# Patient Record
Sex: MALE | Race: WHITE | Employment: OTHER | URBAN - METROPOLITAN AREA
[De-identification: names, ages, dates, MRNs, and addresses within clinical notes are randomized per-mention and may not be internally consistent; named-entity substitution may affect disease eponyms.]

---

## 2017-01-31 ENCOUNTER — OFFICE VISIT (OUTPATIENT)
Dept: SURGERY | Age: 57
End: 2017-01-31

## 2017-01-31 VITALS
HEART RATE: 67 BPM | HEIGHT: 71 IN | DIASTOLIC BLOOD PRESSURE: 68 MMHG | OXYGEN SATURATION: 98 % | WEIGHT: 162 LBS | SYSTOLIC BLOOD PRESSURE: 120 MMHG | TEMPERATURE: 98.8 F | BODY MASS INDEX: 22.68 KG/M2

## 2017-01-31 DIAGNOSIS — K43.2 INCISIONAL HERNIA, WITHOUT OBSTRUCTION OR GANGRENE: Primary | ICD-10-CM

## 2017-01-31 RX ORDER — OXYCODONE AND ACETAMINOPHEN 10; 325 MG/1; MG/1
TABLET ORAL
Refills: 0 | COMMUNITY
Start: 2017-01-17 | End: 2017-11-02

## 2017-01-31 RX ORDER — MORPHINE SULFATE 20 MG/1
CAPSULE, EXTENDED RELEASE ORAL
Refills: 0 | COMMUNITY
Start: 2017-01-28 | End: 2017-11-02

## 2017-01-31 NOTE — PROGRESS NOTES
[employer] [facility] History and Physical    History of Present Illness:      Arnaldo Martin is a 55 y.o. male who has a PSH of multiple laparotomies for Crohn's disease, had small bowel resections, R colectomy, enterocutaneous fistulas. He was most recently operated on in 2/16 for fistula takedown by Dr Eleanor Pritchett of colorectal.  He now presents with an incisional hernia. The hernia is causing him pain in the mid abdomen. The skin is thin over his abdominal scar and has pain there all the time. The pain is increased with straining but hurts to press on it. He also says he thinks he may have one in the RUQ too. Past Medical History   Diagnosis Date    Abdominal wall hernia 6/15/2012    Anal fissure     Anemia     Anemia     Anxiety and depression     Arthritis      Related to the Crohn's disease.  Cancer ([address]      MELANOMA HEAD AND FACE    Chronic pain      GENERALIZED    COPD (chronic obstructive pulmonary disease) (HCC)     Crohn disease (HCC)      Diagnosed at age 17.  Echocardiogram normal (EF: 55-60%) 07/2015    Esophageal ulcer     GERD (gastroesophageal reflux disease)     H/O blood transfusion 2013     [address] [facility]    Hypertension      denies    Migraine     Short bowel syndrome     Ventral hernia without obstruction or gangrene        Past Surgical History   Procedure Laterality Date    Hc ndl ceja       ceja needle, takes 1 inch needle    Pr abdomen surgery proc unlisted       33 abdominal operations for treatment of Crohn's disease and its complications.     Hx cholecystectomy      Hx heent       neck fusion    Hx appendectomy      Hx other surgical       surgical repair from spider bite    Hx other surgical  12/1/14     Incision and drainage of posterior right subcutaneous shoulder abscess    Hx other surgical  2014     LEFT INDEX FINGER SPIDER BITE    Hx other surgical  2014     RECTAL FISTULA    Hx other surgical  3/17/2015     Ileostomy takedown with extensive lysis of adhesions (greater than three hours), mobilization of the splenic flexure, left colon resection, ileocolic anastomosis, and excision of scar; Dr. Harley Aguero.   other surgical  3/22/2015     Exploratory laparotomy with washout of abdomen, suture repair of small bowel/anastomosis, and diverting protective loop ileostomy; Steffany Cabello MD.    Hx other surgical  4/9/2015     Laparotomy, extensive lysis of adhesions, abdominal lavage, and resection of ileocolic anastomosis (including the efferent limb of the loop ileostomy) with creation of Demetrius's pouch; Dr. Harley Aguero.   gi       colectomy x2, one ileostomy    Hx gi  7/28/14     exp lap,partial colectomy with end ileostomy by Dr Ronak Burgess Hx orthopaedic  1980s     wrist right,     Hx orthopaedic  2006, 11/2013     neck, L4 L5 L6    Hx orthopaedic  1990s     right knee scope    Hx other surgical  7/14/2015     Cystoscopy and placement of bilateral ureteral catheters; Ermias Weeks MD.    Hx other surgical  7/14/2015     Ileostomy takedown with extensive lysis of adhesions, small bowel resection, and enterocolostomy; Dr. Harley Aguero.   other surgical  9/29/2015     CT-guided percutaneous drainage of intraabdominal abscess; Dr. Karen Broderick.   other surgical  11/16/2015     CT-guided percutaneous aspiration of abdominal wall cavity; Dr. José Hutchinson.   other surgical  12/1/2015     Incision and drainage of abdominal wall abscess; Dr. Harley Aguero.  Hx other surgical  2/11/2016     Incision and drainage of abdominal wall abscess; Dr. Harley Aguero.   other surgical  2/23/2016     Cystoscopy and placement of bilateral temporary ureteral catheters; Dr. Diana Sparks.  Hx other surgical  2/23/2016     Exploratory laparotomy, extensive lysis of adhesions, removal of mesh, and repair of enterocutaneous fistula; Dr. Harley Aguero.          Current Outpatient Prescriptions:     HYDROmorphone (DILAUDID) 8 mg tablet, Take 1 Tab by mouth every four (4) hours as needed. Max Daily Amount: 48 mg., Disp: 30 Tab, Rfl: 0    cephALEXin (KEFLEX) 500 mg capsule, Take 1 Cap by mouth four (4) times daily. , Disp: 24 Cap, Rfl: 0    colestipol (COLESTID) 1 gram tablet, Take 6 g by mouth four (4) times daily. Min 24 g/day and Max 30 g/day., Disp: , Rfl:     LORazepam (ATIVAN) 1 mg tablet, Take 1 mg by mouth nightly as needed for Anxiety. , Disp: , Rfl:     pregabalin (LYRICA) 75 mg capsule, Take 75 mg by mouth daily. , Disp: , Rfl:     aspirin-acetaminophen-caffeine (EXCEDRIN ES) 250-250-65 mg per tablet, Take 2 Tabs by mouth every six (6) hours as needed for Headache., Disp: , Rfl:     HYDROcodone-acetaminophen (NORCO)  mg tablet, Take 1 Tab by mouth every four (4) hours as needed for Pain., Disp: , Rfl:     diphenoxylate-atropine (LOMOTIL) 2.5-0.025 mg per tablet, Take 2 Tabs by mouth four (4) times daily as needed for Diarrhea., Disp: , Rfl:     loperamide (IMODIUM) 2 mg capsule, Take 2 mg by mouth as needed for Diarrhea. Patient takes 14-16 capsules a day., Disp: , Rfl:     diphenhydrAMINE (BENADRYL) 50 mg capsule, Take 100 mg by mouth nightly as needed. , Disp: , Rfl:     ondansetron hcl (ZOFRAN, AS HYDROCHLORIDE,) 8 mg tablet, Take 8 mg by mouth every eight (8) hours as needed for Nausea., Disp: , Rfl:     multivit-min-FA-lycopen-lutein (CENTRUM SILVER ULTRA MEN'S) 300-600-300 mcg tab, Take 1 Tab by mouth daily. , Disp: , Rfl:     folic acid 058 mcg tablet, Take 400 mcg by mouth three (3) times daily (with meals). , Disp: , Rfl:     Allergies   Allergen Reactions    Honey Anaphylaxis    Remicade [Infliximab] Anaphylaxis    Crestor [Rosuvastatin] Myalgia    Other Plant, Animal, Environmental Other (comments)     Developed \"boils\" on right arm that required I &D after receiving FENTANYL patch.   Is able to take FENTANYL orally; states is allergic to fentanyl patch GLUE    Imuran [Azathioprine] Diarrhea and Nausea Only    Mercaptopurine Diarrhea    Sulfa (Sulfonamide Antibiotics) Other (comments)     Causes diarrhea       Social History     Social History    Marital status:      Spouse name: N/A    Number of children: N/A    Years of education: N/A     Occupational History    Not on file.      Social History Main Topics    Smoking status: Current Every Day Smoker     Packs/day: 1.00     Years: 44.00    Smokeless tobacco: Current User      Comment: CURRENT E CIGS    Alcohol use No      Comment: RARELY    Drug use: No    Sexual activity: Not on file     Other Topics Concern    Not on file     Social History Narrative       Family History   Problem Relation Age of Onset    Stroke Mother     Heart Disease Mother     Kidney Disease Mother     Anesth Problems Mother      TAKES A LONG TIME TO WAKE UP    Heart Disease Father     Thyroid Disease Sister        ROS   Constitutional: negative  Ears, Nose, Mouth, Throat, and Face: negative  Respiratory: negative  Cardiovascular: negative  Gastrointestinal: positive for abdominal pain  Genitourinary:negative  Integument/Breast: negative  Hematologic/Lymphatic: negative  Behavioral/Psychiatric: negative  Allergic/Immunologic: negative      Physical Exam:     Visit Vitals    /68    Pulse 67    Temp 98.8 °F (37.1 °C)    Ht 5' 11\" (1.803 m)    Wt 162 lb (73.5 kg)    SpO2 98%    BMI 22.59 kg/m2       General - alert and oriented, no apparent distress  HEENT - no jaundice, no hearing imparement  Pulm - CTAB, no C/W/R  CV - RRR, no M/R/G  Abd - soft, ND, BS present, large wide midline scar from multiple surgeries, large incisional hernia palpable feels at least 10cm wide, small scab at the base of the hernia, TTP, no incarceration, very wide base  Ext - pulses intact in UE and LE bilaterally, no edema  Skin - supple, no rashes  Psychiatric - normal affect, good mood    Labs  Lab Results   Component Value Date/Time    Sodium 141 08/12/2016 05:32 AM    Potassium 4.5 08/12/2016 05:32 AM    Chloride 107 08/12/2016 05:32 AM    CO2 26 08/12/2016 05:32 AM    Anion gap 8 08/12/2016 05:32 AM    Glucose 102 08/12/2016 05:32 AM    BUN 14 08/12/2016 05:32 AM    Creatinine 1.02 08/12/2016 05:32 AM    BUN/Creatinine ratio 14 08/12/2016 05:32 AM    GFR est AA >60 08/12/2016 05:32 AM    GFR est non-AA >60 08/12/2016 05:32 AM    Calcium 8.6 08/12/2016 05:32 AM    Bilirubin, total 0.4 02/25/2016 02:01 AM    ALT 12 02/25/2016 02:01 AM    AST 12 02/25/2016 02:01 AM    Alk. phosphatase 86 02/25/2016 02:01 AM    Protein, total 6.0 02/25/2016 02:01 AM    Albumin 2.5 02/25/2016 02:01 AM    Globulin 3.5 02/25/2016 02:01 AM    A-G Ratio 0.7 02/25/2016 02:01 AM         Imaging  CT 3/31/16-  Decreased size of the previously seen wound in the subcutaneous tissues of   the right abdomen.      No evidence of a focal drainable fluid collection.     No obstruction. No hernia      I have reviewed and agree with all of the pertinent images    Assessment:     Radha Hernandez is a 55 y.o. male s/p multiple abdominal surgeries with large incisional hernia    Recommendations:     1. His hernia is very large and wide. He has also had a few attempts at hernia repair in the past but other surgeons, mostly they were done in [place]. I will need to get the records from these surgeries and also get a CT to get a baseline for the size of hernia now. He had cut down his smoking and is at a 1/2 ppd but still needs to be smoke free for a month prior to any possible surgery. He will try cutting down now. He will see me in the office soon after the CT to discuss our options for repair. The hernia is large and wide base so very little to no chance of incarceration. Shant Patel MD    Mr. Eleni Valentin has a reminder for a \"due or due soon\" health maintenance. I have asked that he contact his primary care provider for follow-up on this health maintenance.

## 2017-01-31 NOTE — PROGRESS NOTES
1. Have you been to the ER, urgent care clinic since your last visit? Hospitalized since your last visit?  no    2. Have you seen or consulted any other health care providers outside of the [address] [place] [address] since your last visit? Include any pap smears or colon screening.    Dr Omaira Hernandez

## 2017-02-03 ENCOUNTER — HOSPITAL ENCOUNTER (OUTPATIENT)
Dept: CT IMAGING | Age: 57
Discharge: HOME | End: 2017-02-03
Attending: SURGERY
Payer: MEDICARE

## 2017-02-03 DIAGNOSIS — K43.2 INCISIONAL HERNIA, WITHOUT OBSTRUCTION OR GANGRENE: ICD-10-CM

## 2017-02-03 PROCEDURE — 74011636320 HC RX REV CODE- 636/320: Performed by: SURGERY

## 2017-02-03 PROCEDURE — 74177 CT ABD & PELVIS W/CONTRAST: CPT

## 2017-02-03 PROCEDURE — 74011000258 HC RX REV CODE- 258: Performed by: SURGERY

## 2017-02-03 RX ORDER — SODIUM CHLORIDE 0.9 % (FLUSH) 0.9 %
10 SYRINGE (ML) INJECTION
Status: COMPLETED | OUTPATIENT
Start: 2017-02-03 | End: 2017-02-03

## 2017-02-03 RX ORDER — SODIUM CHLORIDE 0.9 % (FLUSH) 0.9 %
10 SYRINGE (ML) INJECTION
Status: DISCONTINUED | OUTPATIENT
Start: 2017-02-03 | End: 2017-02-03 | Stop reason: SDUPTHER

## 2017-02-03 RX ADMIN — Medication 10 ML: at 15:13

## 2017-02-03 RX ADMIN — IOPAMIDOL 100 ML: 755 INJECTION, SOLUTION INTRAVENOUS at 15:13

## 2017-02-03 RX ADMIN — SODIUM CHLORIDE 100 ML: 900 INJECTION, SOLUTION INTRAVENOUS at 15:13

## 2017-02-07 ENCOUNTER — TELEPHONE (OUTPATIENT)
Dept: SURGERY | Age: 57
End: 2017-02-07

## 2017-02-07 NOTE — TELEPHONE ENCOUNTER
I discussed the CT findings with the patient. The CT shows his hernia to be about 10cm wide. He has been smoke free for one week so far and is doing well. He will follow up with me in 2 wks. If he can be smoke free for 4wks he will undergo incisional hernia repair.       Sera Avery

## 2017-02-27 ENCOUNTER — OFFICE VISIT (OUTPATIENT)
Dept: SURGERY | Age: 57
End: 2017-02-27

## 2017-02-27 VITALS
TEMPERATURE: 98 F | RESPIRATION RATE: 18 BRPM | DIASTOLIC BLOOD PRESSURE: 70 MMHG | WEIGHT: 165 LBS | HEART RATE: 85 BPM | OXYGEN SATURATION: 97 % | HEIGHT: 71 IN | BODY MASS INDEX: 23.1 KG/M2 | SYSTOLIC BLOOD PRESSURE: 116 MMHG

## 2017-02-27 DIAGNOSIS — K43.2 INCISIONAL HERNIA, WITHOUT OBSTRUCTION OR GANGRENE: Primary | ICD-10-CM

## 2017-02-27 NOTE — PROGRESS NOTES
1. Have you been to the ER, urgent care clinic since your last visit? Hospitalized since your last visit?  no    2. Have you seen or consulted any other health care providers outside of the [address] [place] [address] since your last visit? Include any pap smears or colon screening.    no

## 2017-02-28 NOTE — PROGRESS NOTES
[facility] History and Physical    History of Present Illness:      Harpal Griggs is a 55 y.o. male who has a PSH of multiple laparotomies for Crohn's disease, had small bowel resections, R colectomy, enterocutaneous fistulas. He was most recently operated on in 2/16 for fistula takedown by Dr Akosua Sykes of colorectal. He now presents with an incisional hernia. He has been mostly smoke free for the past month. He otherwise is doing well and is having some mild pain from the hernia but is manageable. Past Medical History:   Diagnosis Date    Abdominal wall hernia 6/15/2012    Anal fissure     Anemia     Anemia     Anxiety and depression     Arthritis     Related to the Crohn's disease.  Cancer ([address]     MELANOMA HEAD AND FACE    Chronic pain     GENERALIZED    COPD (chronic obstructive pulmonary disease) (HCC)     Crohn disease (HCC)     Diagnosed at age 17.  Echocardiogram normal (EF: 55-60%) 07/2015    Esophageal ulcer     GERD (gastroesophageal reflux disease)     H/O blood transfusion 2013    [address] [facility]    Hypertension     denies    Migraine     Short bowel syndrome     Ventral hernia without obstruction or gangrene        Past Surgical History:   Procedure Laterality Date    ABDOMEN SURGERY PROC UNLISTED      33 abdominal operations for treatment of Crohn's disease and its complications.     HC NDL CEJA      ceja needle, takes 1 inch needle    HX APPENDECTOMY      HX CHOLECYSTECTOMY      HX GI      colectomy x2, one ileostomy    HX GI  7/28/14    exp lap,partial colectomy with end ileostomy by Dr Blank Organ      neck fusion    HX ORTHOPAEDIC  1980s    wrist right,     HX ORTHOPAEDIC  2006, 11/2013    neck, L4 L5 L6    HX ORTHOPAEDIC  1990s    right knee scope    HX OTHER SURGICAL      surgical repair from spider bite    HX OTHER SURGICAL  12/1/14    Incision and drainage of posterior right subcutaneous shoulder abscess    HX OTHER SURGICAL  2014    LEFT INDEX FINGER SPIDER BITE    HX OTHER SURGICAL  2014    RECTAL FISTULA    HX OTHER SURGICAL  3/17/2015    Ileostomy takedown with extensive lysis of adhesions (greater than three hours), mobilization of the splenic flexure, left colon resection, ileocolic anastomosis, and excision of scar; Dr. Codie Foote.  HX OTHER SURGICAL  3/22/2015    Exploratory laparotomy with washout of abdomen, suture repair of small bowel/anastomosis, and diverting protective loop ileostomy; Brice Del Rosario MD.    HX OTHER SURGICAL  4/9/2015    Laparotomy, extensive lysis of adhesions, abdominal lavage, and resection of ileocolic anastomosis (including the efferent limb of the loop ileostomy) with creation of Demetrius's pouch; Dr. Codie Foote.  HX OTHER SURGICAL  7/14/2015    Cystoscopy and placement of bilateral ureteral catheters; Toni Braxton MD.    HX OTHER SURGICAL  7/14/2015    Ileostomy takedown with extensive lysis of adhesions, small bowel resection, and enterocolostomy; Dr. Codie Foote.  HX OTHER SURGICAL  9/29/2015    CT-guided percutaneous drainage of intraabdominal abscess; Dr. Alicia Bernal.   OTHER SURGICAL  11/16/2015    CT-guided percutaneous aspiration of abdominal wall cavity; Dr. Dior Schwab.   OTHER SURGICAL  12/1/2015    Incision and drainage of abdominal wall abscess; Dr. Codie Foote.   OTHER SURGICAL  2/11/2016    Incision and drainage of abdominal wall abscess; Dr. Codie Foote.   OTHER SURGICAL  2/23/2016    Cystoscopy and placement of bilateral temporary ureteral catheters; Dr. Katherine Robertson.   OTHER SURGICAL  2/23/2016    Exploratory laparotomy, extensive lysis of adhesions, removal of mesh, and repair of enterocutaneous fistula; Dr. Codie Foote.          Current Outpatient Prescriptions:     oxyCODONE-acetaminophen (PERCOCET 10)  mg per tablet, TAKE 1 TABLET BY MOUTH EVERY 4 HOURS AS NEEDED FOR PAIN, Disp: , Rfl: 0    Morphine 20 mg CERP, take 1 capsule by mouth once daily, Disp: , Rfl: 0    colestipol (COLESTID) 1 gram tablet, Take 6 g by mouth four (4) times daily. Min 24 g/day and Max 30 g/day., Disp: , Rfl:     LORazepam (ATIVAN) 1 mg tablet, Take 1 mg by mouth nightly as needed for Anxiety. , Disp: , Rfl:     pregabalin (LYRICA) 75 mg capsule, Take 75 mg by mouth daily. , Disp: , Rfl:     loperamide (IMODIUM) 2 mg capsule, Take 2 mg by mouth as needed for Diarrhea. Patient takes 14-16 capsules a day., Disp: , Rfl:     ondansetron hcl (ZOFRAN, AS HYDROCHLORIDE,) 8 mg tablet, Take 8 mg by mouth every eight (8) hours as needed for Nausea., Disp: , Rfl:     multivit-min-FA-lycopen-lutein (CENTRUM SILVER ULTRA MEN'S) 300-600-300 mcg tab, Take 1 Tab by mouth daily. , Disp: , Rfl:     folic acid 810 mcg tablet, Take 400 mcg by mouth three (3) times daily (with meals). , Disp: , Rfl:     HYDROmorphone (DILAUDID) 8 mg tablet, Take 1 Tab by mouth every four (4) hours as needed. Max Daily Amount: 48 mg., Disp: 30 Tab, Rfl: 0    cephALEXin (KEFLEX) 500 mg capsule, Take 1 Cap by mouth four (4) times daily. , Disp: 24 Cap, Rfl: 0    aspirin-acetaminophen-caffeine (EXCEDRIN ES) 250-250-65 mg per tablet, Take 2 Tabs by mouth every six (6) hours as needed for Headache., Disp: , Rfl:     diphenoxylate-atropine (LOMOTIL) 2.5-0.025 mg per tablet, Take 2 Tabs by mouth four (4) times daily as needed for Diarrhea., Disp: , Rfl:     diphenhydrAMINE (BENADRYL) 50 mg capsule, Take 100 mg by mouth nightly as needed. , Disp: , Rfl:     Allergies   Allergen Reactions    Honey Anaphylaxis    Remicade [Infliximab] Anaphylaxis    Crestor [Rosuvastatin] Myalgia    Other Plant, Animal, Environmental Other (comments)     Developed \"boils\" on right arm that required I &D after receiving FENTANYL patch.   Is able to take FENTANYL orally; states is allergic to fentanyl patch GLUE    Imuran [Azathioprine] Diarrhea and Nausea Only    Mercaptopurine Diarrhea    Sulfa (Sulfonamide Antibiotics) Other (comments)     Causes diarrhea       Social History     Social History    Marital status: LEGALLY      Spouse name: N/A    Number of children: N/A    Years of education: N/A     Occupational History    Not on file. Social History Main Topics    Smoking status: Current Every Day Smoker     Packs/day: 1.00     Years: 44.00    Smokeless tobacco: Current User      Comment: CURRENT E CIGS    Alcohol use No      Comment: RARELY    Drug use: No    Sexual activity: Not on file     Other Topics Concern    Not on file     Social History Narrative       Family History   Problem Relation Age of Onset    Stroke Mother     Heart Disease Mother     Kidney Disease Mother     Anesth Problems Mother      TAKES A LONG TIME TO WAKE UP    Heart Disease Father     Thyroid Disease Sister        ROS   Constitutional: negative  Ears, Nose, Mouth, Throat, and Face: negative  Respiratory: negative  Cardiovascular: negative  Gastrointestinal: positive for abdominal pain  Genitourinary:negative  Integument/Breast: negative  Hematologic/Lymphatic: negative  Behavioral/Psychiatric: negative  Allergic/Immunologic: negative      Physical Exam:     Visit Vitals    /70    Pulse 85    Temp 98 °F (36.7 °C)    Resp 18    Ht 5' 11\" (1.803 m)    Wt 165 lb (74.8 kg)    SpO2 97%    BMI 23.01 kg/m2       General - alert and oriented, no apparent distress  HEENT - no jaundice, no hearing imparement  Pulm - CTAB, no C/W/R  CV - RRR, no M/R/G  Abd - soft, ND, BS present, large incisional hernia  Ext - pulses intact in UE and LE bilaterally, no edema  Skin - supple, no rashes  Psychiatric - normal affect, good mood    Labs  none    Imaging  CT - large incisional hernia about 13cm at its widest  I have reviewed and agree with all of the pertinent images    Assessment:     Cheryl Rowley is a 55 y.o. male with large incisional hernia    Recommendations:     1.    He will get sent for urine cotenine testing to test for him smoking. He needs to be smoke free for 4 wks prior to surgery. I will call him after the test results return. If it is positive I will likely have him retest in 1 wk. If normal then we may schedule him for surgery for incisional hernia repair with mesh and component separation. Ruby Kaplan MD    Mr. Thomas Weir has a reminder for a \"due or due soon\" health maintenance. I have asked that he contact his primary care provider for follow-up on this health maintenance.

## 2017-06-22 ENCOUNTER — APPOINTMENT (OUTPATIENT)
Dept: GENERAL RADIOLOGY | Age: 57
End: 2017-06-22
Attending: STUDENT IN AN ORGANIZED HEALTH CARE EDUCATION/TRAINING PROGRAM
Payer: MEDICARE

## 2017-06-22 ENCOUNTER — HOSPITAL ENCOUNTER (EMERGENCY)
Age: 57
Discharge: HOME | End: 2017-06-22
Attending: STUDENT IN AN ORGANIZED HEALTH CARE EDUCATION/TRAINING PROGRAM
Payer: MEDICARE

## 2017-06-22 VITALS
DIASTOLIC BLOOD PRESSURE: 76 MMHG | OXYGEN SATURATION: 100 % | SYSTOLIC BLOOD PRESSURE: 128 MMHG | WEIGHT: 179 LBS | TEMPERATURE: 98.5 F | BODY MASS INDEX: 26.51 KG/M2 | HEIGHT: 69 IN | RESPIRATION RATE: 14 BRPM | HEART RATE: 64 BPM

## 2017-06-22 DIAGNOSIS — M25.461 KNEE EFFUSION, RIGHT: Primary | ICD-10-CM

## 2017-06-22 PROCEDURE — 75810000054 HC ARTHOCENTISIS JOINT

## 2017-06-22 PROCEDURE — 73562 X-RAY EXAM OF KNEE 3: CPT

## 2017-06-22 PROCEDURE — 99282 EMERGENCY DEPT VISIT SF MDM: CPT

## 2017-06-22 PROCEDURE — 74011000250 HC RX REV CODE- 250: Performed by: STUDENT IN AN ORGANIZED HEALTH CARE EDUCATION/TRAINING PROGRAM

## 2017-06-22 RX ORDER — KETOROLAC TROMETHAMINE 10 MG/1
10 TABLET, FILM COATED ORAL
Qty: 8 TAB | Refills: 0 | Status: SHIPPED | OUTPATIENT
Start: 2017-06-22 | End: 2017-06-24

## 2017-06-22 RX ORDER — LIDOCAINE HYDROCHLORIDE 10 MG/ML
5 INJECTION INFILTRATION; PERINEURAL ONCE
Status: COMPLETED | OUTPATIENT
Start: 2017-06-22 | End: 2017-06-22

## 2017-06-22 RX ADMIN — LIDOCAINE HYDROCHLORIDE 5 ML: 10 INJECTION, SOLUTION INFILTRATION; PERINEURAL at 19:01

## 2017-06-22 NOTE — DISCHARGE INSTRUCTIONS
We hope that we have addressed all of your medical concerns. The examination and treatment you received in the Emergency Department were for an emergent problem and were not intended as complete care. It is important that you follow up with your healthcare provider(s) for ongoing care. If your symptoms worsen or do not improve as expected, and you are unable to reach your usual health care provider(s), you should return to the Emergency Department. Today's healthcare is undergoing tremendous change, and patient satisfaction surveys are one of the many tools to assess the quality of medical care. You may receive a survey from the [employer] organization regarding your experience in the Emergency Department. I hope that your experience has been completely positive, particularly the medical care that I provided. As such, please participate in the survey; anything less than excellent does not meet my expectations or intentions. Formerly [facility] and [address] [place] [address] participate in nationally recognized quality of care measures. If your blood pressure is greater than 120/80, as reported below, we urge that you seek medical care to address the potential of high blood pressure, commonly known as hypertension. Hypertension can be hereditary or can be caused by certain medical conditions, pain, stress, or \"white coat syndrome. \"       Please make an appointment with your health care provider(s) for follow up of your Emergency Department visit. VITALS:   Patient Vitals for the past 8 hrs:   Temp Pulse Resp BP SpO2   06/22/17 1751 98.4 °F (36.9 °C) 93 16 135/78 98 %          Thank you for allowing us to provide you with medical care today. We realize that you have many choices for your emergency care needs. Please choose us in the future for any continued health care needs. La Kapoor, [address] [place] [address] [address]   Office: [phone]            No results found for this or any previous visit (from the past 24 hour(s)). Xr Knee Rt 3 V    Result Date: 6/22/2017  EXAM:  XR KNEE RT 3 V INDICATION:  Right knee pain, swelling. Today. No injury. COMPARISON: None. FINDINGS: Three views of the right knee demonstrate a joint effusion without fat fluid level. There is mild osteoarthritis. There is no apparent fracture. IMPRESSION: Joint effusion. No apparent fracture. Knee Pain or Injury: Care Instructions  Your Care Instructions    Injuries are a common cause of knee problems. Sudden (acute) injuries may be caused by a direct blow to the knee. They can also be caused by abnormal twisting, bending, or falling on the knee. Pain, bruising, or swelling may be severe, and may start within minutes of the injury. Overuse is another cause of knee pain. Other causes are climbing stairs, kneeling, and other activities that use the knee. Everyday wear and tear, especially as you get older, also can cause knee pain. Rest, along with home treatment, often relieves pain and allows your knee to heal. If you have a serious knee injury, you may need tests and treatment. Follow-up care is a key part of your treatment and safety. Be sure to make and go to all appointments, and call your doctor if you are having problems. It's also a good idea to know your test results and keep a list of the medicines you take. How can you care for yourself at home? · Be safe with medicines. Read and follow all instructions on the label. ¨ If the doctor gave you a prescription medicine for pain, take it as prescribed. ¨ If you are not taking a prescription pain medicine, ask your doctor if you can take an over-the-counter medicine. · Rest and protect your knee. Take a break from any activity that may cause pain. · Put ice or a cold pack on your knee for 10 to 20 minutes at a time. Put a thin cloth between the ice and your skin.   · Prop up a sore knee on a pillow when you ice it or anytime you sit or lie down for the next 3 days. Try to keep it above the level of your heart. This will help reduce swelling. · If your knee is not swollen, you can put moist heat, a heating pad, or a warm cloth on your knee. · If your doctor recommends an elastic bandage, sleeve, or other type of support for your knee, wear it as directed. · Follow your doctor's instructions about how much weight you can put on your leg. Use a cane, crutches, or a walker as instructed. · Follow your doctor's instructions about activity during your healing process. If you can do mild exercise, slowly increase your activity. · Reach and stay at a healthy weight. Extra weight can strain the joints, especially the knees and hips, and make the pain worse. Losing even a few pounds may help. When should you call for help? Call 911 anytime you think you may need emergency care. For example, call if:  · You have symptoms of a blood clot in your lung (called a pulmonary embolism). These may include:  ¨ Sudden chest pain. ¨ Trouble breathing. ¨ Coughing up blood. Call your doctor now or seek immediate medical care if:  · You have severe or increasing pain. · Your leg or foot turns cold or changes color. · You cannot stand or put weight on your knee. · Your knee looks twisted or bent out of shape. · You cannot move your knee. · You have signs of infection, such as:  ¨ Increased pain, swelling, warmth, or redness. ¨ Red streaks leading from the knee. ¨ Pus draining from a place on your knee. ¨ A fever. · You have signs of a blood clot in your leg (called a deep vein thrombosis), such as:  ¨ Pain in your calf, back of the knee, thigh, or groin. ¨ Redness and swelling in your leg or groin. Watch closely for changes in your health, and be sure to contact your doctor if:  · You have tingling, weakness, or numbness in your knee. · You have any new symptoms, such as swelling.   · You have bruises from a knee injury that last longer than 2 weeks. · You do not get better as expected. Where can you learn more? Go to http://johnathon-marielos.info/. Enter K195 in the search box to learn more about \"Knee Pain or Injury: Care Instructions. \"  Current as of: March 20, 2017  Content Version: 11.3  © 8820-9736 [employer]. Care instructions adapted under license by [employer] (which disclaims liability or warranty for this information). If you have questions about a medical condition or this instruction, always ask your healthcare professional. [place] [address] any warranty or liability for your use of this information.

## 2017-06-22 NOTE — ED NOTES
Pt given discharge instructions, patient education, prescriptions, and follow up information by Provider. Pt states understanding. All questions answered. Pt discharged to home in private vehicle, ambulatory. Pt A/Ox4, RA, pain controlled.

## 2017-08-18 ENCOUNTER — HOSPITAL ENCOUNTER (EMERGENCY)
Age: 57
Discharge: HOME | End: 2017-08-18
Attending: EMERGENCY MEDICINE
Payer: MEDICARE

## 2017-08-18 ENCOUNTER — APPOINTMENT (OUTPATIENT)
Dept: GENERAL RADIOLOGY | Age: 57
End: 2017-08-18
Attending: PHYSICIAN ASSISTANT
Payer: MEDICARE

## 2017-08-18 VITALS
SYSTOLIC BLOOD PRESSURE: 134 MMHG | HEART RATE: 68 BPM | TEMPERATURE: 98.3 F | RESPIRATION RATE: 16 BRPM | HEIGHT: 71 IN | OXYGEN SATURATION: 98 % | WEIGHT: 180 LBS | DIASTOLIC BLOOD PRESSURE: 81 MMHG | BODY MASS INDEX: 25.2 KG/M2

## 2017-08-18 DIAGNOSIS — M25.461 KNEE EFFUSION, RIGHT: Primary | ICD-10-CM

## 2017-08-18 PROCEDURE — 74011250637 HC RX REV CODE- 250/637: Performed by: PHYSICIAN ASSISTANT

## 2017-08-18 PROCEDURE — 73562 X-RAY EXAM OF KNEE 3: CPT

## 2017-08-18 PROCEDURE — 96372 THER/PROPH/DIAG INJ SC/IM: CPT

## 2017-08-18 PROCEDURE — 74011250636 HC RX REV CODE- 250/636: Performed by: PHYSICIAN ASSISTANT

## 2017-08-18 PROCEDURE — 99284 EMERGENCY DEPT VISIT MOD MDM: CPT

## 2017-08-18 PROCEDURE — L1830 KO IMMOB CANVAS LONG PRE OTS: HCPCS

## 2017-08-18 RX ORDER — OXYCODONE AND ACETAMINOPHEN 5; 325 MG/1; MG/1
1 TABLET ORAL
Status: COMPLETED | OUTPATIENT
Start: 2017-08-18 | End: 2017-08-18

## 2017-08-18 RX ORDER — KETOROLAC TROMETHAMINE 30 MG/ML
60 INJECTION, SOLUTION INTRAMUSCULAR; INTRAVENOUS
Status: COMPLETED | OUTPATIENT
Start: 2017-08-18 | End: 2017-08-18

## 2017-08-18 RX ADMIN — OXYCODONE HYDROCHLORIDE AND ACETAMINOPHEN 1 TABLET: 5; 325 TABLET ORAL at 17:15

## 2017-08-18 RX ADMIN — KETOROLAC TROMETHAMINE 60 MG: 30 INJECTION, SOLUTION INTRAMUSCULAR at 17:15

## 2017-08-18 NOTE — ED NOTES
Discharge instructions given to pt. All questions answered and pt verbalized understanding. V/S stable @ time of discharge. Pt ambulatory out of unit on crutches.

## 2017-08-18 NOTE — ED TRIAGE NOTES
TRIAGE NOTE: Patient reports right knee \"popped real bad a few days ago while walking up an incline. Now I can't stand it. \" Reports pain was originally just his right knee, now it radiates down to foot and up to hip.

## 2017-08-18 NOTE — DISCHARGE INSTRUCTIONS
We hope that we have addressed all of your medical concerns. The examination and treatment you received in the Emergency Department were for an emergent problem and were not intended as complete care. It is important that you follow up with your healthcare provider(s) for ongoing care. If your symptoms worsen or do not improve as expected, and you are unable to reach your usual health care provider(s), you should return to the Emergency Department. Today's healthcare is undergoing tremendous change, and patient satisfaction surveys are one of the many tools to assess the quality of medical care. You may receive a survey from the [employer] regarding your experience in the Emergency Department. I hope that your experience has been completely positive, particularly the medical care that I provided. As such, please participate in the survey; anything less than excellent does not meet my expectations or intentions. [facility] and [address] [place] [address] participate in nationally recognized quality of care measures. If your blood pressure is greater than 120/80, as reported below, we urge that you seek medical care to address the potential of high blood pressure, commonly known as hypertension. Hypertension can be hereditary or can be caused by certain medical conditions, pain, stress, or \"white coat syndrome. \"       Please make an appointment with your health care provider(s) for follow up of your Emergency Department visit. VITALS:   Patient Vitals for the past 8 hrs:   Temp Pulse Resp BP SpO2   08/18/17 1649 98.2 °F (36.8 °C) 97 16 132/82 97 %          Thank you for allowing us to provide you with medical care today. We realize that you have many choices for your emergency care needs. Please choose us in the future for any continued health care needs. Kerry Mccoy, [address] [facility]   Office: [phone]            No results found for this or any previous visit (from the past 24 hour(s)). Xr Knee Rt 3 V    Result Date: 8/18/2017  EXAM:  XR KNEE RT 3 V INDICATION:   Right knee pain after walking. COMPARISON: 6/22/2017. FINDINGS: Three views of the right knee demonstrate no fracture or other acute osseous or articular abnormality. There is a large joint effusion. IMPRESSION:  Large effusion. No acute fracture. Learning About RICE (Rest, Ice, Compression, and Elevation)  What is RICE? RICE is a way to care for an injury. RICE helps relieve pain and swelling. It may also help with healing and flexibility. RICE stands for:  · Rest and protect the injured or sore area. · Ice or a cold pack used as soon as possible. · Compression, or wrapping the injured or sore area with an elastic bandage. · Elevation (propping up) the injured or sore area. How do you do RICE? You can use RICE for home treatment when you have general aches and pains or after an injury or surgery. Rest  · Do not put weight on the injury for at least 24 to 48 hours. · Use crutches for a badly sprained knee or ankle. · Support a sprained wrist, elbow, or shoulder with a sling. Ice  · Put ice or a cold pack on the injury right away to reduce pain and swelling. Frozen vegetables will also work as an ice pack. Put a thin cloth between the ice or cold pack and your skin. The cloth protects the injured area from getting too cold. · Use ice for 10 to 15 minutes at a time for the first 48 to 72 hours. Compression  · Use compression for sprains, strains, and surgeries of the arms and legs. · Wrap the injured area with an elastic bandage or compression sleeve to reduce swelling. · Don't wrap it too tightly. If the area below it feels numb, tingles, or feels cool, loosen the wrap. Elevation  · Use elevation for areas of the body that can be propped up, such as arms and legs.   · Prop up the injured area on pillows whenever you use ice. Keep it propped up anytime you sit or lie down. · Try to keep the injured area at or above the level of your heart. This will help reduce swelling and bruising. Where can you learn more? Go to http://johnathon-marielos.info/. Enter X043 in the search box to learn more about \"Learning About RICE (Rest, Ice, Compression, and Elevation). \"  Current as of: March 21, 2017  Content Version: 11.3  © 3234-1017 [employer]. Care instructions adapted under license by [employer] (which disclaims liability or warranty for this information). If you have questions about a medical condition or this instruction, always ask your healthcare professional. [place] [address] any warranty or liability for your use of this information. Knee Pain or Injury: Care Instructions  Your Care Instructions    Injuries are a common cause of knee problems. Sudden (acute) injuries may be caused by a direct blow to the knee. They can also be caused by abnormal twisting, bending, or falling on the knee. Pain, bruising, or swelling may be severe, and may start within minutes of the injury. Overuse is another cause of knee pain. Other causes are climbing stairs, kneeling, and other activities that use the knee. Everyday wear and tear, especially as you get older, also can cause knee pain. Rest, along with home treatment, often relieves pain and allows your knee to heal. If you have a serious knee injury, you may need tests and treatment. Follow-up care is a key part of your treatment and safety. Be sure to make and go to all appointments, and call your doctor if you are having problems. It's also a good idea to know your test results and keep a list of the medicines you take. How can you care for yourself at home? · Be safe with medicines. Read and follow all instructions on the label.   ¨ If the doctor gave you a prescription medicine for pain, take it as prescribed. ¨ If you are not taking a prescription pain medicine, ask your doctor if you can take an over-the-counter medicine. · Rest and protect your knee. Take a break from any activity that may cause pain. · Put ice or a cold pack on your knee for 10 to 20 minutes at a time. Put a thin cloth between the ice and your skin. · Prop up a sore knee on a pillow when you ice it or anytime you sit or lie down for the next 3 days. Try to keep it above the level of your heart. This will help reduce swelling. · If your knee is not swollen, you can put moist heat, a heating pad, or a warm cloth on your knee. · If your doctor recommends an elastic bandage, sleeve, or other type of support for your knee, wear it as directed. · Follow your doctor's instructions about how much weight you can put on your leg. Use a cane, crutches, or a walker as instructed. · Follow your doctor's instructions about activity during your healing process. If you can do mild exercise, slowly increase your activity. · Reach and stay at a healthy weight. Extra weight can strain the joints, especially the knees and hips, and make the pain worse. Losing even a few pounds may help. When should you call for help? Call 911 anytime you think you may need emergency care. For example, call if:  · You have symptoms of a blood clot in your lung (called a pulmonary embolism). These may include:  ¨ Sudden chest pain. ¨ Trouble breathing. ¨ Coughing up blood. Call your doctor now or seek immediate medical care if:  · You have severe or increasing pain. · Your leg or foot turns cold or changes color. · You cannot stand or put weight on your knee. · Your knee looks twisted or bent out of shape. · You cannot move your knee. · You have signs of infection, such as:  ¨ Increased pain, swelling, warmth, or redness. ¨ Red streaks leading from the knee. ¨ Pus draining from a place on your knee. ¨ A fever.   · You have signs of a blood clot in your leg (called a deep vein thrombosis), such as:  ¨ Pain in your calf, back of the knee, thigh, or groin. ¨ Redness and swelling in your leg or groin. Watch closely for changes in your health, and be sure to contact your doctor if:  · You have tingling, weakness, or numbness in your knee. · You have any new symptoms, such as swelling. · You have bruises from a knee injury that last longer than 2 weeks. · You do not get better as expected. Where can you learn more? Go to http://johnathon-marielos.info/. Enter K195 in the search box to learn more about \"Knee Pain or Injury: Care Instructions. \"  Current as of: March 20, 2017  Content Version: 11.3  © 4939-0978 [employer]. Care instructions adapted under license by [employer] (which disclaims liability or warranty for this information). If you have questions about a medical condition or this instruction, always ask your healthcare professional. [place] [address] any warranty or liability for your use of this information.

## 2017-08-18 NOTE — ED PROVIDER NOTES
HPI Comments: 65year old male presenting to the ED for RIGHT knee pain. Pt notes that a few days ago he was walking up an incline when it popped and he fell to the ground, notes that he landed directly onto the knee. Pt states that about 30 minutes later he got up and was able to walk, had localized pain, thought it would improve. Pt notes that over the last couple of days the pain has worsened, now radiates into the ankle and up into the hip. No fever. No tx attempted PTA. Unable to bear weight. Pt notes that he has had issues with the same knee in the past.  Does not have orthopedist.  No CP, SOB, or other concerns. PMHx: extensive GI hx, please see list.  Also HTN, anemia, arthritis, skin CA     reviewed, filled #180 10 mg percocet and #30 15mg morphine ER on 8/7. Patient is a 65 y.o. male presenting with knee pain. The history is provided by the patient. Knee Pain           Past Medical History:   Diagnosis Date    Abdominal wall hernia 6/15/2012    Anal fissure     Anemia     Anemia     Anxiety and depression     Arthritis     Related to the Crohn's disease.  Cancer ([facility] [address]     MELANOMA HEAD AND FACE    Chronic pain     GENERALIZED    COPD (chronic obstructive pulmonary disease) (HCC)     Crohn disease (HCC)     Diagnosed at age 17.  Echocardiogram normal (EF: 55-60%) 07/2015    Esophageal ulcer     GERD (gastroesophageal reflux disease)     H/O blood transfusion 2013    [address] [facility]    Hypertension     denies    Migraine     Short bowel syndrome     Ventral hernia without obstruction or gangrene        Past Surgical History:   Procedure Laterality Date    ABDOMEN SURGERY PROC UNLISTED      33 abdominal operations for treatment of Crohn's disease and its complications.     HC NDL CEJA      ceja needle, takes 1 inch needle    HX APPENDECTOMY      HX CHOLECYSTECTOMY      HX GI      colectomy x2, one ileostomy    HX GI  7/28/14    exp lap,partial colectomy with end ileostomy by Dr Judy Quintana      neck fusion    HX ORTHOPAEDIC  1980s    wrist right,     HX ORTHOPAEDIC  2006, 11/2013    neck, L4 L5 L6    HX ORTHOPAEDIC  1990s    right knee scope    HX OTHER SURGICAL      surgical repair from spider bite    HX OTHER SURGICAL  12/1/14    Incision and drainage of posterior right subcutaneous shoulder abscess    HX OTHER SURGICAL  2014    LEFT INDEX FINGER SPIDER BITE    HX OTHER SURGICAL  2014    RECTAL FISTULA    HX OTHER SURGICAL  3/17/2015    Ileostomy takedown with extensive lysis of adhesions (greater than three hours), mobilization of the splenic flexure, left colon resection, ileocolic anastomosis, and excision of scar; Dr. Laura Montague.  HX OTHER SURGICAL  3/22/2015    Exploratory laparotomy with washout of abdomen, suture repair of small bowel/anastomosis, and diverting protective loop ileostomy; Heath Mandel MD.    HX OTHER SURGICAL  4/9/2015    Laparotomy, extensive lysis of adhesions, abdominal lavage, and resection of ileocolic anastomosis (including the efferent limb of the loop ileostomy) with creation of Demetrius's pouch; Dr. Laura Montague.   OTHER SURGICAL  7/14/2015    Cystoscopy and placement of bilateral ureteral catheters; Kitty Multani MD.    HX OTHER SURGICAL  7/14/2015    Ileostomy takedown with extensive lysis of adhesions, small bowel resection, and enterocolostomy; Dr. Laura Montague.   OTHER SURGICAL  9/29/2015    CT-guided percutaneous drainage of intraabdominal abscess; Dr. Ralph Cobian.   OTHER SURGICAL  11/16/2015    CT-guided percutaneous aspiration of abdominal wall cavity; Dr. Carlos Eduardo Shultz.   OTHER SURGICAL  12/1/2015    Incision and drainage of abdominal wall abscess; Dr. Laura Montague.   OTHER SURGICAL  2/11/2016    Incision and drainage of abdominal wall abscess; Dr. Laura Montague.      OTHER SURGICAL  2/23/2016    Cystoscopy and placement of bilateral temporary ureteral catheters; Dr. Katherine Rodriguez.  HX OTHER SURGICAL  2/23/2016    Exploratory laparotomy, extensive lysis of adhesions, removal of mesh, and repair of enterocutaneous fistula; Dr. Irving Oates. Family History:   Problem Relation Age of Onset   Jojo Lopez Stroke Mother     Heart Disease Mother     Kidney Disease Mother     Anesth Problems Mother      TAKES A LONG TIME TO WAKE UP    Heart Disease Father     Thyroid Disease Sister        Social History     Social History    Marital status: LEGALLY      Spouse name: N/A    Number of children: N/A    Years of education: N/A     Occupational History    Not on file. Social History Main Topics    Smoking status: Current Every Day Smoker     Packs/day: 1.00     Years: 44.00    Smokeless tobacco: Current User      Comment: CURRENT E [employer]    Alcohol use No      Comment: RARELY    Drug use: No    Sexual activity: Not on file     Other Topics Concern    Not on file     Social History Narrative         ALLERGIES: Honey; Remicade [infliximab]; Crestor [rosuvastatin]; Other plant, animal, environmental; Imuran [azathioprine]; Mercaptopurine; and Sulfa (sulfonamide antibiotics)    Review of Systems   Constitutional: Negative for fever. HENT: Negative for trouble swallowing. Eyes: Negative for visual disturbance. Respiratory: Negative for shortness of breath. Cardiovascular: Positive for leg swelling. Gastrointestinal: Negative for abdominal pain, diarrhea and vomiting. Musculoskeletal: Positive for arthralgias and joint swelling. Skin: Negative for wound. Neurological: Negative for weakness. All other systems reviewed and are negative. Vitals:    08/18/17 1649   BP: 132/82   Pulse: 97   Resp: 16   Temp: 98.2 °F (36.8 °C)   SpO2: 97%   Weight: 81.6 kg (180 lb)   Height: 5' 11\" (1.803 m)            Physical Exam   Constitutional: He is oriented to person, place, and time. He appears well-developed and well-nourished. No distress.    Pleasant WM HENT:   Head: Normocephalic and atraumatic. Right Ear: External ear normal.   Left Ear: External ear normal.   Eyes: Conjunctivae are normal. No scleral icterus. Neck: Neck supple. No tracheal deviation present. Cardiovascular: Normal rate. Pulmonary/Chest: Effort normal. No stridor. No respiratory distress. Abdominal: Soft. He exhibits no distension. Musculoskeletal: He exhibits edema and tenderness. RIGHT KNEE: + swelling, large medial effusion. + tender. Pt able to extend the knee. No appreciable laxity. 2+ DP and PT pulses, sensation intact distally, foot warm and well-perfused, brisk cap refill. No TTP of the hip or ankle   Neurological: He is alert and oriented to person, place, and time. Skin: Skin is warm and dry. Psychiatric: He has a normal mood and affect. His behavior is normal.   Nursing note and vitals reviewed. MDM  Number of Diagnoses or Management Options  Knee effusion, right:   Diagnosis management comments: 65year old male presenting to the ED for RIGHT knee pain after a fall a few days ago. Pt with hx problems with the knee. Large effusion noted on exam and XR. No fx. Discussed with pt possible ligamentous injury. Encouraged use of immobilizer, crutches, RICE, ortho f/u.        Amount and/or Complexity of Data Reviewed  Tests in the radiology section of CPT®: ordered and reviewed  Discuss the patient with other providers: yes (Dr. Cristiane Bhatia, ED attending)      ED Course       Procedures

## 2017-11-02 ENCOUNTER — HOSPITAL ENCOUNTER (INPATIENT)
Age: 57
LOS: 7 days | Discharge: HOME | DRG: 330 | End: 2017-11-10
Attending: STUDENT IN AN ORGANIZED HEALTH CARE EDUCATION/TRAINING PROGRAM | Admitting: COLON & RECTAL SURGERY
Payer: MEDICARE

## 2017-11-02 ENCOUNTER — ANESTHESIA (OUTPATIENT)
Dept: SURGERY | Age: 57
DRG: 330 | End: 2017-11-02
Payer: MEDICARE

## 2017-11-02 ENCOUNTER — APPOINTMENT (OUTPATIENT)
Dept: CT IMAGING | Age: 57
DRG: 330 | End: 2017-11-02
Attending: STUDENT IN AN ORGANIZED HEALTH CARE EDUCATION/TRAINING PROGRAM
Payer: MEDICARE

## 2017-11-02 ENCOUNTER — APPOINTMENT (OUTPATIENT)
Dept: GENERAL RADIOLOGY | Age: 57
DRG: 330 | End: 2017-11-02
Attending: COLON & RECTAL SURGERY
Payer: MEDICARE

## 2017-11-02 ENCOUNTER — APPOINTMENT (OUTPATIENT)
Dept: GENERAL RADIOLOGY | Age: 57
DRG: 330 | End: 2017-11-02
Attending: EMERGENCY MEDICINE
Payer: MEDICARE

## 2017-11-02 DIAGNOSIS — K56.609 LARGE BOWEL OBSTRUCTION (HCC): Primary | ICD-10-CM

## 2017-11-02 DIAGNOSIS — R11.0 NAUSEA: ICD-10-CM

## 2017-11-02 DIAGNOSIS — K90.9 DIARRHEA DUE TO MALABSORPTION: ICD-10-CM

## 2017-11-02 DIAGNOSIS — G89.18 ACUTE POST-OPERATIVE PAIN: ICD-10-CM

## 2017-11-02 DIAGNOSIS — G89.4 CHRONIC PAIN SYNDROME: ICD-10-CM

## 2017-11-02 DIAGNOSIS — R10.9 ACUTE ABDOMINAL PAIN: ICD-10-CM

## 2017-11-02 DIAGNOSIS — Z71.89 ADVANCED CARE PLANNING/COUNSELING DISCUSSION: ICD-10-CM

## 2017-11-02 DIAGNOSIS — R10.84 GENERALIZED ABDOMINAL PAIN: ICD-10-CM

## 2017-11-02 DIAGNOSIS — R19.7 DIARRHEA DUE TO MALABSORPTION: ICD-10-CM

## 2017-11-02 DIAGNOSIS — M54.50 CHRONIC BILATERAL LOW BACK PAIN WITHOUT SCIATICA: ICD-10-CM

## 2017-11-02 DIAGNOSIS — G89.29 CHRONIC BILATERAL LOW BACK PAIN WITHOUT SCIATICA: ICD-10-CM

## 2017-11-02 LAB
ALBUMIN SERPL-MCNC: 3.8 G/DL (ref 3.5–5)
ALBUMIN/GLOB SERPL: 1 {RATIO} (ref 1.1–2.2)
ALP SERPL-CCNC: 99 U/L (ref 45–117)
ALT SERPL-CCNC: 22 U/L (ref 12–78)
ANION GAP SERPL CALC-SCNC: 10 MMOL/L (ref 5–15)
AST SERPL-CCNC: 17 U/L (ref 15–37)
BASOPHILS # BLD: 0 K/UL (ref 0–0.1)
BASOPHILS NFR BLD: 0 % (ref 0–1)
BILIRUB SERPL-MCNC: 0.3 MG/DL (ref 0.2–1)
BUN SERPL-MCNC: 9 MG/DL (ref 6–20)
BUN/CREAT SERPL: 9 (ref 12–20)
CALCIUM SERPL-MCNC: 8.7 MG/DL (ref 8.5–10.1)
CHLORIDE SERPL-SCNC: 102 MMOL/L (ref 97–108)
CO2 SERPL-SCNC: 26 MMOL/L (ref 21–32)
CREAT SERPL-MCNC: 1.03 MG/DL (ref 0.7–1.3)
EOSINOPHIL # BLD: 0.2 K/UL (ref 0–0.4)
EOSINOPHIL NFR BLD: 2 % (ref 0–7)
ERYTHROCYTE [DISTWIDTH] IN BLOOD BY AUTOMATED COUNT: 15.7 % (ref 11.5–14.5)
GLOBULIN SER CALC-MCNC: 3.8 G/DL (ref 2–4)
GLUCOSE SERPL-MCNC: 79 MG/DL (ref 65–100)
HCT VFR BLD AUTO: 43.3 % (ref 36.6–50.3)
HGB BLD-MCNC: 14 G/DL (ref 12.1–17)
LYMPHOCYTES # BLD: 2.2 K/UL (ref 0.8–3.5)
LYMPHOCYTES NFR BLD: 23 % (ref 12–49)
MCH RBC QN AUTO: 28.5 PG (ref 26–34)
MCHC RBC AUTO-ENTMCNC: 32.3 G/DL (ref 30–36.5)
MCV RBC AUTO: 88 FL (ref 80–99)
MONOCYTES # BLD: 0.7 K/UL (ref 0–1)
MONOCYTES NFR BLD: 7 % (ref 5–13)
NEUTS SEG # BLD: 6.6 K/UL (ref 1.8–8)
NEUTS SEG NFR BLD: 68 % (ref 32–75)
PLATELET # BLD AUTO: 392 K/UL (ref 150–400)
POTASSIUM SERPL-SCNC: 3.6 MMOL/L (ref 3.5–5.1)
PROT SERPL-MCNC: 7.6 G/DL (ref 6.4–8.2)
RBC # BLD AUTO: 4.92 M/UL (ref 4.1–5.7)
SODIUM SERPL-SCNC: 138 MMOL/L (ref 136–145)
WBC # BLD AUTO: 9.7 K/UL (ref 4.1–11.1)

## 2017-11-02 PROCEDURE — 99284 EMERGENCY DEPT VISIT MOD MDM: CPT

## 2017-11-02 PROCEDURE — 74000 XR ABD PORT  1 V: CPT

## 2017-11-02 PROCEDURE — 77030002966 HC SUT PDS J&J -A: Performed by: COLON & RECTAL SURGERY

## 2017-11-02 PROCEDURE — 74011000250 HC RX REV CODE- 250

## 2017-11-02 PROCEDURE — 77030011640 HC PAD GRND REM COVD -A: Performed by: COLON & RECTAL SURGERY

## 2017-11-02 PROCEDURE — 74011250636 HC RX REV CODE- 250/636

## 2017-11-02 PROCEDURE — 96374 THER/PROPH/DIAG INJ IV PUSH: CPT

## 2017-11-02 PROCEDURE — 80053 COMPREHEN METABOLIC PANEL: CPT | Performed by: EMERGENCY MEDICINE

## 2017-11-02 PROCEDURE — 77030002996 HC SUT SLK J&J -A: Performed by: COLON & RECTAL SURGERY

## 2017-11-02 PROCEDURE — 77030031139 HC SUT VCRL2 J&J -A: Performed by: COLON & RECTAL SURGERY

## 2017-11-02 PROCEDURE — 77030032490 HC SLV COMPR SCD KNE COVD -B: Performed by: COLON & RECTAL SURGERY

## 2017-11-02 PROCEDURE — 77030034850: Performed by: COLON & RECTAL SURGERY

## 2017-11-02 PROCEDURE — 76210000017 HC OR PH I REC 1.5 TO 2 HR: Performed by: COLON & RECTAL SURGERY

## 2017-11-02 PROCEDURE — 77030019702 HC WRP THER MENM -C: Performed by: COLON & RECTAL SURGERY

## 2017-11-02 PROCEDURE — 0DS80ZZ REPOSITION SMALL INTESTINE, OPEN APPROACH: ICD-10-PCS | Performed by: COLON & RECTAL SURGERY

## 2017-11-02 PROCEDURE — 74020 XR ABD FLAT/ ERECT: CPT

## 2017-11-02 PROCEDURE — 77030018836 HC SOL IRR NACL ICUM -A: Performed by: COLON & RECTAL SURGERY

## 2017-11-02 PROCEDURE — C1765 ADHESION BARRIER: HCPCS | Performed by: COLON & RECTAL SURGERY

## 2017-11-02 PROCEDURE — 76060000038 HC ANESTHESIA 3.5 TO 4 HR: Performed by: COLON & RECTAL SURGERY

## 2017-11-02 PROCEDURE — 76010000134 HC OR TIME 3.5 TO 4 HR: Performed by: COLON & RECTAL SURGERY

## 2017-11-02 PROCEDURE — 36415 COLL VENOUS BLD VENIPUNCTURE: CPT | Performed by: STUDENT IN AN ORGANIZED HEALTH CARE EDUCATION/TRAINING PROGRAM

## 2017-11-02 PROCEDURE — 96375 TX/PRO/DX INJ NEW DRUG ADDON: CPT

## 2017-11-02 PROCEDURE — 85025 COMPLETE CBC W/AUTO DIFF WBC: CPT | Performed by: EMERGENCY MEDICINE

## 2017-11-02 PROCEDURE — 3E0M05Z INTRODUCTION OF ADHESION BARRIER INTO PERITONEAL CAVITY, OPEN APPROACH: ICD-10-PCS | Performed by: COLON & RECTAL SURGERY

## 2017-11-02 PROCEDURE — 96376 TX/PRO/DX INJ SAME DRUG ADON: CPT

## 2017-11-02 PROCEDURE — 74011000258 HC RX REV CODE- 258: Performed by: STUDENT IN AN ORGANIZED HEALTH CARE EDUCATION/TRAINING PROGRAM

## 2017-11-02 PROCEDURE — 74011636320 HC RX REV CODE- 636/320: Performed by: STUDENT IN AN ORGANIZED HEALTH CARE EDUCATION/TRAINING PROGRAM

## 2017-11-02 PROCEDURE — 0DN80ZZ RELEASE SMALL INTESTINE, OPEN APPROACH: ICD-10-PCS | Performed by: COLON & RECTAL SURGERY

## 2017-11-02 PROCEDURE — 74011250636 HC RX REV CODE- 250/636: Performed by: STUDENT IN AN ORGANIZED HEALTH CARE EDUCATION/TRAINING PROGRAM

## 2017-11-02 PROCEDURE — 74177 CT ABD & PELVIS W/CONTRAST: CPT

## 2017-11-02 PROCEDURE — 77030011264 HC ELECTRD BLD EXT COVD -A: Performed by: COLON & RECTAL SURGERY

## 2017-11-02 PROCEDURE — 77030008467 HC STPLR SKN COVD -B: Performed by: COLON & RECTAL SURGERY

## 2017-11-02 RX ORDER — CLONIDINE HYDROCHLORIDE 0.1 MG/1
0.1 TABLET ORAL 2 TIMES DAILY
Status: ON HOLD | COMMUNITY
End: 2019-06-30

## 2017-11-02 RX ORDER — LORAZEPAM 2 MG/ML
1 INJECTION INTRAMUSCULAR
Status: COMPLETED | OUTPATIENT
Start: 2017-11-02 | End: 2017-11-02

## 2017-11-02 RX ORDER — HYDROMORPHONE HYDROCHLORIDE 1 MG/ML
1 INJECTION, SOLUTION INTRAMUSCULAR; INTRAVENOUS; SUBCUTANEOUS
Status: COMPLETED | OUTPATIENT
Start: 2017-11-02 | End: 2017-11-02

## 2017-11-02 RX ORDER — MORPHINE SULFATE 15 MG/1
15 TABLET, FILM COATED, EXTENDED RELEASE ORAL DAILY
Status: ON HOLD | COMMUNITY
End: 2017-11-10

## 2017-11-02 RX ORDER — CHLORZOXAZONE 500 MG/1
500-1000 TABLET ORAL
COMMUNITY

## 2017-11-02 RX ORDER — HYDROMORPHONE HYDROCHLORIDE 1 MG/ML
1 INJECTION, SOLUTION INTRAMUSCULAR; INTRAVENOUS; SUBCUTANEOUS ONCE
Status: COMPLETED | OUTPATIENT
Start: 2017-11-02 | End: 2017-11-02

## 2017-11-02 RX ORDER — SODIUM CHLORIDE 0.9 % (FLUSH) 0.9 %
10 SYRINGE (ML) INJECTION
Status: COMPLETED | OUTPATIENT
Start: 2017-11-02 | End: 2017-11-02

## 2017-11-02 RX ORDER — HYDROCODONE BITARTRATE AND ACETAMINOPHEN 10; 325 MG/1; MG/1
1-2 TABLET ORAL
COMMUNITY
End: 2017-11-10

## 2017-11-02 RX ADMIN — HYDROMORPHONE HYDROCHLORIDE 1 MG: 1 INJECTION, SOLUTION INTRAMUSCULAR; INTRAVENOUS; SUBCUTANEOUS at 18:24

## 2017-11-02 RX ADMIN — Medication 10 ML: at 20:57

## 2017-11-02 RX ADMIN — LORAZEPAM 1 MG: 2 INJECTION, SOLUTION INTRAMUSCULAR; INTRAVENOUS at 22:34

## 2017-11-02 RX ADMIN — HYDROMORPHONE HYDROCHLORIDE 1 MG: 1 INJECTION, SOLUTION INTRAMUSCULAR; INTRAVENOUS; SUBCUTANEOUS at 22:35

## 2017-11-02 RX ADMIN — SODIUM CHLORIDE 100 ML: 900 INJECTION, SOLUTION INTRAVENOUS at 20:57

## 2017-11-02 RX ADMIN — IOHEXOL 50 ML: 240 INJECTION, SOLUTION INTRATHECAL; INTRAVASCULAR; INTRAVENOUS; ORAL at 20:58

## 2017-11-02 RX ADMIN — HYDROMORPHONE HYDROCHLORIDE 1 MG: 1 INJECTION, SOLUTION INTRAMUSCULAR; INTRAVENOUS; SUBCUTANEOUS at 17:44

## 2017-11-02 RX ADMIN — IOPAMIDOL 100 ML: 755 INJECTION, SOLUTION INTRAVENOUS at 20:57

## 2017-11-02 NOTE — ED TRIAGE NOTES
Pt. presents doubled over and complains of abd. Pain and \"intestinal blockage\". Pain started Tuesday. States he hasn't eaten in about 4 days. Last \"real\" BM a few days ago per pt.

## 2017-11-02 NOTE — ED PROVIDER NOTES
HPI Comments: 65 y.o. male with extensive past medical history, please see list, significant for Crohn's disease, arthritis, migraine, chronic generalized pain, anemia, hypertension, anxiety, depression, COPD, GERD, short bowel syndrome, and ventral hernia without obstruction or gangrene who presents from home with chief complaint of abdominal pain. Patient states onset of moderate generalized abdominal pain that is aggravated upon palpation. Patient suspects he is having an \"intestinal blockage\" for the past 3 days described as \"10/10\" abdominal pain and he is unable to find a position of comfort. Patient admits he has not been eating or sleeping the past 4 days though he has been drinking a lot of fluids. Patient notes his last episode of obstruction was over 2 years ago. Patient denies fever and chills. There are no other acute medical concerns at this time. Old Chart Review: Per note, patient was admitted from 02/14/16 through 03/1/16 for enterocutaneous fistula, Crohn's disease, short bowel syndrome, and malnutrition. Patient has hx of multiple abdominal surgeries inclunding removal of abdominal wall abscess. Social hx: Tobacco Use: Yes (1 pack per day), Alcohol Use: No (rarely), Drug Use: No    PCP: Stacy Gregg MD    Note written by Elvis Cotton, as dictated by Henrry Hope MD 5:23 PM      The history is provided by the patient and medical records. Past Medical History:   Diagnosis Date    Abdominal wall hernia 6/15/2012    Anal fissure     Anemia     Anemia     Anxiety and depression     Arthritis     Related to the Crohn's disease.  Cancer ([address]     MELANOMA HEAD AND FACE    Chronic pain     GENERALIZED    COPD (chronic obstructive pulmonary disease) (HCC)     Crohn disease (HCC)     Diagnosed at age 17.     Echocardiogram normal (EF: 55-60%) 07/2015    Esophageal ulcer     GERD (gastroesophageal reflux disease)     H/O blood transfusion 2013    [facility] [place]    Hypertension     denies    Migraine     Short bowel syndrome     Ventral hernia without obstruction or gangrene        Past Surgical History:   Procedure Laterality Date    ABDOMEN SURGERY PROC UNLISTED      33 abdominal operations for treatment of Crohn's disease and its complications.  HC NDL CEJA      ceja needle, takes 1 inch needle    HX APPENDECTOMY      HX CHOLECYSTECTOMY      HX GI      colectomy x2, one ileostomy    HX GI  7/28/14    exp lap,partial colectomy with end ileostomy by Dr Pantera Talavera      neck fusion    HX ORTHOPAEDIC  1980s    wrist right,     HX ORTHOPAEDIC  2006, 11/2013    neck, L4 L5 L6    HX ORTHOPAEDIC  1990s    right knee scope    HX OTHER SURGICAL      surgical repair from spider bite    HX OTHER SURGICAL  12/1/14    Incision and drainage of posterior right subcutaneous shoulder abscess    HX OTHER SURGICAL  2014    LEFT INDEX FINGER SPIDER BITE    HX OTHER SURGICAL  2014    RECTAL FISTULA    HX OTHER SURGICAL  3/17/2015    Ileostomy takedown with extensive lysis of adhesions (greater than three hours), mobilization of the splenic flexure, left colon resection, ileocolic anastomosis, and excision of scar; Dr. Kassie Guthrie.  HX OTHER SURGICAL  3/22/2015    Exploratory laparotomy with washout of abdomen, suture repair of small bowel/anastomosis, and diverting protective loop ileostomy; Zofia De Leon MD.    HX OTHER SURGICAL  4/9/2015    Laparotomy, extensive lysis of adhesions, abdominal lavage, and resection of ileocolic anastomosis (including the efferent limb of the loop ileostomy) with creation of Demetrius's pouch; Dr. Kassie Guthrie.  HX OTHER SURGICAL  7/14/2015    Cystoscopy and placement of bilateral ureteral catheters; Thiago Raya MD.    HX OTHER SURGICAL  7/14/2015    Ileostomy takedown with extensive lysis of adhesions, small bowel resection, and enterocolostomy; Dr. Kassie Guthrie.      OTHER SURGICAL 9/29/2015    CT-guided percutaneous drainage of intraabdominal abscess; Dr. Martha Teran.   OTHER SURGICAL  11/16/2015    CT-guided percutaneous aspiration of abdominal wall cavity; Dr. Foreign Schneider.   OTHER SURGICAL  12/1/2015    Incision and drainage of abdominal wall abscess; Dr. Ronda Allen.   OTHER SURGICAL  2/11/2016    Incision and drainage of abdominal wall abscess; Dr. Ronda Allen.   OTHER SURGICAL  2/23/2016    Cystoscopy and placement of bilateral temporary ureteral catheters; Dr. Catie Miranda.   OTHER SURGICAL  2/23/2016    Exploratory laparotomy, extensive lysis of adhesions, removal of mesh, and repair of enterocutaneous fistula; Dr. Ronda Allen. Family History:   Problem Relation Age of Onset   [employer] Stroke Mother     Heart Disease Mother     Kidney Disease Mother     Anesth Problems Mother      TAKES A LONG TIME TO WAKE UP    Heart Disease Father     Thyroid Disease Sister        Social History     Social History    Marital status: LEGALLY      Spouse name: N/A    Number of children: N/A    Years of education: N/A     Occupational History    Not on file. Social History Main Topics    Smoking status: Current Every Day Smoker     Packs/day: 1.00     Years: 44.00    Smokeless tobacco: Current User      Comment: CURRENT E CIGS    Alcohol use No      Comment: RARELY    Drug use: No    Sexual activity: Not on file     Other Topics Concern    Not on file     Social History Narrative         ALLERGIES: Honey; Remicade [infliximab]; Crestor [rosuvastatin]; Other plant, animal, environmental; Imuran [azathioprine]; Mercaptopurine; and Sulfa (sulfonamide antibiotics)    Review of Systems   Constitutional: Negative for chills, diaphoresis, fatigue and fever. HENT: Negative for congestion, rhinorrhea, sinus pressure, sore throat, trouble swallowing and voice change. Eyes: Negative for photophobia and visual disturbance.    Respiratory: Negative for cough, chest tightness and shortness of breath. Cardiovascular: Negative for chest pain, palpitations and leg swelling. Gastrointestinal: Positive for abdominal pain and nausea. Negative for blood in stool, constipation, diarrhea and vomiting. Musculoskeletal: Negative for arthralgias, myalgias and neck pain. Neurological: Negative for dizziness, weakness, light-headedness, numbness and headaches. All other systems reviewed and are negative. Vitals:    11/02/17 1511   BP: 127/81   Pulse: 75   Resp: 20   Temp: 97.8 °F (36.6 °C)   SpO2: 99%   Weight: 69.6 kg (153 lb 6.4 oz)   Height: 5' 11\" (1.803 m)            Physical Exam   Constitutional: He is oriented to person, place, and time. He appears well-developed and well-nourished. He appears distressed. Moderate to severe distress   Patient appears uncomfortable    HENT:   Head: Normocephalic and atraumatic. Nose: Nose normal.   Mouth/Throat: Oropharynx is clear and moist. No oropharyngeal exudate. Eyes: Conjunctivae and EOM are normal. Right eye exhibits no discharge. Left eye exhibits no discharge. No scleral icterus. Neck: Normal range of motion. Neck supple. No JVD present. No tracheal deviation present. No thyromegaly present. Cardiovascular: Normal rate, regular rhythm, normal heart sounds and intact distal pulses. Exam reveals no gallop and no friction rub. No murmur heard. Pulmonary/Chest: Effort normal and breath sounds normal. No stridor. No respiratory distress. He has no wheezes. He has no rales. He exhibits no tenderness. Abdominal: Bowel sounds are normal. He exhibits no distension and no mass. There is tenderness. There is no rebound. Patient has a large midline incision scar with scar tissue   Patient's abdomen is extremely tender to touch    Musculoskeletal: Normal range of motion. He exhibits no edema or tenderness. Lymphadenopathy:     He has no cervical adenopathy.    Neurological: He is alert and oriented to person, place, and time. No cranial nerve deficit. Coordination normal.   Skin: Skin is warm and dry. No rash noted. He is not diaphoretic. No erythema. No pallor. Psychiatric: He has a normal mood and affect. His behavior is normal. Judgment and thought content normal.      Note written by Elvis Chaidez, as dictated by Shayla Montesinos MD 5:23 PM    MDM  Number of Diagnoses or Management Options  Large bowel obstruction:      Amount and/or Complexity of Data Reviewed  Clinical lab tests: ordered and reviewed  Tests in the radiology section of CPT®: ordered and reviewed  Discuss the patient with other providers: yes  Independent visualization of images, tracings, or specimens: yes    Risk of Complications, Morbidity, and/or Mortality  Presenting problems: moderate  Diagnostic procedures: moderate  Management options: moderate    Critical Care  Total time providing critical care: 30-74 minutes (Total critical care time spent exclusive of procedures:  40 min.)    Patient Progress  Patient progress: stable    ED Course       Procedures    CONSULT NOTE:  9:58 PM Shayla Montesinos MD spoke with Dr. Zully Browning, Consult for colorectal surgery. Discussed available diagnostic tests and clinical findings. He is in agreement with care plans as outlined. Provider will evaluate the patient for surgery. 11:22 PM  Patient is being admitted to the hospital.  The results of their tests and reasons for their admission have been discussed with them and/or available family. They convey agreement and understanding for the need to be admitted and for their admission diagnosis. Consultation has been made with the inpatient physician specialist for hospitalization.     LABORATORY TESTS:  Recent Results (from the past 12 hour(s))   GLUCOSE, POC    Collection Time: 11/04/17 12:21 PM   Result Value Ref Range    Glucose (POC) 128 (H) 65 - 100 mg/dL    Performed by Meron Max POC    Collection Time: 11/04/17  4:29 PM Result Value Ref Range    Glucose (POC) 127 (H) 65 - 100 mg/dL    Performed by Talha HOGAN(CON)    GLUCOSE, POC    Collection Time: 11/04/17 10:48 PM   Result Value Ref Range    Glucose (POC) 167 (H) 65 - 100 mg/dL    Performed by Talha HOGAN(CON)        IMAGING RESULTS:  XR ABD PORT  1 V   Final Result      CT ABD PELV W CONT   Final Result      XR ABD FLAT/ ERECT   Final Result        No results found.     MEDICATIONS GIVEN:  Medications   sodium chloride (NS) flush 5-10 mL (10 mL IntraVENous Given 11/4/17 2304)   sodium chloride (NS) flush 5-10 mL (not administered)   naloxone (NARCAN) injection 0.4 mg (not administered)   ondansetron (ZOFRAN) injection 4 mg (not administered)   LORazepam (ATIVAN) injection 1 mg (not administered)   midazolam (VERSED) 1 mg/mL injection (not administered)   ADDaptor (not administered)   0.9% sodium chloride (MBP/ADV) 0.9 % infusion (not administered)   TPN ADULT - CENTRAL ( IntraVENous New Bag 11/3/17 1908)   insulin lispro (HUMALOG) injection (2 Units SubCUTAneous Given 11/4/17 2302)   glucose chewable tablet 16 g (not administered)   dextrose (D50W) injection syrg 12.5-25 g (not administered)   glucagon (GLUCAGEN) injection 1 mg (not administered)   sodium chloride (NS) flush 20 mL (not administered)   sodium chloride (NS) flush 10 mL (10 mL InterCATHeter Given 11/4/17 1200)   sodium chloride (NS) flush 10 mL (not administered)   sodium chloride (NS) flush 10 mL (10 mL InterCATHeter Given 11/4/17 2303)   alteplase (CATHFLO) 1 mg in sterile water (preservative free) 1 mL injection (not administered)   bacitracin 500 unit/gram packet 1 Packet (not administered)   butalbital-acetaminophen-caffeine (FIORICET, ESGIC) -40 mg per tablet 1 Tab (1 Tab Oral Given 11/4/17 1844)   nicotine (NICODERM CQ) 14 mg/24 hr patch 1 Patch (1 Patch TransDERmal Remove Patch 11/4/17 4757)   lactated Ringers infusion (50 mL/hr IntraVENous New Bag 11/4/17 1146)   TPN ADULT - CENTRAL ( IntraVENous New Bag 11/4/17 1831)   fat emulsion 20% (LIPOSYN, INTRAlipid) infusion 500 mL (500 mL IntraVENous Given 11/4/17 1831)   chlorzoxazone (PARAFON FORTE) tablet 500 mg (not administered)   morphine CR (MS CONTIN) tablet 15 mg (15 mg Oral Given 11/4/17 1147)   HYDROmorphone (PF) 15 mg/30 ml (DILAUDID) PCA ( IntraVENous Rate Verify 11/4/17 1952)   cloNIDine HCl (CATAPRES) tablet 0.1 mg (0.1 mg Oral Given 11/4/17 1750)   diphenhydrAMINE (BENADRYL) capsule 100 mg (not administered)   aluminum-magnesium hydroxide (MAALOX) oral suspension 15 mL (not administered)   famotidine (PEPCID) tablet 20 mg (20 mg Oral Given 11/4/17 1749)   HYDROmorphone (PF) (DILAUDID) injection 1 mg (1 mg IntraVENous Given 11/2/17 1744)   sodium chloride 0.9 % bolus infusion 100 mL (100 mL IntraVENous New Bag 11/2/17 2057)   iopamidol (ISOVUE-370) 76 % injection 100 mL (100 mL IntraVENous Given 11/2/17 2057)   sodium chloride (NS) flush 10 mL (10 mL IntraVENous Given 11/2/17 2057)   iohexol (OMNIPAQUE) solution 50 mL (50 mL Oral Given 11/2/17 2058)   HYDROmorphone (PF) (DILAUDID) injection 1 mg (1 mg IntraVENous Given 11/2/17 1824)   LORazepam (ATIVAN) injection 1 mg (1 mg IntraVENous Given 11/2/17 2234)   HYDROmorphone (PF) (DILAUDID) injection 1 mg (1 mg IntraVENous Given 11/2/17 2235)   cefoTEtan (CEFOTAN) 2 g in 0.9% sodium chloride (MBP/ADV) 50 mL (2 g IntraVENous New Bag 11/3/17 0142)   fentaNYL citrate (PF) injection 25 mcg (25 mcg IntraVENous Given 11/3/17 0625)   HYDROmorphone (PF) (DILAUDID) injection 0.5 mg (0.5 mg IntraVENous Given 11/3/17 0617)       IMPRESSION:  1. Large bowel obstruction        PLAN:  1.  Admit to Colorectal surgery    Total critical care time spent exclusive of procedures:  40 minutes      Edgar Blanco MD

## 2017-11-02 NOTE — IP AVS SNAPSHOT
[address] 
[phone] Patient: Radha Hernandez MRN: [id number] YJR:[id number] You are allergic to the following Allergen Reactions Honey Anaphylaxis Remicade (Infliximab) Anaphylaxis Crestor (Rosuvastatin) Myalgia Other Plant, Animal, Environmental Other (comments) Developed \"boils\" on right arm that required I &D after receiving FENTANYL patch. Is able to take FENTANYL orally; states is allergic to fentanyl patch GLUE Imuran (Azathioprine) Diarrhea Nausea Only Mercaptopurine Diarrhea Sulfa (Sulfonamide Antibiotics) Other (comments) Causes diarrhea Recent Documentation Height Weight BMI Smoking Status 1.803 m 72.4 kg 22.26 kg/m2 Current Every Day Smoker Emergency Contacts  (Rel.) Home Phone Work Phone Mobile Phone [facility] Nixani Samaniegojose Allenjairo) [phone] -- -- About your hospitalization You were admitted on:  November 3, 2017 You last received care in the:  [facility] You were discharged on:  November 10, 2017 Why you were hospitalized Your primary diagnosis was: Intussusception Of Intestine (Hcc) Your diagnoses also included:  Short Bowel Syndrome, Chronic Pain, Incisional Hernia, Without Obstruction Or Gangrene, Bowel Obstruction Providers Seen During Your Hospitalization Provider Specialty Primary office phone Gilbert Elias MD Emergency Medicine [phone] Cristobal Padilla MD Colon and Rectal Surgery [phone] Your Primary Care Physician (PCP) Primary Care Physician Office Phone Office Fax Aly Paredes [phone] Follow-up Information Follow up With Details Comments Contact Info Ju Perkins MD On 11/15/2017 For discharge follow up 3:00PM  [facility] [email or web address] [facility] 
[phone] [facility] EM[facility]ENC[facility]P  If symptoms worsen [address] [address] 
[phone] Aki Kelly MD Schedule an appointment as soon as possible for a visit in 3 weeks For wound re-check [address] [facility] [address] 1400 [address] [place] [address] 
[phone] My Medications STOP taking these medications diphenhydrAMINE 50 mg capsule Commonly known as:  BENADRYL  
   
  
  
TAKE these medications as instructed Instructions Each Dose to Equal  
 Morning Noon Evening Bedtime  
 aspirin-acetaminophen-caffeine 250-250-65 mg per tablet Commonly known as:  EXCEDRIN ES Your last dose was: Your next dose is: Take 2 Tabs by mouth every six (6) hours as needed for Headache. 2 Tab CENTRUM SILVER ULTRA MEN'S 300-600-300 mcg Tab Generic drug:  multivit-min-FA-lycopen-lutein Your last dose was: Your next dose is: Take 1 Tab by mouth daily. 1 Tab  
    
   
   
   
  
 chlorzoxazone 500 mg tablet Commonly known as:  Kellen Linker Your last dose was: Your next dose is: Take 500 mg by mouth two (2) times daily as needed for Muscle Spasm(s). 500 mg  
    
   
   
   
  
 cloNIDine HCl 0.1 mg tablet Commonly known as:  CATAPRES Your last dose was: Your next dose is: Take 0.1 mg by mouth two (2) times a day. 0.1 mg  
    
   
   
   
  
 colestipol 1 gram tablet Commonly known as:  COLESTID Your last dose was: Your next dose is: Take 6 g by mouth Before breakfast, lunch, dinner and at bedtime. Min 24 g/day and Max 30 g/day. 6 g HYDROcodone-acetaminophen  mg tablet Commonly known as:  Rolinda Doles Your last dose was: Your next dose is: Take 1-2 Tabs by mouth every four (4) hours as needed. Max Daily Amount: 12 Tabs. 1-2 Tab HYDROmorphone 4 mg tablet Commonly known as:  DILAUDID Your last dose was: Your next dose is: Take 1-2 Tabs by mouth every three (3) hours as needed. Max Daily Amount: 64 mg.  
 4-8 mg  
    
   
   
   
  
 LOMOTIL 2.5-0.025 mg per tablet Generic drug:  diphenoxylate-atropine Your last dose was: Your next dose is: Take 2 Tabs by mouth Before breakfast, lunch, dinner and at bedtime. 2 Tab  
    
   
   
   
  
 loperamide 2 mg capsule Commonly known as:  IMODIUM Your last dose was: Your next dose is: Take 2 mg by mouth as needed for Diarrhea. Patient takes 14-16 capsules a day. 2 mg LORazepam 1 mg tablet Commonly known as:  ATIVAN Your last dose was: Your next dose is: Take 1 mg by mouth nightly as needed for Anxiety. 1 mg  
    
   
   
   
  
 morphine CR 15 mg CR tablet Commonly known as:  MS CONTIN Your last dose was: Your next dose is: Take 1 Tab by mouth every twelve (12) hours. Max Daily Amount: 30 mg.  
 15 mg  
    
   
   
   
  
 nicotine 14 mg/24 hr patch Commonly known as:  Rod Hammonds Start taking on:  11/11/2017 Your last dose was: Your next dose is:    
   
   
 1 Patch by TransDERmal route daily for 30 days. 1 Patch ZOFRAN (AS HYDROCHLORIDE) 8 mg tablet Generic drug:  ondansetron hcl Your last dose was: Your next dose is: Take 8 mg by mouth every eight (8) hours as needed for Nausea. 8 mg Where to Get Your Medications Information on where to get these meds will be given to you by the nurse or doctor. ! Ask your nurse or doctor about these medications HYDROcodone-acetaminophen  mg tablet HYDROmorphone 4 mg tablet  
 morphine CR 15 mg CR tablet  
 nicotine 14 mg/24 hr patch Discharge Instructions Patient Discharge Instructions Duwayne Boast / [id number] : 1960 Admitted 2017 Discharged: 11/10/2017 · It is important that you take medications exactly as they are prescribed. · Keep your medication in the bottles provided by the pharmacist and keep a list of the medication names, dosages, and times to be taken in your wallet. · Do not take other medications without consulting your doctor. What To Do At [facility] Recommended Diet:  You should continue to consume a diet as tolerated. Recommended Activity: You should not drive, lift more than 10 lb., or engage in strenuous activity until four weeks have passed since the operation. You should walk frequently to gradually regain your stamina, and you may climb stairs as tolerated. Abdominal Binder:  Keep the abdominal binder on most of the time. You may remove it to shower. Hygiene: You may shower, but do not bathe or submerge your abdomen until you have been seen for follow-up. Problems:  If you have a fever (temperature > 100.0 degrees Fahrenheit), if there is drainage from the incision, if there is increasing redness around the incision, or if the skin separates, you should call Dr. Jennifer Downs or Dr. Lukasz Masters. Other:  If you have questions or other problems, call Dr. Jennifer Downs. Follow-Up 1. Please return to Dr. Loan Burgess office at 12:30 PM on 2017. 
2.  Follow up with Dr. Clarke Alicia next week. 3.  Follow up with Dr. Lukasz Masters in approximately 5 weeks. Information obtained by : 
I understand that if any problems occur once I am at home I am to contact my physician. I understand and acknowledge receipt of the instructions indicated above.   
 
                                                                                                                                     
Physician's or R.N.'s Signature Date/Time Patient or Representative Signature                                                          Date/Time Discharge Orders None [employer] Announcement We are excited to announce that we are making your provider's discharge notes available to you in [employer]. You will see these notes when they are completed and signed by the physician that discharged you from your recent hospital stay. If you have any questions or concerns about any information you see in [employer], please call the Health Information Department where you were seen or reach out to your Primary Care Provider for more information about your plan of care. Introducing [facility]! Dear Twila: 
Thank you for requesting a [employer] account. Our records indicate that you already have an active [employer] account. You can access your account anytime at [email or web address] Did you know that you can access your hospital and ER discharge instructions at any time in [employer]? You can also review all of your test results from your hospital stay or ER visit. Additional Information If you have questions, please visit the Frequently Asked Questions section of the [employer] website at [email or web address] Remember, [employer] is NOT to be used for urgent needs. For medical emergencies, dial 911. Now available from your iPhone and Android! General Information Please provide this summary of care documentation to your next provider. Patient Signature:  ____________________________________________________________ Date:  ____________________________________________________________  
  
Tommy Benoit Provider Signature:  ____________________________________________________________ Date:  ____________________________________________________________

## 2017-11-03 ENCOUNTER — ANESTHESIA EVENT (OUTPATIENT)
Dept: SURGERY | Age: 57
DRG: 330 | End: 2017-11-03
Payer: MEDICARE

## 2017-11-03 PROBLEM — K56.1 INTUSSUSCEPTION OF INTESTINE (HCC): Status: ACTIVE | Noted: 2017-11-03

## 2017-11-03 PROBLEM — K56.609 BOWEL OBSTRUCTION (HCC): Status: ACTIVE | Noted: 2017-11-03

## 2017-11-03 PROBLEM — K43.2 INCISIONAL HERNIA, WITHOUT OBSTRUCTION OR GANGRENE: Chronic | Status: ACTIVE | Noted: 2017-01-31

## 2017-11-03 LAB
ANION GAP SERPL CALC-SCNC: 10 MMOL/L (ref 5–15)
BASOPHILS # BLD: 0 K/UL (ref 0–0.1)
BASOPHILS NFR BLD: 0 % (ref 0–1)
BUN SERPL-MCNC: 8 MG/DL (ref 6–20)
BUN/CREAT SERPL: 6 (ref 12–20)
CALCIUM SERPL-MCNC: 8.1 MG/DL (ref 8.5–10.1)
CHLORIDE SERPL-SCNC: 104 MMOL/L (ref 97–108)
CO2 SERPL-SCNC: 25 MMOL/L (ref 21–32)
CREAT SERPL-MCNC: 1.33 MG/DL (ref 0.7–1.3)
EOSINOPHIL # BLD: 0.1 K/UL (ref 0–0.4)
EOSINOPHIL NFR BLD: 2 % (ref 0–7)
ERYTHROCYTE [DISTWIDTH] IN BLOOD BY AUTOMATED COUNT: 15.9 % (ref 11.5–14.5)
GLUCOSE BLD STRIP.AUTO-MCNC: 113 MG/DL (ref 65–100)
GLUCOSE BLD STRIP.AUTO-MCNC: 114 MG/DL (ref 65–100)
GLUCOSE BLD STRIP.AUTO-MCNC: 130 MG/DL (ref 65–100)
GLUCOSE SERPL-MCNC: 107 MG/DL (ref 65–100)
HBV SURFACE AG SER QL: <0.1 INDEX
HBV SURFACE AG SER QL: NEGATIVE
HCT VFR BLD AUTO: 43.6 % (ref 36.6–50.3)
HCV AB SERPL QL IA: NONREACTIVE
HCV COMMENT,HCGAC: NORMAL
HGB BLD-MCNC: 14 G/DL (ref 12.1–17)
HIV1 P24 AG SERPL QL IA: NONREACTIVE
HIV1+2 AB SERPL QL IA: NONREACTIVE
LYMPHOCYTES # BLD: 1.4 K/UL (ref 0.8–3.5)
LYMPHOCYTES NFR BLD: 19 % (ref 12–49)
MAGNESIUM SERPL-MCNC: 2.4 MG/DL (ref 1.6–2.4)
MCH RBC QN AUTO: 28.4 PG (ref 26–34)
MCHC RBC AUTO-ENTMCNC: 32.1 G/DL (ref 30–36.5)
MCV RBC AUTO: 88.4 FL (ref 80–99)
MONOCYTES # BLD: 0.3 K/UL (ref 0–1)
MONOCYTES NFR BLD: 3 % (ref 5–13)
NEUTS SEG # BLD: 5.6 K/UL (ref 1.8–8)
NEUTS SEG NFR BLD: 76 % (ref 32–75)
PHOSPHATE SERPL-MCNC: 3.2 MG/DL (ref 2.6–4.7)
PLATELET # BLD AUTO: 340 K/UL (ref 150–400)
POTASSIUM SERPL-SCNC: 3.8 MMOL/L (ref 3.5–5.1)
RBC # BLD AUTO: 4.93 M/UL (ref 4.1–5.7)
SERVICE CMNT-IMP: ABNORMAL
SODIUM SERPL-SCNC: 139 MMOL/L (ref 136–145)
WBC # BLD AUTO: 7.4 K/UL (ref 4.1–11.1)

## 2017-11-03 PROCEDURE — 84100 ASSAY OF PHOSPHORUS: CPT | Performed by: COLON & RECTAL SURGERY

## 2017-11-03 PROCEDURE — 77030026438 HC STYL ET INTUB CARD -A: Performed by: ANESTHESIOLOGY

## 2017-11-03 PROCEDURE — 77030019908 HC STETH ESOPH SIMS -A: Performed by: ANESTHESIOLOGY

## 2017-11-03 PROCEDURE — 80048 BASIC METABOLIC PNL TOTAL CA: CPT | Performed by: COLON & RECTAL SURGERY

## 2017-11-03 PROCEDURE — 74011250636 HC RX REV CODE- 250/636

## 2017-11-03 PROCEDURE — 36569 INSJ PICC 5 YR+ W/O IMAGING: CPT | Performed by: COLON & RECTAL SURGERY

## 2017-11-03 PROCEDURE — 74011000258 HC RX REV CODE- 258: Performed by: COLON & RECTAL SURGERY

## 2017-11-03 PROCEDURE — C1894 INTRO/SHEATH, NON-LASER: HCPCS

## 2017-11-03 PROCEDURE — 65270000029 HC RM PRIVATE

## 2017-11-03 PROCEDURE — 82962 GLUCOSE BLOOD TEST: CPT

## 2017-11-03 PROCEDURE — 76937 US GUIDE VASCULAR ACCESS: CPT

## 2017-11-03 PROCEDURE — C1751 CATH, INF, PER/CENT/MIDLINE: HCPCS

## 2017-11-03 PROCEDURE — 36415 COLL VENOUS BLD VENIPUNCTURE: CPT | Performed by: COLON & RECTAL SURGERY

## 2017-11-03 PROCEDURE — 77030013079 HC BLNKT BAIR HGGR 3M -A: Performed by: ANESTHESIOLOGY

## 2017-11-03 PROCEDURE — 77030018719 HC DRSG PTCH ANTIMIC J&J -A

## 2017-11-03 PROCEDURE — 02HV33Z INSERTION OF INFUSION DEVICE INTO SUPERIOR VENA CAVA, PERCUTANEOUS APPROACH: ICD-10-PCS | Performed by: COLON & RECTAL SURGERY

## 2017-11-03 PROCEDURE — 83735 ASSAY OF MAGNESIUM: CPT | Performed by: COLON & RECTAL SURGERY

## 2017-11-03 PROCEDURE — 3E0436Z INTRODUCTION OF NUTRITIONAL SUBSTANCE INTO CENTRAL VEIN, PERCUTANEOUS APPROACH: ICD-10-PCS | Performed by: COLON & RECTAL SURGERY

## 2017-11-03 PROCEDURE — 85025 COMPLETE CBC W/AUTO DIFF WBC: CPT | Performed by: COLON & RECTAL SURGERY

## 2017-11-03 PROCEDURE — 77030008684 HC TU ET CUF COVD -B: Performed by: ANESTHESIOLOGY

## 2017-11-03 PROCEDURE — 74011000250 HC RX REV CODE- 250: Performed by: COLON & RECTAL SURGERY

## 2017-11-03 PROCEDURE — 74011000250 HC RX REV CODE- 250

## 2017-11-03 PROCEDURE — 74011250636 HC RX REV CODE- 250/636: Performed by: COLON & RECTAL SURGERY

## 2017-11-03 PROCEDURE — 74011250637 HC RX REV CODE- 250/637: Performed by: COLON & RECTAL SURGERY

## 2017-11-03 PROCEDURE — 77030020847 HC STATLOK BARD -A

## 2017-11-03 RX ORDER — LORAZEPAM 2 MG/ML
1 INJECTION INTRAMUSCULAR
Status: DISCONTINUED | OUTPATIENT
Start: 2017-11-03 | End: 2017-11-10 | Stop reason: HOSPADM

## 2017-11-03 RX ORDER — LIDOCAINE HYDROCHLORIDE 20 MG/ML
INJECTION, SOLUTION EPIDURAL; INFILTRATION; INTRACAUDAL; PERINEURAL AS NEEDED
Status: DISCONTINUED | OUTPATIENT
Start: 2017-11-03 | End: 2017-11-03 | Stop reason: HOSPADM

## 2017-11-03 RX ORDER — FENTANYL CITRATE 50 UG/ML
INJECTION, SOLUTION INTRAMUSCULAR; INTRAVENOUS AS NEEDED
Status: DISCONTINUED | OUTPATIENT
Start: 2017-11-03 | End: 2017-11-03 | Stop reason: HOSPADM

## 2017-11-03 RX ORDER — MAGNESIUM SULFATE HEPTAHYDRATE 40 MG/ML
INJECTION, SOLUTION INTRAVENOUS AS NEEDED
Status: DISCONTINUED | OUTPATIENT
Start: 2017-11-03 | End: 2017-11-03 | Stop reason: HOSPADM

## 2017-11-03 RX ORDER — MAGNESIUM SULFATE 100 %
4 CRYSTALS MISCELLANEOUS AS NEEDED
Status: DISCONTINUED | OUTPATIENT
Start: 2017-11-03 | End: 2017-11-10 | Stop reason: HOSPADM

## 2017-11-03 RX ORDER — DEXAMETHASONE SODIUM PHOSPHATE 4 MG/ML
INJECTION, SOLUTION INTRA-ARTICULAR; INTRALESIONAL; INTRAMUSCULAR; INTRAVENOUS; SOFT TISSUE AS NEEDED
Status: DISCONTINUED | OUTPATIENT
Start: 2017-11-03 | End: 2017-11-03 | Stop reason: HOSPADM

## 2017-11-03 RX ORDER — BUTALBITAL, ACETAMINOPHEN AND CAFFEINE 50; 325; 40 MG/1; MG/1; MG/1
1 TABLET ORAL
Status: DISCONTINUED | OUTPATIENT
Start: 2017-11-03 | End: 2017-11-10 | Stop reason: HOSPADM

## 2017-11-03 RX ORDER — HYDROMORPHONE HYDROCHLORIDE 1 MG/ML
0.5 INJECTION, SOLUTION INTRAMUSCULAR; INTRAVENOUS; SUBCUTANEOUS
Status: COMPLETED | OUTPATIENT
Start: 2017-11-03 | End: 2017-11-03

## 2017-11-03 RX ORDER — DEXTROSE 50 % IN WATER (D50W) INTRAVENOUS SYRINGE
12.5-25 AS NEEDED
Status: DISCONTINUED | OUTPATIENT
Start: 2017-11-03 | End: 2017-11-10 | Stop reason: HOSPADM

## 2017-11-03 RX ORDER — SODIUM CHLORIDE 0.9 % (FLUSH) 0.9 %
5-10 SYRINGE (ML) INJECTION AS NEEDED
Status: DISCONTINUED | OUTPATIENT
Start: 2017-11-03 | End: 2017-11-10 | Stop reason: HOSPADM

## 2017-11-03 RX ORDER — SODIUM CHLORIDE 0.9 % (FLUSH) 0.9 %
10 SYRINGE (ML) INJECTION AS NEEDED
Status: DISCONTINUED | OUTPATIENT
Start: 2017-11-03 | End: 2017-11-10 | Stop reason: HOSPADM

## 2017-11-03 RX ORDER — ONDANSETRON 2 MG/ML
4 INJECTION INTRAMUSCULAR; INTRAVENOUS
Status: DISCONTINUED | OUTPATIENT
Start: 2017-11-03 | End: 2017-11-10 | Stop reason: HOSPADM

## 2017-11-03 RX ORDER — INSULIN LISPRO 100 [IU]/ML
INJECTION, SOLUTION INTRAVENOUS; SUBCUTANEOUS EVERY 6 HOURS
Status: DISCONTINUED | OUTPATIENT
Start: 2017-11-03 | End: 2017-11-10 | Stop reason: HOSPADM

## 2017-11-03 RX ORDER — SODIUM CHLORIDE 0.9 % (FLUSH) 0.9 %
20 SYRINGE (ML) INJECTION AS NEEDED
Status: DISCONTINUED | OUTPATIENT
Start: 2017-11-03 | End: 2017-11-10 | Stop reason: HOSPADM

## 2017-11-03 RX ORDER — NALOXONE HYDROCHLORIDE 0.4 MG/ML
0.4 INJECTION, SOLUTION INTRAMUSCULAR; INTRAVENOUS; SUBCUTANEOUS AS NEEDED
Status: DISCONTINUED | OUTPATIENT
Start: 2017-11-03 | End: 2017-11-10 | Stop reason: HOSPADM

## 2017-11-03 RX ORDER — KETAMINE HYDROCHLORIDE 10 MG/ML
INJECTION, SOLUTION INTRAMUSCULAR; INTRAVENOUS AS NEEDED
Status: DISCONTINUED | OUTPATIENT
Start: 2017-11-03 | End: 2017-11-03 | Stop reason: HOSPADM

## 2017-11-03 RX ORDER — GLYCOPYRROLATE 0.2 MG/ML
INJECTION INTRAMUSCULAR; INTRAVENOUS AS NEEDED
Status: DISCONTINUED | OUTPATIENT
Start: 2017-11-03 | End: 2017-11-03 | Stop reason: HOSPADM

## 2017-11-03 RX ORDER — SUCCINYLCHOLINE CHLORIDE 20 MG/ML
INJECTION INTRAMUSCULAR; INTRAVENOUS AS NEEDED
Status: DISCONTINUED | OUTPATIENT
Start: 2017-11-03 | End: 2017-11-03 | Stop reason: HOSPADM

## 2017-11-03 RX ORDER — SODIUM CHLORIDE 0.9 % (FLUSH) 0.9 %
5-10 SYRINGE (ML) INJECTION EVERY 8 HOURS
Status: DISCONTINUED | OUTPATIENT
Start: 2017-11-03 | End: 2017-11-10 | Stop reason: HOSPADM

## 2017-11-03 RX ORDER — MIDAZOLAM HYDROCHLORIDE 1 MG/ML
INJECTION, SOLUTION INTRAMUSCULAR; INTRAVENOUS AS NEEDED
Status: DISCONTINUED | OUTPATIENT
Start: 2017-11-03 | End: 2017-11-03 | Stop reason: HOSPADM

## 2017-11-03 RX ORDER — DEXTROSE, SODIUM CHLORIDE, AND POTASSIUM CHLORIDE 5; .9; .15 G/100ML; G/100ML; G/100ML
100 INJECTION INTRAVENOUS CONTINUOUS
Status: DISCONTINUED | OUTPATIENT
Start: 2017-11-03 | End: 2017-11-04

## 2017-11-03 RX ORDER — HYDROMORPHONE HCL IN 0.9% NACL 15 MG/30ML
PATIENT CONTROLLED ANALGESIA VIAL INTRAVENOUS CONTINUOUS
Status: DISCONTINUED | OUTPATIENT
Start: 2017-11-03 | End: 2017-11-03

## 2017-11-03 RX ORDER — NEOSTIGMINE METHYLSULFATE 1 MG/ML
INJECTION INTRAVENOUS AS NEEDED
Status: DISCONTINUED | OUTPATIENT
Start: 2017-11-03 | End: 2017-11-03 | Stop reason: HOSPADM

## 2017-11-03 RX ORDER — ONDANSETRON 2 MG/ML
INJECTION INTRAMUSCULAR; INTRAVENOUS AS NEEDED
Status: DISCONTINUED | OUTPATIENT
Start: 2017-11-03 | End: 2017-11-03 | Stop reason: HOSPADM

## 2017-11-03 RX ORDER — PROPOFOL 10 MG/ML
INJECTION, EMULSION INTRAVENOUS AS NEEDED
Status: DISCONTINUED | OUTPATIENT
Start: 2017-11-03 | End: 2017-11-03 | Stop reason: HOSPADM

## 2017-11-03 RX ORDER — HYDROMORPHONE HCL IN 0.9% NACL 15 MG/30ML
PATIENT CONTROLLED ANALGESIA VIAL INTRAVENOUS CONTINUOUS
Status: DISCONTINUED | OUTPATIENT
Start: 2017-11-03 | End: 2017-11-04

## 2017-11-03 RX ORDER — BACITRACIN 500 UNIT/G
1 PACKET (EA) TOPICAL AS NEEDED
Status: DISCONTINUED | OUTPATIENT
Start: 2017-11-03 | End: 2017-11-10 | Stop reason: HOSPADM

## 2017-11-03 RX ORDER — ROCURONIUM BROMIDE 10 MG/ML
INJECTION, SOLUTION INTRAVENOUS AS NEEDED
Status: DISCONTINUED | OUTPATIENT
Start: 2017-11-03 | End: 2017-11-03 | Stop reason: HOSPADM

## 2017-11-03 RX ORDER — SODIUM CHLORIDE, SODIUM LACTATE, POTASSIUM CHLORIDE, CALCIUM CHLORIDE 600; 310; 30; 20 MG/100ML; MG/100ML; MG/100ML; MG/100ML
INJECTION, SOLUTION INTRAVENOUS
Status: DISCONTINUED | OUTPATIENT
Start: 2017-11-03 | End: 2017-11-03 | Stop reason: HOSPADM

## 2017-11-03 RX ORDER — FENTANYL CITRATE 50 UG/ML
INJECTION, SOLUTION INTRAMUSCULAR; INTRAVENOUS
Status: DISCONTINUED
Start: 2017-11-03 | End: 2017-11-03

## 2017-11-03 RX ORDER — SODIUM CHLORIDE 0.9 % (FLUSH) 0.9 %
10 SYRINGE (ML) INJECTION EVERY 24 HOURS
Status: DISCONTINUED | OUTPATIENT
Start: 2017-11-03 | End: 2017-11-10 | Stop reason: HOSPADM

## 2017-11-03 RX ORDER — SODIUM CHLORIDE 900 MG/100ML
INJECTION INTRAVENOUS
Status: DISPENSED
Start: 2017-11-03 | End: 2017-11-03

## 2017-11-03 RX ORDER — HYDROMORPHONE HYDROCHLORIDE 1 MG/ML
INJECTION, SOLUTION INTRAMUSCULAR; INTRAVENOUS; SUBCUTANEOUS
Status: DISCONTINUED
Start: 2017-11-03 | End: 2017-11-03

## 2017-11-03 RX ORDER — HYDROMORPHONE HYDROCHLORIDE 2 MG/ML
INJECTION, SOLUTION INTRAMUSCULAR; INTRAVENOUS; SUBCUTANEOUS AS NEEDED
Status: DISCONTINUED | OUTPATIENT
Start: 2017-11-03 | End: 2017-11-03 | Stop reason: HOSPADM

## 2017-11-03 RX ORDER — CEFOTETAN DISODIUM 2 G/20ML
INJECTION, POWDER, FOR SOLUTION INTRAMUSCULAR; INTRAVENOUS
Status: DISCONTINUED
Start: 2017-11-03 | End: 2017-11-03

## 2017-11-03 RX ORDER — IBUPROFEN 200 MG
1 TABLET ORAL DAILY
Status: DISCONTINUED | OUTPATIENT
Start: 2017-11-03 | End: 2017-11-10 | Stop reason: HOSPADM

## 2017-11-03 RX ORDER — MIDAZOLAM HYDROCHLORIDE 1 MG/ML
INJECTION, SOLUTION INTRAMUSCULAR; INTRAVENOUS
Status: DISPENSED
Start: 2017-11-03 | End: 2017-11-03

## 2017-11-03 RX ORDER — FENTANYL CITRATE 50 UG/ML
25 INJECTION, SOLUTION INTRAMUSCULAR; INTRAVENOUS
Status: COMPLETED | OUTPATIENT
Start: 2017-11-03 | End: 2017-11-03

## 2017-11-03 RX ORDER — SODIUM CHLORIDE 0.9 % (FLUSH) 0.9 %
10 SYRINGE (ML) INJECTION EVERY 8 HOURS
Status: DISCONTINUED | OUTPATIENT
Start: 2017-11-03 | End: 2017-11-10 | Stop reason: HOSPADM

## 2017-11-03 RX ADMIN — ROCURONIUM BROMIDE 20 MG: 10 INJECTION, SOLUTION INTRAVENOUS at 03:42

## 2017-11-03 RX ADMIN — Medication 10 ML: at 16:48

## 2017-11-03 RX ADMIN — FENTANYL CITRATE 25 MCG: 50 INJECTION, SOLUTION INTRAMUSCULAR; INTRAVENOUS at 06:05

## 2017-11-03 RX ADMIN — HYDROMORPHONE HYDROCHLORIDE 0.5 MG: 1 INJECTION, SOLUTION INTRAMUSCULAR; INTRAVENOUS; SUBCUTANEOUS at 06:00

## 2017-11-03 RX ADMIN — FENTANYL CITRATE 50 MCG: 50 INJECTION, SOLUTION INTRAMUSCULAR; INTRAVENOUS at 05:00

## 2017-11-03 RX ADMIN — SODIUM CHLORIDE, SODIUM LACTATE, POTASSIUM CHLORIDE, CALCIUM CHLORIDE: 600; 310; 30; 20 INJECTION, SOLUTION INTRAVENOUS at 01:14

## 2017-11-03 RX ADMIN — SUCCINYLCHOLINE CHLORIDE 160 MG: 20 INJECTION INTRAMUSCULAR; INTRAVENOUS at 01:25

## 2017-11-03 RX ADMIN — FENTANYL CITRATE 25 MCG: 50 INJECTION, SOLUTION INTRAMUSCULAR; INTRAVENOUS at 06:25

## 2017-11-03 RX ADMIN — HYDROMORPHONE HYDROCHLORIDE 0.5 MG: 1 INJECTION, SOLUTION INTRAMUSCULAR; INTRAVENOUS; SUBCUTANEOUS at 06:17

## 2017-11-03 RX ADMIN — KETAMINE HYDROCHLORIDE 50 MG: 10 INJECTION, SOLUTION INTRAMUSCULAR; INTRAVENOUS at 01:25

## 2017-11-03 RX ADMIN — BUTALBITAL, ACETAMINOPHEN AND CAFFEINE 1 TABLET: 50; 325; 40 TABLET ORAL at 14:50

## 2017-11-03 RX ADMIN — HYDROMORPHONE HYDROCHLORIDE 0.5 MG: 2 INJECTION, SOLUTION INTRAMUSCULAR; INTRAVENOUS; SUBCUTANEOUS at 02:41

## 2017-11-03 RX ADMIN — HYDROMORPHONE HYDROCHLORIDE 0.5 MG: 2 INJECTION, SOLUTION INTRAMUSCULAR; INTRAVENOUS; SUBCUTANEOUS at 02:14

## 2017-11-03 RX ADMIN — HYDROMORPHONE HYDROCHLORIDE 0.5 MG: 2 INJECTION, SOLUTION INTRAMUSCULAR; INTRAVENOUS; SUBCUTANEOUS at 05:00

## 2017-11-03 RX ADMIN — ROCURONIUM BROMIDE 40 MG: 10 INJECTION, SOLUTION INTRAVENOUS at 01:29

## 2017-11-03 RX ADMIN — DEXTROSE MONOHYDRATE, SODIUM CHLORIDE, AND POTASSIUM CHLORIDE 100 ML/HR: 50; 9; 1.49 INJECTION, SOLUTION INTRAVENOUS at 17:45

## 2017-11-03 RX ADMIN — DEXAMETHASONE SODIUM PHOSPHATE 8 MG: 4 INJECTION, SOLUTION INTRA-ARTICULAR; INTRALESIONAL; INTRAMUSCULAR; INTRAVENOUS; SOFT TISSUE at 01:31

## 2017-11-03 RX ADMIN — Medication 10 ML: at 08:00

## 2017-11-03 RX ADMIN — MAGNESIUM SULFATE HEPTAHYDRATE 2 G: 40 INJECTION, SOLUTION INTRAVENOUS at 01:47

## 2017-11-03 RX ADMIN — ONDANSETRON 4 MG: 2 INJECTION INTRAMUSCULAR; INTRAVENOUS at 04:57

## 2017-11-03 RX ADMIN — ASCORBIC ACID: 500 INJECTION, SOLUTION INTRAMUSCULAR; INTRAVENOUS; SUBCUTANEOUS at 19:08

## 2017-11-03 RX ADMIN — NEOSTIGMINE METHYLSULFATE 3 MG: 1 INJECTION INTRAVENOUS at 04:49

## 2017-11-03 RX ADMIN — FENTANYL CITRATE 25 MCG: 50 INJECTION, SOLUTION INTRAMUSCULAR; INTRAVENOUS at 05:52

## 2017-11-03 RX ADMIN — Medication: at 06:09

## 2017-11-03 RX ADMIN — Medication: at 20:32

## 2017-11-03 RX ADMIN — DEXTROSE MONOHYDRATE, SODIUM CHLORIDE, AND POTASSIUM CHLORIDE 100 ML/HR: 50; 9; 1.49 INJECTION, SOLUTION INTRAVENOUS at 06:29

## 2017-11-03 RX ADMIN — HYDROMORPHONE HYDROCHLORIDE 0.5 MG: 2 INJECTION, SOLUTION INTRAMUSCULAR; INTRAVENOUS; SUBCUTANEOUS at 04:36

## 2017-11-03 RX ADMIN — LIDOCAINE HYDROCHLORIDE 60 MG: 20 INJECTION, SOLUTION EPIDURAL; INFILTRATION; INTRACAUDAL; PERINEURAL at 01:25

## 2017-11-03 RX ADMIN — CEFOTETAN DISODIUM 2 G: 2 INJECTION, POWDER, FOR SOLUTION INTRAMUSCULAR; INTRAVENOUS at 01:42

## 2017-11-03 RX ADMIN — FENTANYL CITRATE 25 MCG: 50 INJECTION, SOLUTION INTRAMUSCULAR; INTRAVENOUS at 06:15

## 2017-11-03 RX ADMIN — MIDAZOLAM HYDROCHLORIDE 2 MG: 1 INJECTION, SOLUTION INTRAMUSCULAR; INTRAVENOUS at 01:15

## 2017-11-03 RX ADMIN — GLYCOPYRROLATE 0.4 MG: 0.2 INJECTION INTRAMUSCULAR; INTRAVENOUS at 04:49

## 2017-11-03 RX ADMIN — PROPOFOL 150 MG: 10 INJECTION, EMULSION INTRAVENOUS at 01:25

## 2017-11-03 RX ADMIN — ROCURONIUM BROMIDE 10 MG: 10 INJECTION, SOLUTION INTRAVENOUS at 01:25

## 2017-11-03 RX ADMIN — KETAMINE HYDROCHLORIDE 25 MG: 10 INJECTION, SOLUTION INTRAMUSCULAR; INTRAVENOUS at 05:00

## 2017-11-03 RX ADMIN — ROCURONIUM BROMIDE 20 MG: 10 INJECTION, SOLUTION INTRAVENOUS at 02:56

## 2017-11-03 NOTE — PROGRESS NOTES
NUTRITION COMPLETE ASSESSMENT    RECOMMENDATIONS:   1. If electrolytes WNL advanced TPN tomorrow to goal of:    5%AA, D20 @ 90ml/hr + 500ml, 20% lipids 3x/week  2. Check zinc, vitamin B12, and vitamin A,D,E - at risk for deficiency with SBS  3. Diet advancement per surgery - low lactose, low fiber, NCS  4. Daily weights while on TPN (standing scale only)  Interventions/Plan:   Food/Nutrient Delivery:          Initiate enteral nutrition    Assessment:   Reason for Assessment:   [x]BPA/MST Referral   [x]Other: new TPN    Diet: NPO  TPN: (to start tonight): 5%AA, D20 @ 42ml/hr  Nutritionally Significant Medications: [x] Reviewed & Includes: cefotetan, D5 NaCl w/ KCl @ 100ml/hr, dilaudid PCA; PRN: ativan, zofran    Pre-Hospitalization:  Usual Appetite: Poor  Diet at Home: low lactose    Subjective:  Pt drowsy during visit with PCA, minimal information provided. Reports wt loss over past week. Notes wt up to 200# prior to admit, unsure if this is accurate. Objective:  Pt admitted for intussusception. PMHx: Crohn's dx, HTN, SBS, COPD, enterocutaneous fistula (resolved), extensive colorectal surgical hx. S/p extensive THONY, and reduction of intussusception on 11/3. Extensive hx of bowel surgeries (>30 operations) d/t Crohn's. Pt with SBS per MD note. Per Colorectal Surgeon notes:   - 210cm bowel from LOT to ileocoloic anastomosis   - little to no colon   - entire rectum    Severe wt loss of 15% x 2.5 months noted, however unsure of what wt has actually been trending. Please weigh on standing scale daily while on TPN and to monitor trends. Wt Readings:   11/02/17 69.6 kg (153 lb 6.4 oz) - standing scale   08/18/17 81.6 kg (180 lb) - ED weight, ?accuracy   06/22/17 81.2 kg (179 lb) - ED weight, ?accuracy   02/27/17 74.8 kg (165 lb) - office weight   08/09/16 72.6 kg (160 lb)     No PO intake for 4 days PTA and predicted sub-optimal intake over next few days. Agree with start of TPN which is ordered to start tonight. Continue with current order @ 42ml/hr x 24hrs. If electrolytes WNL advanced tomorrow to goal of: 5%AA, D20 @ 90ml/hr + 500ml, 20% lipids 3x/week. Provides: 2329kcal, 108g protein, 432g CHO (GIR = 4.3mg/kg/min), 2160ml fluid. With hx of SBS recommend checking zinc, vitamin B12, and fat soluble vitamins (A,D,E) - have not been checked since 2015. Will continue to follow for labs results, new measured weights, and diet advancement per surgery. Pt's usual diet order and snacks:  Regular, Double Portions, Lactaid milk only  PM: Oatmeal + Nutritgrain Bar (strawberry) x 2   HS: PB&J sandwich (on white) x 2      Estimated Nutrition Needs:   Kcals/day: 2155 Kcals/day (2155-2405kcal)  Protein: 97 g (97-111g (1.4-1.6g/kg))  Fluid: 2200 ml (1ml/kcal)  Based On: [place] Jeor (x 1.4 + 250kcal)  Weight Used: Actual wt (69.6kg)    Pt expected to meet estimated nutrient needs:  []   Yes     []  No [] Unable to predict at this time  Nutrition Diagnosis:   1.  Inadequate protein-energy intake related to altered GI fx as evidenced by s/p THONY; NPO w/ TPN    2.  (Impaired nutrient utilization) related to altered GI fx as evidenced by SBS with 210cm small bowel distal to LOT    Goals:     PN meeting at least 90% needs in 2-3 days     Monitoring & Evaluation:    - Enteral/parenteral nutrition intake   - Weight/weight change, GI, Vitamin profile    Previous Nutrition Goals Met:   N/A  Previous Recommendations:    N/A    Education & Discharge Needs:   [x] None Identified   [] Identified and addressed    [] Participated in care plan, discharge planning, and/or interdisciplinary rounds        Cultural, Zoroastrianism and ethnic food preferences identified: None    Skin Integrity: [x]Intact  []Other  Edema: [x]None []Other  Last BM: 10/30  Food Allergies: []None [x]Other: honey  Diet Restrictions: Cultural/Islam Preference(s): None     Anthropometrics:    Weight Loss Metrics 11/2/2017 8/18/2017 6/22/2017 2/27/2017 1/31/2017 8/9/2016 2/29/2016   Today's Wt 153 lb 6.4 oz 180 lb 179 lb 165 lb 162 lb 160 lb 164 lb 6.4 oz   BMI 21.39 kg/m2 25.1 kg/m2 26.43 kg/m2 23.01 kg/m2 22.59 kg/m2 22.33 kg/m2 22.94 kg/m2      Weight Source: Standing scale (comment)  Height: 5' 11\" (180.3 cm),    Body mass index is 21.39 kg/(m^2).   IBW : 78 kg (172 lb), % IBW (Calculated): 89.19 %   ,      Labs:    Lab Results   Component Value Date/Time    Sodium 139 11/03/2017 05:45 AM    Potassium 3.8 11/03/2017 05:45 AM    Chloride 104 11/03/2017 05:45 AM    CO2 25 11/03/2017 05:45 AM    Glucose 107 11/03/2017 05:45 AM    BUN 8 11/03/2017 05:45 AM    Creatinine 1.33 11/03/2017 05:45 AM    Calcium 8.1 11/03/2017 05:45 AM    Magnesium 2.4 11/03/2017 05:45 AM    Phosphorus 3.2 11/03/2017 05:45 AM    Albumin 3.8 11/02/2017 04:48 PM     Lab Results   Component Value Date/Time    Hemoglobin A1c 4.8 11/15/2013 03:14 PM     Michela Sanchez RD

## 2017-11-03 NOTE — PERIOP NOTES
TRANSFER - OUT REPORT:    Verbal report given to [place] TEJEDA(name) on Andrea Ernst  being transferred to [facility](unit) for routine post - op       Report consisted of patients Situation, Background, Assessment and   Recommendations(SBAR). Time Pre op antibiotic given:cefotetan 2gm IV @ 0142  Anesthesia Stop time: 9352  Trejo Present on Transfer to floor:yes  Order for Trejo on Chart:yes    Information from the following report(s) SBAR, OR Summary, Intake/Output, MAR and Recent Results was reviewed with the receiving nurse. Opportunity for questions and clarification was provided. Is the patient on 02? YES       L/Min 2       Other n/a    Is the patient on a monitor? NO    Is the nurse transporting with the patient? YES    Surgical Waiting Area notified of patient's transfer from PACU? No family waiting        The following personal items collected during your admission accompanied patient upon transfer:   Dental Appliance: Dental Appliances: Uppers  Vision: Visual Aid:  With patient, Magnifying glass  Hearing Aid:    Jewelry:    Clothing:    Other Valuables:    Valuables sent to safe:      Dentures, glasses and clothing x 2 bags sent with pt to [facility]

## 2017-11-03 NOTE — ANESTHESIA PREPROCEDURE EVALUATION
Anesthetic History   No history of anesthetic complications            Review of Systems / Medical History  Patient summary reviewed, nursing notes reviewed and pertinent labs reviewed    Pulmonary  Within defined limits  COPD               Neuro/Psych   Within defined limits           Cardiovascular  Within defined limits  Hypertension                   GI/Hepatic/Renal  Within defined limits   GERD      PUD     Endo/Other  Within defined limits      Arthritis     Other Findings              Physical Exam    Airway  Mallampati: II  TM Distance: > 6 cm  Neck ROM: normal range of motion   Mouth opening: Normal     Cardiovascular  Regular rate and rhythm,  S1 and S2 normal,  no murmur, click, rub, or gallop             Dental  No notable dental hx       Pulmonary  Breath sounds clear to auscultation               Abdominal  GI exam deferred       Other Findings            Anesthetic Plan    ASA: 3  Anesthesia type: general          Induction: Intravenous  Anesthetic plan and risks discussed with: Patient

## 2017-11-03 NOTE — PROCEDURES
PICC Placement Note    PRE-PROCEDURE VERIFICATION  Correct Procedure: yes  Correct Site:  yes  Temperature: Temp: 98.6 °F (37 °C), Temperature Source: Temp Source: Oral  Recent Labs      11/03/17   0545   BUN  8   CREA  1.33*   PLT  340   WBC  7.4       Allergies: Honey; Remicade [infliximab]; Crestor [rosuvastatin]; Other plant, animal, environmental; Imuran [azathioprine]; Mercaptopurine; and Sulfa (sulfonamide antibiotics)    Education materials, including PICC Booklet and written information regarding central catheter related bloodstream infection and prevention given to patient. See Patient Education activity for further details. PROCEDURE DETAIL  A double lumen power injectable PICC line was placed for TPN. The following documentation is in addition to the PICC properties in the lines/airways flowsheet :  Lot #: [id number]  Xylocaine 1% used intradermally:  yes  Total Catheter Length: 42 (cm)  Internal Catheter Length: 42 (cm)  Vein Selection for PICC: left basilic  Central Line Bundle followed: yes  Complication Related to Insertion: none    The placement was verified by ECG technology. The PICC is on the left side and the tip overlies the superior vena cava. ECG verification documentation is on the patient's paper chart. Line is ready for immediate use. Report to Chelsie.     Regine Braga, BSN, RN, VA-BC   Vascular Access Team

## 2017-11-03 NOTE — PROGRESS NOTES
Admission Medication Reconciliation:    Information obtained from:  Patient, chart review, RxQuery    Comments/Recommendations: All medications/allergies have been reviewed and updated; last medication administration times reviewed and recorded. The patient was a good historian and could recall names and doses of medication. He states that due to gaps in insurance coverage he is not able to afford all of his medications including the colestipol and Lomotil. Changes made to Prior to Admission (PTA) Medication List:   ?   Medications Added:   - chlorzoxazone  - clonidine  - hydrocodone/APAP  ? Medications Changed:   - Morphine SR changed from 20 mg daily to 15 mg daily  ? Medications Removed:   - cephalexin  - folic acid  - hydromorphone 8 mg  - oxycodone/APAP 10/325     Significant PMH/Disease States:   Past Medical History:   Diagnosis Date    Abdominal wall hernia 6/15/2012    Anal fissure     Anemia     Anemia     Anxiety and depression     Arthritis     Related to the Crohn's disease.  Cancer ([facility] [address]     MELANOMA HEAD AND FACE    Chronic pain     GENERALIZED    COPD (chronic obstructive pulmonary disease) (HCC)     Crohn disease (HCC)     Diagnosed at age 17.  Echocardiogram normal (EF: 55-60%) 07/2015    Esophageal ulcer     GERD (gastroesophageal reflux disease)     H/O blood transfusion 2013    [address] [facility]    Hypertension     denies    Migraine     Short bowel syndrome     Ventral hernia without obstruction or gangrene        Chief Complaint for this Admission:    Chief Complaint   Patient presents with    Abdominal Pain       Allergies:  Honey; Remicade [infliximab]; Crestor [rosuvastatin]; Other plant, animal, environmental; Imuran [azathioprine]; Mercaptopurine; and Sulfa (sulfonamide antibiotics)    Prior to Admission Medications:   Prior to Admission Medications   Prescriptions Last Dose Informant Patient Reported? Taking?    HYDROcodone-acetaminophen (NORCO)  mg tablet   Yes Yes   Sig: Take 1-2 Tabs by mouth every four (4) hours as needed for Pain. Maximum of 6 tablets per day   LORazepam (ATIVAN) 1 mg tablet Unknown at Unknown time  Yes No   Sig: Take 1 mg by mouth nightly as needed for Anxiety. aspirin-acetaminophen-caffeine (EXCEDRIN ES) 250-250-65 mg per tablet 11/2/2017 at Unknown time  Yes Yes   Sig: Take 2 Tabs by mouth every six (6) hours as needed for Headache. chlorzoxazone (PARAFON FORTE) 500 mg tablet   Yes Yes   Sig: Take 500 mg by mouth two (2) times daily as needed for Muscle Spasm(s). cloNIDine HCl (CATAPRES) 0.1 mg tablet   Yes Yes   Sig: Take 0.1 mg by mouth two (2) times a day. colestipol (COLESTID) 1 gram tablet Unknown at Unknown time  Yes No   Sig: Take 6 g by mouth Before breakfast, lunch, dinner and at bedtime. Min 24 g/day and Max 30 g/day. diphenhydrAMINE (BENADRYL) 50 mg capsule Not Taking at Unknown time  Yes No   Sig: Take 100 mg by mouth nightly as needed. diphenoxylate-atropine (LOMOTIL) 2.5-0.025 mg per tablet   Yes No   Sig: Take 2 Tabs by mouth Before breakfast, lunch, dinner and at bedtime. loperamide (IMODIUM) 2 mg capsule 11/2/2017 at Unknown time  Yes Yes   Sig: Take 2 mg by mouth as needed for Diarrhea. Patient takes 14-16 capsules a day. morphine CR (MS CONTIN) 15 mg CR tablet   Yes Yes   Sig: Take 15 mg by mouth daily. multivit-min-FA-lycopen-lutein (CENTRUM SILVER ULTRA MEN'S) 300-600-300 mcg tab   Yes No   Sig: Take 1 Tab by mouth daily. ondansetron hcl (ZOFRAN, AS HYDROCHLORIDE,) 8 mg tablet   Yes No   Sig: Take 8 mg by mouth every eight (8) hours as needed for Nausea. Facility-Administered Medications: None         Thank you for allowing pharmacy to participate in the coordination of this patient's care. If you have any other questions, please contact the medication reconciliation pharmacist at x [phone]. Mariah Emery D.

## 2017-11-03 NOTE — ED NOTES
Bedside and Verbal shift change report given to Emilee Chirinos RN (oncoming nurse) by Linda Bansal RN (offgoing nurse). Report included the following information ED Summary.

## 2017-11-03 NOTE — CONSULTS
Colorectal Surgery Consultation/Pre-Operative History and Physical Examination Note          NAME:  Ananda Villatoro   :   1960   MRN:   [id number]     Referring Physician:  Gurmeet Alexis MD    Consultation Date: 11/3/2017    Chief Complaint:  Abdominal pain. History of Present Illness: The patient is a 60-year-old male with Crohn's disease who is well known to me from multiple previous admissions and operations. He has had more than 30 operations, and he has short bowel syndrome. He has his entire rectum, little or no colon, and approximately 125 cm of small intestine distal to the ligament of Treitz. He had been doing reasonably well until 4 days ago when he began to have significant abdominal pain and reduced output of stool. He has continued to pas flatus, but he has had almost no output of stool for the last 2 days. What he has been able to pass has been watery. He denies seeing any blood in it. He had had nausea but no vomiting. PMH:  Past Medical History:   Diagnosis Date    Abdominal wall hernia 6/15/2012    Anal fissure     Anemia     Anemia     Anxiety and depression     Arthritis     Related to the Crohn's disease.  Cancer ([address]     MELANOMA HEAD AND FACE    Chronic pain     GENERALIZED    COPD (chronic obstructive pulmonary disease) (HCC)     Crohn disease (HCC)     Diagnosed at age 17.  Echocardiogram normal (EF: 55-60%) 2015    Esophageal ulcer     GERD (gastroesophageal reflux disease)     H/O blood transfusion     [address] [facility]    Hypertension     denies    Migraine     Short bowel syndrome     Ventral hernia without obstruction or gangrene        PSH:  Past Surgical History:   Procedure Laterality Date    ABDOMEN SURGERY PROC UNLISTED      33 abdominal operations for treatment of Crohn's disease and its complications.     HC NDL CEJA      ceja needle, takes 1 inch needle    HX APPENDECTOMY      HX CHOLECYSTECTOMY  HX GI      colectomy x2, one ileostomy    HX GI  7/28/14    exp lap,partial colectomy with end ileostomy by Dr Santiago Lob      neck fusion    HX ORTHOPAEDIC  1980s    wrist right,     HX ORTHOPAEDIC  2006, 11/2013    neck, L4 L5 L6    HX ORTHOPAEDIC  1990s    right knee scope    HX OTHER SURGICAL      surgical repair from spider bite    HX OTHER SURGICAL  12/1/14    Incision and drainage of posterior right subcutaneous shoulder abscess    HX OTHER SURGICAL  2014    LEFT INDEX FINGER SPIDER BITE    HX OTHER SURGICAL  2014    RECTAL FISTULA    HX OTHER SURGICAL  3/17/2015    Ileostomy takedown with extensive lysis of adhesions (greater than three hours), mobilization of the splenic flexure, left colon resection, ileocolic anastomosis, and excision of scar; Dr. Alexx Morris.  HX OTHER SURGICAL  3/22/2015    Exploratory laparotomy with washout of abdomen, suture repair of small bowel/anastomosis, and diverting protective loop ileostomy; Ayesha Segovia MD.    HX OTHER SURGICAL  4/9/2015    Laparotomy, extensive lysis of adhesions, abdominal lavage, and resection of ileocolic anastomosis (including the efferent limb of the loop ileostomy) with creation of Demetrius's pouch; Dr. Alexx Morris.  HX OTHER SURGICAL  7/14/2015    Cystoscopy and placement of bilateral ureteral catheters; Alyce Balderas MD.    HX OTHER SURGICAL  7/14/2015    Ileostomy takedown with extensive lysis of adhesions, small bowel resection, and enterocolostomy; Dr. Alexx Morris.  HX OTHER SURGICAL  9/29/2015    CT-guided percutaneous drainage of intraabdominal abscess; Dr. Ethel Madison.  HX OTHER SURGICAL  11/16/2015    CT-guided percutaneous aspiration of abdominal wall cavity; Dr. Bryanna Tillman.  HX OTHER SURGICAL  12/1/2015    Incision and drainage of abdominal wall abscess; Dr. Alexx Morris.  HX OTHER SURGICAL  2/11/2016    Incision and drainage of abdominal wall abscess; Dr. Alexx Morris.     HX OTHER SURGICAL 2/23/2016    Cystoscopy and placement of bilateral temporary ureteral catheters; Dr. Tamiko Massey.  HX OTHER SURGICAL  2/23/2016    Exploratory laparotomy, extensive lysis of adhesions, removal of mesh, and repair of enterocutaneous fistula; Dr. Maira Smith. Home Medications:  Prior to Admission Medications   Prescriptions Last Dose Informant Patient Reported? Taking? HYDROcodone-acetaminophen (NORCO)  mg tablet   Yes Yes   Sig: Take 1-2 Tabs by mouth every four (4) hours as needed for Pain. Maximum of 6 tablets per day   LORazepam (ATIVAN) 1 mg tablet Unknown at Unknown time  Yes No   Sig: Take 1 mg by mouth nightly as needed for Anxiety. aspirin-acetaminophen-caffeine (EXCEDRIN ES) 250-250-65 mg per tablet 11/2/2017 at Unknown time  Yes Yes   Sig: Take 2 Tabs by mouth every six (6) hours as needed for Headache. chlorzoxazone (PARAFON FORTE) 500 mg tablet   Yes Yes   Sig: Take 500 mg by mouth two (2) times daily as needed for Muscle Spasm(s). cloNIDine HCl (CATAPRES) 0.1 mg tablet   Yes Yes   Sig: Take 0.1 mg by mouth two (2) times a day. colestipol (COLESTID) 1 gram tablet Unknown at Unknown time  Yes No   Sig: Take 6 g by mouth Before breakfast, lunch, dinner and at bedtime. Min 24 g/day and Max 30 g/day. diphenhydrAMINE (BENADRYL) 50 mg capsule Not Taking at Unknown time  Yes No   Sig: Take 100 mg by mouth nightly as needed. diphenoxylate-atropine (LOMOTIL) 2.5-0.025 mg per tablet   Yes No   Sig: Take 2 Tabs by mouth Before breakfast, lunch, dinner and at bedtime. loperamide (IMODIUM) 2 mg capsule 11/2/2017 at Unknown time  Yes Yes   Sig: Take 2 mg by mouth as needed for Diarrhea. Patient takes 14-16 capsules a day. morphine CR (MS CONTIN) 15 mg CR tablet   Yes Yes   Sig: Take 15 mg by mouth daily. multivit-min-FA-lycopen-lutein (CENTRUM SILVER ULTRA MEN'S) 300-600-300 mcg tab   Yes No   Sig: Take 1 Tab by mouth daily.    ondansetron hcl (ZOFRAN, AS HYDROCHLORIDE,) 8 mg tablet Yes No   Sig: Take 8 mg by mouth every eight (8) hours as needed for Nausea. Facility-Administered Medications: None       Hospital Medications:  No current facility-administered medications for this encounter. Current Outpatient Prescriptions   Medication Sig    morphine CR (MS CONTIN) 15 mg CR tablet Take 15 mg by mouth daily.  HYDROcodone-acetaminophen (NORCO)  mg tablet Take 1-2 Tabs by mouth every four (4) hours as needed for Pain. Maximum of 6 tablets per day    chlorzoxazone (PARAFON FORTE) 500 mg tablet Take 500 mg by mouth two (2) times daily as needed for Muscle Spasm(s).  cloNIDine HCl (CATAPRES) 0.1 mg tablet Take 0.1 mg by mouth two (2) times a day.  aspirin-acetaminophen-caffeine (EXCEDRIN ES) 250-250-65 mg per tablet Take 2 Tabs by mouth every six (6) hours as needed for Headache.  loperamide (IMODIUM) 2 mg capsule Take 2 mg by mouth as needed for Diarrhea. Patient takes 14-16 capsules a day.  colestipol (COLESTID) 1 gram tablet Take 6 g by mouth Before breakfast, lunch, dinner and at bedtime. Min 24 g/day and Max 30 g/day.  LORazepam (ATIVAN) 1 mg tablet Take 1 mg by mouth nightly as needed for Anxiety.  diphenoxylate-atropine (LOMOTIL) 2.5-0.025 mg per tablet Take 2 Tabs by mouth Before breakfast, lunch, dinner and at bedtime.  diphenhydrAMINE (BENADRYL) 50 mg capsule Take 100 mg by mouth nightly as needed.  ondansetron hcl (ZOFRAN, AS HYDROCHLORIDE,) 8 mg tablet Take 8 mg by mouth every eight (8) hours as needed for Nausea.  multivit-min-FA-lycopen-lutein (CENTRUM SILVER ULTRA MEN'S) 300-600-300 mcg tab Take 1 Tab by mouth daily. Allergies: Allergies   Allergen Reactions    Honey Anaphylaxis    Remicade [Infliximab] Anaphylaxis    Crestor [Rosuvastatin] Myalgia    Other Plant, Animal, Environmental Other (comments)     Developed \"boils\" on right arm that required I &D after receiving FENTANYL patch.   Is able to take FENTANYL orally; states is allergic to fentanyl patch GLUE    Imuran [Azathioprine] Diarrhea and Nausea Only    Mercaptopurine Diarrhea    Sulfa (Sulfonamide Antibiotics) Other (comments)     Causes diarrhea       Family History:  Family History   Problem Relation Age of Onset    Stroke Mother     Heart Disease Mother     Kidney Disease Mother     Anesth Problems Mother      TAKES A LONG TIME TO WAKE UP    Heart Disease Father     Thyroid Disease Sister        Social History:  Social History   Substance Use Topics    Smoking status: Current Every Day Smoker     Packs/day: 1.00     Years: 44.00    Smokeless tobacco: Current User      Comment: CURRENT E CIGS    Alcohol use No      Comment: RARELY       Review of Systems:    Symptom Y/N Comments  Symptom Y/N Comments   Fever/Chills    Chest Pain     Cough    Abdominal Pain     Sputum    Joint Pain     SOB/YOUNGER    Pruritis/Rash     Nausea/vomit    Tolerating PT/OT     Diarrhea    Tolerating Diet     Constipation    Other       Could NOT obtain due to:        Objective:     Patient Vitals for the past 8 hrs:   BP Pulse Resp SpO2   11/02/17 2300 125/75 - - 94 %   11/02/17 2113 123/75 66 18 99 %   11/02/17 2000 121/70 60 18 98 %   11/02/17 1954 118/62 - 18 -   11/02/17 1829 126/66 82 20 97 %   11/02/17 1705 122/75 66 20 97 %   11/02/17 1645 121/83 72 20 96 %             PHYSICAL EXAMINATION:    GENERAL:  Uncomfortable (after receiving Dilaudid). HEENT:  Anicteric. LUNGS:  Clear to auscultation bilaterally. HEART:  Regular rate and rhythm with no murmurs, gallops, or rubs. ABDOMEN:  Soft but diffusely tender. Large midline ventral incisional hernia. PERINEUM:  Digital rectal examination poorly tolerated secondary to discomfort. EXTREMITIES:  No edema. NEURO:  Alert and oriented.        Data Review     Recent Labs      11/02/17   1648   WBC  9.7   HGB  14.0   HCT  43.3   PLT  392     Recent Labs      11/02/17   1648   NA  138   K  3.6   CL  102   CO2  26   BUN  9   CREA  1.03 GLU  79   CA  8.7     Recent Labs      11/02/17   1648   SGOT  17   AP  99   TP  7.6   ALB  3.8   GLOB  3.8     No results for input(s): INR, PTP, APTT in the last 72 hours. No lab exists for component: INREXT, INREXT      CT Scan of Abdomen and Pelvis:  I have reviewed the images, and I agree that it appears that the patient has a bowel obstruction secondary to intussusception. Assessment and Plan:      The patient has a bowel obstruction that appears to be secondary to intussusception. He is at risk of developing intestinal ischemia, which could result in loss of bowel. Such a loss could be catastrophic given that he already has short bowel syndrome. Although there are many risks of surgery, I believe that it is necessary and should be performed as soon as possible to relieve the obstruction and conserve the bowel. The patient understands the risks all too well, including (but not limited to) the risks of bowel injury, fistula formation, and TPN dependence, and he has agreed to proceed.       Principal Problem:    Intussusception of intestine ([facility] [address] (11/3/2017)    Active Problems:    Chronic pain (3/17/2015)      Short bowel syndrome (9/28/2015)      Incisional hernia, without obstruction or gangrene (1/31/2017)

## 2017-11-03 NOTE — ANESTHESIA POSTPROCEDURE EVALUATION
Post-Anesthesia Evaluation and Assessment    Patient: Davey Rico MRN: [id number]  SSN: [id number]    YOB: 1960  Age: 65 y.o. Sex: male       Cardiovascular Function/Vital Signs  Visit Vitals    /75    Pulse 86    Temp 36.6 °C (97.8 °F)    Resp 22    Ht 5' 11\" (1.803 m)    Wt 69.6 kg (153 lb 6.4 oz)    SpO2 100%    BMI 21.39 kg/m2       Patient is status post general anesthesia for Procedure(s):  LAPAROTOMY, POSSIBLE BOWEL OBSTRUCTION, POSSIBLE OSTOMY. Nausea/Vomiting: None    Postoperative hydration reviewed and adequate. Pain:  Pain Scale 1: Numeric (0 - 10) (11/03/17 0625)  Pain Intensity 1: 8 (11/03/17 0102)   Managed    Neurological Status: At baseline    Mental Status and Level of Consciousness: Arousable    Pulmonary Status:   O2 Device: Nasal cannula (11/03/17 0522)   Adequate oxygenation and airway patent    Complications related to anesthesia: None    Post-anesthesia assessment completed.  No concerns    Signed By: Cira Arauz MD     November 3, 2017

## 2017-11-03 NOTE — PROGRESS NOTES
Chart reviewed. Patient is a 67-year-old male with Crohn's disease. He has had multiple previous admissions and operations. Patient had an Exploratory laparotomy, extensive lysis of adhesions, and reduction of intussusception today by Dr. Edil Elias. CM noted that patient also had a PICC line placed today for TPN. CM met with patient to introduce role of CM and discuss discharge planning. Patient  lives with his best friend Joselyn Bryant ([phone]) in a private residence in [place] Patient is independent with ADLs and has a cane and walker to use at home. Patient drives himself. Confirmed insurance is [employer]. Patient gets medications at [facility]. PCP is Dr. Autumn Campbell MD ([phone]) at [facility]. Patient's friend Joselyn Bryant will transport him home via car at discharge. Care management will continue to follow for discharge planning. Care Management Interventions  PCP Verified by CM: Yes Autumn Campbell MD)  Mode of Transport at Discharge: Other (see comment) (friend)  Transition of Care Consult (CM Consult): Discharge Planning  MyChart Signup: No  Discharge Durable Medical Equipment: No  Physical Therapy Consult: No  Occupational Therapy Consult: No  Speech Therapy Consult: No  Current Support Network:  Other, Own Home (Lives with best friend)  Confirm Follow Up Transport: Friends  Plan discussed with Pt/Family/Caregiver: Yes  Discharge Location  Discharge Placement: Home     Bria Selby, BSW/CRM

## 2017-11-03 NOTE — PROGRESS NOTES
Bedside shift change report given to Remi Deferiet Jermaine (oncoming nurse) by Tyrone Tafoya RN (offgoing nurse). Report included the following information SBAR, Kardex, Intake/Output, MAR and Recent Results.

## 2017-11-03 NOTE — BRIEF OP NOTE
BRIEF OPERATIVE NOTE    Date of Procedure:  11/3/2017   Preoperative Diagnosis:  Bowel obstruction secondary to intussusception. Ventral hernia. Postoperative Diagnosis:  Bowel obstruction secondary to intussusception. Ventral hernia. Procedure:  Exploratory laparotomy, extensive lysis of adhesions, and reduction of intussusception. Surgeon:  Ulysses Forester, MD  Assistant:  Marlyn Almaguer. Arnold Richard, [place]  Anesthesia:  General endotracheal  Estimated Blood Loss:  200 mL  Crystalloid:  1300 mL  Urine:    150 mL  Specimens:  None. Tubes and Drains:  None. Findings:  Large, reducible ventral incisional hernia. Extensive adhesions. Dilated small intestine without definite intussusception. No evidence of active Crohn's disease. Intestinal length from Ligament of Treitz to ileocolic anastomosis was approximately 210 cm. Side-to-end anastomosis between ileum and rectosigmoid colon. Dilated rectum. Mild anal stricture. Complications:  None apparent.

## 2017-11-04 LAB
BASOPHILS # BLD: 0 K/UL (ref 0–0.1)
BASOPHILS NFR BLD: 0 % (ref 0–1)
DIFFERENTIAL METHOD BLD: ABNORMAL
EOSINOPHIL # BLD: 0.5 K/UL (ref 0–0.4)
EOSINOPHIL NFR BLD: 7 % (ref 0–7)
ERYTHROCYTE [DISTWIDTH] IN BLOOD BY AUTOMATED COUNT: 16.7 % (ref 11.5–14.5)
GLUCOSE BLD STRIP.AUTO-MCNC: 123 MG/DL (ref 65–100)
GLUCOSE BLD STRIP.AUTO-MCNC: 127 MG/DL (ref 65–100)
GLUCOSE BLD STRIP.AUTO-MCNC: 128 MG/DL (ref 65–100)
GLUCOSE BLD STRIP.AUTO-MCNC: 167 MG/DL (ref 65–100)
HCT VFR BLD AUTO: 38.6 % (ref 36.6–50.3)
HGB BLD-MCNC: 11.8 G/DL (ref 12.1–17)
LYMPHOCYTES # BLD: 0.9 K/UL (ref 0.8–3.5)
LYMPHOCYTES NFR BLD: 13 % (ref 12–49)
MCH RBC QN AUTO: 28.1 PG (ref 26–34)
MCHC RBC AUTO-ENTMCNC: 30.6 G/DL (ref 30–36.5)
MCV RBC AUTO: 91.9 FL (ref 80–99)
MONOCYTES # BLD: 0.8 K/UL (ref 0–1)
MONOCYTES NFR BLD: 11 % (ref 5–13)
NEUTS SEG # BLD: 4.8 K/UL (ref 1.8–8)
NEUTS SEG NFR BLD: 69 % (ref 32–75)
NRBC # BLD: 0 K/UL (ref 0–0.01)
NRBC BLD-RTO: 0 PER 100 WBC
PLATELET # BLD AUTO: 301 K/UL (ref 150–400)
RBC # BLD AUTO: 4.2 M/UL (ref 4.1–5.7)
RBC MORPH BLD: ABNORMAL
SERVICE CMNT-IMP: ABNORMAL
WBC # BLD AUTO: 7 K/UL (ref 4.1–11.1)

## 2017-11-04 PROCEDURE — 74011636637 HC RX REV CODE- 636/637: Performed by: COLON & RECTAL SURGERY

## 2017-11-04 PROCEDURE — 36415 COLL VENOUS BLD VENIPUNCTURE: CPT | Performed by: COLON & RECTAL SURGERY

## 2017-11-04 PROCEDURE — 82962 GLUCOSE BLOOD TEST: CPT

## 2017-11-04 PROCEDURE — 74011000258 HC RX REV CODE- 258: Performed by: COLON & RECTAL SURGERY

## 2017-11-04 PROCEDURE — 65270000029 HC RM PRIVATE

## 2017-11-04 PROCEDURE — 85025 COMPLETE CBC W/AUTO DIFF WBC: CPT | Performed by: COLON & RECTAL SURGERY

## 2017-11-04 PROCEDURE — 74011000250 HC RX REV CODE- 250: Performed by: COLON & RECTAL SURGERY

## 2017-11-04 PROCEDURE — 74011250636 HC RX REV CODE- 250/636: Performed by: COLON & RECTAL SURGERY

## 2017-11-04 PROCEDURE — 74011250637 HC RX REV CODE- 250/637: Performed by: COLON & RECTAL SURGERY

## 2017-11-04 RX ORDER — SODIUM CHLORIDE, SODIUM LACTATE, POTASSIUM CHLORIDE, CALCIUM CHLORIDE 600; 310; 30; 20 MG/100ML; MG/100ML; MG/100ML; MG/100ML
10 INJECTION, SOLUTION INTRAVENOUS CONTINUOUS
Status: DISCONTINUED | OUTPATIENT
Start: 2017-11-04 | End: 2017-11-10 | Stop reason: HOSPADM

## 2017-11-04 RX ORDER — CLONIDINE HYDROCHLORIDE 0.1 MG/1
0.1 TABLET ORAL 2 TIMES DAILY
Status: DISCONTINUED | OUTPATIENT
Start: 2017-11-04 | End: 2017-11-10 | Stop reason: HOSPADM

## 2017-11-04 RX ORDER — HYDROMORPHONE HCL IN 0.9% NACL 15 MG/30ML
PATIENT CONTROLLED ANALGESIA VIAL INTRAVENOUS
Status: DISPENSED | OUTPATIENT
Start: 2017-11-04 | End: 2017-11-08

## 2017-11-04 RX ORDER — CHLORZOXAZONE 500 MG/1
500 TABLET ORAL
Status: DISCONTINUED | OUTPATIENT
Start: 2017-11-04 | End: 2017-11-10 | Stop reason: HOSPADM

## 2017-11-04 RX ORDER — MORPHINE SULFATE 15 MG/1
15 TABLET, FILM COATED, EXTENDED RELEASE ORAL DAILY
Status: DISCONTINUED | OUTPATIENT
Start: 2017-11-05 | End: 2017-11-04

## 2017-11-04 RX ORDER — MORPHINE SULFATE 15 MG/1
15 TABLET, FILM COATED, EXTENDED RELEASE ORAL DAILY
Status: DISCONTINUED | OUTPATIENT
Start: 2017-11-04 | End: 2017-11-05

## 2017-11-04 RX ORDER — FAMOTIDINE 20 MG/1
20 TABLET, FILM COATED ORAL 2 TIMES DAILY
Status: DISCONTINUED | OUTPATIENT
Start: 2017-11-04 | End: 2017-11-10 | Stop reason: HOSPADM

## 2017-11-04 RX ORDER — DIPHENHYDRAMINE HCL 25 MG
100 CAPSULE ORAL
Status: DISCONTINUED | OUTPATIENT
Start: 2017-11-04 | End: 2017-11-10 | Stop reason: HOSPADM

## 2017-11-04 RX ADMIN — DEXTROSE MONOHYDRATE, SODIUM CHLORIDE, AND POTASSIUM CHLORIDE 100 ML/HR: 50; 9; 1.49 INJECTION, SOLUTION INTRAVENOUS at 04:22

## 2017-11-04 RX ADMIN — MORPHINE SULFATE 15 MG: 15 TABLET, FILM COATED, EXTENDED RELEASE ORAL at 11:47

## 2017-11-04 RX ADMIN — ASCORBIC ACID: 500 INJECTION, SOLUTION INTRAMUSCULAR; INTRAVENOUS; SUBCUTANEOUS at 18:31

## 2017-11-04 RX ADMIN — Medication: at 10:19

## 2017-11-04 RX ADMIN — I.V. FAT EMULSION 500 ML: 20 EMULSION INTRAVENOUS at 18:31

## 2017-11-04 RX ADMIN — Medication 10 ML: at 23:03

## 2017-11-04 RX ADMIN — BUTALBITAL, ACETAMINOPHEN AND CAFFEINE 1 TABLET: 50; 325; 40 TABLET ORAL at 01:39

## 2017-11-04 RX ADMIN — Medication: at 14:24

## 2017-11-04 RX ADMIN — SODIUM CHLORIDE, SODIUM LACTATE, POTASSIUM CHLORIDE, AND CALCIUM CHLORIDE 50 ML/HR: 600; 310; 30; 20 INJECTION, SOLUTION INTRAVENOUS at 11:46

## 2017-11-04 RX ADMIN — Medication 10 ML: at 17:50

## 2017-11-04 RX ADMIN — FAMOTIDINE 20 MG: 20 TABLET ORAL at 17:49

## 2017-11-04 RX ADMIN — INSULIN LISPRO 2 UNITS: 100 INJECTION, SOLUTION INTRAVENOUS; SUBCUTANEOUS at 23:02

## 2017-11-04 RX ADMIN — Medication 10 ML: at 12:00

## 2017-11-04 RX ADMIN — Medication 10 ML: at 23:04

## 2017-11-04 RX ADMIN — BUTALBITAL, ACETAMINOPHEN AND CAFFEINE 1 TABLET: 50; 325; 40 TABLET ORAL at 18:44

## 2017-11-04 RX ADMIN — CLONIDINE HYDROCHLORIDE 0.1 MG: 0.1 TABLET ORAL at 17:50

## 2017-11-04 NOTE — PROGRESS NOTES
Bedside shift change report given to NIKHIL Sanchez (oncoming nurse) by Catherene Najjar, RN (offgoing nurse). Report included the following information SBAR, Kardex, Intake/Output and Recent Results.

## 2017-11-04 NOTE — PROGRESS NOTES
General Daily Progress Note    Admission Date: 11/2/2017  Hospital Day 2  Post-Op Day 1  Subjective:   Pain control could be better. No nausea or vomiting. No flatus. Ambulating in halls. Appreciates the nicotine patch. Objective:   Patient Vitals for the past 24 hrs:   BP Temp Pulse Resp SpO2 Weight   11/04/17 0815 125/70 98 °F (36.7 °C) 97 16 92 % -   11/04/17 0530 111/68 98.5 °F (36.9 °C) 97 16 93 % -   11/04/17 0526 - - - - - 75.7 kg (166 lb 14.2 oz)   11/04/17 0020 123/83 100 °F (37.8 °C) (!) 106 16 97 % -   11/03/17 2051 109/71 99 °F (37.2 °C) (!) 102 16 94 % -   11/03/17 1548 113/71 99.1 °F (37.3 °C) 92 18 96 % -   11/03/17 1213 133/76 98.6 °F (37 °C) 94 18 97 % -     11/04 0701 - 11/04 1900  In: -   Out: 250 [Urine:250]  11/02 1901 - 11/04 0700  In: 1936 [P.O.:236; I.V.:1700]  Out: 1125 [Urine:875]      Physical Examination:  General Appearance:  Somewhat uncomfortable. Abdomen:  Dressing dry and intact. Abdominal binder in use. :  Trejo catheter in place. Extremities:  Pneumatic compression stockings in use. Data Review   Recent Results (from the past 24 hour(s))   GLUCOSE, POC    Collection Time: 11/03/17  1:18 PM   Result Value Ref Range    Glucose (POC) 113 (H) 65 - 100 mg/dL    Performed by [employer], POC    Collection Time: 11/03/17  6:58 PM   Result Value Ref Range    Glucose (POC) 114 (H) 65 - 100 mg/dL    Performed by [employer], POC    Collection Time: 11/03/17 11:38 PM   Result Value Ref Range    Glucose (POC) 130 (H) 65 - 100 mg/dL    Performed by Cindy Hernandez    CBC WITH AUTOMATED DIFF    Collection Time: 11/04/17  5:56 AM   Result Value Ref Range    WBC 7.0 4.1 - 11.1 K/uL    RBC 4.20 4. 10 - 5.70 M/uL    HGB 11.8 (L) 12.1 - 17.0 g/dL    HCT 38.6 36.6 - 50.3 %    MCV 91.9 80.0 - 99.0 FL    MCH 28.1 26.0 - 34.0 PG    MCHC 30.6 30.0 - 36.5 g/dL    RDW 16.7 (H) 11.5 - 14.5 %    PLATELET 276 418 - 657 K/uL    NEUTROPHILS 69 32 - 75 %    LYMPHOCYTES 13 12 - 49 %    MONOCYTES 11 5 - 13 %    EOSINOPHILS 7 0 - 7 %    BASOPHILS 0 0 - 1 %    ABS. NEUTROPHILS 4.8 1.8 - 8.0 K/UL    ABS. LYMPHOCYTES 0.9 0.8 - 3.5 K/UL    ABS. MONOCYTES 0.8 0.0 - 1.0 K/UL    ABS. EOSINOPHILS 0.5 (H) 0.0 - 0.4 K/UL    ABS. BASOPHILS 0.0 0.0 - 0.1 K/UL    DF SMEAR SCANNED      RBC COMMENTS ANISOCYTOSIS  1+       NUCLEATED RBC    Collection Time: 11/04/17  5:56 AM   Result Value Ref Range    NRBC 0.0 0  WBC    ABSOLUTE NRBC 0.00 0.00 - 0.01 K/uL   GLUCOSE, POC    Collection Time: 11/04/17  6:15 AM   Result Value Ref Range    Glucose (POC) 123 (H) 65 - 100 mg/dL    Performed by Linsey Fairbanks (PCT)            Assessment:     Principal Problem:    Intussusception of intestine ([address] (11/3/2017)    Active Problems:    Chronic pain (3/17/2015)      Short bowel syndrome (9/28/2015)      Incisional hernia, without obstruction or gangrene (1/31/2017)      Bowel obstruction (11/3/2017)        1 Day Post-Op s/p Exploratory laparotomy, extensive lysis of adhesions, and reduction of intussusception. LUE PICC placed post-operatively on 11/3/2017. Hemodynamically stable. Adequate urine output. No setback overnight after removal of NG tube. No evidence of infection. Dr. Clarita Phipps input greatly appreciated. Plan:   Begin clear liquid diet with caution. Increase TPN to goal rate. Discontinue basal rate on PCA and begin MS Contin that patient takes at home. Remove Trejo catheter. Ambulate. Incentive spirometer. Continue abdominal binder.

## 2017-11-04 NOTE — PROGRESS NOTES
Bedside shift change report given to Rhoda Mccurdy RN (oncoming nurse) by Sean Chin RN (offgoing nurse). Report included the following information SBAR, Kardex, Intake/Output and Recent Results.

## 2017-11-04 NOTE — PROGRESS NOTES
Bedside and Verbal shift change report given to [address] (oncoming nurse) by Ole Clayton (offgoing nurse). Report included the following information SBAR, Kardex, Procedure Summary, Intake/Output, MAR and Accordion.

## 2017-11-05 LAB
ALBUMIN SERPL-MCNC: 2.2 G/DL (ref 3.5–5)
ALBUMIN/GLOB SERPL: 0.6 {RATIO} (ref 1.1–2.2)
ALP SERPL-CCNC: 92 U/L (ref 45–117)
ALT SERPL-CCNC: 32 U/L (ref 12–78)
ANION GAP SERPL CALC-SCNC: 7 MMOL/L (ref 5–15)
AST SERPL-CCNC: 22 U/L (ref 15–37)
BASOPHILS # BLD: 0 K/UL (ref 0–0.1)
BASOPHILS NFR BLD: 0 % (ref 0–1)
BILIRUB SERPL-MCNC: 0.4 MG/DL (ref 0.2–1)
BUN SERPL-MCNC: 8 MG/DL (ref 6–20)
BUN/CREAT SERPL: 10 (ref 12–20)
CALCIUM SERPL-MCNC: 7.6 MG/DL (ref 8.5–10.1)
CHLORIDE SERPL-SCNC: 103 MMOL/L (ref 97–108)
CO2 SERPL-SCNC: 24 MMOL/L (ref 21–32)
CREAT SERPL-MCNC: 0.78 MG/DL (ref 0.7–1.3)
EOSINOPHIL # BLD: 0.2 K/UL (ref 0–0.4)
EOSINOPHIL NFR BLD: 2 % (ref 0–7)
ERYTHROCYTE [DISTWIDTH] IN BLOOD BY AUTOMATED COUNT: 15.7 % (ref 11.5–14.5)
GLOBULIN SER CALC-MCNC: 3.4 G/DL (ref 2–4)
GLUCOSE BLD STRIP.AUTO-MCNC: 126 MG/DL (ref 65–100)
GLUCOSE BLD STRIP.AUTO-MCNC: 129 MG/DL (ref 65–100)
GLUCOSE BLD STRIP.AUTO-MCNC: 140 MG/DL (ref 65–100)
GLUCOSE SERPL-MCNC: 114 MG/DL (ref 65–100)
HCT VFR BLD AUTO: 33 % (ref 36.6–50.3)
HGB BLD-MCNC: 10.6 G/DL (ref 12.1–17)
LYMPHOCYTES # BLD: 0.8 K/UL (ref 0.8–3.5)
LYMPHOCYTES NFR BLD: 9 % (ref 12–49)
MAGNESIUM SERPL-MCNC: 1.9 MG/DL (ref 1.6–2.4)
MCH RBC QN AUTO: 28.3 PG (ref 26–34)
MCHC RBC AUTO-ENTMCNC: 32.1 G/DL (ref 30–36.5)
MCV RBC AUTO: 88 FL (ref 80–99)
MONOCYTES # BLD: 0.8 K/UL (ref 0–1)
MONOCYTES NFR BLD: 8 % (ref 5–13)
NEUTS SEG # BLD: 7.7 K/UL (ref 1.8–8)
NEUTS SEG NFR BLD: 81 % (ref 32–75)
PHOSPHATE SERPL-MCNC: 1.7 MG/DL (ref 2.6–4.7)
PLATELET # BLD AUTO: 239 K/UL (ref 150–400)
POTASSIUM SERPL-SCNC: 3.9 MMOL/L (ref 3.5–5.1)
PROT SERPL-MCNC: 5.6 G/DL (ref 6.4–8.2)
RBC # BLD AUTO: 3.75 M/UL (ref 4.1–5.7)
SERVICE CMNT-IMP: ABNORMAL
SODIUM SERPL-SCNC: 134 MMOL/L (ref 136–145)
WBC # BLD AUTO: 9.5 K/UL (ref 4.1–11.1)

## 2017-11-05 PROCEDURE — 85025 COMPLETE CBC W/AUTO DIFF WBC: CPT | Performed by: COLON & RECTAL SURGERY

## 2017-11-05 PROCEDURE — 74011000258 HC RX REV CODE- 258: Performed by: COLON & RECTAL SURGERY

## 2017-11-05 PROCEDURE — 74011250636 HC RX REV CODE- 250/636: Performed by: COLON & RECTAL SURGERY

## 2017-11-05 PROCEDURE — 74011000250 HC RX REV CODE- 250: Performed by: COLON & RECTAL SURGERY

## 2017-11-05 PROCEDURE — 83735 ASSAY OF MAGNESIUM: CPT | Performed by: COLON & RECTAL SURGERY

## 2017-11-05 PROCEDURE — 82962 GLUCOSE BLOOD TEST: CPT

## 2017-11-05 PROCEDURE — 65270000029 HC RM PRIVATE

## 2017-11-05 PROCEDURE — 74011636637 HC RX REV CODE- 636/637: Performed by: COLON & RECTAL SURGERY

## 2017-11-05 PROCEDURE — 74011250637 HC RX REV CODE- 250/637: Performed by: COLON & RECTAL SURGERY

## 2017-11-05 PROCEDURE — 80053 COMPREHEN METABOLIC PANEL: CPT | Performed by: COLON & RECTAL SURGERY

## 2017-11-05 PROCEDURE — 36415 COLL VENOUS BLD VENIPUNCTURE: CPT | Performed by: COLON & RECTAL SURGERY

## 2017-11-05 PROCEDURE — 84100 ASSAY OF PHOSPHORUS: CPT | Performed by: COLON & RECTAL SURGERY

## 2017-11-05 RX ORDER — HYDROCODONE BITARTRATE AND ACETAMINOPHEN 10; 325 MG/1; MG/1
1-2 TABLET ORAL
Status: DISCONTINUED | OUTPATIENT
Start: 2017-11-05 | End: 2017-11-10 | Stop reason: HOSPADM

## 2017-11-05 RX ORDER — MORPHINE SULFATE 15 MG/1
15 TABLET, FILM COATED, EXTENDED RELEASE ORAL EVERY 12 HOURS
Status: DISCONTINUED | OUTPATIENT
Start: 2017-11-05 | End: 2017-11-10 | Stop reason: HOSPADM

## 2017-11-05 RX ORDER — SODIUM,POTASSIUM PHOSPHATES 280-250MG
1 POWDER IN PACKET (EA) ORAL 4 TIMES DAILY
Status: DISCONTINUED | OUTPATIENT
Start: 2017-11-05 | End: 2017-11-06

## 2017-11-05 RX ORDER — MONTELUKAST SODIUM 4 MG/1
6 TABLET, CHEWABLE ORAL
Status: DISCONTINUED | OUTPATIENT
Start: 2017-11-05 | End: 2017-11-10 | Stop reason: HOSPADM

## 2017-11-05 RX ADMIN — POTASSIUM & SODIUM PHOSPHATES POWDER PACK 280-160-250 MG 1 PACKET: 280-160-250 PACK at 09:48

## 2017-11-05 RX ADMIN — MORPHINE SULFATE 15 MG: 15 TABLET, FILM COATED, EXTENDED RELEASE ORAL at 21:35

## 2017-11-05 RX ADMIN — POTASSIUM & SODIUM PHOSPHATES POWDER PACK 280-160-250 MG 1 PACKET: 280-160-250 PACK at 17:14

## 2017-11-05 RX ADMIN — CLONIDINE HYDROCHLORIDE 0.1 MG: 0.1 TABLET ORAL at 17:14

## 2017-11-05 RX ADMIN — FAMOTIDINE 20 MG: 20 TABLET ORAL at 08:05

## 2017-11-05 RX ADMIN — Medication 10 ML: at 21:36

## 2017-11-05 RX ADMIN — HYDROCODONE BITARTRATE AND ACETAMINOPHEN 2 TABLET: 10; 325 TABLET ORAL at 09:48

## 2017-11-05 RX ADMIN — HYDROCODONE BITARTRATE AND ACETAMINOPHEN 2 TABLET: 10; 325 TABLET ORAL at 13:22

## 2017-11-05 RX ADMIN — BUTALBITAL, ACETAMINOPHEN AND CAFFEINE 1 TABLET: 50; 325; 40 TABLET ORAL at 02:18

## 2017-11-05 RX ADMIN — LORAZEPAM 1 MG: 2 INJECTION, SOLUTION INTRAMUSCULAR; INTRAVENOUS at 08:05

## 2017-11-05 RX ADMIN — ASCORBIC ACID: 500 INJECTION, SOLUTION INTRAMUSCULAR; INTRAVENOUS; SUBCUTANEOUS at 18:51

## 2017-11-05 RX ADMIN — LORAZEPAM 1 MG: 2 INJECTION, SOLUTION INTRAMUSCULAR; INTRAVENOUS at 17:14

## 2017-11-05 RX ADMIN — Medication: at 08:59

## 2017-11-05 RX ADMIN — COLESTIPOL HYDROCHLORIDE 6 G: 1 TABLET, FILM COATED ORAL at 21:35

## 2017-11-05 RX ADMIN — Medication 10 ML: at 13:23

## 2017-11-05 RX ADMIN — FAMOTIDINE 20 MG: 20 TABLET ORAL at 17:14

## 2017-11-05 RX ADMIN — MORPHINE SULFATE 15 MG: 15 TABLET, FILM COATED, EXTENDED RELEASE ORAL at 08:05

## 2017-11-05 RX ADMIN — MORPHINE SULFATE 15 MG: 15 TABLET, FILM COATED, EXTENDED RELEASE ORAL at 09:48

## 2017-11-05 RX ADMIN — POTASSIUM & SODIUM PHOSPHATES POWDER PACK 280-160-250 MG 1 PACKET: 280-160-250 PACK at 13:22

## 2017-11-05 RX ADMIN — CLONIDINE HYDROCHLORIDE 0.1 MG: 0.1 TABLET ORAL at 08:05

## 2017-11-05 RX ADMIN — INSULIN LISPRO 2 UNITS: 100 INJECTION, SOLUTION INTRAVENOUS; SUBCUTANEOUS at 13:22

## 2017-11-05 NOTE — PROGRESS NOTES
Bedside shift change report given to Zuly Pittman RN (oncoming nurse) by Artem Fishman RN (offgoing nurse). Report included the following information SBAR, Intake/Output and MAR.

## 2017-11-05 NOTE — PROGRESS NOTES
General Daily Progress Note    Admission Date: 11/2/2017  Hospital Day 3  Post-Op Day 2  Subjective:   Complains mostly of poor pain control. Had some reflux symptoms yesterday, but they have improved with Pepcid. One watery bowel movement. Tolerating clear liquids. Voiding well, but has some lower abdominal pain when he does. No urethral pain. Objective:   Patient Vitals for the past 24 hrs:   BP Temp Pulse Resp SpO2   11/05/17 0754 147/81 98.6 °F (37 °C) 90 18 95 %   11/05/17 0500 123/71 98.3 °F (36.8 °C) 97 16 91 %   11/04/17 2305 122/71 99.7 °F (37.6 °C) 94 18 94 %   11/04/17 1944 121/66 99.3 °F (37.4 °C) (!) 110 20 93 %   11/04/17 1750 125/71 - (!) 111 - -   11/04/17 1533 128/74 100 °F (37.8 °C) (!) 104 18 93 %   11/04/17 1244 147/76 99.7 °F (37.6 °C) (!) 112 16 94 %        11/03 1901 - 11/05 0700  In: 236 [P.O.:236]  Out: 1500 [Urine:1500]      Physical Examination:  General Appearance:  Uncomfortable. Abdomen:  Incision clean, dry, intact and without evidence of infection. Skin staples are in place. Abdominal binder in use.            Data Review   Recent Results (from the past 24 hour(s))   GLUCOSE, POC    Collection Time: 11/04/17 12:21 PM   Result Value Ref Range    Glucose (POC) 128 (H) 65 - 100 mg/dL    Performed by Ludy Graf    GLUCOSE, POC    Collection Time: 11/04/17  4:29 PM   Result Value Ref Range    Glucose (POC) 127 (H) 65 - 100 mg/dL    Performed by Javi HOGAN(CON)    GLUCOSE, POC    Collection Time: 11/04/17 10:48 PM   Result Value Ref Range    Glucose (POC) 167 (H) 65 - 100 mg/dL    Performed by Javi HOGAN(CON)    CBC WITH AUTOMATED DIFF    Collection Time: 11/05/17  2:25 AM   Result Value Ref Range    WBC 9.5 4.1 - 11.1 K/uL    RBC 3.75 (L) 4.10 - 5.70 M/uL    HGB 10.6 (L) 12.1 - 17.0 g/dL    HCT 33.0 (L) 36.6 - 50.3 %    MCV 88.0 80.0 - 99.0 FL    MCH 28.3 26.0 - 34.0 PG    MCHC 32.1 30.0 - 36.5 g/dL    RDW 15.7 (H) 11.5 - 14.5 %    PLATELET 149 773 - 258 K/uL NEUTROPHILS 81 (H) 32 - 75 %    LYMPHOCYTES 9 (L) 12 - 49 %    MONOCYTES 8 5 - 13 %    EOSINOPHILS 2 0 - 7 %    BASOPHILS 0 0 - 1 %    ABS. NEUTROPHILS 7.7 1.8 - 8.0 K/UL    ABS. LYMPHOCYTES 0.8 0.8 - 3.5 K/UL    ABS. MONOCYTES 0.8 0.0 - 1.0 K/UL    ABS. EOSINOPHILS 0.2 0.0 - 0.4 K/UL    ABS. BASOPHILS 0.0 0.0 - 0.1 K/UL   METABOLIC PANEL, COMPREHENSIVE    Collection Time: 11/05/17  2:25 AM   Result Value Ref Range    Sodium 134 (L) 136 - 145 mmol/L    Potassium 3.9 3.5 - 5.1 mmol/L    Chloride 103 97 - 108 mmol/L    CO2 24 21 - 32 mmol/L    Anion gap 7 5 - 15 mmol/L    Glucose 114 (H) 65 - 100 mg/dL    BUN 8 6 - 20 MG/DL    Creatinine 0.78 0.70 - 1.30 MG/DL    BUN/Creatinine ratio 10 (L) 12 - 20      GFR est AA >60 >60 ml/min/1.73m2    GFR est non-AA >60 >60 ml/min/1.73m2    Calcium 7.6 (L) 8.5 - 10.1 MG/DL    Bilirubin, total 0.4 0.2 - 1.0 MG/DL    ALT (SGPT) 32 12 - 78 U/L    AST (SGOT) 22 15 - 37 U/L    Alk.  phosphatase 92 45 - 117 U/L    Protein, total 5.6 (L) 6.4 - 8.2 g/dL    Albumin 2.2 (L) 3.5 - 5.0 g/dL    Globulin 3.4 2.0 - 4.0 g/dL    A-G Ratio 0.6 (L) 1.1 - 2.2     MAGNESIUM    Collection Time: 11/05/17  2:25 AM   Result Value Ref Range    Magnesium 1.9 1.6 - 2.4 mg/dL   PHOSPHORUS    Collection Time: 11/05/17  2:25 AM   Result Value Ref Range    Phosphorus 1.7 (L) 2.6 - 4.7 MG/DL   GLUCOSE, POC    Collection Time: 11/05/17  6:08 AM   Result Value Ref Range    Glucose (POC) 129 (H) 65 - 100 mg/dL    Performed by Ana Tejeda (S0N)            Assessment:     Principal Problem:    Intussusception of intestine ([address] (11/3/2017)    Active Problems:    Chronic pain (3/17/2015)      Short bowel syndrome (9/28/2015)      Incisional hernia, without obstruction or gangrene (1/31/2017)      Bowel obstruction (11/3/2017)        2 Days Post-Op s/p Exploratory laparotomy, extensive lysis of adhesions, and reduction of intussusception.     LUE PICC placed post-operatively on 11/3/2017.     Hemodynamically stable. Adequate urine output. GI function is begining to return. Hyponatremic and hypophosphatemic. No evidence of infection. Pain management is always a problem for him. He usually takes the MS Contin in the evening and then uses the hydrocodone during the day. For now, I think it should be safe to increase the MS Contin to every 12 hours. Plan:   Begin full liquid diet with caution. Continue TPN at goal rate. Increase the sodium and phosphorus. Continue current PCA Dilaudid. Increase MS Contin to every 12 hours. Restart the patient's usual hydrocodone.     Ambulate. Incentive spirometer.     Continue abdominal binder.

## 2017-11-05 NOTE — OP NOTES
[address] [facility] [address], [address]   OP NOTE       Name:  Arsenio Payne   MR#:  [id number]   :  1960   Account #:  [de-identified]    Surgery Date:  2017   Date of Adm:  2017       PREOPERATIVE DIAGNOSES:   1. Bowel obstruction secondary to intussusception. 2. Ventral hernia. POSTOPERATIVE DIAGNOSES:   1. Bowel obstruction secondary to intussusception. 2. Ventral hernia. PROCEDURES PERFORMED:   1. Exploratory laparotomy. 2. Extensive lysis of adhesions, and reduction of intussusception. SURGEON: Reese Taylor MD    ASSISTANT: Gena Huerta SA    ANESTHESIA: General endotracheal.    SPECIMENS REMOVED: None. TUBES AND DRAINS: None. ESTIMATED BLOOD LOSS: 200 mL    CRYSTALLOID:  1300 mL. URINE OUTPUT: 150 mL. INDICATIONS FOR PROCEDURE: The patient is a 58-year-old male with a history of Crohn's disease and numerous abdominal operations, who presented to the emergency room with worsening abdominal pain, nausea, and decreased output of stool. 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 His intestinal anatomy includes the absence of almost the entire colon. He has his entire rectum and a very small amount of rectosigmoid to which the small intestine is anastomosed. Upon presentation to the emergency room with the current symptoms, his evaluation included laboratory studies and CT scan of the abdomen and pelvis. The scan was interpreted as revealing a bowel obstruction secondary to intussusception. Surgery was recommended and the patient, well aware of the risks, agreed to proceed. OPERATIVE FINDINGS: There was a large, reducible, ventral incisional hernia.  There were extensive adhesions involving essentially the entire small intestine. Most of these were of the filmy type. Those that were most dense were located laterally on the right and the left sides. There were dense adhesions in the region of the ileocolic anastomosis. There was no fistulization. There was no evidence of active Crohn's disease. The small intestine was dilated, consistent with a distal obstruction, but no definite intussusception was identified. The intestinal length from the ligament of Treitz to the ileocolic anastomosis was approximately 210 cm. There was a side-to-end anastomosis between the ileum and the rectosigmoid colon. The rectum itself was dilated, and there was a mild anal stricture. DESCRIPTION OF PROCEDURE: After informed consent was obtained, the patient was taken to the operating room where standard monitoring devices were attached and adequate general endotracheal anesthesia was induced. He was then positioned in a low lithotomy with the lower extremities fitted with pneumatic compression stockings and supported by the padded hydraulic Ralph stirrups. Two grams of Cefotetan were administered parenterally prior to making any incisions. Using standard sterile technique, a Trejo catheter was inserted into the bladder. A nasogastric tube was already in place. The abdomen and perineum were prepped and draped in the usual sterile fashion. The abdomen was carefully entered using a scalpel to incise the midline skin. The skin over the hernia was very thin and the bowel was just beneath it. There was no intact fascia in the upper midline. The abdomen was widely opened and extensive lysis of adhesions (taking more than 2 hours) was performed, mostly using the scissors. Gradually, the bowel was freed and the obstruction was relieved. As noted above, the intussusception was never directly visualized. As the bowel was mobilized, it was most likely reduced; therefore, it could not actually be seen. The entire small intestine was freed.  It was run multiple times. There was no evidence of any bowel injury. The intestinal length was roughly measured using a 75 cm silk tie. The abdominal cavity was copiously irrigated with warm saline solution, and the irrigant was mostly evacuated. Inspection revealed good hemostasis. Because of the presence of the large ventral hernia, it was not possible to primarily close the fascia. Some thought was given to using mesh to facilitate the closure. Instead, it was ultimately decided to close skin and scar and allow the patient to heal with the hernia, which could hopefully be repaired at a later date. Six small sheets of Seprafilm were placed on the viscera. The closure of the abdominal wall was then performed using two running 0 Vicryl sutures, followed by skin staples. A dressing of Telfa and 4 x 4s were put in place. The nasogastric tube and the Trejo catheter were both left in. There were no apparent complications, and the patient appeared to have tolerated the procedure well. At its conclusion, he was extubated and transported in stable condition to the recovery room. All sponge, needle and instrument counts were correct.         Maria Luz Pearl MD      PG / CD   D:  11/04/2017   14:40   T:  11/05/2017   05:53   Job #:  [id number]

## 2017-11-05 NOTE — PROGRESS NOTES
Progress Note    Patient: Mary Redman MRN: [id number]  SSN: [id number]    YOB: 1960  Age: 65 y.o. Sex: male      Admit Date: 2017    3 Days Post-Op    Procedure:  Procedure(s):  LAPAROTOMY, POSSIBLE BOWEL OBSTRUCTION, POSSIBLE OSTOMY    Subjective:     No acute surgical issues. Pt tolerating clears. No nausea or vomiting. Pt reported small watery stool. Pt reported some difficulty getting pain under control overnight. Objective:     Visit Vitals    /81 (BP 1 Location: Right arm, BP Patient Position: At rest)    Pulse 90    Temp 98.6 °F (37 °C)    Resp 18    Ht 5' 11\" (1.803 m)    Wt 166 lb 14.2 oz (75.7 kg)    SpO2 95%    BMI 23.28 kg/m2       Temp (24hrs), Av.3 °F (37.4 °C), Min:98.3 °F (36.8 °C), Max:100 °F (37.8 °C)        Physical Exam:    Gen:  NAD  Pulm:  Unlabored  Abd:  S/ND/appropriate TTP  Wound:  C/D/I    Assessment:     Hospital Problems  Date Reviewed: 2017          Codes Class Noted POA    * (Principal)Intussusception of intestine ([facility] [address] ICD-10-CM: K56.1  ICD-9-CM: 560.0  11/3/2017 Yes        Bowel obstruction ICD-10-CM: Q92.233  ICD-9-CM: 560.9  11/3/2017 Unknown        Incisional hernia, without obstruction or gangrene (Chronic) ICD-10-CM: K43.2  ICD-9-CM: 553.21  2017 Yes        Short bowel syndrome (Chronic) ICD-10-CM: K91.2  ICD-9-CM: 579.3  2015 Yes        Chronic pain (Chronic) ICD-10-CM: G31.79  ICD-9-CM: 338.29  3/17/2015 Yes              Plan/Recommendations/Medical Decision Making:     - Diet per Dr. Reyna Boggs  - No acute indication for incisional hernia repair  - Recommend keeping binder on at all times.   - Ambulate as tolerated    Signed By: Werner Lyon MD     2017

## 2017-11-05 NOTE — ROUTINE PROCESS
Bedside and Verbal shift change report given to [address] [facility] (oncoming nurse) by Bob Browning (offgoing nurse). Report included the following information SBAR, Kardex, Intake/Output, MAR, Accordion and Recent Results. All opportunity for clarification/questions given.

## 2017-11-06 LAB
ANION GAP SERPL CALC-SCNC: 8 MMOL/L (ref 5–15)
BASOPHILS # BLD: 0 K/UL (ref 0–0.1)
BASOPHILS NFR BLD: 0 % (ref 0–1)
BUN SERPL-MCNC: 10 MG/DL (ref 6–20)
BUN/CREAT SERPL: 14 (ref 12–20)
CALCIUM SERPL-MCNC: 8 MG/DL (ref 8.5–10.1)
CHLORIDE SERPL-SCNC: 105 MMOL/L (ref 97–108)
CO2 SERPL-SCNC: 26 MMOL/L (ref 21–32)
CREAT SERPL-MCNC: 0.74 MG/DL (ref 0.7–1.3)
EOSINOPHIL # BLD: 0.3 K/UL (ref 0–0.4)
EOSINOPHIL NFR BLD: 5 % (ref 0–7)
ERYTHROCYTE [DISTWIDTH] IN BLOOD BY AUTOMATED COUNT: 16.1 % (ref 11.5–14.5)
GLUCOSE BLD STRIP.AUTO-MCNC: 121 MG/DL (ref 65–100)
GLUCOSE BLD STRIP.AUTO-MCNC: 128 MG/DL (ref 65–100)
GLUCOSE BLD STRIP.AUTO-MCNC: 134 MG/DL (ref 65–100)
GLUCOSE BLD STRIP.AUTO-MCNC: 137 MG/DL (ref 65–100)
GLUCOSE BLD STRIP.AUTO-MCNC: 141 MG/DL (ref 65–100)
GLUCOSE SERPL-MCNC: 126 MG/DL (ref 65–100)
HCT VFR BLD AUTO: 29.8 % (ref 36.6–50.3)
HGB BLD-MCNC: 9.6 G/DL (ref 12.1–17)
LYMPHOCYTES # BLD: 0.9 K/UL (ref 0.8–3.5)
LYMPHOCYTES NFR BLD: 12 % (ref 12–49)
MAGNESIUM SERPL-MCNC: 1.8 MG/DL (ref 1.6–2.4)
MCH RBC QN AUTO: 28.2 PG (ref 26–34)
MCHC RBC AUTO-ENTMCNC: 32.2 G/DL (ref 30–36.5)
MCV RBC AUTO: 87.4 FL (ref 80–99)
MONOCYTES # BLD: 0.7 K/UL (ref 0–1)
MONOCYTES NFR BLD: 9 % (ref 5–13)
NEUTS SEG # BLD: 5.4 K/UL (ref 1.8–8)
NEUTS SEG NFR BLD: 74 % (ref 32–75)
PHOSPHATE SERPL-MCNC: 3.3 MG/DL (ref 2.6–4.7)
PLATELET # BLD AUTO: 222 K/UL (ref 150–400)
POTASSIUM SERPL-SCNC: 4 MMOL/L (ref 3.5–5.1)
RBC # BLD AUTO: 3.41 M/UL (ref 4.1–5.7)
SERVICE CMNT-IMP: ABNORMAL
SODIUM SERPL-SCNC: 139 MMOL/L (ref 136–145)
VIT B12 SERPL-MCNC: 239 PG/ML (ref 211–911)
WBC # BLD AUTO: 7.3 K/UL (ref 4.1–11.1)

## 2017-11-06 PROCEDURE — 74011250636 HC RX REV CODE- 250/636: Performed by: COLON & RECTAL SURGERY

## 2017-11-06 PROCEDURE — 84446 ASSAY OF VITAMIN E: CPT | Performed by: COLON & RECTAL SURGERY

## 2017-11-06 PROCEDURE — 82607 VITAMIN B-12: CPT | Performed by: COLON & RECTAL SURGERY

## 2017-11-06 PROCEDURE — 85025 COMPLETE CBC W/AUTO DIFF WBC: CPT | Performed by: COLON & RECTAL SURGERY

## 2017-11-06 PROCEDURE — 80048 BASIC METABOLIC PNL TOTAL CA: CPT | Performed by: COLON & RECTAL SURGERY

## 2017-11-06 PROCEDURE — 82306 VITAMIN D 25 HYDROXY: CPT | Performed by: COLON & RECTAL SURGERY

## 2017-11-06 PROCEDURE — 36415 COLL VENOUS BLD VENIPUNCTURE: CPT | Performed by: COLON & RECTAL SURGERY

## 2017-11-06 PROCEDURE — 82962 GLUCOSE BLOOD TEST: CPT

## 2017-11-06 PROCEDURE — 84590 ASSAY OF VITAMIN A: CPT | Performed by: COLON & RECTAL SURGERY

## 2017-11-06 PROCEDURE — 74011636637 HC RX REV CODE- 636/637: Performed by: COLON & RECTAL SURGERY

## 2017-11-06 PROCEDURE — 76450000000

## 2017-11-06 PROCEDURE — 84100 ASSAY OF PHOSPHORUS: CPT | Performed by: COLON & RECTAL SURGERY

## 2017-11-06 PROCEDURE — 65270000029 HC RM PRIVATE

## 2017-11-06 PROCEDURE — 83735 ASSAY OF MAGNESIUM: CPT | Performed by: COLON & RECTAL SURGERY

## 2017-11-06 PROCEDURE — 74011250637 HC RX REV CODE- 250/637: Performed by: COLON & RECTAL SURGERY

## 2017-11-06 PROCEDURE — 74011000258 HC RX REV CODE- 258: Performed by: COLON & RECTAL SURGERY

## 2017-11-06 PROCEDURE — 74011000250 HC RX REV CODE- 250: Performed by: COLON & RECTAL SURGERY

## 2017-11-06 PROCEDURE — 74011250636 HC RX REV CODE- 250/636: Performed by: PHYSICAL MEDICINE & REHABILITATION

## 2017-11-06 RX ORDER — DIPHENOXYLATE HYDROCHLORIDE AND ATROPINE SULFATE 2.5; .025 MG/1; MG/1
2 TABLET ORAL
Status: DISCONTINUED | OUTPATIENT
Start: 2017-11-06 | End: 2017-11-10 | Stop reason: HOSPADM

## 2017-11-06 RX ORDER — LOPERAMIDE HYDROCHLORIDE 2 MG/1
2 CAPSULE ORAL AS NEEDED
Status: DISCONTINUED | OUTPATIENT
Start: 2017-11-06 | End: 2017-11-06

## 2017-11-06 RX ORDER — LOPERAMIDE HYDROCHLORIDE 2 MG/1
4 CAPSULE ORAL
Status: DISCONTINUED | OUTPATIENT
Start: 2017-11-06 | End: 2017-11-10 | Stop reason: HOSPADM

## 2017-11-06 RX ADMIN — CLONIDINE HYDROCHLORIDE 0.1 MG: 0.1 TABLET ORAL at 10:05

## 2017-11-06 RX ADMIN — MORPHINE SULFATE 15 MG: 15 TABLET, FILM COATED, EXTENDED RELEASE ORAL at 20:35

## 2017-11-06 RX ADMIN — DIPHENOXYLATE HYDROCHLORIDE AND ATROPINE SULFATE 2 TABLET: 2.5; .025 TABLET ORAL at 17:15

## 2017-11-06 RX ADMIN — CLONIDINE HYDROCHLORIDE 0.1 MG: 0.1 TABLET ORAL at 17:15

## 2017-11-06 RX ADMIN — MORPHINE SULFATE 15 MG: 15 TABLET, FILM COATED, EXTENDED RELEASE ORAL at 10:04

## 2017-11-06 RX ADMIN — DIPHENOXYLATE HYDROCHLORIDE AND ATROPINE SULFATE 2 TABLET: 2.5; .025 TABLET ORAL at 11:11

## 2017-11-06 RX ADMIN — Medication 10 ML: at 13:48

## 2017-11-06 RX ADMIN — COLESTIPOL HYDROCHLORIDE 6 G: 1 TABLET, FILM COATED ORAL at 11:11

## 2017-11-06 RX ADMIN — INSULIN LISPRO 2 UNITS: 100 INJECTION, SOLUTION INTRAVENOUS; SUBCUTANEOUS at 12:19

## 2017-11-06 RX ADMIN — Medication 10 ML: at 22:19

## 2017-11-06 RX ADMIN — COLESTIPOL HYDROCHLORIDE 6 G: 1 TABLET, FILM COATED ORAL at 17:14

## 2017-11-06 RX ADMIN — ASCORBIC ACID: 500 INJECTION, SOLUTION INTRAMUSCULAR; INTRAVENOUS; SUBCUTANEOUS at 19:52

## 2017-11-06 RX ADMIN — Medication: at 17:28

## 2017-11-06 RX ADMIN — HYDROCODONE BITARTRATE AND ACETAMINOPHEN 2 TABLET: 10; 325 TABLET ORAL at 07:22

## 2017-11-06 RX ADMIN — Medication 10 ML: at 07:14

## 2017-11-06 RX ADMIN — I.V. FAT EMULSION 500 ML: 20 EMULSION INTRAVENOUS at 21:12

## 2017-11-06 RX ADMIN — LOPERAMIDE HYDROCHLORIDE 4 MG: 2 CAPSULE ORAL at 22:18

## 2017-11-06 RX ADMIN — Medication 10 ML: at 07:15

## 2017-11-06 RX ADMIN — COLESTIPOL HYDROCHLORIDE 6 G: 1 TABLET, FILM COATED ORAL at 22:19

## 2017-11-06 RX ADMIN — FAMOTIDINE 20 MG: 20 TABLET ORAL at 10:05

## 2017-11-06 RX ADMIN — DIPHENOXYLATE HYDROCHLORIDE AND ATROPINE SULFATE 2 TABLET: 2.5; .025 TABLET ORAL at 22:18

## 2017-11-06 RX ADMIN — FAMOTIDINE 20 MG: 20 TABLET ORAL at 17:15

## 2017-11-06 RX ADMIN — Medication 10 ML: at 12:00

## 2017-11-06 RX ADMIN — COLESTIPOL HYDROCHLORIDE 6 G: 1 TABLET, FILM COATED ORAL at 07:15

## 2017-11-06 NOTE — PROGRESS NOTES
NUTRITION brief       1. Continue TPN - to be ordered at half volume tonight  2. Check zinc, vitamin B12, and vitamin A,D,E - at risk for deficiency with SBS  3. Daily weights while on TPN (standing scale only)    Interventions:  - SF [place] Instant Breakfast (w/ lactaid milk) - 3x/day  - Nutrigrain bar (strawberry) TID as snack  - Low lactose added to diet order       Diet: regular  TPN: 5%AA, D20 @ 90ml/hr + 100mg thiamine, 500mg vit C + 500ml, 20% lipids 3x week  Medications: cefotetan, colestid, pepcid, SSI, morphine, neutra-phos (oral), dilaudid PCA; PRN: ativan, zofran q4hr    Chart reviewed and pt visited for TPN check. Happy to see diet advanced over the weekend with good appetite (pt ate 100% breakfast this morning). Large hernia still needs repair per surgery notes but plans to wait 3-6 months d/t high risk for abd surgery at this time. Watery stools noted with colestid reordered, which pt takes at home. Pt notes cost has been a barrier for colestid and lomotil at home - has met his copay limit so each bottle cost ~$100 each. Discussed avoidance of high sugar drinks, which pt has been educated no in the past, to help manage loose stools. Snacks added along with SF CIB 3x/day (450kcal, 39g protein). Current TPN meeting 100% needs. Plans to continue TPN for now but at half rate since eating well, per discussion with surgeon. May be able to discontinue tomorrow. Discussed considering checking of labs while inpatient (see above). Hypophosphatemia noted and repleted. UBW has been around 190#. Wt fluctuating significantly this admit from 69kg to 80.7kg, however current wt is bedscale wt which is often falsely elevated. Please weigh pt on standing scale only for better accuracy.   Last 3 Recorded Weights in this Encounter    11/02/17 1511 11/04/17 0526 11/06/17 0717   Weight: 69.6 kg (153 lb 6.4 oz) 75.7 kg (166 lb 14.2 oz) 80.7 kg (177 lb 14.6 oz)     Will continue to follow for PO intake/tolerance, TPN adjustments, wt trends, stool consistency and labs values. Estimated Nutrition Needs:   Kcals/day: 2155 Kcals/day (2155-2405kcal)  Protein: 97 g (97-111g (1.4-1.6g/kg))  Fluid: 2200 ml (1ml/kcal)  Based On:  [place] Jeor (x 1.4 + 250kcal)  Weight Used: Actual wt (69.6kg)    Maria Luz Mary RD

## 2017-11-06 NOTE — ROUTINE PROCESS
Bedside and Verbal shift change report given to Ede Maria (oncoming nurse) by Adin Orellana (offgoing nurse). Report included the following information SBAR, Kardex, Intake/Output, MAR, Accordion and Recent Results. All opportunity for clarification/questions given.

## 2017-11-06 NOTE — PROGRESS NOTES
1700 Pt complaining of having diarrhea with numerous loose stools this afternoon and evening. Pt requesting lomotil or Immodium to help with the diarrhea. Spoke with Dr. Melody Chanr who does not wish to start pt on lomotil or Immodium at this time. Received orders to advance pt's diet to regular and to restart pt's home Colestid 6 g PO ACHS.

## 2017-11-06 NOTE — CONSULTS
Palliative Medicine Consult  Driss: [phone] ([phone])    Patient Name: Andrea Ernst  YOB: 1960    Date of Initial Consult: 11-6-17  Reason for Consult: Pain management  Requesting Provider: Marcin Rolon   Primary Care Physician: Amy Shelton MD     SUMMARY:   Andrea Ernst is a 65 y.o. with a past history of Cronh's disease w/ extensive surgical hx( more than 35 abdominal surgeries), short gut syndrome, chronic diarrhea,  depression, chronic neck/back pain [facility] on 11/2/2017 from home with bowel obstruction due to intussusception and ventral hernia s/p ex lap, extensive THONY and reduction of intussusception. Tolerating solid foods, having >15 loose stools daily. Current medical issues leading to Palliative Medicine involvement include: Pain regimen.  reviewed and most recently on MSContin 15mg daily and Hydrocodone/Tylenol 10/325mg prn~6 a day. Social: Pt recently . Still has a lot of friend and family support. PALLIATIVE DIAGNOSES:   1. Acute abdominal pain from surgery  2. Chronic diarrhea  3. Nausea  4. Chronic neck, back and L wrist pain        PLAN:   1. Currently on MsContin 15mg bid and Dilaudid PCA 0.3mg IV every 6 min demand. 2. See below for description of pain. Has been doing quite well - no recent surgeries. 3. Has been following at [facility] pain [facility] spine,they are trying to decr his Morphine Equiv daily dose. Understands why, but not having very good pain relief w/ his current home regimen. 4. Discuss adjusting PCA incl adding a basal on top of home MsContin at least for a day or two. Certainly had extensive surgery and has incr acute pain that I want to treat, but also don't want to incr his tolerance too much- thereby making it harder for him to wean back to home regimen. 5. Plan for acute pain:   1. Cont MsContin 15mg bid  2.  Adjust Dilaudid PCA, add basal. Now at 0.3m/h basal, 0.3mg IV every 6 min demand. Has high opioid tolerance. If need be, can also consider incr demand dose a bit. Will hold off on this, hoping that the basal allows him to rest.   3. No bowel regimen, as given chronic diarrhea. 6. Initial consult note routed to primary continuity provider  7. As pt recently , will also talk to him about adjusting his AMD- as she is listed as primary mPOA. 8. Communicated plan of care with: Palliative IDT; Augsutina Cantu RN       GOALS OF CARE / TREATMENT PREFERENCES:   [====Goals of Care====]  GOALS OF CARE:  Patient / health care proxy stated goals: pain relief       TREATMENT PREFERENCES:   Code Status: Prior    Advance Care Planning:Need to discuss with him  Advance Care Planning 11/4/2017   Patient's Healthcare Decision Maker is: Legal Next of Kin   Primary Decision Maker Name -   Primary Decision Maker Phone Number -   Primary Decision Maker Relationship to Patient -   Secondary Decision [address] Name -   Secondary Decision [address] Phone Number -   Secondary Decision Maker Relationship to Patient -   Confirm Advance Directive Yes, on file   Does the patient have other document types -       Other:    The palliative care team has discussed with patient / health care proxy about goals of care / treatment preferences for patient.  [====Goals of Care====]         HISTORY:     History obtained from: Pt, chart, staff    CHIEF COMPLAINT: Acute abd pain     HPI/SUBJECTIVE:    The patient is:   [x] Verbal and participatory  [] Non-participatory due to:     Pt w/ above hx. Able to move around, but hard to take deep breaths. Having 15 loose stools a day, which is more than his baseline 10. Pain is tearing, both sides of abdomen. At baseline has chronic neck and back pain.     Clinical Pain Assessment (nonverbal scale for severity on nonverbal patients):   [++++ Clinical Pain Assessment++++]  [++++Pain Severity++++]: Pain: 9  [++++Pain Character++++]:   [++++Pain Duration++++]:   [++++Pain Effect++++]: [++++Pain Factors++++]:   [++++Pain Frequency++++]:   [++++Pain Location++++]:   [++++ Clinical Pain Assessment++++]  Duration: for how long has pt been experiencing pain (e.g., 2 days, 1 month, years)  Frequency: how often pain is an issue (e.g., several times per day, once every few days, constant)     FUNCTIONAL ASSESSMENT:     Palliative Performance Scale (PPS):  PPS: 70       PSYCHOSOCIAL/SPIRITUAL SCREENING:     Advance Care Planning:  Advance Care Planning 11/4/2017   Patient's Healthcare Decision Maker is: Legal Next of Kin   Primary Decision Maker Name -   Primary Decision [address] Phone Number -   Primary Decision Maker Relationship to Patient -   Secondary Decision [address] Name -   Secondary Decision [address] Phone Number -   Secondary Decision Maker Relationship to Patient -   Confirm Advance Directive Yes, on file   Does the patient have other document types -        Any spiritual / Jehovah's witness concerns:  [] Yes /  [x] No    Caregiver Burnout:  [] Yes /  [x] No /  [] No Caregiver Present      Anticipatory grief assessment:   [x] Normal  / [] Maladaptive       ESAS Anxiety: Anxiety: 0    ESAS Depression: Depression: 0        REVIEW OF SYSTEMS:     Positive and pertinent negative findings in ROS are noted above in HPI. The following systems were [x] reviewed / [] unable to be reviewed as noted in HPI  Other findings are noted below. Systems: constitutional, ears/nose/mouth/throat, respiratory, gastrointestinal, genitourinary, musculoskeletal, integumentary, neurologic, psychiatric, endocrine. Positive findings noted below.   Modified ESAS Completed by: provider   Fatigue: 0 Drowsiness: 0   Depression: 0 Pain: 9   Anxiety: 0 Nausea: 3   Anorexia: 2 Dyspnea: 1     Constipation: No     Stool Occurrence(s): 7        PHYSICAL EXAM:     From RN flowsheet:  Wt Readings from Last 3 Encounters:   11/06/17 177 lb 14.6 oz (80.7 kg)   08/18/17 180 lb (81.6 kg)   06/22/17 179 lb (81.2 kg)     Blood pressure 130/71, pulse 91, temperature 98.8 °F (37.1 °C), resp. rate 16, height 5' 11\" (1.803 m), weight 177 lb 14.6 oz (80.7 kg), SpO2 95 %. Pain Scale 1: Numeric (0 - 10)  Pain Intensity 1: 9  Pain Onset 1: post op  Pain Location 1: Abdomen  Pain Orientation 1: Anterior  Pain Description 1: Constant (\"feels like things being teared apart and pushed up under ri)  Pain Intervention(s) 1: Medication (see MAR)  Last bowel movement, if known: today     Constitutional: awake, alert, no sedation or confusion   Eyes: pupils equal, anicteric  ENMT: no nasal discharge, moist mucous membranes  Cardiovascular: regular rhythm  Respiratory: breathing not labored, symmetric  Gastrointestinal: a bit distended; +BS; midline incision c/d/i  Musculoskeletal: no deformity, no tenderness to palpation  Skin: warm, dry  Neurologic: following commands, moving all extremities  Psychiatric: full affect, no hallucinations       HISTORY:     Principal Problem:    Intussusception of intestine (HCC) (11/3/2017)    Active Problems:    Chronic pain (3/17/2015)      Short bowel syndrome (9/28/2015)      Incisional hernia, without obstruction or gangrene (1/31/2017)      Bowel obstruction (11/3/2017)      Past Medical History:   Diagnosis Date    Abdominal wall hernia 6/15/2012    Anal fissure     Anemia     Anemia     Anxiety and depression     Arthritis     Related to the Crohn's disease.  Cancer ([facility] [address]     MELANOMA HEAD AND FACE    Chronic pain     GENERALIZED    COPD (chronic obstructive pulmonary disease) (HCC)     Crohn disease (HCC)     Diagnosed at age 17.     Echocardiogram normal (EF: 55-60%) 07/2015    Esophageal ulcer     GERD (gastroesophageal reflux disease)     H/O blood transfusion 2013    [address], [address]    Hypertension     denies    Migraine     Short bowel syndrome     Ventral hernia without obstruction or gangrene       Past Surgical History:   Procedure Laterality Date    ABDOMEN SURGERY PROC UNLISTED      35 abdominal operations for treatment of Crohn's disease and its complications.  HC NDL CEJA      ceja needle, takes 1 inch needle    HX APPENDECTOMY      HX CHOLECYSTECTOMY      HX GI      colectomy x2, one ileostomy    HX GI  7/28/14    exp lap,partial colectomy with end ileostomy by Dr Santiago Lob      neck fusion    HX ORTHOPAEDIC  1980s    wrist right,     HX ORTHOPAEDIC  2006, 11/2013    neck, L4 L5 L6    HX ORTHOPAEDIC  1990s    right knee scope    HX OTHER SURGICAL      surgical repair from spider bite    HX OTHER SURGICAL  12/1/14    Incision and drainage of posterior right subcutaneous shoulder abscess    HX OTHER SURGICAL  2014    LEFT INDEX FINGER SPIDER BITE    HX OTHER SURGICAL  2014    RECTAL FISTULA    HX OTHER SURGICAL  3/17/2015    Ileostomy takedown with extensive lysis of adhesions (greater than three hours), mobilization of the splenic flexure, left colon resection, ileocolic anastomosis, and excision of scar; Dr. Alexx Morris.  HX OTHER SURGICAL  3/22/2015    Exploratory laparotomy with washout of abdomen, suture repair of small bowel/anastomosis, and diverting protective loop ileostomy; Ayesha Segovia MD.    HX OTHER SURGICAL  4/9/2015    Laparotomy, extensive lysis of adhesions, abdominal lavage, and resection of ileocolic anastomosis (including the efferent limb of the loop ileostomy) with creation of Demetrius's pouch; Dr. Alexx Morris.  HX OTHER SURGICAL  7/14/2015    Cystoscopy and placement of bilateral ureteral catheters; Alyce Balderas MD.    HX OTHER SURGICAL  7/14/2015    Ileostomy takedown with extensive lysis of adhesions, small bowel resection, and enterocolostomy; Dr. Alexx Morris.   OTHER SURGICAL  9/29/2015    CT-guided percutaneous drainage of intraabdominal abscess; Dr. Ethel Madison.   OTHER SURGICAL  11/16/2015    CT-guided percutaneous aspiration of abdominal wall cavity; Dr. Bryanna Tillman.      OTHER SURGICAL  12/1/2015    Incision and drainage of abdominal wall abscess; Dr. Corey Fernandez.  HX OTHER SURGICAL  2/11/2016    Incision and drainage of abdominal wall abscess; Dr. Corey Fernandez.  HX OTHER SURGICAL  2/23/2016    Cystoscopy and placement of bilateral temporary ureteral catheters; Dr. Severiano Mulling.  HX OTHER SURGICAL  2/23/2016    Exploratory laparotomy, extensive lysis of adhesions, removal of mesh, and repair of enterocutaneous fistula; Dr. Corey Fernandez. Family History   Problem Relation Age of Onset    Stroke Mother     Heart Disease Mother     Kidney Disease Mother     Anesth Problems Mother      TAKES A LONG TIME TO WAKE UP    Heart Disease Father     Thyroid Disease Sister       History reviewed, no pertinent family history. Social History   Substance Use Topics    Smoking status: Current Every Day Smoker     Packs/day: 1.00     Years: 44.00    Smokeless tobacco: Current User      Comment: CURRENT E CIGS    Alcohol use No      Comment: RARELY     Allergies   Allergen Reactions    Honey Anaphylaxis    Remicade [Infliximab] Anaphylaxis    Crestor [Rosuvastatin] Myalgia    Other Plant, Animal, Environmental Other (comments)     Developed \"boils\" on right arm that required I &D after receiving FENTANYL patch.   Is able to take FENTANYL orally; states is allergic to fentanyl patch GLUE    Imuran [Azathioprine] Diarrhea and Nausea Only    Mercaptopurine Diarrhea    Sulfa (Sulfonamide Antibiotics) Other (comments)     Causes diarrhea      Current Facility-Administered Medications   Medication Dose Route Frequency    diphenoxylate-atropine (LOMOTIL) tablet 2 Tab  2 Tab Oral AC&HS    loperamide (IMODIUM) capsule 2 mg  2 mg Oral PRN    TPN ADULT - CENTRAL   IntraVENous CONTINUOUS    HYDROcodone-acetaminophen (NORCO)  mg tablet 1-2 Tab  1-2 Tab Oral Q4H PRN    morphine CR (MS CONTIN) tablet 15 mg  15 mg Oral Q12H    TPN ADULT - CENTRAL   IntraVENous CONTINUOUS    colestipol (COLESTID) tablet 6 g  6 g Oral AC&HS    lactated Ringers infusion  10 mL/hr IntraVENous CONTINUOUS    fat emulsion 20% (LIPOSYN, INTRAlipid) infusion 500 mL  500 mL IntraVENous Q MON, WED & SAT    chlorzoxazone (PARAFON FORTE) tablet 500 mg  500 mg Oral BID PRN    HYDROmorphone (PF) 15 mg/30 ml (DILAUDID) PCA   IntraVENous TITRATE    cloNIDine HCl (CATAPRES) tablet 0.1 mg  0.1 mg Oral BID    diphenhydrAMINE (BENADRYL) capsule 100 mg  100 mg Oral QHS PRN    famotidine (PEPCID) tablet 20 mg  20 mg Oral BID    sodium chloride (NS) flush 5-10 mL  5-10 mL IntraVENous Q8H    sodium chloride (NS) flush 5-10 mL  5-10 mL IntraVENous PRN    naloxone (NARCAN) injection 0.4 mg  0.4 mg IntraVENous PRN    ondansetron (ZOFRAN) injection 4 mg  4 mg IntraVENous Q4H PRN    LORazepam (ATIVAN) injection 1 mg  1 mg IntraVENous Q8H PRN    insulin lispro (HUMALOG) injection   SubCUTAneous Q6H    glucose chewable tablet 16 g  4 Tab Oral PRN    dextrose (D50W) injection syrg 12.5-25 g  12.5-25 g IntraVENous PRN    glucagon (GLUCAGEN) injection 1 mg  1 mg IntraMUSCular PRN    sodium chloride (NS) flush 20 mL  20 mL InterCATHeter PRN    sodium chloride (NS) flush 10 mL  10 mL InterCATHeter Q24H    sodium chloride (NS) flush 10 mL  10 mL InterCATHeter PRN    sodium chloride (NS) flush 10 mL  10 mL InterCATHeter Q8H    alteplase (CATHFLO) 1 mg in sterile water (preservative free) 1 mL injection  1 mg InterCATHeter PRN    bacitracin 500 unit/gram packet 1 Packet  1 Packet Topical PRN    butalbital-acetaminophen-caffeine (FIORICET, ESGIC) -40 mg per tablet 1 Tab  1 Tab Oral Q6H PRN    nicotine (NICODERM CQ) 14 mg/24 hr patch 1 Patch  1 Patch TransDERmal DAILY          LAB AND IMAGING FINDINGS:     Lab Results   Component Value Date/Time    WBC 7.3 11/06/2017 01:35 AM    HGB 9.6 11/06/2017 01:35 AM    PLATELET 160 65/79/7049 01:35 AM     Lab Results   Component Value Date/Time    Sodium 139 11/06/2017 01:35 AM    Potassium 4.0 11/06/2017 01:35 AM Chloride 105 11/06/2017 01:35 AM    CO2 26 11/06/2017 01:35 AM    BUN 10 11/06/2017 01:35 AM    Creatinine 0.74 11/06/2017 01:35 AM    Calcium 8.0 11/06/2017 01:35 AM    Magnesium 1.8 11/06/2017 01:35 AM    Phosphorus 3.3 11/06/2017 01:35 AM      Lab Results   Component Value Date/Time    AST (SGOT) 22 11/05/2017 02:25 AM    Alk. phosphatase 92 11/05/2017 02:25 AM    Protein, total 5.6 11/05/2017 02:25 AM    Albumin 2.2 11/05/2017 02:25 AM    Globulin 3.4 11/05/2017 02:25 AM     Lab Results   Component Value Date/Time    INR 1.1 02/16/2016 02:51 AM    Prothrombin time 11.0 02/16/2016 02:51 AM    aPTT 31.0 09/28/2015 01:06 PM      Lab Results   Component Value Date/Time    Iron 11 06/20/2012 10:55 AM    TIBC 433 06/20/2012 10:55 AM    Iron % saturation 3 06/20/2012 10:55 AM      No results found for: PH, PCO2, PO2  No components found for: [place]   Lab Results   Component Value Date/Time    CK 29 12/01/2014 04:33 AM    CK - MB <0.5 12/01/2014 04:33 AM                Total time:   Counseling / coordination time, spent as noted above:   > 50% counseling / coordination?:     Prolonged service was provided for  []30 min   []75 min in face to face time in the presence of the patient, spent as noted above. Time Start:   Time End:   Note: this can only be billed with 25257 (initial) or 78622 (follow up). If multiple start / stop times, list each separately.

## 2017-11-06 NOTE — PROGRESS NOTES
Cm reviewed chart and CM note 11/3/17-- Patient lives at home with his friend, Radha Vidales, ([phone]). Prior to admission he was self care and independent with ADL's. Patient is progressing and plans to return home with his friend providing transportation. He does remain on TPN but it is being decreased. CM will assist with any transition of care needs.

## 2017-11-06 NOTE — PROGRESS NOTES
[facility]        Subjective     Doing well, no N/V, tolerating diet, no N/V    Objective     Patient Vitals for the past 24 hrs:   Temp Pulse Resp BP SpO2   11/06/17 0815 98.8 °F (37.1 °C) 91 16 130/71 95 %   11/06/17 0515 98.8 °F (37.1 °C) 83 16 109/71 95 %   11/06/17 0122 99.2 °F (37.3 °C) 89 18 113/68 95 %   11/05/17 1713 99 °F (37.2 °C) 86 16 123/63 94 %   11/05/17 1202 98.9 °F (37.2 °C) 99 18 106/63 95 %         Date 11/05/17 0700 - 11/06/17 0659 11/06/17 0700 - 11/07/17 0659   Shift 8844-9014 9238-8583 24 Hour Total 9490-3297 9195-8845 24 Hour Total   I  N  T  A  K  E   Shift Total  (mL/kg)         O  U  T  P  U  T   Urine  (mL/kg/hr)            Urine Occurrence(s) 3 x  3 x       Stool            Stool Occurrence(s) 7 x  7 x       Shift Total  (mL/kg)         NET         Weight (kg) 75.7 75.7 75.7 80.7 80.7 80.7       PE  Pulm - CTAB  CV - RRR  Abd - soft, ND, BS present, incision c/d/i    Labs  Recent Results (from the past 12 hour(s))   GLUCOSE, POC    Collection Time: 11/06/17  1:26 AM   Result Value Ref Range    Glucose (POC) 137 (H) 65 - 100 mg/dL    Performed by Milagro Waggoner    CBC WITH AUTOMATED DIFF    Collection Time: 11/06/17  1:35 AM   Result Value Ref Range    WBC 7.3 4.1 - 11.1 K/uL    RBC 3.41 (L) 4.10 - 5.70 M/uL    HGB 9.6 (L) 12.1 - 17.0 g/dL    HCT 29.8 (L) 36.6 - 50.3 %    MCV 87.4 80.0 - 99.0 FL    MCH 28.2 26.0 - 34.0 PG    MCHC 32.2 30.0 - 36.5 g/dL    RDW 16.1 (H) 11.5 - 14.5 %    PLATELET 451 923 - 116 K/uL    NEUTROPHILS 74 32 - 75 %    LYMPHOCYTES 12 12 - 49 %    MONOCYTES 9 5 - 13 %    EOSINOPHILS 5 0 - 7 %    BASOPHILS 0 0 - 1 %    ABS. NEUTROPHILS 5.4 1.8 - 8.0 K/UL    ABS. LYMPHOCYTES 0.9 0.8 - 3.5 K/UL    ABS. MONOCYTES 0.7 0.0 - 1.0 K/UL    ABS. EOSINOPHILS 0.3 0.0 - 0.4 K/UL    ABS.  BASOPHILS 0.0 0.0 - 0.1 K/UL   METABOLIC PANEL, BASIC    Collection Time: 11/06/17  1:35 AM   Result Value Ref Range    Sodium 139 136 - 145 mmol/L    Potassium 4.0 3.5 - 5.1 mmol/L    Chloride 105 97 - 108 mmol/L    CO2 26 21 - 32 mmol/L    Anion gap 8 5 - 15 mmol/L    Glucose 126 (H) 65 - 100 mg/dL    BUN 10 6 - 20 MG/DL    Creatinine 0.74 0.70 - 1.30 MG/DL    BUN/Creatinine ratio 14 12 - 20      GFR est AA >60 >60 ml/min/1.73m2    GFR est non-AA >60 >60 ml/min/1.73m2    Calcium 8.0 (L) 8.5 - 10.1 MG/DL   MAGNESIUM    Collection Time: 11/06/17  1:35 AM   Result Value Ref Range    Magnesium 1.8 1.6 - 2.4 mg/dL   PHOSPHORUS    Collection Time: 11/06/17  1:35 AM   Result Value Ref Range    Phosphorus 3.3 2.6 - 4.7 MG/DL   GLUCOSE, POC    Collection Time: 11/06/17  5:14 AM   Result Value Ref Range    Glucose (POC) 134 (H) 65 - 100 mg/dL    Performed by Mary Anne Nobles is a 65 y. o.yr old male s/p laparotomy, lysis of adhesions reduction of intussusception    Plan     Doing well, no issues with the wound closure  There is a little discoloration right at the wound edge on part of the wound but no major skin ischemia  Continue the abdominal binder    Andrew Phelan MD

## 2017-11-06 NOTE — PROGRESS NOTES
General Daily Progress Note    Admission Date: 11/2/2017  Hospital Day 4  Post-Op Day 3  Subjective: Tolerating solid food so far. Complains of severe pain. Also having numerous ( >15) loose stools. Objective:   Patient Vitals for the past 24 hrs:   BP Temp Pulse Resp SpO2 Weight   11/06/17 0815 130/71 98.8 °F (37.1 °C) 91 16 95 % -   11/06/17 0717 - - - - - 80.7 kg (177 lb 14.6 oz)   11/06/17 0515 109/71 98.8 °F (37.1 °C) 83 16 95 % -   11/06/17 0122 113/68 99.2 °F (37.3 °C) 89 18 95 % -   11/05/17 1713 123/63 99 °F (37.2 °C) 86 16 94 % -   11/05/17 1202 106/63 98.9 °F (37.2 °C) 99 18 95 % -        11/04 1901 - 11/06 0700  In: -   Out: 950 [Urine:950]    Physical Examination:  General Appearance:  Uncomfortable. Abdomen:  Abdominal binder in use. Data Review   Recent Results (from the past 24 hour(s))   GLUCOSE, POC    Collection Time: 11/05/17 11:41 AM   Result Value Ref Range    Glucose (POC) 140 (H) 65 - 100 mg/dL    Performed by Le Gracia    GLUCOSE, POC    Collection Time: 11/05/17  6:04 PM   Result Value Ref Range    Glucose (POC) 126 (H) 65 - 100 mg/dL    Performed by Delmy Blanco    GLUCOSE, POC    Collection Time: 11/06/17  1:26 AM   Result Value Ref Range    Glucose (POC) 137 (H) 65 - 100 mg/dL    Performed by Jameson Quiroga    CBC WITH AUTOMATED DIFF    Collection Time: 11/06/17  1:35 AM   Result Value Ref Range    WBC 7.3 4.1 - 11.1 K/uL    RBC 3.41 (L) 4.10 - 5.70 M/uL    HGB 9.6 (L) 12.1 - 17.0 g/dL    HCT 29.8 (L) 36.6 - 50.3 %    MCV 87.4 80.0 - 99.0 FL    MCH 28.2 26.0 - 34.0 PG    MCHC 32.2 30.0 - 36.5 g/dL    RDW 16.1 (H) 11.5 - 14.5 %    PLATELET 928 677 - 662 K/uL    NEUTROPHILS 74 32 - 75 %    LYMPHOCYTES 12 12 - 49 %    MONOCYTES 9 5 - 13 %    EOSINOPHILS 5 0 - 7 %    BASOPHILS 0 0 - 1 %    ABS. NEUTROPHILS 5.4 1.8 - 8.0 K/UL    ABS. LYMPHOCYTES 0.9 0.8 - 3.5 K/UL    ABS. MONOCYTES 0.7 0.0 - 1.0 K/UL    ABS. EOSINOPHILS 0.3 0.0 - 0.4 K/UL    ABS.  BASOPHILS 0.0 0.0 - 0.1 K/UL   METABOLIC PANEL, BASIC    Collection Time: 11/06/17  1:35 AM   Result Value Ref Range    Sodium 139 136 - 145 mmol/L    Potassium 4.0 3.5 - 5.1 mmol/L    Chloride 105 97 - 108 mmol/L    CO2 26 21 - 32 mmol/L    Anion gap 8 5 - 15 mmol/L    Glucose 126 (H) 65 - 100 mg/dL    BUN 10 6 - 20 MG/DL    Creatinine 0.74 0.70 - 1.30 MG/DL    BUN/Creatinine ratio 14 12 - 20      GFR est AA >60 >60 ml/min/1.73m2    GFR est non-AA >60 >60 ml/min/1.73m2    Calcium 8.0 (L) 8.5 - 10.1 MG/DL   MAGNESIUM    Collection Time: 11/06/17  1:35 AM   Result Value Ref Range    Magnesium 1.8 1.6 - 2.4 mg/dL   PHOSPHORUS    Collection Time: 11/06/17  1:35 AM   Result Value Ref Range    Phosphorus 3.3 2.6 - 4.7 MG/DL   GLUCOSE, POC    Collection Time: 11/06/17  5:14 AM   Result Value Ref Range    Glucose (POC) 134 (H) 65 - 100 mg/dL    Performed by James Parker            Assessment:     Principal Problem:    Intussusception of intestine ([address] (11/3/2017)    Active Problems:    Chronic pain (3/17/2015)      Short bowel syndrome (9/28/2015)      Incisional hernia, without obstruction or gangrene (1/31/2017)      Bowel obstruction (11/3/2017)        3 Days Post-Op s/p Exploratory laparotomy, extensive lysis of adhesions, and reduction of intussusception.      LUE PICC placed post-operatively on 11/3/2017.      Hemodynamically stable. GI function is returning. Diarrhea needs to be controlled. Colestid was restarted yesterday. Hyponatremia and hypophosphatemia have been corrected. No evidence of infection.     Pain management continues to be a major problem. Plan:   Continue regular diet. Decrease TPN with next bag.     Continue current pain medications. Palliative Medicine consultation to assist with pain management. Restart the patient's usual Imodium and Lomotil.      Ambulate. Incentive spirometer.      Continue abdominal binder.

## 2017-11-06 NOTE — ROUTINE PROCESS
Bedside and Verbal shift change report given to [facility], RN (oncoming nurse) by Alissa Nunez RN (offgoing nurse). Report included the following information SBAR, Kardex, Intake/Output, MAR, Accordion and Recent Results.

## 2017-11-07 LAB
ANION GAP SERPL CALC-SCNC: 7 MMOL/L (ref 5–15)
BASOPHILS # BLD: 0 K/UL (ref 0–0.1)
BASOPHILS NFR BLD: 0 % (ref 0–1)
BUN SERPL-MCNC: 11 MG/DL (ref 6–20)
BUN/CREAT SERPL: 13 (ref 12–20)
CALCIUM SERPL-MCNC: 8.4 MG/DL (ref 8.5–10.1)
CHLORIDE SERPL-SCNC: 103 MMOL/L (ref 97–108)
CO2 SERPL-SCNC: 27 MMOL/L (ref 21–32)
CREAT SERPL-MCNC: 0.87 MG/DL (ref 0.7–1.3)
EOSINOPHIL # BLD: 0.3 K/UL (ref 0–0.4)
EOSINOPHIL NFR BLD: 5 % (ref 0–7)
ERYTHROCYTE [DISTWIDTH] IN BLOOD BY AUTOMATED COUNT: 16.2 % (ref 11.5–14.5)
GLUCOSE BLD STRIP.AUTO-MCNC: 105 MG/DL (ref 65–100)
GLUCOSE BLD STRIP.AUTO-MCNC: 111 MG/DL (ref 65–100)
GLUCOSE BLD STRIP.AUTO-MCNC: 116 MG/DL (ref 65–100)
GLUCOSE BLD STRIP.AUTO-MCNC: 121 MG/DL (ref 65–100)
GLUCOSE SERPL-MCNC: 90 MG/DL (ref 65–100)
HCT VFR BLD AUTO: 33.4 % (ref 36.6–50.3)
HGB BLD-MCNC: 10.7 G/DL (ref 12.1–17)
LYMPHOCYTES # BLD: 1.4 K/UL (ref 0.8–3.5)
LYMPHOCYTES NFR BLD: 23 % (ref 12–49)
MCH RBC QN AUTO: 27.9 PG (ref 26–34)
MCHC RBC AUTO-ENTMCNC: 32 G/DL (ref 30–36.5)
MCV RBC AUTO: 87.2 FL (ref 80–99)
MONOCYTES # BLD: 0.5 K/UL (ref 0–1)
MONOCYTES NFR BLD: 9 % (ref 5–13)
NEUTS SEG # BLD: 3.7 K/UL (ref 1.8–8)
NEUTS SEG NFR BLD: 63 % (ref 32–75)
PHOSPHATE SERPL-MCNC: 3.9 MG/DL (ref 2.6–4.7)
PLATELET # BLD AUTO: 272 K/UL (ref 150–400)
POTASSIUM SERPL-SCNC: 4 MMOL/L (ref 3.5–5.1)
RBC # BLD AUTO: 3.83 M/UL (ref 4.1–5.7)
SERVICE CMNT-IMP: ABNORMAL
SODIUM SERPL-SCNC: 137 MMOL/L (ref 136–145)
WBC # BLD AUTO: 5.9 K/UL (ref 4.1–11.1)

## 2017-11-07 PROCEDURE — 82962 GLUCOSE BLOOD TEST: CPT

## 2017-11-07 PROCEDURE — 80048 BASIC METABOLIC PNL TOTAL CA: CPT | Performed by: COLON & RECTAL SURGERY

## 2017-11-07 PROCEDURE — 74011000250 HC RX REV CODE- 250: Performed by: COLON & RECTAL SURGERY

## 2017-11-07 PROCEDURE — 74011250636 HC RX REV CODE- 250/636: Performed by: PHYSICAL MEDICINE & REHABILITATION

## 2017-11-07 PROCEDURE — 84100 ASSAY OF PHOSPHORUS: CPT | Performed by: COLON & RECTAL SURGERY

## 2017-11-07 PROCEDURE — 74011000258 HC RX REV CODE- 258: Performed by: COLON & RECTAL SURGERY

## 2017-11-07 PROCEDURE — 85025 COMPLETE CBC W/AUTO DIFF WBC: CPT | Performed by: COLON & RECTAL SURGERY

## 2017-11-07 PROCEDURE — 65270000029 HC RM PRIVATE

## 2017-11-07 PROCEDURE — 36415 COLL VENOUS BLD VENIPUNCTURE: CPT | Performed by: COLON & RECTAL SURGERY

## 2017-11-07 PROCEDURE — 74011250637 HC RX REV CODE- 250/637: Performed by: COLON & RECTAL SURGERY

## 2017-11-07 PROCEDURE — 74011250636 HC RX REV CODE- 250/636: Performed by: COLON & RECTAL SURGERY

## 2017-11-07 RX ADMIN — Medication 10 ML: at 06:00

## 2017-11-07 RX ADMIN — MORPHINE SULFATE 15 MG: 15 TABLET, FILM COATED, EXTENDED RELEASE ORAL at 22:30

## 2017-11-07 RX ADMIN — LOPERAMIDE HYDROCHLORIDE 4 MG: 2 CAPSULE ORAL at 12:23

## 2017-11-07 RX ADMIN — LOPERAMIDE HYDROCHLORIDE 4 MG: 2 CAPSULE ORAL at 22:30

## 2017-11-07 RX ADMIN — LOPERAMIDE HYDROCHLORIDE 4 MG: 2 CAPSULE ORAL at 06:41

## 2017-11-07 RX ADMIN — COLESTIPOL HYDROCHLORIDE 6 G: 1 TABLET, FILM COATED ORAL at 12:23

## 2017-11-07 RX ADMIN — LOPERAMIDE HYDROCHLORIDE 4 MG: 2 CAPSULE ORAL at 17:23

## 2017-11-07 RX ADMIN — Medication 10 ML: at 13:47

## 2017-11-07 RX ADMIN — HYDROCODONE BITARTRATE AND ACETAMINOPHEN 2 TABLET: 10; 325 TABLET ORAL at 06:45

## 2017-11-07 RX ADMIN — DIPHENOXYLATE HYDROCHLORIDE AND ATROPINE SULFATE 2 TABLET: 2.5; .025 TABLET ORAL at 12:23

## 2017-11-07 RX ADMIN — Medication 10 ML: at 22:01

## 2017-11-07 RX ADMIN — CLONIDINE HYDROCHLORIDE 0.1 MG: 0.1 TABLET ORAL at 09:14

## 2017-11-07 RX ADMIN — BUTALBITAL, ACETAMINOPHEN AND CAFFEINE 1 TABLET: 50; 325; 40 TABLET ORAL at 01:05

## 2017-11-07 RX ADMIN — DIPHENOXYLATE HYDROCHLORIDE AND ATROPINE SULFATE 2 TABLET: 2.5; .025 TABLET ORAL at 22:30

## 2017-11-07 RX ADMIN — DIPHENOXYLATE HYDROCHLORIDE AND ATROPINE SULFATE 2 TABLET: 2.5; .025 TABLET ORAL at 17:07

## 2017-11-07 RX ADMIN — DIPHENOXYLATE HYDROCHLORIDE AND ATROPINE SULFATE 2 TABLET: 2.5; .025 TABLET ORAL at 06:41

## 2017-11-07 RX ADMIN — ASCORBIC ACID: 500 INJECTION, SOLUTION INTRAMUSCULAR; INTRAVENOUS; SUBCUTANEOUS at 18:56

## 2017-11-07 RX ADMIN — COLESTIPOL HYDROCHLORIDE 6 G: 1 TABLET, FILM COATED ORAL at 06:41

## 2017-11-07 RX ADMIN — COLESTIPOL HYDROCHLORIDE 6 G: 1 TABLET, FILM COATED ORAL at 22:30

## 2017-11-07 RX ADMIN — Medication: at 21:43

## 2017-11-07 RX ADMIN — COLESTIPOL HYDROCHLORIDE 6 G: 1 TABLET, FILM COATED ORAL at 17:06

## 2017-11-07 RX ADMIN — FAMOTIDINE 20 MG: 20 TABLET ORAL at 17:07

## 2017-11-07 RX ADMIN — MORPHINE SULFATE 15 MG: 15 TABLET, FILM COATED, EXTENDED RELEASE ORAL at 09:14

## 2017-11-07 RX ADMIN — CLONIDINE HYDROCHLORIDE 0.1 MG: 0.1 TABLET ORAL at 17:06

## 2017-11-07 RX ADMIN — FAMOTIDINE 20 MG: 20 TABLET ORAL at 09:14

## 2017-11-07 NOTE — PROGRESS NOTES
[facility]        Subjective     Doing well, tolerating diet, 13 BM    Objective     Patient Vitals for the past 24 hrs:   Temp Pulse Resp BP SpO2   11/07/17 0405 98.9 °F (37.2 °C) 78 18 118/69 97 %   11/06/17 2225 98.5 °F (36.9 °C) 85 16 125/70 96 %   11/06/17 1557 98.7 °F (37.1 °C) 90 18 115/56 96 %   11/06/17 1149 98.5 °F (36.9 °C) 80 16 116/67 97 %   11/06/17 0815 98.8 °F (37.1 °C) 91 16 130/71 95 %         Date 11/06/17 0700 - 11/07/17 0659 11/07/17 0700 - 11/08/17 0659   Shift 9189-4237 3524-1550 24 Hour Total 3020-9968 3422-6880 24 Hour Total   I  N  T  A  K  E   P. O. 700  700         P. O. 700  700       I.V.  (mL/kg/hr) 1010  (1)  1010  (0.6)         Volume (lactated Ringers infusion) 290  290         Volume (TPN ADULT - CENTRAL) 720  720       Shift Total  (mL/kg) 1710  (21.2)  1710  (22.7)      O  U  T  P  U  T   Urine  (mL/kg/hr)  300  (0.3) 300  (0.2)         Urine Voided  300 300         Urine Occurrence(s)  1 x 1 x       Stool            Stool Occurrence(s) 5 x 1 x 6 x       Shift Total  (mL/kg)  300  (4) 300  (4)      NET 1710 -300 1410      Weight (kg) 80.7 75.3 75.3 75.3 75.3 75.3       PE  Pulm - CTAB  CV - RRR  Abd - soft, ND, BS present, incision c/d/i, mild discoloration of the incision upper portion on the L side 1mm wide, stable from yesterday    Labs  Recent Results (from the past 12 hour(s))   GLUCOSE, POC    Collection Time: 11/06/17 10:24 PM   Result Value Ref Range    Glucose (POC) 121 (H) 65 - 100 mg/dL    Performed by MARLA PHILLIP    CBC WITH AUTOMATED DIFF    Collection Time: 11/07/17  4:10 AM   Result Value Ref Range    WBC 5.9 4.1 - 11.1 K/uL    RBC 3.83 (L) 4.10 - 5.70 M/uL    HGB 10.7 (L) 12.1 - 17.0 g/dL    HCT 33.4 (L) 36.6 - 50.3 %    MCV 87.2 80.0 - 99.0 FL    MCH 27.9 26.0 - 34.0 PG    MCHC 32.0 30.0 - 36.5 g/dL    RDW 16.2 (H) 11.5 - 14.5 %    PLATELET 414 854 - 345 K/uL    NEUTROPHILS 63 32 - 75 % LYMPHOCYTES 23 12 - 49 %    MONOCYTES 9 5 - 13 %    EOSINOPHILS 5 0 - 7 %    BASOPHILS 0 0 - 1 %    ABS. NEUTROPHILS 3.7 1.8 - 8.0 K/UL    ABS. LYMPHOCYTES 1.4 0.8 - 3.5 K/UL    ABS. MONOCYTES 0.5 0.0 - 1.0 K/UL    ABS. EOSINOPHILS 0.3 0.0 - 0.4 K/UL    ABS. BASOPHILS 0.0 0.0 - 0.1 K/UL   METABOLIC PANEL, BASIC    Collection Time: 11/07/17  4:10 AM   Result Value Ref Range    Sodium 137 136 - 145 mmol/L    Potassium 4.0 3.5 - 5.1 mmol/L    Chloride 103 97 - 108 mmol/L    CO2 27 21 - 32 mmol/L    Anion gap 7 5 - 15 mmol/L    Glucose 90 65 - 100 mg/dL    BUN 11 6 - 20 MG/DL    Creatinine 0.87 0.70 - 1.30 MG/DL    BUN/Creatinine ratio 13 12 - 20      GFR est AA >60 >60 ml/min/1.73m2    GFR est non-AA >60 >60 ml/min/1.73m2    Calcium 8.4 (L) 8.5 - 10.1 MG/DL   PHOSPHORUS    Collection Time: 11/07/17  4:10 AM   Result Value Ref Range    Phosphorus 3.9 2.6 - 4.7 MG/DL   GLUCOSE, POC    Collection Time: 11/07/17  6:18 AM   Result Value Ref Range    Glucose (POC) 111 (H) 65 - 100 mg/dL    Performed by Alonso Burgess is a 65 y. o.yr old male s/p laparotomy, lysis of adhesions reduction of intussusception    Plan     Doing well, no issues with the wound closure  There is a little discoloration right at the wound edge on part of the wound but no major skin ischemia, stable  Continue the abdominal binder    Maulik Kay MD

## 2017-11-07 NOTE — PROGRESS NOTES
Bedside shift change report given to [address] (oncoming nurse) by Faizan Pickard (offgoing nurse). Report included the following information SBAR, Kardex, Intake/Output, MAR and Recent Results.

## 2017-11-07 NOTE — PROGRESS NOTES
Bedside shift change report given to Corinne, rn (oncoming nurse) by Mendy Mckeon (offgoing nurse). Report included the following information SBAR, Kardex, OR Summary, Intake/Output and MAR.

## 2017-11-07 NOTE — PROGRESS NOTES
General Daily Progress Note    Admission Date: 11/2/2017  Hospital Day 5  Post-Op Day 4  Subjective:   Pain control improved. Tolerating diet. Numerous liquid bowel movements. Objective:   Patient Vitals for the past 24 hrs:   BP Temp Pulse Resp SpO2 Weight   11/07/17 0620 - - - - - 75.3 kg (166 lb)   11/07/17 0405 118/69 98.9 °F (37.2 °C) 78 18 97 % -   11/06/17 2225 125/70 98.5 °F (36.9 °C) 85 16 96 % -   11/06/17 1557 115/56 98.7 °F (37.1 °C) 90 18 96 % -   11/06/17 1149 116/67 98.5 °F (36.9 °C) 80 16 97 % -        11/05 1901 - 11/07 0700  In: 7810 [P.O.:700; I.V.:1010]  Out: 850 [Urine:850]      Physical Examination:  General Appearance:  Uncomfortable. Abdomen:  Abdominal binder in use. Data Review   Recent Results (from the past 24 hour(s))   VITAMIN B12    Collection Time: 11/06/17 10:20 AM   Result Value Ref Range    Vitamin B12 239 211 - 911 pg/mL   GLUCOSE, POC    Collection Time: 11/06/17 11:21 AM   Result Value Ref Range    Glucose (POC) 141 (H) 65 - 100 mg/dL    Performed by Bernie 55, POC    Collection Time: 11/06/17  4:23 PM   Result Value Ref Range    Glucose (POC) 128 (H) 65 - 100 mg/dL    Performed by Morgan Carbajal    GLUCOSE, POC    Collection Time: 11/06/17 10:24 PM   Result Value Ref Range    Glucose (POC) 121 (H) 65 - 100 mg/dL    Performed by Dalton Robertson    CBC WITH AUTOMATED DIFF    Collection Time: 11/07/17  4:10 AM   Result Value Ref Range    WBC 5.9 4.1 - 11.1 K/uL    RBC 3.83 (L) 4.10 - 5.70 M/uL    HGB 10.7 (L) 12.1 - 17.0 g/dL    HCT 33.4 (L) 36.6 - 50.3 %    MCV 87.2 80.0 - 99.0 FL    MCH 27.9 26.0 - 34.0 PG    MCHC 32.0 30.0 - 36.5 g/dL    RDW 16.2 (H) 11.5 - 14.5 %    PLATELET 754 977 - 288 K/uL    NEUTROPHILS 63 32 - 75 %    LYMPHOCYTES 23 12 - 49 %    MONOCYTES 9 5 - 13 %    EOSINOPHILS 5 0 - 7 %    BASOPHILS 0 0 - 1 %    ABS. NEUTROPHILS 3.7 1.8 - 8.0 K/UL    ABS. LYMPHOCYTES 1.4 0.8 - 3.5 K/UL    ABS. MONOCYTES 0.5 0.0 - 1.0 K/UL    ABS. EOSINOPHILS 0.3 0.0 - 0.4 K/UL    ABS. BASOPHILS 0.0 0.0 - 0.1 K/UL   METABOLIC PANEL, BASIC    Collection Time: 11/07/17  4:10 AM   Result Value Ref Range    Sodium 137 136 - 145 mmol/L    Potassium 4.0 3.5 - 5.1 mmol/L    Chloride 103 97 - 108 mmol/L    CO2 27 21 - 32 mmol/L    Anion gap 7 5 - 15 mmol/L    Glucose 90 65 - 100 mg/dL    BUN 11 6 - 20 MG/DL    Creatinine 0.87 0.70 - 1.30 MG/DL    BUN/Creatinine ratio 13 12 - 20      GFR est AA >60 >60 ml/min/1.73m2    GFR est non-AA >60 >60 ml/min/1.73m2    Calcium 8.4 (L) 8.5 - 10.1 MG/DL   PHOSPHORUS    Collection Time: 11/07/17  4:10 AM   Result Value Ref Range    Phosphorus 3.9 2.6 - 4.7 MG/DL   GLUCOSE, POC    Collection Time: 11/07/17  6:18 AM   Result Value Ref Range    Glucose (POC) 111 (H) 65 - 100 mg/dL    Performed by Scarlett Hammonds            Assessment:     Principal Problem:    Intussusception of intestine ([address] (11/3/2017)    Active Problems:    Chronic pain (3/17/2015)      Short bowel syndrome (9/28/2015)      Incisional hernia, without obstruction or gangrene (1/31/2017)      Bowel obstruction (11/3/2017)        4 Days Post-Op s/p Exploratory laparotomy, extensive lysis of adhesions, and reduction of intussusception.      LUE PICC placed post-operatively on 11/3/2017.      Hemodynamically stable. GI function is returning. TPN begun on 11/3/2017. Diarrhea needs to be controlled. Colestid, Imodium, and Lomotil were restarted yesterday. No evidence of infection.      Pain management remains a challenge. Palliative Medicine assistance is greatly appreciated. Plan:   Continue regular diet. Continue TPN at current rate.      Continue pain management per Palliative Medicine.      Ambulate. Incentive spirometer.      Continue abdominal binder.

## 2017-11-07 NOTE — PROGRESS NOTES
Palliative Medicine Consult  Driss: [phone] ([phone])    Patient Name: Eugenio Boucher  YOB: 1960    Date of Initial Consult: 11-6-17  Reason for Consult: Pain management  Requesting Provider: Kassie Guthrie   Primary Care Physician: Radha Diaz MD     SUMMARY:   Eugenio Boucher is a 65 y.o. with a past history of Cronh's disease w/ extensive surgical hx( more than 35 abdominal surgeries), short gut syndrome, chronic diarrhea,  depression, chronic neck/back pain [facility] on 11/2/2017 from home with bowel obstruction due to intussusception and ventral hernia s/p ex lap, extensive THONY and reduction of intussusception. Tolerating solid foods, having >15 loose stools daily. Current medical issues leading to Palliative Medicine involvement include: Pain regimen.  reviewed and most recently on MSContin 15mg daily and Hydrocodone/Tylenol 10/325mg prn~6 a day. Social: Pt recently . Still has a lot of friend and family support. PALLIATIVE DIAGNOSES:   1. Acute abdominal pain from surgery  2. Chronic diarrhea  3. Nausea  4. Chronic neck, back and L wrist pain        PLAN:   1. Discuss pain regimen, and pt feels that his surgical pain improving somewhat. Again, as yest, discuss that do not want to incr his tolerance to opioids during the stay and weaning to home doses harder. 2. Agree that will cont PCA for one more day, adjusting to give chance to break acute pain cycle and allow to rest.   3. Plan is to stop Dilaudid PCA and convert to po Dilaudid tablets (either prn or scheduled while in hospital) tmrw, Wednesday AM.   4. Today's plan:   1. Cont MsContin 15mg bid  2. Adjust Dilaudid PCA: now 0.3mg/h basal, 0.6mg IV every 6 min demand. 3. No bowel regimen, as given chronic diarrhea. 5. Advanced care planning: Pt recently , thus did a new AMD today naming good friend Dakotah Said as primary agent.  I changed ACP tab and added ACP note.   6. Communicated plan of care with: Palliative IDT; Ahsan Green RN       GOALS OF CARE / TREATMENT PREFERENCES:   [====Goals of Care====]  GOALS OF CARE:  Patient / health care proxy stated goals: pain relief       TREATMENT PREFERENCES:   Code Status: Prior    Advance Care Planning:Need to discuss with him  Advance Care Planning 11/7/2017   Patient's Healthcare Decision Maker is: Named in scanned ACP document   Primary Decision Maker Name [address] Phone Number [phone]   Primary Decision Maker Relationship to Patient Friend   Secondary Decision Maker Name -   Secondary Decision [address] Phone Number -   Secondary Decision Maker Relationship to Patient -   Confirm Advance Directive Yes, on file   Does the patient have other document types -       Other:    The palliative care team has discussed with patient / health care proxy about goals of care / treatment preferences for patient.  [====Goals of Care====]         HISTORY:         HPI/SUBJECTIVE:    The patient is:   [x] Verbal and participatory  [] Non-participatory due to:       Finally able to rest overnight with basal, but on-call provider was called and adjusted demand dose from 0.3mg to 0.4mg. Feels that he may need another day to have sx relieved before converting to po. Hopeful that will be stable for discharge near end of week. 13 BMs in past 24h. Tolerating regular diet. Able to walk around unit. Clinical Pain Assessment (nonverbal scale for severity on nonverbal patients):   [++++ Clinical Pain Assessment++++]  [++++Pain Severity++++]: Pain: 8  [++++Pain Character++++]: tearing   [++++Pain Duration++++]: surgery.  Also has chronic neck, lower back and L wrist pain   [++++Pain Effect++++]: hard to sleep and take deep breaths   [++++Pain Factors++++]: better w/ PCA  [++++Pain Frequency++++]: constant   [++++Pain Location++++]: generalized abdomen   [++++ Clinical Pain Assessment++++]  Duration: for how long has pt been experiencing pain (e.g., 2 days, 1 month, years)  Frequency: how often pain is an issue (e.g., several times per day, once every few days, constant)     FUNCTIONAL ASSESSMENT:     Palliative Performance Scale (PPS):  PPS: 80       PSYCHOSOCIAL/SPIRITUAL SCREENING:     Advance Care Planning:  Advance Care Planning 11/7/2017   Patient's Healthcare Decision Maker is: Named in scanned ACP document   Primary Decision Maker Name Lorie Justin Phone Number [phone]   Primary Decision Maker Relationship to Patient Friend   Secondary Decision Maker Name -   Secondary Decision [address] Phone Number -   Secondary Decision Maker Relationship to Patient -   Confirm Advance Directive Yes, on file   Does the patient have other document types -        Any spiritual / Protestant concerns:  [] Yes /  [x] No    Caregiver Burnout:  [] Yes /  [x] No /  [] No Caregiver Present      Anticipatory grief assessment:   [x] Normal  / [] Maladaptive       ESAS Anxiety: Anxiety: 0    ESAS Depression: Depression: 0        REVIEW OF SYSTEMS:     Positive and pertinent negative findings in ROS are noted above in HPI. The following systems were [x] reviewed / [] unable to be reviewed as noted in HPI  Other findings are noted below. Systems: constitutional, ears/nose/mouth/throat, respiratory, gastrointestinal, genitourinary, musculoskeletal, integumentary, neurologic, psychiatric, endocrine. Positive findings noted below. Modified ESAS Completed by: provider   Fatigue: 0 Drowsiness: 0   Depression: 0 Pain: 8   Anxiety: 0 Nausea: 0   Anorexia: 2 Dyspnea: 0     Constipation: No     Stool Occurrence(s): 1        PHYSICAL EXAM:     From RN flowsheet:  Wt Readings from Last 3 Encounters:   11/07/17 166 lb (75.3 kg)   08/18/17 180 lb (81.6 kg)   06/22/17 179 lb (81.2 kg)     Blood pressure 113/76, pulse 77, temperature 99.1 °F (37.3 °C), resp. rate 18, height 5' 11\" (1.803 m), weight 166 lb (75.3 kg), SpO2 95 %.     Pain Scale 1: Numeric (0 - 10)  Pain Intensity 1: 8  Pain Onset 1: post op  Pain Location 1: Abdomen, Back, Neck  Pain Orientation 1: Anterior  Pain Description 1:  (feels like im bruised all over and pulling in stomach)  Pain Intervention(s) 1: Medication (see MAR), Encouraged PCA  Last bowel movement, if known: today     Constitutional: awake, alert, no sedation or confusion   Eyes: pupils equal, anicteric  ENMT: no nasal discharge, moist mucous membranes  Cardiovascular: regular rhythm  Respiratory: breathing not labored, symmetric  Gastrointestinal: a bit distended; +BS; midline incision c/d/i  Musculoskeletal: no deformity, no tenderness to palpation  Skin: warm, dry  Neurologic: following commands, moving all extremities  Psychiatric: full affect, no hallucinations       HISTORY:     Principal Problem:    Intussusception of intestine (HCC) (11/3/2017)    Active Problems:    Chronic pain (3/17/2015)      Short bowel syndrome (9/28/2015)      Incisional hernia, without obstruction or gangrene (1/31/2017)      Bowel obstruction (11/3/2017)      Past Medical History:   Diagnosis Date    Abdominal wall hernia 6/15/2012    Anal fissure     Anemia     Anemia     Anxiety and depression     Arthritis     Related to the Crohn's disease.  Cancer ([facility] [address]     MELANOMA HEAD AND FACE    Chronic pain     GENERALIZED    COPD (chronic obstructive pulmonary disease) (HCC)     Crohn disease (HCC)     Diagnosed at age 17.  Echocardiogram normal (EF: 55-60%) 07/2015    Esophageal ulcer     GERD (gastroesophageal reflux disease)     H/O blood transfusion 2013    [address] [facility]    Hypertension     denies    Migraine     Short bowel syndrome     Ventral hernia without obstruction or gangrene       Past Surgical History:   Procedure Laterality Date    ABDOMEN SURGERY PROC UNLISTED      33 abdominal operations for treatment of Crohn's disease and its complications.     HC NDL CEJA      ceja needle, takes 1 inch needle    HX APPENDECTOMY      HX CHOLECYSTECTOMY      HX GI      colectomy x2, one ileostomy    HX GI  7/28/14    exp lap,partial colectomy with end ileostomy by Dr Jean Corrigan      neck fusion    HX ORTHOPAEDIC  1980s    wrist right,     HX ORTHOPAEDIC  2006, 11/2013    neck, L4 L5 L6    HX ORTHOPAEDIC  1990s    right knee scope    HX OTHER SURGICAL      surgical repair from spider bite    HX OTHER SURGICAL  12/1/14    Incision and drainage of posterior right subcutaneous shoulder abscess    HX OTHER SURGICAL  2014    LEFT INDEX FINGER SPIDER BITE    HX OTHER SURGICAL  2014    RECTAL FISTULA    HX OTHER SURGICAL  3/17/2015    Ileostomy takedown with extensive lysis of adhesions (greater than three hours), mobilization of the splenic flexure, left colon resection, ileocolic anastomosis, and excision of scar; Dr. Edil Elias.  HX OTHER SURGICAL  3/22/2015    Exploratory laparotomy with washout of abdomen, suture repair of small bowel/anastomosis, and diverting protective loop ileostomy; Nathan Barnard MD.    HX OTHER SURGICAL  4/9/2015    Laparotomy, extensive lysis of adhesions, abdominal lavage, and resection of ileocolic anastomosis (including the efferent limb of the loop ileostomy) with creation of Demetrius's pouch; Dr. Edil Elias.  HX OTHER SURGICAL  7/14/2015    Cystoscopy and placement of bilateral ureteral catheters; Estevan Luong MD.    HX OTHER SURGICAL  7/14/2015    Ileostomy takedown with extensive lysis of adhesions, small bowel resection, and enterocolostomy; Dr. Edil Elias.  HX OTHER SURGICAL  9/29/2015    CT-guided percutaneous drainage of intraabdominal abscess; Dr. Tamiko Brock.  HX OTHER SURGICAL  11/16/2015    CT-guided percutaneous aspiration of abdominal wall cavity; Dr. Alex Potter.  HX OTHER SURGICAL  12/1/2015    Incision and drainage of abdominal wall abscess; Dr. Edil Elias.     HX OTHER SURGICAL  2/11/2016    Incision and drainage of abdominal wall abscess; Dr. Oliver Courser.  HX OTHER SURGICAL  2/23/2016    Cystoscopy and placement of bilateral temporary ureteral catheters; Dr. Enio Franco.  HX OTHER SURGICAL  2/23/2016    Exploratory laparotomy, extensive lysis of adhesions, removal of mesh, and repair of enterocutaneous fistula; Dr. Oliver Courser. Family History   Problem Relation Age of Onset    Stroke Mother     Heart Disease Mother     Kidney Disease Mother     Anesth Problems Mother      TAKES A LONG TIME TO WAKE UP    Heart Disease Father     Thyroid Disease Sister       History reviewed, no pertinent family history. Social History   Substance Use Topics    Smoking status: Current Every Day Smoker     Packs/day: 1.00     Years: 44.00    Smokeless tobacco: Current User      Comment: CURRENT E CIGS    Alcohol use No      Comment: RARELY     Allergies   Allergen Reactions    Honey Anaphylaxis    Remicade [Infliximab] Anaphylaxis    Crestor [Rosuvastatin] Myalgia    Other Plant, Animal, Environmental Other (comments)     Developed \"boils\" on right arm that required I &D after receiving FENTANYL patch.   Is able to take FENTANYL orally; states is allergic to fentanyl patch GLUE    Imuran [Azathioprine] Diarrhea and Nausea Only    Mercaptopurine Diarrhea    Sulfa (Sulfonamide Antibiotics) Other (comments)     Causes diarrhea      Current Facility-Administered Medications   Medication Dose Route Frequency    TPN ADULT - CENTRAL   IntraVENous CONTINUOUS    diphenoxylate-atropine (LOMOTIL) tablet 2 Tab  2 Tab Oral AC&HS    TPN ADULT - CENTRAL   IntraVENous CONTINUOUS    loperamide (IMODIUM) capsule 4 mg  4 mg Oral AC&HS    HYDROcodone-acetaminophen (NORCO)  mg tablet 1-2 Tab  1-2 Tab Oral Q4H PRN    morphine CR (MS CONTIN) tablet 15 mg  15 mg Oral Q12H    colestipol (COLESTID) tablet 6 g  6 g Oral AC&HS    lactated Ringers infusion  10 mL/hr IntraVENous CONTINUOUS    fat emulsion 20% (LIPOSYN, INTRAlipid) infusion 500 mL  500 mL IntraVENous Q MON, WED & SAT    chlorzoxazone (PARAFON FORTE) tablet 500 mg  500 mg Oral BID PRN    HYDROmorphone (PF) 15 mg/30 ml (DILAUDID) PCA   IntraVENous TITRATE    cloNIDine HCl (CATAPRES) tablet 0.1 mg  0.1 mg Oral BID    diphenhydrAMINE (BENADRYL) capsule 100 mg  100 mg Oral QHS PRN    famotidine (PEPCID) tablet 20 mg  20 mg Oral BID    sodium chloride (NS) flush 5-10 mL  5-10 mL IntraVENous Q8H    sodium chloride (NS) flush 5-10 mL  5-10 mL IntraVENous PRN    naloxone (NARCAN) injection 0.4 mg  0.4 mg IntraVENous PRN    ondansetron (ZOFRAN) injection 4 mg  4 mg IntraVENous Q4H PRN    LORazepam (ATIVAN) injection 1 mg  1 mg IntraVENous Q8H PRN    insulin lispro (HUMALOG) injection   SubCUTAneous Q6H    glucose chewable tablet 16 g  4 Tab Oral PRN    dextrose (D50W) injection syrg 12.5-25 g  12.5-25 g IntraVENous PRN    glucagon (GLUCAGEN) injection 1 mg  1 mg IntraMUSCular PRN    sodium chloride (NS) flush 20 mL  20 mL InterCATHeter PRN    sodium chloride (NS) flush 10 mL  10 mL InterCATHeter Q24H    sodium chloride (NS) flush 10 mL  10 mL InterCATHeter PRN    sodium chloride (NS) flush 10 mL  10 mL InterCATHeter Q8H    alteplase (CATHFLO) 1 mg in sterile water (preservative free) 1 mL injection  1 mg InterCATHeter PRN    bacitracin 500 unit/gram packet 1 Packet  1 Packet Topical PRN    butalbital-acetaminophen-caffeine (FIORICET, ESGIC) -40 mg per tablet 1 Tab  1 Tab Oral Q6H PRN    nicotine (NICODERM CQ) 14 mg/24 hr patch 1 Patch  1 Patch TransDERmal DAILY          LAB AND IMAGING FINDINGS:     Lab Results   Component Value Date/Time    WBC 5.9 11/07/2017 04:10 AM    HGB 10.7 11/07/2017 04:10 AM    PLATELET 152 23/24/2219 04:10 AM     Lab Results   Component Value Date/Time    Sodium 137 11/07/2017 04:10 AM    Potassium 4.0 11/07/2017 04:10 AM    Chloride 103 11/07/2017 04:10 AM    CO2 27 11/07/2017 04:10 AM    BUN 11 11/07/2017 04:10 AM    Creatinine 0.87 11/07/2017 04:10 AM    Calcium 8.4 11/07/2017 04:10 AM    Magnesium 1.8 11/06/2017 01:35 AM    Phosphorus 3.9 11/07/2017 04:10 AM      Lab Results   Component Value Date/Time    AST (SGOT) 22 11/05/2017 02:25 AM    Alk. phosphatase 92 11/05/2017 02:25 AM    Protein, total 5.6 11/05/2017 02:25 AM    Albumin 2.2 11/05/2017 02:25 AM    Globulin 3.4 11/05/2017 02:25 AM     Lab Results   Component Value Date/Time    INR 1.1 02/16/2016 02:51 AM    Prothrombin time 11.0 02/16/2016 02:51 AM    aPTT 31.0 09/28/2015 01:06 PM      Lab Results   Component Value Date/Time    Iron 11 06/20/2012 10:55 AM    TIBC 433 06/20/2012 10:55 AM    Iron % saturation 3 06/20/2012 10:55 AM      No results found for: PH, PCO2, PO2  No components found for: [place]   Lab Results   Component Value Date/Time    CK 29 12/01/2014 04:33 AM    CK - MB <0.5 12/01/2014 04:33 AM                Total time:   Counseling / coordination time, spent as noted above:   > 50% counseling / coordination?:     Prolonged service was provided for  []30 min   []75 min in face to face time in the presence of the patient, spent as noted above. Time Start:   Time End:   Note: this can only be billed with 39677 (initial) or 71510 (follow up). If multiple start / stop times, list each separately.

## 2017-11-07 NOTE — PROGRESS NOTES
Palliative Medicine Consult  Driss: [phone] ([phone]     [facility]  [facility]: 1960          Phone call by RN requesting increase in PCA; pt c/o uncontrolled pain. 1.  Change Dilaudid PCA 0.3mg IV every 6 min demand to 0.4mg IV every 6 min demand. 2. Continuous oximetry while increasing dose.

## 2017-11-07 NOTE — ACP (ADVANCE CARE PLANNING)
Advanced Care Planning:    Completed new AMD with pt, as recently . On chart to be scanned. If in the future, death is imminent or he is in a permanent state of being unaware of his surroundings, he would not want aggressive interventions.        Advance Care Planning:  Advance Care Planning 11/7/2017   Patient's Healthcare Decision Maker is: Named in scanned ACP document   Primary Decision Maker Name [address] Phone Number [phone]   Primary Decision Maker Relationship to Patient Friend   Secondary Decision Maker Name -   Secondary Decision [address] Phone Number -   Secondary Decision Maker Relationship to Patient -   Confirm Advance Directive Yes, on file   Does the patient have other document types -

## 2017-11-08 LAB
ANION GAP SERPL CALC-SCNC: 9 MMOL/L (ref 5–15)
BASOPHILS # BLD: 0 K/UL (ref 0–0.1)
BASOPHILS NFR BLD: 0 % (ref 0–1)
BUN SERPL-MCNC: 12 MG/DL (ref 6–20)
BUN/CREAT SERPL: 13 (ref 12–20)
CALCIUM SERPL-MCNC: 8.4 MG/DL (ref 8.5–10.1)
CHLORIDE SERPL-SCNC: 101 MMOL/L (ref 97–108)
CO2 SERPL-SCNC: 26 MMOL/L (ref 21–32)
CREAT SERPL-MCNC: 0.93 MG/DL (ref 0.7–1.3)
EOSINOPHIL # BLD: 0.2 K/UL (ref 0–0.4)
EOSINOPHIL NFR BLD: 4 % (ref 0–7)
ERYTHROCYTE [DISTWIDTH] IN BLOOD BY AUTOMATED COUNT: 15.7 % (ref 11.5–14.5)
GLUCOSE BLD STRIP.AUTO-MCNC: 104 MG/DL (ref 65–100)
GLUCOSE BLD STRIP.AUTO-MCNC: 106 MG/DL (ref 65–100)
GLUCOSE BLD STRIP.AUTO-MCNC: 110 MG/DL (ref 65–100)
GLUCOSE BLD STRIP.AUTO-MCNC: 82 MG/DL (ref 65–100)
GLUCOSE SERPL-MCNC: 123 MG/DL (ref 65–100)
HCT VFR BLD AUTO: 33.6 % (ref 36.6–50.3)
HGB BLD-MCNC: 10.9 G/DL (ref 12.1–17)
LYMPHOCYTES # BLD: 1.3 K/UL (ref 0.8–3.5)
LYMPHOCYTES NFR BLD: 22 % (ref 12–49)
MAGNESIUM SERPL-MCNC: 2.2 MG/DL (ref 1.6–2.4)
MCH RBC QN AUTO: 28.2 PG (ref 26–34)
MCHC RBC AUTO-ENTMCNC: 32.4 G/DL (ref 30–36.5)
MCV RBC AUTO: 87 FL (ref 80–99)
MONOCYTES # BLD: 0.6 K/UL (ref 0–1)
MONOCYTES NFR BLD: 9 % (ref 5–13)
NEUTS SEG # BLD: 3.7 K/UL (ref 1.8–8)
NEUTS SEG NFR BLD: 65 % (ref 32–75)
PHOSPHATE SERPL-MCNC: 4.6 MG/DL (ref 2.6–4.7)
PLATELET # BLD AUTO: 292 K/UL (ref 150–400)
POTASSIUM SERPL-SCNC: 4.7 MMOL/L (ref 3.5–5.1)
RBC # BLD AUTO: 3.86 M/UL (ref 4.1–5.7)
SERVICE CMNT-IMP: ABNORMAL
SERVICE CMNT-IMP: NORMAL
SODIUM SERPL-SCNC: 136 MMOL/L (ref 136–145)
VIT A SERPL-MCNC: 17 UG/DL (ref 24–85)
WBC # BLD AUTO: 5.9 K/UL (ref 4.1–11.1)

## 2017-11-08 PROCEDURE — 80048 BASIC METABOLIC PNL TOTAL CA: CPT | Performed by: COLON & RECTAL SURGERY

## 2017-11-08 PROCEDURE — 36592 COLLECT BLOOD FROM PICC: CPT

## 2017-11-08 PROCEDURE — 36415 COLL VENOUS BLD VENIPUNCTURE: CPT | Performed by: COLON & RECTAL SURGERY

## 2017-11-08 PROCEDURE — 74011000250 HC RX REV CODE- 250: Performed by: COLON & RECTAL SURGERY

## 2017-11-08 PROCEDURE — 84100 ASSAY OF PHOSPHORUS: CPT | Performed by: COLON & RECTAL SURGERY

## 2017-11-08 PROCEDURE — 83735 ASSAY OF MAGNESIUM: CPT | Performed by: COLON & RECTAL SURGERY

## 2017-11-08 PROCEDURE — 74011250637 HC RX REV CODE- 250/637: Performed by: COLON & RECTAL SURGERY

## 2017-11-08 PROCEDURE — 74011250637 HC RX REV CODE- 250/637: Performed by: EMERGENCY MEDICINE

## 2017-11-08 PROCEDURE — 82962 GLUCOSE BLOOD TEST: CPT

## 2017-11-08 PROCEDURE — 65270000032 HC RM SEMIPRIVATE

## 2017-11-08 PROCEDURE — 85025 COMPLETE CBC W/AUTO DIFF WBC: CPT | Performed by: COLON & RECTAL SURGERY

## 2017-11-08 RX ORDER — HYDROMORPHONE HYDROCHLORIDE 2 MG/1
4-8 TABLET ORAL
Status: DISCONTINUED | OUTPATIENT
Start: 2017-11-08 | End: 2017-11-10 | Stop reason: HOSPADM

## 2017-11-08 RX ADMIN — DIPHENOXYLATE HYDROCHLORIDE AND ATROPINE SULFATE 2 TABLET: 2.5; .025 TABLET ORAL at 22:00

## 2017-11-08 RX ADMIN — Medication 10 ML: at 13:15

## 2017-11-08 RX ADMIN — FAMOTIDINE 20 MG: 20 TABLET ORAL at 16:54

## 2017-11-08 RX ADMIN — DIPHENOXYLATE HYDROCHLORIDE AND ATROPINE SULFATE 2 TABLET: 2.5; .025 TABLET ORAL at 06:49

## 2017-11-08 RX ADMIN — I.V. FAT EMULSION 500 ML: 20 EMULSION INTRAVENOUS at 18:58

## 2017-11-08 RX ADMIN — LOPERAMIDE HYDROCHLORIDE 4 MG: 2 CAPSULE ORAL at 06:49

## 2017-11-08 RX ADMIN — Medication 10 ML: at 11:19

## 2017-11-08 RX ADMIN — LOPERAMIDE HYDROCHLORIDE 4 MG: 2 CAPSULE ORAL at 16:53

## 2017-11-08 RX ADMIN — FAMOTIDINE 20 MG: 20 TABLET ORAL at 09:40

## 2017-11-08 RX ADMIN — BUTALBITAL, ACETAMINOPHEN AND CAFFEINE 1 TABLET: 50; 325; 40 TABLET ORAL at 19:22

## 2017-11-08 RX ADMIN — Medication 10 ML: at 05:11

## 2017-11-08 RX ADMIN — CLONIDINE HYDROCHLORIDE 0.1 MG: 0.1 TABLET ORAL at 09:40

## 2017-11-08 RX ADMIN — MORPHINE SULFATE 15 MG: 15 TABLET, FILM COATED, EXTENDED RELEASE ORAL at 09:40

## 2017-11-08 RX ADMIN — COLESTIPOL HYDROCHLORIDE 6 G: 1 TABLET, FILM COATED ORAL at 11:28

## 2017-11-08 RX ADMIN — Medication 20 ML: at 18:58

## 2017-11-08 RX ADMIN — COLESTIPOL HYDROCHLORIDE 6 G: 1 TABLET, FILM COATED ORAL at 22:00

## 2017-11-08 RX ADMIN — Medication 10 ML: at 22:00

## 2017-11-08 RX ADMIN — DIPHENOXYLATE HYDROCHLORIDE AND ATROPINE SULFATE 2 TABLET: 2.5; .025 TABLET ORAL at 16:53

## 2017-11-08 RX ADMIN — CLONIDINE HYDROCHLORIDE 0.1 MG: 0.1 TABLET ORAL at 18:58

## 2017-11-08 RX ADMIN — LOPERAMIDE HYDROCHLORIDE 4 MG: 2 CAPSULE ORAL at 22:00

## 2017-11-08 RX ADMIN — COLESTIPOL HYDROCHLORIDE 6 G: 1 TABLET, FILM COATED ORAL at 17:00

## 2017-11-08 RX ADMIN — HYDROMORPHONE HYDROCHLORIDE 8 MG: 2 TABLET ORAL at 19:22

## 2017-11-08 RX ADMIN — MORPHINE SULFATE 15 MG: 15 TABLET, FILM COATED, EXTENDED RELEASE ORAL at 21:59

## 2017-11-08 RX ADMIN — COLESTIPOL HYDROCHLORIDE 6 G: 1 TABLET, FILM COATED ORAL at 06:50

## 2017-11-08 RX ADMIN — LOPERAMIDE HYDROCHLORIDE 4 MG: 2 CAPSULE ORAL at 11:18

## 2017-11-08 RX ADMIN — DIPHENOXYLATE HYDROCHLORIDE AND ATROPINE SULFATE 2 TABLET: 2.5; .025 TABLET ORAL at 11:19

## 2017-11-08 NOTE — ROUTINE PROCESS
Bedside shift change report given to [address] [place] [address] (oncoming nurse) by Rosie Alcaraz (offgoing nurse). Report included the following information Kardex.

## 2017-11-08 NOTE — PROGRESS NOTES
General Daily Progress Note    Admission Date: 11/2/2017  Hospital Day 6  Post-Op Day 5  Subjective:   Pain control improved. Stool frequency a bit less. Objective:   Patient Vitals for the past 24 hrs:   BP Temp Pulse Resp SpO2 Height Weight   11/08/17 0937 121/65 99 °F (37.2 °C) 83 18 96 % - -   11/08/17 0804 - - - - - - 74.5 kg (164 lb 4.8 oz)   11/08/17 0455 113/72 98 °F (36.7 °C) 78 18 97 % 5' 11\" (1.803 m) 74.5 kg (164 lb 4.8 oz)   11/08/17 0124 116/70 98.6 °F (37 °C) 77 18 96 % - -   11/07/17 2127 109/69 98.9 °F (37.2 °C) 86 18 93 % - -   11/07/17 1706 123/77 - 87 - - - -   11/07/17 1603 123/77 99.4 °F (37.4 °C) 87 18 94 % - -   11/07/17 1243 116/76 98.7 °F (37.1 °C) 79 18 95 % - -        11/06 1901 - 11/08 0700  In: 971 [P.O.:560; I.V.:411]  Out: 1950 [Urine:1950]      Physical Examination:  General Appearance:  No distress. Abdomen:  Incision clean, dry, and intact. Staples in place.             Data Review   Recent Results (from the past 24 hour(s))   GLUCOSE, POC    Collection Time: 11/07/17  5:11 PM   Result Value Ref Range    Glucose (POC) 105 (H) 65 - 100 mg/dL    Performed by Emanuel David, POC    Collection Time: 11/07/17 10:09 PM   Result Value Ref Range    Glucose (POC) 121 (H) 65 - 100 mg/dL    Performed by Daria Buchanan    CBC WITH AUTOMATED DIFF    Collection Time: 11/08/17  4:59 AM   Result Value Ref Range    WBC 5.9 4.1 - 11.1 K/uL    RBC 3.86 (L) 4.10 - 5.70 M/uL    HGB 10.9 (L) 12.1 - 17.0 g/dL    HCT 33.6 (L) 36.6 - 50.3 %    MCV 87.0 80.0 - 99.0 FL    MCH 28.2 26.0 - 34.0 PG    MCHC 32.4 30.0 - 36.5 g/dL    RDW 15.7 (H) 11.5 - 14.5 %    PLATELET 716 333 - 717 K/uL    NEUTROPHILS 65 32 - 75 %    LYMPHOCYTES 22 12 - 49 %    MONOCYTES 9 5 - 13 %    EOSINOPHILS 4 0 - 7 %    BASOPHILS 0 0 - 1 %    ABS. NEUTROPHILS 3.7 1.8 - 8.0 K/UL    ABS. LYMPHOCYTES 1.3 0.8 - 3.5 K/UL    ABS. MONOCYTES 0.6 0.0 - 1.0 K/UL    ABS. EOSINOPHILS 0.2 0.0 - 0.4 K/UL    ABS.  BASOPHILS 0.0 0.0 - 0.1 K/UL   METABOLIC PANEL, BASIC    Collection Time: 11/08/17  4:59 AM   Result Value Ref Range    Sodium 136 136 - 145 mmol/L    Potassium 4.7 3.5 - 5.1 mmol/L    Chloride 101 97 - 108 mmol/L    CO2 26 21 - 32 mmol/L    Anion gap 9 5 - 15 mmol/L    Glucose 123 (H) 65 - 100 mg/dL    BUN 12 6 - 20 MG/DL    Creatinine 0.93 0.70 - 1.30 MG/DL    BUN/Creatinine ratio 13 12 - 20      GFR est AA >60 >60 ml/min/1.73m2    GFR est non-AA >60 >60 ml/min/1.73m2    Calcium 8.4 (L) 8.5 - 10.1 MG/DL   MAGNESIUM    Collection Time: 11/08/17  4:59 AM   Result Value Ref Range    Magnesium 2.2 1.6 - 2.4 mg/dL   PHOSPHORUS    Collection Time: 11/08/17  4:59 AM   Result Value Ref Range    Phosphorus 4.6 2.6 - 4.7 MG/DL   GLUCOSE, POC    Collection Time: 11/08/17  5:12 AM   Result Value Ref Range    Glucose (POC) 110 (H) 65 - 100 mg/dL    Performed by Rajesh Durant            Assessment:     Principal Problem:    Intussusception of intestine ([address] (11/3/2017)    Active Problems:    Chronic pain (3/17/2015)      Short bowel syndrome (9/28/2015)      Incisional hernia, without obstruction or gangrene (1/31/2017)      Bowel obstruction (11/3/2017)        5 Days Post-Op s/p Exploratory laparotomy, extensive lysis of adhesions, and reduction of intussusception.      LUE PICC placed post-operatively on 11/3/2017.      Hemodynamically stable. GI function is returning. TPN was begun on 11/3/2017 and is probably not needed anymore. Control of diarrhea seems to be improving. Colestid, Imodium, and Lomotil were restarted on 11/6/2017. No evidence of infection.      Pain management is improving. Palliative Medicine assistance is greatly appreciated. Plan:   Continue regular diet. Wean and discontinue TPN.     Continue pain management per Palliative Medicine.      Ambulate. Incentive spirometer.      Continue abdominal binder. Possible discharge as soon as tomorrow afternoon.

## 2017-11-08 NOTE — PROGRESS NOTES
Problem: Falls - Risk of  Goal: *Absence of Falls  Document Grayson Fall Risk and appropriate interventions in the flowsheet.    Outcome: Progressing Towards Goal  Fall Risk Interventions:  Mobility Interventions: Patient to call before getting OOB         Medication Interventions: Patient to call before getting OOB, Teach patient to arise slowly, Evaluate medications/consider consulting pharmacy         History of Falls Interventions: Evaluate medications/consider consulting pharmacy

## 2017-11-08 NOTE — PROGRESS NOTES
Palliative Medicine Consult  Driss: [phone] ([phone])    Patient Name: Glen Camara  YOB: 1960    Date of Initial Consult: 11-6-17  Reason for Consult: Pain management  Requesting Provider: Ronda Allen   Primary Care Physician: Esther Phelan MD     SUMMARY:   Glen Camara is a 65 y.o. with a past history of Crohn's disease w/ extensive surgical hx( more than 35 abdominal surgeries), short gut syndrome, chronic diarrhea,  depression, chronic neck/back pain [facility] on 11/2/2017 from home with bowel obstruction due to intussusception and ventral hernia s/p ex lap, extensive THONY and reduction of intussusception. Tolerating solid foods, having >15 loose stools daily. Current medical issues leading to Palliative Medicine involvement include: Pain regimen.  reviewed and most recently on MSContin 15mg daily and Hydrocodone/Tylenol 10/325mg prn~6 a day. Social: Pt recently . Still has a lot of friend and family support. PALLIATIVE DIAGNOSES:   1. Acute abdominal pain from surgery  2. Chronic diarrhea  3. Nausea  4. Chronic neck, back and L wrist pain        PLAN:   1. Pt reports pain near his chronic pain baseline, reports 7/10 currently, baseline is 6/10; he seems overall pleased with pain regimen and wants to transition off PCA; review of pca shows he has used pca dose < 1 time per hour in recent hours, he has basal of 0.3mg/hr > 7.2mg/d > 36mg PO; given that this includes only basal and not PCA doses, would be reasonable to start with 4-8mg dilaudid PO q3h PRN; discussed with pt and he is in agreement  2. Pt requests that we wait until this afternoon for transition off PCA, I have written an end time of 5pm, he can come off before if he wants to  3.  We discussed that if he goes home in next couple of days, we would suggest a few days supply of dilaudid, but he should gradually taper down to his usual regimen of ms contin + hydrocodone; would take either dilaudid OR hydrocodone, NOT both  4. Continue MS contin  5. New AMD completed this admission  6. Discussed with RN  7. Communicated plan of care with: Palliative IDT       GOALS OF CARE / TREATMENT PREFERENCES:   [====Goals of Care====]  GOALS OF CARE:  Patient / health care proxy stated goals: pain relief       TREATMENT PREFERENCES:   Code Status: Prior    Advance Care Planning:Need to discuss with him  Advance Care Planning 11/7/2017   Patient's Healthcare Decision Maker is: Named in scanned ACP document   Primary Decision Maker Name [address] Phone Number [phone]   Primary Decision Maker Relationship to Patient Friend   Secondary Decision Maker Name -   Secondary Decision [address] Phone Number -   Secondary Decision Maker Relationship to Patient -   Confirm Advance Directive Yes, on file   Does the patient have other document types -       Other:    The palliative care team has discussed with patient / health care proxy about goals of care / treatment preferences for patient.  [====Goals of Care====]         HISTORY:     Reviewed vitals, I/O's, labs, imaging, MAR, notes. 560 PO, 11x stool  MAR  Fioricet, 1 dose 11/7  Benadryl, no recent dose  Lomotil, immodium  norco 10/325, 2 tabs on 11/7 and on 11/6  Dilaudid pca 0.3mg/hr, 0.6mg q6min, 4 hr lock out 20mg  Ativan 1mg IV PRN, no recent dose  Ms contin 15mg po q12h  Zofran, no recent dose  TPN        HPI/SUBJECTIVE:    The patient is:   [x] Verbal and participatory  [] Non-participatory due to:     Pt reports changes yesterday helped, pain currently close to his chronic baseline. No nausea. Pain limits him taking deep breath, but denies sob. Clinical Pain Assessment (nonverbal scale for severity on nonverbal patients):   [++++ Clinical Pain Assessment++++]  [++++Pain Severity++++]: Pain: 7  [++++Pain Character++++]: tearing   [++++Pain Duration++++]: surgery.  Also has chronic neck, lower back and L wrist pain   [++++Pain Effect++++]: hard to sleep and take deep breaths   [++++Pain Factors++++]: better w/ PCA  [++++Pain Frequency++++]: constant   [++++Pain Location++++]: generalized abdomen   [++++ Clinical Pain Assessment++++]  Duration: for how long has pt been experiencing pain (e.g., 2 days, 1 month, years)  Frequency: how often pain is an issue (e.g., several times per day, once every few days, constant)     FUNCTIONAL ASSESSMENT:     Palliative Performance Scale (PPS):  PPS: 80       PSYCHOSOCIAL/SPIRITUAL SCREENING:     Advance Care Planning:  Advance Care Planning 11/7/2017   Patient's Healthcare Decision Maker is: Named in scanned ACP document   Primary Decision Maker Name [address] Phone Number [phone]   Primary Decision Maker Relationship to Patient Friend   Secondary Decision Maker Name -   Secondary Decision [address] [place] [address] Phone Number -   Secondary Decision Maker Relationship to Patient -   Confirm Advance Directive Yes, on file   Does the patient have other document types -        Any spiritual / Roman [employer] concerns:  [] Yes /  [x] No    Caregiver Burnout:  [] Yes /  [x] No /  [] No Caregiver Present      Anticipatory grief assessment:   [x] Normal  / [] Maladaptive       ESAS Anxiety: Anxiety: 0    ESAS Depression: Depression: 0        REVIEW OF SYSTEMS:     Positive and pertinent negative findings in ROS are noted above in HPI. The following systems were [x] reviewed / [] unable to be reviewed as noted in HPI  Other findings are noted below. Systems: constitutional, ears/nose/mouth/throat, respiratory, gastrointestinal, genitourinary, musculoskeletal, integumentary, neurologic, psychiatric, endocrine. Positive findings noted below. Modified ESAS Completed by: provider   Fatigue: 0 Drowsiness: 0   Depression: 0 Pain: 7   Anxiety: 0 Nausea: 0   Anorexia: 2 Dyspnea: 1     Constipation: No     Stool Occurrence(s): 6        PHYSICAL EXAM:     From RN flowsheet:   Wt Readings from Last 3 Encounters:   11/08/17 164 lb 4.8 oz (74.5 kg)   08/18/17 180 lb (81.6 kg)   06/22/17 179 lb (81.2 kg)     Blood pressure 121/65, pulse 83, temperature 99 °F (37.2 °C), resp. rate 18, height 5' 11\" (1.803 m), weight 164 lb 4.8 oz (74.5 kg), SpO2 96 %. Pain Scale 1: Numeric (0 - 10)  Pain Intensity 1: 0  Pain Onset 1: post op  Pain Location 1: Abdomen, Back, Neck  Pain Orientation 1: Anterior  Pain Description 1:  (feels like im bruised all over and pulling in stomach)  Pain Intervention(s) 1: Encouraged PCA  Last bowel movement, if known: today     Constitutional: awake, alert, no sedation or confusion   Eyes: pupils equal, anicteric  ENMT: no nasal discharge, moist mucous membranes  Cardiovascular: regular rhythm  Respiratory: breathing not labored, symmetric  Gastrointestinal: +BS, abdominal binder in place  Musculoskeletal: no deformity, no tenderness to palpation  Skin: warm, dry  Neurologic: following commands, moving all extremities  Psychiatric: full affect, no hallucinations       HISTORY:     Principal Problem:    Intussusception of intestine (HCC) (11/3/2017)    Active Problems:    Chronic pain (3/17/2015)      Short bowel syndrome (9/28/2015)      Incisional hernia, without obstruction or gangrene (1/31/2017)      Bowel obstruction (11/3/2017)      Past Medical History:   Diagnosis Date    Abdominal wall hernia 6/15/2012    Anal fissure     Anemia     Anemia     Anxiety and depression     Arthritis     Related to the Crohn's disease.  Cancer ([facility] [address]     MELANOMA HEAD AND FACE    Chronic pain     GENERALIZED    COPD (chronic obstructive pulmonary disease) (HCC)     Crohn disease (HCC)     Diagnosed at age 17.     Echocardiogram normal (EF: 55-60%) 07/2015    Esophageal ulcer     GERD (gastroesophageal reflux disease)     H/O blood transfusion 2013    [address], [address] [address]    Hypertension     denies    Migraine     Short bowel syndrome     Ventral hernia without obstruction or gangrene       Past Surgical History:   Procedure Laterality Date    ABDOMEN SURGERY PROC UNLISTED      33 abdominal operations for treatment of Crohn's disease and its complications.  HC NDL CEJA      ceja needle, takes 1 inch needle    HX APPENDECTOMY      HX CHOLECYSTECTOMY      HX GI      colectomy x2, one ileostomy    HX GI  7/28/14    exp lap,partial colectomy with end ileostomy by Dr Steve Quiñonez      neck fusion    HX ORTHOPAEDIC  1980s    wrist right,     HX ORTHOPAEDIC  2006, 11/2013    neck, L4 L5 L6    HX ORTHOPAEDIC  1990s    right knee scope    HX OTHER SURGICAL      surgical repair from spider bite    HX OTHER SURGICAL  12/1/14    Incision and drainage of posterior right subcutaneous shoulder abscess    HX OTHER SURGICAL  2014    LEFT INDEX FINGER SPIDER BITE    HX OTHER SURGICAL  2014    RECTAL FISTULA    HX OTHER SURGICAL  3/17/2015    Ileostomy takedown with extensive lysis of adhesions (greater than three hours), mobilization of the splenic flexure, left colon resection, ileocolic anastomosis, and excision of scar; Dr. Elle Rogers.   OTHER SURGICAL  3/22/2015    Exploratory laparotomy with washout of abdomen, suture repair of small bowel/anastomosis, and diverting protective loop ileostomy; Maria Guadalupe Quintero MD.    HX OTHER SURGICAL  4/9/2015    Laparotomy, extensive lysis of adhesions, abdominal lavage, and resection of ileocolic anastomosis (including the efferent limb of the loop ileostomy) with creation of Demetrius's pouch; Dr. Elle Rogers.   OTHER SURGICAL  7/14/2015    Cystoscopy and placement of bilateral ureteral catheters; Stephanie An MD.    HX OTHER SURGICAL  7/14/2015    Ileostomy takedown with extensive lysis of adhesions, small bowel resection, and enterocolostomy; Dr. Elle Rogers.   OTHER SURGICAL  9/29/2015    CT-guided percutaneous drainage of intraabdominal abscess; Dr. Shobha Hernandez.      OTHER SURGICAL 11/16/2015    CT-guided percutaneous aspiration of abdominal wall cavity; Dr. Iman Li.  HX OTHER SURGICAL  12/1/2015    Incision and drainage of abdominal wall abscess; Dr. Jennifer Downs.  HX OTHER SURGICAL  2/11/2016    Incision and drainage of abdominal wall abscess; Dr. Jennifer Downs.  HX OTHER SURGICAL  2/23/2016    Cystoscopy and placement of bilateral temporary ureteral catheters; Dr. Yasemin Sahu.  HX OTHER SURGICAL  2/23/2016    Exploratory laparotomy, extensive lysis of adhesions, removal of mesh, and repair of enterocutaneous fistula; Dr. Jennifer Downs.  HX OTHER SURGICAL  11/03/2017    Exploratory laparotomy, extensive lysis of adhesions, and reduction of intussusception; Dr. Jennifer Downs. Family History   Problem Relation Age of Onset    Stroke Mother     Heart Disease Mother     Kidney Disease Mother     Anesth Problems Mother      TAKES A LONG TIME TO WAKE UP    Heart Disease Father     Thyroid Disease Sister       History reviewed, no pertinent family history. Social History   Substance Use Topics    Smoking status: Current Every Day Smoker     Packs/day: 1.00     Years: 44.00    Smokeless tobacco: Current User      Comment: CURRENT E CIGS    Alcohol use No      Comment: RARELY     Allergies   Allergen Reactions    Honey Anaphylaxis    Remicade [Infliximab] Anaphylaxis    Crestor [Rosuvastatin] Myalgia    Other Plant, Animal, Environmental Other (comments)     Developed \"boils\" on right arm that required I &D after receiving FENTANYL patch.   Is able to take FENTANYL orally; states is allergic to fentanyl patch GLUE    Imuran [Azathioprine] Diarrhea and Nausea Only    Mercaptopurine Diarrhea    Sulfa (Sulfonamide Antibiotics) Other (comments)     Causes diarrhea      Current Facility-Administered Medications   Medication Dose Route Frequency    TPN ADULT - CENTRAL   IntraVENous CONTINUOUS    diphenoxylate-atropine (LOMOTIL) tablet 2 Tab  2 Tab Oral AC&HS    loperamide (IMODIUM) capsule 4 mg  4 mg Oral AC&HS    HYDROcodone-acetaminophen (NORCO)  mg tablet 1-2 Tab  1-2 Tab Oral Q4H PRN    morphine CR (MS CONTIN) tablet 15 mg  15 mg Oral Q12H    colestipol (COLESTID) tablet 6 g  6 g Oral AC&HS    lactated Ringers infusion  10 mL/hr IntraVENous CONTINUOUS    fat emulsion 20% (LIPOSYN, INTRAlipid) infusion 500 mL  500 mL IntraVENous Q MON, WED & SAT    chlorzoxazone (PARAFON FORTE) tablet 500 mg  500 mg Oral BID PRN    HYDROmorphone (PF) 15 mg/30 ml (DILAUDID) PCA   IntraVENous TITRATE    cloNIDine HCl (CATAPRES) tablet 0.1 mg  0.1 mg Oral BID    diphenhydrAMINE (BENADRYL) capsule 100 mg  100 mg Oral QHS PRN    famotidine (PEPCID) tablet 20 mg  20 mg Oral BID    sodium chloride (NS) flush 5-10 mL  5-10 mL IntraVENous Q8H    sodium chloride (NS) flush 5-10 mL  5-10 mL IntraVENous PRN    naloxone (NARCAN) injection 0.4 mg  0.4 mg IntraVENous PRN    ondansetron (ZOFRAN) injection 4 mg  4 mg IntraVENous Q4H PRN    LORazepam (ATIVAN) injection 1 mg  1 mg IntraVENous Q8H PRN    insulin lispro (HUMALOG) injection   SubCUTAneous Q6H    glucose chewable tablet 16 g  4 Tab Oral PRN    dextrose (D50W) injection syrg 12.5-25 g  12.5-25 g IntraVENous PRN    glucagon (GLUCAGEN) injection 1 mg  1 mg IntraMUSCular PRN    sodium chloride (NS) flush 20 mL  20 mL InterCATHeter PRN    sodium chloride (NS) flush 10 mL  10 mL InterCATHeter Q24H    sodium chloride (NS) flush 10 mL  10 mL InterCATHeter PRN    sodium chloride (NS) flush 10 mL  10 mL InterCATHeter Q8H    alteplase (CATHFLO) 1 mg in sterile water (preservative free) 1 mL injection  1 mg InterCATHeter PRN    bacitracin 500 unit/gram packet 1 Packet  1 Packet Topical PRN    butalbital-acetaminophen-caffeine (FIORICET, ESGIC) -40 mg per tablet 1 Tab  1 Tab Oral Q6H PRN    nicotine (NICODERM CQ) 14 mg/24 hr patch 1 Patch  1 Patch TransDERmal DAILY          LAB AND IMAGING FINDINGS:     Lab Results Component Value Date/Time    WBC 5.9 11/08/2017 04:59 AM    HGB 10.9 11/08/2017 04:59 AM    PLATELET 597 01/70/4597 04:59 AM     Lab Results   Component Value Date/Time    Sodium 136 11/08/2017 04:59 AM    Potassium 4.7 11/08/2017 04:59 AM    Chloride 101 11/08/2017 04:59 AM    CO2 26 11/08/2017 04:59 AM    BUN 12 11/08/2017 04:59 AM    Creatinine 0.93 11/08/2017 04:59 AM    Calcium 8.4 11/08/2017 04:59 AM    Magnesium 2.2 11/08/2017 04:59 AM    Phosphorus 4.6 11/08/2017 04:59 AM      Lab Results   Component Value Date/Time    AST (SGOT) 22 11/05/2017 02:25 AM    Alk. phosphatase 92 11/05/2017 02:25 AM    Protein, total 5.6 11/05/2017 02:25 AM    Albumin 2.2 11/05/2017 02:25 AM    Globulin 3.4 11/05/2017 02:25 AM     Lab Results   Component Value Date/Time    INR 1.1 02/16/2016 02:51 AM    Prothrombin time 11.0 02/16/2016 02:51 AM    aPTT 31.0 09/28/2015 01:06 PM      Lab Results   Component Value Date/Time    Iron 11 06/20/2012 10:55 AM    TIBC 433 06/20/2012 10:55 AM    Iron % saturation 3 06/20/2012 10:55 AM      No results found for: PH, PCO2, PO2  No components found for: [place]   Lab Results   Component Value Date/Time    CK 29 12/01/2014 04:33 AM    CK - MB <0.5 12/01/2014 04:33 AM                Total time:   Counseling / coordination time, spent as noted above:   > 50% counseling / coordination?:     Prolonged service was provided for  []30 min   []75 min in face to face time in the presence of the patient, spent as noted above. Time Start:   Time End:   Note: this can only be billed with 29451 (initial) or 15274 (follow up). If multiple start / stop times, list each separately.

## 2017-11-08 NOTE — PROGRESS NOTES
2330 - Bedside and Verbal shift change report given to yasir herrera (oncoming nurse) by Melly Lantigua (offgoing nurse). Report included the following information SBAR, Kardex, Intake/Output, MAR and Recent Results. 0730 - Bedside and Verbal shift change report given to Humphrey Díaz (oncoming nurse) by Yasemin Orozco (offgoing nurse). Report included the following information SBAR, Kardex, Intake/Output, MAR and Recent Results.

## 2017-11-08 NOTE — PROGRESS NOTES
Problem: Falls - Risk of  Goal: *Absence of Falls  Document Grayson Fall Risk and appropriate interventions in the flowsheet.    Fall Risk Interventions:  Mobility Interventions: Patient to call before getting OOB         Medication Interventions: Patient to call before getting OOB, Teach patient to arise slowly, Evaluate medications/consider consulting pharmacy         History of Falls Interventions: Evaluate medications/consider consulting pharmacy

## 2017-11-09 LAB
25(OH)D2 SERPL-MCNC: <1 NG/ML
25(OH)D3 SERPL-MCNC: 18 NG/ML
25(OH)D3+25(OH)D2 SERPL-MCNC: 18 NG/ML
A-TOCOPHEROL VIT E SERPL-MCNC: 7.8 MG/L (ref 5.3–17.5)
BASOPHILS # BLD: 0 K/UL (ref 0–0.1)
BASOPHILS NFR BLD: 0 % (ref 0–1)
EOSINOPHIL # BLD: 0.3 K/UL (ref 0–0.4)
EOSINOPHIL NFR BLD: 4 % (ref 0–7)
ERYTHROCYTE [DISTWIDTH] IN BLOOD BY AUTOMATED COUNT: 15.6 % (ref 11.5–14.5)
GLUCOSE BLD STRIP.AUTO-MCNC: 107 MG/DL (ref 65–100)
GLUCOSE BLD STRIP.AUTO-MCNC: 107 MG/DL (ref 65–100)
GLUCOSE BLD STRIP.AUTO-MCNC: 87 MG/DL (ref 65–100)
GLUCOSE BLD STRIP.AUTO-MCNC: 94 MG/DL (ref 65–100)
HCT VFR BLD AUTO: 31.5 % (ref 36.6–50.3)
HGB BLD-MCNC: 10.1 G/DL (ref 12.1–17)
LYMPHOCYTES # BLD: 1.5 K/UL (ref 0.8–3.5)
LYMPHOCYTES NFR BLD: 26 % (ref 12–49)
MCH RBC QN AUTO: 28 PG (ref 26–34)
MCHC RBC AUTO-ENTMCNC: 32.1 G/DL (ref 30–36.5)
MCV RBC AUTO: 87.3 FL (ref 80–99)
MONOCYTES # BLD: 0.6 K/UL (ref 0–1)
MONOCYTES NFR BLD: 10 % (ref 5–13)
NEUTS SEG # BLD: 3.5 K/UL (ref 1.8–8)
NEUTS SEG NFR BLD: 60 % (ref 32–75)
PLATELET # BLD AUTO: 322 K/UL (ref 150–400)
RBC # BLD AUTO: 3.61 M/UL (ref 4.1–5.7)
SERVICE CMNT-IMP: ABNORMAL
SERVICE CMNT-IMP: ABNORMAL
SERVICE CMNT-IMP: NORMAL
SERVICE CMNT-IMP: NORMAL
WBC # BLD AUTO: 5.8 K/UL (ref 4.1–11.1)

## 2017-11-09 PROCEDURE — 74011250637 HC RX REV CODE- 250/637: Performed by: COLON & RECTAL SURGERY

## 2017-11-09 PROCEDURE — 65270000032 HC RM SEMIPRIVATE

## 2017-11-09 PROCEDURE — 74011250637 HC RX REV CODE- 250/637: Performed by: EMERGENCY MEDICINE

## 2017-11-09 PROCEDURE — 85025 COMPLETE CBC W/AUTO DIFF WBC: CPT | Performed by: COLON & RECTAL SURGERY

## 2017-11-09 PROCEDURE — 36415 COLL VENOUS BLD VENIPUNCTURE: CPT | Performed by: COLON & RECTAL SURGERY

## 2017-11-09 PROCEDURE — 74011250636 HC RX REV CODE- 250/636: Performed by: EMERGENCY MEDICINE

## 2017-11-09 PROCEDURE — 82962 GLUCOSE BLOOD TEST: CPT

## 2017-11-09 RX ORDER — HYDROMORPHONE HYDROCHLORIDE 1 MG/ML
2 INJECTION, SOLUTION INTRAMUSCULAR; INTRAVENOUS; SUBCUTANEOUS
Status: DISCONTINUED | OUTPATIENT
Start: 2017-11-09 | End: 2017-11-10 | Stop reason: HOSPADM

## 2017-11-09 RX ADMIN — COLESTIPOL HYDROCHLORIDE 6 G: 1 TABLET, FILM COATED ORAL at 11:41

## 2017-11-09 RX ADMIN — FAMOTIDINE 20 MG: 20 TABLET ORAL at 19:08

## 2017-11-09 RX ADMIN — LOPERAMIDE HYDROCHLORIDE 4 MG: 2 CAPSULE ORAL at 11:42

## 2017-11-09 RX ADMIN — COLESTIPOL HYDROCHLORIDE 6 G: 1 TABLET, FILM COATED ORAL at 21:14

## 2017-11-09 RX ADMIN — HYDROMORPHONE HYDROCHLORIDE 8 MG: 2 TABLET ORAL at 09:16

## 2017-11-09 RX ADMIN — HYDROMORPHONE HYDROCHLORIDE 8 MG: 2 TABLET ORAL at 12:22

## 2017-11-09 RX ADMIN — COLESTIPOL HYDROCHLORIDE 6 G: 1 TABLET, FILM COATED ORAL at 07:04

## 2017-11-09 RX ADMIN — LOPERAMIDE HYDROCHLORIDE 4 MG: 2 CAPSULE ORAL at 21:14

## 2017-11-09 RX ADMIN — CLONIDINE HYDROCHLORIDE 0.1 MG: 0.1 TABLET ORAL at 19:11

## 2017-11-09 RX ADMIN — CLONIDINE HYDROCHLORIDE 0.1 MG: 0.1 TABLET ORAL at 09:11

## 2017-11-09 RX ADMIN — DIPHENOXYLATE HYDROCHLORIDE AND ATROPINE SULFATE 2 TABLET: 2.5; .025 TABLET ORAL at 11:41

## 2017-11-09 RX ADMIN — MORPHINE SULFATE 15 MG: 15 TABLET, FILM COATED, EXTENDED RELEASE ORAL at 21:14

## 2017-11-09 RX ADMIN — DIPHENOXYLATE HYDROCHLORIDE AND ATROPINE SULFATE 2 TABLET: 2.5; .025 TABLET ORAL at 07:04

## 2017-11-09 RX ADMIN — DIPHENOXYLATE HYDROCHLORIDE AND ATROPINE SULFATE 2 TABLET: 2.5; .025 TABLET ORAL at 16:01

## 2017-11-09 RX ADMIN — HYDROMORPHONE HYDROCHLORIDE 8 MG: 2 TABLET ORAL at 05:03

## 2017-11-09 RX ADMIN — Medication 10 ML: at 12:00

## 2017-11-09 RX ADMIN — Medication 10 ML: at 16:02

## 2017-11-09 RX ADMIN — HYDROMORPHONE HYDROCHLORIDE 8 MG: 2 TABLET ORAL at 23:26

## 2017-11-09 RX ADMIN — DIPHENOXYLATE HYDROCHLORIDE AND ATROPINE SULFATE 2 TABLET: 2.5; .025 TABLET ORAL at 21:14

## 2017-11-09 RX ADMIN — HYDROMORPHONE HYDROCHLORIDE 2 MG: 1 INJECTION, SOLUTION INTRAMUSCULAR; INTRAVENOUS; SUBCUTANEOUS at 16:07

## 2017-11-09 RX ADMIN — HYDROMORPHONE HYDROCHLORIDE 8 MG: 2 TABLET ORAL at 18:52

## 2017-11-09 RX ADMIN — FAMOTIDINE 20 MG: 20 TABLET ORAL at 09:12

## 2017-11-09 RX ADMIN — COLESTIPOL HYDROCHLORIDE 6 G: 1 TABLET, FILM COATED ORAL at 16:01

## 2017-11-09 RX ADMIN — LOPERAMIDE HYDROCHLORIDE 4 MG: 2 CAPSULE ORAL at 16:01

## 2017-11-09 RX ADMIN — MORPHINE SULFATE 15 MG: 15 TABLET, FILM COATED, EXTENDED RELEASE ORAL at 11:12

## 2017-11-09 RX ADMIN — LOPERAMIDE HYDROCHLORIDE 4 MG: 2 CAPSULE ORAL at 07:04

## 2017-11-09 NOTE — PROGRESS NOTES
Bedside and Verbal shift change report given to NIKHIL Cano (oncoming nurse) by Ankita Disla RN (offgoing nurse). Report included the following information SBAR, Kardex, Intake/Output, MAR, Accordion and Recent Results.

## 2017-11-09 NOTE — PROGRESS NOTES
Bedside and Verbal shift change report given to NIKHIL Cano  (oncoming nurse) by Ronald Zuniga (offgoing nurse). Report included the following information SBAR and Kardex.

## 2017-11-09 NOTE — PROGRESS NOTES
11:49 AM  Patient reviewed in rounds. CM informed that TPN/PCA have been discontinued. Patient currently tolerating diet and pain control is being managed by palliative. CM informed of expected discharge tomorrow. There are no CM consults or needs at this time. CM will continue to follow and assist with disposition needs as they arise. Mode of transport at time of discharge to be provided by patient's friend.     Bryanna Metz, RORY/BHUMIKA

## 2017-11-09 NOTE — PROGRESS NOTES
Note entered on 11.9.17: Spiritual Care Partner Volunteer visited patient in [facility] on 11.8. 17. Documented by:    Rev. Karen Bloom M.Div, [facility]

## 2017-11-09 NOTE — PROGRESS NOTES
[facility] General Surgery        Subjective     Doing well, no acute issues    Objective     Patient Vitals for the past 24 hrs:   Temp Pulse Resp BP SpO2   11/09/17 0145 98.1 °F (36.7 °C) 64 16 110/69 95 %   11/08/17 2156 98.3 °F (36.8 °C) 81 18 103/69 94 %   11/08/17 1855 98.9 °F (37.2 °C) 88 20 123/81 95 %   11/08/17 1648 98.7 °F (37.1 °C) 86 18 126/73 94 %   11/08/17 0937 99 °F (37.2 °C) 83 18 121/65 96 %         Date 11/08/17 0700 - 11/09/17 0659 11/09/17 0700 - 11/10/17 0659   Shift 3059-5257 4835-1248 24 Hour Total 2724-0156 8461-7984 24 Hour Total   I  N  T  A  K  E   P. O.          P. O.        I.V.  (mL/kg/hr) 264.2  (0.3) 31860.9  (16.2) 06969  (8.2)         I.V.  33286.9 98390.9         Volume (lactated Ringers infusion) 264.2  264.2       Shift Total  (mL/kg) 1144.2  (15.4) 49157.9  (199.8) 01286  (215.1)      O  U  T  P  U  T   Urine  (mL/kg/hr)            Urine Occurrence(s)  1 x 1 x       Stool            Stool Occurrence(s)  1 x 1 x       Shift Total  (mL/kg)         NET 1144. 2 49049.9 27896      Weight (kg) 74.5 74.5 74.5 74.5 74.5 74.5       PE  Pulm - CTAB  CV - RRR  Abd - soft, ND, BS present, incision c/d/i, wound stable    Labs  Recent Results (from the past 12 hour(s))   GLUCOSE, POC    Collection Time: 11/08/17 10:00 PM   Result Value Ref Range    Glucose (POC) 106 (H) 65 - 100 mg/dL    Performed by Kurt Reyes    CBC WITH AUTOMATED DIFF    Collection Time: 11/09/17  2:08 AM   Result Value Ref Range    WBC 5.8 4.1 - 11.1 K/uL    RBC 3.61 (L) 4.10 - 5.70 M/uL    HGB 10.1 (L) 12.1 - 17.0 g/dL    HCT 31.5 (L) 36.6 - 50.3 %    MCV 87.3 80.0 - 99.0 FL    MCH 28.0 26.0 - 34.0 PG    MCHC 32.1 30.0 - 36.5 g/dL    RDW 15.6 (H) 11.5 - 14.5 %    PLATELET 236 102 - 291 K/uL    NEUTROPHILS 60 32 - 75 %    LYMPHOCYTES 26 12 - 49 %    MONOCYTES 10 5 - 13 %    EOSINOPHILS 4 0 - 7 %    BASOPHILS 0 0 - 1 %    ABS.  NEUTROPHILS 3.5 1.8 - 8.0 K/UL    ABS. LYMPHOCYTES 1.5 0.8 - 3.5 K/UL    ABS. MONOCYTES 0.6 0.0 - 1.0 K/UL    ABS. EOSINOPHILS 0.3 0.0 - 0.4 K/UL    ABS. BASOPHILS 0.0 0.0 - 0.1 K/UL   GLUCOSE, POC    Collection Time: 11/09/17  5:40 AM   Result Value Ref Range    Glucose (POC) 94 65 - 100 mg/dL    Performed by Jones Pitts is a 65 y. o.yr old male s/p laparotomy, lysis of adhesions reduction of intussusception    Plan     Doing well, no issues with the wound closure  There is a little discoloration right at the wound edge on part of the wound but no major skin ischemia, stable  Continue the abdominal binder  Leave staples in place for about 6 wks  FU with me in a few weeks for hernia follow up    Mich Zimmer MD

## 2017-11-09 NOTE — PROGRESS NOTES
TRANSFER - IN REPORT:    Verbal report received from Rossi Earl RN (name) on Jesi Villaseñor  being received from [facility](unit) for routine progression of care      Report consisted of patients Situation, Background, Assessment and   Recommendations(SBAR). Information from the following report(s) SBAR was reviewed with the receiving nurse. Opportunity for questions and clarification was provided. Assessment completed upon patients arrival to unit and care assumed.

## 2017-11-09 NOTE — PROGRESS NOTES
Palliative Medicine Consult  Driss: [phone] ([phone])    Patient Name: Mildred Griffith  YOB: 1960    Date of Initial Consult: 11-6-17  Reason for Consult: Pain management  Requesting Provider: Maira Smith   Primary Care Physician: Severo Fujita, MD     SUMMARY:   Mildred Griffith is a 65 y.o. with a past history of Crohn's disease w/ extensive surgical hx( more than 35 abdominal surgeries), short gut syndrome, chronic diarrhea,  depression, chronic neck[facility] pain [facility] was admitted on 11/2/2017 from home with bowel obstruction due to intussusception and ventral hernia s/p ex lap, extensive THONY and reduction of intussusception. Tolerating solid foods, having >15 loose stools daily. Current medical issues leading to Palliative Medicine involvement include: Pain regimen.  reviewed and most recently on MSContin 15mg daily and Hydrocodone/Tylenol 10/325mg prn~6 a day. Social: Pt recently . Still has a lot of friend and family support. PALLIATIVE DIAGNOSES:   1. Acute abdominal pain from surgery  2. Chronic diarrhea  3. Nausea  4. Chronic neck, back and L wrist pain        PLAN:   1. Pt reports pain doing reasonably well on oral regimen, he is tolerating multiple 8mg po doses dilaudid along with long acting with no evidence of sedation / confusion; he still has occasional sudden pain flares, e.g., this morning around time he was attempting to eat breakfast  2. Will add back 2mg IV dilaudid PRN dose; encouraged him to continue working mainly with PO dilaudid, since this is what he will be discharged on; space out PRN dilaudid so that no PO / IV doses are any closer than 1hr apart  3. We have discussed that if he goes home in next couple of days, we would suggest a few days supply of dilaudid, but he should gradually taper down to his usual regimen of ms contin + hydrocodone; would take either dilaudid OR hydrocodone, NOT both  4.  Continue MS contin  5. New AMD completed this admission  6. Discussed with RN  7. Communicated plan of care with: Palliative IDT       GOALS OF CARE / TREATMENT PREFERENCES:   [====Goals of Care====]  GOALS OF CARE:  Patient / health care proxy stated goals: pain relief       TREATMENT PREFERENCES:   Code Status: Prior    Advance Care Planning:Need to discuss with him  Advance Care Planning 11/7/2017   Patient's Healthcare Decision Maker is: Named in scanned ACP document   Primary Decision Maker Name [address] Phone Number [phone]   Primary Decision Maker Relationship to Patient Friend   Secondary Decision Maker Name -   Secondary Decision [address] Phone Number -   Secondary Decision Maker Relationship to Patient -   Confirm Advance Directive Yes, on file   Does the patient have other document types -       Other:    The palliative care team has discussed with patient / health care proxy about goals of care / treatment preferences for patient.  [====Goals of Care====]         HISTORY:     Reviewed vitals, I/O's, labs, imaging, MAR, notes. 1300 PO, 1x BM    MAR  Fioricet, 1 dose 11/8  Clonidine 0.1mg po bid  Benadryl, no recent dose  Lomotil, immodium  norco 10/325, 2 tabs on 11/7 and on 11/6  Dilaudid pca 0.3mg/hr, 0.6mg q6min, 4 hr lock out 20mg stopped 11/8  Dilaudid 4-8mg po q3h PRN, 1 dose 8mg 11/8, 3 doses 11/9 thus far last 1200  Ativan 1mg IV PRN, no recent dose  Ms contin 15mg po q12h  Zofran, no recent dose          HPI/SUBJECTIVE:    The patient is:   [x] Verbal and participatory  [] Non-participatory due to:     Pt reports pain ok overall, but occ still with flares of severe pain, e.g., when he tried to eat breakfast this morning. We discussed optimal use of PO PRN dilaudid. No nausea. No sob per se, but reports deep breath causes abd pain.     Clinical Pain Assessment (nonverbal scale for severity on nonverbal patients):   [++++ Clinical Pain Assessment++++]  [++++Pain Severity++++]: Pain: 7  [++++Pain Character++++]: tearing   [++++Pain Duration++++]: surgery. Also has chronic neck, lower back and L wrist pain   [++++Pain Effect++++]: hard to sleep and take deep breaths   [++++Pain Factors++++]: better w/ PCA  [++++Pain Frequency++++]: constant   [++++Pain Location++++]: generalized abdomen   [++++ Clinical Pain Assessment++++]  Duration: for how long has pt been experiencing pain (e.g., 2 days, 1 month, years)  Frequency: how often pain is an issue (e.g., several times per day, once every few days, constant)     FUNCTIONAL ASSESSMENT:     Palliative Performance Scale (PPS):  PPS: 80       PSYCHOSOCIAL/SPIRITUAL SCREENING:     Advance Care Planning:  Advance Care Planning 11/7/2017   Patient's Healthcare Decision Maker is: Named in scanned ACP document   Primary Decision Maker Name [address] Phone Number [phone]   Primary Decision Maker Relationship to Patient Friend   Secondary Decision Maker Name -   Secondary Decision [address] Phone Number -   Secondary Decision Maker Relationship to Patient -   Confirm Advance Directive Yes, on file   Does the patient have other document types -        Any spiritual / Christianity concerns:  [] Yes /  [x] No    Caregiver Burnout:  [] Yes /  [x] No /  [] No Caregiver Present      Anticipatory grief assessment:   [x] Normal  / [] Maladaptive       ESAS Anxiety: Anxiety: 0    ESAS Depression: Depression: 0        REVIEW OF SYSTEMS:     Positive and pertinent negative findings in ROS are noted above in HPI. The following systems were [x] reviewed / [] unable to be reviewed as noted in HPI  Other findings are noted below. Systems: constitutional, ears/nose/mouth/throat, respiratory, gastrointestinal, genitourinary, musculoskeletal, integumentary, neurologic, psychiatric, endocrine. Positive findings noted below.   Modified ESAS Completed by: provider   Fatigue: 0 Drowsiness: 0   Depression: 0 Pain: 7   Anxiety: 0 Nausea: 0 Anorexia: 2 Dyspnea: 1     Constipation: No     Stool Occurrence(s): 1        PHYSICAL EXAM:     From RN flowsheet:  Wt Readings from Last 3 Encounters:   11/09/17 161 lb 6 oz (73.2 kg)   08/18/17 180 lb (81.6 kg)   06/22/17 179 lb (81.2 kg)     Blood pressure 120/73, pulse 73, temperature 98.8 °F (37.1 °C), resp. rate 18, height 5' 11\" (1.803 m), weight 161 lb 6 oz (73.2 kg), SpO2 96 %. Pain Scale 1: Numeric (0 - 10)  Pain Intensity 1: 9  Pain Onset 1: post op (acute)  Pain Location 1: Abdomen  Pain Orientation 1: Left, Right  Pain Description 1: Aching, Stabbing  Pain Intervention(s) 1: Medication (see MAR) (diluadid given)  Last bowel movement, if known: today     Constitutional: awake, alert, no sedation or confusion   Eyes: pupils equal, anicteric  ENMT: no nasal discharge, moist mucous membranes  Cardiovascular: regular rhythm  Respiratory: breathing not labored, symmetric  Gastrointestinal: +BS, abdominal binder in place  Musculoskeletal: no deformity, no tenderness to palpation  Skin: warm, dry  Neurologic: following commands, moving all extremities  Psychiatric: full affect, no hallucinations       HISTORY:     Principal Problem:    Intussusception of intestine (HCC) (11/3/2017)    Active Problems:    Chronic pain (3/17/2015)      Short bowel syndrome (9/28/2015)      Incisional hernia, without obstruction or gangrene (1/31/2017)      Bowel obstruction (11/3/2017)      Past Medical History:   Diagnosis Date    Abdominal wall hernia 6/15/2012    Anal fissure     Anemia     Anemia     Anxiety and depression     Arthritis     Related to the Crohn's disease.  Cancer ([facility] [address]     MELANOMA HEAD AND FACE    Chronic pain     GENERALIZED    COPD (chronic obstructive pulmonary disease) (HCC)     Crohn disease (HCC)     Diagnosed at age 17.     Echocardiogram normal (EF: 55-60%) 07/2015    Esophageal ulcer     GERD (gastroesophageal reflux disease)     H/O blood transfusion 2013    [facility] [place]    Hypertension     denies    Migraine     Short bowel syndrome     Ventral hernia without obstruction or gangrene       Past Surgical History:   Procedure Laterality Date    ABDOMEN SURGERY PROC UNLISTED      33 abdominal operations for treatment of Crohn's disease and its complications.  HC NDL CEJA      ceja needle, takes 1 inch needle    HX APPENDECTOMY      HX CHOLECYSTECTOMY      HX GI      colectomy x2, one ileostomy    HX GI  7/28/14    exp lap,partial colectomy with end ileostomy by Dr Elidia Diaz      neck fusion    HX ORTHOPAEDIC  1980s    wrist right,     HX ORTHOPAEDIC  2006, 11/2013    neck, L4 L5 L6    HX ORTHOPAEDIC  1990s    right knee scope    HX OTHER SURGICAL      surgical repair from spider bite    HX OTHER SURGICAL  12/1/14    Incision and drainage of posterior right subcutaneous shoulder abscess    HX OTHER SURGICAL  2014    LEFT INDEX FINGER SPIDER BITE    HX OTHER SURGICAL  2014    RECTAL FISTULA    HX OTHER SURGICAL  3/17/2015    Ileostomy takedown with extensive lysis of adhesions (greater than three hours), mobilization of the splenic flexure, left colon resection, ileocolic anastomosis, and excision of scar; Dr. Marcin Rolon.   OTHER SURGICAL  3/22/2015    Exploratory laparotomy with washout of abdomen, suture repair of small bowel/anastomosis, and diverting protective loop ileostomy; Esau Alvarado MD.    HX OTHER SURGICAL  4/9/2015    Laparotomy, extensive lysis of adhesions, abdominal lavage, and resection of ileocolic anastomosis (including the efferent limb of the loop ileostomy) with creation of Demetrius's pouch; Dr. Marcin Rolon.   OTHER SURGICAL  7/14/2015    Cystoscopy and placement of bilateral ureteral catheters; Ayah Beltre MD.    HX OTHER SURGICAL  7/14/2015    Ileostomy takedown with extensive lysis of adhesions, small bowel resection, and enterocolostomy; Dr. Marcin Rolon.      OTHER SURGICAL  9/29/2015 CT-guided percutaneous drainage of intraabdominal abscess; Dr. Solmon Dandy.   OTHER SURGICAL  11/16/2015    CT-guided percutaneous aspiration of abdominal wall cavity; Dr. Catherine Galicia.   OTHER SURGICAL  12/1/2015    Incision and drainage of abdominal wall abscess; Dr. Juan Borges.   OTHER SURGICAL  2/11/2016    Incision and drainage of abdominal wall abscess; Dr. Juan Borges.   OTHER SURGICAL  2/23/2016    Cystoscopy and placement of bilateral temporary ureteral catheters; Dr. Aubrey Lara.   OTHER SURGICAL  2/23/2016    Exploratory laparotomy, extensive lysis of adhesions, removal of mesh, and repair of enterocutaneous fistula; Dr. Juan Borges.   OTHER SURGICAL  11/03/2017    Exploratory laparotomy, extensive lysis of adhesions, and reduction of intussusception; Dr. Juan Borges. Family History   Problem Relation Age of Onset    Stroke Mother     Heart Disease Mother     Kidney Disease Mother     Anesth Problems Mother      TAKES A LONG TIME TO WAKE UP    Heart Disease Father     Thyroid Disease Sister       History reviewed, no pertinent family history. Social History   Substance Use Topics    Smoking status: Current Every Day Smoker     Packs/day: 1.00     Years: 44.00    Smokeless tobacco: Current User      Comment: CURRENT E CIGS    Alcohol use No      Comment: RARELY     Allergies   Allergen Reactions    Honey Anaphylaxis    Remicade [Infliximab] Anaphylaxis    Crestor [Rosuvastatin] Myalgia    Other Plant, Animal, Environmental Other (comments)     Developed \"boils\" on right arm that required I &D after receiving FENTANYL patch.   Is able to take FENTANYL orally; states is allergic to fentanyl patch GLUE    Imuran [Azathioprine] Diarrhea and Nausea Only    Mercaptopurine Diarrhea    Sulfa (Sulfonamide Antibiotics) Other (comments)     Causes diarrhea      Current Facility-Administered Medications   Medication Dose Route Frequency    HYDROmorphone (DILAUDID) tablet 4-8 mg  4-8 mg Oral Q3H PRN    diphenoxylate-atropine (LOMOTIL) tablet 2 Tab  2 Tab Oral AC&HS    loperamide (IMODIUM) capsule 4 mg  4 mg Oral AC&HS    HYDROcodone-acetaminophen (NORCO)  mg tablet 1-2 Tab  1-2 Tab Oral Q4H PRN    morphine CR (MS CONTIN) tablet 15 mg  15 mg Oral Q12H    colestipol (COLESTID) tablet 6 g  6 g Oral AC&HS    lactated Ringers infusion  10 mL/hr IntraVENous CONTINUOUS    chlorzoxazone (PARAFON FORTE) tablet 500 mg  500 mg Oral BID PRN    cloNIDine HCl (CATAPRES) tablet 0.1 mg  0.1 mg Oral BID    diphenhydrAMINE (BENADRYL) capsule 100 mg  100 mg Oral QHS PRN    famotidine (PEPCID) tablet 20 mg  20 mg Oral BID    sodium chloride (NS) flush 5-10 mL  5-10 mL IntraVENous Q8H    sodium chloride (NS) flush 5-10 mL  5-10 mL IntraVENous PRN    naloxone (NARCAN) injection 0.4 mg  0.4 mg IntraVENous PRN    ondansetron (ZOFRAN) injection 4 mg  4 mg IntraVENous Q4H PRN    LORazepam (ATIVAN) injection 1 mg  1 mg IntraVENous Q8H PRN    insulin lispro (HUMALOG) injection   SubCUTAneous Q6H    glucose chewable tablet 16 g  4 Tab Oral PRN    dextrose (D50W) injection syrg 12.5-25 g  12.5-25 g IntraVENous PRN    glucagon (GLUCAGEN) injection 1 mg  1 mg IntraMUSCular PRN    sodium chloride (NS) flush 20 mL  20 mL InterCATHeter PRN    sodium chloride (NS) flush 10 mL  10 mL InterCATHeter Q24H    sodium chloride (NS) flush 10 mL  10 mL InterCATHeter PRN    sodium chloride (NS) flush 10 mL  10 mL InterCATHeter Q8H    alteplase (CATHFLO) 1 mg in sterile water (preservative free) 1 mL injection  1 mg InterCATHeter PRN    bacitracin 500 unit/gram packet 1 Packet  1 Packet Topical PRN    butalbital-acetaminophen-caffeine (FIORICET, ESGIC) -40 mg per tablet 1 Tab  1 Tab Oral Q6H PRN    nicotine (NICODERM CQ) 14 mg/24 hr patch 1 Patch  1 Patch TransDERmal DAILY          LAB AND IMAGING FINDINGS:     Lab Results   Component Value Date/Time    WBC 5.8 11/09/2017 02:08 AM    HGB 10.1 11/09/2017 02:08 AM    PLATELET 368 32/86/2540 02:08 AM     Lab Results   Component Value Date/Time    Sodium 136 11/08/2017 04:59 AM    Potassium 4.7 11/08/2017 04:59 AM    Chloride 101 11/08/2017 04:59 AM    CO2 26 11/08/2017 04:59 AM    BUN 12 11/08/2017 04:59 AM    Creatinine 0.93 11/08/2017 04:59 AM    Calcium 8.4 11/08/2017 04:59 AM    Magnesium 2.2 11/08/2017 04:59 AM    Phosphorus 4.6 11/08/2017 04:59 AM      Lab Results   Component Value Date/Time    AST (SGOT) 22 11/05/2017 02:25 AM    Alk. phosphatase 92 11/05/2017 02:25 AM    Protein, total 5.6 11/05/2017 02:25 AM    Albumin 2.2 11/05/2017 02:25 AM    Globulin 3.4 11/05/2017 02:25 AM     Lab Results   Component Value Date/Time    INR 1.1 02/16/2016 02:51 AM    Prothrombin time 11.0 02/16/2016 02:51 AM    aPTT 31.0 09/28/2015 01:06 PM      Lab Results   Component Value Date/Time    Iron 11 06/20/2012 10:55 AM    TIBC 433 06/20/2012 10:55 AM    Iron % saturation 3 06/20/2012 10:55 AM      No results found for: PH, PCO2, PO2  No components found for: [place]   Lab Results   Component Value Date/Time    CK 29 12/01/2014 04:33 AM    CK - MB <0.5 12/01/2014 04:33 AM                Total time:   Counseling / coordination time, spent as noted above:   > 50% counseling / coordination?:     Prolonged service was provided for  []30 min   []75 min in face to face time in the presence of the patient, spent as noted above. Time Start:   Time End:   Note: this can only be billed with 11383 (initial) or 50592 (follow up). If multiple start / stop times, list each separately.

## 2017-11-10 VITALS
RESPIRATION RATE: 18 BRPM | BODY MASS INDEX: 22.35 KG/M2 | OXYGEN SATURATION: 98 % | WEIGHT: 159.61 LBS | HEART RATE: 73 BPM | DIASTOLIC BLOOD PRESSURE: 66 MMHG | TEMPERATURE: 98.2 F | SYSTOLIC BLOOD PRESSURE: 112 MMHG | HEIGHT: 71 IN

## 2017-11-10 LAB
GLUCOSE BLD STRIP.AUTO-MCNC: 113 MG/DL (ref 65–100)
GLUCOSE BLD STRIP.AUTO-MCNC: 120 MG/DL (ref 65–100)
SERVICE CMNT-IMP: ABNORMAL
SERVICE CMNT-IMP: ABNORMAL

## 2017-11-10 PROCEDURE — 74011250636 HC RX REV CODE- 250/636: Performed by: EMERGENCY MEDICINE

## 2017-11-10 PROCEDURE — 74011250637 HC RX REV CODE- 250/637: Performed by: COLON & RECTAL SURGERY

## 2017-11-10 PROCEDURE — 82962 GLUCOSE BLOOD TEST: CPT

## 2017-11-10 PROCEDURE — 74011250637 HC RX REV CODE- 250/637: Performed by: EMERGENCY MEDICINE

## 2017-11-10 RX ORDER — IBUPROFEN 200 MG
1 TABLET ORAL DAILY
Qty: 30 PATCH | Refills: 0 | Status: SHIPPED | OUTPATIENT
Start: 2017-11-11 | End: 2017-12-11

## 2017-11-10 RX ORDER — MORPHINE SULFATE 15 MG/1
15 TABLET, FILM COATED, EXTENDED RELEASE ORAL EVERY 12 HOURS
Qty: 12 TAB | Refills: 0 | Status: ON HOLD | OUTPATIENT
Start: 2017-11-10 | End: 2019-06-30

## 2017-11-10 RX ORDER — HYDROMORPHONE HYDROCHLORIDE 4 MG/1
4-8 TABLET ORAL
Qty: 50 TAB | Refills: 0 | Status: SHIPPED | OUTPATIENT
Start: 2017-11-10 | End: 2017-12-18 | Stop reason: ALTCHOICE

## 2017-11-10 RX ORDER — HYDROCODONE BITARTRATE AND ACETAMINOPHEN 10; 325 MG/1; MG/1
1-2 TABLET ORAL
Qty: 40 TAB | Refills: 0 | Status: ON HOLD | OUTPATIENT
Start: 2017-11-10 | End: 2019-06-30

## 2017-11-10 RX ADMIN — LOPERAMIDE HYDROCHLORIDE 4 MG: 2 CAPSULE ORAL at 07:02

## 2017-11-10 RX ADMIN — Medication 10 ML: at 15:18

## 2017-11-10 RX ADMIN — DIPHENOXYLATE HYDROCHLORIDE AND ATROPINE SULFATE 2 TABLET: 2.5; .025 TABLET ORAL at 16:44

## 2017-11-10 RX ADMIN — HYDROCODONE BITARTRATE AND ACETAMINOPHEN 2 TABLET: 10; 325 TABLET ORAL at 08:32

## 2017-11-10 RX ADMIN — CLONIDINE HYDROCHLORIDE 0.1 MG: 0.1 TABLET ORAL at 18:28

## 2017-11-10 RX ADMIN — MORPHINE SULFATE 15 MG: 15 TABLET, FILM COATED, EXTENDED RELEASE ORAL at 10:03

## 2017-11-10 RX ADMIN — CLONIDINE HYDROCHLORIDE 0.1 MG: 0.1 TABLET ORAL at 10:03

## 2017-11-10 RX ADMIN — LOPERAMIDE HYDROCHLORIDE 4 MG: 2 CAPSULE ORAL at 12:33

## 2017-11-10 RX ADMIN — COLESTIPOL HYDROCHLORIDE 6 G: 1 TABLET, FILM COATED ORAL at 12:33

## 2017-11-10 RX ADMIN — HYDROMORPHONE HYDROCHLORIDE 8 MG: 2 TABLET ORAL at 07:02

## 2017-11-10 RX ADMIN — FAMOTIDINE 20 MG: 20 TABLET ORAL at 10:02

## 2017-11-10 RX ADMIN — HYDROMORPHONE HYDROCHLORIDE 2 MG: 1 INJECTION, SOLUTION INTRAMUSCULAR; INTRAVENOUS; SUBCUTANEOUS at 12:33

## 2017-11-10 RX ADMIN — COLESTIPOL HYDROCHLORIDE 6 G: 1 TABLET, FILM COATED ORAL at 07:02

## 2017-11-10 RX ADMIN — DIPHENOXYLATE HYDROCHLORIDE AND ATROPINE SULFATE 2 TABLET: 2.5; .025 TABLET ORAL at 12:33

## 2017-11-10 RX ADMIN — FAMOTIDINE 20 MG: 20 TABLET ORAL at 18:28

## 2017-11-10 RX ADMIN — LOPERAMIDE HYDROCHLORIDE 4 MG: 2 CAPSULE ORAL at 16:44

## 2017-11-10 RX ADMIN — HYDROMORPHONE HYDROCHLORIDE 2 MG: 1 INJECTION, SOLUTION INTRAMUSCULAR; INTRAVENOUS; SUBCUTANEOUS at 00:34

## 2017-11-10 RX ADMIN — COLESTIPOL HYDROCHLORIDE 6 G: 1 TABLET, FILM COATED ORAL at 16:44

## 2017-11-10 RX ADMIN — DIPHENOXYLATE HYDROCHLORIDE AND ATROPINE SULFATE 2 TABLET: 2.5; .025 TABLET ORAL at 07:02

## 2017-11-10 RX ADMIN — Medication 10 ML: at 15:19

## 2017-11-10 RX ADMIN — HYDROMORPHONE HYDROCHLORIDE 8 MG: 2 TABLET ORAL at 16:44

## 2017-11-10 RX ADMIN — Medication 10 ML: at 12:36

## 2017-11-10 NOTE — PROGRESS NOTES
General Daily Progress Note    Admission Date: 11/2/2017  Hospital Day 8  Post-Op Day 7  Subjective:   Pain control seems to be adequate. Bowel movements are manageable. Objective:   Patient Vitals for the past 24 hrs:   BP Temp Pulse Resp SpO2   11/10/17 1001 108/66 98.1 °F (36.7 °C) 72 18 -   11/10/17 0158 103/64 98.2 °F (36.8 °C) 72 16 94 %   11/09/17 2110 105/65 98.6 °F (37 °C) 72 16 94 %   11/09/17 1840 127/74 - 80 - -   11/09/17 1443 117/71 98 °F (36.7 °C) 74 16 98 %        11/08 1901 - 11/10 0700  In: 68438.9 [P.O.:420; I.V.:19405.9]  Out: -       Physical Examination:  General Appearance:  No distress. Abdomen:  Incision clean, dry, and intact. Staples in place.             Data Review   Recent Results (from the past 24 hour(s))   GLUCOSE, POC    Collection Time: 11/09/17  7:07 PM   Result Value Ref Range    Glucose (POC) 107 (H) 65 - 100 mg/dL    Performed by [address], POC    Collection Time: 11/09/17 11:43 PM   Result Value Ref Range    Glucose (POC) 87 65 - 100 mg/dL    Performed by Chemo Schroeder    GLUCOSE, POC    Collection Time: 11/10/17  5:54 AM   Result Value Ref Range    Glucose (POC) 120 (H) 65 - 100 mg/dL    Performed by Hayden Mckeon, POC    Collection Time: 11/10/17 11:35 AM   Result Value Ref Range    Glucose (POC) 113 (H) 65 - 100 mg/dL    Performed by Danielle Patel            Assessment:     Principal Problem:    Intussusception of intestine ([address] (11/3/2017)    Active Problems:    Chronic pain (3/17/2015)      Short bowel syndrome (9/28/2015)      Incisional hernia, without obstruction or gangrene (1/31/2017)      Bowel obstruction (11/3/2017)        7 Days Post-Op s/p Exploratory laparotomy, extensive lysis of adhesions, and reduction of intussusception.      LUE PICC placed post-operatively on 11/3/2017.      Hemodynamically stable.      GI function is returning.  TPN was begun on 11/3/2017 and discontinued in the evening on 11/8/2017.   Control of diarrhea has improved. Colestid, Imodium, and Lomotil were restarted on 11/6/2017.      No evidence of infection.      Pain management is improved and seems to be adequate now without parenteral medications. Palliative Medicine assistance has been extremely helpful.     The patient appears to be fit for discharge to home. Plan:   Discharge to home,  Follow-up with me in approximately 2 weeks. Follow-up with Dr. Meera Bell in 5 weeks. Follow-up with Dr. Loni Castillo (Pain Management) next week.

## 2017-11-10 NOTE — PROGRESS NOTES
11:06 AM  Patient reviewed in rounds. There are no new updates at this time. There are no CM consults or needs at this time. RN reported patient with pain which is being addressed by Palliative. CM will continue to follow and assist with disposition needs as they arise. Mode of transport at time of discharge to be provided by patient's family/friend. 1:29 PM  CM notified that patient has orders for discharge. There are no CM consults or needs. PCP follow-up appointment requested from [facility] Specialist Graham Bazzi for appointment via email. Mode of transport at time of discharge to be provided by patient's family friend.     RORY Braswell/BHUMIKA

## 2017-11-10 NOTE — PROGRESS NOTES
Bedside and Verbal shift change report given to NIKHIL Cano (oncoming nurse) by Jung Eisenberg RN (offgoing nurse). Report included the following information SBAR, Kardex, Intake/Output, MAR and Accordion.

## 2017-11-10 NOTE — DISCHARGE SUMMARY
Discharge Summary     Patient ID:    Tali Corona  [id number]  41 y.o.  1960    Admission Date: 11/2/2017    Discharge Date: 11/10/2017    Admission Diagnoses:  1. Bowel obstruction secondary to intussusception  2. Ventral hernia  3. Chronic pain  4. Short bowel syndrome      Chronic Diagnoses:    Patient Active Problem List   Diagnosis Code    Crohn's disease ([facility] K50.90    GERD (gastroesophageal reflux disease) K21.9    Hiatal hernia K44.9    B12 deficiency E53.8    Chronic pain G89.29    Short bowel syndrome K91.2    Back pain, chronic M54.9, G89.29    Incisional hernia, without obstruction or gangrene K43.2    Intussusception of intestine ([facility] K56.1    Bowel obstruction K56.609       Discharge Diagnoses:  1. Bowel obstruction secondary to intussusception (reduced)  2. Ventral hernia (still present)  3. Short bowel syndrome  4. Chronic pain        Hospital Problems as of 11/10/2017  Date Reviewed: 2/27/2017          Codes Class Noted - Resolved POA    * (Principal)Intussusception of intestine ([facility] ICD-10-CM: K56.1  ICD-9-CM: 560.0  11/3/2017 - Present Yes        Bowel obstruction ICD-10-CM: H83.204  ICD-9-CM: 560.9  11/3/2017 - Present Unknown        Incisional hernia, without obstruction or gangrene (Chronic) ICD-10-CM: K43.2  ICD-9-CM: 553.21  1/31/2017 - Present Yes        Short bowel syndrome (Chronic) ICD-10-CM: K91.2  ICD-9-CM: 579.3  9/28/2015 - Present Yes        Chronic pain (Chronic) ICD-10-CM: P28.62  ICD-9-CM: 338.29  3/17/2015 - Present Yes              Procedures Performed:  1. Exploratory laparotomy, extensive lysis of adhesions, and reduction of intussusception was performed by Dr. Sourav Quintana on 11/3/2017.  2.  Left upper extremity PICC placement was performed on 11/3/2017.       Discharge Medications:   Current Discharge Medication List      START taking these medications    Details   HYDROmorphone (DILAUDID) 4 mg tablet Take 1-2 Tabs by mouth every three (3) hours as needed. Max Daily Amount: 64 mg. Qty: 50 Tab, Refills: 0      nicotine (NICODERM CQ) 14 mg/24 hr patch 1 Patch by TransDERmal route daily for 30 days. Qty: 30 Patch, Refills: 0         CONTINUE these medications which have CHANGED    Details   HYDROcodone-acetaminophen (NORCO)  mg tablet Take 1-2 Tabs by mouth every four (4) hours as needed. Max Daily Amount: 12 Tabs. Qty: 40 Tab, Refills: 0      morphine CR (MS CONTIN) 15 mg CR tablet Take 1 Tab by mouth every twelve (12) hours. Max Daily Amount: 30 mg.  Qty: 12 Tab, Refills: 0         CONTINUE these medications which have NOT CHANGED    Details   chlorzoxazone (PARAFON FORTE) 500 mg tablet Take 500 mg by mouth two (2) times daily as needed for Muscle Spasm(s). cloNIDine HCl (CATAPRES) 0.1 mg tablet Take 0.1 mg by mouth two (2) times a day. aspirin-acetaminophen-caffeine (EXCEDRIN ES) 250-250-65 mg per tablet Take 2 Tabs by mouth every six (6) hours as needed for Headache. loperamide (IMODIUM) 2 mg capsule Take 2 mg by mouth as needed for Diarrhea. Patient takes 14-16 capsules a day. colestipol (COLESTID) 1 gram tablet Take 6 g by mouth Before breakfast, lunch, dinner and at bedtime. Min 24 g/day and Max 30 g/day. LORazepam (ATIVAN) 1 mg tablet Take 1 mg by mouth nightly as needed for Anxiety. diphenoxylate-atropine (LOMOTIL) 2.5-0.025 mg per tablet Take 2 Tabs by mouth Before breakfast, lunch, dinner and at bedtime. ondansetron hcl (ZOFRAN, AS HYDROCHLORIDE,) 8 mg tablet Take 8 mg by mouth every eight (8) hours as needed for Nausea. multivit-min-FA-lycopen-lutein (CENTRUM SILVER ULTRA MEN'S) 300-600-300 mcg tab Take 1 Tab by mouth daily. STOP taking these medications       diphenhydrAMINE (BENADRYL) 50 mg capsule Comments:   Reason for Stopping:                Diet:  As tolerated. Activity:  No strenuous activity, lifting more than 10 lb., or driving for 4 weeks after the operation.     Wound Care:  The skin staples will remain in place for approximately 6 weeks. An abdominal binder should be worn most of the time. It may be removed for showering. Discharge Condition:  Improved compared to that upon admission. Disposition:  Home. Follow-up Care:  1. Dr. Letha Garcia (Pain Management) within one week. 2. Dr. Aquilino Dial on 11/27/2017. 3.  Dr. Alannah Crocker in approximately 5 weeks. CONSULTATIONS:  1.  Jasbir Serrano MD (General Surgery)  2. Lawson Goncalves MD (Pallaitive Medicine)    Significant Diagnostic Studies:   Recent Labs      11/09/17   0208  11/08/17   0459   WBC  5.8  5.9   HGB  10.1*  10.9*   HCT  31.5*  33.6*   PLT  322  292     Recent Labs      11/08/17   0459   NA  136   K  4.7   CL  101   CO2  26   BUN  12   CREA  0.93   GLU  123*   CA  8.4*   MG  2.2   PHOS  4.6       Lab Results   Component Value Date/Time    Glucose (POC) 113 11/10/2017 11:35 AM    Glucose (POC) 120 11/10/2017 05:54 AM    Glucose (POC) 87 11/09/2017 11:43 PM    Glucose (POC) 107 11/09/2017 07:07 PM    Glucose (POC) 107 11/09/2017 11:22 AM         HOSPITAL COURSE & TREATMENT RENDERED:     The patient is a 60-year-old male with a history of Crohn's disease and numerous abdominal operations, who presented to the emergency room with worsening abdominal pain, nausea, and decreased output of stool. Among the many surgical procedures that he has undergone, the most recent abdominal operation had been an exploratory laparotomy with extensive lysis of adhesions, removal of mesh, and repair of an enterocutaneous fistula that I performed on 02/23/2016. His intestinal anatomy includes the absence of almost the entire colon. He has his entire rectum and a very small amount of rectosigmoid to which the small intestine is anastomosed. Upon presentation to the emergency room with the current symptoms, his evaluation included laboratory studies and CT scan of the abdomen and pelvis.  The scan was interpreted as revealing a bowel obstruction secondary to intussusception. Surgery was recommended and the patient, well aware of the risks, agreed to proceed. He was taken to the operating room on 11/3/2017, and there the above-listed procedure was performed. There were no apparent complications, but because of the presence of a large ventral hernia it was not possible to close the fascia. The abdomen was closed by reapproximating skin and scar tissue, and an abdominal binder was placed. Post-operatively on 11/3/2017, Brenna Hagan MD was consulted. Dr. Shonna Kelly had previously evaluated the patient as an outpatient, and his opinion regarding hernia repair options was requested. He recommended waiting at least 6 weeks before removing the skin staples or considering performing a hernia repair. On 11/3/2017, in anticipation of a prolonged ileus, a PICC was placed and TPN was begun. Ward Patel MD (Palliative Medicine) was consulted on 11/6/2017 to assist with pain management, and the input of her team was quite helpful. In particular, they managed the transition from parenteral to oral analgesics. As the patient's gastrointestinal function returned, the TPN was eventually weaned and, on 11/8/2017 (POD#5) discontinued. On 11/10/2017 (POD#7), the patient was hemodynamically stable and afebrile, tolerating solid food, and experiencing a manageable bowel pattern. There was no evidence of infection or other complication, and he was judged to be fit for discharge to home. His condition upon discharge was improved compared to that upon admission, and he appeared to have understood all discharge and follow-up instructions.         Signed:  Ulysses Forester, MD

## 2017-11-10 NOTE — DISCHARGE INSTRUCTIONS
Patient Discharge Instructions    Tavo Patel / [id number] : 1960    Admitted 2017 Discharged: 11/10/2017       · It is important that you take medications exactly as they are prescribed. · Keep your medication in the bottles provided by the pharmacist and keep a list of the medication names, dosages, and times to be taken in your wallet. · Do not take other medications without consulting your doctor. What To Do At Home  Recommended Diet:  You should continue to consume a diet as tolerated. Recommended Activity: You should not drive, lift more than 10 lb., or engage in strenuous activity until four weeks have passed since the operation. You should walk frequently to gradually regain your stamina, and you may climb stairs as tolerated. Abdominal Binder:  Keep the abdominal binder on most of the time. You may remove it to shower. Hygiene: You may shower, but do not bathe or submerge your abdomen until you have been seen for follow-up. Problems:  If you have a fever (temperature > 100.0 degrees Fahrenheit), if there is drainage from the incision, if there is increasing redness around the incision, or if the skin separates, you should call Dr. Juan Borges or Dr. Beryle Levee. Other:  If you have questions or other problems, call Dr. Juan Borges. Follow-Up  1. Please return to Dr. Lyric Cabrales office at 12:30 PM on 2017.  2.  Follow up with Dr. John Velez next week. 3.  Follow up with Dr. Beryle Levee in approximately 5 weeks. Information obtained by :  I understand that if any problems occur once I am at home I am to contact my physician. I understand and acknowledge receipt of the instructions indicated above.                                                                                                                                            Physician's or R.N.'s Signature                                                                  Date/Time Patient or Representative Signature                                                          Date/Time

## 2017-11-29 ENCOUNTER — TELEPHONE (OUTPATIENT)
Dept: SURGERY | Age: 57
End: 2017-11-29

## 2017-11-29 NOTE — TELEPHONE ENCOUNTER
Patient said Dr. Jael Priest requested that he come in to see Dr. Roberta Bass as soon as possible, I told the patient the first available I have is on Monday at 2:20 on 12/04. and I would talk to you about it. I suggested a NP but he quickly said that he has to see Dr. Roberta Bass.

## 2017-12-04 ENCOUNTER — OFFICE VISIT (OUTPATIENT)
Dept: SURGERY | Age: 57
End: 2017-12-04

## 2017-12-04 VITALS
TEMPERATURE: 98.3 F | HEART RATE: 88 BPM | BODY MASS INDEX: 23.32 KG/M2 | DIASTOLIC BLOOD PRESSURE: 70 MMHG | OXYGEN SATURATION: 97 % | SYSTOLIC BLOOD PRESSURE: 118 MMHG | WEIGHT: 166.6 LBS | HEIGHT: 71 IN | RESPIRATION RATE: 18 BRPM

## 2017-12-04 DIAGNOSIS — T81.30XA ABDOMINAL WOUND DEHISCENCE, INITIAL ENCOUNTER: Primary | ICD-10-CM

## 2017-12-04 DIAGNOSIS — K43.2 INCISIONAL HERNIA, WITHOUT OBSTRUCTION OR GANGRENE: ICD-10-CM

## 2017-12-04 RX ORDER — OMEPRAZOLE 40 MG/1
CAPSULE, DELAYED RELEASE ORAL
Refills: 0 | COMMUNITY
Start: 2017-11-15

## 2017-12-04 NOTE — LETTER
12/13/2017 4:10 PM 
 
Patient:  Mary Redman YOB: 1960 Date of Visit: 12/4/2017 Dear Madison Ho MD 
[address] [place] [address] [id number] [id number] VIA Facsimile: [phone] 
 : Thank you for referring Mr. Jackie King to me for evaluation/treatment. Below are the relevant portions of my assessment and plan of care. If you have questions, please do not hesitate to call me. I look forward to following Mr. Pedro Cerna along with you. Sincerely, Beryl Martin MD

## 2017-12-04 NOTE — MR AVS SNAPSHOT
Visit Information Date & Time Provider Department Dept. Phone Encounter #  
 12/4/2017  2:20 PM Sam See MD [address] [phone] [id number] Follow-up Instructions Return in about 2 weeks (around 12/18/2017). Your Appointments 12/18/2017  1:40 PM  
POST OP 10 MIN with Sam See MD  
[address] ([address]) Appt Note: po 2 week f/u 11/6/17: NL: Repair REC INC HERNIA w MESH w Component Separation OPEN/$0.00cp/$0.00pb [address] [employer] [address] [facility] [id number]  
[address] [place] [address] Upcoming Health Maintenance Date Due Hepatitis C Screening 1960 FOBT Q 1 YEAR AGE 50-75 9/28/2016 Influenza Age 5 to Adult 8/1/2017 DTaP/Tdap/Td series (2 - Td) 4/20/2025 Allergies as of 12/4/2017  Review Complete On: 12/4/2017 By: Wayne Landry LPN Severity Noted Reaction Type Reactions Honey High 02/02/2014   Systemic Anaphylaxis Remicade [Infliximab] High 06/15/2012   Systemic Anaphylaxis Crestor [Rosuvastatin]  07/10/2015    Myalgia Other Plant, Animal, Environmental  06/04/2015    Other (comments) Developed \"boils\" on right arm that required I &D after receiving FENTANYL patch. Is able to take FENTANYL orally; states is allergic to fentanyl patch GLUE Imuran [Azathioprine] Low 11/04/2013   Side Effect Diarrhea, Nausea Only Mercaptopurine Low 12/01/2015   Side Effect Diarrhea Sulfa (Sulfonamide Antibiotics) Low 06/15/2012   Side Effect Other (comments) Causes diarrhea Current Immunizations  Reviewed on 2/24/2016 Name Date Influenza Vaccine 1/1/2013 Influenza Vaccine (Quad) PF 10/6/2015  1:16 PM  
 Pneumococcal Vaccine (Unspecified Type) 1/1/2013 Tdap 4/20/2015  1:11 PM, 1/31/2014  8:02 PM  
  
 Not reviewed this visit You Were Diagnosed With   
  
 Codes Comments Abdominal wound dehiscence, initial encounter    -  Primary ICD-10-CM: T81.30XA ICD-9-CM: 998.30 Incisional hernia, without obstruction or gangrene     ICD-10-CM: K43.2 ICD-9-CM: 553.21 Vitals BP Pulse Temp Resp Height(growth percentile) Weight(growth percentile) 118/70 88 98.3 °F (36.8 °C) 18 5' 11\" (1.803 m) 166 lb 9.6 oz (75.6 kg) SpO2 BMI Smoking Status 97% 23.24 kg/m2 Current Every Day Smoker Vitals History BMI and BSA Data Body Mass Index Body Surface Area  
 23.24 kg/m 2 1.95 m 2 Preferred Pharmacy Pharmacy Name Phone [id number] [address] [phone] Your Updated Medication List  
  
   
This list is accurate as of: 12/4/17 11:59 PM.  Always use your most recent med list.  
  
  
  
  
 aspirin-acetaminophen-caffeine 250-250-65 mg per tablet Commonly known as:  EXCEDRIN ES Take 2 Tabs by mouth every six (6) hours as needed for Headache. CENTRUM SILVER ULTRA MEN'S 300-600-300 mcg Tab Generic drug:  multivit-min-FA-lycopen-lutein Take 1 Tab by mouth daily. chlorzoxazone 500 mg tablet Commonly known as:  Clarnce Portillo Take 500 mg by mouth two (2) times daily as needed for Muscle Spasm(s). cloNIDine HCl 0.1 mg tablet Commonly known as:  CATAPRES Take 0.1 mg by mouth two (2) times a day. colestipol 1 gram tablet Commonly known as:  COLESTID Take 6 g by mouth Before breakfast, lunch, dinner and at bedtime. Min 24 g/day and Max 30 g/day. HYDROcodone-acetaminophen  mg tablet Commonly known as:  Benetta Pock Take 1-2 Tabs by mouth every four (4) hours as needed. Max Daily Amount: 12 Tabs. HYDROmorphone 4 mg tablet Commonly known as:  DILAUDID Take 1-2 Tabs by mouth every three (3) hours as needed. Max Daily Amount: 64 mg. LOMOTIL 2.5-0.025 mg per tablet Generic drug:  diphenoxylate-atropine Take 2 Tabs by mouth Before breakfast, lunch, dinner and at bedtime. loperamide 2 mg capsule Commonly known as:  IMODIUM Take 2 mg by mouth as needed for Diarrhea. Patient takes 14-16 capsules a day. LORazepam 1 mg tablet Commonly known as:  ATIVAN Take 1 mg by mouth nightly as needed for Anxiety. morphine CR 15 mg CR tablet Commonly known as:  MS CONTIN Take 1 Tab by mouth every twelve (12) hours. Max Daily Amount: 30 mg.  
  
 nicotine 14 mg/24 hr patch Commonly known as:  NICODERM CQ  
1 Patch by TransDERmal route daily for 30 days. omeprazole 40 mg capsule Commonly known as:  PRILOSEC  
take 1 capsule by mouth once daily ZOFRAN (AS HYDROCHLORIDE) 8 mg tablet Generic drug:  ondansetron hcl Take 8 mg by mouth every eight (8) hours as needed for Nausea. We Performed the Following REFERRAL TO WOUND CARE [[id number] Custom] Comments:  
 Please evaluate patient for outpatient wound care clinic, please set the patient up for daily wound care with moist hydrogel to the midline wound daily with dry dressing overtop. Follow-up Instructions Return in about 2 weeks (around 12/18/2017). Referral Information Referral ID Referred By Referred To  
  
 [id number] Patricio Rosa Not Available Visits Status Start Date End Date 1 Closed 12/4/17 12/4/18 If your referral has a status of pending review or denied, additional information will be sent to support the outcome of this decision. Introducing [facility]! Dear Rafael Ayala: 
Thank you for requesting a [email or web address] Textbook Time account. Our records indicate that you already have an active [email or web address] Textbook Time account. You can access your account anytime at [email or web address] Did you know that you can access your hospital and ER discharge instructions at any time in [email or web address] Textbook Time? You can also review all of your test results from your hospital stay or ER visit. Additional Information If you have questions, please visit the Frequently Asked Questions section of the [employer] website at [email or web address] Remember, [employer] is NOT to be used for urgent needs. For medical emergencies, dial 911. Now available from your iPhone and Android! Please provide this summary of care documentation to your next provider. Your primary care clinician is listed as Billy Benavidez. If you have any questions after today's visit, please call [phone].

## 2017-12-13 NOTE — PROGRESS NOTES
[facility] General Surgery History and Physical    History of Present Illness:      Irina Edwards is a 65 y.o. male who is about 4 wks s/p laparotomy and lysis of adhesions for SBO. He did well with surgery but his hernia could not be closed due to the large size in the operation. His skin and soft tissue were closed by Dr Darrin Simmons of colorectal.  He did well and was DC home. He is back now for wound follow up visit. Some of the midline staples have been removed due to the wound opening up in the midline some. He says the wound looks about the same as it did about 2 wks ago. He does not have any nausea, vomiting or changes in BM. He has some pain related to the surgery. He is getting local wound care with daily wet to dry dressing changes to the wound. Past Medical History:   Diagnosis Date    Abdominal wall hernia 6/15/2012    Anal fissure     Anemia     Anemia     Anxiety and depression     Arthritis     Related to the Crohn's disease.  Cancer ([address]     MELANOMA HEAD AND FACE    Chronic pain     GENERALIZED    COPD (chronic obstructive pulmonary disease) (HCC)     Crohn disease (HCC)     Diagnosed at age 17.  Echocardiogram normal (EF: 55-60%) 07/2015    Esophageal ulcer     GERD (gastroesophageal reflux disease)     H/O blood transfusion 2013    [address] [facility]    Hypertension     denies    Migraine     Short bowel syndrome     Ventral hernia without obstruction or gangrene        Past Surgical History:   Procedure Laterality Date    ABDOMEN SURGERY PROC UNLISTED      33 abdominal operations for treatment of Crohn's disease and its complications.     HC NDL CEJA      ceja needle, takes 1 inch needle    HX APPENDECTOMY      HX CHOLECYSTECTOMY      HX GI      colectomy x2, one ileostomy    HX GI  7/28/14    exp lap,partial colectomy with end ileostomy by Dr Oscar Multani HX HEENT      neck fusion    HX ORTHOPAEDIC  1980s    wrist right,     HX ORTHOPAEDIC 2006, 11/2013    neck, L4 L5 L6    HX ORTHOPAEDIC  1990s    right knee scope    HX OTHER SURGICAL      surgical repair from spider bite    HX OTHER SURGICAL  12/1/14    Incision and drainage of posterior right subcutaneous shoulder abscess    HX OTHER SURGICAL  2014    LEFT INDEX FINGER SPIDER BITE    HX OTHER SURGICAL  2014    RECTAL FISTULA    HX OTHER SURGICAL  3/17/2015    Ileostomy takedown with extensive lysis of adhesions (greater than three hours), mobilization of the splenic flexure, left colon resection, ileocolic anastomosis, and excision of scar; Dr. Deangelo Garland.  HX OTHER SURGICAL  3/22/2015    Exploratory laparotomy with washout of abdomen, suture repair of small bowel/anastomosis, and diverting protective loop ileostomy; Cris Jackson MD.    HX OTHER SURGICAL  4/9/2015    Laparotomy, extensive lysis of adhesions, abdominal lavage, and resection of ileocolic anastomosis (including the efferent limb of the loop ileostomy) with creation of Demetrius's pouch; Dr. Deangelo Garland.  HX OTHER SURGICAL  7/14/2015    Cystoscopy and placement of bilateral ureteral catheters; Malik Dunne MD.    HX OTHER SURGICAL  7/14/2015    Ileostomy takedown with extensive lysis of adhesions, small bowel resection, and enterocolostomy; Dr. Deangelo Garland.  HX OTHER SURGICAL  9/29/2015    CT-guided percutaneous drainage of intraabdominal abscess; Dr. Edwar Hansen.  HX OTHER SURGICAL  11/16/2015    CT-guided percutaneous aspiration of abdominal wall cavity; Dr. Lalo Henry.  HX OTHER SURGICAL  12/1/2015    Incision and drainage of abdominal wall abscess; Dr. Deangelo Garland.   OTHER SURGICAL  2/11/2016    Incision and drainage of abdominal wall abscess; Dr. Deangelo Garland.   OTHER SURGICAL  2/23/2016    Cystoscopy and placement of bilateral temporary ureteral catheters; Dr. Tamiko Massey.     HX OTHER SURGICAL  2/23/2016    Exploratory laparotomy, extensive lysis of adhesions, removal of mesh, and repair of enterocutaneous fistula; Dr. Ronda Allen.  HX OTHER SURGICAL  11/03/2017    Exploratory laparotomy, extensive lysis of adhesions, and reduction of intussusception; Dr. Ronda Allen. Current Outpatient Prescriptions:     omeprazole (PRILOSEC) 40 mg capsule, take 1 capsule by mouth once daily, Disp: , Rfl: 0    HYDROcodone-acetaminophen (NORCO)  mg tablet, Take 1-2 Tabs by mouth every four (4) hours as needed. Max Daily Amount: 12 Tabs., Disp: 40 Tab, Rfl: 0    morphine CR (MS CONTIN) 15 mg CR tablet, Take 1 Tab by mouth every twelve (12) hours. Max Daily Amount: 30 mg., Disp: 12 Tab, Rfl: 0    chlorzoxazone (PARAFON FORTE) 500 mg tablet, Take 500 mg by mouth two (2) times daily as needed for Muscle Spasm(s). , Disp: , Rfl:     cloNIDine HCl (CATAPRES) 0.1 mg tablet, Take 0.1 mg by mouth two (2) times a day., Disp: , Rfl:     colestipol (COLESTID) 1 gram tablet, Take 6 g by mouth Before breakfast, lunch, dinner and at bedtime. Min 24 g/day and Max 30 g/day. , Disp: , Rfl:     LORazepam (ATIVAN) 1 mg tablet, Take 1 mg by mouth nightly as needed for Anxiety. , Disp: , Rfl:     aspirin-acetaminophen-caffeine (EXCEDRIN ES) 250-250-65 mg per tablet, Take 2 Tabs by mouth every six (6) hours as needed for Headache., Disp: , Rfl:     diphenoxylate-atropine (LOMOTIL) 2.5-0.025 mg per tablet, Take 2 Tabs by mouth Before breakfast, lunch, dinner and at bedtime. , Disp: , Rfl:     loperamide (IMODIUM) 2 mg capsule, Take 2 mg by mouth as needed for Diarrhea. Patient takes 14-16 capsules a day., Disp: , Rfl:     ondansetron hcl (ZOFRAN, AS HYDROCHLORIDE,) 8 mg tablet, Take 8 mg by mouth every eight (8) hours as needed for Nausea., Disp: , Rfl:     multivit-min-FA-lycopen-lutein (CENTRUM SILVER ULTRA MEN'S) 300-600-300 mcg tab, Take 1 Tab by mouth daily. , Disp: , Rfl:     HYDROmorphone (DILAUDID) 4 mg tablet, Take 1-2 Tabs by mouth every three (3) hours as needed.  Max Daily Amount: 64 mg., Disp: 50 Tab, Rfl: 0    Allergies   Allergen Reactions    Honey Anaphylaxis    Remicade [Infliximab] Anaphylaxis    Crestor [Rosuvastatin] Myalgia    Other Plant, Animal, Environmental Other (comments)     Developed \"boils\" on right arm that required I &D after receiving FENTANYL patch. Is able to take FENTANYL orally; states is allergic to fentanyl patch GLUE    Imuran [Azathioprine] Diarrhea and Nausea Only    Mercaptopurine Diarrhea    Sulfa (Sulfonamide Antibiotics) Other (comments)     Causes diarrhea       Social History     Social History    Marital status: LEGALLY      Spouse name: N/A    Number of children: N/A    Years of education: N/A     Occupational History    Not on file.      Social History Main Topics    Smoking status: Current Every Day Smoker     Packs/day: 1.00     Years: 44.00    Smokeless tobacco: Current User      Comment: CURRENT E CIGS    Alcohol use No      Comment: RARELY    Drug use: No    Sexual activity: Not on file     Other Topics Concern    Not on file     Social History Narrative       Family History   Problem Relation Age of Onset    Stroke Mother     Heart Disease Mother     Kidney Disease Mother     Anesth Problems Mother      TAKES A LONG TIME TO WAKE UP    Heart Disease Father     Thyroid Disease Sister        ROS   Constitutional: negative  Ears, Nose, Mouth, Throat, and Face: negative  Respiratory: negative  Cardiovascular: negative  Gastrointestinal: negative  Genitourinary:negative  Integument/Breast: negative  Hematologic/Lymphatic: negative  Behavioral/Psychiatric: negative  Allergic/Immunologic: negative      Physical Exam:     Visit Vitals    /70    Pulse 88    Temp 98.3 °F (36.8 °C)    Resp 18    Ht 5' 11\" (1.803 m)    Wt 166 lb 9.6 oz (75.6 kg)    SpO2 97%    BMI 23.24 kg/m2       General - alert and oriented, no apparent distress  HEENT - no jaundice, no hearing imparement  Pulm - CTAB, no C/W/R  CV - RRR, no M/R/G  Abd - soft, ND, BS present, mild TTP, open portion of the wound in the middle about 4cm, soft tissue visible, no bowel or intraabdominal contents present, upper portion of the wound with staples and skin intact  Ext - pulses intact in UE and LE bilaterally, no edema  Skin - supple, no rashes  Psychiatric - normal affect, good mood    Labs  none    Imaging  none  I have reviewed and agree with all of the pertinent images    Assessment:     Mildred Griffith is a 65 y.o. male with open wound s/p laparotomy and THONY and large incisional hernia    Recommendations:     1. He does have a large incisional hernia still but had a recent laparotomy and now partial wound dehiscence. His wound is partially open but shallow and no bowel is visible. He will need daily wound care and will go to the outpatient wound care center for wound care daily. The rest of the stable were removed and the rest of the skin was healed and intact. He will eventually need incisional hernia repair. He is still smoking some but cut down some. He HAS to be smoke free for at least a month or two. He will continue [facility] and follow up with me in 2 wks. Jerrell Enrique MD    Mr. Afshin Zavala has a reminder for a \"due or due soon\" health maintenance. I have asked that he contact his primary care provider for follow-up on this health maintenance.

## 2017-12-18 ENCOUNTER — OFFICE VISIT (OUTPATIENT)
Dept: SURGERY | Age: 57
End: 2017-12-18

## 2017-12-18 VITALS
BODY MASS INDEX: 22.68 KG/M2 | TEMPERATURE: 98.4 F | HEART RATE: 82 BPM | DIASTOLIC BLOOD PRESSURE: 80 MMHG | RESPIRATION RATE: 18 BRPM | WEIGHT: 162 LBS | OXYGEN SATURATION: 98 % | HEIGHT: 71 IN | SYSTOLIC BLOOD PRESSURE: 120 MMHG

## 2017-12-18 DIAGNOSIS — T81.30XA ABDOMINAL WOUND DEHISCENCE, INITIAL ENCOUNTER: Primary | ICD-10-CM

## 2017-12-18 DIAGNOSIS — K43.2 INCISIONAL HERNIA, WITHOUT OBSTRUCTION OR GANGRENE: ICD-10-CM

## 2017-12-18 RX ORDER — OXYCODONE AND ACETAMINOPHEN 10; 325 MG/1; MG/1
1 TABLET ORAL
Qty: 20 TAB | Refills: 0 | Status: ON HOLD | OUTPATIENT
Start: 2017-12-18 | End: 2019-06-30

## 2017-12-18 NOTE — PROGRESS NOTES
1. Have you been to the ER, urgent care clinic since your last visit? Hospitalized since your last visit?  2 wks surgery [facility]    2. Have you seen or consulted any other health care providers outside of the [address] [place] [address] since your last visit? Include any pap smears or colon screening.  no

## 2017-12-18 NOTE — MR AVS SNAPSHOT
Visit Information Date & Time Provider Department Dept. Phone Encounter #  
 12/18/2017  1:40 PM Charlie Farris MD [facility] [address] [phone] [id number] Follow-up Instructions Return in about 3 weeks (around 1/8/2018). Your Appointments 1/8/2018  2:00 PM  
POST OP 10 MIN with Charlie Farris MD  
[facility] Appt Note: EP/3 week P/O/11/6/17: NL: Repair REC INC HERNIA w MESH w Component Separation OPEN/$0Cp/216.68PB [address] [facility] [id number]  
[address] [place] [address] Upcoming Health Maintenance Date Due Hepatitis C Screening 1960 FOBT Q 1 YEAR AGE 50-75 9/28/2016 Influenza Age 5 to Adult 8/1/2017 DTaP/Tdap/Td series (2 - Td) 4/20/2025 Allergies as of 12/18/2017  Review Complete On: 12/18/2017 By: Ivar Pulse, LPN Severity Noted Reaction Type Reactions Honey High 02/02/2014   Systemic Anaphylaxis Remicade [Infliximab] High 06/15/2012   Systemic Anaphylaxis Crestor [Rosuvastatin]  07/10/2015    Myalgia Other Plant, Animal, Environmental  06/04/2015    Other (comments) Developed \"boils\" on right arm that required I &D after receiving FENTANYL patch. Is able to take FENTANYL orally; states is allergic to fentanyl patch GLUE Imuran [Azathioprine] Low 11/04/2013   Side Effect Diarrhea, Nausea Only Mercaptopurine Low 12/01/2015   Side Effect Diarrhea Sulfa (Sulfonamide Antibiotics) Low 06/15/2012   Side Effect Other (comments) Causes diarrhea Current Immunizations  Reviewed on 2/24/2016 Name Date Influenza Vaccine 1/1/2013 Influenza Vaccine (Quad) PF 10/6/2015  1:16 PM  
 Pneumococcal Vaccine (Unspecified Type) 1/1/2013 Tdap 4/20/2015  1:11 PM, 1/31/2014  8:02 PM  
  
 Not reviewed this visit You Were Diagnosed With   
  
 Codes Comments Abdominal wound dehiscence, initial encounter    -  Primary ICD-10-CM: T81.30XA ICD-9-CM: 998.30 Incisional hernia, without obstruction or gangrene     ICD-10-CM: K43.2 ICD-9-CM: 553.21 Vitals BP Pulse Temp Resp Height(growth percentile) Weight(growth percentile) 120/80 82 98.4 °F (36.9 °C) 18 5' 11\" (1.803 m) 162 lb (73.5 kg) SpO2 BMI Smoking Status 98% 22.59 kg/m2 Current Every Day Smoker Vitals History BMI and BSA Data Body Mass Index Body Surface Area  
 22.59 kg/m 2 1.92 m 2 Preferred Pharmacy Pharmacy Name Phone [id number] & [address] [phone] Your Updated Medication List  
  
   
This list is accurate as of: 12/18/17 11:59 PM.  Always use your most recent med list.  
  
  
  
  
 vdwcy-lwvh-CrRMK-collag-mv-min 7-7-1.5 gram Pwpk Commonly known as:  Estle Dec (WITH COLLAGEN) Take 1 Packet by mouth daily. aspirin-acetaminophen-caffeine 250-250-65 mg per tablet Commonly known as:  EXCEDRIN ES Take 2 Tabs by mouth every six (6) hours as needed for Headache. CENTRUM SILVER ULTRA MEN'S 300-600-300 mcg Tab Generic drug:  multivit-min-FA-lycopen-lutein Take 1 Tab by mouth daily. chlorzoxazone 500 mg tablet Commonly known as:  Donnel Starling Take 500 mg by mouth two (2) times daily as needed for Muscle Spasm(s). cloNIDine HCl 0.1 mg tablet Commonly known as:  CATAPRES Take 0.1 mg by mouth two (2) times a day. colestipol 1 gram tablet Commonly known as:  COLESTID Take 6 g by mouth Before breakfast, lunch, dinner and at bedtime. Min 24 g/day and Max 30 g/day. HYDROcodone-acetaminophen  mg tablet Commonly known as:  Iven Roberts Take 1-2 Tabs by mouth every four (4) hours as needed. Max Daily Amount: 12 Tabs. LOMOTIL 2.5-0.025 mg per tablet Generic drug:  diphenoxylate-atropine Take 2 Tabs by mouth Before breakfast, lunch, dinner and at bedtime. loperamide 2 mg capsule Commonly known as:  IMODIUM Take 2 mg by mouth as needed for Diarrhea. Patient takes 14-16 capsules a day. LORazepam 1 mg tablet Commonly known as:  ATIVAN Take 1 mg by mouth nightly as needed for Anxiety. morphine CR 15 mg CR tablet Commonly known as:  MS CONTIN Take 1 Tab by mouth every twelve (12) hours. Max Daily Amount: 30 mg.  
  
 omeprazole 40 mg capsule Commonly known as:  PRILOSEC  
take 1 capsule by mouth once daily  
  
 oxyCODONE-acetaminophen  mg per tablet Commonly known as:  PERCOCET 10 Take 1 Tab by mouth every six (6) hours as needed for Pain. Max Daily Amount: 4 Tabs. ZOFRAN (AS HYDROCHLORIDE) 8 mg tablet Generic drug:  ondansetron hcl Take 8 mg by mouth every eight (8) hours as needed for Nausea. Prescriptions Printed Refills  
 uptqr-adlb-IuAXN-collag-mv-min (FLOR, WITH COLLAGEN,) 7-7-1.5 gram pwpk 0 Sig: Take 1 Packet by mouth daily. Class: Print Route: Oral  
 oxyCODONE-acetaminophen (PERCOCET 10)  mg per tablet 0 Sig: Take 1 Tab by mouth every six (6) hours as needed for Pain. Max Daily Amount: 4 Tabs. Class: Print Route: Oral  
  
Follow-up Instructions Return in about 3 weeks (around 1/8/2018). Patient Instructions Hernia: Care Instructions Your Care Instructions A hernia develops when tissue bulges through a weak spot in the wall of your belly. The groin area and the navel are common areas for a hernia. A hernia can also develop near the area of a surgery you had before. Pressure from lifting, straining, or coughing can tear the weak area, causing the hernia to bulge and be painful. If you cannot push a hernia back into place, the tissue may become trapped outside the belly wall.  If the hernia gets twisted and loses its blood supply, it will swell and die. This is called a strangulated hernia. It usually causes a lot of pain. It needs treatment right away. Some hernias need to be repaired to prevent a strangulated hernia. If your hernia causes symptoms or is large, you may need surgery. Follow-up care is a key part of your treatment and safety. Be sure to make and go to all appointments, and call your doctor if you are having problems. It's also a good idea to know your test results and keep a list of the medicines you take. How can you care for yourself at home? · Take care when lifting heavy objects. · Stay at a healthy weight. · Do not smoke. Smoking can cause coughing, which can cause your hernia to bulge. If you need help quitting, talk to your doctor about stop-smoking programs and medicines. These can increase your chances of quitting for good. · Talk with your doctor before wearing a corset or truss for a hernia. These devices are not recommended for treating hernias and sometimes can do more harm than good. There may be certain situations when your doctor thinks a truss would work, but these are rare. When should you call for help? Call your doctor now or seek immediate medical care if: 
? · You have new or worse belly pain. ? · You are vomiting. ? · You cannot pass stools or gas. ? · You cannot push the hernia back into place with gentle pressure when you are lying down. ? · The area over the hernia turns red or becomes tender. ? Watch closely for changes in your health, and be sure to contact your doctor if you have any problems. Where can you learn more? Go to http://johnathon-marielos.info/. Enter C129 in the search box to learn more about \"Hernia: Care Instructions. \" Current as of: May 12, 2017 Content Version: 11.4 © 1109-8908 [employer].  Care instructions adapted under license by [employer] (which disclaims liability or warranty for this information). If you have questions about a medical condition or this instruction, always ask your healthcare professional. [address] any warranty or liability for your use of this information. Introducing [facility]! Dear Charis Vo: 
Thank you for requesting a [employer] account. Our records indicate that you already have an active [employer] account. You can access your account anytime at [email or web address]. [email or web address] Did you know that you can access your hospital and ER discharge instructions at any time in [employer]? You can also review all of your test results from your hospital stay or ER visit. Additional Information If you have questions, please visit the Frequently Asked Questions section of the [employer] website at [email or web address] Remember, [employer] is NOT to be used for urgent needs. For medical emergencies, dial 911. Now available from your iPhone and Android! Please provide this summary of care documentation to your next provider. Your primary care clinician is listed as Lamar Form. If you have any questions after today's visit, please call [phone].

## 2017-12-18 NOTE — LETTER
12/22/2017 12:50 PM 
 
Patient:  Pacheco Dang YOB: 1960 Date of Visit: 12/18/2017 Dear Marcelino Durant MD 
[address] [address] [address] VIA Facsimile: [phone] 
 : Thank you for referring Mr. Lupis Cordero to me for evaluation/treatment. Below are the relevant portions of my assessment and plan of care. If you have questions, please do not hesitate to call me. I look forward to following Mr. Bhaskar Louis along with you. Sincerely, Aj Jones MD

## 2017-12-22 NOTE — PROGRESS NOTES
[facility] General Surgery History and Physical    History of Present Illness:      Davey Rico is a 65 y.o. male who is 6 wks s/p open THONY for SBO done by Dr John Diaz and has been seeing me for post op wound care. He is doing well with the wound care at home. He is doing wet to dry dressing on the midline wound. He is changing them daily. He thinks the wound is getting smaller. He does still have some pain at the incision currently. He still smokes about 4-5 cigarettes at day home. He has been eating and drinking well, no changes in his BM, he has about 10 BM a day which is normal for him. Past Medical History:   Diagnosis Date    Abdominal wall hernia 6/15/2012    Anal fissure     Anemia     Anemia     Anxiety and depression     Arthritis     Related to the Crohn's disease.  Cancer ([address]     MELANOMA HEAD AND FACE    Chronic pain     GENERALIZED    COPD (chronic obstructive pulmonary disease) (HCC)     Crohn disease (HCC)     Diagnosed at age 17.  Echocardiogram normal (EF: 55-60%) 07/2015    Esophageal ulcer     GERD (gastroesophageal reflux disease)     H/O blood transfusion 2013    [address] [facility]    Hypertension     denies    Migraine     Short bowel syndrome     Ventral hernia without obstruction or gangrene        Past Surgical History:   Procedure Laterality Date    ABDOMEN SURGERY PROC UNLISTED      33 abdominal operations for treatment of Crohn's disease and its complications.     HC NDL CEJA      ceja needle, takes 1 inch needle    HX APPENDECTOMY      HX CHOLECYSTECTOMY      HX GI      colectomy x2, one ileostomy    HX GI  7/28/14    exp lap,partial colectomy with end ileostomy by Dr Rodri Moore      neck fusion    HX ORTHOPAEDIC  1980s    wrist right,     HX ORTHOPAEDIC  2006, 11/2013    neck, L4 L5 L6    HX ORTHOPAEDIC  1990s    right knee scope    HX OTHER SURGICAL      surgical repair from spider bite    HX OTHER SURGICAL 12/1/14    Incision and drainage of posterior right subcutaneous shoulder abscess    HX OTHER SURGICAL  2014    LEFT INDEX FINGER SPIDER BITE    HX OTHER SURGICAL  2014    RECTAL FISTULA    HX OTHER SURGICAL  3/17/2015    Ileostomy takedown with extensive lysis of adhesions (greater than three hours), mobilization of the splenic flexure, left colon resection, ileocolic anastomosis, and excision of scar; Dr. Twin Martines.  HX OTHER SURGICAL  3/22/2015    Exploratory laparotomy with washout of abdomen, suture repair of small bowel/anastomosis, and diverting protective loop ileostomy; Ike García MD.    HX OTHER SURGICAL  4/9/2015    Laparotomy, extensive lysis of adhesions, abdominal lavage, and resection of ileocolic anastomosis (including the efferent limb of the loop ileostomy) with creation of Demetrius's pouch; Dr. Twin Martines.  HX OTHER SURGICAL  7/14/2015    Cystoscopy and placement of bilateral ureteral catheters; Sukhwinder Nettles MD.    HX OTHER SURGICAL  7/14/2015    Ileostomy takedown with extensive lysis of adhesions, small bowel resection, and enterocolostomy; Dr. Twin Martiens.  HX OTHER SURGICAL  9/29/2015    CT-guided percutaneous drainage of intraabdominal abscess; Dr. Jacobo Tyson.   OTHER SURGICAL  11/16/2015    CT-guided percutaneous aspiration of abdominal wall cavity; Dr. Devante Marmolejo.   OTHER SURGICAL  12/1/2015    Incision and drainage of abdominal wall abscess; Dr. Twin Martines.   OTHER SURGICAL  2/11/2016    Incision and drainage of abdominal wall abscess; Dr. Twin Martines.   OTHER SURGICAL  2/23/2016    Cystoscopy and placement of bilateral temporary ureteral catheters; Dr. Darlene Vo.  HX OTHER SURGICAL  2/23/2016    Exploratory laparotomy, extensive lysis of adhesions, removal of mesh, and repair of enterocutaneous fistula; Dr. Twin Martines.      OTHER SURGICAL  11/03/2017    Exploratory laparotomy, extensive lysis of adhesions, and reduction of intussusception; Dr. Elle Rogers. Current Outpatient Prescriptions:     fytob-ltyq-JzAQG-collag-mv-min (FLOR, WITH COLLAGEN,) 7-7-1.5 gram pwpk, Take 1 Packet by mouth daily. , Disp: 60 Packet, Rfl: 0    oxyCODONE-acetaminophen (PERCOCET 10)  mg per tablet, Take 1 Tab by mouth every six (6) hours as needed for Pain. Max Daily Amount: 4 Tabs., Disp: 20 Tab, Rfl: 0    omeprazole (PRILOSEC) 40 mg capsule, take 1 capsule by mouth once daily, Disp: , Rfl: 0    chlorzoxazone (PARAFON FORTE) 500 mg tablet, Take 500 mg by mouth two (2) times daily as needed for Muscle Spasm(s). , Disp: , Rfl:     cloNIDine HCl (CATAPRES) 0.1 mg tablet, Take 0.1 mg by mouth two (2) times a day., Disp: , Rfl:     colestipol (COLESTID) 1 gram tablet, Take 6 g by mouth Before breakfast, lunch, dinner and at bedtime. Min 24 g/day and Max 30 g/day. , Disp: , Rfl:     LORazepam (ATIVAN) 1 mg tablet, Take 1 mg by mouth nightly as needed for Anxiety. , Disp: , Rfl:     aspirin-acetaminophen-caffeine (EXCEDRIN ES) 250-250-65 mg per tablet, Take 2 Tabs by mouth every six (6) hours as needed for Headache., Disp: , Rfl:     diphenoxylate-atropine (LOMOTIL) 2.5-0.025 mg per tablet, Take 2 Tabs by mouth Before breakfast, lunch, dinner and at bedtime. , Disp: , Rfl:     loperamide (IMODIUM) 2 mg capsule, Take 2 mg by mouth as needed for Diarrhea. Patient takes 14-16 capsules a day., Disp: , Rfl:     ondansetron hcl (ZOFRAN, AS HYDROCHLORIDE,) 8 mg tablet, Take 8 mg by mouth every eight (8) hours as needed for Nausea., Disp: , Rfl:     multivit-min-FA-lycopen-lutein (CENTRUM SILVER ULTRA MEN'S) 300-600-300 mcg tab, Take 1 Tab by mouth daily. , Disp: , Rfl:     HYDROcodone-acetaminophen (NORCO)  mg tablet, Take 1-2 Tabs by mouth every four (4) hours as needed. Max Daily Amount: 12 Tabs., Disp: 40 Tab, Rfl: 0    morphine CR (MS CONTIN) 15 mg CR tablet, Take 1 Tab by mouth every twelve (12) hours.  Max Daily Amount: 30 mg., Disp: 12 Tab, Rfl: 0    Allergies   Allergen Reactions    Honey Anaphylaxis    Remicade [Infliximab] Anaphylaxis    Crestor [Rosuvastatin] Myalgia    Other Plant, Animal, Environmental Other (comments)     Developed \"boils\" on right arm that required I &D after receiving FENTANYL patch. Is able to take FENTANYL orally; states is allergic to fentanyl patch GLUE    Imuran [Azathioprine] Diarrhea and Nausea Only    Mercaptopurine Diarrhea    Sulfa (Sulfonamide Antibiotics) Other (comments)     Causes diarrhea       Social History     Social History    Marital status: LEGALLY      Spouse name: N/A    Number of children: N/A    Years of education: N/A     Occupational History    Not on file.      Social History Main Topics    Smoking status: Current Every Day Smoker     Packs/day: 1.00     Years: 44.00    Smokeless tobacco: Current User      Comment: CURRENT E CIGS    Alcohol use No      Comment: RARELY    Drug use: No    Sexual activity: Not on file     Other Topics Concern    Not on file     Social History Narrative       Family History   Problem Relation Age of Onset    Stroke Mother     Heart Disease Mother     Kidney Disease Mother     Anesth Problems Mother      TAKES A LONG TIME TO WAKE UP    Heart Disease Father     Thyroid Disease Sister        ROS   Constitutional: negative  Ears, Nose, Mouth, Throat, and Face: negative  Respiratory: negative  Cardiovascular: negative  Gastrointestinal: negative  Genitourinary:negative  Integument/Breast: negative  Hematologic/Lymphatic: negative  Behavioral/Psychiatric: negative  Allergic/Immunologic: negative      Physical Exam:     Visit Vitals    /80    Pulse 82    Temp 98.4 °F (36.9 °C)    Resp 18    Ht 5' 11\" (1.803 m)    Wt 162 lb (73.5 kg)    SpO2 98%    BMI 22.59 kg/m2       General - alert and oriented, no apparent distress  HEENT - no jaundice, no hearing imparement  Pulm - CTAB, no C/W/R  CV - RRR, no M/R/G  Abd - soft, ND, BS present, open portion of the wound is smaller about 1-2 cm wide and 4cm tall, good granulation  Ext - pulses intact in UE and LE bilaterally, no edema  Skin - supple, no rashes  Psychiatric - normal affect, good mood    Labs  none    Imaging  none  I have reviewed and agree with all of the pertinent images    Assessment:     Kristie Brizuela is a 65 y.o. male with open wound s/p laparotomy and THONY and had a large incisional hernia    Recommendations:     1. His wound is healing well and will continue with the wound care with hydrogel and dressing over top. He was again counseled about stopping smoking and says he will try to cut down. He will not have hernia repair till he has been smoke free for 1-2 months. Follow up in 3 wks. Rosie Soria MD    Mr. Marcelina Lawler has a reminder for a \"due or due soon\" health maintenance. I have asked that he contact his primary care provider for follow-up on this health maintenance.

## 2017-12-22 NOTE — PATIENT INSTRUCTIONS
Hernia: Care Instructions  Your Care Instructions    A hernia develops when tissue bulges through a weak spot in the wall of your belly. The groin area and the navel are common areas for a hernia. A hernia can also develop near the area of a surgery you had before. Pressure from lifting, straining, or coughing can tear the weak area, causing the hernia to bulge and be painful. If you cannot push a hernia back into place, the tissue may become trapped outside the belly wall. If the hernia gets twisted and loses its blood supply, it will swell and die. This is called a strangulated hernia. It usually causes a lot of pain. It needs treatment right away. Some hernias need to be repaired to prevent a strangulated hernia. If your hernia causes symptoms or is large, you may need surgery. Follow-up care is a key part of your treatment and safety. Be sure to make and go to all appointments, and call your doctor if you are having problems. It's also a good idea to know your test results and keep a list of the medicines you take. How can you care for yourself at home? · Take care when lifting heavy objects. · Stay at a healthy weight. · Do not smoke. Smoking can cause coughing, which can cause your hernia to bulge. If you need help quitting, talk to your doctor about stop-smoking programs and medicines. These can increase your chances of quitting for good. · Talk with your doctor before wearing a corset or truss for a hernia. These devices are not recommended for treating hernias and sometimes can do more harm than good. There may be certain situations when your doctor thinks a truss would work, but these are rare. When should you call for help? Call your doctor now or seek immediate medical care if:  ? · You have new or worse belly pain. ? · You are vomiting. ? · You cannot pass stools or gas. ? · You cannot push the hernia back into place with gentle pressure when you are lying down.    ? · The area over the hernia turns red or becomes tender. ? Watch closely for changes in your health, and be sure to contact your doctor if you have any problems. Where can you learn more? Go to http://johnathon-marielos.info/. Enter C129 in the search box to learn more about \"Hernia: Care Instructions. \"  Current as of: May 12, 2017  Content Version: 11.4  © 2340-8512 [employer]. Care instructions adapted under license by [employer] (which disclaims liability or warranty for this information). If you have questions about a medical condition or this instruction, always ask your healthcare professional. [address] any warranty or liability for your use of this information.

## 2018-02-14 ENCOUNTER — HOSPITAL ENCOUNTER (EMERGENCY)
Age: 58
Discharge: HOME | End: 2018-02-14
Attending: EMERGENCY MEDICINE
Payer: MEDICARE

## 2018-02-14 ENCOUNTER — APPOINTMENT (OUTPATIENT)
Dept: CT IMAGING | Age: 58
End: 2018-02-14
Attending: EMERGENCY MEDICINE
Payer: MEDICARE

## 2018-02-14 VITALS
TEMPERATURE: 98.1 F | DIASTOLIC BLOOD PRESSURE: 61 MMHG | RESPIRATION RATE: 16 BRPM | OXYGEN SATURATION: 97 % | WEIGHT: 173.8 LBS | SYSTOLIC BLOOD PRESSURE: 112 MMHG | HEART RATE: 70 BPM | BODY MASS INDEX: 24.33 KG/M2 | HEIGHT: 71 IN

## 2018-02-14 DIAGNOSIS — R10.84 ABDOMINAL PAIN, GENERALIZED: Primary | ICD-10-CM

## 2018-02-14 LAB
ALBUMIN SERPL-MCNC: 3.4 G/DL (ref 3.5–5)
ALBUMIN/GLOB SERPL: 0.9 {RATIO} (ref 1.1–2.2)
ALP SERPL-CCNC: 89 U/L (ref 45–117)
ALT SERPL-CCNC: 19 U/L (ref 12–78)
ANION GAP SERPL CALC-SCNC: 7 MMOL/L (ref 5–15)
APPEARANCE UR: CLEAR
AST SERPL-CCNC: 14 U/L (ref 15–37)
BACTERIA URNS QL MICRO: NEGATIVE /HPF
BASOPHILS # BLD: 0 K/UL (ref 0–0.1)
BASOPHILS NFR BLD: 0 % (ref 0–1)
BILIRUB SERPL-MCNC: 0.2 MG/DL (ref 0.2–1)
BILIRUB UR QL: NEGATIVE
BUN SERPL-MCNC: 10 MG/DL (ref 6–20)
BUN/CREAT SERPL: 10 (ref 12–20)
CALCIUM SERPL-MCNC: 8.6 MG/DL (ref 8.5–10.1)
CHLORIDE SERPL-SCNC: 105 MMOL/L (ref 97–108)
CO2 SERPL-SCNC: 26 MMOL/L (ref 21–32)
COLOR UR: ABNORMAL
CREAT SERPL-MCNC: 1.04 MG/DL (ref 0.7–1.3)
DIFFERENTIAL METHOD BLD: ABNORMAL
EOSINOPHIL # BLD: 0.2 K/UL (ref 0–0.4)
EOSINOPHIL NFR BLD: 3 % (ref 0–7)
EPITH CASTS URNS QL MICRO: ABNORMAL /LPF
ERYTHROCYTE [DISTWIDTH] IN BLOOD BY AUTOMATED COUNT: 18.5 % (ref 11.5–14.5)
GLOBULIN SER CALC-MCNC: 3.9 G/DL (ref 2–4)
GLUCOSE SERPL-MCNC: 90 MG/DL (ref 65–100)
GLUCOSE UR STRIP.AUTO-MCNC: NEGATIVE MG/DL
HCT VFR BLD AUTO: 41.2 % (ref 36.6–50.3)
HGB BLD-MCNC: 13.3 G/DL (ref 12.1–17)
HGB UR QL STRIP: ABNORMAL
HYALINE CASTS URNS QL MICRO: ABNORMAL /LPF (ref 0–5)
IMM GRANULOCYTES # BLD: 0 K/UL (ref 0–0.04)
IMM GRANULOCYTES NFR BLD AUTO: 0 % (ref 0–0.5)
KETONES UR QL STRIP.AUTO: NEGATIVE MG/DL
LEUKOCYTE ESTERASE UR QL STRIP.AUTO: NEGATIVE
LIPASE SERPL-CCNC: 196 U/L (ref 73–393)
LYMPHOCYTES # BLD: 2 K/UL (ref 0.8–3.5)
LYMPHOCYTES NFR BLD: 28 % (ref 12–49)
MCH RBC QN AUTO: 28.5 PG (ref 26–34)
MCHC RBC AUTO-ENTMCNC: 32.3 G/DL (ref 30–36.5)
MCV RBC AUTO: 88.4 FL (ref 80–99)
MONOCYTES # BLD: 0.5 K/UL (ref 0–1)
MONOCYTES NFR BLD: 8 % (ref 5–13)
NEUTS SEG # BLD: 4.4 K/UL (ref 1.8–8)
NEUTS SEG NFR BLD: 62 % (ref 32–75)
NITRITE UR QL STRIP.AUTO: NEGATIVE
NRBC # BLD: 0 K/UL (ref 0–0.01)
NRBC BLD-RTO: 0 PER 100 WBC
PH UR STRIP: 5 [PH] (ref 5–8)
PLATELET # BLD AUTO: 295 K/UL (ref 150–400)
PMV BLD AUTO: 9.9 FL (ref 8.9–12.9)
POTASSIUM SERPL-SCNC: 4.4 MMOL/L (ref 3.5–5.1)
PROT SERPL-MCNC: 7.3 G/DL (ref 6.4–8.2)
PROT UR STRIP-MCNC: NEGATIVE MG/DL
RBC # BLD AUTO: 4.66 M/UL (ref 4.1–5.7)
RBC #/AREA URNS HPF: ABNORMAL /HPF (ref 0–5)
SODIUM SERPL-SCNC: 138 MMOL/L (ref 136–145)
SP GR UR REFRACTOMETRY: 1.03 (ref 1–1.03)
UR CULT HOLD, URHOLD: NORMAL
UROBILINOGEN UR QL STRIP.AUTO: 0.2 EU/DL (ref 0.2–1)
WBC # BLD AUTO: 7.1 K/UL (ref 4.1–11.1)
WBC URNS QL MICRO: ABNORMAL /HPF (ref 0–4)

## 2018-02-14 PROCEDURE — 83690 ASSAY OF LIPASE: CPT | Performed by: EMERGENCY MEDICINE

## 2018-02-14 PROCEDURE — 80053 COMPREHEN METABOLIC PANEL: CPT | Performed by: EMERGENCY MEDICINE

## 2018-02-14 PROCEDURE — 74177 CT ABD & PELVIS W/CONTRAST: CPT

## 2018-02-14 PROCEDURE — 99284 EMERGENCY DEPT VISIT MOD MDM: CPT

## 2018-02-14 PROCEDURE — 74011636320 HC RX REV CODE- 636/320: Performed by: EMERGENCY MEDICINE

## 2018-02-14 PROCEDURE — 96361 HYDRATE IV INFUSION ADD-ON: CPT

## 2018-02-14 PROCEDURE — 74011250636 HC RX REV CODE- 250/636: Performed by: PHYSICIAN ASSISTANT

## 2018-02-14 PROCEDURE — 36415 COLL VENOUS BLD VENIPUNCTURE: CPT | Performed by: EMERGENCY MEDICINE

## 2018-02-14 PROCEDURE — 74011250636 HC RX REV CODE- 250/636: Performed by: EMERGENCY MEDICINE

## 2018-02-14 PROCEDURE — 74011000258 HC RX REV CODE- 258: Performed by: EMERGENCY MEDICINE

## 2018-02-14 PROCEDURE — 81001 URINALYSIS AUTO W/SCOPE: CPT | Performed by: EMERGENCY MEDICINE

## 2018-02-14 PROCEDURE — 96374 THER/PROPH/DIAG INJ IV PUSH: CPT

## 2018-02-14 PROCEDURE — 96375 TX/PRO/DX INJ NEW DRUG ADDON: CPT

## 2018-02-14 PROCEDURE — 85025 COMPLETE CBC W/AUTO DIFF WBC: CPT | Performed by: EMERGENCY MEDICINE

## 2018-02-14 RX ORDER — ONDANSETRON 2 MG/ML
4 INJECTION INTRAMUSCULAR; INTRAVENOUS
Status: COMPLETED | OUTPATIENT
Start: 2018-02-14 | End: 2018-02-14

## 2018-02-14 RX ORDER — SODIUM CHLORIDE 0.9 % (FLUSH) 0.9 %
10 SYRINGE (ML) INJECTION
Status: CANCELLED | OUTPATIENT
Start: 2018-02-14 | End: 2018-02-14

## 2018-02-14 RX ORDER — SODIUM CHLORIDE 9 MG/ML
1000 INJECTION, SOLUTION INTRAVENOUS ONCE
Status: COMPLETED | OUTPATIENT
Start: 2018-02-14 | End: 2018-02-14

## 2018-02-14 RX ORDER — HYDROMORPHONE HYDROCHLORIDE 1 MG/ML
1 INJECTION, SOLUTION INTRAMUSCULAR; INTRAVENOUS; SUBCUTANEOUS
Status: COMPLETED | OUTPATIENT
Start: 2018-02-14 | End: 2018-02-14

## 2018-02-14 RX ORDER — SODIUM CHLORIDE 0.9 % (FLUSH) 0.9 %
10 SYRINGE (ML) INJECTION
Status: COMPLETED | OUTPATIENT
Start: 2018-02-14 | End: 2018-02-14

## 2018-02-14 RX ADMIN — IOPAMIDOL 100 ML: 755 INJECTION, SOLUTION INTRAVENOUS at 19:22

## 2018-02-14 RX ADMIN — Medication 10 ML: at 19:22

## 2018-02-14 RX ADMIN — SODIUM CHLORIDE 1000 ML: 900 INJECTION, SOLUTION INTRAVENOUS at 20:55

## 2018-02-14 RX ADMIN — SODIUM CHLORIDE 100 ML: 900 INJECTION, SOLUTION INTRAVENOUS at 19:22

## 2018-02-14 RX ADMIN — ONDANSETRON 4 MG: 2 INJECTION INTRAMUSCULAR; INTRAVENOUS at 20:55

## 2018-02-14 RX ADMIN — HYDROMORPHONE HYDROCHLORIDE 1 MG: 1 INJECTION, SOLUTION INTRAMUSCULAR; INTRAVENOUS; SUBCUTANEOUS at 20:55

## 2018-02-14 NOTE — ED NOTES
5:27 PM  I have evaluated the patient as the Provider in Triage. I have reviewed His vital signs and the triage nurse assessment. I have talked with the patient and any available family and advised that I am the provider in triage and have ordered the appropriate study to initiate their work up based on the clinical presentation during my assessment. I have advised that the patient will be accommodated in the Main ED as soon as possible. I have also requested to contact the triage nurse or myself immediately if the patient experiences any changes in their condition during this brief waiting period. Pt presenting for worsening upper abdominal pain. Pain located across upper abdomen. Pain sharp to tearing. Pain does not radiate to back. No chest pain, shortness of breath or difficulty breathing. No nausea, vomiting or diarrhea. No back pain or urinary symptoms. Pt takes percocet and morphine at home. Pt indicating no relief. No known precipitating events.   P: labs, iv fluid, cT  Felicity Dang PA-C

## 2018-02-14 NOTE — ED TRIAGE NOTES
Triage note: Pt states he had SBO surgery 2 months ago and he feels like something is tearing from under his ribs and that he can't stand straight or get comfortable. Pt states the pain started 2 days ago.

## 2018-02-15 NOTE — ED PROVIDER NOTES
HPI Comments: 65 y.o. male with extensive past medical history, please see list, significant for Crohn's disease, arthritis, migraine, chronic generalized pain, anemia, hypertension, anxiety, depression, COPD, GERD, short bowel syndrome, and ventral hernia without obstruction or gangrene who presents from home with chief complaint of abdominal pain. Patient states onset of moderate generalized abdominal pain that is aggravated upon palpation over the past 2 days. Pt admitted in Nov 2017 for bowel obx secondary to intussusception. Prior to this patient notes his last episode of obstruction was over 2 years ago. Patient denies Cp or SOB; no fever and chills. There are no other acute medical concerns at this time.     Old Chart Review: Per note, patient was admitted from 11/2/17 for large bowel obx/intussusception; and 02/14/16 through 03/1/16 for enterocutaneous fistula, Crohn's disease, short bowel syndrome, and malnutrition. Patient has hx of multiple abdominal surgeries inclunding removal of abdominal wall abscess.     Social hx: Tobacco Use: Yes (1 pack per day), Alcohol Use: No (rarely), Drug Use: No     PCP: Del Spurling, MD  Surgeon: Dr Gage France rectal Dr [facility]        Patient is a 65 y.o. male presenting with abdominal pain. The history is provided by the patient. Abdominal Pain    The current episode started more than 2 days ago. The problem occurs constantly. The pain is located in the generalized abdominal region. The pain is at a severity of 9/10. The pain is moderate. Pertinent negatives include no fever, no vomiting, no constipation, no dysuria, no frequency, no hematuria, no headaches, no myalgias, no chest pain and no back pain. Past Medical History:   Diagnosis Date    Abdominal wall hernia 6/15/2012    Anal fissure     Anemia     Anemia     Anxiety and depression     Arthritis     Related to the Crohn's disease.     Cancer (HCC)     MELANOMA HEAD AND FACE    Chronic pain GENERALIZED    COPD (chronic obstructive pulmonary disease) (HCC)     Crohn disease (HCC)     Diagnosed at age 17.  Echocardiogram normal (EF: 55-60%) 07/2015    Esophageal ulcer     GERD (gastroesophageal reflux disease)     H/O blood transfusion 2013    [address] [facility]    Hypertension     denies    Migraine     Short bowel syndrome     Ventral hernia without obstruction or gangrene        Past Surgical History:   Procedure Laterality Date    ABDOMEN SURGERY PROC UNLISTED      33 abdominal operations for treatment of Crohn's disease and its complications.  HC NDL CEJA      ceja needle, takes 1 inch needle    HX APPENDECTOMY      HX CHOLECYSTECTOMY      HX GI      colectomy x2, one ileostomy    HX GI  7/28/14    exp lap,partial colectomy with end ileostomy by Dr Jean-Paul Schaefer      neck fusion    HX ORTHOPAEDIC  1980s    wrist right,     HX ORTHOPAEDIC  2006, 11/2013    neck, L4 L5 L6    HX ORTHOPAEDIC  1990s    right knee scope    HX OTHER SURGICAL      surgical repair from spider bite    HX OTHER SURGICAL  12/1/14    Incision and drainage of posterior right subcutaneous shoulder abscess    HX OTHER SURGICAL  2014    LEFT INDEX FINGER SPIDER BITE    HX OTHER SURGICAL  2014    RECTAL FISTULA    HX OTHER SURGICAL  3/17/2015    Ileostomy takedown with extensive lysis of adhesions (greater than three hours), mobilization of the splenic flexure, left colon resection, ileocolic anastomosis, and excision of scar; Dr. Danelle Lira.  HX OTHER SURGICAL  3/22/2015    Exploratory laparotomy with washout of abdomen, suture repair of small bowel/anastomosis, and diverting protective loop ileostomy; Griselda Galvan MD.    HX OTHER SURGICAL  4/9/2015    Laparotomy, extensive lysis of adhesions, abdominal lavage, and resection of ileocolic anastomosis (including the efferent limb of the loop ileostomy) with creation of Demetrius's pouch; Dr. Danelle Lira.     HX OTHER SURGICAL  7/14/2015    Cystoscopy and placement of bilateral ureteral catheters; Dallin Cornejo MD.     OTHER SURGICAL  7/14/2015    Ileostomy takedown with extensive lysis of adhesions, small bowel resection, and enterocolostomy; Dr. Araseli Samuel.   OTHER SURGICAL  9/29/2015    CT-guided percutaneous drainage of intraabdominal abscess; Dr. Juanjose Meyer.   OTHER SURGICAL  11/16/2015    CT-guided percutaneous aspiration of abdominal wall cavity; Dr. Samantha Sam.   OTHER SURGICAL  12/1/2015    Incision and drainage of abdominal wall abscess; Dr. Araseli Samuel.   OTHER SURGICAL  2/11/2016    Incision and drainage of abdominal wall abscess; Dr. Araseli Samuel.   OTHER SURGICAL  2/23/2016    Cystoscopy and placement of bilateral temporary ureteral catheters; Dr. Cristian Kay.   OTHER SURGICAL  2/23/2016    Exploratory laparotomy, extensive lysis of adhesions, removal of mesh, and repair of enterocutaneous fistula; Dr. Araseli Samuel.   OTHER SURGICAL  11/03/2017    Exploratory laparotomy, extensive lysis of adhesions, and reduction of intussusception; Dr. Araseli Samuel. Family History:   Problem Relation Age of Onset   [address] [facility] [address] Stroke Mother     Heart Disease Mother     Kidney Disease Mother     Anesth Problems Mother      TAKES A LONG TIME TO WAKE UP    Heart Disease Father     Thyroid Disease Sister        Social History     Social History    Marital status: LEGALLY      Spouse name: N/A    Number of children: N/A    Years of education: N/A     Occupational History    Not on file. Social History Main Topics    Smoking status: Current Every Day Smoker     Packs/day: 1.00     Years: 44.00    Smokeless tobacco: Current User      Comment: CURRENT E CIGS    Alcohol use No      Comment: RARELY    Drug use: No    Sexual activity: Not on file     Other Topics Concern    Not on file     Social History Narrative         ALLERGIES: Honey; Remicade [infliximab];  Crestor [rosuvastatin]; Other plant, animal, environmental; Imuran [azathioprine]; Mercaptopurine; and Sulfa (sulfonamide antibiotics)    Review of Systems   Constitutional: Negative. Negative for fever. HENT: Negative for ear discharge. Eyes: Negative for photophobia, pain, discharge and visual disturbance. Respiratory: Negative for apnea, cough, chest tightness and shortness of breath. Cardiovascular: Negative for chest pain, palpitations and leg swelling. Gastrointestinal: Positive for abdominal pain. Negative for blood in stool, constipation and vomiting. Genitourinary: Negative for difficulty urinating, dysuria, flank pain, frequency and hematuria. Musculoskeletal: Negative for back pain, gait problem, joint swelling, myalgias and neck pain. Skin: Positive for wound. Negative for color change and pallor. Neurological: Negative for dizziness, syncope, weakness, numbness and headaches. Psychiatric/Behavioral: Negative for behavioral problems and confusion. The patient is not nervous/anxious. Vitals:    02/14/18 1720   BP: 117/75   Pulse: 74   Resp: 20   Temp: 98 °F (36.7 °C)   SpO2: 98%   Weight: 78.8 kg (173 lb 12.8 oz)   Height: 5' 11\" (1.803 m)            Physical Exam   Constitutional: He is oriented to person, place, and time. He appears well-developed and well-nourished. He appears distressed. HENT:   Head: Normocephalic and atraumatic. Right Ear: External ear normal.   Left Ear: External ear normal.   Nose: Nose normal.   Mouth/Throat: Oropharynx is clear and moist.   Eyes: Conjunctivae and EOM are normal. Pupils are equal, round, and reactive to light. Right eye exhibits no discharge. Left eye exhibits no discharge. Neck: Normal range of motion. Neck supple. Cardiovascular: Normal rate, regular rhythm, normal heart sounds and intact distal pulses. Pulmonary/Chest: Effort normal and breath sounds normal.   Abdominal: There is tenderness (throughout ).  There is no rebound and no guarding. Well healed surgical scars    Reducible umbilical hernia noted   Musculoskeletal: Normal range of motion. He exhibits no edema or tenderness. Neurological: He is alert and oriented to person, place, and time. No cranial nerve deficit. Coordination normal.   Skin: Skin is warm and dry. No rash noted. Psychiatric: He has a normal mood and affect. His behavior is normal. Judgment and thought content normal.   Nursing note and vitals reviewed. MDM  Number of Diagnoses or Management Options  Abdominal pain, generalized:      Amount and/or Complexity of Data Reviewed  Clinical lab tests: ordered and reviewed  Tests in the radiology section of CPT®: ordered and reviewed  Decide to obtain previous medical records or to obtain history from someone other than the patient: yes  Review and summarize past medical records: yes  Discuss the patient with other providers: yes  Independent visualization of images, tracings, or specimens: yes          ED Course       Procedures    Spoke to Dr Kiki Ogden; will review scans and discuss with Dr Ana Lucas. DESHAWN Mckeon Dr in to see; abd soft with negative CT; states to also consult Dr Marylene Larve. DESHAWN Mckeon    Discussed with Dr Marylene Larve as he last did surgery on pt; pain improved and negative CT; with no acute findings does not feel his speciality is needed tonight. DESHAWN Mckeon    Patient has been reassessed. Feeling better; sleeping in room. DESHAWN Mckeon    Patient has been reassessed. Feeling better. Reviewed labs, medications and radiographics with patient. Ready to discharge home. Has pain meds at home; will follow up; return if any changes or worsening of symptoms. DESHAWN Mckeon    Discussed case with attending Physician Bigg Nelson. Agrees with care and will D/C with follow up. Patient's results have been reviewed with them.   Patient and/or family have verbally conveyed their understanding and agreement of the patient's signs, symptoms, diagnosis, treatment and prognosis and additionally agree to follow up as recommended or return to the Emergency Room should their condition change prior to follow-up. Discharge instructions have also been provided to the patient with some educational information regarding their diagnosis as well a list of reasons why they would want to return to the ER prior to their follow-up appointment should their condition change.   Jamie Rapp, PA

## 2018-02-15 NOTE — CONSULTS
Chief Complaint:  Abdominal pain    HPI: 66 yo man with hx Crohn's and multiple abdominal operations with loss of domain presented to ED with abdominal pain. Pt reported Dr. Kelsey Jolly performed multiple operations on him and felt like some of the sutures on both lateral sides of his abdomen pop. Pt had large loss of domain ventral hernia without obstruction. Pt denied any fever, chills, nausea or vomiting. Past Medical History:   Diagnosis Date    Abdominal wall hernia 6/15/2012    Anal fissure     Anemia     Anemia     Anxiety and depression     Arthritis     Related to the Crohn's disease.  Cancer ([address]     MELANOMA HEAD AND FACE    Chronic pain     GENERALIZED    COPD (chronic obstructive pulmonary disease) (HCC)     Crohn disease (HCC)     Diagnosed at age 17.  Echocardiogram normal (EF: 55-60%) 07/2015    Esophageal ulcer     GERD (gastroesophageal reflux disease)     H/O blood transfusion 2013    [address] [facility]    Hypertension     denies    Migraine     Short bowel syndrome     Ventral hernia without obstruction or gangrene        Past Surgical History:   Procedure Laterality Date    ABDOMEN SURGERY PROC UNLISTED      33 abdominal operations for treatment of Crohn's disease and its complications.     HC NDL CEJA      ceja needle, takes 1 inch needle    HX APPENDECTOMY      HX CHOLECYSTECTOMY      HX GI      colectomy x2, one ileostomy    HX GI  7/28/14    exp lap,partial colectomy with end ileostomy by Dr Jackie Laughlin      neck fusion    HX ORTHOPAEDIC  1980s    wrist right,     HX ORTHOPAEDIC  2006, 11/2013    neck, L4 L5 L6    HX ORTHOPAEDIC  1990s    right knee scope    HX OTHER SURGICAL      surgical repair from spider bite    HX OTHER SURGICAL  12/1/14    Incision and drainage of posterior right subcutaneous shoulder abscess    HX OTHER SURGICAL  2014    LEFT INDEX FINGER SPIDER BITE    HX OTHER SURGICAL  2014    RECTAL FISTULA    HX OTHER SURGICAL  3/17/2015    Ileostomy takedown with extensive lysis of adhesions (greater than three hours), mobilization of the splenic flexure, left colon resection, ileocolic anastomosis, and excision of scar; Dr. Zena Kent.   OTHER SURGICAL  3/22/2015    Exploratory laparotomy with washout of abdomen, suture repair of small bowel/anastomosis, and diverting protective loop ileostomy; Shaan Bryan MD.    HX OTHER SURGICAL  4/9/2015    Laparotomy, extensive lysis of adhesions, abdominal lavage, and resection of ileocolic anastomosis (including the efferent limb of the loop ileostomy) with creation of Demetrius's pouch; Dr. Zena Kent.   OTHER SURGICAL  7/14/2015    Cystoscopy and placement of bilateral ureteral catheters; Priti Erickson MD.     OTHER SURGICAL  7/14/2015    Ileostomy takedown with extensive lysis of adhesions, small bowel resection, and enterocolostomy; Dr. Zena Kent.   OTHER SURGICAL  9/29/2015    CT-guided percutaneous drainage of intraabdominal abscess; Dr. Ranjan Iverson.   OTHER SURGICAL  11/16/2015    CT-guided percutaneous aspiration of abdominal wall cavity; Dr. Sarah Baird.   OTHER SURGICAL  12/1/2015    Incision and drainage of abdominal wall abscess; Dr. Zena Kent.   OTHER SURGICAL  2/11/2016    Incision and drainage of abdominal wall abscess; Dr. Zena Kent.   OTHER SURGICAL  2/23/2016    Cystoscopy and placement of bilateral temporary ureteral catheters; Dr. Jadon Padilla.   OTHER SURGICAL  2/23/2016    Exploratory laparotomy, extensive lysis of adhesions, removal of mesh, and repair of enterocutaneous fistula; Dr. Zena Kent.   OTHER SURGICAL  11/03/2017    Exploratory laparotomy, extensive lysis of adhesions, and reduction of intussusception; Dr. Zena Kent. No current facility-administered medications on file prior to encounter.       Current Outpatient Prescriptions on File Prior to Encounter   Medication Sig Dispense Refill  jdcog-uwdg-XhRSL-collag-mv-min (FLOR, WITH COLLAGEN,) 7-7-1.5 gram pwpk Take 1 Packet by mouth daily. 60 Packet 0    oxyCODONE-acetaminophen (PERCOCET 10)  mg per tablet Take 1 Tab by mouth every six (6) hours as needed for Pain. Max Daily Amount: 4 Tabs. 20 Tab 0    omeprazole (PRILOSEC) 40 mg capsule take 1 capsule by mouth once daily  0    HYDROcodone-acetaminophen (NORCO)  mg tablet Take 1-2 Tabs by mouth every four (4) hours as needed. Max Daily Amount: 12 Tabs. 40 Tab 0    morphine CR (MS CONTIN) 15 mg CR tablet Take 1 Tab by mouth every twelve (12) hours. Max Daily Amount: 30 mg. 12 Tab 0    chlorzoxazone (PARAFON FORTE) 500 mg tablet Take 500 mg by mouth two (2) times daily as needed for Muscle Spasm(s).  cloNIDine HCl (CATAPRES) 0.1 mg tablet Take 0.1 mg by mouth two (2) times a day.  colestipol (COLESTID) 1 gram tablet Take 6 g by mouth Before breakfast, lunch, dinner and at bedtime. Min 24 g/day and Max 30 g/day.  LORazepam (ATIVAN) 1 mg tablet Take 1 mg by mouth nightly as needed for Anxiety.  aspirin-acetaminophen-caffeine (EXCEDRIN ES) 250-250-65 mg per tablet Take 2 Tabs by mouth every six (6) hours as needed for Headache.  diphenoxylate-atropine (LOMOTIL) 2.5-0.025 mg per tablet Take 2 Tabs by mouth Before breakfast, lunch, dinner and at bedtime.  loperamide (IMODIUM) 2 mg capsule Take 2 mg by mouth as needed for Diarrhea. Patient takes 14-16 capsules a day.  ondansetron hcl (ZOFRAN, AS HYDROCHLORIDE,) 8 mg tablet Take 8 mg by mouth every eight (8) hours as needed for Nausea.  multivit-min-FA-lycopen-lutein (CENTRUM SILVER ULTRA MEN'S) 300-600-300 mcg tab Take 1 Tab by mouth daily.          Allergies   Allergen Reactions    Honey Anaphylaxis    Remicade [Infliximab] Anaphylaxis    Crestor [Rosuvastatin] Myalgia    Other Plant, Animal, Environmental Other (comments)     Developed \"boils\" on right arm that required I &D after receiving FENTANYL patch. Is able to take FENTANYL orally; states is allergic to fentanyl patch GLUE    Imuran [Azathioprine] Diarrhea and Nausea Only    Mercaptopurine Diarrhea    Sulfa (Sulfonamide Antibiotics) Other (comments)     Causes diarrhea       Review of Systems - General ROS: negative  Psychological ROS: negative  Respiratory ROS: negative  Cardiovascular ROS: negative  Gastrointestinal ROS: positive for - abdominal pain     Visit Vitals    /75 (BP 1 Location: Left arm, BP Patient Position: Sitting)    Pulse 74    Temp 98 °F (36.7 °C)    Resp 20    Ht 5' 11\" (1.803 m)    Wt 173 lb 12.8 oz (78.8 kg)    SpO2 98%    BMI 24.24 kg/m2         Physical Exam:    Gen:  NAD  Pulm:  Unlabored  Abd:  S/ND/mild TTP in LUQ and RUQ. No guarding or rebound    Recent Results (from the past 24 hour(s))   METABOLIC PANEL, COMPREHENSIVE    Collection Time: 02/14/18  5:46 PM   Result Value Ref Range    Sodium 138 136 - 145 mmol/L    Potassium 4.4 3.5 - 5.1 mmol/L    Chloride 105 97 - 108 mmol/L    CO2 26 21 - 32 mmol/L    Anion gap 7 5 - 15 mmol/L    Glucose 90 65 - 100 mg/dL    BUN 10 6 - 20 MG/DL    Creatinine 1.04 0.70 - 1.30 MG/DL    BUN/Creatinine ratio 10 (L) 12 - 20      GFR est AA >60 >60 ml/min/1.73m2    GFR est non-AA >60 >60 ml/min/1.73m2    Calcium 8.6 8.5 - 10.1 MG/DL    Bilirubin, total 0.2 0.2 - 1.0 MG/DL    ALT (SGPT) 19 12 - 78 U/L    AST (SGOT) 14 (L) 15 - 37 U/L    Alk.  phosphatase 89 45 - 117 U/L    Protein, total 7.3 6.4 - 8.2 g/dL    Albumin 3.4 (L) 3.5 - 5.0 g/dL    Globulin 3.9 2.0 - 4.0 g/dL    A-G Ratio 0.9 (L) 1.1 - 2.2     LIPASE    Collection Time: 02/14/18  5:46 PM   Result Value Ref Range    Lipase 196 73 - 393 U/L   CBC WITH AUTOMATED DIFF    Collection Time: 02/14/18  5:46 PM   Result Value Ref Range    WBC 7.1 4.1 - 11.1 K/uL    RBC 4.66 4.10 - 5.70 M/uL    HGB 13.3 12.1 - 17.0 g/dL    HCT 41.2 36.6 - 50.3 %    MCV 88.4 80.0 - 99.0 FL    MCH 28.5 26.0 - 34.0 PG    MCHC 32.3 30.0 - 36.5 g/dL    RDW 18.5 (H) 11.5 - 14.5 %    PLATELET 089 460 - 610 K/uL    MPV 9.9 8.9 - 12.9 FL    NRBC 0.0 0  WBC    ABSOLUTE NRBC 0.00 0.00 - 0.01 K/uL    NEUTROPHILS 62 32 - 75 %    LYMPHOCYTES 28 12 - 49 %    MONOCYTES 8 5 - 13 %    EOSINOPHILS 3 0 - 7 %    BASOPHILS 0 0 - 1 %    IMMATURE GRANULOCYTES 0 0.0 - 0.5 %    ABS. NEUTROPHILS 4.4 1.8 - 8.0 K/UL    ABS. LYMPHOCYTES 2.0 0.8 - 3.5 K/UL    ABS. MONOCYTES 0.5 0.0 - 1.0 K/UL    ABS. EOSINOPHILS 0.2 0.0 - 0.4 K/UL    ABS. BASOPHILS 0.0 0.0 - 0.1 K/UL    ABS. IMM. GRANS. 0.0 0.00 - 0.04 K/UL    DF AUTOMATED         AP: 64 yo man with abdominal pain    - Abdominal pain:  No acute surgical issues. There is no clinical or radiologic evidence to account for patient's reported pain  - Recommend pain control  - Consider discussing patient's presentation with his operative surgeon.

## 2018-02-15 NOTE — DISCHARGE INSTRUCTIONS
Abdominal Pain: Care Instructions  Your Care Instructions    Abdominal pain has many possible causes. Some aren't serious and get better on their own in a few days. Others need more testing and treatment. If your pain continues or gets worse, you need to be rechecked and may need more tests to find out what is wrong. You may need surgery to correct the problem. Don't ignore new symptoms, such as fever, nausea and vomiting, urination problems, pain that gets worse, and dizziness. These may be signs of a more serious problem. Your doctor may have recommended a follow-up visit in the next 8 to 12 hours. If you are not getting better, you may need more tests or treatment. The doctor has checked you carefully, but problems can develop later. If you notice any problems or new symptoms, get medical treatment right away. Follow-up care is a key part of your treatment and safety. Be sure to make and go to all appointments, and call your doctor if you are having problems. It's also a good idea to know your test results and keep a list of the medicines you take. How can you care for yourself at home? · Rest until you feel better. · To prevent dehydration, drink plenty of fluids, enough so that your urine is light yellow or clear like water. Choose water and other caffeine-free clear liquids until you feel better. If you have kidney, heart, or liver disease and have to limit fluids, talk with your doctor before you increase the amount of fluids you drink. · If your stomach is upset, eat mild foods, such as rice, dry toast or crackers, bananas, and applesauce. Try eating several small meals instead of two or three large ones. · Wait until 48 hours after all symptoms have gone away before you have spicy foods, alcohol, and drinks that contain caffeine. · Do not eat foods that are high in fat. · Avoid anti-inflammatory medicines such as aspirin, ibuprofen (Advil, Motrin), and naproxen (Aleve).  These can cause stomach upset. Talk to your doctor if you take daily aspirin for another health problem. When should you call for help? Call 911 anytime you think you may need emergency care. For example, call if:  ? · You passed out (lost consciousness). ? · You pass maroon or very bloody stools. ? · You vomit blood or what looks like coffee grounds. ? · You have new, severe belly pain. ?Call your doctor now or seek immediate medical care if:  ? · Your pain gets worse, especially if it becomes focused in one area of your belly. ? · You have a new or higher fever. ? · Your stools are black and look like tar, or they have streaks of blood. ? · You have unexpected vaginal bleeding. ? · You have symptoms of a urinary tract infection. These may include:  ¨ Pain when you urinate. ¨ Urinating more often than usual.  ¨ Blood in your urine. ? · You are dizzy or lightheaded, or you feel like you may faint. ? Watch closely for changes in your health, and be sure to contact your doctor if:  ? · You are not getting better after 1 day (24 hours). Where can you learn more? Go to http://johnathonOzura Worldmarielos.info/. Enter V926 in the search box to learn more about \"Abdominal Pain: Care Instructions. \"  Current as of: March 20, 2017  Content Version: 11.4  © 0839-0572 [employer]. Care instructions adapted under license by [employer] (which disclaims liability or warranty for this information). If you have questions about a medical condition or this instruction, always ask your healthcare professional. [place] [address] any warranty or liability for your use of this information. We hope that we have addressed all of your medical concerns. The examination and treatment you received in the Emergency Department were for an emergent problem and were not intended as complete care. It is important that you follow up with your healthcare provider(s) for ongoing care.  If your symptoms worsen or do not improve as expected, and you are unable to reach your usual health care provider(s), you should return to the Emergency Department. Today's healthcare is undergoing tremendous change, and patient satisfaction surveys are one of the many tools to assess the quality of medical care. You may receive a survey from the [employer] regarding your experience in the Emergency Department. I hope that your experience has been completely positive, particularly the medical care that I provided. As such, please participate in the survey; anything less than excellent does not meet my expectations or intentions. [address] and [facility] participate in nationally recognized quality of care measures. If your blood pressure is greater than 120/80, as reported below, we urge that you seek medical care to address the potential of high blood pressure, commonly known as hypertension. Hypertension can be hereditary or can be caused by certain medical conditions, pain, stress, or \"white coat syndrome. \"       Please make an appointment with your health care provider(s) for follow up of your Emergency Department visit. VITALS:   Patient Vitals for the past 8 hrs:   Temp Pulse Resp BP SpO2   02/14/18 2235 98.1 °F (36.7 °C) 70 16 112/61 97 %   02/14/18 1720 98 °F (36.7 °C) 74 20 117/75 98 %          Thank you for allowing us to provide you with medical care today. We realize that you have many choices for your emergency care needs. Please choose us in the future for any continued health care needs. Irene Crocker, [address] [place] [address] 20.   Office: [phone]            Recent Results (from the past 24 hour(s))   METABOLIC PANEL, COMPREHENSIVE    Collection Time: 02/14/18  5:46 PM   Result Value Ref Range    Sodium 138 136 - 145 mmol/L    Potassium 4.4 3.5 - 5.1 mmol/L    Chloride 105 97 - 108 mmol/L    CO2 26 21 - 32 mmol/L    Anion gap 7 5 - 15 mmol/L    Glucose 90 65 - 100 mg/dL    BUN 10 6 - 20 MG/DL    Creatinine 1.04 0.70 - 1.30 MG/DL    BUN/Creatinine ratio 10 (L) 12 - 20      GFR est AA >60 >60 ml/min/1.73m2    GFR est non-AA >60 >60 ml/min/1.73m2    Calcium 8.6 8.5 - 10.1 MG/DL    Bilirubin, total 0.2 0.2 - 1.0 MG/DL    ALT (SGPT) 19 12 - 78 U/L    AST (SGOT) 14 (L) 15 - 37 U/L    Alk. phosphatase 89 45 - 117 U/L    Protein, total 7.3 6.4 - 8.2 g/dL    Albumin 3.4 (L) 3.5 - 5.0 g/dL    Globulin 3.9 2.0 - 4.0 g/dL    A-G Ratio 0.9 (L) 1.1 - 2.2     LIPASE    Collection Time: 02/14/18  5:46 PM   Result Value Ref Range    Lipase 196 73 - 393 U/L   CBC WITH AUTOMATED DIFF    Collection Time: 02/14/18  5:46 PM   Result Value Ref Range    WBC 7.1 4.1 - 11.1 K/uL    RBC 4.66 4.10 - 5.70 M/uL    HGB 13.3 12.1 - 17.0 g/dL    HCT 41.2 36.6 - 50.3 %    MCV 88.4 80.0 - 99.0 FL    MCH 28.5 26.0 - 34.0 PG    MCHC 32.3 30.0 - 36.5 g/dL    RDW 18.5 (H) 11.5 - 14.5 %    PLATELET 317 113 - 370 K/uL    MPV 9.9 8.9 - 12.9 FL    NRBC 0.0 0  WBC    ABSOLUTE NRBC 0.00 0.00 - 0.01 K/uL    NEUTROPHILS 62 32 - 75 %    LYMPHOCYTES 28 12 - 49 %    MONOCYTES 8 5 - 13 %    EOSINOPHILS 3 0 - 7 %    BASOPHILS 0 0 - 1 %    IMMATURE GRANULOCYTES 0 0.0 - 0.5 %    ABS. NEUTROPHILS 4.4 1.8 - 8.0 K/UL    ABS. LYMPHOCYTES 2.0 0.8 - 3.5 K/UL    ABS. MONOCYTES 0.5 0.0 - 1.0 K/UL    ABS. EOSINOPHILS 0.2 0.0 - 0.4 K/UL    ABS. BASOPHILS 0.0 0.0 - 0.1 K/UL    ABS. IMM.  GRANS. 0.0 0.00 - 0.04 K/UL    DF AUTOMATED     URINALYSIS W/MICROSCOPIC    Collection Time: 02/14/18  9:44 PM   Result Value Ref Range    Color YELLOW/STRAW      Appearance CLEAR CLEAR      Specific gravity 1.028 1.003 - 1.030      pH (UA) 5.0 5.0 - 8.0      Protein NEGATIVE  NEG mg/dL    Glucose NEGATIVE  NEG mg/dL    Ketone NEGATIVE  NEG mg/dL    Bilirubin NEGATIVE  NEG      Blood TRACE (A) NEG      Urobilinogen 0.2 0.2 - 1.0 EU/dL    Nitrites NEGATIVE  NEG Leukocyte Esterase NEGATIVE  NEG      WBC 0-4 0 - 4 /hpf    RBC 0-5 0 - 5 /hpf    Epithelial cells FEW FEW /lpf    Bacteria NEGATIVE  NEG /hpf    Hyaline cast 0-2 0 - 5 /lpf   URINE CULTURE HOLD SAMPLE    Collection Time: 02/14/18  9:44 PM   Result Value Ref Range    Urine culture hold        URINE ON HOLD IN MICROBIOLOGY DEPT FOR 3 DAYS. IF UNPRESERVED URINE IS SUBMITTED, IT CANNOT BE USED FOR ADDITIONAL TESTING AFTER 24 HRS, RECOLLECTION WILL BE REQUIRED. Ct Abd Pelv W Cont    Result Date: 2/14/2018  INDICATION: generalized upper abdominal pain EXAM: CT Abdomen and CT Pelvis are performed with 100 mL Isovue 370 contrast IV without complication. CT dose reduction was achieved through use of a standardized protocol tailored for this examination and automatic exposure control for dose modulation. FINDINGS: Rectosigmoid is fluid-filled without inflammatory mural thickening or significant distention. Small bowel is nondilated, without inflammation. There is no abdominal wall hernia. There is no ascites, free air or significant adenopathy. Liver shows no significant finding. Bile ducts are not enlarged. Pancreas shows no mass or inflammation. Spleen is unremarkable. Adrenal glands are normal in size. Kidneys show no mass or hydronephrosis. Aorta shows no aneurysm. The bladder is midline and the distal ureters are not dilated. There is no apparent pelvic mass. IMPRESSION: No Acute Disease.

## 2018-02-15 NOTE — ED NOTES
CT came for pt but he was not in the waiting room. RN found pt outside attempting to smoke a cigarette. Pt informed it is a non-smoking facility and that he was being called for a test.  Pt back to the department and KIRAN to CT.

## 2018-04-12 ENCOUNTER — HOSPITAL ENCOUNTER (OUTPATIENT)
Dept: CT IMAGING | Age: 58
Discharge: HOME | End: 2018-04-12
Attending: COLON & RECTAL SURGERY
Payer: MEDICARE

## 2018-04-12 DIAGNOSIS — R10.9 ABDOMINAL PAIN: ICD-10-CM

## 2018-04-12 DIAGNOSIS — K50.90 CROHN'S DISEASE (HCC): ICD-10-CM

## 2018-04-12 PROCEDURE — 74011636320 HC RX REV CODE- 636/320: Performed by: COLON & RECTAL SURGERY

## 2018-04-12 PROCEDURE — 74177 CT ABD & PELVIS W/CONTRAST: CPT

## 2018-04-12 PROCEDURE — 74011000258 HC RX REV CODE- 258: Performed by: COLON & RECTAL SURGERY

## 2018-04-12 RX ORDER — SODIUM CHLORIDE 0.9 % (FLUSH) 0.9 %
10 SYRINGE (ML) INJECTION
Status: COMPLETED | OUTPATIENT
Start: 2018-04-12 | End: 2018-04-12

## 2018-04-12 RX ADMIN — IOPAMIDOL 100 ML: 755 INJECTION, SOLUTION INTRAVENOUS at 13:19

## 2018-04-12 RX ADMIN — SODIUM CHLORIDE 100 ML: 900 INJECTION, SOLUTION INTRAVENOUS at 13:19

## 2018-04-12 RX ADMIN — Medication 10 ML: at 13:19

## 2018-05-25 ENCOUNTER — APPOINTMENT (OUTPATIENT)
Dept: CT IMAGING | Age: 58
End: 2018-05-25
Attending: PHYSICIAN ASSISTANT
Payer: MEDICARE

## 2018-05-25 ENCOUNTER — HOSPITAL ENCOUNTER (EMERGENCY)
Age: 58
Discharge: HOME | End: 2018-05-25
Attending: EMERGENCY MEDICINE
Payer: MEDICARE

## 2018-05-25 VITALS
WEIGHT: 179 LBS | BODY MASS INDEX: 25.06 KG/M2 | OXYGEN SATURATION: 98 % | HEART RATE: 83 BPM | HEIGHT: 71 IN | SYSTOLIC BLOOD PRESSURE: 122 MMHG | TEMPERATURE: 98.1 F | RESPIRATION RATE: 16 BRPM | DIASTOLIC BLOOD PRESSURE: 68 MMHG

## 2018-05-25 DIAGNOSIS — R10.84 ABDOMINAL PAIN, GENERALIZED: Primary | ICD-10-CM

## 2018-05-25 LAB
ALBUMIN SERPL-MCNC: 3.8 G/DL (ref 3.5–5)
ALBUMIN/GLOB SERPL: 1.1 {RATIO} (ref 1.1–2.2)
ALP SERPL-CCNC: 97 U/L (ref 45–117)
ALT SERPL-CCNC: 19 U/L (ref 12–78)
ANION GAP SERPL CALC-SCNC: 7 MMOL/L (ref 5–15)
AST SERPL-CCNC: 16 U/L (ref 15–37)
BASOPHILS # BLD: 0 K/UL (ref 0–0.1)
BASOPHILS NFR BLD: 0 % (ref 0–1)
BILIRUB SERPL-MCNC: 0.3 MG/DL (ref 0.2–1)
BUN SERPL-MCNC: 11 MG/DL (ref 6–20)
BUN/CREAT SERPL: 10 (ref 12–20)
CALCIUM SERPL-MCNC: 9 MG/DL (ref 8.5–10.1)
CHLORIDE SERPL-SCNC: 107 MMOL/L (ref 97–108)
CO2 SERPL-SCNC: 28 MMOL/L (ref 21–32)
CREAT SERPL-MCNC: 1.05 MG/DL (ref 0.7–1.3)
DIFFERENTIAL METHOD BLD: ABNORMAL
EOSINOPHIL # BLD: 0.2 K/UL (ref 0–0.4)
EOSINOPHIL NFR BLD: 2 % (ref 0–7)
ERYTHROCYTE [DISTWIDTH] IN BLOOD BY AUTOMATED COUNT: 14.6 % (ref 11.5–14.5)
GLOBULIN SER CALC-MCNC: 3.4 G/DL (ref 2–4)
GLUCOSE SERPL-MCNC: 87 MG/DL (ref 65–100)
HCT VFR BLD AUTO: 42.6 % (ref 36.6–50.3)
HGB BLD-MCNC: 14 G/DL (ref 12.1–17)
IMM GRANULOCYTES # BLD: 0 K/UL (ref 0–0.04)
IMM GRANULOCYTES NFR BLD AUTO: 0 % (ref 0–0.5)
LIPASE SERPL-CCNC: 148 U/L (ref 73–393)
LYMPHOCYTES # BLD: 2.1 K/UL (ref 0.8–3.5)
LYMPHOCYTES NFR BLD: 25 % (ref 12–49)
MCH RBC QN AUTO: 31.8 PG (ref 26–34)
MCHC RBC AUTO-ENTMCNC: 32.9 G/DL (ref 30–36.5)
MCV RBC AUTO: 96.8 FL (ref 80–99)
MONOCYTES # BLD: 0.6 K/UL (ref 0–1)
MONOCYTES NFR BLD: 7 % (ref 5–13)
NEUTS SEG # BLD: 5.3 K/UL (ref 1.8–8)
NEUTS SEG NFR BLD: 65 % (ref 32–75)
NRBC # BLD: 0 K/UL (ref 0–0.01)
NRBC BLD-RTO: 0 PER 100 WBC
PLATELET # BLD AUTO: 271 K/UL (ref 150–400)
PMV BLD AUTO: 9.7 FL (ref 8.9–12.9)
POTASSIUM SERPL-SCNC: 4.2 MMOL/L (ref 3.5–5.1)
PROT SERPL-MCNC: 7.2 G/DL (ref 6.4–8.2)
RBC # BLD AUTO: 4.4 M/UL (ref 4.1–5.7)
SODIUM SERPL-SCNC: 142 MMOL/L (ref 136–145)
WBC # BLD AUTO: 8.2 K/UL (ref 4.1–11.1)

## 2018-05-25 PROCEDURE — 85025 COMPLETE CBC W/AUTO DIFF WBC: CPT | Performed by: PHYSICIAN ASSISTANT

## 2018-05-25 PROCEDURE — 83690 ASSAY OF LIPASE: CPT | Performed by: PHYSICIAN ASSISTANT

## 2018-05-25 PROCEDURE — 74011000258 HC RX REV CODE- 258: Performed by: PHYSICIAN ASSISTANT

## 2018-05-25 PROCEDURE — 74011250636 HC RX REV CODE- 250/636: Performed by: PHYSICIAN ASSISTANT

## 2018-05-25 PROCEDURE — 74177 CT ABD & PELVIS W/CONTRAST: CPT

## 2018-05-25 PROCEDURE — 99283 EMERGENCY DEPT VISIT LOW MDM: CPT

## 2018-05-25 PROCEDURE — 74011636320 HC RX REV CODE- 636/320: Performed by: PHYSICIAN ASSISTANT

## 2018-05-25 PROCEDURE — 36415 COLL VENOUS BLD VENIPUNCTURE: CPT | Performed by: PHYSICIAN ASSISTANT

## 2018-05-25 PROCEDURE — 96374 THER/PROPH/DIAG INJ IV PUSH: CPT

## 2018-05-25 PROCEDURE — 80053 COMPREHEN METABOLIC PANEL: CPT | Performed by: PHYSICIAN ASSISTANT

## 2018-05-25 RX ORDER — HYDROCODONE BITARTRATE AND ACETAMINOPHEN 5; 325 MG/1; MG/1
1 TABLET ORAL
Status: DISCONTINUED | OUTPATIENT
Start: 2018-05-25 | End: 2018-05-25 | Stop reason: HOSPADM

## 2018-05-25 RX ORDER — SODIUM CHLORIDE 0.9 % (FLUSH) 0.9 %
10 SYRINGE (ML) INJECTION
Status: COMPLETED | OUTPATIENT
Start: 2018-05-25 | End: 2018-05-25

## 2018-05-25 RX ORDER — ONDANSETRON 2 MG/ML
4 INJECTION INTRAMUSCULAR; INTRAVENOUS
Status: COMPLETED | OUTPATIENT
Start: 2018-05-25 | End: 2018-05-25

## 2018-05-25 RX ADMIN — Medication 10 ML: at 19:34

## 2018-05-25 RX ADMIN — SODIUM CHLORIDE 100 ML: 900 INJECTION, SOLUTION INTRAVENOUS at 19:34

## 2018-05-25 RX ADMIN — ONDANSETRON 4 MG: 2 INJECTION INTRAMUSCULAR; INTRAVENOUS at 20:33

## 2018-05-25 RX ADMIN — IOPAMIDOL 100 ML: 755 INJECTION, SOLUTION INTRAVENOUS at 19:34

## 2018-05-25 NOTE — ED NOTES
6:35 PM  I have evaluated the patient as the Provider in Triage. I have reviewed His vital signs and the triage nurse assessment. I have talked with the patient and any available family and advised that I am the provider in triage and have ordered the appropriate study to initiate their work up based on the clinical presentation during my assessment. I have advised that the patient will be accommodated in the Main ED as soon as possible. I have also requested to contact the triage nurse or myself immediately if the patient experiences any changes in their condition during this brief waiting period. Pt here for worsening pain at site of known abdominal wall hernia. Normal BMs. No vomiting. Pt reports 80 prior abdominal surgeries.     DESHAWN Moreira

## 2018-05-25 NOTE — ED TRIAGE NOTES
Pt reports abdominal pain for the past 2 months. Pt states he has an abdominal hernia and over the past few days his pain has increased. Pt is now having a lot of nausea and bloating. Pt had had multiple abdominal surgeries and only had 6 feet of colon left due to Crohn's Disease.

## 2018-05-26 NOTE — DISCHARGE INSTRUCTIONS
Abdominal Pain: Care Instructions  Your Care Instructions    Abdominal pain has many possible causes. Some aren't serious and get better on their own in a few days. Others need more testing and treatment. If your pain continues or gets worse, you need to be rechecked and may need more tests to find out what is wrong. You may need surgery to correct the problem. Don't ignore new symptoms, such as fever, nausea and vomiting, urination problems, pain that gets worse, and dizziness. These may be signs of a more serious problem. Your doctor may have recommended a follow-up visit in the next 8 to 12 hours. If you are not getting better, you may need more tests or treatment. The doctor has checked you carefully, but problems can develop later. If you notice any problems or new symptoms, get medical treatment right away. Follow-up care is a key part of your treatment and safety. Be sure to make and go to all appointments, and call your doctor if you are having problems. It's also a good idea to know your test results and keep a list of the medicines you take. How can you care for yourself at home? · Rest until you feel better. · To prevent dehydration, drink plenty of fluids, enough so that your urine is light yellow or clear like water. Choose water and other caffeine-free clear liquids until you feel better. If you have kidney, heart, or liver disease and have to limit fluids, talk with your doctor before you increase the amount of fluids you drink. · If your stomach is upset, eat mild foods, such as rice, dry toast or crackers, bananas, and applesauce. Try eating several small meals instead of two or three large ones. · Wait until 48 hours after all symptoms have gone away before you have spicy foods, alcohol, and drinks that contain caffeine. · Do not eat foods that are high in fat. · Avoid anti-inflammatory medicines such as aspirin, ibuprofen (Advil, Motrin), and naproxen (Aleve).  These can cause stomach upset. Talk to your doctor if you take daily aspirin for another health problem. When should you call for help? Call 911 anytime you think you may need emergency care. For example, call if:  ? · You passed out (lost consciousness). ? · You pass maroon or very bloody stools. ? · You vomit blood or what looks like coffee grounds. ? · You have new, severe belly pain. ?Call your doctor now or seek immediate medical care if:  ? · Your pain gets worse, especially if it becomes focused in one area of your belly. ? · You have a new or higher fever. ? · Your stools are black and look like tar, or they have streaks of blood. ? · You have unexpected vaginal bleeding. ? · You have symptoms of a urinary tract infection. These may include:  ¨ Pain when you urinate. ¨ Urinating more often than usual.  ¨ Blood in your urine. ? · You are dizzy or lightheaded, or you feel like you may faint. ? Watch closely for changes in your health, and be sure to contact your doctor if:  ? · You are not getting better after 1 day (24 hours). Where can you learn more? Go to http://johnathon-marielos.info/. Enter G554 in the search box to learn more about \"Abdominal Pain: Care Instructions. \"  Current as of: March 20, 2017  Content Version: 11.4  © 8054-3779 [employer]. Care instructions adapted under license by [employer] (which disclaims liability or warranty for this information). If you have questions about a medical condition or this instruction, always ask your healthcare professional. [place] [address] any warranty or liability for your use of this information.

## 2018-05-26 NOTE — ED NOTES
While attempting to medicate patient he reports that Bakari Echevarria doesn't work for him, he takes Percocet at home without relief. While attempting to provided medication patient aggravated with this RN, \"I should just go, this visit is a waste of time\". Attempted to ascertain from patient what more he would like, patient stating \"this is taking forever\". Patient allowed RN to medicate for nausea, however declined the [place]. Provider notified.

## 2018-05-26 NOTE — ED NOTES
Patient refused norco. PA spoke with patient. Patient would not let me get vitals. Discharge paperwork given. Patient ambulated out of ED by himself.

## 2018-05-26 NOTE — ED PROVIDER NOTES
HPI Comments: 65 y.o. male with past medical history significant for Crohn's disease, Colectomy, Appendectomy, Rectal fistula, Short bowel syndrome, Ventral herniaArthritis, Anemia, COPD, GERD, Esophageal ulcer who presents from home with chief complaint of abdominal pain. Pt reports an acute on chronic exacerbation of abdominal pain over the past couple of days. Pt describes the pain as severe, most significant to mid to RUQ of which radiates around \"my ribs\". \"The pain takes my breath away\". Pt states that the pain is constant. It is not exacerbated with eating or drinking. The pain worsens upon laying down. He also c/o nausea without vomiting. He is having his normal amount of bowel movements but that he has chronic diarrhea. Pt notes hx of crohn's disease and small bowel obstruction. He has had numerous abdominal surgeries. Pt states that he last had a colonoscopy and endoscopy x 3 months ago where his crohn's was noted to not be active. Pt denies recent travel or antibiotics. He has no known sick contact. Pt further denies fever, vomiting, urinary symptoms, chest pain, shortness of breath, neck pain, headache, dizziness, weakness or numbness. There are no other acute medical concerns at this time. PCP: Isaías Pisano MD  GI: Ruth Ann Hernandez MD     Note written by Elvis Modi, as dictated by DESHAWN Oh 8:16 PM      The history is provided by the patient. No  was used. Past Medical History:   Diagnosis Date    Abdominal wall hernia 6/15/2012    Anal fissure     Anemia     Anemia     Anxiety and depression     Arthritis     Related to the Crohn's disease.  Cancer ([facility] [address]     [facility]    Chronic pain     GENERALIZED    COPD (chronic obstructive pulmonary disease) (HCC)     Crohn disease (HCC)     Diagnosed at age 17.     Echocardiogram normal (EF: 55-60%) 07/2015    Esophageal ulcer     GERD (gastroesophageal reflux disease)  H/O blood transfusion 2013    [facility]    Hypertension     denies    Migraine     Short bowel syndrome     Ventral hernia without obstruction or gangrene        Past Surgical History:   Procedure Laterality Date    ABDOMEN SURGERY PROC UNLISTED      33 abdominal operations for treatment of Crohn's disease and its complications.  HC NDL CEJA      ceja needle, takes 1 inch needle    HX APPENDECTOMY      HX CHOLECYSTECTOMY      HX GI      colectomy x2, one ileostomy    HX GI  7/28/14    exp lap,partial colectomy with end ileostomy by Dr Ashley Parra      neck fusion    HX ORTHOPAEDIC  1980s    wrist right,     HX ORTHOPAEDIC  2006, 11/2013    neck, L4 L5 L6    HX ORTHOPAEDIC  1990s    right knee scope    HX OTHER SURGICAL      surgical repair from spider bite    HX OTHER SURGICAL  12/1/14    Incision and drainage of posterior right subcutaneous shoulder abscess    HX OTHER SURGICAL  2014    LEFT INDEX FINGER SPIDER BITE    HX OTHER SURGICAL  2014    RECTAL FISTULA    HX OTHER SURGICAL  3/17/2015    Ileostomy takedown with extensive lysis of adhesions (greater than three hours), mobilization of the splenic flexure, left colon resection, ileocolic anastomosis, and excision of scar; Dr. Bob Ritchie.  HX OTHER SURGICAL  3/22/2015    Exploratory laparotomy with washout of abdomen, suture repair of small bowel/anastomosis, and diverting protective loop ileostomy; Tawanda Siddiqui MD.    HX OTHER SURGICAL  4/9/2015    Laparotomy, extensive lysis of adhesions, abdominal lavage, and resection of ileocolic anastomosis (including the efferent limb of the loop ileostomy) with creation of Demetrius's pouch; Dr. Bob Ritchie.  HX OTHER SURGICAL  7/14/2015    Cystoscopy and placement of bilateral ureteral catheters;  Marin Pelletier MD.    HX OTHER SURGICAL  7/14/2015    Ileostomy takedown with extensive lysis of adhesions, small bowel resection, and enterocolostomy; Dr. Griffin Mckeon.   OTHER SURGICAL  9/29/2015    CT-guided percutaneous drainage of intraabdominal abscess; Dr. Tal Cohen.   OTHER SURGICAL  11/16/2015    CT-guided percutaneous aspiration of abdominal wall cavity; Dr. Lucio Faria.   OTHER SURGICAL  12/1/2015    Incision and drainage of abdominal wall abscess; Dr. Griffin Mckeon.   OTHER SURGICAL  2/11/2016    Incision and drainage of abdominal wall abscess; Dr. Griffin Mckeon.   OTHER SURGICAL  2/23/2016    Cystoscopy and placement of bilateral temporary ureteral catheters; Dr. Zelda Anderson.  HX OTHER SURGICAL  2/23/2016    Exploratory laparotomy, extensive lysis of adhesions, removal of mesh, and repair of enterocutaneous fistula; Dr. Griffin Mckeon.   OTHER SURGICAL  11/03/2017    Exploratory laparotomy, extensive lysis of adhesions, and reduction of intussusception; Dr. Griffin Mckeon. Family History:   Problem Relation Age of Onset   Indio Grayson Stroke Mother     Heart Disease Mother     Kidney Disease Mother     Anesth Problems Mother      TAKES A LONG TIME TO WAKE UP    Heart Disease Father     Thyroid Disease Sister        Social History     Social History    Marital status: LEGALLY      Spouse name: N/A    Number of children: N/A    Years of education: N/A     Occupational History    Not on file. Social History Main Topics    Smoking status: Current Every Day Smoker     Packs/day: 1.00     Years: 44.00    Smokeless tobacco: Current User      Comment: CURRENT E CIGS    Alcohol use No      Comment: RARELY    Drug use: No    Sexual activity: Not on file     Other Topics Concern    Not on file     Social History Narrative         ALLERGIES: Honey; Remicade [infliximab]; Crestor [rosuvastatin]; Other plant, animal, environmental; Imuran [azathioprine]; Mercaptopurine; and Sulfa (sulfonamide antibiotics)    Review of Systems   Constitutional: Negative for fever. Respiratory: Negative for shortness of breath. Cardiovascular: Negative for chest pain. Gastrointestinal: Positive for abdominal pain, diarrhea and nausea. Negative for vomiting. Genitourinary: Negative for difficulty urinating and dysuria. Musculoskeletal: Negative for neck pain. Neurological: Negative for dizziness, weakness and numbness. All other systems reviewed and are negative. Vitals:    05/25/18 1840   BP: 122/68   Pulse: 83   Resp: 16   Temp: 98.1 °F (36.7 °C)   SpO2: 98%   Weight: 81.2 kg (179 lb)   Height: 5' 11\" (1.803 m)            Physical Exam   Constitutional: He is oriented to person, place, and time. He appears well-developed and well-nourished. No distress.  male uncomfortable appearing     HENT:   Head: Normocephalic and atraumatic. Right Ear: External ear normal.   Left Ear: External ear normal.   Nose: Nose normal.   Mouth/Throat: Oropharynx is clear and moist. No oropharyngeal exudate. Eyes: Conjunctivae and EOM are normal. Pupils are equal, round, and reactive to light. Right eye exhibits no discharge. Left eye exhibits no discharge. Neck: Normal range of motion. Neck supple. Cardiovascular: Normal rate, regular rhythm and normal heart sounds. Pulmonary/Chest: Effort normal and breath sounds normal. He has no wheezes. He has no rales. Abdominal: Soft. He exhibits no distension. There is no tenderness. There is no guarding. Large surgical scars across the abdomen  Hyperactive bowel sounds throughout     Musculoskeletal: Normal range of motion. Lymphadenopathy:     He has no cervical adenopathy. Neurological: He is alert and oriented to person, place, and time. No cranial nerve deficit. Skin: Skin is warm and dry. He is not diaphoretic. Psychiatric: He has a normal mood and affect. His behavior is normal.   Nursing note and vitals reviewed.        MDM  Number of Diagnoses or Management Options  Abdominal pain, generalized:   Diagnosis management comments: 64 yo  male with extensive abdominal surgery hx with complaint of acute exacerbation of chronic abdominal pain. Concern for SBO vs pancreatitis vs obstipation amongst others  Plan  CBC  CMP  Lipase  UA  Analgesia. [place], [place]         Amount and/or Complexity of Data Reviewed  Clinical lab tests: ordered and reviewed  Tests in the radiology section of CPT®: ordered and reviewed  Independent visualization of images, tracings, or specimens: yes          ED Course       Procedures    Progress note    Labs and imaging reviewed. CT + stool in rectum. Offered enema but pt declined. No other acute findings. Offered pt oral pain medication. Pt declined. States has \"Percocet 10\" at home and requesting discharge from ED.  Krista ROY [facility] [place]

## 2018-11-30 ENCOUNTER — HOSPITAL ENCOUNTER (EMERGENCY)
Age: 58
Discharge: HOME | End: 2018-11-30
Attending: EMERGENCY MEDICINE
Payer: MEDICARE

## 2018-11-30 VITALS
SYSTOLIC BLOOD PRESSURE: 169 MMHG | RESPIRATION RATE: 18 BRPM | TEMPERATURE: 97.8 F | BODY MASS INDEX: 25.06 KG/M2 | HEART RATE: 98 BPM | DIASTOLIC BLOOD PRESSURE: 78 MMHG | WEIGHT: 179 LBS | HEIGHT: 71 IN | OXYGEN SATURATION: 96 %

## 2018-11-30 DIAGNOSIS — G89.29 CHRONIC LOW BACK PAIN, UNSPECIFIED BACK PAIN LATERALITY, WITH SCIATICA PRESENCE UNSPECIFIED: Primary | ICD-10-CM

## 2018-11-30 DIAGNOSIS — M54.5 CHRONIC LOW BACK PAIN, UNSPECIFIED BACK PAIN LATERALITY, WITH SCIATICA PRESENCE UNSPECIFIED: Primary | ICD-10-CM

## 2018-11-30 PROCEDURE — 99282 EMERGENCY DEPT VISIT SF MDM: CPT

## 2018-11-30 PROCEDURE — 96372 THER/PROPH/DIAG INJ SC/IM: CPT

## 2018-11-30 PROCEDURE — 74011250636 HC RX REV CODE- 250/636: Performed by: PHYSICIAN ASSISTANT

## 2018-11-30 RX ORDER — KETOROLAC TROMETHAMINE 30 MG/ML
15 INJECTION, SOLUTION INTRAMUSCULAR; INTRAVENOUS
Status: COMPLETED | OUTPATIENT
Start: 2018-11-30 | End: 2018-11-30

## 2018-11-30 RX ADMIN — KETOROLAC TROMETHAMINE 15 MG: 30 INJECTION, SOLUTION INTRAMUSCULAR at 15:52

## 2018-11-30 NOTE — DISCHARGE INSTRUCTIONS
Back Pain: Care Instructions  Your Care Instructions    Back pain has many possible causes. It is often related to problems with muscles and ligaments of the back. It may also be related to problems with the nerves, discs, or bones of the back. Moving, lifting, standing, sitting, or sleeping in an awkward way can strain the back. Sometimes you don't notice the injury until later. Arthritis is another common cause of back pain. Although it may hurt a lot, back pain usually improves on its own within several weeks. Most people recover in 12 weeks or less. Using good home treatment and being careful not to stress your back can help you feel better sooner. Follow-up care is a key part of your treatment and safety. Be sure to make and go to all appointments, and call your doctor if you are having problems. It's also a good idea to know your test results and keep a list of the medicines you take. How can you care for yourself at home? · Sit or lie in positions that are most comfortable and reduce your pain. Try one of these positions when you lie down:  ? Lie on your back with your knees bent and supported by large pillows. ? Lie on the floor with your legs on the seat of a sofa or chair. ? Lie on your side with your knees and hips bent and a pillow between your legs. ? Lie on your stomach if it does not make pain worse. · Do not sit up in bed, and avoid soft couches and twisted positions. Bed rest can help relieve pain at first, but it delays healing. Avoid bed rest after the first day of back pain. · Change positions every 30 minutes. If you must sit for long periods of time, take breaks from sitting. Get up and walk around, or lie in a comfortable position. · Try using a heating pad on a low or medium setting for 15 to 20 minutes every 2 or 3 hours. Try a warm shower in place of one session with the heating pad. · You can also try an ice pack for 10 to 15 minutes every 2 to 3 hours.  Put a thin cloth between the ice pack and your skin. · Take pain medicines exactly as directed. ? If the doctor gave you a prescription medicine for pain, take it as prescribed. ? If you are not taking a prescription pain medicine, ask your doctor if you can take an over-the-counter medicine. · Take short walks several times a day. You can start with 5 to 10 minutes, 3 or 4 times a day, and work up to longer walks. Walk on level surfaces and avoid hills and stairs until your back is better. · Return to work and other activities as soon as you can. Continued rest without activity is usually not good for your back. · To prevent future back pain, do exercises to stretch and strengthen your back and stomach. Learn how to use good posture, safe lifting techniques, and proper body mechanics. When should you call for help? Call your doctor now or seek immediate medical care if:    · You have new or worsening numbness in your legs.     · You have new or worsening weakness in your legs. (This could make it hard to stand up.)     · You lose control of your bladder or bowels.    Watch closely for changes in your health, and be sure to contact your doctor if:    · You have a fever, lose weight, or don't feel well.     · You do not get better as expected. Where can you learn more? Go to http://johnathon-marielos.info/. Enter E291 in the search box to learn more about \"Back Pain: Care Instructions. \"  Current as of: November 29, 2017  Content Version: 11.8  © 1565-3234 [employer]. Care instructions adapted under license by [employer] (which disclaims liability or warranty for this information). If you have questions about a medical condition or this instruction, always ask your healthcare professional. [place] [address] any warranty or liability for your use of this information. We hope that we have addressed all of your medical concerns.  The examination and treatment you received in the Emergency Department were for an emergent problem and were not intended as complete care. It is important that you follow up with your healthcare provider(s) for ongoing care. If your symptoms worsen or do not improve as expected, and you are unable to reach your usual health care provider(s), you should return to the Emergency Department. Today's healthcare is undergoing tremendous change, and patient satisfaction surveys are one of the many tools to assess the quality of medical care. You may receive a survey from the [employer] organization regarding your experience in the Emergency Department. I hope that your experience has been completely positive, particularly the medical care that I provided. As such, please participate in the survey; anything less than excellent does not meet my expectations or intentions. [facility] and [facility] participate in nationally recognized quality of care measures. If your blood pressure is greater than 120/80, as reported below, we urge that you seek medical care to address the potential of high blood pressure, commonly known as hypertension. Hypertension can be hereditary or can be caused by certain medical conditions, pain, stress, or \"white coat syndrome. \"       Please make an appointment with your health care provider(s) for follow up of your Emergency Department visit. VITALS:   Patient Vitals for the past 8 hrs:   Temp Pulse Resp BP SpO2   11/30/18 1516 97.8 °F (36.6 °C) 98 18 169/78 96 %          Thank you for allowing us to provide you with medical care today. We realize that you have many choices for your emergency care needs. Please choose us in the future for any continued health care needs. Angelique Chavez, [address] [facility] [place]: [phone]            No results found for this or any previous visit (from the past 24 hour(s)). No results found.

## 2018-12-01 NOTE — ED PROVIDER NOTES
63 y.o. male with past medical history significant for Chrons disease, chronic back pain (followed by Dr. Lisa Mitchell pain management), GERD, COPD, and ppd smoker presents with complaints of low back pain. The pt explained \"the percocet just isn't working anymore. \"  Denies any injury/trauma to his low back. The pt states that movement makes the pain worse. The pt rates the pain as a 10/10 in severity. The pt describes the pain as a dull ache. Pt denies loss of bowel/bladder continence, urinary retention, perineal numbness, fever, weight loss, hx of IV drug use. There are no other acute medical complaints at this time. PCP: Smooth Gamboa MD 
 
Unknown Mohs, PA-C Past Medical History:  
Diagnosis Date  Abdominal wall hernia 6/15/2012  Anal fissure  Anemia  Anemia  Anxiety and depression  Arthritis Related to the Crohn's disease.  Cancer ([facility] [address] [facility]  Chronic pain GENERALIZED  COPD (chronic obstructive pulmonary disease) (HCC)  Crohn disease ([facility] [address] Diagnosed at age 17.  Echocardiogram normal (EF: 55-60%) 07/2015  Esophageal ulcer  GERD (gastroesophageal reflux disease)  H/O blood transfusion 2013 [employer] Javed Sahni  Hypertension   
 denies  Migraine  Short bowel syndrome  Ventral hernia without obstruction or gangrene Past Surgical History:  
Procedure Laterality Date [address] [place] UNLISTED 33 abdominal operations for treatment of Crohn's disease and its complications.  HC NDL CEJA    
 ceja needle, takes 1 inch needle  HX APPENDECTOMY  HX CHOLECYSTECTOMY  HX GI    
 colectomy x2, one ileostomy  HX GI  7/28/14  
 exp lap,partial colectomy with end ileostomy by Dr Glenys Mas  HX HEENT    
 neck fusion  HX ORTHOPAEDIC  1980s  
 wrist right,   
 HX ORTHOPAEDIC  2006, 11/2013  
 neck, L4 L5 L6  
 HX ORTHOPAEDIC  1990s  
 right knee scope  HX OTHER SURGICAL    
 surgical repair from spider bite  HX OTHER SURGICAL  12/1/14 Incision and drainage of posterior right subcutaneous shoulder abscess  HX OTHER SURGICAL  2014 LEFT INDEX FINGER SPIDER BITE  
 HX OTHER SURGICAL  2014 RECTAL FISTULA  HX OTHER SURGICAL  3/17/2015 Ileostomy takedown with extensive lysis of adhesions (greater than three hours), mobilization of the splenic flexure, left colon resection, ileocolic anastomosis, and excision of scar; Dr. Viki Downing.  HX OTHER SURGICAL  3/22/2015 Exploratory laparotomy with washout of abdomen, suture repair of small bowel/anastomosis, and diverting protective loop ileostomy; Genette Harada, MD.  
 HX OTHER SURGICAL  4/9/2015 Laparotomy, extensive lysis of adhesions, abdominal lavage, and resection of ileocolic anastomosis (including the efferent limb of the loop ileostomy) with creation of Demetrius's pouch; Dr. Viki Downing.   OTHER SURGICAL  7/14/2015 Cystoscopy and placement of bilateral ureteral catheters; Tal Diaz MD.  
 HX OTHER SURGICAL  7/14/2015 Ileostomy takedown with extensive lysis of adhesions, small bowel resection, and enterocolostomy; Dr. Viki Downing.   OTHER SURGICAL  9/29/2015 CT-guided percutaneous drainage of intraabdominal abscess; Dr. Kyung Max.   OTHER SURGICAL  11/16/2015 CT-guided percutaneous aspiration of abdominal wall cavity; Dr. Jose Cortes.   OTHER SURGICAL  12/1/2015 Incision and drainage of abdominal wall abscess; Dr. Viki Downing.   OTHER SURGICAL  2/11/2016 Incision and drainage of abdominal wall abscess; Dr. Viki Downing.   OTHER SURGICAL  2/23/2016 Cystoscopy and placement of bilateral temporary ureteral catheters; Dr. Vlad Martinez.   OTHER SURGICAL  2/23/2016 Exploratory laparotomy, extensive lysis of adhesions, removal of mesh, and repair of enterocutaneous fistula; Dr. Viki Downing.   OTHER SURGICAL  11/03/2017 Exploratory laparotomy, extensive lysis of adhesions, and reduction of intussusception; Dr. Vinnie Quevedo. Family History:  
Problem Relation Age of Onset  Stroke Mother  Heart Disease Mother  Kidney Disease Mother Marvia Scales Problems Mother TAKES A LONG TIME TO WAKE UP  
 Heart Disease Father  Thyroid Disease Sister Social History Socioeconomic History  Marital status: LEGALLY  Spouse name: Not on file  Number of children: Not on file  Years of education: Not on file  Highest education level: Not on file Social Needs  Financial resource strain: Not on file  Food insecurity - worry: Not on file  Food insecurity - inability: Not on file  Transportation needs - medical: Not on file  Transportation needs - non-medical: Not on file Occupational History  Not on file Tobacco Use  Smoking status: Current Every Day Smoker Packs/day: 1.00 Years: 44.00 Pack years: 44.00  Smokeless tobacco: Current User  Tobacco comment: CURRENT E CIGS Substance and Sexual Activity  Alcohol use: No  
  Comment: RARELY  Drug use: No  
 Sexual activity: Not on file Other Topics Concern  Not on file Social History Narrative  Not on file ALLERGIES: Honey; Remicade [infliximab]; Crestor [rosuvastatin]; Other plant, animal, environmental; Imuran [azathioprine]; Mercaptopurine; and Sulfa (sulfonamide antibiotics) Review of Systems Constitutional: Negative for chills, diaphoresis and fever. HENT: Negative for congestion, postnasal drip, rhinorrhea and sore throat. Eyes: Negative for photophobia, discharge, redness and visual disturbance. Respiratory: Negative for cough, chest tightness, shortness of breath and wheezing. Cardiovascular: Negative for chest pain, palpitations and leg swelling. Gastrointestinal: Positive for abdominal pain.  Negative for abdominal distention, blood in stool, constipation, diarrhea, nausea and vomiting. Genitourinary: Negative for difficulty urinating, dysuria, frequency, hematuria and urgency. Musculoskeletal: Positive for arthralgias and myalgias. Negative for back pain and joint swelling. Skin: Negative for color change and rash. Neurological: Negative for dizziness, speech difficulty, weakness, light-headedness, numbness and headaches. Psychiatric/Behavioral: Negative for confusion. The patient is not nervous/anxious. All other systems reviewed and are negative. Vitals:  
 11/30/18 1516 BP: 169/78 Pulse: 98 Resp: 18 Temp: 97.8 °F (36.6 °C) SpO2: 96% Weight: 81.2 kg (179 lb) Height: 5' 11\" (1.803 m) Physical Exam  
Constitutional: He is oriented to person, place, and time. He appears well-developed and well-nourished. No distress. HENT:  
Head: Normocephalic and atraumatic. Head is without raccoon's eyes, without Griffin's sign and without laceration. Right Ear: Hearing, tympanic membrane, external ear and ear canal normal. No foreign bodies. Tympanic membrane is not bulging. No hemotympanum. Left Ear: Hearing, tympanic membrane, external ear and ear canal normal. No foreign bodies. Tympanic membrane is not bulging. No hemotympanum. Nose: Nose normal. No mucosal edema or rhinorrhea. Right sinus exhibits no maxillary sinus tenderness and no frontal sinus tenderness. Left sinus exhibits no maxillary sinus tenderness and no frontal sinus tenderness. Mouth/Throat: Uvula is midline, oropharynx is clear and moist and mucous membranes are normal. No tonsillar abscesses. Eyes: Conjunctivae and EOM are normal. Pupils are equal, round, and reactive to light. Right eye exhibits no discharge. Left eye exhibits no discharge. Neck: Normal range of motion. Neck supple. Cardiovascular: Normal rate, regular rhythm and normal heart sounds. Exam reveals no gallop and no friction rub. No murmur heard. Regular rate and rhythm. No murmurs, gallops, rubs, or clicks. Pulmonary/Chest: Effort normal and breath sounds normal. No respiratory distress. He has no wheezes. He has no rales. No stridor or wheezes. No accessory muscle usage. No nasal flaring. Breath Sounds equal bilaterally. Abdominal: Soft. Bowel sounds are normal. He exhibits no distension. There is no tenderness. There is no rebound and no guarding. No abdominal Bruits. No pulsatile mass. No abdominal scars. Active bowel sounds. Musculoskeletal: Normal range of motion. He exhibits no edema, tenderness or deformity. Neurological: He is alert and oriented to person, place, and time. Skin: He is not diaphoretic. Nursing note and vitals reviewed. MDM Number of Diagnoses or Management Options Chronic low back pain, unspecified back pain laterality, with sciatica presence unspecified:  
Diagnosis management comments: Pt afebrile and nontoxic appearing. No signs of cauda equina, spinal epidural abscess, fx, dislocation, dissection, pancreatitis, appendicitis, cholecystitis/cholagnitis, bowel obstruction/perforation, sepsis or any other acute life threatening condition. Will treat symptomatically and advise close follow up with pain management doctor. Sharon Winchester PA-C Procedures

## 2019-06-29 ENCOUNTER — APPOINTMENT (OUTPATIENT)
Dept: CT IMAGING | Age: 59
DRG: 386 | End: 2019-06-29
Attending: STUDENT IN AN ORGANIZED HEALTH CARE EDUCATION/TRAINING PROGRAM
Payer: MEDICARE

## 2019-06-29 ENCOUNTER — HOSPITAL ENCOUNTER (INPATIENT)
Age: 59
LOS: 9 days | Discharge: HOME | DRG: 386 | End: 2019-07-08
Attending: STUDENT IN AN ORGANIZED HEALTH CARE EDUCATION/TRAINING PROGRAM | Admitting: INTERNAL MEDICINE
Payer: MEDICARE

## 2019-06-29 DIAGNOSIS — K50.118 CROHN'S DISEASE OF COLON WITH OTHER COMPLICATION (HCC): ICD-10-CM

## 2019-06-29 DIAGNOSIS — R10.84 ABDOMINAL PAIN, GENERALIZED: Primary | ICD-10-CM

## 2019-06-29 PROBLEM — K50.90 CROHN DISEASE (HCC): Status: ACTIVE | Noted: 2019-06-29

## 2019-06-29 PROBLEM — J05.0 CROUP: Status: ACTIVE | Noted: 2019-06-29

## 2019-06-29 PROBLEM — R10.9 ABDOMINAL PAIN: Status: ACTIVE | Noted: 2019-06-29

## 2019-06-29 PROBLEM — I10 HTN (HYPERTENSION): Status: ACTIVE | Noted: 2019-06-29

## 2019-06-29 LAB
ALBUMIN SERPL-MCNC: 3.4 G/DL (ref 3.5–5)
ALBUMIN/GLOB SERPL: 1.2 {RATIO} (ref 1.1–2.2)
ALP SERPL-CCNC: 87 U/L (ref 45–117)
ALT SERPL-CCNC: 19 U/L (ref 12–78)
ANION GAP SERPL CALC-SCNC: 4 MMOL/L (ref 5–15)
APPEARANCE UR: CLEAR
AST SERPL-CCNC: 14 U/L (ref 15–37)
BACTERIA URNS QL MICRO: NEGATIVE /HPF
BASOPHILS # BLD: 0 K/UL (ref 0–0.1)
BASOPHILS NFR BLD: 0 % (ref 0–1)
BILIRUB SERPL-MCNC: 0.2 MG/DL (ref 0.2–1)
BILIRUB UR QL: NEGATIVE
BUN SERPL-MCNC: 8 MG/DL (ref 6–20)
BUN/CREAT SERPL: 8 (ref 12–20)
CALCIUM SERPL-MCNC: 8.2 MG/DL (ref 8.5–10.1)
CHLORIDE SERPL-SCNC: 108 MMOL/L (ref 97–108)
CO2 SERPL-SCNC: 26 MMOL/L (ref 21–32)
COLOR UR: ABNORMAL
COMMENT, HOLDF: NORMAL
CREAT SERPL-MCNC: 0.98 MG/DL (ref 0.7–1.3)
DIFFERENTIAL METHOD BLD: ABNORMAL
EOSINOPHIL # BLD: 0.4 K/UL (ref 0–0.4)
EOSINOPHIL NFR BLD: 4 % (ref 0–7)
EPITH CASTS URNS QL MICRO: ABNORMAL /LPF
ERYTHROCYTE [DISTWIDTH] IN BLOOD BY AUTOMATED COUNT: 14.1 % (ref 11.5–14.5)
GLOBULIN SER CALC-MCNC: 2.8 G/DL (ref 2–4)
GLUCOSE SERPL-MCNC: 80 MG/DL (ref 65–100)
GLUCOSE UR STRIP.AUTO-MCNC: NEGATIVE MG/DL
HCT VFR BLD AUTO: 41.9 % (ref 36.6–50.3)
HGB BLD-MCNC: 14.1 G/DL (ref 12.1–17)
HGB UR QL STRIP: ABNORMAL
HYALINE CASTS URNS QL MICRO: ABNORMAL /LPF (ref 0–5)
IMM GRANULOCYTES # BLD AUTO: 0 K/UL (ref 0–0.04)
IMM GRANULOCYTES NFR BLD AUTO: 0 % (ref 0–0.5)
KETONES UR QL STRIP.AUTO: NEGATIVE MG/DL
LEUKOCYTE ESTERASE UR QL STRIP.AUTO: NEGATIVE
LIPASE SERPL-CCNC: 105 U/L (ref 73–393)
LYMPHOCYTES # BLD: 2.3 K/UL (ref 0.8–3.5)
LYMPHOCYTES NFR BLD: 27 % (ref 12–49)
MCH RBC QN AUTO: 35.3 PG (ref 26–34)
MCHC RBC AUTO-ENTMCNC: 33.7 G/DL (ref 30–36.5)
MCV RBC AUTO: 105 FL (ref 80–99)
MONOCYTES # BLD: 0.5 K/UL (ref 0–1)
MONOCYTES NFR BLD: 6 % (ref 5–13)
NEUTS SEG # BLD: 5.2 K/UL (ref 1.8–8)
NEUTS SEG NFR BLD: 63 % (ref 32–75)
NITRITE UR QL STRIP.AUTO: NEGATIVE
NRBC # BLD: 0 K/UL (ref 0–0.01)
NRBC BLD-RTO: 0 PER 100 WBC
PH UR STRIP: 5 [PH] (ref 5–8)
PLATELET # BLD AUTO: 201 K/UL (ref 150–400)
PMV BLD AUTO: 9.8 FL (ref 8.9–12.9)
POTASSIUM SERPL-SCNC: 3.9 MMOL/L (ref 3.5–5.1)
PROT SERPL-MCNC: 6.2 G/DL (ref 6.4–8.2)
PROT UR STRIP-MCNC: NEGATIVE MG/DL
RBC # BLD AUTO: 3.99 M/UL (ref 4.1–5.7)
RBC #/AREA URNS HPF: ABNORMAL /HPF (ref 0–5)
SAMPLES BEING HELD,HOLD: NORMAL
SODIUM SERPL-SCNC: 138 MMOL/L (ref 136–145)
SP GR UR REFRACTOMETRY: >1.03 (ref 1–1.03)
UROBILINOGEN UR QL STRIP.AUTO: 0.2 EU/DL (ref 0.2–1)
WBC # BLD AUTO: 8.4 K/UL (ref 4.1–11.1)
WBC URNS QL MICRO: ABNORMAL /HPF (ref 0–4)

## 2019-06-29 PROCEDURE — 65270000029 HC RM PRIVATE

## 2019-06-29 PROCEDURE — 74011000258 HC RX REV CODE- 258: Performed by: RADIOLOGY

## 2019-06-29 PROCEDURE — 96374 THER/PROPH/DIAG INJ IV PUSH: CPT

## 2019-06-29 PROCEDURE — 96361 HYDRATE IV INFUSION ADD-ON: CPT

## 2019-06-29 PROCEDURE — 85025 COMPLETE CBC W/AUTO DIFF WBC: CPT

## 2019-06-29 PROCEDURE — 80053 COMPREHEN METABOLIC PANEL: CPT

## 2019-06-29 PROCEDURE — 99285 EMERGENCY DEPT VISIT HI MDM: CPT

## 2019-06-29 PROCEDURE — 74011250636 HC RX REV CODE- 250/636: Performed by: STUDENT IN AN ORGANIZED HEALTH CARE EDUCATION/TRAINING PROGRAM

## 2019-06-29 PROCEDURE — 74011250636 HC RX REV CODE- 250/636: Performed by: INTERNAL MEDICINE

## 2019-06-29 PROCEDURE — 96376 TX/PRO/DX INJ SAME DRUG ADON: CPT

## 2019-06-29 PROCEDURE — 74177 CT ABD & PELVIS W/CONTRAST: CPT

## 2019-06-29 PROCEDURE — 81001 URINALYSIS AUTO W/SCOPE: CPT

## 2019-06-29 PROCEDURE — 36415 COLL VENOUS BLD VENIPUNCTURE: CPT

## 2019-06-29 PROCEDURE — 74011636320 HC RX REV CODE- 636/320: Performed by: RADIOLOGY

## 2019-06-29 PROCEDURE — 83690 ASSAY OF LIPASE: CPT

## 2019-06-29 RX ORDER — HYDROMORPHONE HYDROCHLORIDE 2 MG/ML
2 INJECTION, SOLUTION INTRAMUSCULAR; INTRAVENOUS; SUBCUTANEOUS ONCE
Status: COMPLETED | OUTPATIENT
Start: 2019-06-29 | End: 2019-06-29

## 2019-06-29 RX ORDER — MORPHINE SULFATE 2 MG/ML
2 INJECTION, SOLUTION INTRAMUSCULAR; INTRAVENOUS
Status: DISCONTINUED | OUTPATIENT
Start: 2019-06-29 | End: 2019-07-06

## 2019-06-29 RX ORDER — HYDROMORPHONE HYDROCHLORIDE 2 MG/ML
1 INJECTION, SOLUTION INTRAMUSCULAR; INTRAVENOUS; SUBCUTANEOUS ONCE
Status: COMPLETED | OUTPATIENT
Start: 2019-06-29 | End: 2019-06-29

## 2019-06-29 RX ORDER — SODIUM CHLORIDE 9 MG/ML
75 INJECTION, SOLUTION INTRAVENOUS CONTINUOUS
Status: DISCONTINUED | OUTPATIENT
Start: 2019-06-29 | End: 2019-07-03

## 2019-06-29 RX ORDER — SODIUM CHLORIDE 0.9 % (FLUSH) 0.9 %
10 SYRINGE (ML) INJECTION
Status: COMPLETED | OUTPATIENT
Start: 2019-06-29 | End: 2019-06-29

## 2019-06-29 RX ADMIN — IOHEXOL 50 ML: 240 INJECTION, SOLUTION INTRATHECAL; INTRAVASCULAR; INTRAVENOUS; ORAL at 17:29

## 2019-06-29 RX ADMIN — SODIUM CHLORIDE 1000 ML: 900 INJECTION, SOLUTION INTRAVENOUS at 18:09

## 2019-06-29 RX ADMIN — Medication 10 ML: at 19:17

## 2019-06-29 RX ADMIN — IOPAMIDOL 100 ML: 755 INJECTION, SOLUTION INTRAVENOUS at 19:17

## 2019-06-29 RX ADMIN — SODIUM CHLORIDE 100 ML: 900 INJECTION, SOLUTION INTRAVENOUS at 19:17

## 2019-06-29 RX ADMIN — HYDROMORPHONE HYDROCHLORIDE 2 MG: 2 INJECTION, SOLUTION INTRAMUSCULAR; INTRAVENOUS; SUBCUTANEOUS at 19:42

## 2019-06-29 RX ADMIN — MORPHINE SULFATE 2 MG: 2 INJECTION, SOLUTION INTRAMUSCULAR; INTRAVENOUS at 23:09

## 2019-06-29 RX ADMIN — HYDROMORPHONE HYDROCHLORIDE 1 MG: 2 INJECTION, SOLUTION INTRAMUSCULAR; INTRAVENOUS; SUBCUTANEOUS at 18:09

## 2019-06-29 RX ADMIN — SODIUM CHLORIDE 75 ML/HR: 900 INJECTION, SOLUTION INTRAVENOUS at 23:09

## 2019-06-29 NOTE — ED TRIAGE NOTES
\"I have a history of crohn's with 91 surgeries. My body mass produces adhesions. Yesterday I was getting up and felt something tear (LLQ). This morning I woke up with swelling and a boil in that area that is leaking what appears to be stool. \" Patient reports subjective fever \"I'm burning up but don't have a thermometer at home\".

## 2019-06-29 NOTE — ED NOTES
1949:  Report received from Tita Sebastian, [address] [place] [address]. Patient is in bed, A&O X3, skin is warm and dry, respirations are even and unlabored. Call bell within reach, will continue to monitor. 2012:  Patient given urinal, provided urine specimen at this time. 2148:  Patient rang out, states he needs to have a bowel movement.   Walked with patient to restroom, able to ambulate without assist.

## 2019-06-30 LAB
ANION GAP SERPL CALC-SCNC: 6 MMOL/L (ref 5–15)
BUN SERPL-MCNC: 7 MG/DL (ref 6–20)
BUN/CREAT SERPL: 8 (ref 12–20)
CALCIUM SERPL-MCNC: 7.9 MG/DL (ref 8.5–10.1)
CHLORIDE SERPL-SCNC: 106 MMOL/L (ref 97–108)
CO2 SERPL-SCNC: 26 MMOL/L (ref 21–32)
CREAT SERPL-MCNC: 0.88 MG/DL (ref 0.7–1.3)
ERYTHROCYTE [DISTWIDTH] IN BLOOD BY AUTOMATED COUNT: 14.3 % (ref 11.5–14.5)
GLUCOSE SERPL-MCNC: 75 MG/DL (ref 65–100)
HCT VFR BLD AUTO: 40.9 % (ref 36.6–50.3)
HGB BLD-MCNC: 13.5 G/DL (ref 12.1–17)
MCH RBC QN AUTO: 34.9 PG (ref 26–34)
MCHC RBC AUTO-ENTMCNC: 33 G/DL (ref 30–36.5)
MCV RBC AUTO: 105.7 FL (ref 80–99)
NRBC # BLD: 0 K/UL (ref 0–0.01)
NRBC BLD-RTO: 0 PER 100 WBC
PLATELET # BLD AUTO: 190 K/UL (ref 150–400)
PMV BLD AUTO: 9.7 FL (ref 8.9–12.9)
POTASSIUM SERPL-SCNC: 3.8 MMOL/L (ref 3.5–5.1)
RBC # BLD AUTO: 3.87 M/UL (ref 4.1–5.7)
SODIUM SERPL-SCNC: 138 MMOL/L (ref 136–145)
WBC # BLD AUTO: 5.9 K/UL (ref 4.1–11.1)

## 2019-06-30 PROCEDURE — 85027 COMPLETE CBC AUTOMATED: CPT

## 2019-06-30 PROCEDURE — 65270000029 HC RM PRIVATE

## 2019-06-30 PROCEDURE — 74011250637 HC RX REV CODE- 250/637: Performed by: NURSE PRACTITIONER

## 2019-06-30 PROCEDURE — 36415 COLL VENOUS BLD VENIPUNCTURE: CPT

## 2019-06-30 PROCEDURE — 80048 BASIC METABOLIC PNL TOTAL CA: CPT

## 2019-06-30 PROCEDURE — 74011250637 HC RX REV CODE- 250/637: Performed by: INTERNAL MEDICINE

## 2019-06-30 PROCEDURE — 74011250636 HC RX REV CODE- 250/636: Performed by: INTERNAL MEDICINE

## 2019-06-30 RX ORDER — LOPERAMIDE HYDROCHLORIDE 2 MG/1
2 CAPSULE ORAL AS NEEDED
Status: DISCONTINUED | OUTPATIENT
Start: 2019-06-30 | End: 2019-07-03

## 2019-06-30 RX ORDER — LORAZEPAM 0.5 MG/1
1 TABLET ORAL
Status: DISCONTINUED | OUTPATIENT
Start: 2019-06-30 | End: 2019-06-30

## 2019-06-30 RX ORDER — CLONIDINE HYDROCHLORIDE 0.1 MG/1
0.1 TABLET ORAL 2 TIMES DAILY
Status: DISCONTINUED | OUTPATIENT
Start: 2019-06-30 | End: 2019-06-30

## 2019-06-30 RX ORDER — MONTELUKAST SODIUM 4 MG/1
6 TABLET, CHEWABLE ORAL
Status: DISCONTINUED | OUTPATIENT
Start: 2019-06-30 | End: 2019-06-30

## 2019-06-30 RX ORDER — IBUPROFEN 200 MG
1 TABLET ORAL DAILY
Status: DISCONTINUED | OUTPATIENT
Start: 2019-06-30 | End: 2019-07-05

## 2019-06-30 RX ORDER — OXYCODONE HCL 5 MG/5 ML
10 SOLUTION, ORAL ORAL EVERY 8 HOURS
Status: DISCONTINUED | OUTPATIENT
Start: 2019-06-30 | End: 2019-07-08 | Stop reason: HOSPADM

## 2019-06-30 RX ORDER — OXYCODONE HCL 5 MG/5 ML
10 SOLUTION, ORAL ORAL
COMMUNITY

## 2019-06-30 RX ORDER — SODIUM CHLORIDE 0.9 % (FLUSH) 0.9 %
5-40 SYRINGE (ML) INJECTION EVERY 8 HOURS
Status: DISCONTINUED | OUTPATIENT
Start: 2019-06-30 | End: 2019-07-08 | Stop reason: HOSPADM

## 2019-06-30 RX ORDER — CHLORZOXAZONE 500 MG/1
500-1000 TABLET ORAL DAILY PRN
Status: DISCONTINUED | OUTPATIENT
Start: 2019-06-30 | End: 2019-07-03

## 2019-06-30 RX ORDER — GABAPENTIN 600 MG/1
300 TABLET ORAL 3 TIMES DAILY
Status: DISCONTINUED | OUTPATIENT
Start: 2019-06-30 | End: 2019-07-05 | Stop reason: SDUPTHER

## 2019-06-30 RX ORDER — ENOXAPARIN SODIUM 100 MG/ML
40 INJECTION SUBCUTANEOUS EVERY 24 HOURS
Status: DISCONTINUED | OUTPATIENT
Start: 2019-06-30 | End: 2019-07-08 | Stop reason: HOSPADM

## 2019-06-30 RX ORDER — DIPHENOXYLATE HYDROCHLORIDE AND ATROPINE SULFATE 2.5; .025 MG/1; MG/1
2 TABLET ORAL
Status: DISCONTINUED | OUTPATIENT
Start: 2019-06-30 | End: 2019-06-30

## 2019-06-30 RX ORDER — GABAPENTIN 300 MG/1
300 CAPSULE ORAL 3 TIMES DAILY
COMMUNITY

## 2019-06-30 RX ORDER — SODIUM CHLORIDE 0.9 % (FLUSH) 0.9 %
5-40 SYRINGE (ML) INJECTION AS NEEDED
Status: DISCONTINUED | OUTPATIENT
Start: 2019-06-30 | End: 2019-07-08 | Stop reason: HOSPADM

## 2019-06-30 RX ORDER — HYDROCODONE BITARTRATE AND ACETAMINOPHEN 10; 325 MG/1; MG/1
1-2 TABLET ORAL
Status: DISCONTINUED | OUTPATIENT
Start: 2019-06-30 | End: 2019-06-30

## 2019-06-30 RX ADMIN — MORPHINE SULFATE 2 MG: 2 INJECTION, SOLUTION INTRAMUSCULAR; INTRAVENOUS at 13:09

## 2019-06-30 RX ADMIN — HYDROCODONE BITARTRATE AND ACETAMINOPHEN 2 TABLET: 10; 325 TABLET ORAL at 09:41

## 2019-06-30 RX ADMIN — MORPHINE SULFATE 2 MG: 2 INJECTION, SOLUTION INTRAMUSCULAR; INTRAVENOUS at 07:35

## 2019-06-30 RX ADMIN — Medication 10 ML: at 21:26

## 2019-06-30 RX ADMIN — SODIUM CHLORIDE 75 ML/HR: 900 INJECTION, SOLUTION INTRAVENOUS at 12:03

## 2019-06-30 RX ADMIN — GABAPENTIN 300 MG: 600 TABLET, FILM COATED ORAL at 17:33

## 2019-06-30 RX ADMIN — Medication 10 ML: at 03:24

## 2019-06-30 RX ADMIN — Medication 10 MG: at 21:26

## 2019-06-30 RX ADMIN — DIPHENOXYLATE HYDROCHLORIDE AND ATROPINE SULFATE 2 TABLET: 2.5; .025 TABLET ORAL at 12:03

## 2019-06-30 RX ADMIN — DIPHENOXYLATE HYDROCHLORIDE AND ATROPINE SULFATE 2 TABLET: 2.5; .025 TABLET ORAL at 06:46

## 2019-06-30 RX ADMIN — GABAPENTIN 300 MG: 600 TABLET, FILM COATED ORAL at 21:25

## 2019-06-30 RX ADMIN — Medication 10 MG: at 16:29

## 2019-06-30 RX ADMIN — Medication 10 ML: at 13:08

## 2019-06-30 NOTE — ROUTINE PROCESS
TRANSFER - OUT REPORT: 
 
Verbal report given to Via Chad Cazares RN(name) on Cathy Walsh  being transferred to (unit) for routine progression of care Report consisted of patients Situation, Background, Assessment and  
Recommendations(SBAR). Information from the following report(s) SBAR, Kardex, ED Summary and Recent Results was reviewed with the receiving nurse. Lines:  
Peripheral IV 06/29/19 Left; Lower Arm (Active) Site Assessment Clean, dry, & intact 6/29/2019  6:11 PM  
Phlebitis Assessment 0 6/29/2019  6:11 PM  
Infiltration Assessment 0 6/29/2019  6:11 PM  
Dressing Status Clean, dry, & intact 6/29/2019  6:11 PM  
Dressing Type Transparent 6/29/2019  6:11 PM  
Hub Color/Line Status Pink;Flushed;Patent 6/29/2019  6:11 PM  
Action Taken Blood drawn 6/29/2019  6:11 PM  
  
 
Opportunity for questions and clarification was provided. Patient transported with: 
 [employer]

## 2019-06-30 NOTE — H&P
Hospitalist Admission Note    NAME: Bhavesh Suarez   :  1960   MRN:  [id number]     Date/Time:  2019 10:32 PM    Patient PCP: Rajat Pack MD  ________________________________________________________________________    My assessment of this patient's clinical condition and my plan of care is as follows. Assessment / Plan:    1) Crohn's flare: per CT \" 1. Inflammation of the dilated sigmoid colon is compatible with active Crohn's colitis. No visible fistula or abscess. However, the bowel is in close approximation to the peritoneum and extremely thin anterior pelvic wall. A subtle fistula could be present but not visible. 2. Pseudoobstruction of the small bowel. No true bowel obstruction. 3. No ascites or abscess. 4. Patient is at risk for forming a fistula between the sigmoid colon and the  dome of the urinary bladder, unchanged since May 2018. \"    Will consult colorectal Sx for further eval and management of fistula, keep NPO, IV fluids, Pain control,  no fever, no WCC. Will hold on ABX. Will consult also GI for further eval and recs. Patient only has 6 feet left of colon,     2) HTN: Controlled, Continue home meds and adjust as needed      Code Status: full  Surrogate Decision Maker:    DVT Prophylaxis: yes  GI Prophylaxis: not indicated    Baseline: lives at home        Subjective:   CHIEF COMPLAINT: abdominal pain    HISTORY OF PRESENT ILLNESS:     Claritza White is a 63 y.o. 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 He presents to the ED today because early this morning he had abdominal pain and he felt a  \"pop\" then he noticed fluid coming out of his belly, with a \"feces odor\". He decided to come to the ED for further eval and management. Denies N/V fevers or chills    We were asked to admit for work up and evaluation of the above problems.      Past Medical History:   Diagnosis Date    Abdominal wall hernia 6/15/2012    Anal fissure     Anemia     Anemia     Anxiety and depression     Arthritis     Related to the Crohn's disease.  Cancer ([address]     MELANOMA HEAD AND FACE    Chronic pain     GENERALIZED    COPD (chronic obstructive pulmonary disease) (HCC)     Crohn disease (HCC)     Diagnosed at age 17.  Echocardiogram normal (EF: 55-60%) 07/2015    Esophageal ulcer     GERD (gastroesophageal reflux disease)     H/O blood transfusion 2013    [address] [facility]    Hypertension     denies    Migraine     Short bowel syndrome     Ventral hernia without obstruction or gangrene         Past Surgical History:   Procedure Laterality Date    ABDOMEN SURGERY PROC UNLISTED      33 abdominal operations for treatment of Crohn's disease and its complications.  HC NDL CEJA      ceja needle, takes 1 inch needle    HX APPENDECTOMY      HX CHOLECYSTECTOMY      HX GI      colectomy x2, one ileostomy    HX GI  7/28/14    exp lap,partial colectomy with end ileostomy by Dr Evette Cabello      neck fusion    HX ORTHOPAEDIC  1980s    wrist right,     HX ORTHOPAEDIC  2006, 11/2013    neck, L4 L5 L6    HX ORTHOPAEDIC  1990s    right knee scope    HX OTHER SURGICAL      surgical repair from spider bite    HX OTHER SURGICAL  12/1/14    Incision and drainage of posterior right subcutaneous shoulder abscess    HX OTHER SURGICAL  2014    LEFT INDEX FINGER SPIDER BITE    HX OTHER SURGICAL  2014    RECTAL FISTULA    HX OTHER SURGICAL  3/17/2015    Ileostomy takedown with extensive lysis of adhesions (greater than three hours), mobilization of the splenic flexure, left colon resection, ileocolic anastomosis, and excision of scar; Dr. Matthew Hennessy.  HX OTHER SURGICAL  3/22/2015    Exploratory laparotomy with washout of abdomen, suture repair of small bowel/anastomosis, and diverting protective loop ileostomy;  Leonides Rudd MD.    HX OTHER SURGICAL  4/9/2015 Laparotomy, extensive lysis of adhesions, abdominal lavage, and resection of ileocolic anastomosis (including the efferent limb of the loop ileostomy) with creation of Demetrius's pouch; Dr. Enedina Gan.   OTHER SURGICAL  7/14/2015    Cystoscopy and placement of bilateral ureteral catheters; Sharyn Moise MD.     OTHER SURGICAL  7/14/2015    Ileostomy takedown with extensive lysis of adhesions, small bowel resection, and enterocolostomy; Dr. Enedina Gan.   OTHER SURGICAL  9/29/2015    CT-guided percutaneous drainage of intraabdominal abscess; Dr. Javi Fox.   OTHER SURGICAL  11/16/2015    CT-guided percutaneous aspiration of abdominal wall cavity; Dr. Nadiya Verde.   OTHER SURGICAL  12/1/2015    Incision and drainage of abdominal wall abscess; Dr. Enedina Gan.   OTHER SURGICAL  2/11/2016    Incision and drainage of abdominal wall abscess; Dr. Enedina Gan.   OTHER SURGICAL  2/23/2016    Cystoscopy and placement of bilateral temporary ureteral catheters; Dr. Theron Patel.   OTHER SURGICAL  2/23/2016    Exploratory laparotomy, extensive lysis of adhesions, removal of mesh, and repair of enterocutaneous fistula; Dr. Enedina Gan.   OTHER SURGICAL  11/03/2017    Exploratory laparotomy, extensive lysis of adhesions, and reduction of intussusception; Dr. Enedina Gan.        Social History     Tobacco Use    Smoking status: Current Every Day Smoker     Packs/day: 1.00     Years: 44.00     Pack years: 44.00    Smokeless tobacco: Current User    Tobacco comment: CURRENT E CIGS   Substance Use Topics    Alcohol use: No     Comment: RARELY        Family History   Problem Relation Age of Onset    Stroke Mother     Heart Disease Mother     Kidney Disease Mother     Anesth Problems Mother         TAKES A LONG TIME TO WAKE UP    Heart Disease Father     Thyroid Disease Sister      Allergies   Allergen Reactions    Honey Anaphylaxis    Remicade [Infliximab] Anaphylaxis    Crestor [Rosuvastatin] Myalgia    Other Plant, Animal, Environmental Other (comments)     Developed \"boils\" on right arm that required I &D after receiving FENTANYL patch. Is able to take FENTANYL orally; states is allergic to fentanyl patch GLUE    Imuran [Azathioprine] Diarrhea and Nausea Only    Mercaptopurine Diarrhea    Sulfa (Sulfonamide Antibiotics) Other (comments)     Causes diarrhea        Prior to Admission medications    Medication Sig Start Date End Date Taking? Authorizing Provider   mdcux-tolc-MnCND-collag-mv-min (FLOR, WITH COLLAGEN,) 7-7-1.5 gram pwpk Take 1 Packet by mouth daily. 12/18/17   Torrey Kasper MD   oxyCODONE-acetaminophen (PERCOCET 10)  mg per tablet Take 1 Tab by mouth every six (6) hours as needed for Pain. Max Daily Amount: 4 Tabs. 12/18/17   Torrey Kasper MD   omeprazole (PRILOSEC) 40 mg capsule take 1 capsule by mouth once daily 11/15/17   Provider, Historical   HYDROcodone-acetaminophen (NORCO)  mg tablet Take 1-2 Tabs by mouth every four (4) hours as needed. Max Daily Amount: 12 Tabs. 11/10/17   Katia Joseph MD   morphine CR (MS CONTIN) 15 mg CR tablet Take 1 Tab by mouth every twelve (12) hours. Max Daily Amount: 30 mg. 11/10/17   Katia Joseph MD   chlorzoxazone (PARAFON FORTE) 500 mg tablet Take 500 mg by mouth two (2) times daily as needed for Muscle Spasm(s). Provider, Historical   cloNIDine HCl (CATAPRES) 0.1 mg tablet Take 0.1 mg by mouth two (2) times a day. Provider, Historical   colestipol (COLESTID) 1 gram tablet Take 6 g by mouth Before breakfast, lunch, dinner and at bedtime. Min 24 g/day and Max 30 g/day. Provider, Historical   LORazepam (ATIVAN) 1 mg tablet Take 1 mg by mouth nightly as needed for Anxiety. Provider, Historical   aspirin-acetaminophen-caffeine (EXCEDRIN ES) 250-250-65 mg per tablet Take 2 Tabs by mouth every six (6) hours as needed for Headache.     Provider, Historical   diphenoxylate-atropine (LOMOTIL) 2.5-0.025 mg per tablet Take 2 Tabs by mouth Before breakfast, lunch, dinner and at bedtime. Provider, Historical   loperamide (IMODIUM) 2 mg capsule Take 2 mg by mouth as needed for Diarrhea. Patient takes 14-16 capsules a day. Provider, Historical   ondansetron hcl (ZOFRAN, AS HYDROCHLORIDE,) 8 mg tablet Take 8 mg by mouth every eight (8) hours as needed for Nausea. Provider, Historical   multivit-min-FA-lycopen-lutein (CENTRUM SILVER ULTRA MEN'S) 300-600-300 mcg tab Take 1 Tab by mouth daily. Provider, Historical       REVIEW OF SYSTEMS:     I am not able to complete the review of systems because:    The patient is intubated and sedated    The patient has altered mental status due to his acute medical problems    The patient has baseline aphasia from prior stroke(s)    The patient has baseline dementia and is not reliable historian    The patient is in acute medical distress and unable to provide information           Total of 12 systems reviewed as follows:       POSITIVE= underlined text  Negative = text not underlined  General:  fever, chills, sweats, generalized weakness, weight loss/gain,      loss of appetite   Eyes:    blurred vision, eye pain, loss of vision, double vision  ENT:    rhinorrhea, pharyngitis   Respiratory:   cough, sputum production, SOB, YOUNGER, wheezing, pleuritic pain   Cardiology:   chest pain, palpitations, orthopnea, PND, edema, syncope   Gastrointestinal:  abdominal pain , N/V, diarrhea, dysphagia, constipation, bleeding   Genitourinary:  frequency, urgency, dysuria, hematuria, incontinence   Muskuloskeletal :  arthralgia, myalgia, back pain  Hematology:  easy bruising, nose or gum bleeding, lymphadenopathy   Dermatological: rash, ulceration, pruritis, color change / jaundice  Endocrine:   hot flashes or polydipsia   Neurological:  headache, dizziness, confusion, focal weakness, paresthesia,     Speech difficulties, memory loss, gait difficulty  Psychological: Feelings of anxiety, depression, agitation    Objective:   VITALS:    Visit Vitals  /75   Pulse 70   Temp 98.4 °F (36.9 °C)   Resp 18   Ht 5' 11\" (1.803 m)   Wt 84.7 kg (186 lb 11.7 oz)   SpO2 93%   BMI 26.04 kg/m²       PHYSICAL EXAM:    General:    Alert, cooperative, no distress, appears stated age. HEENT: Atraumatic, anicteric sclerae, pink conjunctivae     No oral ulcers, mucosa moist, throat clear, dentition fair  Neck:  Supple, symmetrical,  thyroid: non tender  Lungs:   Clear to auscultation bilaterally. No Wheezing or Rhonchi. No rales. Chest wall:  No tenderness  No Accessory muscle use. Heart:   Regular  rhythm,  No  murmur   No edema  Abdomen:   Multiple scars, there is an area of drainage in the LLQ, also diffuse abdominal pain to palpation, Soft,  Not distended. Bowel sounds normal  Extremities: No cyanosis. No clubbing,      Skin turgor normal, Capillary refill normal, Radial dial pulse 2+  Skin:     Not pale. Not Jaundiced  No rashes   Psych:  Good insight. Not depressed. Not anxious or agitated. Neurologic: EOMs intact. No facial asymmetry. No aphasia or slurred speech. Symmetrical strength, Sensation grossly intact.  Alert and oriented X 4.     _______________________________________________________________________  Care Plan discussed with:    Comments   Patient x    Family      RN     Care Manager                    Consultant:      _______________________________________________________________________  Expected  Disposition:   Home with Family x   HH/PT/OT/RN    SNF/LTC    FRANCINE    ________________________________________________________________________  TOTAL TIME:  28 Minutes    Critical Care Provided     Minutes non procedure based      Comments     Reviewed previous records   >50% of visit spent in counseling and coordination of care  Discussion with patient and/or family and questions answered       ________________________________________________________________________  Signed: Zaira Gorman MD    Procedures: see electronic medical records for all procedures/Xrays and details which were not copied into this note but were reviewed prior to creation of Plan. LAB DATA REVIEWED:    Recent Results (from the past 24 hour(s))   CBC WITH AUTOMATED DIFF    Collection Time: 06/29/19  6:08 PM   Result Value Ref Range    WBC 8.4 4.1 - 11.1 K/uL    RBC 3.99 (L) 4.10 - 5.70 M/uL    HGB 14.1 12.1 - 17.0 g/dL    HCT 41.9 36.6 - 50.3 %    .0 (H) 80.0 - 99.0 FL    MCH 35.3 (H) 26.0 - 34.0 PG    MCHC 33.7 30.0 - 36.5 g/dL    RDW 14.1 11.5 - 14.5 %    PLATELET 791 585 - 486 K/uL    MPV 9.8 8.9 - 12.9 FL    NRBC 0.0 0  WBC    ABSOLUTE NRBC 0.00 0.00 - 0.01 K/uL    NEUTROPHILS 63 32 - 75 %    LYMPHOCYTES 27 12 - 49 %    MONOCYTES 6 5 - 13 %    EOSINOPHILS 4 0 - 7 %    BASOPHILS 0 0 - 1 %    IMMATURE GRANULOCYTES 0 0.0 - 0.5 %    ABS. NEUTROPHILS 5.2 1.8 - 8.0 K/UL    ABS. LYMPHOCYTES 2.3 0.8 - 3.5 K/UL    ABS. MONOCYTES 0.5 0.0 - 1.0 K/UL    ABS. EOSINOPHILS 0.4 0.0 - 0.4 K/UL    ABS. BASOPHILS 0.0 0.0 - 0.1 K/UL    ABS. IMM. GRANS. 0.0 0.00 - 0.04 K/UL    DF AUTOMATED     METABOLIC PANEL, COMPREHENSIVE    Collection Time: 06/29/19  6:08 PM   Result Value Ref Range    Sodium 138 136 - 145 mmol/L    Potassium 3.9 3.5 - 5.1 mmol/L    Chloride 108 97 - 108 mmol/L    CO2 26 21 - 32 mmol/L    Anion gap 4 (L) 5 - 15 mmol/L    Glucose 80 65 - 100 mg/dL    BUN 8 6 - 20 MG/DL    Creatinine 0.98 0.70 - 1.30 MG/DL    BUN/Creatinine ratio 8 (L) 12 - 20      GFR est AA >60 >60 ml/min/1.73m2    GFR est non-AA >60 >60 ml/min/1.73m2    Calcium 8.2 (L) 8.5 - 10.1 MG/DL    Bilirubin, total 0.2 0.2 - 1.0 MG/DL    ALT (SGPT) 19 12 - 78 U/L    AST (SGOT) 14 (L) 15 - 37 U/L    Alk.  phosphatase 87 45 - 117 U/L    Protein, total 6.2 (L) 6.4 - 8.2 g/dL    Albumin 3.4 (L) 3.5 - 5.0 g/dL    Globulin 2.8 2.0 - 4.0 g/dL    A-G Ratio 1.2 1.1 - 2.2     LIPASE    Collection Time: 06/29/19  6:08 PM   Result Value Ref Range    Lipase 105 73 - 393 U/L   SAMPLES BEING HELD    Collection Time: 06/29/19  6:08 PM   Result Value Ref Range    SAMPLES BEING HELD 1RED,1BLU     COMMENT        Add-on orders for these samples will be processed based on acceptable specimen integrity and analyte stability, which may vary by analyte.    URINALYSIS W/ RFLX MICROSCOPIC    Collection Time: 06/29/19  8:13 PM   Result Value Ref Range    Color YELLOW/STRAW      Appearance CLEAR CLEAR      Specific gravity >1.030 (H) 1.003 - 1.030    pH (UA) 5.0 5.0 - 8.0      Protein NEGATIVE  NEG mg/dL    Glucose NEGATIVE  NEG mg/dL    Ketone NEGATIVE  NEG mg/dL    Bilirubin NEGATIVE  NEG      Blood TRACE (A) NEG      Urobilinogen 0.2 0.2 - 1.0 EU/dL    Nitrites NEGATIVE  NEG      Leukocyte Esterase NEGATIVE  NEG      WBC 0-4 0 - 4 /hpf    RBC 0-5 0 - 5 /hpf    Epithelial cells FEW FEW /lpf    Bacteria NEGATIVE  NEG /hpf    Hyaline cast 0-2 0 - 5 /lpf

## 2019-06-30 NOTE — CONSULTS
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 History of Present Illness: The patient is a 64-year-old male with Crohn's disease who is well known to me from multiple previous admissions and operations. He has had more than 30 operations, and he has short bowel syndrome. He has his entire rectum, little or no colon, and between 125 cm (measured intraoperatively on 2016) and 210 cm (measured intraoperatively on 11/3/2017) of small intestine distal to the ligament of Treitz. The ileum is anastomosed to the rectosigmoid josefina side-to-end fashion. Among the many surgical procedures that he has undergone, the most recent abdominal operation was an exploratory laparotomy with extensive lysis of adhesions and reduction of an intestinal intussusception on 11/3/2017. Because there was a large, reducible, ventral hernia already present at the time of that operation, it was not possible to primarily close the fascia. Some thought was given to using mesh to facilitate the closure. Instead, it was ultimately decided to close skin and scar and allow the patient to heal with the hernia, which could hopefully be repaired at a later date. The patient has been seen by Demetria Franklin MD regarding a possible hernia repair, but he was told that an elective repair would not be attempted unless he had been able to abstain from smoking for three months. So far, he has not been able to do that. The patient presented to the ER today with severe abdominal pain. He reports that yesterday he felt something seemingly tearing in the left side of his abdomen and that today he had worse pain and he began to have drainage of what looked like loose stool from a left-sided abdominal scar.   He has had nausea but no vomiting and he has not had any definite fever. His bowel movements have not changed recently. For at least the l;ast several months he has been having 8 to 10 watery stools per day. He has not seen any blood in the stools but does sometimes see some from skin irritaton from all of the wiping. He does not usually have any fecal incontinence. His appetite has tended to be good, and he has not experienced any significant and persistent weight loss. He has not seen his gastroenterologist since 2017, and he is not currently on any Crohn's disease medications. He also has not seen his primary physician for several months, but he has followed up regularly with his pain management physician, Dr. Samantha Bill.    [facility] medication list below is probably not accurate and will need to be updated/corrected.]        PMH:  Past Medical History:   Diagnosis Date    Abdominal wall hernia 6/15/2012    Anal fissure     Anemia     Anemia     Anxiety and depression     Arthritis     Related to the Crohn's disease.  Cancer ([address]     MELANOMA HEAD AND FACE    Chronic pain     GENERALIZED    COPD (chronic obstructive pulmonary disease) (HCC)     Crohn disease (HCC)     Diagnosed at age 17.  Echocardiogram normal (EF: 55-60%) 07/2015    Esophageal ulcer     GERD (gastroesophageal reflux disease)     H/O blood transfusion 2013    [address] [facility]    Hypertension     denies    Migraine     Short bowel syndrome     Ventral hernia without obstruction or gangrene        PSH:  Past Surgical History:   Procedure Laterality Date    ABDOMEN SURGERY PROC UNLISTED      33 abdominal operations for treatment of Crohn's disease and its complications.     HC NDL CEJA      ceja needle, takes 1 inch needle    HX APPENDECTOMY      HX CHOLECYSTECTOMY      HX GI      colectomy x2, one ileostomy    HX GI  7/28/14    exp lap,partial colectomy with end ileostomy by Dr Yahir Madison HX HEENT      neck fusion    HX ORTHOPAEDIC  1980s    wrist right,     HX ORTHOPAEDIC  2006, 11/2013    neck, L4 L5 L6    HX ORTHOPAEDIC  1990s    right knee scope    HX OTHER SURGICAL      surgical repair from spider bite    HX OTHER SURGICAL  12/1/14    Incision and drainage of posterior right subcutaneous shoulder abscess    HX OTHER SURGICAL  2014    LEFT INDEX FINGER SPIDER BITE    HX OTHER SURGICAL  2014    RECTAL FISTULA    HX OTHER SURGICAL  3/17/2015    Ileostomy takedown with extensive lysis of adhesions (greater than three hours), mobilization of the splenic flexure, left colon resection, ileocolic anastomosis, and excision of scar; Dr. Jorge Stark.  HX OTHER SURGICAL  3/22/2015    Exploratory laparotomy with washout of abdomen, suture repair of small bowel/anastomosis, and diverting protective loop ileostomy; Blaze Ray MD.    HX OTHER SURGICAL  4/9/2015    Laparotomy, extensive lysis of adhesions, abdominal lavage, and resection of ileocolic anastomosis (including the efferent limb of the loop ileostomy) with creation of Demetrius's pouch; Dr. Jorge Stark.  HX OTHER SURGICAL  7/14/2015    Cystoscopy and placement of bilateral ureteral catheters; Joselito Moser MD.    HX OTHER SURGICAL  7/14/2015    Ileostomy takedown with extensive lysis of adhesions, small bowel resection, and enterocolostomy; Dr. Jorge Stark.  HX OTHER SURGICAL  9/29/2015    CT-guided percutaneous drainage of intraabdominal abscess; Dr. Sandeep Arias.  HX OTHER SURGICAL  11/16/2015    CT-guided percutaneous aspiration of abdominal wall cavity; Dr. Cassandra Chavez.  HX OTHER SURGICAL  12/1/2015    Incision and drainage of abdominal wall abscess; Dr. Jorge Stark.   OTHER SURGICAL  2/11/2016    Incision and drainage of abdominal wall abscess; Dr. Jorge Stark.   OTHER SURGICAL  2/23/2016    Cystoscopy and placement of bilateral temporary ureteral catheters; Dr. Frankey Satchel.     HX OTHER SURGICAL  2/23/2016    Exploratory laparotomy, extensive lysis of adhesions, removal of mesh, and repair of enterocutaneous fistula; Dr. Maria A Soriano.  HX OTHER SURGICAL  11/03/2017    Exploratory laparotomy, extensive lysis of adhesions, and reduction of intussusception; Dr. Maria A Soriano. Home Medications:  Prior to Admission Medications   Prescriptions Last Dose Informant Patient Reported? Taking? HYDROcodone-acetaminophen (NORCO)  mg tablet   No No   Sig: Take 1-2 Tabs by mouth every four (4) hours as needed. Max Daily Amount: 12 Tabs. LORazepam (ATIVAN) 1 mg tablet   Yes No   Sig: Take 1 mg by mouth nightly as needed for Anxiety. fpckd-hxix-PgRRZ-collag-mv-min (FLOR, WITH COLLAGEN,) 7-7-1.5 gram pwpk   No No   Sig: Take 1 Packet by mouth daily. aspirin-acetaminophen-caffeine (EXCEDRIN ES) 250-250-65 mg per tablet   Yes No   Sig: Take 2 Tabs by mouth every six (6) hours as needed for Headache. chlorzoxazone (PARAFON FORTE) 500 mg tablet   Yes No   Sig: Take 500 mg by mouth two (2) times daily as needed for Muscle Spasm(s). cloNIDine HCl (CATAPRES) 0.1 mg tablet   Yes No   Sig: Take 0.1 mg by mouth two (2) times a day. colestipol (COLESTID) 1 gram tablet   Yes No   Sig: Take 6 g by mouth Before breakfast, lunch, dinner and at bedtime. Min 24 g/day and Max 30 g/day. diphenoxylate-atropine (LOMOTIL) 2.5-0.025 mg per tablet   Yes No   Sig: Take 2 Tabs by mouth Before breakfast, lunch, dinner and at bedtime. loperamide (IMODIUM) 2 mg capsule   Yes No   Sig: Take 2 mg by mouth as needed for Diarrhea. Patient takes 14-16 capsules a day. morphine CR (MS CONTIN) 15 mg CR tablet   No No   Sig: Take 1 Tab by mouth every twelve (12) hours. Max Daily Amount: 30 mg.   multivit-min-FA-lycopen-lutein (CENTRUM SILVER ULTRA MEN'S) 300-600-300 mcg tab   Yes No   Sig: Take 1 Tab by mouth daily.    omeprazole (PRILOSEC) 40 mg capsule   Yes No   Sig: take 1 capsule by mouth once daily   ondansetron hcl (ZOFRAN, AS HYDROCHLORIDE,) 8 mg tablet   Yes No   Sig: Take 8 mg by mouth every eight (8) hours as needed for Nausea. oxyCODONE-acetaminophen (PERCOCET 10)  mg per tablet   No No   Sig: Take 1 Tab by mouth every six (6) hours as needed for Pain. Max Daily Amount: 4 Tabs. Facility-Administered Medications: None       Hospital Medications:  Current Facility-Administered Medications   Medication Dose Route Frequency    morphine injection 2 mg  2 mg IntraVENous Q3H PRN    0.9% sodium chloride infusion  75 mL/hr IntraVENous CONTINUOUS     Current Outpatient Medications   Medication Sig    ypafo-udqd-DqNSV-collag-mv-min (FLOR, WITH COLLAGEN,) 7-7-1.5 gram pwpk Take 1 Packet by mouth daily.  oxyCODONE-acetaminophen (PERCOCET 10)  mg per tablet Take 1 Tab by mouth every six (6) hours as needed for Pain. Max Daily Amount: 4 Tabs.  omeprazole (PRILOSEC) 40 mg capsule take 1 capsule by mouth once daily    HYDROcodone-acetaminophen (NORCO)  mg tablet Take 1-2 Tabs by mouth every four (4) hours as needed. Max Daily Amount: 12 Tabs.  morphine CR (MS CONTIN) 15 mg CR tablet Take 1 Tab by mouth every twelve (12) hours. Max Daily Amount: 30 mg.    chlorzoxazone (PARAFON FORTE) 500 mg tablet Take 500 mg by mouth two (2) times daily as needed for Muscle Spasm(s).  cloNIDine HCl (CATAPRES) 0.1 mg tablet Take 0.1 mg by mouth two (2) times a day.  colestipol (COLESTID) 1 gram tablet Take 6 g by mouth Before breakfast, lunch, dinner and at bedtime. Min 24 g/day and Max 30 g/day.  LORazepam (ATIVAN) 1 mg tablet Take 1 mg by mouth nightly as needed for Anxiety.  aspirin-acetaminophen-caffeine (EXCEDRIN ES) 250-250-65 mg per tablet Take 2 Tabs by mouth every six (6) hours as needed for Headache.  diphenoxylate-atropine (LOMOTIL) 2.5-0.025 mg per tablet Take 2 Tabs by mouth Before breakfast, lunch, dinner and at bedtime.  loperamide (IMODIUM) 2 mg capsule Take 2 mg by mouth as needed for Diarrhea. Patient takes 14-16 capsules a day.     ondansetron hcl (ZOFRAN, AS HYDROCHLORIDE,) 8 mg tablet Take 8 mg by mouth every eight (8) hours as needed for Nausea.  multivit-min-FA-lycopen-lutein (CENTRUM SILVER ULTRA MEN'S) 300-600-300 mcg tab Take 1 Tab by mouth daily. Allergies: Allergies   Allergen Reactions    Honey Anaphylaxis    Remicade [Infliximab] Anaphylaxis    Crestor [Rosuvastatin] Myalgia    Other Plant, Animal, Environmental Other (comments)     Developed \"boils\" on right arm that required I &D after receiving FENTANYL patch. Is able to take FENTANYL orally; states is allergic to fentanyl patch GLUE    Imuran [Azathioprine] Diarrhea and Nausea Only    Mercaptopurine Diarrhea    Sulfa (Sulfonamide Antibiotics) Other (comments)     Causes diarrhea       Family History:  Family History   Problem Relation Age of Onset    Stroke Mother     Heart Disease Mother     Kidney Disease Mother     Anesth Problems Mother         TAKES A LONG TIME TO WAKE UP    Heart Disease Father     Thyroid Disease Sister        Social History:  Social History     Tobacco Use    Smoking status: Current Every Day Smoker     Packs/day: 1.00     Years: 44.00     Pack years: 44.00    Smokeless tobacco: Current User    Tobacco comment: CURRENT E CIGS   Substance Use Topics    Alcohol use: No     Comment: RARELY       Review of Systems:    Symptom Y/N Comments  Symptom Y/N Comments   Fever/Chills N   Chest Pain N    Cough N   Abdominal Pain Y    Sputum N   Joint Pain     SOB/YOUNGER N   Pruritis/Rash     Nausea/vomit Y Nausea only.   Tolerating PT/OT     Diarrhea Y Chronic  Tolerating Diet Y    Constipation N   Other       Could NOT obtain due to:        Objective:     Patient Vitals for the past 8 hrs:   BP Temp Pulse Resp SpO2 Height Weight   06/29/19 2130 132/75    93 %     06/29/19 2100 133/69    94 %     06/29/19 2030 138/82    95 %     06/29/19 2000 147/72    95 %     06/29/19 1944 140/77         06/29/19 1830 134/75    100 %     06/29/19 1800 134/76    97 %     06/29/19 1730 135/74    96 %     06/29/19 1700 131/75    96 %     06/29/19 1618 143/77 98.4 °F (36.9 °C) 70 18 98 % 5' 11\" (1.803 m) 84.7 kg (186 lb 11.7 oz)     No intake/output data recorded. No intake/output data recorded. PHYSICAL EXAMINATION:    GENERAL:  No distress (medicated). HEENT:  Anicteric. HEART:  Regular rate and rhythm with no murmurs, gallops, or rubs. ABDOMEN:  Soft. Non-distended. Mildly tender diffusely (which is normal for the patient). More focally tender in the left lower quadrant where there is an area of skin irritation and what appears to be a pinhole-sized opening contained within a transversely oriented scar. There is a broad midline scar at which the tissue is quite thin; peristalsis is visible beneath this skin. There are no palpable masses except for the massive midline ventral incisional hernia. EXTREMITIES:  No edema. NEURO:  Alert and oriented. Data Review     Recent Labs     06/29/19  1808   WBC 8.4   HGB 14.1   HCT 41.9        Recent Labs     06/29/19  1808      K 3.9      CO2 26   BUN 8   CREA 0.98   GLU 80   CA 8.2*     Recent Labs     06/29/19  1808   SGOT 14*   AP 87   TP 6.2*   ALB 3.4*   GLOB 2.8   LPSE 105     No results for input(s): INR, PTP, APTT in the last 72 hours. No lab exists for component: INREXT    CT Scan of Abdomen and Pelvis (with oral and IV contrast) (6/29/2019): I have reviewed the images and agree with the interprertation that there is no pneumoperitoneum, intraabdominal abscess, or obstruction. Although the sigmoid colon is described as having mural thickening and surrounding inflammation, I think that it is more likely that what is inflamed is the distal small intestine. In addition, although the report states that there is no visible fistula, I believe there is contrast that tracks toward the skin in the area from which the patient has noted the drainage. 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 He is currently stable and not septic, and there is no indication for urgent surgical intervention. I believe that he should be admitted by the Hospitalist Service and that a Gastroenterology consultation should be obtained tomorrow for further evaluation of the GI tract and appropriate medical management. For now, the patient should be kept NPO except for sips with medications and occasional ice chips. Bowel rest should help to minimize the consequences of what should be a low output fistula. If it is indeed from the Crohn's disease, then medical management of the inflammation might promote closure of the fistula. Surgery should be avoided if at all possible, but I will follow the patient's progress.     Active Problems:    Croup (6/29/2019)      Crohn disease ([address] (6/29/2019)      HTN (hypertension) (6/29/2019)      Abdominal pain (6/29/2019)

## 2019-06-30 NOTE — PROGRESS NOTES
0475 [facility] to Dr. Tejinder Mills about pt's diet order. Pt may have meds and ice chips. Colestid to be held while NPO.     0930 Notified Dr. Tejinder Mills that pt rating pain \"8\", received morphine around 0730. Pt may receive norco now and morphine for breakthrough pain. Also, pt refusing clonidine. 5 Paged Dr. Tejinder Mills regarding pt's need for nicotine patch. Pt stated he smokes 1 PPD. Awaiting call back. 0 Re-paged Dr. Tejinder Mills regarding needed order for nicotine patch. Awaiting call back.

## 2019-06-30 NOTE — PROGRESS NOTES
General Daily Progress Note    Admission Date: 6/29/2019  Hospital Day 2  Post-Op Day Not Applicable  Subjective:     No new complaints. Objective:     Patient Vitals for the past 24 hrs:   BP Temp Pulse Resp SpO2 Height Weight   06/30/19 0808 128/86 98.4 °F (36.9 °C) 71 17 97 %     06/30/19 0031 134/76 98.1 °F (36.7 °C)  18 95 %     06/29/19 2245 139/87 98.4 °F (36.9 °C)  18 97 %     06/29/19 2130 132/75    95 %     06/29/19 2100 133/69    96 %     06/29/19 2030 138/82    95 %     06/29/19 2000 147/72 98.2 °F (36.8 °C)   95 %     06/29/19 1944 140/77         06/29/19 1830 134/75    100 %     06/29/19 1800 134/76    97 %     06/29/19 1730 135/74    96 %     06/29/19 1700 131/75    96 %     06/29/19 1618 143/77 98.4 °F (36.9 °C) 70 18 98 % 5' 11\" (1.803 m) 84.7 kg (186 lb 11.7 oz)   06/29/19 1510     96 %       No intake/output data recorded. No intake/output data recorded. Physical Examination:  No distress. Abdominal tenderness localized to the left lower quadrant site at which there is a single drop of mucoid drainage. Data Review   Recent Results (from the past 24 hour(s))   CBC WITH AUTOMATED DIFF    Collection Time: 06/29/19  6:08 PM   Result Value Ref Range    WBC 8.4 4.1 - 11.1 K/uL    RBC 3.99 (L) 4.10 - 5.70 M/uL    HGB 14.1 12.1 - 17.0 g/dL    HCT 41.9 36.6 - 50.3 %    .0 (H) 80.0 - 99.0 FL    MCH 35.3 (H) 26.0 - 34.0 PG    MCHC 33.7 30.0 - 36.5 g/dL    RDW 14.1 11.5 - 14.5 %    PLATELET 159 820 - 685 K/uL    MPV 9.8 8.9 - 12.9 FL    NRBC 0.0 0  WBC    ABSOLUTE NRBC 0.00 0.00 - 0.01 K/uL    NEUTROPHILS 63 32 - 75 %    LYMPHOCYTES 27 12 - 49 %    MONOCYTES 6 5 - 13 %    EOSINOPHILS 4 0 - 7 %    BASOPHILS 0 0 - 1 %    IMMATURE GRANULOCYTES 0 0.0 - 0.5 %    ABS. NEUTROPHILS 5.2 1.8 - 8.0 K/UL    ABS. LYMPHOCYTES 2.3 0.8 - 3.5 K/UL    ABS. MONOCYTES 0.5 0.0 - 1.0 K/UL    ABS.  EOSINOPHILS 0.4 0.0 - 0.4 K/UL ABS. BASOPHILS 0.0 0.0 - 0.1 K/UL    ABS. IMM. GRANS. 0.0 0.00 - 0.04 K/UL    DF AUTOMATED     METABOLIC PANEL, COMPREHENSIVE    Collection Time: 06/29/19  6:08 PM   Result Value Ref Range    Sodium 138 136 - 145 mmol/L    Potassium 3.9 3.5 - 5.1 mmol/L    Chloride 108 97 - 108 mmol/L    CO2 26 21 - 32 mmol/L    Anion gap 4 (L) 5 - 15 mmol/L    Glucose 80 65 - 100 mg/dL    BUN 8 6 - 20 MG/DL    Creatinine 0.98 0.70 - 1.30 MG/DL    BUN/Creatinine ratio 8 (L) 12 - 20      GFR est AA >60 >60 ml/min/1.73m2    GFR est non-AA >60 >60 ml/min/1.73m2    Calcium 8.2 (L) 8.5 - 10.1 MG/DL    Bilirubin, total 0.2 0.2 - 1.0 MG/DL    ALT (SGPT) 19 12 - 78 U/L    AST (SGOT) 14 (L) 15 - 37 U/L    Alk. phosphatase 87 45 - 117 U/L    Protein, total 6.2 (L) 6.4 - 8.2 g/dL    Albumin 3.4 (L) 3.5 - 5.0 g/dL    Globulin 2.8 2.0 - 4.0 g/dL    A-G Ratio 1.2 1.1 - 2.2     LIPASE    Collection Time: 06/29/19  6:08 PM   Result Value Ref Range    Lipase 105 73 - 393 U/L   SAMPLES BEING HELD    Collection Time: 06/29/19  6:08 PM   Result Value Ref Range    SAMPLES BEING HELD 1RED,1BLU     COMMENT        Add-on orders for these samples will be processed based on acceptable specimen integrity and analyte stability, which may vary by analyte.    URINALYSIS W/ RFLX MICROSCOPIC    Collection Time: 06/29/19  8:13 PM   Result Value Ref Range    Color YELLOW/STRAW      Appearance CLEAR CLEAR      Specific gravity >1.030 (H) 1.003 - 1.030    pH (UA) 5.0 5.0 - 8.0      Protein NEGATIVE  NEG mg/dL    Glucose NEGATIVE  NEG mg/dL    Ketone NEGATIVE  NEG mg/dL    Bilirubin NEGATIVE  NEG      Blood TRACE (A) NEG      Urobilinogen 0.2 0.2 - 1.0 EU/dL    Nitrites NEGATIVE  NEG      Leukocyte Esterase NEGATIVE  NEG      WBC 0-4 0 - 4 /hpf    RBC 0-5 0 - 5 /hpf    Epithelial cells FEW FEW /lpf    Bacteria NEGATIVE  NEG /hpf    Hyaline cast 0-2 0 - 5 /lpf   METABOLIC PANEL, BASIC    Collection Time: 06/30/19  2:33 AM   Result Value Ref Range    Sodium 138 136 - 145 mmol/L    Potassium 3.8 3.5 - 5.1 mmol/L    Chloride 106 97 - 108 mmol/L    CO2 26 21 - 32 mmol/L    Anion gap 6 5 - 15 mmol/L    Glucose 75 65 - 100 mg/dL    BUN 7 6 - 20 MG/DL    Creatinine 0.88 0.70 - 1.30 MG/DL    BUN/Creatinine ratio 8 (L) 12 - 20      GFR est AA >60 >60 ml/min/1.73m2    GFR est non-AA >60 >60 ml/min/1.73m2    Calcium 7.9 (L) 8.5 - 10.1 MG/DL   CBC W/O DIFF    Collection Time: 06/30/19  2:33 AM   Result Value Ref Range    WBC 5.9 4.1 - 11.1 K/uL    RBC 3.87 (L) 4.10 - 5.70 M/uL    HGB 13.5 12.1 - 17.0 g/dL    HCT 40.9 36.6 - 50.3 %    .7 (H) 80.0 - 99.0 FL    MCH 34.9 (H) 26.0 - 34.0 PG    MCHC 33.0 30.0 - 36.5 g/dL    RDW 14.3 11.5 - 14.5 %    PLATELET 439 341 - 396 K/uL    MPV 9.7 8.9 - 12.9 FL    NRBC 0.0 0  WBC    ABSOLUTE NRBC 0.00 0.00 - 0.01 K/uL           Assessment and Plan:     Principal Problem:    HTN (hypertension) (6/29/2019)    Active Problems:    Croup (6/29/2019)      Crohn disease ([address] (6/29/2019)      Abdominal pain (6/29/2019)      No clinical change compared to when I last saw the patient approximately 12 hours ago. I have placed a consult request for the Pharmacy to clean up and correct the patient's highly erroneous pre-admission medication list.     I have no new recommendations. I am still in favor of keep ing the patient NPO except for ice chips and sips with medications at least until he has been seen by GI.

## 2019-06-30 NOTE — PROGRESS NOTES
Problem: Falls - Risk of  Goal: *Absence of Falls  Description  Document Ting Rod Fall Risk and appropriate interventions in the flowsheet.   6/30/2019 1748 by Shiv Thomas RN  Outcome: Progressing Towards Goal  Note:   Fall Risk Interventions:  Medication Interventions: Evaluate medications/consider consulting pharmacy, Patient to call before getting OOB, patient to call for assistance     6/30/2019 1218 by Shiv Thomas RN  Outcome: Progressing Towards Goal  Note:   Fall Risk Interventions:  Medication Interventions: Evaluate medications/consider consulting pharmacy, Patient to call before getting OOB

## 2019-06-30 NOTE — PROGRESS NOTES
Problem: Falls - Risk of  Goal: *Absence of Falls  Description  Document Princess Girt Fall Risk and appropriate interventions in the flowsheet.   Outcome: Progressing Towards Goal  Note:   Fall Risk Interventions:  Medication Interventions: Evaluate medications/consider consulting pharmacy, Patient to call before getting OOB

## 2019-06-30 NOTE — PROGRESS NOTES
Hospitalist Progress Note    NAME: Giovanny Arredondo   :  1960   MRN:  [id number]       Assessment / Plan:  1) Crohn's flare: per CT \" 1. Inflammation of the dilated sigmoid colon is compatible with active Crohn's colitis. No visible fistula or abscess. However, the bowel is in close approximation to the peritoneum and extremely thin anterior pelvic wall. A subtle fistula could be present but not visible. \" 2. Pseudoobstruction of the small bowel. No true bowel obstruction. 3. No ascites or abscess. 4. Patient is at risk for forming a fistula between the sigmoid colon and the dome of the urinary bladder, unchanged since May 2018. \"    No WCC, No fevers, normal electrolytes  Patient feeling the same as last night. Drainage from abdomen still present. Pain better controlled     Will appreciate recs from colorectal Sx also from GI   keep NPO, IV fluids, Pain control,  no fever, no WCC. Will hold on ABX.    2) HTN: He mentioned he has not been taking meds in more than 2 years. BP controlled . Add meds as needed    Body mass index is 26.04 kg/m². Code status: full  Prophylaxis: yes  Recommended Disposition: Home     Subjective:     Chief Complaint / Reason for Physician Visit  Abdominal pain, drainage from abdomen    Review of Systems:  Symptom Y/N Comments  Symptom Y/N Comments   Fever/Chills n   Chest Pain n    Poor Appetite n   Edema n    Cough n   Abdominal Pain y    Sputum n   Joint Pain     SOB/YOUNGER n   Pruritis/Rash     Nausea/vomit n   Tolerating PT/OT     Diarrhea n   Tolerating Diet     Constipation n   Other       Could NOT obtain due to:      Objective:     VITALS:   Last 24hrs VS reviewed since prior progress note.  Most recent are:  Patient Vitals for the past 24 hrs:   Temp Pulse Resp BP SpO2   19 0808 98.4 °F (36.9 °C) 71 17 128/86 97 %   19 0031 98.1 °F (36.7 °C)  18 134/76 95 %   19 2245 98.4 °F (36.9 °C)  18 139/87 97 %   19 2130    132/75 95 %   19 2100    133/69 96 %   06/29/19 2030    138/82 95 %   06/29/19 2000 98.2 °F (36.8 °C)   147/72 95 %   06/29/19 1944    140/77    06/29/19 1830    134/75 100 %   06/29/19 1800    134/76 97 %   06/29/19 1730    135/74 96 %   06/29/19 1700    131/75 96 %   06/29/19 1618 98.4 °F (36.9 °C) 70 18 143/77 98 %   06/29/19 1510     96 %     No intake or output data in the 24 hours ending 06/30/19 0932     PHYSICAL EXAM:  General: WD, WN. Alert, cooperative, no acute distress    EENT:  EOMI. Anicteric sclerae. MMM  Resp:  CTA bilaterally, no wheezing or rales. No accessory muscle use  CV:  Regular  rhythm,  No edema  GI:  Soft, Non distended, there is drainage coming from abdominal wall close top belly bottom. diffuse tenderness .  +Bowel sounds  Neurologic:  Alert and oriented X 3, normal speech,   Psych:   Good insight. Not anxious nor agitated  Skin:  No rashes. No jaundice    Reviewed most current lab test results and cultures  YES  Reviewed most current radiology test results   YES  Review and summation of old records today    NO  Reviewed patient's current orders and MAR    YES  PMH/ reviewed - no change compared to H&P  ________________________________________________________________________  Care Plan discussed with:    Comments   Patient x    Family      RN x    Care Manager     Consultant                        Multidiciplinary team rounds were held today with , nursing, pharmacist and clinical coordinator. Patient's plan of care was discussed; medications were reviewed and discharge planning was addressed.      ________________________________________________________________________  Total NON critical care TIME:  35   Minutes    Total CRITICAL CARE TIME Spent:   Minutes non procedure based      Comments   >50% of visit spent in counseling and coordination of care     ________________________________________________________________________  Jaiden Dimas MD Procedures: see electronic medical records for all procedures/Xrays and details which were not copied into this note but were reviewed prior to creation of Plan. LABS:  I reviewed today's most current labs and imaging studies.   Pertinent labs include:  Recent Labs     06/30/19  0233 06/29/19  1808   WBC 5.9 8.4   HGB 13.5 14.1   HCT 40.9 41.9    201     Recent Labs     06/30/19  0233 06/29/19  1808    138   K 3.8 3.9    108   CO2 26 26   GLU 75 80   BUN 7 8   CREA 0.88 0.98   CA 7.9* 8.2*   ALB  --  3.4*   TBILI  --  0.2   SGOT  --  14*   ALT  --  19       Signed: Phil Keyes MD

## 2019-06-30 NOTE — PROGRESS NOTES
Admission Medication Reconciliation:    Information obtained from: Jason Ferrari, patient interview    Significant PMH/Disease States:   Past Medical History:   Diagnosis Date    Abdominal wall hernia 6/15/2012    Anal fissure     Anemia     Anemia     Anxiety and depression     Arthritis     Related to the Crohn's disease.  Cancer ([address]     MELANOMA HEAD AND FACE    Chronic pain     GENERALIZED    COPD (chronic obstructive pulmonary disease) (HCC)     Crohn disease (HCC)     Diagnosed at age 17.  Echocardiogram normal (EF: 55-60%) 07/2015    Esophageal ulcer     GERD (gastroesophageal reflux disease)     H/O blood transfusion 2013    [address] [facility]    Hypertension     denies    Migraine     Short bowel syndrome     Ventral hernia without obstruction or gangrene      Chief Complaint for this Admission:    Allergies:  Honey; Remicade [infliximab]; Crestor [rosuvastatin]; Other plant, animal, environmental; Imuran [azathioprine]; Mercaptopurine; and Sulfa (sulfonamide antibiotics)    Prior to Admission Medications:   Prior to Admission Medications   Prescriptions Last Dose Informant Patient Reported? Taking?   chlorzoxazone (PARAFON FORTE) 500 mg tablet 6/28/2019  Yes No   Sig: Take 500-1,000 mg by mouth daily as needed for Muscle Spasm(s). diphenoxylate-atropine (LOMOTIL) 2.5-0.025 mg per tablet 6/23/2019 at Unknown time  Yes Yes   Sig: Take 2 Tabs by mouth Before breakfast, lunch, dinner and at bedtime. gabapentin (NEURONTIN) 300 mg capsule 6/28/2019  Yes Yes   Sig: Take 300 mg by mouth three (3) times daily. loperamide (IMODIUM) 2 mg capsule 6/23/2019 at Unknown time  Yes Yes   Sig: Take 2 mg by mouth as needed for Diarrhea. Patient takes 14-16 capsules a day.    omeprazole (PRILOSEC) 40 mg capsule 6/23/2019 at Unknown time  Yes Yes   Sig: take 1 capsule by mouth once daily   ondansetron hcl (ZOFRAN, AS HYDROCHLORIDE,) 8 mg tablet 5/30/2019 at Unknown time  Yes Yes   Sig: Take 8 mg by mouth every eight (8) hours as needed for Nausea. oxyCODONE (ROXICODONE) 5 mg/5 mL solution 6/28/2019  Yes Yes   Sig: Take 10 mg by mouth every six to eight (6-8) hours as needed for Pain. Facility-Administered Medications: None         Comments/Recommendations: Per consult to pharmacy:  \"I have placed a consult request for the Pharmacy to clean up and correct the patient's highly erroneous pre-admission medication list.\" as per Daniel Scanlon MD.  Mary Raya patient, who is reliable historian. TT to hospitalist and Dr Enedina Gan to update regarding changes to current PTA medication list.    21 : Received telephone orders from Dr Ngoc Dhillon, orders adjusted    Added:  1. Oxycodone solution-scheduled  2. Gabapentin    Revised:  1. Chlorzoxazone    Deleted:  1. Excedrin (takes rarely)  2. Clonidine  3. Colestipol  4. Norco  5. Lorazepam  6. Morphine CR  7. MVT  8. Percocet    Thank you for allowing me to participate in the care of your patient.     Katerina Doss PharmD, RN #[id number]

## 2019-07-01 ENCOUNTER — APPOINTMENT (OUTPATIENT)
Dept: GENERAL RADIOLOGY | Age: 59
DRG: 386 | End: 2019-07-01
Attending: NURSE PRACTITIONER
Payer: MEDICARE

## 2019-07-01 LAB
ALBUMIN SERPL-MCNC: 3.2 G/DL (ref 3.5–5)
ALBUMIN/GLOB SERPL: 1.1 {RATIO} (ref 1.1–2.2)
ALP SERPL-CCNC: 81 U/L (ref 45–117)
ALT SERPL-CCNC: 21 U/L (ref 12–78)
ANION GAP SERPL CALC-SCNC: 6 MMOL/L (ref 5–15)
AST SERPL-CCNC: 19 U/L (ref 15–37)
BACTERIA SPEC CULT: NORMAL
BACTERIA SPEC CULT: NORMAL
BILIRUB SERPL-MCNC: 0.6 MG/DL (ref 0.2–1)
BUN SERPL-MCNC: 8 MG/DL (ref 6–20)
BUN/CREAT SERPL: 10 (ref 12–20)
CALCIUM SERPL-MCNC: 8.2 MG/DL (ref 8.5–10.1)
CHLORIDE SERPL-SCNC: 106 MMOL/L (ref 97–108)
CO2 SERPL-SCNC: 25 MMOL/L (ref 21–32)
CREAT SERPL-MCNC: 0.8 MG/DL (ref 0.7–1.3)
ERYTHROCYTE [DISTWIDTH] IN BLOOD BY AUTOMATED COUNT: 14.1 % (ref 11.5–14.5)
GLOBULIN SER CALC-MCNC: 2.8 G/DL (ref 2–4)
GLUCOSE SERPL-MCNC: 62 MG/DL (ref 65–100)
HAV IGM SER QL: NONREACTIVE
HBV CORE IGM SER QL: NONREACTIVE
HBV SURFACE AG SER QL: <0.1 INDEX
HBV SURFACE AG SER QL: NEGATIVE
HCT VFR BLD AUTO: 42 % (ref 36.6–50.3)
HCV AB SERPL QL IA: NONREACTIVE
HCV COMMENT,HCGAC: NORMAL
HGB BLD-MCNC: 13.9 G/DL (ref 12.1–17)
MCH RBC QN AUTO: 35.3 PG (ref 26–34)
MCHC RBC AUTO-ENTMCNC: 33.1 G/DL (ref 30–36.5)
MCV RBC AUTO: 106.6 FL (ref 80–99)
NRBC # BLD: 0 K/UL (ref 0–0.01)
NRBC BLD-RTO: 0 PER 100 WBC
PLATELET # BLD AUTO: 207 K/UL (ref 150–400)
PMV BLD AUTO: 9.9 FL (ref 8.9–12.9)
POTASSIUM SERPL-SCNC: 3.6 MMOL/L (ref 3.5–5.1)
PROT SERPL-MCNC: 6 G/DL (ref 6.4–8.2)
RBC # BLD AUTO: 3.94 M/UL (ref 4.1–5.7)
SERVICE CMNT-IMP: NORMAL
SODIUM SERPL-SCNC: 137 MMOL/L (ref 136–145)
SP1: NORMAL
SP2: NORMAL
SP3: NORMAL
WBC # BLD AUTO: 6.6 K/UL (ref 4.1–11.1)

## 2019-07-01 PROCEDURE — 80053 COMPREHEN METABOLIC PANEL: CPT

## 2019-07-01 PROCEDURE — 65270000029 HC RM PRIVATE

## 2019-07-01 PROCEDURE — 86480 TB TEST CELL IMMUN MEASURE: CPT

## 2019-07-01 PROCEDURE — 80074 ACUTE HEPATITIS PANEL: CPT

## 2019-07-01 PROCEDURE — 36415 COLL VENOUS BLD VENIPUNCTURE: CPT

## 2019-07-01 PROCEDURE — 74011250637 HC RX REV CODE- 250/637: Performed by: INTERNAL MEDICINE

## 2019-07-01 PROCEDURE — 85027 COMPLETE CBC AUTOMATED: CPT

## 2019-07-01 PROCEDURE — 74011250636 HC RX REV CODE- 250/636: Performed by: INTERNAL MEDICINE

## 2019-07-01 PROCEDURE — 74011250637 HC RX REV CODE- 250/637: Performed by: NURSE PRACTITIONER

## 2019-07-01 PROCEDURE — 71046 X-RAY EXAM CHEST 2 VIEWS: CPT

## 2019-07-01 RX ORDER — ACETAMINOPHEN 325 MG/1
650 TABLET ORAL
Status: DISCONTINUED | OUTPATIENT
Start: 2019-07-01 | End: 2019-07-08 | Stop reason: HOSPADM

## 2019-07-01 RX ADMIN — Medication 10 MG: at 14:07

## 2019-07-01 RX ADMIN — GABAPENTIN 300 MG: 600 TABLET, FILM COATED ORAL at 10:29

## 2019-07-01 RX ADMIN — GABAPENTIN 300 MG: 600 TABLET, FILM COATED ORAL at 21:00

## 2019-07-01 RX ADMIN — GABAPENTIN 300 MG: 600 TABLET, FILM COATED ORAL at 15:03

## 2019-07-01 RX ADMIN — Medication 10 MG: at 06:47

## 2019-07-01 RX ADMIN — SODIUM CHLORIDE 75 ML/HR: 900 INJECTION, SOLUTION INTRAVENOUS at 01:52

## 2019-07-01 RX ADMIN — Medication 10 MG: at 20:59

## 2019-07-01 RX ADMIN — SODIUM CHLORIDE 75 ML/HR: 900 INJECTION, SOLUTION INTRAVENOUS at 14:54

## 2019-07-01 RX ADMIN — MORPHINE SULFATE 2 MG: 2 INJECTION, SOLUTION INTRAMUSCULAR; INTRAVENOUS at 22:05

## 2019-07-01 RX ADMIN — Medication 10 ML: at 18:23

## 2019-07-01 RX ADMIN — Medication 10 ML: at 10:31

## 2019-07-01 RX ADMIN — MORPHINE SULFATE 2 MG: 2 INJECTION, SOLUTION INTRAMUSCULAR; INTRAVENOUS at 18:21

## 2019-07-01 RX ADMIN — MORPHINE SULFATE 2 MG: 2 INJECTION, SOLUTION INTRAMUSCULAR; INTRAVENOUS at 10:30

## 2019-07-01 RX ADMIN — MORPHINE SULFATE 2 MG: 2 INJECTION, SOLUTION INTRAMUSCULAR; INTRAVENOUS at 15:03

## 2019-07-01 RX ADMIN — Medication 10 ML: at 21:00

## 2019-07-01 RX ADMIN — MORPHINE SULFATE 2 MG: 2 INJECTION, SOLUTION INTRAMUSCULAR; INTRAVENOUS at 01:53

## 2019-07-01 NOTE — CONSULTS
[address]    Name:  Alfreda De La O  MR#:  [id number]  :  1960  ACCOUNT #:  [de-identified]  DATE OF SERVICE:  2019    CHIEF COMPLAINT:  Enterocutaneous fistula, longstanding Crohn's disease. HISTORY OF PRESENT ILLNESS:  A 61-year-old gentleman who presented with abdominal pain, first an area of abscess formation and then subsequent drainage of a fistula and subsequent drainage through the abdominal wall. This happened over the course of less than 24 hours. There is no fever or chills. He has a history of Crohn's disease dating back 40 years. He has had multiple procedures and a recent CT scan demonstrates chronic small bowel distention with an enterocutaneous fistula demonstrated. The patient is followed closely by Dr. Sol Wells    He has not been seen by his gastroenterologist for five years, and that is Dr. Elvia Paez. At that time, they worked with biologics, Remicade. He developed anaphylactoid reaction. Humira was too expensive. Sandostatin for his multiple stools up to 20 per day was also too expensive. He has been taking Colestipol and Lomotil as well as Imodium for his diarrhea. The patient was admitted because of some ongoing pain and drainage. PAST MEDICAL HISTORY:  1. Positive for Crohn's disease for 40 years. 2.  Anemia. 3.  Chronic diarrhea and suspected small bowel intestinal overgrowth. 4.  Anxiety. 5.  Depression. 6.  IBD associated arthritis. 7.  Melanoma. 8.  Chronic pain syndrome and opioid use. 9.  COPD. 10.  GERD. 11.  Hypertension. 12.  Migraine. 13. Short bowel syndrome. 14.  History of ventral hernia. MEDICATIONS:  Prior to admission,  1. Multivitamins. 2.  Oxycodone. 3.  Prilosec. 4.  Hydrocodone. 5.  Morphine. 6.  Parafon forte. 7.  Catapres. 8.  Colestid. 9.  Ativan. 10.  Excedrin. 11.  Lomotil. 12.  Imodium. 13.  Zofran.     ALLERGIES:  HONEY; REMICADE; CRESTOR; IMURAN, WHICH ONLY CAUSES DIARRHEA, NAUSEA AND VOMITING; MERCAPTOPURINE, DIARRHEA; SULFONAMIDES; CIPRO CAUSES TENDONITIS; FLAGYL CAUSES UNACCEPTABLE HEADACHES. SURGERIES:  Multiple. He documents 33 abdominal operations for treatment of Crohn's and its complications, appendectomy, cholecystectomy, neck fusion, rectofistula, ileostomy, multiple laparotomies and lysis of adhesions. SOCIAL HISTORY:  Still smoking everyday with 44 pack year history. No alcohol. FAMILY HISTORY:  Positive for stroke, heart disease, kidney disease, thyroid disease. REVIEW OF SYSTEMS:  10-system review is as noted above, otherwise negative. PHYSICAL EXAMINATION:  GENERAL:  A middle-aged gentleman in no acute distress. He is oriented x3. VITAL SIGNS:  Temperature is 98, pulse 57, blood pressure 143/73, respirations 16. SKIN:  Warm and dry, no petechiae or ecchymosis. HEENT:  Sclerae are anicteric. Conjunctivae are pink, moist mucous membranes. NECK:  Supple. LYMPHATIC:  No adenopathy in the neck or groin. CHEST:  Clear to auscultation and percussion. HEART:  Regular rate and rhythm. ABDOMEN:  Multiple surgical scars. Prominent tenderness to light palpation. There is a draining fistulous tract in the left lower abdomen. EXTREMITIES:  Without cyanosis, clubbing or edema. RECTAL:  Exam is not performed. LABORATORY DATA:  CBC:  WBC is 5.9, hemoglobin is 13.5, platelet count is 681. Urinalysis is normal.  BUN is 7. Liver function tests are normal.  Lipase is 105. Celiac panel in the past has been negative. CT scan of the abdomen today. Inflammation of dilated sigmoid colon, with Crohns', pseudoobstruction of small bowel. No ascites or abscess. There is no free air. Dilation of the small bowel and suspected fistula from this into the skin. ASSESSMENT:  1. Crohn's flare with fistula. The patient needs to be tried on biologics. Stelara  might be consideration. He has tried Humira on and off because of financial situation. Cipro and Flagyl are inappropriate because there is still a poor tolerance to the medications. The patient will be picked up by Dr. Stephanie Foote in the a.m. and he can hopefully work out a plan to get this patient on his needed medication which he has not taking because of severe financial constraints. 2.  Short bowel syndrome and probable small bowel bacterial overgrowth. 3.  Hypertension. PLAN:  As outlined above.       MD JESSICA Summers/[id number]  D:  07/01/2019 0:16  T:  07/01/2019 2:39  JOB #:  [id number]

## 2019-07-01 NOTE — PROGRESS NOTES
Hospitalist Progress Note  Gerson Jalloh MD  Answering service: [phone] from in house phone        Date of Service:  2019  NAME:  Gera Cade  :  1960  MRN:  [id number]      Admission Summary:   63 y.o. 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 He presented to the ED  because early this morning he had abdominal pain and he felt a  \"pop\" then he noticed fluid coming out of his belly, with a \"feces odor\". He decided to come to the ED for further eval and management. Denies N/V fevers or chills. Currently being managed for Crohn's flare. Interval history / Subjective:   Says he continues to have pain but it is better compared to admission. Has not had as much draining from abdomen. Afebrile overnight     Assessment & Plan:     Abdominal pain secondary to suspected Crohn's flare:  - consult to GI need to plan for biologics  - Colon/Rectal surgery also following, appreciate recs (no visible fistula noted on CT but give that the bowel is in close approximation to the peritoneum and extremely thin anterior pelvic wall.  A subtle fistula could be present but not visible; pseudoobstruction of small bowel)  - Pain control  on scheduled oxycodone  - Advanced to clear liquids  - IVF  - Holding on abx for now    HTN: not currently on meds, BP stable  - continue to monitor    Code status: Full  DVT prophylaxis: SCD  Dispo: Need to initiate approval process for patient assistance program. Will f/u with case management if we have any patient assistance funds     Hospital Problems  Date Reviewed: 2017          Codes Class Noted POA    Croup ICD-10-CM: J05.0  ICD-9-CM: 464.4  2019 Unknown        Crohn disease ([facility] [address] ICD-10-CM: K50.90  ICD-9-CM: 555.9  2019 Unknown        * (Principal) HTN (hypertension) ICD-10-CM: I10  ICD-9-CM: 401.9  6/29/2019 Unknown        Abdominal pain ICD-10-CM: R10.9  ICD-9-CM: 789.00  6/29/2019 Unknown                Vital Signs:    Last 24hrs VS reviewed since prior progress note. Most recent are:  Visit Vitals  /75 (BP 1 Location: Right arm, BP Patient Position: At rest)   Pulse 63   Temp 98.5 °F (36.9 °C)   Resp 16   Ht 5' 11\" (1.803 m)   Wt 84.7 kg (186 lb 11.7 oz)   SpO2 100%   BMI 26.04 kg/m²         Intake/Output Summary (Last 24 hours) at 7/1/2019 1519  Last data filed at 7/1/2019 0647  Gross per 24 hour   Intake 1336 ml   Output    Net 1336 ml        Physical Examination:             Constitutional:  No acute distress, cooperative, pleasant    ENT:  dry mucous membranes, oropharynx benign. Neck supple,    Resp:  CTA bilaterally. No wheezing/rhonchi/rales. No accessory muscle use   CV:  Regular rhythm, normal rate, no murmurs, gallops, rubs    GI:  Soft, non distended, + tender along midline. normoactive bowel sounds, 4 cm lesion in LLQ, no drainage noted, 3 cm lesion at RLQ, no drainage, multiple scars from abdomina surgeries noted     Musculoskeletal:  No edema, warm, 2+ pulses throughout    Neurologic:  Moves all extremities. Answers questions appropriately, responds to commands         Labs:     Recent Labs     07/01/19  0307 06/30/19  0233   WBC 6.6 5.9   HGB 13.9 13.5   HCT 42.0 40.9    190     Recent Labs     07/01/19  0307 06/30/19  0233 06/29/19  1808    138 138   K 3.6 3.8 3.9    106 108   CO2 25 26 26   BUN 8 7 8   CREA 0.80 0.88 0.98   GLU 62* 75 80   CA 8.2* 7.9* 8.2*     Recent Labs     07/01/19  0307 06/29/19  1808   SGOT 19 14*   ALT 21 19   AP 81 87   TBILI 0.6 0.2   TP 6.0* 6.2*   ALB 3.2* 3.4*   GLOB 2.8 2.8   LPSE  --  105     No results for input(s): INR, PTP, APTT in the last 72 hours. No lab exists for component: INREXT   No results for input(s): FE, TIBC, PSAT, FERR in the last 72 hours.    No results found for: FOL, RBCF   No results for input(s): PH, PCO2, PO2 in the last 72 hours. No results for input(s): CPK, CKNDX, TROIQ in the last 72 hours.     No lab exists for component: CPKMB  Lab Results   Component Value Date/Time    Cholesterol, total 134 12/01/2014 04:33 AM    HDL Cholesterol 35 12/01/2014 04:33 AM    LDL, calculated 58.2 12/01/2014 04:33 AM    Triglyceride 204 (H) 12/01/2014 04:33 AM    CHOL/HDL Ratio 3.8 12/01/2014 04:33 AM     Lab Results   Component Value Date/Time    Glucose (POC) 113 (H) 11/10/2017 11:35 AM    Glucose (POC) 120 (H) 11/10/2017 05:54 AM    Glucose (POC) 87 11/09/2017 11:43 PM    Glucose (POC) 107 (H) 11/09/2017 07:07 PM    Glucose (POC) 107 (H) 11/09/2017 11:22 AM     Lab Results   Component Value Date/Time    Color YELLOW/STRAW 06/29/2019 08:13 PM    Appearance CLEAR 06/29/2019 08:13 PM    Specific gravity >1.030 (H) 06/29/2019 08:13 PM    Specific gravity 1.028 02/14/2018 09:44 PM    pH (UA) 5.0 06/29/2019 08:13 PM    Protein NEGATIVE  06/29/2019 08:13 PM    Glucose NEGATIVE  06/29/2019 08:13 PM    Ketone NEGATIVE  06/29/2019 08:13 PM    Bilirubin NEGATIVE  06/29/2019 08:13 PM    Urobilinogen 0.2 06/29/2019 08:13 PM    Nitrites NEGATIVE  06/29/2019 08:13 PM    Leukocyte Esterase NEGATIVE  06/29/2019 08:13 PM    Epithelial cells FEW 06/29/2019 08:13 PM    Bacteria NEGATIVE  06/29/2019 08:13 PM    WBC 0-4 06/29/2019 08:13 PM    RBC 0-5 06/29/2019 08:13 PM         Medications Reviewed:     Current Facility-Administered Medications   Medication Dose Route Frequency    acetaminophen (TYLENOL) tablet 650 mg  650 mg Oral Q6H PRN    loperamide (IMODIUM) capsule 2 mg  2 mg Oral PRN    sodium chloride (NS) flush 5-40 mL  5-40 mL IntraVENous Q8H    sodium chloride (NS) flush 5-40 mL  5-40 mL IntraVENous PRN    enoxaparin (LOVENOX) injection 40 mg  40 mg SubCUTAneous Q24H    gabapentin (NEURONTIN) tablet 300 mg  300 mg Oral TID    oxyCODONE (ROXICODONE) 5 mg/5 mL oral solution 10 mg  10 mg Oral Q8H    chlorzoxazone (PARAFON FORTE) tablet 500-1,000 mg  500-1,000 mg Oral DAILY PRN    nicotine (NICODERM CQ) 14 mg/24 hr patch 1 Patch  1 Patch TransDERmal DAILY    morphine injection 2 mg  2 mg IntraVENous Q3H PRN    0.9% sodium chloride infusion  75 mL/hr IntraVENous CONTINUOUS     ______________________________________________________________________  EXPECTED LENGTH OF STAY: 3d 12h  ACTUAL LENGTH OF STAY:          2                 Ralph Turner MD

## 2019-07-01 NOTE — PROGRESS NOTES
[address]  [address]   [phone]                GI PROGRESS NOTE  Jesse Ortega, [facility]  Work Cell: [phone]      NAME:   Xochitl Irizarry   :    1960   MRN:    [id number]     Assessment/Plan   1. Crohn's disease with likely enterocutaneous fistula - CT showing inflammation of sigmoid colon w/ close approximation of bowel to peritoneum and possible subtle fistula. Pt has drainage from LLQ. He has not been on medications for quite some time due to financial constraints. - Clear liquids as tolerated  - Supportive management per primary team  - Hep panel, Quant TB and chest x-ray   - Will initiate approval process for Humira through pt assistance program      Patient Active Problem List   Diagnosis Code    Crohn's disease ([facility] [address] K50.90    GERD (gastroesophageal reflux disease) K21.9    Hiatal hernia K44.9    B12 deficiency E53.8    Chronic pain G89.29    Short bowel syndrome K91.2    Back pain, chronic M54.9, G89.29    Incisional hernia, without obstruction or gangrene K43.2    Intussusception of intestine ([address] K56.1    Bowel obstruction ([address] K56.609    Abdominal wound dehiscence T81.30XA    Croup J05.0    Crohn disease ([address] K50.90    HTN (hypertension) I10    Abdominal pain R10.9       Subjective:     Reports diffuse abdominal pain and minimal output from LLQ. Denies any nausea or vomiting. Asking to eat \"real food. \" [place] to go home. Objective:     VITALS:   Last 24hrs VS reviewed since prior hospitalist progress note.  Most recent are:  Visit Vitals  /90 (BP 1 Location: Right arm, BP Patient Position: At rest)   Pulse 70   Temp 98.1 °F (36.7 °C)   Resp 16   Ht 5' 11\" (1.803 m)   Wt 84.7 kg (186 lb 11.7 oz)   SpO2 94%   BMI 26.04 kg/m²       Intake/Output Summary (Last 24 hours) at 2019 1407  Last data filed at 2019 0647  Gross per 24 hour   Intake 1336 ml   Output    Net 1336 ml        PHYSICAL EXAM:  General   well developed, alert, in no acute distress  EENT  Normocephalic, Atraumatic, PERRLA, EOMI, sclera clear  Respiratory   Clear To Auscultation bilaterally - no wheezes, rales, rhonchi, or crackles  Cardiology  Regular Rate and Rythmn  - no murmurs, rubs or gallops  Abdominal  Soft, mildly distended, mild diffuse tenderness, multiple scars with thin skin over midline abdomen, LLQ site with minimal/no drainage, positive bowel sounds, no hepatosplenomegaly, no palpable mass  Extremities  No clubbing, cyanosis, or edema. Pulses intact. Neurological  No focal neurological deficits noted  Psychological  Oriented x 3. Normal affect. Lab Data   Recent Results (from the past 12 hour(s))   METABOLIC PANEL, COMPREHENSIVE    Collection Time: 07/01/19  3:07 AM   Result Value Ref Range    Sodium 137 136 - 145 mmol/L    Potassium 3.6 3.5 - 5.1 mmol/L    Chloride 106 97 - 108 mmol/L    CO2 25 21 - 32 mmol/L    Anion gap 6 5 - 15 mmol/L    Glucose 62 (L) 65 - 100 mg/dL    BUN 8 6 - 20 MG/DL    Creatinine 0.80 0.70 - 1.30 MG/DL    BUN/Creatinine ratio 10 (L) 12 - 20      GFR est AA >60 >60 ml/min/1.73m2    GFR est non-AA >60 >60 ml/min/1.73m2    Calcium 8.2 (L) 8.5 - 10.1 MG/DL    Bilirubin, total 0.6 0.2 - 1.0 MG/DL    ALT (SGPT) 21 12 - 78 U/L    AST (SGOT) 19 15 - 37 U/L    Alk.  phosphatase 81 45 - 117 U/L    Protein, total 6.0 (L) 6.4 - 8.2 g/dL    Albumin 3.2 (L) 3.5 - 5.0 g/dL    Globulin 2.8 2.0 - 4.0 g/dL    A-G Ratio 1.1 1.1 - 2.2     CBC W/O DIFF    Collection Time: 07/01/19  3:07 AM   Result Value Ref Range    WBC 6.6 4.1 - 11.1 K/uL    RBC 3.94 (L) 4.10 - 5.70 M/uL    HGB 13.9 12.1 - 17.0 g/dL    HCT 42.0 36.6 - 50.3 %    .6 (H) 80.0 - 99.0 FL    MCH 35.3 (H) 26.0 - 34.0 PG    MCHC 33.1 30.0 - 36.5 g/dL    RDW 14.1 11.5 - 14.5 %    PLATELET 363 653 - 231 K/uL    MPV 9.9 8.9 - 12.9 FL    NRBC 0.0 0  WBC    ABSOLUTE NRBC 0.00 0.00 - 0.01 K/uL         Medications: Reviewed    PMH/SH reviewed - no change compared to H&P  Mid-Level Provider: Luis Long NP   Date/Time:  7/1/2019  I have examined the patient. I have reviewed the chart and agree with the documentation recorded by the NP, including the assessment, treatment plan, and disposition.       Quinton Beth MD

## 2019-07-02 LAB
ANION GAP SERPL CALC-SCNC: 5 MMOL/L (ref 5–15)
BASOPHILS # BLD: 0 K/UL (ref 0–0.1)
BASOPHILS NFR BLD: 0 % (ref 0–1)
BUN SERPL-MCNC: 7 MG/DL (ref 6–20)
BUN/CREAT SERPL: 7 (ref 12–20)
CALCIUM SERPL-MCNC: 8.8 MG/DL (ref 8.5–10.1)
CHLORIDE SERPL-SCNC: 104 MMOL/L (ref 97–108)
CO2 SERPL-SCNC: 29 MMOL/L (ref 21–32)
CREAT SERPL-MCNC: 1.07 MG/DL (ref 0.7–1.3)
DIFFERENTIAL METHOD BLD: ABNORMAL
EOSINOPHIL # BLD: 0.2 K/UL (ref 0–0.4)
EOSINOPHIL NFR BLD: 4 % (ref 0–7)
ERYTHROCYTE [DISTWIDTH] IN BLOOD BY AUTOMATED COUNT: 13.5 % (ref 11.5–14.5)
GLUCOSE SERPL-MCNC: 86 MG/DL (ref 65–100)
HCT VFR BLD AUTO: 44.6 % (ref 36.6–50.3)
HGB BLD-MCNC: 14.8 G/DL (ref 12.1–17)
IMM GRANULOCYTES # BLD AUTO: 0 K/UL (ref 0–0.04)
IMM GRANULOCYTES NFR BLD AUTO: 0 % (ref 0–0.5)
LYMPHOCYTES # BLD: 1.7 K/UL (ref 0.8–3.5)
LYMPHOCYTES NFR BLD: 30 % (ref 12–49)
MCH RBC QN AUTO: 34.7 PG (ref 26–34)
MCHC RBC AUTO-ENTMCNC: 33.2 G/DL (ref 30–36.5)
MCV RBC AUTO: 104.7 FL (ref 80–99)
MONOCYTES # BLD: 0.6 K/UL (ref 0–1)
MONOCYTES NFR BLD: 10 % (ref 5–13)
NEUTS SEG # BLD: 3.1 K/UL (ref 1.8–8)
NEUTS SEG NFR BLD: 56 % (ref 32–75)
NRBC # BLD: 0 K/UL (ref 0–0.01)
NRBC BLD-RTO: 0 PER 100 WBC
PLATELET # BLD AUTO: 219 K/UL (ref 150–400)
PMV BLD AUTO: 9.7 FL (ref 8.9–12.9)
POTASSIUM SERPL-SCNC: 3.8 MMOL/L (ref 3.5–5.1)
RBC # BLD AUTO: 4.26 M/UL (ref 4.1–5.7)
SODIUM SERPL-SCNC: 138 MMOL/L (ref 136–145)
WBC # BLD AUTO: 5.5 K/UL (ref 4.1–11.1)

## 2019-07-02 PROCEDURE — 74011250637 HC RX REV CODE- 250/637: Performed by: NURSE PRACTITIONER

## 2019-07-02 PROCEDURE — 74011250636 HC RX REV CODE- 250/636: Performed by: INTERNAL MEDICINE

## 2019-07-02 PROCEDURE — 74011250637 HC RX REV CODE- 250/637: Performed by: INTERNAL MEDICINE

## 2019-07-02 PROCEDURE — 80048 BASIC METABOLIC PNL TOTAL CA: CPT

## 2019-07-02 PROCEDURE — 65270000029 HC RM PRIVATE

## 2019-07-02 PROCEDURE — 36415 COLL VENOUS BLD VENIPUNCTURE: CPT

## 2019-07-02 PROCEDURE — 85025 COMPLETE CBC W/AUTO DIFF WBC: CPT

## 2019-07-02 RX ORDER — OXYCODONE HCL 5 MG/5 ML
10 SOLUTION, ORAL ORAL
Status: DISCONTINUED | OUTPATIENT
Start: 2019-07-02 | End: 2019-07-06

## 2019-07-02 RX ORDER — OXYCODONE HCL 20 MG/ML
10 CONCENTRATE, ORAL ORAL
Status: DISCONTINUED | OUTPATIENT
Start: 2019-07-02 | End: 2019-07-02 | Stop reason: SDUPTHER

## 2019-07-02 RX ORDER — ONDANSETRON 2 MG/ML
4 INJECTION INTRAMUSCULAR; INTRAVENOUS
Status: DISCONTINUED | OUTPATIENT
Start: 2019-07-02 | End: 2019-07-08 | Stop reason: HOSPADM

## 2019-07-02 RX ADMIN — GABAPENTIN 300 MG: 600 TABLET, FILM COATED ORAL at 09:39

## 2019-07-02 RX ADMIN — MORPHINE SULFATE 2 MG: 2 INJECTION, SOLUTION INTRAMUSCULAR; INTRAVENOUS at 02:34

## 2019-07-02 RX ADMIN — Medication 10 ML: at 06:00

## 2019-07-02 RX ADMIN — MORPHINE SULFATE 2 MG: 2 INJECTION, SOLUTION INTRAMUSCULAR; INTRAVENOUS at 15:06

## 2019-07-02 RX ADMIN — Medication 10 MG: at 06:00

## 2019-07-02 RX ADMIN — METHYLPREDNISOLONE SODIUM SUCCINATE 30 MG: 40 INJECTION, POWDER, FOR SOLUTION INTRAMUSCULAR; INTRAVENOUS at 17:30

## 2019-07-02 RX ADMIN — LOPERAMIDE HYDROCHLORIDE 2 MG: 2 CAPSULE ORAL at 07:54

## 2019-07-02 RX ADMIN — Medication 10 ML: at 12:30

## 2019-07-02 RX ADMIN — METHYLPREDNISOLONE SODIUM SUCCINATE 30 MG: 40 INJECTION, POWDER, FOR SOLUTION INTRAMUSCULAR; INTRAVENOUS at 12:22

## 2019-07-02 RX ADMIN — MORPHINE SULFATE 2 MG: 2 INJECTION, SOLUTION INTRAMUSCULAR; INTRAVENOUS at 10:03

## 2019-07-02 RX ADMIN — MORPHINE SULFATE 2 MG: 2 INJECTION, SOLUTION INTRAMUSCULAR; INTRAVENOUS at 20:26

## 2019-07-02 RX ADMIN — LOPERAMIDE HYDROCHLORIDE 2 MG: 2 CAPSULE ORAL at 15:06

## 2019-07-02 RX ADMIN — SODIUM CHLORIDE 75 ML/HR: 900 INJECTION, SOLUTION INTRAVENOUS at 02:46

## 2019-07-02 RX ADMIN — Medication 10 ML: at 20:26

## 2019-07-02 RX ADMIN — Medication 10 MG: at 22:12

## 2019-07-02 RX ADMIN — Medication 10 MG: at 12:29

## 2019-07-02 RX ADMIN — GABAPENTIN 300 MG: 600 TABLET, FILM COATED ORAL at 15:06

## 2019-07-02 RX ADMIN — MORPHINE SULFATE 2 MG: 2 INJECTION, SOLUTION INTRAMUSCULAR; INTRAVENOUS at 17:30

## 2019-07-02 RX ADMIN — GABAPENTIN 300 MG: 600 TABLET, FILM COATED ORAL at 20:25

## 2019-07-02 NOTE — PROGRESS NOTES
General Daily Progress Note    Admission Date: 6/29/2019  Hospital Day 4  Post-Op Day Not Applicable  Subjective:   No real increase in abdominal discomfort with clear liquid diet, but perhaps a small increase in the amount of drainage from the abdominal wall opening. Objective:     Patient Vitals for the past 24 hrs:   BP Temp Pulse Resp SpO2   07/02/19 1518 138/90 98.4 °F (36.9 °C) 61 18 96 %   07/02/19 0819 153/53 99 °F (37.2 °C) 67 18 97 %   07/02/19 0301 137/78 98.1 °F (36.7 °C) 61 18 95 %   07/01/19 1947 159/78 99.1 °F (37.3 °C) 60 18 94 %     No intake/output data recorded. 06/30 1901 - 07/02 0700  In: 1081 [I.V.:1081]  Out: -       Physical Examination:  No distress. Data Review   Recent Results (from the past 24 hour(s))   CBC WITH AUTOMATED DIFF    Collection Time: 07/02/19  2:43 AM   Result Value Ref Range    WBC 5.5 4.1 - 11.1 K/uL    RBC 4.26 4.10 - 5.70 M/uL    HGB 14.8 12.1 - 17.0 g/dL    HCT 44.6 36.6 - 50.3 %    .7 (H) 80.0 - 99.0 FL    MCH 34.7 (H) 26.0 - 34.0 PG    MCHC 33.2 30.0 - 36.5 g/dL    RDW 13.5 11.5 - 14.5 %    PLATELET 663 074 - 770 K/uL    MPV 9.7 8.9 - 12.9 FL    NRBC 0.0 0  WBC    ABSOLUTE NRBC 0.00 0.00 - 0.01 K/uL    NEUTROPHILS 56 32 - 75 %    LYMPHOCYTES 30 12 - 49 %    MONOCYTES 10 5 - 13 %    EOSINOPHILS 4 0 - 7 %    BASOPHILS 0 0 - 1 %    IMMATURE GRANULOCYTES 0 0.0 - 0.5 %    ABS. NEUTROPHILS 3.1 1.8 - 8.0 K/UL    ABS. LYMPHOCYTES 1.7 0.8 - 3.5 K/UL    ABS. MONOCYTES 0.6 0.0 - 1.0 K/UL    ABS. EOSINOPHILS 0.2 0.0 - 0.4 K/UL    ABS. BASOPHILS 0.0 0.0 - 0.1 K/UL    ABS. IMM.  GRANS. 0.0 0.00 - 0.04 K/UL    DF AUTOMATED     METABOLIC PANEL, BASIC    Collection Time: 07/02/19  2:43 AM   Result Value Ref Range    Sodium 138 136 - 145 mmol/L    Potassium 3.8 3.5 - 5.1 mmol/L    Chloride 104 97 - 108 mmol/L    CO2 29 21 - 32 mmol/L    Anion gap 5 5 - 15 mmol/L    Glucose 86 65 - 100 mg/dL    BUN 7 6 - 20 MG/DL    Creatinine 1.07 0.70 - 1.30 MG/DL BUN/Creatinine ratio 7 (L) 12 - 20      GFR est AA >60 >60 ml/min/1.73m2    GFR est non-AA >60 >60 ml/min/1.73m2    Calcium 8.8 8.5 - 10.1 MG/DL           Assessment and Plan:     Principal Problem:    Crohn disease ([address] (6/29/2019)    Active Problems:    Croup (6/29/2019)      HTN (hypertension) (6/29/2019)      Abdominal pain (6/29/2019)      Stable. Clinically unchanged. On steroids while awaiting decision on approval for Humira.

## 2019-07-02 NOTE — PROGRESS NOTES
[address]  [address]  [address] [place] [address]   [phone]                GI PROGRESS NOTE  Slick [place], AGACNP-BC  Work Cell: [phone]      NAME:   Ketan Sanchez   :    1960   MRN:    [id number]     Assessment/Plan   1. Crohn's disease with likely enterocutaneous fistula - CT showing inflammation of sigmoid colon w/ close approximation of bowel to peritoneum and possible subtle fistula. Pt has drainage from LLQ. He has not been on medications for quite some time due to financial constraints. Hep panel (-). Chest x-ray unremarkable. - Clear liquids as tolerated  - Supportive management per primary team  - Steroids started today   - Quant TB pending   - Initiated approval process for Humira . We will start him on Humira if insurance or patient assistance program approves Humira. Patient Active Problem List   Diagnosis Code    Crohn's disease ([facility] K50.90    GERD (gastroesophageal reflux disease) K21.9    Hiatal hernia K44.9    B12 deficiency E53.8    Chronic pain G89.29    Short bowel syndrome K91.2    Back pain, chronic M54.9, G89.29    Incisional hernia, without obstruction or gangrene K43.2    Intussusception of intestine (HCC) K56.1    Bowel obstruction (HCC) K56.609    Abdominal wound dehiscence T81.30XA    Croup J05.0    Crohn disease ([facility] K50.90    HTN (hypertension) I10    Abdominal pain R10.9       Subjective:     Reports on-going diffuse abdominal pain. Tolerating liquids but states they are \"going straight through him. \" Denies any n/v. Wants to eat solid food. LLQ site with minimal drainage. Objective:     VITALS:   Last 24hrs VS reviewed since prior hospitalist progress note.  Most recent are:  Visit Vitals  /53   Pulse 67   Temp 99 °F (37.2 °C)   Resp 18   Ht 5' 11\" (1.803 m)   Wt 84.7 kg (186 lb 11.7 oz)   SpO2 97%   BMI 26.04 kg/m²     No intake or output data in the 24 hours ending 19 1256     PHYSICAL EXAM:  General   well developed, alert, in no acute distress  EENT  Normocephalic, Atraumatic, PERRLA, EOMI, sclera clear  Respiratory   Clear To Auscultation bilaterally - no wheezes, rales, rhonchi, or crackles  Cardiology  Regular Rate and Rythmn  - no murmurs, rubs or gallops  Abdominal  Soft, mildly distended, mild diffuse tenderness, multiple scars with thin skin over midline abdomen, LLQ site with minimal/no drainage, positive bowel sounds, no hepatosplenomegaly, no palpable mass  Extremities  No clubbing, cyanosis, or edema. Pulses intact. Neurological  No focal neurological deficits noted  Psychological  Oriented x 3. Normal affect. Lab Data   Recent Results (from the past 12 hour(s))   CBC WITH AUTOMATED DIFF    Collection Time: 07/02/19  2:43 AM   Result Value Ref Range    WBC 5.5 4.1 - 11.1 K/uL    RBC 4.26 4.10 - 5.70 M/uL    HGB 14.8 12.1 - 17.0 g/dL    HCT 44.6 36.6 - 50.3 %    .7 (H) 80.0 - 99.0 FL    MCH 34.7 (H) 26.0 - 34.0 PG    MCHC 33.2 30.0 - 36.5 g/dL    RDW 13.5 11.5 - 14.5 %    PLATELET 038 755 - 094 K/uL    MPV 9.7 8.9 - 12.9 FL    NRBC 0.0 0  WBC    ABSOLUTE NRBC 0.00 0.00 - 0.01 K/uL    NEUTROPHILS 56 32 - 75 %    LYMPHOCYTES 30 12 - 49 %    MONOCYTES 10 5 - 13 %    EOSINOPHILS 4 0 - 7 %    BASOPHILS 0 0 - 1 %    IMMATURE GRANULOCYTES 0 0.0 - 0.5 %    ABS. NEUTROPHILS 3.1 1.8 - 8.0 K/UL    ABS. LYMPHOCYTES 1.7 0.8 - 3.5 K/UL    ABS. MONOCYTES 0.6 0.0 - 1.0 K/UL    ABS. EOSINOPHILS 0.2 0.0 - 0.4 K/UL    ABS. BASOPHILS 0.0 0.0 - 0.1 K/UL    ABS. IMM.  GRANS. 0.0 0.00 - 0.04 K/UL    DF AUTOMATED     METABOLIC PANEL, BASIC    Collection Time: 07/02/19  2:43 AM   Result Value Ref Range    Sodium 138 136 - 145 mmol/L    Potassium 3.8 3.5 - 5.1 mmol/L    Chloride 104 97 - 108 mmol/L    CO2 29 21 - 32 mmol/L    Anion gap 5 5 - 15 mmol/L    Glucose 86 65 - 100 mg/dL    BUN 7 6 - 20 MG/DL    Creatinine 1.07 0.70 - 1.30 MG/DL    BUN/Creatinine ratio 7 (L) 12 - 20      GFR est AA >60 >60 ml/min/1.73m2    GFR est non-AA >60 >60 ml/min/1.73m2    Calcium 8.8 8.5 - 10.1 MG/DL         Medications: Reviewed    PMH/SH reviewed - no change compared to H&P  Mid-Level Provider: April Santana NP   Date/Time:  7/2/2019  I have examined the patient. I have reviewed the chart and agree with the documentation recorded by the NP, including the assessment, treatment plan, and disposition.       Mesfin Ding MD

## 2019-07-02 NOTE — PROGRESS NOTES
General Daily Progress Note    Admission Date: 6/29/2019  Hospital Day 3  Post-Op Day Not Applicable  Subjective:   No new complaints. Hungry. Objective:     Patient Vitals for the past 24 hrs:   BP Temp Pulse Resp SpO2   07/01/19 1947 159/78 99.1 °F (37.3 °C) 60 18 94 %   07/01/19 1422 143/75 98.5 °F (36.9 °C) 63 16 100 %   07/01/19 0816 150/90 98.1 °F (36.7 °C) 70 16 94 %   07/01/19 0303 157/79 98.2 °F (36.8 °C) 69 16 92 %     No intake/output data recorded. 06/30 0701 - 07/01 1900  In: 2279 [I.V.:2279]  Out: -     Physical Examination:  No distress. Abdominal tenderness localized to the left lower quadrant site at which scant drainage is expressible. Data Review   Recent Results (from the past 24 hour(s))   METABOLIC PANEL, COMPREHENSIVE    Collection Time: 07/01/19  3:07 AM   Result Value Ref Range    Sodium 137 136 - 145 mmol/L    Potassium 3.6 3.5 - 5.1 mmol/L    Chloride 106 97 - 108 mmol/L    CO2 25 21 - 32 mmol/L    Anion gap 6 5 - 15 mmol/L    Glucose 62 (L) 65 - 100 mg/dL    BUN 8 6 - 20 MG/DL    Creatinine 0.80 0.70 - 1.30 MG/DL    BUN/Creatinine ratio 10 (L) 12 - 20      GFR est AA >60 >60 ml/min/1.73m2    GFR est non-AA >60 >60 ml/min/1.73m2    Calcium 8.2 (L) 8.5 - 10.1 MG/DL    Bilirubin, total 0.6 0.2 - 1.0 MG/DL    ALT (SGPT) 21 12 - 78 U/L    AST (SGOT) 19 15 - 37 U/L    Alk.  phosphatase 81 45 - 117 U/L    Protein, total 6.0 (L) 6.4 - 8.2 g/dL    Albumin 3.2 (L) 3.5 - 5.0 g/dL    Globulin 2.8 2.0 - 4.0 g/dL    A-G Ratio 1.1 1.1 - 2.2     CBC W/O DIFF    Collection Time: 07/01/19  3:07 AM   Result Value Ref Range    WBC 6.6 4.1 - 11.1 K/uL    RBC 3.94 (L) 4.10 - 5.70 M/uL    HGB 13.9 12.1 - 17.0 g/dL    HCT 42.0 36.6 - 50.3 %    .6 (H) 80.0 - 99.0 FL    MCH 35.3 (H) 26.0 - 34.0 PG    MCHC 33.1 30.0 - 36.5 g/dL    RDW 14.1 11.5 - 14.5 %    PLATELET 561 888 - 442 K/uL    MPV 9.9 8.9 - 12.9 FL    NRBC 0.0 0  WBC    ABSOLUTE NRBC 0.00 0.00 - 0.01 K/uL           Assessment and Plan:     Principal Problem:    HTN (hypertension) (6/29/2019)    Active Problems:    Croup (6/29/2019)      Crohn disease ([address] (6/29/2019)      Abdominal pain (6/29/2019)      No clinical change. There is still no indication for surgery. I reminded the patient that one of the basic principles of Crohn's disease management is that surgery should usually be performed only when all appropriate medical treatment has failed. In this specific case, he has not yet received any medical treatment other than bowel rest.    I agree with Dr. Radha Dumont that it would be reasonable to begin a clear liquid diet, but it is possible that the patient will need complete or near complete bowel rest and TPN (possibly for a period of weeks), along with appropriate Crohn's disease medications, to maximize the chances of spontaneous closure of the fistula. If intestinal surgery becomes necessary, it will be associated with a high risk of complications including bowel injury resulting in worsening short gut syndrome and possible permanent TPN dependence. Following.

## 2019-07-02 NOTE — PROGRESS NOTES
Care Management Interventions  PCP Verified by CM: Yes  Transition of Care Consult (CM Consult): Discharge Planning  Discharge Durable Medical Equipment: No  Physical Therapy Consult: No  Occupational Therapy Consult: No  Speech Therapy Consult: No  Current Support Network: Lives Alone  Confirm Follow Up Transport: Family  Plan discussed with Pt/Family/Caregiver: Yes  Discharge Location  Discharge Placement: Home     Chart reviewed. Patient admitted her on 6/29/19 with longstanding Crohn's disease and enterocutaneous Fistula. Per chart review, it was recommended that CM inquire about a prescription /patient assistance or Humira (which patient reports as being too expensive). Per Dr. Junior Ornelas note, Humira is too expensive, Remicade (patient is also allergic to) and Sandostatin (patient can't afford). The patient has been taking Colestipol, Lomotil and Imodium for his diarrhea. CM met with patient to introduce self and role. The patient is legally  and retired on [employer]. He confirms difficulties had in the past in affording medications listed above. CM called [employer] Specialist at [phone]. The specialist recommended that patient call Social Security to apply for EXTRA HELP low income subsidy (LIS) for help with his medications. Per the patient , he is not eligible because he is still legally  and his wife's income is considered and pushes him over the income guidelines. [employer] also offers a Special Needs Plan , however the patient does not qualify due to being over income (as wife income is considered even though he is legally . CM recommended to the patient that he call [employer] and schedule to have a licensed  come to his home to speak with him about insurance option plans that best meet his needs.     CM also spoke with patient re referral to [address] (via [employer]) where he  Can have an insurance counselor work with him prior to open enrollment to select insurance plan that best meets his needs.

## 2019-07-02 NOTE — PROGRESS NOTES
Hospitalist Progress Note  Veena Spence MD  Answering service: [phone] from in house phone        Date of Service:  2019  NAME:  Bhavesh Suarez  :  1960  MRN:  [id number]      Admission Summary:   63 y.o. 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 He presented to the ED  because early this morning he had abdominal pain and he felt a  \"pop\" then he noticed fluid coming out of his belly, with a \"feces odor\". He decided to come to the ED for further eval and management. Denies N/V fevers or chills. Currently being managed for Crohn's flare. Interval history / Subjective:   Continues to have signficiant abdominal pain. Requesting to eat normal diet. Discussed possibility of having bowel rest, and TPN per Dr. Baron Mary note. Discussed with Dr. Isai Mckoy as well, and he will see the patient later today. Pt denies any bleeding, N/V. No fevers. Assessment & Plan:     Abdominal pain secondary to suspected Crohn's flare:  - consult to GI need to plan for biologics, office now undergoing plans for approval.  - Colon/Rectal surgery also following, appreciate recs, possibly will need TPN and total bowel rest.   - Pain control  on scheduled oxycodone, PRN IV morphine, and add oxycodone for breakthrough. - Continue clear liquid diet, defer to GI and colorectal surgery regarding need for TPN and total bowel rest.   - IVF for now, if able to tolerate clears well can DC  - No signs or symptoms of infection, WBC normal. No indication for antibiotics at this time.   - Start solumedrol 30mg IV every 6 hours.       HTN: not currently on meds, BP stable  - continue to monitor    Code status: Full  DVT prophylaxis: Lovenox   Dispo: Need to initiate approval process for patient assistance program. Will f/u with case management if we have any patient assistance funds     Hospital Problems  Date Reviewed: 12/22/2017          Codes Class Noted POA    Croup ICD-10-CM: J05.0  ICD-9-CM: 464.4  6/29/2019 Unknown        Crohn disease ([email or web address] ICD-10-CM: K50.90  ICD-9-CM: 555.9  6/29/2019 Unknown        * (Principal) HTN (hypertension) ICD-10-CM: I10  ICD-9-CM: 401.9  6/29/2019 Unknown        Abdominal pain ICD-10-CM: R10.9  ICD-9-CM: 789.00  6/29/2019 Unknown                Vital Signs:    Last 24hrs VS reviewed since prior progress note. Most recent are:  Visit Vitals  /90   Pulse 61   Temp 98.4 °F (36.9 °C)   Resp 18   Ht 5' 11\" (1.803 m)   Wt 84.7 kg (186 lb 11.7 oz)   SpO2 96%   BMI 26.04 kg/m²       No intake or output data in the 24 hours ending 07/02/19 1548     Physical Examination:             Constitutional:  No acute distress, cooperative, pleasant    ENT:  dry mucous membranes, oropharynx benign. Neck supple,    Resp:  CTA bilaterally. No wheezing/rhonchi/rales. No accessory muscle use   CV:  Regular rhythm, normal rate, no murmurs, gallops, rubs    GI:  Soft, non distended, + tender along midline. normoactive bowel sounds, 4 cm lesion in LLQ, scant drainage noted, 3 cm lesion at RLQ, no drainage, multiple scars from abdomina surgeries noted     Musculoskeletal:  No edema, warm, 2+ pulses throughout    Neurologic:  Moves all extremities. Answers questions appropriately, responds to commands         Labs:     Recent Labs     07/02/19 0243 07/01/19  0307   WBC 5.5 6.6   HGB 14.8 13.9   HCT 44.6 42.0    207     Recent Labs     07/02/19  0243 07/01/19  0307 06/30/19  0233    137 138   K 3.8 3.6 3.8    106 106   CO2 29 25 26   BUN 7 8 7   CREA 1.07 0.80 0.88   GLU 86 62* 75   CA 8.8 8.2* 7.9*     Recent Labs     07/01/19  0307 06/29/19  1808   SGOT 19 14*   ALT 21 19   AP 81 87   TBILI 0.6 0.2   TP 6.0* 6.2*   ALB 3.2* 3.4*   GLOB 2.8 2.8   LPSE  --  105     No results for input(s): INR, PTP, APTT in the last 72 hours.     No lab exists for component: INREXT, INREXT   No results for input(s): FE, TIBC, PSAT, FERR in the last 72 hours. No results found for: FOL, RBCF   No results for input(s): PH, PCO2, PO2 in the last 72 hours. No results for input(s): CPK, CKNDX, TROIQ in the last 72 hours.     No lab exists for component: CPKMB  Lab Results   Component Value Date/Time    Cholesterol, total 134 12/01/2014 04:33 AM    HDL Cholesterol 35 12/01/2014 04:33 AM    LDL, calculated 58.2 12/01/2014 04:33 AM    Triglyceride 204 (H) 12/01/2014 04:33 AM    CHOL/HDL Ratio 3.8 12/01/2014 04:33 AM     Lab Results   Component Value Date/Time    Glucose (POC) 113 (H) 11/10/2017 11:35 AM    Glucose (POC) 120 (H) 11/10/2017 05:54 AM    Glucose (POC) 87 11/09/2017 11:43 PM    Glucose (POC) 107 (H) 11/09/2017 07:07 PM    Glucose (POC) 107 (H) 11/09/2017 11:22 AM     Lab Results   Component Value Date/Time    Color YELLOW/STRAW 06/29/2019 08:13 PM    Appearance CLEAR 06/29/2019 08:13 PM    Specific gravity >1.030 (H) 06/29/2019 08:13 PM    Specific gravity 1.028 02/14/2018 09:44 PM    pH (UA) 5.0 06/29/2019 08:13 PM    Protein NEGATIVE  06/29/2019 08:13 PM    Glucose NEGATIVE  06/29/2019 08:13 PM    Ketone NEGATIVE  06/29/2019 08:13 PM    Bilirubin NEGATIVE  06/29/2019 08:13 PM    Urobilinogen 0.2 06/29/2019 08:13 PM    Nitrites NEGATIVE  06/29/2019 08:13 PM    Leukocyte Esterase NEGATIVE  06/29/2019 08:13 PM    Epithelial cells FEW 06/29/2019 08:13 PM    Bacteria NEGATIVE  06/29/2019 08:13 PM    WBC 0-4 06/29/2019 08:13 PM    RBC 0-5 06/29/2019 08:13 PM         Medications Reviewed:     Current Facility-Administered Medications   Medication Dose Route Frequency    methylPREDNISolone (PF) (SOLU-MEDROL) injection 30 mg  30 mg IntraVENous Q6H    oxyCODONE (ROXICODONE) 5 mg/5 mL oral solution 10 mg  10 mg Oral Q4H PRN    acetaminophen (TYLENOL) tablet 650 mg  650 mg Oral Q6H PRN    loperamide (IMODIUM) capsule 2 mg  2 mg Oral PRN    sodium chloride (NS) flush 5-40 mL  5-40 mL IntraVENous Q8H    sodium chloride (NS) flush 5-40 mL  5-40 mL IntraVENous PRN    enoxaparin (LOVENOX) injection 40 mg  40 mg SubCUTAneous Q24H    gabapentin (NEURONTIN) tablet 300 mg  300 mg Oral TID    oxyCODONE (ROXICODONE) 5 mg/5 mL oral solution 10 mg  10 mg Oral Q8H    chlorzoxazone (PARAFON FORTE) tablet 500-1,000 mg  500-1,000 mg Oral DAILY PRN    nicotine (NICODERM CQ) 14 mg/24 hr patch 1 Patch  1 Patch TransDERmal DAILY    morphine injection 2 mg  2 mg IntraVENous Q3H PRN    0.9% sodium chloride infusion  75 mL/hr IntraVENous CONTINUOUS     ______________________________________________________________________  EXPECTED LENGTH OF STAY: 3d 12h  ACTUAL LENGTH OF STAY:          3                 Kimberli Nieto MD

## 2019-07-02 NOTE — PROGRESS NOTES
NUTRITION COMPLETE ASSESSMENT    RECOMMENDATIONS:   1. Diet advancement per GI  2. If TPN/bowel rest pursued, see goal recs below  3. Daily MVI     Interventions/Plan:   Food/Nutrient Delivery: Ensure Clear TID (monitor for worsening stools for pt drinking concentrated sweets-- may need to dilute)    Assessment:   Reason for Assessment:   [x]BPA/MST Referral; Short Bowel Syndrome    Diet: Clear liquids    Nutritionally Significant Medications: [x] Reviewed & Includes: 0.9% sodium chloride @ 75 mL/hr; imodium prn; solumedrol q6 hours; zofran q 8 hours prn    Meal Intake:   Patient Vitals for the past 100 hrs:   % Diet Eaten   06/30/19 1305 0 %     Pre-Hospitalization:  Usual Appetite: Good  Diet at Home: Regular  Vitamins/Supplements: No    Current Hospitalization:   Fluid Restriction:  NA  Appetite: Fair  PO Ability: Independent      Subjective:  Pt in good spirits. Known to this nutrition team from past admissions. He proudly states, \"I got up to 200# and was doing great. \"  He admits to having ongoing post-prandial loose stools at baseline. He is aware that TPN may be needed. Pt requesting multiple jello's and Ensure Clear at all meals. Discussed the risk of sugar/concentrated sweets contributing to diarrhea. Pt aware and will adjust intake or dilute Ensure supplements as needed. Objective:  Chart reviewed, discussed with RN and team during interdisciplinary rounds. Pt admitted with Crohn's flare. 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 GI following and plans are pending. Noted they are trying to get financial aid for Humira, which he will ne on an ongoing basis. Also noted possible SIBO. Noted no plans for colorectal surgery at this time.     Based on notes from 2017, pt with remaining bowel as follows:  -- Pt with 210 cm bowel remaining from ligament of Trietz to ileoscolic anastomosis  -- Cathy to no colon  -- Entire rectum    Pt with 13# weight loss based on report weight of 200#. This is significant, but unable to confirm time frame. Will continue to monitor trends. If TPN needed, goal would be: 5.5%AA D20 @ 83 mL/hr + 20% lipids, 250 mL 3x/week. This will provide a daily average of 2007 kcal, 110 g protein and 1992 mL (2242 mL on lipid days)-- meeting 91% and 99% of estimated kcal and protein needs, respectively. Estimated Nutrition Needs:   Kcals/day: 2196 Kcals/day(2596-8191 kcal/day (MSJ x 1.3-1.4))  Protein: 111 g(1.3 g/kg)  Fluid: (1 mL/kcal)  Based On: [place] [address] Jeor  Weight Used: Actual wt(84.7 kg)    Pt expected to meet estimated nutrient needs:  []   Yes     []  No [x] Unable to predict at this time  Nutrition Diagnosis:   1.  Inadequate protein-energy intake related to increased energy expenditure; crohn's; hx of short bowel syndrome as evidenced by Crohn's flare, clear liquid diet; weight loss (13# loss based on pt reports his recent weight was 200#)    Goals:     Pt to tolerate diet advnacement to solids within 72 hours; maintain weight +/- 2# over the next 5-7 days      Monitoring & Evaluation:    - Total energy intake, Liquid meal replacement   - Weight/weight change   -      Previous Nutrition Goals Met:   N/A  Previous Recommendations:    N/A    Education & Discharge Needs:   [] None Identified   [x] Identified and addressed    [x] Participated in care plan, discharge planning, and/or interdisciplinary rounds        Cultural, Evangelical and ethnic food preferences identified: None    Skin Integrity: [x]Intact  []Other  Edema: [x]None []Other  Last BM: 7/2/19  Food Allergies: []None [x]Other: honey  Diet Restrictions: Cultural/Mandaeism Preference(s): None     Anthropometrics:    Weight Loss Metrics 6/29/2019 11/30/2018 5/25/2018 2/14/2018 12/18/2017 12/4/2017 11/10/2017   Today's Wt 186 lb 11.7 oz 179 lb 179 lb 173 lb 12.8 oz 162 lb 166 lb 9.6 oz 159 lb 9.8 oz   BMI 26.04 kg/m2 24.97 kg/m2 24.97 kg/m2 24.24 kg/m2 22.59 kg/m2 23.24 kg/m2 22.26 kg/m2      Weight Source: Bed  Height: 5' 11\" (180.3 cm),    Body mass index is 26.04 kg/m².      IBW : 78 kg (172 lb),    Usual Body Weight: 90.7 kg (200 lb),      Labs:    Lab Results   Component Value Date/Time    Sodium 138 07/02/2019 02:43 AM    Potassium 3.8 07/02/2019 02:43 AM    Chloride 104 07/02/2019 02:43 AM    CO2 29 07/02/2019 02:43 AM    Glucose 86 07/02/2019 02:43 AM    BUN 7 07/02/2019 02:43 AM    Creatinine 1.07 07/02/2019 02:43 AM    Calcium 8.8 07/02/2019 02:43 AM    Magnesium 2.2 11/08/2017 04:59 AM    Phosphorus 4.6 11/08/2017 04:59 AM    Albumin 3.2 (L) 07/01/2019 03:07 AM     Lab Results   Component Value Date/Time    Hemoglobin A1c 4.8 11/15/2013 03:14 PM     Adriano Valles, [address]

## 2019-07-03 LAB
ANION GAP SERPL CALC-SCNC: 7 MMOL/L (ref 5–15)
BASOPHILS # BLD: 0 K/UL (ref 0–0.1)
BASOPHILS NFR BLD: 0 % (ref 0–1)
BUN SERPL-MCNC: 4 MG/DL (ref 6–20)
BUN/CREAT SERPL: 4 (ref 12–20)
CALCIUM SERPL-MCNC: 9 MG/DL (ref 8.5–10.1)
CHLORIDE SERPL-SCNC: 104 MMOL/L (ref 97–108)
CO2 SERPL-SCNC: 28 MMOL/L (ref 21–32)
CREAT SERPL-MCNC: 1 MG/DL (ref 0.7–1.3)
DIFFERENTIAL METHOD BLD: ABNORMAL
EOSINOPHIL # BLD: 0 K/UL (ref 0–0.4)
EOSINOPHIL NFR BLD: 0 % (ref 0–7)
ERYTHROCYTE [DISTWIDTH] IN BLOOD BY AUTOMATED COUNT: 13.2 % (ref 11.5–14.5)
GLUCOSE SERPL-MCNC: 136 MG/DL (ref 65–100)
HCT VFR BLD AUTO: 45.4 % (ref 36.6–50.3)
HGB BLD-MCNC: 15.2 G/DL (ref 12.1–17)
IMM GRANULOCYTES # BLD AUTO: 0.1 K/UL (ref 0–0.04)
IMM GRANULOCYTES NFR BLD AUTO: 1 % (ref 0–0.5)
LYMPHOCYTES # BLD: 0.8 K/UL (ref 0.8–3.5)
LYMPHOCYTES NFR BLD: 12 % (ref 12–49)
MAGNESIUM SERPL-MCNC: 1.7 MG/DL (ref 1.6–2.4)
MCH RBC QN AUTO: 34.9 PG (ref 26–34)
MCHC RBC AUTO-ENTMCNC: 33.5 G/DL (ref 30–36.5)
MCV RBC AUTO: 104.1 FL (ref 80–99)
MONOCYTES # BLD: 0.2 K/UL (ref 0–1)
MONOCYTES NFR BLD: 3 % (ref 5–13)
NEUTS SEG # BLD: 5.2 K/UL (ref 1.8–8)
NEUTS SEG NFR BLD: 84 % (ref 32–75)
NRBC # BLD: 0 K/UL (ref 0–0.01)
NRBC BLD-RTO: 0 PER 100 WBC
PHOSPHATE SERPL-MCNC: 3.6 MG/DL (ref 2.6–4.7)
PLATELET # BLD AUTO: 238 K/UL (ref 150–400)
PLATELET COMMENTS,PCOM: ABNORMAL
PMV BLD AUTO: 9.8 FL (ref 8.9–12.9)
POTASSIUM SERPL-SCNC: 4 MMOL/L (ref 3.5–5.1)
RBC # BLD AUTO: 4.36 M/UL (ref 4.1–5.7)
RBC MORPH BLD: ABNORMAL
SODIUM SERPL-SCNC: 139 MMOL/L (ref 136–145)
WBC # BLD AUTO: 6.3 K/UL (ref 4.1–11.1)

## 2019-07-03 PROCEDURE — 80048 BASIC METABOLIC PNL TOTAL CA: CPT

## 2019-07-03 PROCEDURE — 74011250637 HC RX REV CODE- 250/637: Performed by: INTERNAL MEDICINE

## 2019-07-03 PROCEDURE — 65270000029 HC RM PRIVATE

## 2019-07-03 PROCEDURE — 85025 COMPLETE CBC W/AUTO DIFF WBC: CPT

## 2019-07-03 PROCEDURE — 74011250636 HC RX REV CODE- 250/636: Performed by: INTERNAL MEDICINE

## 2019-07-03 PROCEDURE — 74011250637 HC RX REV CODE- 250/637: Performed by: NURSE PRACTITIONER

## 2019-07-03 PROCEDURE — 83735 ASSAY OF MAGNESIUM: CPT

## 2019-07-03 PROCEDURE — 36415 COLL VENOUS BLD VENIPUNCTURE: CPT

## 2019-07-03 PROCEDURE — 84100 ASSAY OF PHOSPHORUS: CPT

## 2019-07-03 RX ORDER — MAGNESIUM SULFATE HEPTAHYDRATE 40 MG/ML
2 INJECTION, SOLUTION INTRAVENOUS ONCE
Status: COMPLETED | OUTPATIENT
Start: 2019-07-03 | End: 2019-07-03

## 2019-07-03 RX ORDER — CHLORZOXAZONE 500 MG/1
500 TABLET ORAL
Status: DISCONTINUED | OUTPATIENT
Start: 2019-07-03 | End: 2019-07-08 | Stop reason: HOSPADM

## 2019-07-03 RX ORDER — LOPERAMIDE HYDROCHLORIDE 2 MG/1
4 CAPSULE ORAL AS NEEDED
Status: DISCONTINUED | OUTPATIENT
Start: 2019-07-03 | End: 2019-07-08 | Stop reason: HOSPADM

## 2019-07-03 RX ADMIN — GABAPENTIN 300 MG: 600 TABLET, FILM COATED ORAL at 23:09

## 2019-07-03 RX ADMIN — Medication 10 ML: at 21:13

## 2019-07-03 RX ADMIN — Medication 10 MG: at 14:42

## 2019-07-03 RX ADMIN — CHLORZOXAZONE 500 MG: 500 TABLET ORAL at 07:02

## 2019-07-03 RX ADMIN — METHYLPREDNISOLONE SODIUM SUCCINATE 30 MG: 40 INJECTION, POWDER, FOR SOLUTION INTRAMUSCULAR; INTRAVENOUS at 00:07

## 2019-07-03 RX ADMIN — MORPHINE SULFATE 2 MG: 2 INJECTION, SOLUTION INTRAMUSCULAR; INTRAVENOUS at 18:04

## 2019-07-03 RX ADMIN — MORPHINE SULFATE 2 MG: 2 INJECTION, SOLUTION INTRAMUSCULAR; INTRAVENOUS at 21:13

## 2019-07-03 RX ADMIN — GABAPENTIN 300 MG: 600 TABLET, FILM COATED ORAL at 09:43

## 2019-07-03 RX ADMIN — LOPERAMIDE HYDROCHLORIDE 2 MG: 2 CAPSULE ORAL at 11:26

## 2019-07-03 RX ADMIN — MAGNESIUM SULFATE HEPTAHYDRATE 2 G: 40 INJECTION, SOLUTION INTRAVENOUS at 10:45

## 2019-07-03 RX ADMIN — MORPHINE SULFATE 2 MG: 2 INJECTION, SOLUTION INTRAMUSCULAR; INTRAVENOUS at 07:09

## 2019-07-03 RX ADMIN — GABAPENTIN 300 MG: 600 TABLET, FILM COATED ORAL at 15:35

## 2019-07-03 RX ADMIN — CHLORZOXAZONE 500 MG: 500 TABLET ORAL at 23:08

## 2019-07-03 RX ADMIN — Medication 10 ML: at 05:37

## 2019-07-03 RX ADMIN — Medication 10 MG: at 05:36

## 2019-07-03 RX ADMIN — MORPHINE SULFATE 2 MG: 2 INJECTION, SOLUTION INTRAMUSCULAR; INTRAVENOUS at 01:52

## 2019-07-03 RX ADMIN — METHYLPREDNISOLONE SODIUM SUCCINATE 30 MG: 40 INJECTION, POWDER, FOR SOLUTION INTRAMUSCULAR; INTRAVENOUS at 23:09

## 2019-07-03 RX ADMIN — Medication 10 ML: at 14:43

## 2019-07-03 RX ADMIN — Medication 10 MG: at 23:09

## 2019-07-03 RX ADMIN — METHYLPREDNISOLONE SODIUM SUCCINATE 30 MG: 40 INJECTION, POWDER, FOR SOLUTION INTRAMUSCULAR; INTRAVENOUS at 11:19

## 2019-07-03 RX ADMIN — Medication 10 ML: at 23:10

## 2019-07-03 RX ADMIN — METHYLPREDNISOLONE SODIUM SUCCINATE 30 MG: 40 INJECTION, POWDER, FOR SOLUTION INTRAMUSCULAR; INTRAVENOUS at 17:48

## 2019-07-03 RX ADMIN — METHYLPREDNISOLONE SODIUM SUCCINATE 30 MG: 40 INJECTION, POWDER, FOR SOLUTION INTRAMUSCULAR; INTRAVENOUS at 05:36

## 2019-07-03 RX ADMIN — SODIUM CHLORIDE 75 ML/HR: 900 INJECTION, SOLUTION INTRAVENOUS at 05:37

## 2019-07-03 RX ADMIN — LOPERAMIDE HYDROCHLORIDE 4 MG: 2 CAPSULE ORAL at 23:08

## 2019-07-03 RX ADMIN — MORPHINE SULFATE 2 MG: 2 INJECTION, SOLUTION INTRAMUSCULAR; INTRAVENOUS at 10:45

## 2019-07-03 NOTE — PROGRESS NOTES
[address]  [address] [facility] [address]   [phone]                GI PROGRESS NOTE  Zen Downey, AGACNP-BC  Work Cell: [phone]      NAME:   Adia Mckeon   :    1960   MRN:    [id number]     Assessment/Plan   1. Crohn's disease with likely enterocutaneous fistula - CT showing inflammation of sigmoid colon w/ close approximation of bowel to peritoneum and possible subtle fistula. Pt has minimal drainage from LLQ. He has not been on medications for quite some time due to financial constraints. Hep panel (-). Chest x-ray unremarkable. - Clear liquids as tolerated  - Supportive management per primary team  - Steroids started   - Quant TB pending   - Initiated approval process for Humira through our office  - If Quant TB is negative, then plan to start Humira 160 mg SQ inpatient with eventual discharge to slowly resume low residue diet  - Other option would be remain NPO w/ initiate TPN and start Humira when able   - Discuss the above with pt who is reluctant to be NPO at present. Patient Active Problem List   Diagnosis Code    Crohn's disease ([facility] K50.90    GERD (gastroesophageal reflux disease) K21.9    Hiatal hernia K44.9    B12 deficiency E53.8    Chronic pain G89.29    Short bowel syndrome K91.2    Back pain, chronic M54.9, G89.29    Incisional hernia, without obstruction or gangrene K43.2    Intussusception of intestine (HCC) K56.1    Bowel obstruction (HCC) K56.609    Abdominal wound dehiscence T81.30XA    Croup J05.0    Crohn disease ([facility] [address] K50.90    HTN (hypertension) I10    Abdominal pain R10.9       Subjective:     Reports on-going abdominal cramping. Worse in LLQ around fistula. Minimal output from this area. Tolerating clears. Having loose, non-bloody stools. Objective:     VITALS:   Last 24hrs VS reviewed since prior hospitalist progress note.  Most recent are:  Visit Vitals  /77 (BP 1 Location: Right arm, BP Patient Position: At rest)   Pulse 64   Temp 99.3 °F (37.4 °C)   Resp 16   Ht 5' 11\" (1.803 m)   Wt 84.7 kg (186 lb 11.7 oz)   SpO2 95%   BMI 26.04 kg/m²     No intake or output data in the 24 hours ending 07/03/19 1240     PHYSICAL EXAM:  General   well developed, alert, in no acute distress  EENT  Normocephalic, Atraumatic, PERRLA, EOMI, sclera clear  Respiratory   Clear To Auscultation bilaterally - no wheezes, rales, rhonchi, or crackles  Cardiology  Regular Rate and Rythmn  - no murmurs, rubs or gallops  Abdominal  Soft, mildly distended, mild diffuse tenderness, multiple scars with thin skin over midline abdomen, LLQ site with minimal/no drainage, positive bowel sounds, no hepatosplenomegaly, no palpable mass  Neurological  No focal neurological deficits noted  Psychological  Oriented x 3.  Normal affect.        Lab Data   Recent Results (from the past 12 hour(s))   MAGNESIUM    Collection Time: 07/03/19  1:35 AM   Result Value Ref Range    Magnesium 1.7 1.6 - 2.4 mg/dL   METABOLIC PANEL, BASIC    Collection Time: 07/03/19  1:35 AM   Result Value Ref Range    Sodium 139 136 - 145 mmol/L    Potassium 4.0 3.5 - 5.1 mmol/L    Chloride 104 97 - 108 mmol/L    CO2 28 21 - 32 mmol/L    Anion gap 7 5 - 15 mmol/L    Glucose 136 (H) 65 - 100 mg/dL    BUN 4 (L) 6 - 20 MG/DL    Creatinine 1.00 0.70 - 1.30 MG/DL    BUN/Creatinine ratio 4 (L) 12 - 20      GFR est AA >60 >60 ml/min/1.73m2    GFR est non-AA >60 >60 ml/min/1.73m2    Calcium 9.0 8.5 - 10.1 MG/DL   PHOSPHORUS    Collection Time: 07/03/19  1:35 AM   Result Value Ref Range    Phosphorus 3.6 2.6 - 4.7 MG/DL   CBC WITH AUTOMATED DIFF    Collection Time: 07/03/19  1:35 AM   Result Value Ref Range    WBC 6.3 4.1 - 11.1 K/uL    RBC 4.36 4.10 - 5.70 M/uL    HGB 15.2 12.1 - 17.0 g/dL    HCT 45.4 36.6 - 50.3 %    .1 (H) 80.0 - 99.0 FL    MCH 34.9 (H) 26.0 - 34.0 PG    MCHC 33.5 30.0 - 36.5 g/dL    RDW 13.2 11.5 - 14.5 %    PLATELET 029 657 - 505 K/uL    MPV 9.8 8.9 - 12.9 FL    NRBC 0.0 0  WBC    ABSOLUTE NRBC 0.00 0.00 - 0.01 K/uL    NEUTROPHILS 84 (H) 32 - 75 %    LYMPHOCYTES 12 12 - 49 %    MONOCYTES 3 (L) 5 - 13 %    EOSINOPHILS 0 0 - 7 %    BASOPHILS 0 0 - 1 %    IMMATURE GRANULOCYTES 1 (H) 0.0 - 0.5 %    ABS. NEUTROPHILS 5.2 1.8 - 8.0 K/UL    ABS. LYMPHOCYTES 0.8 0.8 - 3.5 K/UL    ABS. MONOCYTES 0.2 0.0 - 1.0 K/UL    ABS. EOSINOPHILS 0.0 0.0 - 0.4 K/UL    ABS. BASOPHILS 0.0 0.0 - 0.1 K/UL    ABS. IMM. GRANS. 0.1 (H) 0.00 - 0.04 K/UL    DF SMEAR SCANNED      PLATELET COMMENTS Large Platelets      RBC COMMENTS MACROCYTOSIS  1+             Medications: Reviewed    PMH/SH reviewed - no change compared to H&P  Mid-Level Provider: Stevan Bailey NP   Date/Time:  7/3/2019  I have examined the patient. I have reviewed the chart and agree with the documentation recorded by the NP, including the assessment, treatment plan, and disposition.       Sushila Dover MD

## 2019-07-03 NOTE — PROGRESS NOTES
Hospitalist Progress Note  Kole Crook MD  Answering service: [phone] from in house phone        Date of Service:  7/3/2019  NAME:  Ketan Sanchez  :  1960  MRN:  [id number]      Admission Summary:   63 y.o. 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 He presented to the ED  because early this morning he had abdominal pain and he felt a  \"pop\" then he noticed fluid coming out of his belly, with a \"feces odor\". He decided to come to the ED for further eval and management. Denies N/V fevers or chills. Currently being managed for Crohn's flare. Interval history / Subjective:   Continues to have abdominal discomfort. GI discussing options for treatment with patient. Remains on clear liquid diet. Tolerating relatively well. Less output from fistula today. Assessment & Plan:     Abdominal pain secondary to suspected Crohn's flare:  - consult to GI need to plan for biologics, office now undergoing plans for approval.  - Colon/Rectal surgery also following, appreciate recs, possibly will need TPN and total bowel rest.   - Pain control  on scheduled oxycodone, PRN IV morphine, and add oxycodone for breakthrough. - Continue clear liquid diet, defer to GI and colorectal surgery regarding need for TPN and total bowel rest.   - DC IVF today   - No signs or symptoms of infection, WBC normal. No indication for antibiotics at this time. - Continue solumedrol 30mg IV every 6 hours, defer to GI if able to transition to oral steroids and dischagre. - F/U quant gold for initiation of biologic    HTN: not currently on meds, BP stable  - continue to monitor    Hypomagnesemia - monitor and replete.      Code status: Full  DVT prophylaxis: Lovenox   Dispo: Need to initiate approval process for patient assistance program. Will f/u with case management if we have any patient assistance funds     Hospital Problems  Date Reviewed: 12/22/2017          Codes Class Noted POA    Croup ICD-10-CM: J05.0  ICD-9-CM: 464.4  6/29/2019 Unknown        * (Principal) Crohn disease ([facility] [address] ICD-10-CM: K50.90  ICD-9-CM: 555.9  6/29/2019 Unknown        HTN (hypertension) ICD-10-CM: I10  ICD-9-CM: 401.9  6/29/2019 Unknown        Abdominal pain ICD-10-CM: R10.9  ICD-9-CM: 789.00  6/29/2019 Unknown                Vital Signs:    Last 24hrs VS reviewed since prior progress note. Most recent are:  Visit Vitals  /77 (BP 1 Location: Right arm, BP Patient Position: At rest)   Pulse 64   Temp 99.3 °F (37.4 °C)   Resp 16   Ht 5' 11\" (1.803 m)   Wt 84.7 kg (186 lb 11.7 oz)   SpO2 95%   BMI 26.04 kg/m²       No intake or output data in the 24 hours ending 07/03/19 1525     Physical Examination:             Constitutional:  No acute distress, cooperative, pleasant    ENT:  dry mucous membranes, oropharynx benign. Neck supple,    Resp:  CTA bilaterally. No wheezing/rhonchi/rales. No accessory muscle use   CV:  Regular rhythm, normal rate, no murmurs, gallops, rubs    GI:  Soft, non distended, + tender along midline. normoactive bowel sounds, 4 cm lesion in LLQ, scant drainage noted, 3 cm lesion at RLQ, no drainage, multiple scars from abdomina surgeries noted     Musculoskeletal:  No edema, warm, 2+ pulses throughout    Neurologic:  Moves all extremities.   Answers questions appropriately, responds to commands         Labs:     Recent Labs     07/03/19 0135 07/02/19  0243   WBC 6.3 5.5   HGB 15.2 14.8   HCT 45.4 44.6    219     Recent Labs     07/03/19 0135 07/02/19  0243 07/01/19  0307    138 137   K 4.0 3.8 3.6    104 106   CO2 28 29 25   BUN 4* 7 8   CREA 1.00 1.07 0.80   * 86 62*   CA 9.0 8.8 8.2*   MG 1.7  --   --    PHOS 3.6  --   --      Recent Labs     07/01/19  0307   SGOT 19   ALT 21   AP 81   TBILI 0.6   TP 6.0*   ALB 3.2*   GLOB 2.8     No results for input(s): INR, PTP, APTT in the last 72 hours. No lab exists for component: INREXT, INREXT   No results for input(s): FE, TIBC, PSAT, FERR in the last 72 hours. No results found for: FOL, RBCF   No results for input(s): PH, PCO2, PO2 in the last 72 hours. No results for input(s): CPK, CKNDX, TROIQ in the last 72 hours.     No lab exists for component: CPKMB  Lab Results   Component Value Date/Time    Cholesterol, total 134 12/01/2014 04:33 AM    HDL Cholesterol 35 12/01/2014 04:33 AM    LDL, calculated 58.2 12/01/2014 04:33 AM    Triglyceride 204 (H) 12/01/2014 04:33 AM    CHOL/HDL Ratio 3.8 12/01/2014 04:33 AM     Lab Results   Component Value Date/Time    Glucose (POC) 113 (H) 11/10/2017 11:35 AM    Glucose (POC) 120 (H) 11/10/2017 05:54 AM    Glucose (POC) 87 11/09/2017 11:43 PM    Glucose (POC) 107 (H) 11/09/2017 07:07 PM    Glucose (POC) 107 (H) 11/09/2017 11:22 AM     Lab Results   Component Value Date/Time    Color YELLOW/STRAW 06/29/2019 08:13 PM    Appearance CLEAR 06/29/2019 08:13 PM    Specific gravity >1.030 (H) 06/29/2019 08:13 PM    Specific gravity 1.028 02/14/2018 09:44 PM    pH (UA) 5.0 06/29/2019 08:13 PM    Protein NEGATIVE  06/29/2019 08:13 PM    Glucose NEGATIVE  06/29/2019 08:13 PM    Ketone NEGATIVE  06/29/2019 08:13 PM    Bilirubin NEGATIVE  06/29/2019 08:13 PM    Urobilinogen 0.2 06/29/2019 08:13 PM    Nitrites NEGATIVE  06/29/2019 08:13 PM    Leukocyte Esterase NEGATIVE  06/29/2019 08:13 PM    Epithelial cells FEW 06/29/2019 08:13 PM    Bacteria NEGATIVE  06/29/2019 08:13 PM    WBC 0-4 06/29/2019 08:13 PM    RBC 0-5 06/29/2019 08:13 PM         Medications Reviewed:     Current Facility-Administered Medications   Medication Dose Route Frequency    chlorzoxazone (PARAFON FORTE) tablet 500 mg  500 mg Oral QHS    methylPREDNISolone (PF) (SOLU-MEDROL) injection 30 mg  30 mg IntraVENous Q6H    oxyCODONE (ROXICODONE) 5 mg/5 mL oral solution 10 mg  10 mg Oral Q4H PRN    ondansetron (ZOFRAN) injection 4 mg  4 mg IntraVENous Q8H PRN    acetaminophen (TYLENOL) tablet 650 mg  650 mg Oral Q6H PRN    loperamide (IMODIUM) capsule 2 mg  2 mg Oral PRN    sodium chloride (NS) flush 5-40 mL  5-40 mL IntraVENous Q8H    sodium chloride (NS) flush 5-40 mL  5-40 mL IntraVENous PRN    enoxaparin (LOVENOX) injection 40 mg  40 mg SubCUTAneous Q24H    gabapentin (NEURONTIN) tablet 300 mg  300 mg Oral TID    oxyCODONE (ROXICODONE) 5 mg/5 mL oral solution 10 mg  10 mg Oral Q8H    nicotine (NICODERM CQ) 14 mg/24 hr patch 1 Patch  1 Patch TransDERmal DAILY    morphine injection 2 mg  2 mg IntraVENous Q3H PRN     ______________________________________________________________________  EXPECTED LENGTH OF STAY: 3d 12h  ACTUAL LENGTH OF STAY:          4                 Israel Irby MD

## 2019-07-04 LAB
ANION GAP SERPL CALC-SCNC: 9 MMOL/L (ref 5–15)
BASOPHILS # BLD: 0 K/UL (ref 0–0.1)
BASOPHILS NFR BLD: 0 % (ref 0–1)
BUN SERPL-MCNC: 8 MG/DL (ref 6–20)
BUN/CREAT SERPL: 8 (ref 12–20)
CALCIUM SERPL-MCNC: 9.2 MG/DL (ref 8.5–10.1)
CHLORIDE SERPL-SCNC: 103 MMOL/L (ref 97–108)
CO2 SERPL-SCNC: 24 MMOL/L (ref 21–32)
CREAT SERPL-MCNC: 1.06 MG/DL (ref 0.7–1.3)
DIFFERENTIAL METHOD BLD: ABNORMAL
EOSINOPHIL # BLD: 0 K/UL (ref 0–0.4)
EOSINOPHIL NFR BLD: 0 % (ref 0–7)
ERYTHROCYTE [DISTWIDTH] IN BLOOD BY AUTOMATED COUNT: 13.7 % (ref 11.5–14.5)
GLUCOSE SERPL-MCNC: 119 MG/DL (ref 65–100)
HCT VFR BLD AUTO: 46.1 % (ref 36.6–50.3)
HGB BLD-MCNC: 15.2 G/DL (ref 12.1–17)
IMM GRANULOCYTES # BLD AUTO: 0.2 K/UL (ref 0–0.04)
IMM GRANULOCYTES NFR BLD AUTO: 1 % (ref 0–0.5)
LYMPHOCYTES # BLD: 1 K/UL (ref 0.8–3.5)
LYMPHOCYTES NFR BLD: 6 % (ref 12–49)
MAGNESIUM SERPL-MCNC: 2.2 MG/DL (ref 1.6–2.4)
MCH RBC QN AUTO: 35 PG (ref 26–34)
MCHC RBC AUTO-ENTMCNC: 33 G/DL (ref 30–36.5)
MCV RBC AUTO: 106.2 FL (ref 80–99)
MONOCYTES # BLD: 0.3 K/UL (ref 0–1)
MONOCYTES NFR BLD: 2 % (ref 5–13)
NEUTS SEG # BLD: 14.9 K/UL (ref 1.8–8)
NEUTS SEG NFR BLD: 91 % (ref 32–75)
NRBC # BLD: 0 K/UL (ref 0–0.01)
NRBC BLD-RTO: 0 PER 100 WBC
PHOSPHATE SERPL-MCNC: 4.1 MG/DL (ref 2.6–4.7)
PLATELET # BLD AUTO: 246 K/UL (ref 150–400)
PLATELET COMMENTS,PCOM: ABNORMAL
PMV BLD AUTO: 10.2 FL (ref 8.9–12.9)
POTASSIUM SERPL-SCNC: 4 MMOL/L (ref 3.5–5.1)
RBC # BLD AUTO: 4.34 M/UL (ref 4.1–5.7)
RBC MORPH BLD: ABNORMAL
RBC MORPH BLD: ABNORMAL
SODIUM SERPL-SCNC: 136 MMOL/L (ref 136–145)
WBC # BLD AUTO: 16.4 K/UL (ref 4.1–11.1)

## 2019-07-04 PROCEDURE — 80048 BASIC METABOLIC PNL TOTAL CA: CPT

## 2019-07-04 PROCEDURE — 74011250637 HC RX REV CODE- 250/637: Performed by: NURSE PRACTITIONER

## 2019-07-04 PROCEDURE — 84100 ASSAY OF PHOSPHORUS: CPT

## 2019-07-04 PROCEDURE — 83735 ASSAY OF MAGNESIUM: CPT

## 2019-07-04 PROCEDURE — 36415 COLL VENOUS BLD VENIPUNCTURE: CPT

## 2019-07-04 PROCEDURE — 74011250637 HC RX REV CODE- 250/637: Performed by: INTERNAL MEDICINE

## 2019-07-04 PROCEDURE — 74011250636 HC RX REV CODE- 250/636: Performed by: INTERNAL MEDICINE

## 2019-07-04 PROCEDURE — 65270000029 HC RM PRIVATE

## 2019-07-04 PROCEDURE — 85025 COMPLETE CBC W/AUTO DIFF WBC: CPT

## 2019-07-04 RX ORDER — PANTOPRAZOLE SODIUM 40 MG/1
40 TABLET, DELAYED RELEASE ORAL
Status: DISCONTINUED | OUTPATIENT
Start: 2019-07-04 | End: 2019-07-08 | Stop reason: HOSPADM

## 2019-07-04 RX ORDER — DEXTROMETHORPHAN/PSEUDOEPHED 2.5-7.5/.8
20 DROPS ORAL
Status: DISCONTINUED | OUTPATIENT
Start: 2019-07-04 | End: 2019-07-08 | Stop reason: HOSPADM

## 2019-07-04 RX ADMIN — MORPHINE SULFATE 2 MG: 2 INJECTION, SOLUTION INTRAMUSCULAR; INTRAVENOUS at 08:43

## 2019-07-04 RX ADMIN — METHYLPREDNISOLONE SODIUM SUCCINATE 30 MG: 40 INJECTION, POWDER, FOR SOLUTION INTRAMUSCULAR; INTRAVENOUS at 18:25

## 2019-07-04 RX ADMIN — LOPERAMIDE HYDROCHLORIDE 4 MG: 2 CAPSULE ORAL at 07:13

## 2019-07-04 RX ADMIN — Medication 10 MG: at 07:07

## 2019-07-04 RX ADMIN — MORPHINE SULFATE 2 MG: 2 INJECTION, SOLUTION INTRAMUSCULAR; INTRAVENOUS at 03:31

## 2019-07-04 RX ADMIN — METHYLPREDNISOLONE SODIUM SUCCINATE 30 MG: 40 INJECTION, POWDER, FOR SOLUTION INTRAMUSCULAR; INTRAVENOUS at 07:07

## 2019-07-04 RX ADMIN — Medication 10 ML: at 03:31

## 2019-07-04 RX ADMIN — Medication 10 ML: at 20:34

## 2019-07-04 RX ADMIN — CHLORZOXAZONE 500 MG: 500 TABLET ORAL at 20:30

## 2019-07-04 RX ADMIN — Medication 10 ML: at 07:07

## 2019-07-04 RX ADMIN — LOPERAMIDE HYDROCHLORIDE 4 MG: 2 CAPSULE ORAL at 23:20

## 2019-07-04 RX ADMIN — METHYLPREDNISOLONE SODIUM SUCCINATE 30 MG: 40 INJECTION, POWDER, FOR SOLUTION INTRAMUSCULAR; INTRAVENOUS at 23:10

## 2019-07-04 RX ADMIN — Medication 10 MG: at 14:20

## 2019-07-04 RX ADMIN — METHYLPREDNISOLONE SODIUM SUCCINATE 30 MG: 40 INJECTION, POWDER, FOR SOLUTION INTRAMUSCULAR; INTRAVENOUS at 11:56

## 2019-07-04 RX ADMIN — MORPHINE SULFATE 2 MG: 2 INJECTION, SOLUTION INTRAMUSCULAR; INTRAVENOUS at 15:55

## 2019-07-04 RX ADMIN — MORPHINE SULFATE 2 MG: 2 INJECTION, SOLUTION INTRAMUSCULAR; INTRAVENOUS at 20:31

## 2019-07-04 RX ADMIN — Medication 10 MG: at 23:10

## 2019-07-04 RX ADMIN — Medication 10 MG: at 02:22

## 2019-07-04 RX ADMIN — SIMETHICONE 20 MG: 20 SUSPENSION/ DROPS ORAL at 16:18

## 2019-07-04 RX ADMIN — GABAPENTIN 300 MG: 600 TABLET, FILM COATED ORAL at 08:37

## 2019-07-04 RX ADMIN — PANTOPRAZOLE SODIUM 40 MG: 40 TABLET, DELAYED RELEASE ORAL at 11:56

## 2019-07-04 RX ADMIN — Medication 10 ML: at 14:20

## 2019-07-04 RX ADMIN — GABAPENTIN 300 MG: 600 TABLET, FILM COATED ORAL at 15:55

## 2019-07-04 RX ADMIN — GABAPENTIN 300 MG: 600 TABLET, FILM COATED ORAL at 20:30

## 2019-07-04 NOTE — PROGRESS NOTES
General Daily Progress Note    Admission Date: 6/29/2019  Hospital Day 5  Post-Op Day Not Applicable  Subjective:   Still tolerating clear liquid diet, but having problems resting at night secondary to bowel activity. Objective:     Patient Vitals for the past 24 hrs:   BP Temp Pulse Resp SpO2   07/03/19 2011 138/70 98.4 °F (36.9 °C) 64 16 97 %   07/03/19 1525 124/70 98.1 °F (36.7 °C) 73 16 99 %   07/03/19 0819 137/77 99.3 °F (37.4 °C) 64 16 95 %   07/03/19 0323 127/74 98.9 °F (37.2 °C) 92 18 97 %     No intake/output data recorded. No intake/output data recorded. Physical Examination:  No distress. Abdominal tenderness localized to the left lower quadrant site at which scant drainage is expressible.               Data Review   Recent Results (from the past 24 hour(s))   MAGNESIUM    Collection Time: 07/03/19  1:35 AM   Result Value Ref Range    Magnesium 1.7 1.6 - 2.4 mg/dL   METABOLIC PANEL, BASIC    Collection Time: 07/03/19  1:35 AM   Result Value Ref Range    Sodium 139 136 - 145 mmol/L    Potassium 4.0 3.5 - 5.1 mmol/L    Chloride 104 97 - 108 mmol/L    CO2 28 21 - 32 mmol/L    Anion gap 7 5 - 15 mmol/L    Glucose 136 (H) 65 - 100 mg/dL    BUN 4 (L) 6 - 20 MG/DL    Creatinine 1.00 0.70 - 1.30 MG/DL    BUN/Creatinine ratio 4 (L) 12 - 20      GFR est AA >60 >60 ml/min/1.73m2    GFR est non-AA >60 >60 ml/min/1.73m2    Calcium 9.0 8.5 - 10.1 MG/DL   PHOSPHORUS    Collection Time: 07/03/19  1:35 AM   Result Value Ref Range    Phosphorus 3.6 2.6 - 4.7 MG/DL   CBC WITH AUTOMATED DIFF    Collection Time: 07/03/19  1:35 AM   Result Value Ref Range    WBC 6.3 4.1 - 11.1 K/uL    RBC 4.36 4.10 - 5.70 M/uL    HGB 15.2 12.1 - 17.0 g/dL    HCT 45.4 36.6 - 50.3 %    .1 (H) 80.0 - 99.0 FL    MCH 34.9 (H) 26.0 - 34.0 PG    MCHC 33.5 30.0 - 36.5 g/dL    RDW 13.2 11.5 - 14.5 %    PLATELET 090 821 - 000 K/uL    MPV 9.8 8.9 - 12.9 FL    NRBC 0.0 0  WBC    ABSOLUTE NRBC 0.00 0.00 - 0.01 K/uL    NEUTROPHILS 84 (H) 32 - 75 %    LYMPHOCYTES 12 12 - 49 %    MONOCYTES 3 (L) 5 - 13 %    EOSINOPHILS 0 0 - 7 %    BASOPHILS 0 0 - 1 %    IMMATURE GRANULOCYTES 1 (H) 0.0 - 0.5 %    ABS. NEUTROPHILS 5.2 1.8 - 8.0 K/UL    ABS. LYMPHOCYTES 0.8 0.8 - 3.5 K/UL    ABS. MONOCYTES 0.2 0.0 - 1.0 K/UL    ABS. EOSINOPHILS 0.0 0.0 - 0.4 K/UL    ABS. BASOPHILS 0.0 0.0 - 0.1 K/UL    ABS. IMM. GRANS. 0.1 (H) 0.00 - 0.04 K/UL    DF SMEAR SCANNED      PLATELET COMMENTS Large Platelets      RBC COMMENTS MACROCYTOSIS  1+               Assessment and Plan:     Principal Problem:    Crohn disease ([address] (6/29/2019)    Active Problems:    Croup (6/29/2019)      HTN (hypertension) (6/29/2019)      Abdominal pain (6/29/2019)      Stable. Clinically unchanged. Awaiting TB test results and decision on approval for Humira. Will see patient again on 7/5/2019.

## 2019-07-04 NOTE — PROGRESS NOTES
GI PROGRESS NOTE    NAME:             Sandra Samaniego   :              1960   MRN:              [id number]   Admit Date:     2019  Todays Date:  2019      Subjective:           Still with abdominal pain and diarrhea. Continues on a liquid diet. Quantiferon GOLD study still pending. Medications-reviewed     Current Facility-Administered Medications   Medication Dose Route Frequency    pantoprazole (PROTONIX) tablet 40 mg  40 mg Oral ACB    chlorzoxazone (PARAFON FORTE) tablet 500 mg  500 mg Oral QHS    loperamide (IMODIUM) capsule 4 mg  4 mg Oral PRN    methylPREDNISolone (PF) (SOLU-MEDROL) injection 30 mg  30 mg IntraVENous Q6H    oxyCODONE (ROXICODONE) 5 mg/5 mL oral solution 10 mg  10 mg Oral Q4H PRN    ondansetron (ZOFRAN) injection 4 mg  4 mg IntraVENous Q8H PRN    acetaminophen (TYLENOL) tablet 650 mg  650 mg Oral Q6H PRN    sodium chloride (NS) flush 5-40 mL  5-40 mL IntraVENous Q8H    sodium chloride (NS) flush 5-40 mL  5-40 mL IntraVENous PRN    enoxaparin (LOVENOX) injection 40 mg  40 mg SubCUTAneous Q24H    gabapentin (NEURONTIN) tablet 300 mg  300 mg Oral TID    oxyCODONE (ROXICODONE) 5 mg/5 mL oral solution 10 mg  10 mg Oral Q8H    nicotine (NICODERM CQ) 14 mg/24 hr patch 1 Patch  1 Patch TransDERmal DAILY    morphine injection 2 mg  2 mg IntraVENous Q3H PRN        Objective:     Patient Vitals for the past 8 hrs:   BP Temp Pulse Resp SpO2   19 1413 139/66 98.2 °F (36.8 °C) 62 18 98 %   19 0759 161/85 98 °F (36.7 °C) 73 19 98 %     No intake/output data recorded. No intake/output data recorded. EXAM:     NEURO-a&o   HEENT-wnl   LUNGS-clear   COR-regular rate and rhythym   ABD-soft , some diffuse tenderness, tenisha.  Left midabdomen, no rebound, good bs   EXT-no edema     Data Review     Recent Labs     19  0323 19  0135   WBC 16.4* 6.3   HGB 15.2 15.2   HCT 46.1 45.4    238     Recent Labs     19  0323 19  0135    139   K 4.0 4.0    104   CO2 24 28   BUN 8 4*   CREA 1.06 1.00   * 136*   PHOS 4.1 3.6   CA 9.2 9.0     No results for input(s): SGOT, GPT, AP, TBIL, TP, ALB, GLOB, GGT, AML, LPSE in the last 72 hours. No lab exists for component: AMYP, HLPSE                           Assessment:   1. Crohn's disease-Flare with concern for abdominal wall fistula  2. Chronic abdominal pain-largely unchanged         Principal Problem:    Crohn disease ([address] (6/29/2019)    Active Problems:    Croup (6/29/2019)      HTN (hypertension) (6/29/2019)      Abdominal pain (6/29/2019)        Plan:   1. Await Quantiferon GOLD before starting Humira  2.  Continue steroids for now

## 2019-07-04 NOTE — PROGRESS NOTES
Hospitalist Progress Note  Dinah Scheuermann, MD  Answering service: [phone] OR [phone] from in house phone        Date of Service:  2019  NAME:  Kendall Abbott  :  1960  MRN:  [id number]      Admission Summary:   63 y.o. Male PMH HTN, Cron's dx when he was 16 yo, he has had more than 80 abd Sx, last flare 4-5 years ago, he said his Dr Is Maria Guadalupe Phelan. He presented to the ED  because early this morning he had abdominal pain and he felt a  \"pop\" then he noticed fluid coming out of his belly, with a \"feces odor\". He decided to come to the ED for further eval and management. Denies N/V fevers or chills. Currently being managed for Crohn's flare. Interval history / Subjective:   Patient still having some discomfort overnight. Mostly having bowel spasms. GI and pt decided to continue liquid diet with plans for Humira. Waiting on insurance approval.   Denies any drainage from the abdomen. Assessment & Plan:     Abdominal pain secondary to suspected Crohn's flare:  - consult to GI need to plan for biologics, office now undergoing plans for approval.  - Colon/Rectal surgery also following, appreciate recs, possibly will need TPN and total bowel rest.   - Pain control  on scheduled oxycodone, PRN IV morphine, and add oxycodone for breakthrough. - Continue clear liquid diet, defer to GI and colorectal surgery regarding need for TPN and total bowel rest.   - DC IVF today   - No signs or symptoms of infection, WBC normal. No indication for antibiotics at this time. - Continue solumedrol 30mg IV every 6 hours, defer to GI if able to transition to oral steroids and dischagre. - F/U quant gold for initiation of biologic    HTN: not currently on meds, BP stable  - continue to monitor  Hypomagnesemia - monitor and replete. Code status: Full  DVT prophylaxis: Lovenox   Dispo: Pending approval of Humira, and GI clearance.       [facility] Problems  Date Reviewed: 12/22/2017          Codes Class Noted POA    Croup ICD-10-CM: J05.0  ICD-9-CM: 464.4  6/29/2019 Unknown        * (Principal) [facility] ([facility] ICD-10-CM: K50.90  ICD-9-CM: 555.9  6/29/2019 Unknown        HTN (hypertension) ICD-10-CM: I10  ICD-9-CM: 401.9  6/29/2019 Unknown        Abdominal pain ICD-10-CM: R10.9  ICD-9-CM: 789.00  6/29/2019 Unknown                Vital Signs:    Last 24hrs VS reviewed since prior progress note. Most recent are:  Visit Vitals  /66 (BP 1 Location: Right arm, BP Patient Position: At rest)   Pulse 62   Temp 98.2 °F (36.8 °C)   Resp 18   Ht 5' 11\" (1.803 m)   Wt 84.7 kg (186 lb 11.7 oz)   SpO2 98%   BMI 26.04 kg/m²       No intake or output data in the 24 hours ending 07/04/19 1418     Physical Examination:             Constitutional:  No acute distress, cooperative, pleasant    ENT:  dry mucous membranes, oropharynx benign. Neck supple,    Resp:  CTA bilaterally. No wheezing/rhonchi/rales. No accessory muscle use   CV:  Regular rhythm, normal rate, no murmurs, gallops, rubs    GI:  Soft, non distended, + tender along midline. normoactive bowel sounds, 4 cm lesion in LLQ, scant drainage noted, 3 cm lesion at RLQ, no drainage, multiple scars from abdomina surgeries noted     Musculoskeletal:  No edema, warm, 2+ pulses throughout    Neurologic:  Moves all extremities. Answers questions appropriately, responds to commands         Labs:     Recent Labs     07/04/19 0323 07/03/19  0135   WBC 16.4* 6.3   HGB 15.2 15.2   HCT 46.1 45.4    238     Recent Labs     07/04/19  0323 07/03/19  0135 07/02/19  0243    139 138   K 4.0 4.0 3.8    104 104   CO2 24 28 29   BUN 8 4* 7   CREA 1.06 1.00 1.07   * 136* 86   CA 9.2 9.0 8.8   MG 2.2 1.7  --    PHOS 4.1 3.6  --      No results for input(s): SGOT, GPT, ALT, AP, TBIL, TBILI, TP, ALB, GLOB, GGT, AML, LPSE in the last 72 hours.     No lab exists for component: AMYP, HLPSE  No results for input(s): INR, PTP, APTT in the last 72 hours. No lab exists for component: INREXT, INREXT   No results for input(s): FE, TIBC, PSAT, FERR in the last 72 hours. No results found for: FOL, RBCF   No results for input(s): PH, PCO2, PO2 in the last 72 hours. No results for input(s): CPK, CKNDX, TROIQ in the last 72 hours.     No lab exists for component: CPKMB  Lab Results   Component Value Date/Time    Cholesterol, total 134 12/01/2014 04:33 AM    HDL Cholesterol 35 12/01/2014 04:33 AM    LDL, calculated 58.2 12/01/2014 04:33 AM    Triglyceride 204 (H) 12/01/2014 04:33 AM    CHOL/HDL Ratio 3.8 12/01/2014 04:33 AM     Lab Results   Component Value Date/Time    Glucose (POC) 113 (H) 11/10/2017 11:35 AM    Glucose (POC) 120 (H) 11/10/2017 05:54 AM    Glucose (POC) 87 11/09/2017 11:43 PM    Glucose (POC) 107 (H) 11/09/2017 07:07 PM    Glucose (POC) 107 (H) 11/09/2017 11:22 AM     Lab Results   Component Value Date/Time    Color YELLOW/STRAW 06/29/2019 08:13 PM    Appearance CLEAR 06/29/2019 08:13 PM    Specific gravity >1.030 (H) 06/29/2019 08:13 PM    Specific gravity 1.028 02/14/2018 09:44 PM    pH (UA) 5.0 06/29/2019 08:13 PM    Protein NEGATIVE  06/29/2019 08:13 PM    Glucose NEGATIVE  06/29/2019 08:13 PM    Ketone NEGATIVE  06/29/2019 08:13 PM    Bilirubin NEGATIVE  06/29/2019 08:13 PM    Urobilinogen 0.2 06/29/2019 08:13 PM    Nitrites NEGATIVE  06/29/2019 08:13 PM    Leukocyte Esterase NEGATIVE  06/29/2019 08:13 PM    Epithelial cells FEW 06/29/2019 08:13 PM    Bacteria NEGATIVE  06/29/2019 08:13 PM    WBC 0-4 06/29/2019 08:13 PM    RBC 0-5 06/29/2019 08:13 PM         Medications Reviewed:     Current Facility-Administered Medications   Medication Dose Route Frequency    pantoprazole (PROTONIX) tablet 40 mg  40 mg Oral ACB    chlorzoxazone (PARAFON FORTE) tablet 500 mg  500 mg Oral QHS    loperamide (IMODIUM) capsule 4 mg  4 mg Oral PRN    methylPREDNISolone (PF) (SOLU-MEDROL) injection 30 mg  30 mg IntraVENous Q6H    oxyCODONE (ROXICODONE) 5 mg/5 mL oral solution 10 mg  10 mg Oral Q4H PRN    ondansetron (ZOFRAN) injection 4 mg  4 mg IntraVENous Q8H PRN    acetaminophen (TYLENOL) tablet 650 mg  650 mg Oral Q6H PRN    sodium chloride (NS) flush 5-40 mL  5-40 mL IntraVENous Q8H    sodium chloride (NS) flush 5-40 mL  5-40 mL IntraVENous PRN    enoxaparin (LOVENOX) injection 40 mg  40 mg SubCUTAneous Q24H    gabapentin (NEURONTIN) tablet 300 mg  300 mg Oral TID    oxyCODONE (ROXICODONE) 5 mg/5 mL oral solution 10 mg  10 mg Oral Q8H    nicotine (NICODERM CQ) 14 mg/24 hr patch 1 Patch  1 Patch TransDERmal DAILY    morphine injection 2 mg  2 mg IntraVENous Q3H PRN     ______________________________________________________________________  EXPECTED LENGTH OF STAY: 3d 12h  ACTUAL LENGTH OF STAY:          5                 Stephanie Liu MD

## 2019-07-04 NOTE — PROGRESS NOTES
Noted that when straitening up pt room that there was linen laying on the floor. Pt stated, when he noted that I discarded the linen in the linen hamper, that earlier in the day he asked a tech to change his sheets because he spilled coffee in his bed. Pt continue to state that he waited for some time for the tech to help and they never came by to change his sheets. He stated that he changed the sheets himself and he placed the linen beside the trash can so that if the tech did come by then they would know that he had change the linen. The linen was never picked up. Pt denied any needs at present time, and pleasantly enjoyed that his oxycodone liquid oral medication did not leave a bad taste in his mouth like the kind that he has at home. Bed in lowest position, call light in reach.

## 2019-07-05 LAB
ANION GAP SERPL CALC-SCNC: 7 MMOL/L (ref 5–15)
BASOPHILS # BLD: 0 K/UL (ref 0–0.1)
BASOPHILS NFR BLD: 0 % (ref 0–1)
BUN SERPL-MCNC: 14 MG/DL (ref 6–20)
BUN/CREAT SERPL: 12 (ref 12–20)
CALCIUM SERPL-MCNC: 9 MG/DL (ref 8.5–10.1)
CHLORIDE SERPL-SCNC: 101 MMOL/L (ref 97–108)
CO2 SERPL-SCNC: 32 MMOL/L (ref 21–32)
CREAT SERPL-MCNC: 1.14 MG/DL (ref 0.7–1.3)
DIFFERENTIAL METHOD BLD: ABNORMAL
EOSINOPHIL # BLD: 0 K/UL (ref 0–0.4)
EOSINOPHIL NFR BLD: 0 % (ref 0–7)
ERYTHROCYTE [DISTWIDTH] IN BLOOD BY AUTOMATED COUNT: 13.9 % (ref 11.5–14.5)
GLUCOSE SERPL-MCNC: 104 MG/DL (ref 65–100)
HCT VFR BLD AUTO: 47 % (ref 36.6–50.3)
HGB BLD-MCNC: 15.3 G/DL (ref 12.1–17)
IMM GRANULOCYTES # BLD AUTO: 0.2 K/UL (ref 0–0.04)
IMM GRANULOCYTES NFR BLD AUTO: 1 % (ref 0–0.5)
LYMPHOCYTES # BLD: 0.9 K/UL (ref 0.8–3.5)
LYMPHOCYTES NFR BLD: 6 % (ref 12–49)
MAGNESIUM SERPL-MCNC: 2.1 MG/DL (ref 1.6–2.4)
MCH RBC QN AUTO: 34.6 PG (ref 26–34)
MCHC RBC AUTO-ENTMCNC: 32.6 G/DL (ref 30–36.5)
MCV RBC AUTO: 106.3 FL (ref 80–99)
MONOCYTES # BLD: 0.4 K/UL (ref 0–1)
MONOCYTES NFR BLD: 3 % (ref 5–13)
NEUTS SEG # BLD: 13.2 K/UL (ref 1.8–8)
NEUTS SEG NFR BLD: 90 % (ref 32–75)
NRBC # BLD: 0 K/UL (ref 0–0.01)
NRBC BLD-RTO: 0 PER 100 WBC
PHOSPHATE SERPL-MCNC: 4.1 MG/DL (ref 2.6–4.7)
PLATELET # BLD AUTO: 251 K/UL (ref 150–400)
PMV BLD AUTO: 10.3 FL (ref 8.9–12.9)
POTASSIUM SERPL-SCNC: 3.7 MMOL/L (ref 3.5–5.1)
RBC # BLD AUTO: 4.42 M/UL (ref 4.1–5.7)
SODIUM SERPL-SCNC: 140 MMOL/L (ref 136–145)
WBC # BLD AUTO: 14.7 K/UL (ref 4.1–11.1)

## 2019-07-05 PROCEDURE — 84100 ASSAY OF PHOSPHORUS: CPT

## 2019-07-05 PROCEDURE — 74011250637 HC RX REV CODE- 250/637: Performed by: INTERNAL MEDICINE

## 2019-07-05 PROCEDURE — 74011250636 HC RX REV CODE- 250/636: Performed by: INTERNAL MEDICINE

## 2019-07-05 PROCEDURE — 36415 COLL VENOUS BLD VENIPUNCTURE: CPT

## 2019-07-05 PROCEDURE — 65270000029 HC RM PRIVATE

## 2019-07-05 PROCEDURE — 87177 OVA AND PARASITES SMEARS: CPT

## 2019-07-05 PROCEDURE — 85025 COMPLETE CBC W/AUTO DIFF WBC: CPT

## 2019-07-05 PROCEDURE — 87045 FECES CULTURE AEROBIC BACT: CPT

## 2019-07-05 PROCEDURE — 83735 ASSAY OF MAGNESIUM: CPT

## 2019-07-05 PROCEDURE — 74011250637 HC RX REV CODE- 250/637: Performed by: NURSE PRACTITIONER

## 2019-07-05 PROCEDURE — 80048 BASIC METABOLIC PNL TOTAL CA: CPT

## 2019-07-05 PROCEDURE — 87329 GIARDIA AG IA: CPT

## 2019-07-05 RX ORDER — IBUPROFEN 200 MG
1 TABLET ORAL EVERY 24 HOURS
Status: DISCONTINUED | OUTPATIENT
Start: 2019-07-05 | End: 2019-07-08 | Stop reason: HOSPADM

## 2019-07-05 RX ORDER — GABAPENTIN 300 MG/1
300 CAPSULE ORAL 3 TIMES DAILY
Status: DISCONTINUED | OUTPATIENT
Start: 2019-07-05 | End: 2019-07-08 | Stop reason: HOSPADM

## 2019-07-05 RX ORDER — OCTREOTIDE ACETATE 50 UG/ML
50 INJECTION, SOLUTION INTRAVENOUS; SUBCUTANEOUS 3 TIMES DAILY
Status: DISCONTINUED | OUTPATIENT
Start: 2019-07-05 | End: 2019-07-08 | Stop reason: HOSPADM

## 2019-07-05 RX ADMIN — Medication 10 MG: at 08:59

## 2019-07-05 RX ADMIN — Medication 10 MG: at 16:24

## 2019-07-05 RX ADMIN — METHYLPREDNISOLONE SODIUM SUCCINATE 30 MG: 40 INJECTION, POWDER, FOR SOLUTION INTRAMUSCULAR; INTRAVENOUS at 23:00

## 2019-07-05 RX ADMIN — OCTREOTIDE ACETATE 50 MCG: 50 INJECTION, SOLUTION INTRAVENOUS; SUBCUTANEOUS at 22:03

## 2019-07-05 RX ADMIN — GABAPENTIN 300 MG: 600 TABLET, FILM COATED ORAL at 16:24

## 2019-07-05 RX ADMIN — MORPHINE SULFATE 2 MG: 2 INJECTION, SOLUTION INTRAMUSCULAR; INTRAVENOUS at 00:55

## 2019-07-05 RX ADMIN — Medication 10 MG: at 22:05

## 2019-07-05 RX ADMIN — MORPHINE SULFATE 2 MG: 2 INJECTION, SOLUTION INTRAMUSCULAR; INTRAVENOUS at 23:00

## 2019-07-05 RX ADMIN — METHYLPREDNISOLONE SODIUM SUCCINATE 30 MG: 40 INJECTION, POWDER, FOR SOLUTION INTRAMUSCULAR; INTRAVENOUS at 12:12

## 2019-07-05 RX ADMIN — MORPHINE SULFATE 2 MG: 2 INJECTION, SOLUTION INTRAMUSCULAR; INTRAVENOUS at 18:42

## 2019-07-05 RX ADMIN — Medication 10 ML: at 22:03

## 2019-07-05 RX ADMIN — GABAPENTIN 300 MG: 600 TABLET, FILM COATED ORAL at 08:53

## 2019-07-05 RX ADMIN — GABAPENTIN 300 MG: 300 CAPSULE ORAL at 22:02

## 2019-07-05 RX ADMIN — Medication 10 MG: at 04:10

## 2019-07-05 RX ADMIN — OCTREOTIDE ACETATE 50 MCG: 50 INJECTION, SOLUTION INTRAVENOUS; SUBCUTANEOUS at 16:27

## 2019-07-05 RX ADMIN — SIMETHICONE 20 MG: 20 SUSPENSION/ DROPS ORAL at 04:07

## 2019-07-05 RX ADMIN — MORPHINE SULFATE 2 MG: 2 INJECTION, SOLUTION INTRAMUSCULAR; INTRAVENOUS at 12:20

## 2019-07-05 RX ADMIN — LOPERAMIDE HYDROCHLORIDE 4 MG: 2 CAPSULE ORAL at 07:33

## 2019-07-05 RX ADMIN — Medication 10 ML: at 07:34

## 2019-07-05 RX ADMIN — Medication 10 ML: at 00:55

## 2019-07-05 RX ADMIN — CHLORZOXAZONE 500 MG: 500 TABLET ORAL at 22:02

## 2019-07-05 RX ADMIN — Medication 10 ML: at 16:24

## 2019-07-05 RX ADMIN — MORPHINE SULFATE 2 MG: 2 INJECTION, SOLUTION INTRAMUSCULAR; INTRAVENOUS at 07:33

## 2019-07-05 RX ADMIN — METHYLPREDNISOLONE SODIUM SUCCINATE 30 MG: 40 INJECTION, POWDER, FOR SOLUTION INTRAMUSCULAR; INTRAVENOUS at 17:25

## 2019-07-05 RX ADMIN — METHYLPREDNISOLONE SODIUM SUCCINATE 30 MG: 40 INJECTION, POWDER, FOR SOLUTION INTRAMUSCULAR; INTRAVENOUS at 07:33

## 2019-07-05 RX ADMIN — PANTOPRAZOLE SODIUM 40 MG: 40 TABLET, DELAYED RELEASE ORAL at 07:33

## 2019-07-05 RX ADMIN — OCTREOTIDE ACETATE 50 MCG: 50 INJECTION, SOLUTION INTRAVENOUS; SUBCUTANEOUS at 12:52

## 2019-07-05 NOTE — PROGRESS NOTES
Patient about dose of nicotine being too low and needed it increased to 21 mg. Dr. Gary Ramey gave the new order.

## 2019-07-05 NOTE — PROGRESS NOTES
[address]  [address] [facility] [address]   [phone]                GI PROGRESS NOTE  Cori Scar, [facility]  Work Cell: [phone]      NAME:   Omi Hsu   :    1960   MRN:    [id number]     Assessment/Plan   1. Crohn's disease with likely enterocutaneous fistula - CT showing inflammation of sigmoid colon w/ close approximation of bowel to peritoneum and possible subtle fistula. Pt has minimal drainage from LLQ. He has not been on medications for quite some time due to financial constraints. Hep panel (-). Chest x-ray unremarkable.   - Clear liquids as tolerated  - Supportive management per primary team  - Steroids started   - Stool studies  - Quant TB pending   - Initiated approval process for Humira through our office - pending approval   - If Quant TB is negative, then plan to start Humira 160 mg SQ inpatient with eventual discharge to slowly resume low residue diet  - Other option would be remain NPO w/ initiate TPN and start Humira when able   - Discuss the above with pt who is reluctant to be NPO at present  - Will follow      Patient Active Problem List   Diagnosis Code    Crohn's disease ([address] K50.90    GERD (gastroesophageal reflux disease) K21.9    Hiatal hernia K44.9    B12 deficiency E53.8    Chronic pain G89.29    Short bowel syndrome K91.2    Back pain, chronic M54.9, G89.29    Incisional hernia, without obstruction or gangrene K43.2    Intussusception of intestine ([address] K56.1    Bowel obstruction ([address] K56.609    Abdominal wound dehiscence T81.30XA    Croup J05.0    Crohn disease ([address] K50.90    HTN (hypertension) I10    Abdominal pain R10.9       Subjective:     Reports on-going abdominal pain and diarrhea. Tolerating clears. LLQ site with minimal drainage. States he had been on Octreotide TID in past for diarrhea. Objective:     VITALS:   Last 24hrs VS reviewed since prior hospitalist progress note.  Most recent are:  Visit Vitals  /76   Pulse 60   Temp 98.3 °F (36.8 °C)   Resp 18   Ht 5' 11\" (1.803 m)   Wt 84.7 kg (186 lb 11.7 oz)   SpO2 100%   BMI 26.04 kg/m²       Intake/Output Summary (Last 24 hours) at 7/5/2019 1246  Last data filed at 7/5/2019 1235  Gross per 24 hour   Intake 480 ml   Output    Net 480 ml      PHYSICAL EXAM:  General   well developed, alert, in no acute distress  EENT  Normocephalic, Atraumatic, PERRLA, EOMI, sclera clear  Respiratory   Clear To Auscultation bilaterally - no wheezes, rales, rhonchi, or crackles  Cardiology  Regular Rate and Rythmn  - no murmurs, rubs or gallops  Abdominal  Soft, mildly distended, mild diffuse tenderness, multiple scars with thin skin over midline abdomen, LLQ site with minimal/no drainage, positive bowel sounds, no hepatosplenomegaly, no palpable mass  Neurological  No focal neurological deficits noted  Psychological  Oriented x 3.  Normal affect.      Lab Data   Recent Results (from the past 12 hour(s))   MAGNESIUM    Collection Time: 07/05/19  3:43 AM   Result Value Ref Range    Magnesium 2.1 1.6 - 2.4 mg/dL   METABOLIC PANEL, BASIC    Collection Time: 07/05/19  3:43 AM   Result Value Ref Range    Sodium 140 136 - 145 mmol/L    Potassium 3.7 3.5 - 5.1 mmol/L    Chloride 101 97 - 108 mmol/L    CO2 32 21 - 32 mmol/L    Anion gap 7 5 - 15 mmol/L    Glucose 104 (H) 65 - 100 mg/dL    BUN 14 6 - 20 MG/DL    Creatinine 1.14 0.70 - 1.30 MG/DL    BUN/Creatinine ratio 12 12 - 20      GFR est AA >60 >60 ml/min/1.73m2    GFR est non-AA >60 >60 ml/min/1.73m2    Calcium 9.0 8.5 - 10.1 MG/DL   PHOSPHORUS    Collection Time: 07/05/19  3:43 AM   Result Value Ref Range    Phosphorus 4.1 2.6 - 4.7 MG/DL         Medications: Reviewed    PMH/SH reviewed - no change compared to H&P  Mid-Level Provider: Radha Hahn NP   Date/Time:  7/5/2019

## 2019-07-05 NOTE — PROGRESS NOTES
NUTRITION brief       Diet: clear liquids  Supplements: Ensure Clear TID - will discontinue since not drinking    Pt visited for PO check. Remains on clears liquids per GI recommendations with authorization for Humira pending. Pt had gotten voicemail from insurance reporting approval but GI notes office has yet to receive work of this - pt to follow up. Spoke with GI and MD who report pt refusing TPN despite recommendations. This writer discussion RD recommendations for TPN as well since does not like Ensure Clear and minimal nutrition in clear liquid diet. Pt does like Boost Glucose Control but only available if able to advance to full liquids. If pt agreeable to PICC with TPN over the weekend recommend goal would be: 5.5%AA D20 @ 83 mL/hr + 20% lipids, 250 mL 3x/week. Start with 42ml/hr for 1st day and advance slowly to allow for electrolyte correction since at refeeding risk. Goal will provide a daily average of 2007 kcal, 110 g protein and 1992 mL (2242 mL on lipid days)-- meeting 91% and 99% of estimated kcal and protein needs, respectively. Will continue to follow. Estimated Nutrition Needs:   Kcals/day: 2196 Kcals/day(4972-4697 kcal/day (MSJ x 1.3-1.4))  Protein: 111 g(1.3 g/kg)  Fluid: (1 mL/kcal)  Based On:  Dwight Toscano  Weight Used: Actual wt(84.7 kg)    Zoë Espinal, RD [address] [place] , Pager #[phone] or [phone]

## 2019-07-05 NOTE — PROGRESS NOTES
Hospitalist Progress Note  Bhavya Abreu MD  Answering service: [phone] from in house phone        Date of Service:  2019  NAME:  Nikolas Olvera  :  1960  MRN:  [id number]      Admission Summary:   63 y.o. 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 He presented to the ED  because early this morning he had abdominal pain and he felt a  \"pop\" then he noticed fluid coming out of his belly, with a \"feces odor\". He decided to come to the ED for further eval and management. Denies N/V fevers or chills. Currently being managed for Crohn's flare. Interval history / Subjective:   Still with some cramping, and increased diarrhea. Awaiting TB test to come back prior to starting humira. Reports some drainage from the fistula   Assessment & Plan:     Abdominal pain secondary to suspected Crohn's flare:  - consult to GI need to plan for biologics, office now undergoing plans for approval.  - Colon/Rectal surgery also following, appreciate recs, possibly will need TPN and total bowel rest.   - Pain control  on scheduled oxycodone, PRN IV morphine, and add oxycodone for breakthrough. - Continue clear liquid diet, patient does not want to try TPN.  - No signs or symptoms of infection, WBC normal. No indication for antibiotics at this time. - Continue solumedrol 30mg IV every 6 hours, defer to GI if able to transition to oral steroids and dischagre. - F/U quant gold for initiation of biologic  - Add octreotide subq today     HTN: not currently on meds, BP stable  - continue to monitor  Hypomagnesemia - monitor and replete. Code status: Full  DVT prophylaxis: Lovenox   Dispo: Pending approval of Humira, and GI clearance.       Hospital Problems  Date Reviewed: 2017          Codes Class Noted POA    Croup ICD-10-CM: J05.0  ICD-9-CM: 464.4  6/29/2019 Unknown        * (Principal) Crohn disease ([facility] [address] ICD-10-CM: K50.90  ICD-9-CM: 555.9  6/29/2019 Unknown        HTN (hypertension) ICD-10-CM: I10  ICD-9-CM: 401.9  6/29/2019 Unknown        Abdominal pain ICD-10-CM: R10.9  ICD-9-CM: 789.00  6/29/2019 Unknown                Vital Signs:    Last 24hrs VS reviewed since prior progress note. Most recent are:  Visit Vitals  /76   Pulse 60   Temp 98.3 °F (36.8 °C)   Resp 18   Ht 5' 11\" (1.803 m)   Wt 84.7 kg (186 lb 11.7 oz)   SpO2 100%   BMI 26.04 kg/m²         Intake/Output Summary (Last 24 hours) at 7/5/2019 1630  Last data filed at 7/5/2019 1235  Gross per 24 hour   Intake 480 ml   Output    Net 480 ml        Physical Examination:             Constitutional:  No acute distress, cooperative, pleasant    ENT:  dry mucous membranes, oropharynx benign. Neck supple,    Resp:  CTA bilaterally. No wheezing/rhonchi/rales. No accessory muscle use    CV:  Regular rhythm, normal rate, no murmurs, gallops, rubs     GI:  Soft, non distended, + tender along midline. normoactive bowel sounds, 4 cm lesion in LLQ, scant drainage noted, 3 cm lesion at RLQ, no drainage, multiple scars from abdomina surgeries noted     Musculoskeletal:  No edema, warm, 2+ pulses throughout     Neurologic:  Moves all extremities. Answers questions appropriately, responds to commands          Labs:     Recent Labs     07/04/19  0323 07/03/19  0135   WBC 16.4* 6.3   HGB 15.2 15.2   HCT 46.1 45.4    238     Recent Labs     07/05/19  0343 07/04/19  0323 07/03/19  0135    136 139   K 3.7 4.0 4.0    103 104   CO2 32 24 28   BUN 14 8 4*   CREA 1.14 1.06 1.00   * 119* 136*   CA 9.0 9.2 9.0   MG 2.1 2.2 1.7   PHOS 4.1 4.1 3.6     No results for input(s): SGOT, GPT, ALT, AP, TBIL, TBILI, TP, ALB, GLOB, GGT, AML, LPSE in the last 72 hours. No lab exists for component: AMYP, HLPSE  No results for input(s): INR, PTP, APTT in the last 72 hours.     No lab exists for component: INREXT, INREXT   No results for input(s): FE, TIBC, PSAT, FERR in the last 72 hours. No results found for: FOL, RBCF   No results for input(s): PH, PCO2, PO2 in the last 72 hours. No results for input(s): CPK, CKNDX, TROIQ in the last 72 hours.     No lab exists for component: CPKMB  Lab Results   Component Value Date/Time    Cholesterol, total 134 12/01/2014 04:33 AM    HDL Cholesterol 35 12/01/2014 04:33 AM    LDL, calculated 58.2 12/01/2014 04:33 AM    Triglyceride 204 (H) 12/01/2014 04:33 AM    CHOL/HDL Ratio 3.8 12/01/2014 04:33 AM     Lab Results   Component Value Date/Time    Glucose (POC) 113 (H) 11/10/2017 11:35 AM    Glucose (POC) 120 (H) 11/10/2017 05:54 AM    Glucose (POC) 87 11/09/2017 11:43 PM    Glucose (POC) 107 (H) 11/09/2017 07:07 PM    Glucose (POC) 107 (H) 11/09/2017 11:22 AM     Lab Results   Component Value Date/Time    Color YELLOW/STRAW 06/29/2019 08:13 PM    Appearance CLEAR 06/29/2019 08:13 PM    Specific gravity >1.030 (H) 06/29/2019 08:13 PM    Specific gravity 1.028 02/14/2018 09:44 PM    pH (UA) 5.0 06/29/2019 08:13 PM    Protein NEGATIVE  06/29/2019 08:13 PM    Glucose NEGATIVE  06/29/2019 08:13 PM    Ketone NEGATIVE  06/29/2019 08:13 PM    Bilirubin NEGATIVE  06/29/2019 08:13 PM    Urobilinogen 0.2 06/29/2019 08:13 PM    Nitrites NEGATIVE  06/29/2019 08:13 PM    Leukocyte Esterase NEGATIVE  06/29/2019 08:13 PM    Epithelial cells FEW 06/29/2019 08:13 PM    Bacteria NEGATIVE  06/29/2019 08:13 PM    WBC 0-4 06/29/2019 08:13 PM    RBC 0-5 06/29/2019 08:13 PM         Medications Reviewed:     Current Facility-Administered Medications   Medication Dose Route Frequency    octreotide (SANDOSTATIN) injection 50 mcg  50 mcg IntraVENous TID    pantoprazole (PROTONIX) tablet 40 mg  40 mg Oral ACB    simethicone (MYLICON) 83MJ/3.2MZ oral drops 20 mg  20 mg Oral QID PRN    chlorzoxazone (PARAFON FORTE) tablet 500 mg  500 mg Oral QHS    loperamide (IMODIUM) capsule 4 mg  4 mg Oral PRN    methylPREDNISolone (PF) (SOLU-MEDROL) injection 30 mg  30 mg IntraVENous Q6H    oxyCODONE (ROXICODONE) 5 mg/5 mL oral solution 10 mg  10 mg Oral Q4H PRN    ondansetron (ZOFRAN) injection 4 mg  4 mg IntraVENous Q8H PRN    acetaminophen (TYLENOL) tablet 650 mg  650 mg Oral Q6H PRN    sodium chloride (NS) flush 5-40 mL  5-40 mL IntraVENous Q8H    sodium chloride (NS) flush 5-40 mL  5-40 mL IntraVENous PRN    enoxaparin (LOVENOX) injection 40 mg  40 mg SubCUTAneous Q24H    gabapentin (NEURONTIN) tablet 300 mg  300 mg Oral TID    oxyCODONE (ROXICODONE) 5 mg/5 mL oral solution 10 mg  10 mg Oral Q8H    nicotine (NICODERM CQ) 14 mg/24 hr patch 1 Patch  1 Patch TransDERmal DAILY    morphine injection 2 mg  2 mg IntraVENous Q3H PRN     ______________________________________________________________________  EXPECTED LENGTH OF STAY: 3d 12h  ACTUAL LENGTH OF STAY:          6                 Liseth Coates MD

## 2019-07-05 NOTE — PROGRESS NOTES
General Daily Progress Note    Admission Date: 6/29/2019  Hospital Day 6  Post-Op Day Not Applicable  Subjective:   No change in symptoms. Doesn't seem to be able to tolerate the Ensure Clear secondary to diarrhea. Objective:     Patient Vitals for the past 24 hrs:   BP Temp Pulse Resp SpO2   07/05/19 0840 150/76 98.3 °F (36.8 °C) 60 18 100 %   07/05/19 0337 132/75 98.5 °F (36.9 °C) (!) 57 18 97 %   07/04/19 2021 127/73 98.2 °F (36.8 °C) (!) 55 18 96 %     07/05 0701 - 07/05 1900  In: 480 [P.O.:480]  Out: -   No intake/output data recorded. Physical Examination:  No distress.         Data Review   Recent Results (from the past 24 hour(s))   MAGNESIUM    Collection Time: 07/05/19  3:43 AM   Result Value Ref Range    Magnesium 2.1 1.6 - 2.4 mg/dL   METABOLIC PANEL, BASIC    Collection Time: 07/05/19  3:43 AM   Result Value Ref Range    Sodium 140 136 - 145 mmol/L    Potassium 3.7 3.5 - 5.1 mmol/L    Chloride 101 97 - 108 mmol/L    CO2 32 21 - 32 mmol/L    Anion gap 7 5 - 15 mmol/L    Glucose 104 (H) 65 - 100 mg/dL    BUN 14 6 - 20 MG/DL    Creatinine 1.14 0.70 - 1.30 MG/DL    BUN/Creatinine ratio 12 12 - 20      GFR est AA >60 >60 ml/min/1.73m2    GFR est non-AA >60 >60 ml/min/1.73m2    Calcium 9.0 8.5 - 10.1 MG/DL   PHOSPHORUS    Collection Time: 07/05/19  3:43 AM   Result Value Ref Range    Phosphorus 4.1 2.6 - 4.7 MG/DL   CBC WITH AUTOMATED DIFF    Collection Time: 07/05/19  4:26 PM   Result Value Ref Range    WBC 14.7 (H) 4.1 - 11.1 K/uL    RBC 4.42 4.10 - 5.70 M/uL    HGB 15.3 12.1 - 17.0 g/dL    HCT 47.0 36.6 - 50.3 %    .3 (H) 80.0 - 99.0 FL    MCH 34.6 (H) 26.0 - 34.0 PG    MCHC 32.6 30.0 - 36.5 g/dL    RDW 13.9 11.5 - 14.5 %    PLATELET 684 265 - 287 K/uL    MPV 10.3 8.9 - 12.9 FL    NRBC 0.0 0  WBC    ABSOLUTE NRBC 0.00 0.00 - 0.01 K/uL    NEUTROPHILS 90 (H) 32 - 75 %    LYMPHOCYTES 6 (L) 12 - 49 %    MONOCYTES 3 (L) 5 - 13 %    EOSINOPHILS 0 0 - 7 %    BASOPHILS 0 0 - 1 %    IMMATURE GRANULOCYTES 1 (H) 0.0 - 0.5 %    ABS. NEUTROPHILS 13.2 (H) 1.8 - 8.0 K/UL    ABS. LYMPHOCYTES 0.9 0.8 - 3.5 K/UL    ABS. MONOCYTES 0.4 0.0 - 1.0 K/UL    ABS. EOSINOPHILS 0.0 0.0 - 0.4 K/UL    ABS. BASOPHILS 0.0 0.0 - 0.1 K/UL    ABS. IMM. GRANS. 0.2 (H) 0.00 - 0.04 K/UL    DF AUTOMATED             Assessment and Plan:     Principal Problem:    Crohn disease ([facility] [address] (6/29/2019)    Active Problems:    Croup (6/29/2019)      HTN (hypertension) (6/29/2019)      Abdominal pain (6/29/2019)      Stable. Symptoms unchanged. WBC now elevated, but slightly less than it was yesterday. Possibly secondary to steroids. Humira reportedly approved. Awaiting TB test results.

## 2019-07-06 LAB
ANION GAP SERPL CALC-SCNC: 7 MMOL/L (ref 5–15)
BUN SERPL-MCNC: 16 MG/DL (ref 6–20)
BUN/CREAT SERPL: 13 (ref 12–20)
CALCIUM SERPL-MCNC: 8.7 MG/DL (ref 8.5–10.1)
CHLORIDE SERPL-SCNC: 99 MMOL/L (ref 97–108)
CO2 SERPL-SCNC: 32 MMOL/L (ref 21–32)
CREAT SERPL-MCNC: 1.26 MG/DL (ref 0.7–1.3)
CRYPTOSP AG STL QL: NEGATIVE
G LAMBLIA AG STL QL: NEGATIVE
GLUCOSE SERPL-MCNC: 115 MG/DL (ref 65–100)
MAGNESIUM SERPL-MCNC: 2.2 MG/DL (ref 1.6–2.4)
PHOSPHATE SERPL-MCNC: 4 MG/DL (ref 2.6–4.7)
POTASSIUM SERPL-SCNC: 3.8 MMOL/L (ref 3.5–5.1)
SODIUM SERPL-SCNC: 138 MMOL/L (ref 136–145)

## 2019-07-06 PROCEDURE — 83735 ASSAY OF MAGNESIUM: CPT

## 2019-07-06 PROCEDURE — 74011250637 HC RX REV CODE- 250/637: Performed by: INTERNAL MEDICINE

## 2019-07-06 PROCEDURE — 74011250636 HC RX REV CODE- 250/636: Performed by: INTERNAL MEDICINE

## 2019-07-06 PROCEDURE — 65270000029 HC RM PRIVATE

## 2019-07-06 PROCEDURE — 36415 COLL VENOUS BLD VENIPUNCTURE: CPT

## 2019-07-06 PROCEDURE — 80048 BASIC METABOLIC PNL TOTAL CA: CPT

## 2019-07-06 PROCEDURE — 84100 ASSAY OF PHOSPHORUS: CPT

## 2019-07-06 RX ORDER — OXYCODONE HCL 5 MG/5 ML
10 SOLUTION, ORAL ORAL
Status: DISCONTINUED | OUTPATIENT
Start: 2019-07-06 | End: 2019-07-08 | Stop reason: HOSPADM

## 2019-07-06 RX ORDER — MORPHINE SULFATE 2 MG/ML
2 INJECTION, SOLUTION INTRAMUSCULAR; INTRAVENOUS
Status: DISCONTINUED | OUTPATIENT
Start: 2019-07-06 | End: 2019-07-07

## 2019-07-06 RX ADMIN — CHLORZOXAZONE 500 MG: 500 TABLET ORAL at 21:28

## 2019-07-06 RX ADMIN — METHYLPREDNISOLONE SODIUM SUCCINATE 30 MG: 40 INJECTION, POWDER, FOR SOLUTION INTRAMUSCULAR; INTRAVENOUS at 11:32

## 2019-07-06 RX ADMIN — GABAPENTIN 300 MG: 300 CAPSULE ORAL at 14:45

## 2019-07-06 RX ADMIN — OCTREOTIDE ACETATE 50 MCG: 50 INJECTION, SOLUTION INTRAVENOUS; SUBCUTANEOUS at 21:28

## 2019-07-06 RX ADMIN — METHYLPREDNISOLONE SODIUM SUCCINATE 30 MG: 40 INJECTION, POWDER, FOR SOLUTION INTRAMUSCULAR; INTRAVENOUS at 23:09

## 2019-07-06 RX ADMIN — METHYLPREDNISOLONE SODIUM SUCCINATE 30 MG: 40 INJECTION, POWDER, FOR SOLUTION INTRAMUSCULAR; INTRAVENOUS at 06:24

## 2019-07-06 RX ADMIN — Medication 10 MG: at 06:24

## 2019-07-06 RX ADMIN — OCTREOTIDE ACETATE 50 MCG: 50 INJECTION, SOLUTION INTRAVENOUS; SUBCUTANEOUS at 16:52

## 2019-07-06 RX ADMIN — Medication 10 ML: at 21:29

## 2019-07-06 RX ADMIN — MORPHINE SULFATE 2 MG: 2 INJECTION, SOLUTION INTRAMUSCULAR; INTRAVENOUS at 08:15

## 2019-07-06 RX ADMIN — Medication 10 MG: at 14:45

## 2019-07-06 RX ADMIN — GABAPENTIN 300 MG: 300 CAPSULE ORAL at 08:10

## 2019-07-06 RX ADMIN — Medication 10 MG: at 02:42

## 2019-07-06 RX ADMIN — METHYLPREDNISOLONE SODIUM SUCCINATE 30 MG: 40 INJECTION, POWDER, FOR SOLUTION INTRAMUSCULAR; INTRAVENOUS at 16:48

## 2019-07-06 RX ADMIN — Medication 10 ML: at 11:33

## 2019-07-06 RX ADMIN — OCTREOTIDE ACETATE 50 MCG: 50 INJECTION, SOLUTION INTRAVENOUS; SUBCUTANEOUS at 08:16

## 2019-07-06 RX ADMIN — Medication 10 MG: at 23:09

## 2019-07-06 RX ADMIN — Medication 10 MG: at 11:40

## 2019-07-06 RX ADMIN — GABAPENTIN 300 MG: 300 CAPSULE ORAL at 21:28

## 2019-07-06 RX ADMIN — PANTOPRAZOLE SODIUM 40 MG: 40 TABLET, DELAYED RELEASE ORAL at 06:25

## 2019-07-06 NOTE — PROGRESS NOTES
[address]   [address] [place]  [place], [address]  [phone]        GI PROGRESS NOTE        NAME:   Maia Rodriguez   :    1960   MRN:    [id number]     Assessment/Plan   1. Crohn's disease with likely enterocutaneous fistula - CT showing inflammation of sigmoid colon w/ close approximation of bowel to peritoneum and possible subtle fistula. Pt has minimal drainage from LLQ. He has not been on medications for quite some time due to financial constraints. Hep panel (-). Chest x-ray unremarkable.   - Clear liquids as tolerated. Told to reduce coffee intake. He agrees. - Supportive management per primary team  - Steroids started   - Stool studies  - Quant TB still pending   - Initiated approval process for Humira through our office - pending approval   - If Quant TB is negative, then plan to start Humira 160 mg SQ inpatient with eventual discharge to slowly resume low residue diet  - Dr. Elda Grimes to follow on Monday     Patient Active Problem List   Diagnosis Code    Crohn's disease (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 [address] K50.90    HTN (hypertension) I10    Abdominal pain R10.9       Subjective:     Reports on-going abdominal pain and diarrhea. Diarrhea decreased. Tolerating clears. LLQ site with minimal drainage. States he had been on Octreotide TID in past for diarrhea. Objective:     VITALS:   Last 24hrs VS reviewed since prior hospitalist progress note.  Most recent are:  Visit Vitals  /89 (BP 1 Location: Right arm, BP Patient Position: At rest)   Pulse (!) 51   Temp 98.1 °F (36.7 °C) Resp 17   Ht 5' 11\" (1.803 m)   Wt 84.7 kg (186 lb 11.7 oz)   SpO2 98%   BMI 26.04 kg/m²       Intake/Output Summary (Last 24 hours) at 7/6/2019 0834  Last data filed at 7/6/2019 4592  Gross per 24 hour   Intake 3907 ml   Output    Net 3907 ml      PHYSICAL EXAM:  General   well developed, alert, in no acute distress  EENT  Normocephalic, Atraumatic, PERRLA, EOMI, sclera clear  Respiratory   Clear To Auscultation bilaterally - no wheezes, rales, rhonchi, or crackles  Cardiology  Regular Rate and Rythmn  - no murmurs, rubs or gallops  Abdominal  Soft, mildly distended, mild diffuse tenderness, multiple scars with thin skin over midline abdomen, LLQ site with minimal/no drainage, positive bowel sounds, no hepatosplenomegaly, no palpable mass     Lab Data   Recent Results (from the past 12 hour(s))   CRYPTOSPOR/GIARDIA AG    Collection Time: 07/05/19 11:03 PM   Result Value Ref Range    Giardia Ag NEGATIVE  NEG      Cryptosporidium Ag NEGATIVE      MAGNESIUM    Collection Time: 07/06/19  2:14 AM   Result Value Ref Range    Magnesium 2.2 1.6 - 2.4 mg/dL   PHOSPHORUS    Collection Time: 07/06/19  2:14 AM   Result Value Ref Range    Phosphorus 4.0 2.6 - 4.7 MG/DL   METABOLIC PANEL, BASIC    Collection Time: 07/06/19  2:14 AM   Result Value Ref Range    Sodium 138 136 - 145 mmol/L    Potassium 3.8 3.5 - 5.1 mmol/L    Chloride 99 97 - 108 mmol/L    CO2 32 21 - 32 mmol/L    Anion gap 7 5 - 15 mmol/L    Glucose 115 (H) 65 - 100 mg/dL    BUN 16 6 - 20 MG/DL    Creatinine 1.26 0.70 - 1.30 MG/DL    BUN/Creatinine ratio 13 12 - 20      GFR est AA >60 >60 ml/min/1.73m2    GFR est non-AA 59 (L) >60 ml/min/1.73m2    Calcium 8.7 8.5 - 10.1 MG/DL         Medications: Reviewed    PMH/ reviewed - no change compared to H&P  Mid-Level Provider: Henrry Hansen MD   Date/Time:  7/6/2019

## 2019-07-06 NOTE — PROGRESS NOTES
Hospitalist Progress Note  Veena Spence MD  Answering service: [phone] OR [phone] from in house phone        Date of Service:  2019  NAME:  Bhavesh Suarez  :  1960  MRN:  [id number]      Admission Summary:   63 y.o. 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 He presented to the ED  because early this morning he had abdominal pain and he felt a  \"pop\" then he noticed fluid coming out of his belly, with a \"feces odor\". He decided to come to the ED for further eval and management. Denies N/V fevers or chills. Currently being managed for Crohn's flare. Interval history / Subjective:   Cramping ongoing, recieivng all pain medications that are avaialble to him. Improved diarrhea with somatostatin        Assessment & Plan:     Abdominal pain secondary to suspected Crohn's flare:  - consult to GI need to plan for biologics, office now undergoing plans for approval.  - Colon/Rectal surgery also following, appreciate recs, possibly will need TPN and total bowel rest.   - Pain control  on scheduled oxycodone, PRN IV morphine, and oxycodone for breakthrough. Will space out IV morphine and PRN oxycodone  - Continue clear liquid diet, patient does not want to try TPN.  - No signs or symptoms of infection, WBC normal on admission. No indication for antibiotics at this time. WBC increased after steroids were started, suspect secondary to that. - Continue solumedrol 30mg IV every 6 hours, defer to GI if able to transition to oral steroids and dischagre. - F/U quant gold for initiation of biologic  - Continue octreotide subq while inpatient. HTN: not currently on meds, BP stable  - continue to monitor  Hypomagnesemia - monitor and replete.    Nicotine dependence -  cessation and use patch PRN    Code status: Full  DVT prophylaxis: Lovenox [facility], and GI clearance. Hospital Problems  Date Reviewed: 12/22/2017          Codes Class Noted POA    Croup ICD-10-CM: J05.0  ICD-9-CM: 464.4  6/29/2019 Unknown        * (Principal) Crohn disease ([facility] [address] ICD-10-CM: K50.90  ICD-9-CM: 555.9  6/29/2019 Unknown        HTN (hypertension) ICD-10-CM: I10  ICD-9-CM: 401.9  6/29/2019 Unknown        Abdominal pain ICD-10-CM: R10.9  ICD-9-CM: 789.00  6/29/2019 Unknown                Vital Signs:    Last 24hrs VS reviewed since prior progress note. Most recent are:  Visit Vitals  BP (!) 165/93 (BP 1 Location: Right arm, BP Patient Position: At rest)   Pulse 62   Temp 98.4 °F (36.9 °C)   Resp 18   Ht 5' 11\" (1.803 m)   Wt 84.7 kg (186 lb 11.7 oz)   SpO2 98%   BMI 26.04 kg/m²         Intake/Output Summary (Last 24 hours) at 7/6/2019 1536  Last data filed at 7/6/2019 8821  Gross per 24 hour   Intake 3427 ml   Output    Net 3427 ml        Physical Examination:             Constitutional:  No acute distress, cooperative, pleasant    ENT:  dry mucous membranes, oropharynx benign. Neck supple,    Resp:  CTA bilaterally. No wheezing/rhonchi/rales. No accessory muscle use    CV:  Regular rhythm, normal rate, no murmurs, gallops, rubs     GI:  Soft, non distended, + tender along midline. normoactive bowel sounds, 4 cm lesion in LLQ, scant drainage noted, 3 cm lesion at RLQ, no drainage, multiple scars from abdomina surgeries noted     Musculoskeletal:  No edema, warm, 2+ pulses throughout     Neurologic:  Moves all extremities.   Answers questions appropriately, responds to commands          Labs:     Recent Labs     07/05/19  1626 07/04/19  0323   WBC 14.7* 16.4*   HGB 15.3 15.2   HCT 47.0 46.1    246     Recent Labs     07/06/19  0214 07/05/19  0343 07/04/19  0323    140 136   K 3.8 3.7 4.0   CL 99 101 103   CO2 32 32 24   BUN 16 14 8   CREA 1.26 1.14 1.06   * 104* 119*   CA 8.7 9.0 9.2   MG 2.2 2.1 2.2   PHOS 4.0 4.1 4.1     No results for input(s): SGOT, GPT, ALT, AP, TBIL, TBILI, TP, ALB, GLOB, GGT, AML, LPSE in the last 72 hours. No lab exists for component: AMYP, HLPSE  No results for input(s): INR, PTP, APTT in the last 72 hours. No lab exists for component: INREXT, INREXT   No results for input(s): FE, TIBC, PSAT, FERR in the last 72 hours. No results found for: FOL, RBCF   No results for input(s): PH, PCO2, PO2 in the last 72 hours. No results for input(s): CPK, CKNDX, TROIQ in the last 72 hours.     No lab exists for component: CPKMB  Lab Results   Component Value Date/Time    Cholesterol, total 134 12/01/2014 04:33 AM    HDL Cholesterol 35 12/01/2014 04:33 AM    LDL, calculated 58.2 12/01/2014 04:33 AM    Triglyceride 204 (H) 12/01/2014 04:33 AM    CHOL/HDL Ratio 3.8 12/01/2014 04:33 AM     Lab Results   Component Value Date/Time    Glucose (POC) 113 (H) 11/10/2017 11:35 AM    Glucose (POC) 120 (H) 11/10/2017 05:54 AM    Glucose (POC) 87 11/09/2017 11:43 PM    Glucose (POC) 107 (H) 11/09/2017 07:07 PM    Glucose (POC) 107 (H) 11/09/2017 11:22 AM     Lab Results   Component Value Date/Time    Color YELLOW/STRAW 06/29/2019 08:13 PM    Appearance CLEAR 06/29/2019 08:13 PM    Specific gravity >1.030 (H) 06/29/2019 08:13 PM    Specific gravity 1.028 02/14/2018 09:44 PM    pH (UA) 5.0 06/29/2019 08:13 PM    Protein NEGATIVE  06/29/2019 08:13 PM    Glucose NEGATIVE  06/29/2019 08:13 PM    Ketone NEGATIVE  06/29/2019 08:13 PM    Bilirubin NEGATIVE  06/29/2019 08:13 PM    Urobilinogen 0.2 06/29/2019 08:13 PM    Nitrites NEGATIVE  06/29/2019 08:13 PM    Leukocyte Esterase NEGATIVE  06/29/2019 08:13 PM    Epithelial cells FEW 06/29/2019 08:13 PM    Bacteria NEGATIVE  06/29/2019 08:13 PM    WBC 0-4 06/29/2019 08:13 PM    RBC 0-5 06/29/2019 08:13 PM         Medications Reviewed:     Current Facility-Administered Medications   Medication Dose Route Frequency    morphine injection 2 mg  2 mg IntraVENous Q4H PRN    oxyCODONE (ROXICODONE) 5 mg/5 mL oral solution 10 mg  10 mg Oral Q6H PRN    octreotide (SANDOSTATIN) injection 50 mcg  50 mcg IntraVENous TID    gabapentin (NEURONTIN) capsule 300 mg  300 mg Oral TID    nicotine (NICODERM CQ) 21 mg/24 hr patch 1 Patch  1 Patch TransDERmal Q24H    pantoprazole (PROTONIX) tablet 40 mg  40 mg Oral ACB    simethicone (MYLICON) 91TQ/4.5XZ oral drops 20 mg  20 mg Oral QID PRN    chlorzoxazone (PARAFON FORTE) tablet 500 mg  500 mg Oral QHS    loperamide (IMODIUM) capsule 4 mg  4 mg Oral PRN    methylPREDNISolone (PF) (SOLU-MEDROL) injection 30 mg  30 mg IntraVENous Q6H    ondansetron (ZOFRAN) injection 4 mg  4 mg IntraVENous Q8H PRN    acetaminophen (TYLENOL) tablet 650 mg  650 mg Oral Q6H PRN    sodium chloride (NS) flush 5-40 mL  5-40 mL IntraVENous Q8H    sodium chloride (NS) flush 5-40 mL  5-40 mL IntraVENous PRN    enoxaparin (LOVENOX) injection 40 mg  40 mg SubCUTAneous Q24H    oxyCODONE (ROXICODONE) 5 mg/5 mL oral solution 10 mg  10 mg Oral Q8H     ______________________________________________________________________  EXPECTED LENGTH OF STAY: 3d 12h  ACTUAL LENGTH OF STAY:          7                 Randal Leung MD

## 2019-07-06 NOTE — PROGRESS NOTES
Problem: Falls - Risk of  Goal: *Absence of Falls  Description  Document Millicentmarie Rileys Fall Risk and appropriate interventions in the flowsheet. 7/6/2019 0057 by Kristopher Butler RN  Outcome: Progressing Towards Goal  7/6/2019 0056 by Kristopher Butler RN  Outcome: Progressing Towards Goal     Problem: Pressure Injury - Risk of  Goal: *Prevention of pressure injury  Description  Document Leonardo Scale and appropriate interventions in the flowsheet.   7/6/2019 0057 by Kristopher Butler RN  Outcome: Progressing Towards Goal  7/6/2019 0056 by Kristopher Butler RN  Outcome: Progressing Towards Goal

## 2019-07-07 LAB
ANION GAP SERPL CALC-SCNC: 7 MMOL/L (ref 5–15)
BACTERIA SPEC CULT: NORMAL
BUN SERPL-MCNC: 22 MG/DL (ref 6–20)
BUN/CREAT SERPL: 18 (ref 12–20)
C JEJUNI+C COLI AG STL QL: NEGATIVE
CALCIUM SERPL-MCNC: 8.7 MG/DL (ref 8.5–10.1)
CHLORIDE SERPL-SCNC: 102 MMOL/L (ref 97–108)
CO2 SERPL-SCNC: 31 MMOL/L (ref 21–32)
CREAT SERPL-MCNC: 1.23 MG/DL (ref 0.7–1.3)
E COLI SXT1+2 STL IA: NEGATIVE
GLUCOSE SERPL-MCNC: 104 MG/DL (ref 65–100)
M TB IFN-G BLD-IMP: NEGATIVE
POTASSIUM SERPL-SCNC: 3.9 MMOL/L (ref 3.5–5.1)
QUANTIFERON CRITERIA, QFI1T: NORMAL
QUANTIFERON MITOGEN VALUE: >10 IU/ML
QUANTIFERON NIL VALUE: 0.16 IU/ML
QUANTIFERON TB1 AG: 0.12 IU/ML
QUANTIFERON TB2 AG: 0.1 IU/ML
SERVICE CMNT-IMP: NORMAL
SODIUM SERPL-SCNC: 140 MMOL/L (ref 136–145)

## 2019-07-07 PROCEDURE — 74011250636 HC RX REV CODE- 250/636: Performed by: INTERNAL MEDICINE

## 2019-07-07 PROCEDURE — 36415 COLL VENOUS BLD VENIPUNCTURE: CPT

## 2019-07-07 PROCEDURE — 74011250637 HC RX REV CODE- 250/637: Performed by: INTERNAL MEDICINE

## 2019-07-07 PROCEDURE — 80048 BASIC METABOLIC PNL TOTAL CA: CPT

## 2019-07-07 PROCEDURE — 65270000029 HC RM PRIVATE

## 2019-07-07 RX ORDER — SODIUM CHLORIDE 9 MG/ML
125 INJECTION, SOLUTION INTRAVENOUS CONTINUOUS
Status: DISCONTINUED | OUTPATIENT
Start: 2019-07-07 | End: 2019-07-08 | Stop reason: HOSPADM

## 2019-07-07 RX ORDER — MORPHINE SULFATE 2 MG/ML
2 INJECTION, SOLUTION INTRAMUSCULAR; INTRAVENOUS
Status: DISCONTINUED | OUTPATIENT
Start: 2019-07-07 | End: 2019-07-08 | Stop reason: HOSPADM

## 2019-07-07 RX ADMIN — SODIUM CHLORIDE 125 ML/HR: 900 INJECTION, SOLUTION INTRAVENOUS at 08:21

## 2019-07-07 RX ADMIN — SIMETHICONE 20 MG: 20 SUSPENSION/ DROPS ORAL at 22:39

## 2019-07-07 RX ADMIN — METHYLPREDNISOLONE SODIUM SUCCINATE 30 MG: 40 INJECTION, POWDER, FOR SOLUTION INTRAMUSCULAR; INTRAVENOUS at 07:03

## 2019-07-07 RX ADMIN — CHLORZOXAZONE 500 MG: 500 TABLET ORAL at 22:27

## 2019-07-07 RX ADMIN — Medication 10 ML: at 22:00

## 2019-07-07 RX ADMIN — Medication 10 MG: at 07:03

## 2019-07-07 RX ADMIN — Medication 10 MG: at 15:09

## 2019-07-07 RX ADMIN — PANTOPRAZOLE SODIUM 40 MG: 40 TABLET, DELAYED RELEASE ORAL at 07:02

## 2019-07-07 RX ADMIN — METHYLPREDNISOLONE SODIUM SUCCINATE 30 MG: 40 INJECTION, POWDER, FOR SOLUTION INTRAMUSCULAR; INTRAVENOUS at 23:30

## 2019-07-07 RX ADMIN — METHYLPREDNISOLONE SODIUM SUCCINATE 30 MG: 40 INJECTION, POWDER, FOR SOLUTION INTRAMUSCULAR; INTRAVENOUS at 11:11

## 2019-07-07 RX ADMIN — GABAPENTIN 300 MG: 300 CAPSULE ORAL at 08:21

## 2019-07-07 RX ADMIN — OCTREOTIDE ACETATE 50 MCG: 50 INJECTION, SOLUTION INTRAVENOUS; SUBCUTANEOUS at 15:09

## 2019-07-07 RX ADMIN — SODIUM CHLORIDE 125 ML/HR: 900 INJECTION, SOLUTION INTRAVENOUS at 20:06

## 2019-07-07 RX ADMIN — OCTREOTIDE ACETATE 50 MCG: 50 INJECTION, SOLUTION INTRAVENOUS; SUBCUTANEOUS at 08:21

## 2019-07-07 RX ADMIN — GABAPENTIN 300 MG: 300 CAPSULE ORAL at 15:09

## 2019-07-07 RX ADMIN — OCTREOTIDE ACETATE 50 MCG: 50 INJECTION, SOLUTION INTRAVENOUS; SUBCUTANEOUS at 22:29

## 2019-07-07 RX ADMIN — GABAPENTIN 300 MG: 300 CAPSULE ORAL at 22:27

## 2019-07-07 RX ADMIN — Medication 10 MG: at 22:28

## 2019-07-07 RX ADMIN — Medication 10 MG: at 04:28

## 2019-07-07 RX ADMIN — Medication 10 ML: at 07:03

## 2019-07-07 RX ADMIN — METHYLPREDNISOLONE SODIUM SUCCINATE 30 MG: 40 INJECTION, POWDER, FOR SOLUTION INTRAMUSCULAR; INTRAVENOUS at 17:00

## 2019-07-07 RX ADMIN — Medication 10 MG: at 20:08

## 2019-07-07 NOTE — PROGRESS NOTES
Spiritual Care Assessment/Progress Note  [facility]      NAME: Adia Mckeon      MRN: [id number]  AGE: 63 y.o. SEX: male  Evangelical Affiliation: Anabaptist   Language: English     7/7/2019     Total Time (in minutes): 5     Spiritual Assessment begun in [facility] through conversation with:         []Patient        [] Family    [] Friend(s)        Reason for Consult: Initial visit     Spiritual beliefs: (Please include comment if needed)     [] Identifies with a denny tradition:         [] Supported by a denny community:            [] Claims no spiritual orientation:           [] Seeking spiritual identity:                [] Adheres to an individual form of spirituality:           [x] Not able to assess:                           Identified resources for coping:      [] Prayer                               [] Music                  [] Guided Imagery     [] Family/friends                 [] Pet visits     [] Devotional reading                         [] Unknown     [] Other:                                              Interventions offered during this visit: (See comments for more details)    Patient Interventions: Initial visit           Plan of Care:     [] Support spiritual and/or cultural needs    [] Support AMD and/or advance care planning process      [] Support grieving process   [] Coordinate Rites and/or Rituals    [] Coordination with community clergy   [] No spiritual needs identified at this time   [] Detailed Plan of Care below (See Comments)  [] Make referral to Music Therapy  [] Make referral to Pet Therapy     [] Make referral to Addiction services  [] Make referral to [facility]  [] Make referral to Spiritual Care Partner  [] No future visits requested        [x] Follow up visits as needed     Attempted to visit pt on [address] [facility]. Pt sleeping and no family present. Chaplains will continue to offer support as needed.   Chaplain Shannon, MDiv, MS, [facility]  [phone]

## 2019-07-07 NOTE — PROGRESS NOTES
Hospitalist Progress Note  Neela Johnson MD  Answering service: [phone] from in house phone        Date of Service:  2019  NAME:  Vickey Downs  :  1960  MRN:  [id number]      Admission Summary:   63 y.o. Male PMH HTN, Cron's dx when he was 18 yo, he has had more than 80 abd Sx, last flare 4-5 years ago, he said his Dr Is Ronel Jackson. He presented to the ED  because early this morning he had abdominal pain and he felt a  \"pop\" then he noticed fluid coming out of his belly, with a \"feces odor\". He decided to come to the ED for further eval and management. Denies N/V fevers or chills. Currently being managed for Crohn's flare. Interval history / Subjective:   Abdominal pain this am, specifically periumbilical and radiating up to his ribs. No output from fistula noted today. Requesting to increase diet to full liquid from clears. Assessment & Plan:     Abdominal pain secondary to suspected Crohn's flare:  - consult to GI need to plan for biologics, office now undergoing plans for approval.  - Colon/Rectal surgery also following, appreciate recs, possibly will need TPN and total bowel rest.   - Pain control  on scheduled oxycodone, PRN IV morphine, and oxycodone for breakthrough. Will space out IV morphine further today. Continue PRN oxycodone  - Continue clear liquid diet, patient does not want to try TPN. ? If he can increase to full liquid diet? --> need to ask GI  - No signs or symptoms of infection, WBC normal on admission. No indication for antibiotics at this time. WBC increased after steroids were started, suspect secondary to that. - Continue solumedrol 30mg IV every 6 hours, defer to GI if able to transition to oral steroids and dischagre. - F/U quant gold for initiation of biologic  - Continue octreotide subq while inpatient.      HTN: not currently on meds, BP stable  - continue to monitor  Hypomagnesemia - monitor and replete. Nicotine dependence -  cessation and use patch PRN    Code status: Full  DVT prophylaxis: Lovenox   Dispo: Pending approval of Humira, and GI clearance. Hospital Problems  Date Reviewed: 12/22/2017          Codes Class Noted POA    Croup ICD-10-CM: J05.0  ICD-9-CM: 464.4  6/29/2019 Unknown        * (Principal) Crohn disease ([facility] [address] ICD-10-CM: K50.90  ICD-9-CM: 555.9  6/29/2019 Unknown        HTN (hypertension) ICD-10-CM: I10  ICD-9-CM: 401.9  6/29/2019 Unknown        Abdominal pain ICD-10-CM: R10.9  ICD-9-CM: 789.00  6/29/2019 Unknown                Vital Signs:    Last 24hrs VS reviewed since prior progress note. Most recent are:  Visit Vitals  /86 (BP 1 Location: Right arm, BP Patient Position: At rest)   Pulse 60   Temp 97.8 °F (36.6 °C)   Resp 18   Ht 5' 11\" (1.803 m)   Wt 84.7 kg (186 lb 11.7 oz)   SpO2 98%   BMI 26.04 kg/m²       No intake or output data in the 24 hours ending 07/07/19 1525     Physical Examination:             Constitutional:  No acute distress, cooperative, pleasant    ENT:  dry mucous membranes, oropharynx benign. Neck supple,    Resp:  CTA bilaterally. No wheezing/rhonchi/rales. No accessory muscle use    CV:  Regular rhythm, normal rate, no murmurs, gallops, rubs     GI:  Soft, non distended, + tender along midline. normoactive bowel sounds, 4 cm lesion in LLQ, no drainage noted, 3 cm lesion at RLQ, no drainage, multiple scars from abdomina surgeries noted     Musculoskeletal:  No edema, warm, 2+ pulses throughout     Neurologic:  Moves all extremities.   Answers questions appropriately, responds to commands          Labs:     Recent Labs     07/05/19  1626   WBC 14.7*   HGB 15.3   HCT 47.0        Recent Labs     07/07/19  0213 07/06/19  0214 07/05/19  0343    138 140   K 3.9 3.8 3.7    99 101   CO2 31 32 32   BUN 22* 16 14   CREA 1.23 1.26 1.14   * 115* 104*   CA 8.7 8.7 9.0   MG  --  2.2 2.1   PHOS --  4.0 4.1     No results for input(s): SGOT, GPT, ALT, AP, TBIL, TBILI, TP, ALB, GLOB, GGT, AML, LPSE in the last 72 hours. No lab exists for component: AMYP, HLPSE  No results for input(s): INR, PTP, APTT in the last 72 hours. No lab exists for component: INREXT, INREXT   No results for input(s): FE, TIBC, PSAT, FERR in the last 72 hours. No results found for: FOL, RBCF   No results for input(s): PH, PCO2, PO2 in the last 72 hours. No results for input(s): CPK, CKNDX, TROIQ in the last 72 hours.     No lab exists for component: CPKMB  Lab Results   Component Value Date/Time    Cholesterol, total 134 12/01/2014 04:33 AM    HDL Cholesterol 35 12/01/2014 04:33 AM    LDL, calculated 58.2 12/01/2014 04:33 AM    Triglyceride 204 (H) 12/01/2014 04:33 AM    CHOL/HDL Ratio 3.8 12/01/2014 04:33 AM     Lab Results   Component Value Date/Time    Glucose (POC) 113 (H) 11/10/2017 11:35 AM    Glucose (POC) 120 (H) 11/10/2017 05:54 AM    Glucose (POC) 87 11/09/2017 11:43 PM    Glucose (POC) 107 (H) 11/09/2017 07:07 PM    Glucose (POC) 107 (H) 11/09/2017 11:22 AM     Lab Results   Component Value Date/Time    Color YELLOW/STRAW 06/29/2019 08:13 PM    Appearance CLEAR 06/29/2019 08:13 PM    Specific gravity >1.030 (H) 06/29/2019 08:13 PM    Specific gravity 1.028 02/14/2018 09:44 PM    pH (UA) 5.0 06/29/2019 08:13 PM    Protein NEGATIVE  06/29/2019 08:13 PM    Glucose NEGATIVE  06/29/2019 08:13 PM    Ketone NEGATIVE  06/29/2019 08:13 PM    Bilirubin NEGATIVE  06/29/2019 08:13 PM    Urobilinogen 0.2 06/29/2019 08:13 PM    Nitrites NEGATIVE  06/29/2019 08:13 PM    Leukocyte Esterase NEGATIVE  06/29/2019 08:13 PM    Epithelial cells FEW 06/29/2019 08:13 PM    Bacteria NEGATIVE  06/29/2019 08:13 PM    WBC 0-4 06/29/2019 08:13 PM    RBC 0-5 06/29/2019 08:13 PM         Medications Reviewed:     Current Facility-Administered Medications   Medication Dose Route Frequency    0.9% sodium chloride infusion  125 mL/hr IntraVENous CONTINUOUS    morphine injection 2 mg  2 mg IntraVENous Q6H PRN    oxyCODONE (ROXICODONE) 5 mg/5 mL oral solution 10 mg  10 mg Oral Q6H PRN    octreotide (SANDOSTATIN) injection 50 mcg  50 mcg IntraVENous TID    gabapentin (NEURONTIN) capsule 300 mg  300 mg Oral TID    nicotine (NICODERM CQ) 21 mg/24 hr patch 1 Patch  1 Patch TransDERmal Q24H    pantoprazole (PROTONIX) tablet 40 mg  40 mg Oral ACB    simethicone (MYLICON) 02IX/2.9JX oral drops 20 mg  20 mg Oral QID PRN    chlorzoxazone (PARAFON FORTE) tablet 500 mg  500 mg Oral QHS    loperamide (IMODIUM) capsule 4 mg  4 mg Oral PRN    methylPREDNISolone (PF) (SOLU-MEDROL) injection 30 mg  30 mg IntraVENous Q6H    ondansetron (ZOFRAN) injection 4 mg  4 mg IntraVENous Q8H PRN    acetaminophen (TYLENOL) tablet 650 mg  650 mg Oral Q6H PRN    sodium chloride (NS) flush 5-40 mL  5-40 mL IntraVENous Q8H    sodium chloride (NS) flush 5-40 mL  5-40 mL IntraVENous PRN    enoxaparin (LOVENOX) injection 40 mg  40 mg SubCUTAneous Q24H    oxyCODONE (ROXICODONE) 5 mg/5 mL oral solution 10 mg  10 mg Oral Q8H     ______________________________________________________________________  EXPECTED LENGTH OF STAY: 3d 12h  ACTUAL LENGTH OF STAY:          8                 Neela Johnson MD

## 2019-07-08 VITALS
HEART RATE: 61 BPM | BODY MASS INDEX: 26.14 KG/M2 | WEIGHT: 186.73 LBS | DIASTOLIC BLOOD PRESSURE: 76 MMHG | OXYGEN SATURATION: 98 % | RESPIRATION RATE: 18 BRPM | HEIGHT: 71 IN | SYSTOLIC BLOOD PRESSURE: 157 MMHG | TEMPERATURE: 98.3 F

## 2019-07-08 LAB
ANION GAP SERPL CALC-SCNC: 6 MMOL/L (ref 5–15)
BASOPHILS # BLD: 0 K/UL (ref 0–0.1)
BASOPHILS NFR BLD: 0 % (ref 0–1)
BUN SERPL-MCNC: 18 MG/DL (ref 6–20)
BUN/CREAT SERPL: 16 (ref 12–20)
CALCIUM SERPL-MCNC: 8.2 MG/DL (ref 8.5–10.1)
CHLORIDE SERPL-SCNC: 102 MMOL/L (ref 97–108)
CO2 SERPL-SCNC: 32 MMOL/L (ref 21–32)
CREAT SERPL-MCNC: 1.13 MG/DL (ref 0.7–1.3)
DIFFERENTIAL METHOD BLD: ABNORMAL
EOSINOPHIL # BLD: 0 K/UL (ref 0–0.4)
EOSINOPHIL NFR BLD: 0 % (ref 0–7)
ERYTHROCYTE [DISTWIDTH] IN BLOOD BY AUTOMATED COUNT: 13.4 % (ref 11.5–14.5)
GLUCOSE SERPL-MCNC: 105 MG/DL (ref 65–100)
HCT VFR BLD AUTO: 44.6 % (ref 36.6–50.3)
HGB BLD-MCNC: 14.7 G/DL (ref 12.1–17)
IMM GRANULOCYTES # BLD AUTO: 0.1 K/UL (ref 0–0.04)
IMM GRANULOCYTES NFR BLD AUTO: 1 % (ref 0–0.5)
LYMPHOCYTES # BLD: 1.4 K/UL (ref 0.8–3.5)
LYMPHOCYTES NFR BLD: 12 % (ref 12–49)
MAGNESIUM SERPL-MCNC: 2.3 MG/DL (ref 1.6–2.4)
MCH RBC QN AUTO: 34.8 PG (ref 26–34)
MCHC RBC AUTO-ENTMCNC: 33 G/DL (ref 30–36.5)
MCV RBC AUTO: 105.7 FL (ref 80–99)
MONOCYTES # BLD: 0.8 K/UL (ref 0–1)
MONOCYTES NFR BLD: 7 % (ref 5–13)
NEUTS SEG # BLD: 9 K/UL (ref 1.8–8)
NEUTS SEG NFR BLD: 80 % (ref 32–75)
NRBC # BLD: 0 K/UL (ref 0–0.01)
NRBC BLD-RTO: 0 PER 100 WBC
PHOSPHATE SERPL-MCNC: 3.7 MG/DL (ref 2.6–4.7)
PLATELET # BLD AUTO: 240 K/UL (ref 150–400)
PMV BLD AUTO: 10.3 FL (ref 8.9–12.9)
POTASSIUM SERPL-SCNC: 3.5 MMOL/L (ref 3.5–5.1)
RBC # BLD AUTO: 4.22 M/UL (ref 4.1–5.7)
SODIUM SERPL-SCNC: 140 MMOL/L (ref 136–145)
WBC # BLD AUTO: 11.2 K/UL (ref 4.1–11.1)

## 2019-07-08 PROCEDURE — 74011250637 HC RX REV CODE- 250/637: Performed by: INTERNAL MEDICINE

## 2019-07-08 PROCEDURE — 84100 ASSAY OF PHOSPHORUS: CPT

## 2019-07-08 PROCEDURE — 80048 BASIC METABOLIC PNL TOTAL CA: CPT

## 2019-07-08 PROCEDURE — 85025 COMPLETE CBC W/AUTO DIFF WBC: CPT

## 2019-07-08 PROCEDURE — 74011250636 HC RX REV CODE- 250/636: Performed by: INTERNAL MEDICINE

## 2019-07-08 PROCEDURE — 83735 ASSAY OF MAGNESIUM: CPT

## 2019-07-08 PROCEDURE — 36415 COLL VENOUS BLD VENIPUNCTURE: CPT

## 2019-07-08 RX ORDER — IBUPROFEN 200 MG
1 TABLET ORAL EVERY 24 HOURS
Qty: 30 PATCH | Refills: 0 | Status: SHIPPED | OUTPATIENT
Start: 2019-07-08 | End: 2019-08-07

## 2019-07-08 RX ORDER — PREDNISONE 20 MG/1
60 TABLET ORAL
Qty: 42 TAB | Refills: 0 | Status: SHIPPED | OUTPATIENT
Start: 2019-07-08 | End: 2019-07-22

## 2019-07-08 RX ADMIN — PANTOPRAZOLE SODIUM 40 MG: 40 TABLET, DELAYED RELEASE ORAL at 07:02

## 2019-07-08 RX ADMIN — Medication 10 MG: at 15:12

## 2019-07-08 RX ADMIN — Medication 10 MG: at 09:34

## 2019-07-08 RX ADMIN — Medication 10 ML: at 07:02

## 2019-07-08 RX ADMIN — METHYLPREDNISOLONE SODIUM SUCCINATE 30 MG: 40 INJECTION, POWDER, FOR SOLUTION INTRAMUSCULAR; INTRAVENOUS at 07:02

## 2019-07-08 RX ADMIN — OCTREOTIDE ACETATE 50 MCG: 50 INJECTION, SOLUTION INTRAVENOUS; SUBCUTANEOUS at 09:27

## 2019-07-08 RX ADMIN — GABAPENTIN 300 MG: 300 CAPSULE ORAL at 09:26

## 2019-07-08 RX ADMIN — SODIUM CHLORIDE 125 ML/HR: 900 INJECTION, SOLUTION INTRAVENOUS at 04:28

## 2019-07-08 RX ADMIN — GABAPENTIN 300 MG: 300 CAPSULE ORAL at 15:12

## 2019-07-08 RX ADMIN — Medication 10 MG: at 07:02

## 2019-07-08 NOTE — PROGRESS NOTES
Hospital follow-up PCP transitional care appointment has been scheduled with Dr. Julio Fine for Tuesday, 7/16/19 at 3:45 p.m. Pending patient discharge.   Mary Beth Dejesus, Care Management Specialist.

## 2019-07-08 NOTE — DISCHARGE INSTRUCTIONS
Discharge Instructions       PATIENT ID: Portillo Dixon  MRN: [id number]   YOB: 1960    DATE OF ADMISSION: 6/29/2019  3:55 PM    DATE OF DISCHARGE: 7/8/2019    PRIMARY CARE PROVIDER: Boston Cardona MD     ATTENDING PHYSICIAN: Leocadia Opitz*  DISCHARGING PROVIDER: Miguel Castillo MD    To contact this individual call [phone] and ask the  to page. If unavailable ask to be transferred the Adult Hospitalist Department. DISCHARGE DIAGNOSES   Crohn's disease with enterocutaneous fistula   HTN  Abdominal pain - resolving    CONSULTATIONS: IP CONSULT TO COLORECTAL SURGERY  IP CONSULT TO GASTROENTEROLOGY    PROCEDURES/SURGERIES: * No surgery found *    PENDING TEST RESULTS:   At the time of discharge the following test results are still pending: None    FOLLOW UP APPOINTMENTS:   Follow-up Information     Follow up With Specialties Details Why Contact Info    Boston Cardona MD [facility] Practice In 1 week  [address] [place] [address]. [address] [place] [address]      Mag Mane MD Gastroenterology In 1 week hospital follow up  [address]  [phone]             ADDITIONAL CARE RECOMMENDATIONS:   1. Please take all medications as prescribed. Note changes as below. - Add prednisone 60mg every day until you follow up with your primary care physician. 2. Please make sure to follow up with your primary care physician within 1-2 weeks of discharge for hospital follow up. You also need to call Dr. Nish Bernardo office and make a follow up appointment for next week. 3. Please continue to monitor for worsening output from your fistula. 4. Avoid tobacco, alcohol and other illicit drug use.      DIET: GI soft diet    ACTIVITY: Activity as tolerated    WOUND CARE: None    EQUIPMENT needed: None      DISCHARGE MEDICATIONS:   See Medication Reconciliation Form    · It is important that you take the medication exactly as they are prescribed. · Keep your medication in the bottles provided by the pharmacist and keep a list of the medication names, dosages, and times to be taken in your wallet. · Do not take other medications without consulting your doctor. NOTIFY YOUR PHYSICIAN FOR ANY OF THE FOLLOWING:   Fever over 101 degrees for 24 hours. Chest pain, shortness of breath, fever, chills, nausea, vomiting, diarrhea, change in mentation, falling, weakness, bleeding. Severe pain or pain not relieved by medications. Or, any other signs or symptoms that you may have questions about. DISPOSITION:  X  Home With:   OT  PT  HH  RN       SNF/Inpatient Rehab/LTAC    Independent/assisted living    Hospice    Other:     CDMP Checked: Yes X     PROBLEM LIST Updated:   Yes X       Signed:   Ana María Sánchez MD  7/8/2019  11:24 AM

## 2019-07-08 NOTE — DISCHARGE SUMMARY
Discharge Summary       PATIENT ID: Alden Aragon  MRN: [id number]   YOB: 1960    DATE OF ADMISSION: 6/29/2019  3:55 PM    DATE OF DISCHARGE: 07/08/2019   PRIMARY CARE PROVIDER: Ammy Farias MD     DISCHARGING PROVIDER: Diana Ding MD    To contact this individual call [phone] and ask the  to page. If unavailable ask to be transferred the Adult Hospitalist Department. CONSULTATIONS: IP CONSULT TO COLORECTAL SURGERY  IP CONSULT TO GASTROENTEROLOGY    PROCEDURES/SURGERIES: * No surgery found *    ADMITTING DIAGNOSES & HOSPITAL COURSE:   Crohn's disease with enterocutaneous fistula   HTN  Abdominal pain - resolving    63 y. o.  Male PMH HTN, Cron's dx when he was 16 yo, he has had more than 80 abd Sx, last flare 4-5 years ago, he said his Dr Is Saritha Castro. He presented to the ED  because early this morning he had abdominal pain and he felt a  \"pop\" then he noticed fluid coming out of his belly, with a \"feces odor\". He decided to come to the ED for further eval and management. Denies N/V fevers or chills. Currently being managed for Crohn's flare. Given IV steroids, had bowel rest with improvement in his fistula output. Ellie was undergoing insurance authorization as outpatient. DISCHARGE DIAGNOSES / PLAN:      Abdominal pain secondary to suspected Crohn's flare:  - consult to GI need to plan for biologics, office now undergoing plans for approval.  - Colon/Rectal surgery also following, appreciate recs, patient refusing TPN. - Pain control  discharge on scheduled home dose oxycodone. - Switched to GI lite diet 7/8  - No signs or symptoms of infection, WBC normal on admission. No indication for antibiotics at this time. WBC increased after steroids were started, suspect secondary to that. - Continue solumedrol 30mg IV every 6 hours, transitioning to 60mg prednisone on discharge. - Quant gold negative.  [employer] insurance reported to be approved.      HTN: not currently on meds, BP stable  - continue to monitor  Hypomagnesemia - monitor and replete. Nicotine dependence -  cessation and use patch PRN        ADDITIONAL CARE RECOMMENDATIONS:   1. Please take all medications as prescribed. Note changes as below. - Add prednisone 60mg every day until you follow up with your primary care physician. 2. Please make sure to follow up with your primary care physician within 1-2 weeks of discharge for hospital follow up. You also need to call Dr. Desiree Neumann office and make a follow up appointment for next week. 3. Please continue to monitor for worsening output from your fistula. 4. Avoid tobacco, alcohol and other illicit drug use. PENDING TEST RESULTS:   At the time of discharge the following test results are still pending: None    FOLLOW UP APPOINTMENTS:    Follow-up Information     Follow up With Specialties Details Why Contact Info    Carol Holt MD [facility] Practice In 1 week  [address] [place] [address]. [address] [facility]      Loyd Payne MD Gastroenterology In 1 week hospital follow up  [address]  [phone]               DIET: GI soft diet    ACTIVITY: Activity as tolerated    WOUND CARE: None    EQUIPMENT needed: None      DISCHARGE MEDICATIONS:  Current Discharge Medication List      START taking these medications    Details   nicotine (NICODERM CQ) 21 mg/24 hr 1 Patch by TransDERmal route every twenty-four (24) hours for 30 days. Qty: 30 Patch, Refills: 0      predniSONE (DELTASONE) 20 mg tablet Take 60 mg by mouth daily (with breakfast) for 14 days. Qty: 42 Tab, Refills: 0         CONTINUE these medications which have NOT CHANGED    Details   gabapentin (NEURONTIN) 300 mg capsule Take 300 mg by mouth three (3) times daily. oxyCODONE (ROXICODONE) 5 mg/5 mL solution Take 10 mg by mouth every six to eight (6-8) hours as needed for Pain.       omeprazole (PRILOSEC) 40 mg capsule take 1 capsule by mouth once daily  Refills: 0      diphenoxylate-atropine (LOMOTIL) 2.5-0.025 mg per tablet Take 2 Tabs by mouth Before breakfast, lunch, dinner and at bedtime. loperamide (IMODIUM) 2 mg capsule Take 2 mg by mouth as needed for Diarrhea. Patient takes 14-16 capsules a day. ondansetron hcl (ZOFRAN, AS HYDROCHLORIDE,) 8 mg tablet Take 8 mg by mouth every eight (8) hours as needed for Nausea. chlorzoxazone (PARAFON FORTE) 500 mg tablet Take 500-1,000 mg by mouth daily as needed for Muscle Spasm(s). NOTIFY YOUR PHYSICIAN FOR ANY OF THE FOLLOWING:   Fever over 101 degrees for 24 hours. Chest pain, shortness of breath, fever, chills, nausea, vomiting, diarrhea, change in mentation, falling, weakness, bleeding. Severe pain or pain not relieved by medications. Or, any other signs or symptoms that you may have questions about. DISPOSITION:   X Home With:   OT  PT  HH  RN       Long term SNF/Inpatient Rehab    Independent/assisted living    Hospice    Other:       PATIENT CONDITION AT DISCHARGE:     Functional status    Poor     Deconditioned    X Independent      Cognition   X  Lucid     Forgetful     Dementia      Catheters/lines (plus indication)    Trejo     PICC     PEG    X None      Code status   X  Full code     DNR      PHYSICAL EXAMINATION AT DISCHARGE:  Visit Vitals  /76 (BP 1 Location: Right arm, BP Patient Position: Sitting)   Pulse 61   Temp 98.3 °F (36.8 °C)   Resp 18   Ht 5' 11\" (1.803 m)   Wt 84.7 kg (186 lb 11.7 oz)   SpO2 98%   BMI 26.04 kg/m²     Gen: NAD, awake in bed  HEENT: NC/AT, sclera anicteric, PERRL, EOMI  CV: RRR no m/r/g  Resp: CTA b/l no increased work of breathing  Abd: NT/ND, normal bowel sounds, prior scars in place. No drainage noted from small fistula.    Ext: 2+ pulses, no edema  Skin: No rashes        CHRONIC MEDICAL DIAGNOSES:  Problem List as of 7/8/2019 Date Reviewed: 12/22/2017          Codes Class Noted - Resolved    Croup ICD-10-CM: J05.0  ICD-9-CM: 464.4  6/29/2019 - Present        * (Principal) Crohn disease ([place] [address].) ICD-10-CM: K50.90  ICD-9-CM: 555.9  6/29/2019 - Present        HTN (hypertension) ICD-10-CM: I10  ICD-9-CM: 401.9  6/29/2019 - Present        Abdominal pain ICD-10-CM: R10.9  ICD-9-CM: 789.00  6/29/2019 - Present        Abdominal wound dehiscence ICD-10-CM: T81.30XA  ICD-9-CM: 998.30  12/4/2017 - Present        Intussusception of intestine ([place] [address].) ICD-10-CM: K56.1  ICD-9-CM: 560.0  11/3/2017 - Present        Bowel obstruction ([place] [address].) ICD-10-CM: N84.080  ICD-9-CM: 560.9  11/3/2017 - Present        Incisional hernia, without obstruction or gangrene (Chronic) ICD-10-CM: K43.2  ICD-9-CM: 553.21  1/31/2017 - Present        Back pain, chronic ICD-10-CM: M54.9, G89.29  ICD-9-CM: 724.5, 338.29  9/30/2015 - Present        Short bowel syndrome (Chronic) ICD-10-CM: K91.2  ICD-9-CM: 579.3  9/28/2015 - Present        Chronic pain (Chronic) ICD-10-CM: P85.88  ICD-9-CM: 338.29  3/17/2015 - Present        Crohn's disease ([facility] (Chronic) ICD-10-CM: K50.90  ICD-9-CM: 555.9  6/15/2012 - Present        GERD (gastroesophageal reflux disease) (Chronic) ICD-10-CM: K21.9  ICD-9-CM: 530.81  6/15/2012 - Present        Hiatal hernia (Chronic) ICD-10-CM: K44.9  ICD-9-CM: 553.3  6/15/2012 - Present        B12 deficiency ICD-10-CM: E53.8  ICD-9-CM: 266.2  6/15/2012 - Present        RESOLVED: Abscess ICD-10-CM: L02.91  ICD-9-CM: 682.9  8/9/2016 - 8/12/2016        RESOLVED: Abdominal pain, acute, right lower quadrant ICD-10-CM: R10.31  ICD-9-CM: 789.03, 338.19  9/30/2015 - 12/1/2015        RESOLVED: Diarrhea ICD-10-CM: R19.7  ICD-9-CM: 787.91  9/30/2015 - 12/1/2015        RESOLVED: Abdominal abscess ICD-10-CM: MMY5371  ICD-9-CM: MJN7643  9/28/2015 - 12/1/2015        RESOLVED: Venous stasis of both lower extremities ICD-10-CM: I87.8  ICD-9-CM: 459.81  7/29/2015 - 9/28/2015        RESOLVED: Cellulitis of both lower extremities ICD-10-CM: L03.115, L03.116  ICD-9-CM: 682.6  7/29/2015 - 9/28/2015        RESOLVED: Postoperative wound dehiscence ICD-10-CM: T81.31XA  ICD-9-CM: 998.32  7/26/2015 - 9/28/2015        RESOLVED: Syncope and collapse ICD-10-CM: R55  ICD-9-CM: 780.2  6/21/2015 - 7/14/2015        RESOLVED: Metabolic alkalosis L-12-ZZ: E87.3  ICD-9-CM: 276.3  6/10/2015 - 7/14/2015        RESOLVED: Acute kidney injury ([facility] ICD-10-CM: N17.9  ICD-9-CM: 584.9  6/10/2015 - 9/28/2015        RESOLVED: Severe protein-calorie malnutrition ([facility] (Chronic) ICD-10-CM: E43  ICD-9-CM: 262  6/10/2015 - 9/28/2015        RESOLVED: Hypomagnesemia ICD-10-CM: E83.42  ICD-9-CM: 275.2  6/10/2015 - 7/14/2015        RESOLVED: Hyponatremia ICD-10-CM: E87.1  ICD-9-CM: 276.1  6/8/2015 - 9/28/2015        RESOLVED: Hypokalemia ICD-10-CM: E87.6  ICD-9-CM: 276.8  6/8/2015 - 7/14/2015        RESOLVED: Hyponatremia ICD-10-CM: E87.1  ICD-9-CM: 276.1  5/4/2015 - 5/10/2015        RESOLVED: Elevated serum creatinine (Chronic) ICD-10-CM: R79.89  ICD-9-CM: 790.99  4/20/2015 - 9/28/2015        RESOLVED: High output ileostomy ([facility] (Chronic) ICD-10-CM: R19.8, Z93.2  ICD-9-CM: 787.99, V44.2  4/20/2015 - 9/28/2015        RESOLVED: Ileocolic anastomotic leak ICD-10-CM: K91.89  ICD-9-CM: 997.49  3/23/2015 - 6/8/2015        RESOLVED: S/P ileostomy ([facility] (Chronic) ICD-10-CM: Z93.2  ICD-9-CM: V44.2  3/17/2015 - 9/28/2015        RESOLVED: Abscess of right shoulder ICD-10-CM: L02.413  ICD-9-CM: 682.3  11/30/2014 - 12/3/2014        RESOLVED: Dehydration ICD-10-CM: E86.0  ICD-9-CM: 276.51  10/16/2014 - 3/17/2015        RESOLVED: Blood loss anemia ICD-10-CM: D50.0  ICD-9-CM: 280.0  8/6/2014 - 3/17/2015        RESOLVED: Free intraperitoneal air ICD-10-CM: K66.8  ICD-9-CM: 568.89  7/28/2014 - 9/23/2014        RESOLVED: Abdominal pain (Chronic) ICD-10-CM: R10.9  ICD-9-CM: 789.00  7/8/2014 - 9/28/2015              Greater than 30 minutes were spent with the patient on counseling and coordination of care    Signed:   Damaso Weathers Raine Flores MD  7/8/2019  11:20 AM

## 2019-07-11 LAB
O+P SPEC MICRO: NORMAL
O+P STL CONC: NORMAL
SPECIMEN SOURCE: NORMAL

## 2019-10-06 ENCOUNTER — APPOINTMENT (OUTPATIENT)
Dept: CT IMAGING | Age: 59
DRG: 392 | End: 2019-10-06
Attending: EMERGENCY MEDICINE
Payer: MEDICARE

## 2019-10-06 ENCOUNTER — HOSPITAL ENCOUNTER (INPATIENT)
Age: 59
LOS: 3 days | Discharge: HOME | DRG: 392 | End: 2019-10-09
Attending: EMERGENCY MEDICINE | Admitting: HOSPITALIST
Payer: MEDICARE

## 2019-10-06 DIAGNOSIS — K92.1 BLOOD IN STOOL: ICD-10-CM

## 2019-10-06 DIAGNOSIS — R10.84 ABDOMINAL PAIN, GENERALIZED: ICD-10-CM

## 2019-10-06 DIAGNOSIS — K50.919 CROHN'S DISEASE WITH COMPLICATION, UNSPECIFIED GASTROINTESTINAL TRACT LOCATION (HCC): Primary | ICD-10-CM

## 2019-10-06 LAB
ABO + RH BLD: NORMAL
ALBUMIN SERPL-MCNC: 3.6 G/DL (ref 3.5–5)
ALBUMIN/GLOB SERPL: 1.2 {RATIO} (ref 1.1–2.2)
ALP SERPL-CCNC: 60 U/L (ref 45–117)
ALT SERPL-CCNC: 19 U/L (ref 12–78)
ANION GAP SERPL CALC-SCNC: 6 MMOL/L (ref 5–15)
APTT PPP: 25.4 SEC (ref 22.1–32)
AST SERPL-CCNC: 11 U/L (ref 15–37)
BASOPHILS # BLD: 0 K/UL (ref 0–0.1)
BASOPHILS NFR BLD: 0 % (ref 0–1)
BILIRUB SERPL-MCNC: 0.6 MG/DL (ref 0.2–1)
BLOOD GROUP ANTIBODIES SERPL: NORMAL
BUN SERPL-MCNC: 21 MG/DL (ref 6–20)
BUN/CREAT SERPL: 20 (ref 12–20)
CALCIUM SERPL-MCNC: 8.6 MG/DL (ref 8.5–10.1)
CHLORIDE SERPL-SCNC: 110 MMOL/L (ref 97–108)
CO2 SERPL-SCNC: 23 MMOL/L (ref 21–32)
COMMENT, HOLDF: NORMAL
CREAT SERPL-MCNC: 1.03 MG/DL (ref 0.7–1.3)
DIFFERENTIAL METHOD BLD: ABNORMAL
EOSINOPHIL # BLD: 0.3 K/UL (ref 0–0.4)
EOSINOPHIL NFR BLD: 2 % (ref 0–7)
ERYTHROCYTE [DISTWIDTH] IN BLOOD BY AUTOMATED COUNT: 14.1 % (ref 11.5–14.5)
GLOBULIN SER CALC-MCNC: 3 G/DL (ref 2–4)
GLUCOSE SERPL-MCNC: 90 MG/DL (ref 65–100)
HCT VFR BLD AUTO: 39.7 % (ref 36.6–50.3)
HEMOCCULT STL QL: POSITIVE
HGB BLD-MCNC: 13.1 G/DL (ref 12.1–17)
IMM GRANULOCYTES # BLD AUTO: 0 K/UL
IMM GRANULOCYTES NFR BLD AUTO: 0 %
INR PPP: 1 (ref 0.9–1.1)
LIPASE SERPL-CCNC: 128 U/L (ref 73–393)
LYMPHOCYTES # BLD: 3.6 K/UL (ref 0.8–3.5)
LYMPHOCYTES NFR BLD: 29 % (ref 12–49)
MCH RBC QN AUTO: 34.3 PG (ref 26–34)
MCHC RBC AUTO-ENTMCNC: 33 G/DL (ref 30–36.5)
MCV RBC AUTO: 103.9 FL (ref 80–99)
MONOCYTES # BLD: 0.9 K/UL (ref 0–1)
MONOCYTES NFR BLD: 7 % (ref 5–13)
NEUTS SEG # BLD: 7.7 K/UL (ref 1.8–8)
NEUTS SEG NFR BLD: 62 % (ref 32–75)
NRBC # BLD: 0 K/UL (ref 0–0.01)
NRBC BLD-RTO: 0 PER 100 WBC
PLATELET # BLD AUTO: 288 K/UL (ref 150–400)
PMV BLD AUTO: 9.9 FL (ref 8.9–12.9)
POTASSIUM SERPL-SCNC: 4 MMOL/L (ref 3.5–5.1)
PROT SERPL-MCNC: 6.6 G/DL (ref 6.4–8.2)
PROTHROMBIN TIME: 9.8 SEC (ref 9–11.1)
RBC # BLD AUTO: 3.82 M/UL (ref 4.1–5.7)
RBC MORPH BLD: ABNORMAL
SAMPLES BEING HELD,HOLD: NORMAL
SODIUM SERPL-SCNC: 139 MMOL/L (ref 136–145)
SPECIMEN EXP DATE BLD: NORMAL
THERAPEUTIC RANGE,PTTT: NORMAL SECS (ref 58–77)
WBC # BLD AUTO: 12.5 K/UL (ref 4.1–11.1)

## 2019-10-06 PROCEDURE — 85610 PROTHROMBIN TIME: CPT

## 2019-10-06 PROCEDURE — 74011250636 HC RX REV CODE- 250/636: Performed by: EMERGENCY MEDICINE

## 2019-10-06 PROCEDURE — 96374 THER/PROPH/DIAG INJ IV PUSH: CPT

## 2019-10-06 PROCEDURE — 83690 ASSAY OF LIPASE: CPT

## 2019-10-06 PROCEDURE — 86900 BLOOD TYPING SEROLOGIC ABO: CPT

## 2019-10-06 PROCEDURE — 74011636320 HC RX REV CODE- 636/320: Performed by: RADIOLOGY

## 2019-10-06 PROCEDURE — 85730 THROMBOPLASTIN TIME PARTIAL: CPT

## 2019-10-06 PROCEDURE — 80053 COMPREHEN METABOLIC PANEL: CPT

## 2019-10-06 PROCEDURE — 96375 TX/PRO/DX INJ NEW DRUG ADDON: CPT

## 2019-10-06 PROCEDURE — 74177 CT ABD & PELVIS W/CONTRAST: CPT

## 2019-10-06 PROCEDURE — 36415 COLL VENOUS BLD VENIPUNCTURE: CPT

## 2019-10-06 PROCEDURE — 74011250636 HC RX REV CODE- 250/636: Performed by: HOSPITALIST

## 2019-10-06 PROCEDURE — 82272 OCCULT BLD FECES 1-3 TESTS: CPT

## 2019-10-06 PROCEDURE — 99284 EMERGENCY DEPT VISIT MOD MDM: CPT

## 2019-10-06 PROCEDURE — 74011250637 HC RX REV CODE- 250/637: Performed by: HOSPITALIST

## 2019-10-06 PROCEDURE — 65270000029 HC RM PRIVATE

## 2019-10-06 PROCEDURE — 74011000258 HC RX REV CODE- 258: Performed by: RADIOLOGY

## 2019-10-06 PROCEDURE — 85025 COMPLETE CBC W/AUTO DIFF WBC: CPT

## 2019-10-06 RX ORDER — SODIUM CHLORIDE 0.9 % (FLUSH) 0.9 %
10 SYRINGE (ML) INJECTION
Status: COMPLETED | OUTPATIENT
Start: 2019-10-06 | End: 2019-10-06

## 2019-10-06 RX ORDER — ONDANSETRON 2 MG/ML
4 INJECTION INTRAMUSCULAR; INTRAVENOUS
Status: DISCONTINUED | OUTPATIENT
Start: 2019-10-06 | End: 2019-10-06 | Stop reason: CLARIF

## 2019-10-06 RX ORDER — GABAPENTIN 300 MG/1
300 CAPSULE ORAL 3 TIMES DAILY
Status: DISCONTINUED | OUTPATIENT
Start: 2019-10-07 | End: 2019-10-09 | Stop reason: HOSPADM

## 2019-10-06 RX ORDER — PANTOPRAZOLE SODIUM 40 MG/1
40 TABLET, DELAYED RELEASE ORAL DAILY
Status: DISCONTINUED | OUTPATIENT
Start: 2019-10-07 | End: 2019-10-09 | Stop reason: HOSPADM

## 2019-10-06 RX ORDER — ONDANSETRON 2 MG/ML
4 INJECTION INTRAMUSCULAR; INTRAVENOUS
Status: COMPLETED | OUTPATIENT
Start: 2019-10-06 | End: 2019-10-06

## 2019-10-06 RX ORDER — HYDROMORPHONE HYDROCHLORIDE 1 MG/ML
1 INJECTION, SOLUTION INTRAMUSCULAR; INTRAVENOUS; SUBCUTANEOUS
Status: DISCONTINUED | OUTPATIENT
Start: 2019-10-06 | End: 2019-10-07

## 2019-10-06 RX ORDER — SODIUM CHLORIDE 0.9 % (FLUSH) 0.9 %
5-40 SYRINGE (ML) INJECTION AS NEEDED
Status: DISCONTINUED | OUTPATIENT
Start: 2019-10-06 | End: 2019-10-09 | Stop reason: HOSPADM

## 2019-10-06 RX ORDER — IBUPROFEN 200 MG
1 TABLET ORAL DAILY
Status: DISCONTINUED | OUTPATIENT
Start: 2019-10-07 | End: 2019-10-08

## 2019-10-06 RX ORDER — SODIUM CHLORIDE 9 MG/ML
100 INJECTION, SOLUTION INTRAVENOUS CONTINUOUS
Status: DISCONTINUED | OUTPATIENT
Start: 2019-10-07 | End: 2019-10-09

## 2019-10-06 RX ORDER — DIPHENOXYLATE HYDROCHLORIDE AND ATROPINE SULFATE 2.5; .025 MG/1; MG/1
2 TABLET ORAL
Status: DISCONTINUED | OUTPATIENT
Start: 2019-10-07 | End: 2019-10-09 | Stop reason: HOSPADM

## 2019-10-06 RX ORDER — MORPHINE SULFATE 2 MG/ML
4 INJECTION, SOLUTION INTRAMUSCULAR; INTRAVENOUS ONCE
Status: COMPLETED | OUTPATIENT
Start: 2019-10-06 | End: 2019-10-06

## 2019-10-06 RX ORDER — ONDANSETRON 2 MG/ML
4 INJECTION INTRAMUSCULAR; INTRAVENOUS
Status: DISCONTINUED | OUTPATIENT
Start: 2019-10-06 | End: 2019-10-09 | Stop reason: HOSPADM

## 2019-10-06 RX ORDER — SODIUM CHLORIDE 0.9 % (FLUSH) 0.9 %
5-40 SYRINGE (ML) INJECTION EVERY 8 HOURS
Status: DISCONTINUED | OUTPATIENT
Start: 2019-10-07 | End: 2019-10-09 | Stop reason: HOSPADM

## 2019-10-06 RX ORDER — OMEPRAZOLE 40 MG/1
40 CAPSULE, DELAYED RELEASE ORAL DAILY
Status: DISCONTINUED | OUTPATIENT
Start: 2019-10-07 | End: 2019-10-06 | Stop reason: CLARIF

## 2019-10-06 RX ADMIN — GABAPENTIN 300 MG: 300 CAPSULE ORAL at 23:47

## 2019-10-06 RX ADMIN — SODIUM CHLORIDE 1000 ML: 900 INJECTION, SOLUTION INTRAVENOUS at 20:01

## 2019-10-06 RX ADMIN — SODIUM CHLORIDE 100 ML: 900 INJECTION, SOLUTION INTRAVENOUS at 20:35

## 2019-10-06 RX ADMIN — MORPHINE SULFATE 4 MG: 2 INJECTION, SOLUTION INTRAMUSCULAR; INTRAVENOUS at 20:01

## 2019-10-06 RX ADMIN — IOPAMIDOL 100 ML: 755 INJECTION, SOLUTION INTRAVENOUS at 20:34

## 2019-10-06 RX ADMIN — Medication 10 ML: at 20:34

## 2019-10-06 RX ADMIN — SODIUM CHLORIDE 100 ML/HR: 900 INJECTION, SOLUTION INTRAVENOUS at 23:33

## 2019-10-06 RX ADMIN — HYDROMORPHONE HYDROCHLORIDE 1 MG: 1 INJECTION, SOLUTION INTRAMUSCULAR; INTRAVENOUS; SUBCUTANEOUS at 23:55

## 2019-10-06 RX ADMIN — ONDANSETRON 4 MG: 2 INJECTION INTRAMUSCULAR; INTRAVENOUS at 20:01

## 2019-10-06 RX ADMIN — Medication 10 ML: at 23:28

## 2019-10-06 RX ADMIN — MORPHINE SULFATE 4 MG: 2 INJECTION, SOLUTION INTRAMUSCULAR; INTRAVENOUS at 21:36

## 2019-10-06 NOTE — ED PROVIDER NOTES
63 y.o. male with past medical history significant for Chron's disease, arthritis, hernia, anemia, HTN, COPD, GERD, esophageal ulcer who presents via EMS with chief complaint of abdominal pain. Pt c/o acute onset RLQ pain at 2200 last night. He states that he had a BM about an hour later and had a black BM and noticed red blood on the toilet paper. Pt endorses chronic diarrhea. Pt has hx of colon resection and states that he only has \"6 feet of intestines. \" There are no other acute medical concerns at this time. Social hx: +tobacco smoker (1 pack/day), +rare EtOH consumption     PCP: Sarah Verde MD    Note written by Elvis Hernández, as dictated by Alex Galeas MD 6:33 PM      The history is provided by the patient. No  was used. Past Medical History:   Diagnosis Date    Abdominal wall hernia 6/15/2012    Anal fissure     Anemia     Anemia     Anxiety and depression     Arthritis     Related to the Crohn's disease.  Cancer ([address]     MELANOMA HEAD AND FACE    Chronic pain     GENERALIZED    COPD (chronic obstructive pulmonary disease) (HCC)     Crohn disease (HCC)     Diagnosed at age 17.  Echocardiogram normal (EF: 55-60%) 07/2015    Esophageal ulcer     GERD (gastroesophageal reflux disease)     H/O blood transfusion 2013    [address] [facility]    Hypertension     denies    Migraine     Short bowel syndrome     Ventral hernia without obstruction or gangrene        Past Surgical History:   Procedure Laterality Date    ABDOMEN SURGERY PROC UNLISTED      33 abdominal operations for treatment of Crohn's disease and its complications.     HC NDL CEJA      ceja needle, takes 1 inch needle    HX APPENDECTOMY      HX CHOLECYSTECTOMY      HX GI      colectomy x2, one ileostomy    HX GI  7/28/14    exp lap,partial colectomy with end ileostomy by Dr Ju Solano HX HEENT      neck fusion    HX ORTHOPAEDIC  1980s    wrist right,  HX ORTHOPAEDIC  2006, 11/2013    neck, L4 L5 L6    HX ORTHOPAEDIC  1990s    right knee scope    HX OTHER SURGICAL      surgical repair from spider bite    HX OTHER SURGICAL  12/1/14    Incision and drainage of posterior right subcutaneous shoulder abscess    HX OTHER SURGICAL  2014    LEFT INDEX FINGER SPIDER BITE    HX OTHER SURGICAL  2014    RECTAL FISTULA    HX OTHER SURGICAL  3/17/2015    Ileostomy takedown with extensive lysis of adhesions (greater than three hours), mobilization of the splenic flexure, left colon resection, ileocolic anastomosis, and excision of scar; Dr. Suri Cheatham.  HX OTHER SURGICAL  3/22/2015    Exploratory laparotomy with washout of abdomen, suture repair of small bowel/anastomosis, and diverting protective loop ileostomy; Essence Cody MD.    HX OTHER SURGICAL  4/9/2015    Laparotomy, extensive lysis of adhesions, abdominal lavage, and resection of ileocolic anastomosis (including the efferent limb of the loop ileostomy) with creation of Demetrius's pouch; Dr. Suri Cheatham.  HX OTHER SURGICAL  7/14/2015    Cystoscopy and placement of bilateral ureteral catheters; Delmy Wells MD.    HX OTHER SURGICAL  7/14/2015    Ileostomy takedown with extensive lysis of adhesions, small bowel resection, and enterocolostomy; Dr. Suri Cheatham.  HX OTHER SURGICAL  9/29/2015    CT-guided percutaneous drainage of intraabdominal abscess; Dr. Tristen Briones.  HX OTHER SURGICAL  11/16/2015    CT-guided percutaneous aspiration of abdominal wall cavity; Dr. Елена Heredia.  HX OTHER SURGICAL  12/1/2015    Incision and drainage of abdominal wall abscess; Dr. Suri Cheatham.  HX OTHER SURGICAL  2/11/2016    Incision and drainage of abdominal wall abscess; Dr. Suri Cheatham.   OTHER SURGICAL  2/23/2016    Cystoscopy and placement of bilateral temporary ureteral catheters; Dr. Natalie Chopra.     HX OTHER SURGICAL  2/23/2016    Exploratory laparotomy, extensive lysis of adhesions, removal of mesh, and repair of enterocutaneous fistula; Dr. Kevin Cabrera.  HX OTHER SURGICAL  11/03/2017    Exploratory laparotomy, extensive lysis of adhesions, and reduction of intussusception; Dr. Kevin Cabrera.          Family History:   Problem Relation Age of Onset    Stroke Mother     Heart Disease Mother     Kidney Disease Mother     Anesth Problems Mother         TAKES A LONG TIME TO WAKE UP    Heart Disease Father     Thyroid Disease Sister        Social History     Socioeconomic History    Marital status: LEGALLY      Spouse name: Not on file    Number of children: Not on file    Years of education: Not on file    Highest education level: Not on file   Occupational History    Not on file   Social Needs    Financial resource strain: Not on file    Food insecurity:     Worry: Not on file     Inability: Not on file    Transportation needs:     Medical: Not on file     Non-medical: Not on file   Tobacco Use    Smoking status: Current Every Day Smoker     Packs/day: 1.00     Years: 44.00     Pack years: 44.00    Smokeless tobacco: Current User    Tobacco comment: CURRENT E [employer]IG[employer]   Substance and Sexual Activity    Alcohol use: No     Comment: RARELY    Drug use: No    Sexual activity: Not on file   Lifestyle    Physical activity:     Days per week: Not on file     Minutes per session: Not on file    Stress: Not on file   Relationships    Social connections:     Talks on phone: Not on file     Gets together: Not on file     Attends Evangelical service: Not on file     Active member of club or organization: Not on file     Attends meetings of clubs or organizations: Not on file     Relationship status: Not on file    Intimate partner violence:     Fear of current or ex partner: Not on file     Emotionally abused: Not on file     Physically abused: Not on file     Forced sexual activity: Not on file   Other Topics Concern    Not on file   Social History Narrative    Not on file         ALLERGIES: Honey; Remicade [infliximab]; Crestor [rosuvastatin]; Other plant, animal, environmental; Imuran [azathioprine]; Mercaptopurine; and Sulfa (sulfonamide antibiotics)    Review of Systems   Constitutional: Negative for fever. HENT: Negative for congestion. Eyes: Negative for pain. Respiratory: Negative for cough and shortness of breath. Cardiovascular: Negative for chest pain. Gastrointestinal: Positive for abdominal pain and blood in stool. Endocrine: Negative for polyuria. Genitourinary: Negative for flank pain. Musculoskeletal: Negative for neck pain. Skin: Negative for color change. Allergic/Immunologic: Negative for immunocompromised state. Neurological: Negative for headaches. Hematological: Does not bruise/bleed easily. Psychiatric/Behavioral: Negative for confusion. All other systems reviewed and are negative. There were no vitals filed for this visit. Physical Exam   Constitutional: He is oriented to person, place, and time. He appears well-developed and well-nourished. No distress. HENT:   Head: Normocephalic and atraumatic. Right Ear: External ear normal.   Left Ear: External ear normal.   Eyes: Pupils are equal, round, and reactive to light. EOM are normal.   Neck: Normal range of motion. Neck supple. No JVD present. No tracheal deviation present. Cardiovascular: Normal rate, regular rhythm and normal heart sounds. Exam reveals no gallop and no friction rub. No murmur heard. Pulmonary/Chest: Effort normal and breath sounds normal. No stridor. No respiratory distress. He has no wheezes. He has no rales. Abdominal: Soft. Bowel sounds are normal. He exhibits no distension. There is generalized tenderness and tenderness in the right lower quadrant. There is no rebound and no guarding. Multiple healed scars over abdomen   Genitourinary:   Genitourinary Comments: Tender rectal exam with stool in rectal vault. Musculoskeletal: Normal range of motion.  He exhibits no edema or tenderness. Neurological: He is alert and oriented to person, place, and time. He has normal reflexes. No cranial nerve deficit. Coordination normal.   Skin: Skin is warm and dry. No rash noted. He is not diaphoretic. No erythema. Psychiatric: He has a normal mood and affect. His behavior is normal. Judgment and thought content normal.   Nursing note and vitals reviewed. Note written by Elvis Holland, as dictated by Daisy Bennett MD 6:55 PM        MDM  Number of Diagnoses or Management Options  Diagnosis management comments: 36-year-old white male with multiple abdominal surgeries and many issues with Crohn's disease presents to the emergency department with abdominal pain mostly on the right side. Will check basic blood work. Will check urinalysis. Will check CT of the abdomen. Will give IV fluids, nausea medication, pain medication. Will reassess. Amount and/or Complexity of Data Reviewed  Clinical lab tests: ordered and reviewed  Tests in the radiology section of CPT®: ordered and reviewed  Tests in the medicine section of CPT®: ordered and reviewed  Decide to obtain previous medical records or to obtain history from someone other than the patient: yes  Obtain history from someone other than the patient: yes  Review and summarize past medical records: yes  Independent visualization of images, tracings, or specimens: yes    Risk of Complications, Morbidity, and/or Mortality  Presenting problems: high  Diagnostic procedures: high  Management options: high           Procedures  9:01 PM  Labs are positive for blood in stool    CT shows Chrons but no acute process    Reassessment shows still same abd pain     Will admit for further treatment      Hospitalist Echo for Admission  9:00 PM    ED Room Number: ER07/07  Patient Name and age:  Mildred Griffith 63 y.o.  male  Working Diagnosis:   1.  Crohn's disease with complication, unspecified gastrointestinal tract location ([facility]    2. Abdominal pain, generalized    3.  Blood in stool      Readmission: no  Isolation Requirements:  no  Recommended Level of Care:  med/surg  Code Status:  Full Code  Department:[facility] Adult ED - 21   Other:

## 2019-10-06 NOTE — ED TRIAGE NOTES
Pt reports having severe right mid to lower abd pain that began at 2200 last night. Pt states the pain has increased over the day and he is now having dark red blood in stool. Pt has HX of Crohn's  Disease.

## 2019-10-07 LAB
ANION GAP SERPL CALC-SCNC: 4 MMOL/L (ref 5–15)
APPEARANCE UR: CLEAR
BACTERIA URNS QL MICRO: NEGATIVE /HPF
BILIRUB UR QL: NEGATIVE
BUN SERPL-MCNC: 18 MG/DL (ref 6–20)
BUN/CREAT SERPL: 18 (ref 12–20)
CALCIUM SERPL-MCNC: 8.5 MG/DL (ref 8.5–10.1)
CHLORIDE SERPL-SCNC: 108 MMOL/L (ref 97–108)
CO2 SERPL-SCNC: 26 MMOL/L (ref 21–32)
COLOR UR: ABNORMAL
COMMENT, HOLDF: NORMAL
CREAT SERPL-MCNC: 1.02 MG/DL (ref 0.7–1.3)
EPITH CASTS URNS QL MICRO: ABNORMAL /LPF
ERYTHROCYTE [DISTWIDTH] IN BLOOD BY AUTOMATED COUNT: 14.1 % (ref 11.5–14.5)
GLUCOSE SERPL-MCNC: 90 MG/DL (ref 65–100)
GLUCOSE UR STRIP.AUTO-MCNC: NEGATIVE MG/DL
HCT VFR BLD AUTO: 35.6 % (ref 36.6–50.3)
HGB BLD-MCNC: 11.6 G/DL (ref 12.1–17)
HGB UR QL STRIP: ABNORMAL
HYALINE CASTS URNS QL MICRO: ABNORMAL /LPF (ref 0–5)
KETONES UR QL STRIP.AUTO: NEGATIVE MG/DL
LEUKOCYTE ESTERASE UR QL STRIP.AUTO: NEGATIVE
MCH RBC QN AUTO: 34.1 PG (ref 26–34)
MCHC RBC AUTO-ENTMCNC: 32.6 G/DL (ref 30–36.5)
MCV RBC AUTO: 104.7 FL (ref 80–99)
NITRITE UR QL STRIP.AUTO: NEGATIVE
NRBC # BLD: 0 K/UL (ref 0–0.01)
NRBC BLD-RTO: 0 PER 100 WBC
PH UR STRIP: 5.5 [PH] (ref 5–8)
PLATELET # BLD AUTO: 254 K/UL (ref 150–400)
PMV BLD AUTO: 10 FL (ref 8.9–12.9)
POTASSIUM SERPL-SCNC: 4.8 MMOL/L (ref 3.5–5.1)
PROT UR STRIP-MCNC: NEGATIVE MG/DL
RBC # BLD AUTO: 3.4 M/UL (ref 4.1–5.7)
RBC #/AREA URNS HPF: ABNORMAL /HPF (ref 0–5)
SAMPLES BEING HELD,HOLD: NORMAL
SODIUM SERPL-SCNC: 138 MMOL/L (ref 136–145)
SP GR UR REFRACTOMETRY: 1.01 (ref 1–1.03)
UR CULT HOLD, URHOLD: NORMAL
UROBILINOGEN UR QL STRIP.AUTO: 0.2 EU/DL (ref 0.2–1)
WBC # BLD AUTO: 9.1 K/UL (ref 4.1–11.1)
WBC URNS QL MICRO: ABNORMAL /HPF (ref 0–4)

## 2019-10-07 PROCEDURE — 80048 BASIC METABOLIC PNL TOTAL CA: CPT

## 2019-10-07 PROCEDURE — 74011250636 HC RX REV CODE- 250/636: Performed by: INTERNAL MEDICINE

## 2019-10-07 PROCEDURE — 81001 URINALYSIS AUTO W/SCOPE: CPT

## 2019-10-07 PROCEDURE — 74011250636 HC RX REV CODE- 250/636: Performed by: HOSPITALIST

## 2019-10-07 PROCEDURE — 65270000029 HC RM PRIVATE

## 2019-10-07 PROCEDURE — 36415 COLL VENOUS BLD VENIPUNCTURE: CPT

## 2019-10-07 PROCEDURE — 85027 COMPLETE CBC AUTOMATED: CPT

## 2019-10-07 PROCEDURE — 74011250637 HC RX REV CODE- 250/637: Performed by: NURSE PRACTITIONER

## 2019-10-07 PROCEDURE — 74011250637 HC RX REV CODE- 250/637: Performed by: HOSPITALIST

## 2019-10-07 RX ORDER — SIMETHICONE 80 MG
80 TABLET,CHEWABLE ORAL
Status: DISCONTINUED | OUTPATIENT
Start: 2019-10-07 | End: 2019-10-09 | Stop reason: HOSPADM

## 2019-10-07 RX ORDER — LANOLIN ALCOHOL/MO/W.PET/CERES
3 CREAM (GRAM) TOPICAL
Status: DISCONTINUED | OUTPATIENT
Start: 2019-10-07 | End: 2019-10-09 | Stop reason: HOSPADM

## 2019-10-07 RX ORDER — HYDROMORPHONE HYDROCHLORIDE 1 MG/ML
1 INJECTION, SOLUTION INTRAMUSCULAR; INTRAVENOUS; SUBCUTANEOUS
Status: DISCONTINUED | OUTPATIENT
Start: 2019-10-07 | End: 2019-10-09 | Stop reason: HOSPADM

## 2019-10-07 RX ADMIN — PANTOPRAZOLE SODIUM 40 MG: 40 TABLET, DELAYED RELEASE ORAL at 08:31

## 2019-10-07 RX ADMIN — HYDROMORPHONE HYDROCHLORIDE 1 MG: 1 INJECTION, SOLUTION INTRAMUSCULAR; INTRAVENOUS; SUBCUTANEOUS at 19:12

## 2019-10-07 RX ADMIN — METHYLPREDNISOLONE SODIUM SUCCINATE 40 MG: 40 INJECTION, POWDER, FOR SOLUTION INTRAMUSCULAR; INTRAVENOUS at 07:27

## 2019-10-07 RX ADMIN — METHYLPREDNISOLONE SODIUM SUCCINATE 40 MG: 40 INJECTION, POWDER, FOR SOLUTION INTRAMUSCULAR; INTRAVENOUS at 15:00

## 2019-10-07 RX ADMIN — HYDROMORPHONE HYDROCHLORIDE 1 MG: 1 INJECTION, SOLUTION INTRAMUSCULAR; INTRAVENOUS; SUBCUTANEOUS at 10:15

## 2019-10-07 RX ADMIN — Medication 10 ML: at 07:27

## 2019-10-07 RX ADMIN — Medication 10 ML: at 10:16

## 2019-10-07 RX ADMIN — METHYLPREDNISOLONE SODIUM SUCCINATE 40 MG: 40 INJECTION, POWDER, FOR SOLUTION INTRAMUSCULAR; INTRAVENOUS at 23:52

## 2019-10-07 RX ADMIN — Medication 10 ML: at 15:10

## 2019-10-07 RX ADMIN — SODIUM CHLORIDE 100 ML/HR: 900 INJECTION, SOLUTION INTRAVENOUS at 09:48

## 2019-10-07 RX ADMIN — SIMETHICONE CHEW TAB 80 MG 80 MG: 80 TABLET ORAL at 22:06

## 2019-10-07 RX ADMIN — DIPHENOXYLATE HYDROCHLORIDE AND ATROPINE SULFATE 2 TABLET: 2.5; .025 TABLET ORAL at 11:40

## 2019-10-07 RX ADMIN — MELATONIN 3 MG: at 23:30

## 2019-10-07 RX ADMIN — DIPHENOXYLATE HYDROCHLORIDE AND ATROPINE SULFATE 2 TABLET: 2.5; .025 TABLET ORAL at 22:06

## 2019-10-07 RX ADMIN — HYDROMORPHONE HYDROCHLORIDE 1 MG: 1 INJECTION, SOLUTION INTRAMUSCULAR; INTRAVENOUS; SUBCUTANEOUS at 23:30

## 2019-10-07 RX ADMIN — DIPHENOXYLATE HYDROCHLORIDE AND ATROPINE SULFATE 2 TABLET: 2.5; .025 TABLET ORAL at 07:27

## 2019-10-07 RX ADMIN — GABAPENTIN 300 MG: 300 CAPSULE ORAL at 22:06

## 2019-10-07 RX ADMIN — GABAPENTIN 300 MG: 300 CAPSULE ORAL at 08:31

## 2019-10-07 RX ADMIN — METHYLPREDNISOLONE SODIUM SUCCINATE 40 MG: 40 INJECTION, POWDER, FOR SOLUTION INTRAMUSCULAR; INTRAVENOUS at 00:03

## 2019-10-07 RX ADMIN — HYDROMORPHONE HYDROCHLORIDE 1 MG: 1 INJECTION, SOLUTION INTRAMUSCULAR; INTRAVENOUS; SUBCUTANEOUS at 15:09

## 2019-10-07 RX ADMIN — DIPHENOXYLATE HYDROCHLORIDE AND ATROPINE SULFATE 2 TABLET: 2.5; .025 TABLET ORAL at 16:42

## 2019-10-07 RX ADMIN — GABAPENTIN 300 MG: 300 CAPSULE ORAL at 15:01

## 2019-10-07 RX ADMIN — HYDROMORPHONE HYDROCHLORIDE 1 MG: 1 INJECTION, SOLUTION INTRAMUSCULAR; INTRAVENOUS; SUBCUTANEOUS at 05:36

## 2019-10-07 NOTE — PROGRESS NOTES
TRANSITIONS OF CARE PLAN:   1. DESTINATION: Likely Own Home  2. TRANSPORT: Self - vehicle is on site  3. ADDITIONAL SUPPORT: Appeared limited   4. DME: Canes, Rollator, Raised Toilet Seats, Shower stool, Grab Bars in shower  5. HOME HEALTH: hx of HH with [id number]  6. CODE STATUS/AMD STATUS: FULL CODE; on file  7. FOLLOW UP APPOINTMENTS: [place]-Rectal, GI, PCP  8. STILL NEEDS: GI consult pending    Reason for Admission:   ABD Pain                   RRAT Score:    11                 Plan for utilizing home health:      Has hx with [address] [facility]                    Current Advanced Directive/Advance Care Plan: FULL CODE; on file                         Transition of Care Plan:      Patient has an extensive hx of Crohns with multiple ABD surgeries and multiple admissions. Patient identified that he is followed by Dr. Adolfo Burr, colo-rectal and Dr. Kay Lanier, pain management. Patient had an admission in June that involved attempts to get him coverage for [facility], including filing of financial assistance forms. Per patient, [facility] did not process the financial assistance requests. Patient and spouse are legally  but have been  for 5 years, and patient believes that financial assistance is counting the spouse's income as well. Patient identified that he has been in a deep depression for 2-3 months, including not wanting to be around people, staying in his room most of the time, not going out, stating, \"I've just had it, and I feel safer on my own. \"  CM discussed the above with attending, who will plan to see patient again and possibly submit psych consult, as well as patient's pending GI Consult. [facility].               Care Management Interventions  PCP Verified by CM: Yes(last seen by Dr. Lisa Jorgensen 2 months ago)  Palliative Care Criteria Met (RRAT>21 & CHF Dx)?: No  Mode of Transport at Discharge: Self(Patient's car is on site)  Transition of Care Consult (CM Consult): Discharge Planning  MyChart Signup: Yes  Discharge Durable Medical Equipment: No(has DME of: canes, rollator, raised toilet seats, shower stool, grab bars in shower)  Health Maintenance Reviewed: Yes(CM met with patient, with patient alert and oriented x 4)  Physical Therapy Consult: No  Occupational Therapy Consult: No  Speech Therapy Consult: No  Current Support Network: Own Home, Other(Lives with 2 roommates)  Confirm Follow Up Transport: Self(Independent in ADLs, to include driving)  Plan discussed with Pt/Family/Caregiver: Yes  [employer] Information Provided?: No  Discharge Location  Discharge Placement: Home with one level(lives with 2 roommates in a single story home, 3 exterior steps)    CRM: Juanpablo Moore, MPH, [address] [place] [address]; Z: [phone]

## 2019-10-07 NOTE — PROGRESS NOTES
Bedside shift change report given to Florencio Ford RN (oncoming nurse) by Marin Thakur RN (offgoing nurse). Report included the following information SBAR, Kardex, ED Summary, Intake/Output, MAR and Recent Results.

## 2019-10-07 NOTE — ROUTINE PROCESS
TRANSFER - OUT REPORT: 
 
Verbal report given to Yinbetsyhussein  (name) on Pacheco Liur  being transferred to [address] [facility] (unit) for routine progression of care Report consisted of patients Situation, Background, Assessment and  
Recommendations(SBAR). Information from the following report(s) SBAR, ED Summary, [facility] and Recent Results was reviewed with the receiving nurse. Lines:  
Peripheral IV 10/06/19 Left Arm (Active) Opportunity for questions and clarification was provided. Patient transported with: 
 Registered Nurse

## 2019-10-07 NOTE — PROGRESS NOTES
Hospitalist Progress Note                               Thaddeus Arndt MD                                     Answering service: [phone]                               OR [phone] from in house phone                                         Date of Service:  10/7/2019  NAME:  Anyi Gonzalez  :  1960  MRN:  [id number]      Admission Summary:      62 Y/o gentleman with a PMH significant for Crohn's disease, status post multiple surgeries, Short Bowel syndrome, Vitamin B12 deficiency, GERD, Incisional hernia and chronic pain syndrome was brought to the Er for complaints of worsening abdominal pain and dark stools. Patient denied having any nausea, vomiting, fevers or chills. Reason for follow up:     CC: Abdominal pain and dark stools. Patient was in no acute distress on early am rounds. Stated that his abdominal pain was not adequately controlled. Assessment & Plan:     1) GI: Long H/O Crohn's disease with probable acute flare. Not on any Immunomodulating therapies. H/O multiple surgeries with Short Bowel syndrome. Incisional Hernia. Still symptomatic with abdominal pain. Continue IVFs, IV steroids, IV anlgesics. GI consult. 2) ID: Crohn's flare without any stigmata of sepsis. Afebrile. Monitor off anti-infective therapy. 3) Analgesia: Will aim to optimize pain control with judicious use of opiates. Counselled patient. 4) Hematology: Probable anemia of chronicity. 5) Prophylaxis: DVT: SCDs. 6) Directives: Full Code. D/W patient. 7) Plan: Anticipate D/C to home in 3-5 days. D/W patient and Nursing. Hospital Problems  Date Reviewed: 2019          Codes Class Noted POA    Abdominal pain ICD-10-CM: R10.9  ICD-9-CM: 789.00  2019 Unknown                Physical Examination:      Last 24hrs VS reviewed since prior progress note.  Most recent are:  Visit Vitals  /78   Pulse 88   Temp 97.7 °F (36.5 °C)   Resp 18   SpO2 97%           Constitutional:  No acute distress, cooperative, pleasant    HEENT: Head is a traumatic,  Un icteric sclera. Pink conjunctiva,no erythema or discharge. Oral mucous moist, oropharynx benign. Neck supple,    Resp:  CTA bilaterally. No wheezing/rhonchi/rales. No accessory muscle use   CV:  Regular rhythm, normal rate, no murmurs, gallops, rubs    GI:  Soft, extensive scar tissue, non distended, generalized tenderness on deep palpation. hyperactive bowel sounds, no hepatosplenomegaly    :  No CVA or suprapubic tenderness   Skin  :  No erythema,rash,bullae,dipigmentation     Musculoskeletal:  No asymmetry or significant pedal edema. Neurologic:  Awake, alert and oriented. No intake or output data in the 24 hours ending 10/07/19 1316         Labs:     Recent Labs     10/07/19  0420 10/06/19  1907   WBC 9.1 12.5*   HGB 11.6* 13.1   HCT 35.6* 39.7    288     Recent Labs     10/07/19  0420 10/06/19  1907    139   K 4.8 4.0    110*   CO2 26 23   BUN 18 21*   CREA 1.02 1.03   GLU 90 90   CA 8.5 8.6     Recent Labs     10/06/19  1907   SGOT 11*   ALT 19   AP 60   TBILI 0.6   TP 6.6   ALB 3.6   GLOB 3.0   LPSE 128     Recent Labs     10/06/19  1907   INR 1.0   PTP 9.8   APTT 25.4      No results for input(s): FE, TIBC, PSAT, FERR in the last 72 hours. No results found for: FOL, RBCF   No results for input(s): PH, PCO2, PO2 in the last 72 hours. No results for input(s): CPK, CKNDX, TROIQ in the last 72 hours.     No lab exists for component: CPKMB  Lab Results   Component Value Date/Time    Cholesterol, total 134 12/01/2014 04:33 AM    HDL Cholesterol 35 12/01/2014 04:33 AM    LDL, calculated 58.2 12/01/2014 04:33 AM    Triglyceride 204 (H) 12/01/2014 04:33 AM    CHOL/HDL Ratio 3.8 12/01/2014 04:33 AM     Lab Results   Component Value Date/Time    Glucose (POC) 113 (H) 11/10/2017 11:35 AM    Glucose (POC) 120 (H) 11/10/2017 05:54 AM    Glucose (POC) 87 11/09/2017 11:43 PM Glucose (POC) 107 (H) 11/09/2017 07:07 PM    Glucose (POC) 107 (H) 11/09/2017 11:22 AM     Lab Results   Component Value Date/Time    Color YELLOW/STRAW 10/07/2019 05:48 AM    Appearance CLEAR 10/07/2019 05:48 AM    Specific gravity 1.010 10/07/2019 05:48 AM    Specific gravity 1.028 02/14/2018 09:44 PM    pH (UA) 5.5 10/07/2019 05:48 AM    Protein NEGATIVE  10/07/2019 05:48 AM    Glucose NEGATIVE  10/07/2019 05:48 AM    Ketone NEGATIVE  10/07/2019 05:48 AM    Bilirubin NEGATIVE  10/07/2019 05:48 AM    Urobilinogen 0.2 10/07/2019 05:48 AM    Nitrites NEGATIVE  10/07/2019 05:48 AM    Leukocyte Esterase NEGATIVE  10/07/2019 05:48 AM    Epithelial cells FEW 10/07/2019 05:48 AM    Bacteria NEGATIVE  10/07/2019 05:48 AM    WBC 0-4 10/07/2019 05:48 AM    RBC 0-5 10/07/2019 05:48 AM         Medications Reviewed:     Current Facility-Administered Medications   Medication Dose Route Frequency    HYDROmorphone (PF) (DILAUDID) injection 1 mg  1 mg IntraVENous Q4H PRN    sodium chloride (NS) flush 5-40 mL  5-40 mL IntraVENous Q8H    sodium chloride (NS) flush 5-40 mL  5-40 mL IntraVENous PRN    gabapentin (NEURONTIN) capsule 300 mg  300 mg Oral TID    diphenoxylate-atropine (LOMOTIL) tablet 2 Tab  2 Tab Oral AC&HS    methylPREDNISolone (PF) (SOLU-MEDROL) injection 40 mg  40 mg IntraVENous Q8H    ondansetron (ZOFRAN) injection 4 mg  4 mg IntraVENous Q6H PRN    0.9% sodium chloride infusion  100 mL/hr IntraVENous CONTINUOUS    pantoprazole (PROTONIX) tablet 40 mg  40 mg Oral DAILY    nicotine (NICODERM CQ) 21 mg/24 hr patch 1 Patch  1 Patch TransDERmal DAILY     ______________________________________________________________________  EXPECTED LENGTH OF STAY: 3d 12h  ACTUAL LENGTH OF STAY:          1                 Marcin Ag MD

## 2019-10-07 NOTE — PROGRESS NOTES
Patient arrived to unit at 10:55pm. VS Stable. RN administered IV Fluids, pain medication, and scheduled medications. Patient requested nicotine patch 21 mg. RN paged hospitalist, Tessie Grijalva NP, who ordered a nicotine patch 21 mg. Patient refused SCDs. Patient urinated before RN was able to collect the urine sample and culture. Patient informed of the need for sample and explained process. Urine culture kit at bedside.

## 2019-10-07 NOTE — H&P
[address]   HISTORY AND PHYSICAL    Name:  Missy Mane  MR#:  [id number]  :  1960  ACCOUNT #:  [de-identified]  ADMIT DATE:  10/06/2019      PRIMARY CARE PHYSICIAN:  Magda Diaz MD    GASTROENTEROLOGIST:  Jorge Koenig MD    COLORECTAL SURGERY:  Fitz Moore MD    PRESENTING COMPLAINT:  Abdominal pain and blood in the stool. HISTORY OF PRESENTING ILLNESS:  The patient is a very pleasant 81-year-old gentleman, who has Crohn's disease with previous multiple admissions and surgeries. He has more than 30 surgeries and has short bowel syndrome. He has entire rectum, little or no colon, and between 125 cm of small intestine anastomosed to the rectosigmoid area. The patient was admitted to this hospital in 2019. His last hospital admission was for Crohn's flare up. The patient was discharged and was supposed to be on biologic, however, he tells me that Humira was very expensive even with insurance. Currently he is not on any biologic agent. Today, the patient presents to the emergency room due to abdominal pain which started last night. It is associated with black stools. He has chronic diarrhea, approximately 10-20 loose bowel movements per day which has not changed. He denies having any nausea, vomiting or fever. In the emergency room, his stool was heme positive. His CT scan of the abdomen was unremarkable, it showed partial colectomy. It was decided that the patient should be admitted. PAST MEDICAL HISTORY:     1. Crohn's disease. 2.  History of gastroesophageal reflux disease. 3.  B12 deficiency. 4.  Chronic pain. 5.  Short bowel syndrome. 6.  History of incisional hernia. 7.  History of hypertension. SOCIOECONOMIC HISTORY:  The patient does smoke, almost pack per day. He does not drink. He does not work. CODE STATUS:  Full code. ALLERGIES:  INCLUDE REMICADE; CRESTOR; IMURAN, MERCAPTOPURINE, AND SULFA.     CURRENT MEDICATIONS:  Prior to admission include gabapentin 300 mg three times daily. He also takes hydrocodone and p.r.n. Lomotil. REVIEW OF SYSTEMS:  Negative except as mentioned in the history of presenting illness. All systems were reviewed. No other positive finding was noticed. PHYSICAL EXAMINATION:  GENERAL:  The patient is a 50-year-old gentleman, not in any acute distress, resting comfortably. VITAL SIGNS:  Pending at this time. HEENT:  Examination reveals pupils equally reacting to light and accommodation. NECK:  Supple. There is no adenopathy or JVD. CHEST:  Clear. No wheezing or crackles. CARDIOVASCULAR SYSTEM:  S1 and S2 regular. No murmur. No S3.  ABDOMEN:  Examination reveals scar marks from previous surgeries. There is tenderness on the right lower quadrant but no guarding or rigidity. Bowel sounds are active. EXTREMITIES:  No pedal edema. CNS:  Examination is nonfocal.  The patient is alert, oriented. Has normal strength and reflexes. Plantar is downgoing. Cranial nerves are normal.  PSYCHIATRIC:  Examination is unremarkable. LABORATORY DATA:  Lab work revealed CBC with white count of 12.5, hemoglobin of 13.1, hematocrit 39.7, MCV 93.9, platelet count is 321,414. Chemistries revealed sodium of 139, potassium is 4, chloride is 110, bicarb is 23, gap of 6, glucose is 90, BUN is 21, creatinine is 1.03, bilirubin 0.6, protein 6.6, albumin 3.6, globulin 3. ALT is 19, AST is 11, alkaline phosphatase 260, lipase is 128. INR is 1. Stool Hemoccult is positive. CT abdomen and pelvis with contrast reveals status post partial colectomy with short segment wall thickening at the mid sigmoid colon which is unchanged. No acute abnormality was seen. ASSESSMENT AND PLAN:      The patient is a 50-year-old gentleman who has previous history significant for Crohn's disease, status post multiple surgeries and multiple hospital admissions.   It is not clear if he had enterocutaneous fistula in the past.  He is being admitted due to:    1. Abdominal pain. The patient is currently not taking any biologic agent due to cost issues. He tells me he has taken Remicade in the past but never started Humira. We will consult Gastroenterology for deciding about biologic agent. 2.  We will start him on low-dose steroids. 3.  Pain management. 4.  Gastrointestinal bleed which is associated with his Crohn's disease. He is hemodynamically stable. Hemoglobin is normal.  We will monitor  Serial Hemoglobin. 5.  Deep venous thrombosis prophylaxis.         Bubba Alamo MD      [id number]  D:  10/06/2019 21:33  T:  10/07/2019 2:57  JOB #:  [id number]

## 2019-10-07 NOTE — PROGRESS NOTES
Patient examined, consult dictated. He presents with abdominal pain, diarrhea, black stools and blood in stools and longstanding history of Crohn diease, numerous surgeries including small bowel resections, subtotal colectomy. Ileocolonic anastomosis, etc. See detailed consult. We will order Crohn activity markers and proceed with flex sig to assess active Crohn disease. Further recommendations to follow.

## 2019-10-07 NOTE — PROGRESS NOTES
Bedside shift change report given to Jose Maria (oncoming nurse) by Abhi Schrader (offgoing nurse). Report included the following information SBAR.

## 2019-10-08 ENCOUNTER — ANESTHESIA (OUTPATIENT)
Dept: ENDOSCOPY | Age: 59
DRG: 392 | End: 2019-10-08
Payer: MEDICARE

## 2019-10-08 ENCOUNTER — ANESTHESIA EVENT (OUTPATIENT)
Dept: ENDOSCOPY | Age: 59
DRG: 392 | End: 2019-10-08
Payer: MEDICARE

## 2019-10-08 LAB
ALBUMIN SERPL-MCNC: 3.3 G/DL (ref 3.5–5)
ALBUMIN/GLOB SERPL: 1.3 {RATIO} (ref 1.1–2.2)
ALP SERPL-CCNC: 52 U/L (ref 45–117)
ALT SERPL-CCNC: 14 U/L (ref 12–78)
ANION GAP SERPL CALC-SCNC: 9 MMOL/L (ref 5–15)
AST SERPL-CCNC: 9 U/L (ref 15–37)
BASOPHILS # BLD: 0 K/UL (ref 0–0.1)
BASOPHILS NFR BLD: 0 % (ref 0–1)
BILIRUB SERPL-MCNC: 0.3 MG/DL (ref 0.2–1)
BUN SERPL-MCNC: 13 MG/DL (ref 6–20)
BUN/CREAT SERPL: 15 (ref 12–20)
CALCIUM SERPL-MCNC: 8.6 MG/DL (ref 8.5–10.1)
CHLORIDE SERPL-SCNC: 106 MMOL/L (ref 97–108)
CO2 SERPL-SCNC: 24 MMOL/L (ref 21–32)
CREAT SERPL-MCNC: 0.85 MG/DL (ref 0.7–1.3)
CRP SERPL HS-MCNC: 0.5 MG/L
DIFFERENTIAL METHOD BLD: ABNORMAL
EOSINOPHIL # BLD: 0 K/UL (ref 0–0.4)
EOSINOPHIL NFR BLD: 0 % (ref 0–7)
ERYTHROCYTE [DISTWIDTH] IN BLOOD BY AUTOMATED COUNT: 14.2 % (ref 11.5–14.5)
ERYTHROCYTE [SEDIMENTATION RATE] IN BLOOD: 12 MM/HR (ref 0–20)
FOLATE SERPL-MCNC: 16 NG/ML (ref 5–21)
GLOBULIN SER CALC-MCNC: 2.6 G/DL (ref 2–4)
GLUCOSE SERPL-MCNC: 119 MG/DL (ref 65–100)
HCT VFR BLD AUTO: 30.4 % (ref 36.6–50.3)
HGB BLD-MCNC: 9.7 G/DL (ref 12.1–17)
IMM GRANULOCYTES # BLD AUTO: 0.1 K/UL (ref 0–0.04)
IMM GRANULOCYTES NFR BLD AUTO: 0 % (ref 0–0.5)
LYMPHOCYTES # BLD: 1.2 K/UL (ref 0.8–3.5)
LYMPHOCYTES NFR BLD: 11 % (ref 12–49)
MCH RBC QN AUTO: 33.8 PG (ref 26–34)
MCHC RBC AUTO-ENTMCNC: 31.9 G/DL (ref 30–36.5)
MCV RBC AUTO: 105.9 FL (ref 80–99)
MONOCYTES # BLD: 0.3 K/UL (ref 0–1)
MONOCYTES NFR BLD: 2 % (ref 5–13)
NEUTS SEG # BLD: 9.7 K/UL (ref 1.8–8)
NEUTS SEG NFR BLD: 87 % (ref 32–75)
NRBC # BLD: 0 K/UL (ref 0–0.01)
NRBC BLD-RTO: 0 PER 100 WBC
PLATELET # BLD AUTO: 230 K/UL (ref 150–400)
PMV BLD AUTO: 10.3 FL (ref 8.9–12.9)
POTASSIUM SERPL-SCNC: 4.5 MMOL/L (ref 3.5–5.1)
PROT SERPL-MCNC: 5.9 G/DL (ref 6.4–8.2)
RBC # BLD AUTO: 2.87 M/UL (ref 4.1–5.7)
SODIUM SERPL-SCNC: 139 MMOL/L (ref 136–145)
VIT B12 SERPL-MCNC: <60 PG/ML (ref 193–986)
WBC # BLD AUTO: 11.3 K/UL (ref 4.1–11.1)

## 2019-10-08 PROCEDURE — 74011250637 HC RX REV CODE- 250/637: Performed by: SPECIALIST

## 2019-10-08 PROCEDURE — 85652 RBC SED RATE AUTOMATED: CPT

## 2019-10-08 PROCEDURE — 74011000250 HC RX REV CODE- 250: Performed by: NURSE ANESTHETIST, CERTIFIED REGISTERED

## 2019-10-08 PROCEDURE — 82746 ASSAY OF FOLIC ACID SERUM: CPT

## 2019-10-08 PROCEDURE — 65270000029 HC RM PRIVATE

## 2019-10-08 PROCEDURE — 82607 VITAMIN B-12: CPT

## 2019-10-08 PROCEDURE — 88305 TISSUE EXAM BY PATHOLOGIST: CPT

## 2019-10-08 PROCEDURE — 74011250637 HC RX REV CODE- 250/637: Performed by: HOSPITALIST

## 2019-10-08 PROCEDURE — 0DBP8ZX EXCISION OF RECTUM, VIA NATURAL OR ARTIFICIAL OPENING ENDOSCOPIC, DIAGNOSTIC: ICD-10-PCS | Performed by: SPECIALIST

## 2019-10-08 PROCEDURE — 74011250637 HC RX REV CODE- 250/637: Performed by: INTERNAL MEDICINE

## 2019-10-08 PROCEDURE — 74011250636 HC RX REV CODE- 250/636: Performed by: HOSPITALIST

## 2019-10-08 PROCEDURE — 74011250636 HC RX REV CODE- 250/636: Performed by: NURSE ANESTHETIST, CERTIFIED REGISTERED

## 2019-10-08 PROCEDURE — 74011250637 HC RX REV CODE- 250/637: Performed by: NURSE PRACTITIONER

## 2019-10-08 PROCEDURE — 76060000031 HC ANESTHESIA FIRST 0.5 HR: Performed by: SPECIALIST

## 2019-10-08 PROCEDURE — 80053 COMPREHEN METABOLIC PANEL: CPT

## 2019-10-08 PROCEDURE — 36415 COLL VENOUS BLD VENIPUNCTURE: CPT

## 2019-10-08 PROCEDURE — 83993 ASSAY FOR CALPROTECTIN FECAL: CPT

## 2019-10-08 PROCEDURE — 85025 COMPLETE CBC W/AUTO DIFF WBC: CPT

## 2019-10-08 PROCEDURE — 77030021593 HC FCPS BIOP ENDOSC BSC -A: Performed by: SPECIALIST

## 2019-10-08 PROCEDURE — 74011250636 HC RX REV CODE- 250/636: Performed by: INTERNAL MEDICINE

## 2019-10-08 PROCEDURE — 86141 C-REACTIVE PROTEIN HS: CPT

## 2019-10-08 PROCEDURE — 76040000019: Performed by: SPECIALIST

## 2019-10-08 RX ORDER — DEXTROMETHORPHAN/PSEUDOEPHED 2.5-7.5/.8
1.2 DROPS ORAL
Status: DISCONTINUED | OUTPATIENT
Start: 2019-10-08 | End: 2019-10-08 | Stop reason: HOSPADM

## 2019-10-08 RX ORDER — PROPOFOL 10 MG/ML
INJECTION, EMULSION INTRAVENOUS AS NEEDED
Status: DISCONTINUED | OUTPATIENT
Start: 2019-10-08 | End: 2019-10-08 | Stop reason: HOSPADM

## 2019-10-08 RX ORDER — SODIUM CHLORIDE 9 MG/ML
INJECTION, SOLUTION INTRAVENOUS
Status: DISCONTINUED | OUTPATIENT
Start: 2019-10-08 | End: 2019-10-08 | Stop reason: HOSPADM

## 2019-10-08 RX ORDER — SODIUM CHLORIDE 9 MG/ML
50 INJECTION, SOLUTION INTRAVENOUS CONTINUOUS
Status: DISPENSED | OUTPATIENT
Start: 2019-10-08 | End: 2019-10-08

## 2019-10-08 RX ORDER — SODIUM CHLORIDE 0.9 % (FLUSH) 0.9 %
5-40 SYRINGE (ML) INJECTION AS NEEDED
Status: DISCONTINUED | OUTPATIENT
Start: 2019-10-08 | End: 2019-10-09 | Stop reason: HOSPADM

## 2019-10-08 RX ORDER — LIDOCAINE HYDROCHLORIDE 20 MG/ML
INJECTION, SOLUTION EPIDURAL; INFILTRATION; INTRACAUDAL; PERINEURAL AS NEEDED
Status: DISCONTINUED | OUTPATIENT
Start: 2019-10-08 | End: 2019-10-08 | Stop reason: HOSPADM

## 2019-10-08 RX ORDER — EPINEPHRINE 0.1 MG/ML
1 INJECTION INTRACARDIAC; INTRAVENOUS
Status: DISCONTINUED | OUTPATIENT
Start: 2019-10-08 | End: 2019-10-08 | Stop reason: HOSPADM

## 2019-10-08 RX ORDER — IBUPROFEN 200 MG
1 TABLET ORAL DAILY
Status: DISCONTINUED | OUTPATIENT
Start: 2019-10-08 | End: 2019-10-09 | Stop reason: HOSPADM

## 2019-10-08 RX ORDER — MONTELUKAST SODIUM 4 MG/1
2 TABLET, CHEWABLE ORAL 2 TIMES DAILY
Status: DISCONTINUED | OUTPATIENT
Start: 2019-10-08 | End: 2019-10-09 | Stop reason: HOSPADM

## 2019-10-08 RX ORDER — SODIUM CHLORIDE 0.9 % (FLUSH) 0.9 %
5-40 SYRINGE (ML) INJECTION EVERY 8 HOURS
Status: DISCONTINUED | OUTPATIENT
Start: 2019-10-08 | End: 2019-10-09 | Stop reason: HOSPADM

## 2019-10-08 RX ORDER — ATROPINE SULFATE 0.1 MG/ML
0.5 INJECTION INTRAVENOUS
Status: DISCONTINUED | OUTPATIENT
Start: 2019-10-08 | End: 2019-10-08 | Stop reason: HOSPADM

## 2019-10-08 RX ORDER — FLUMAZENIL 0.1 MG/ML
0.2 INJECTION INTRAVENOUS
Status: DISCONTINUED | OUTPATIENT
Start: 2019-10-08 | End: 2019-10-08 | Stop reason: HOSPADM

## 2019-10-08 RX ORDER — NALOXONE HYDROCHLORIDE 0.4 MG/ML
0.4 INJECTION, SOLUTION INTRAMUSCULAR; INTRAVENOUS; SUBCUTANEOUS
Status: DISCONTINUED | OUTPATIENT
Start: 2019-10-08 | End: 2019-10-08 | Stop reason: HOSPADM

## 2019-10-08 RX ORDER — LORAZEPAM 1 MG/1
1 TABLET ORAL
Status: DISCONTINUED | OUTPATIENT
Start: 2019-10-08 | End: 2019-10-09 | Stop reason: HOSPADM

## 2019-10-08 RX ADMIN — LIDOCAINE HYDROCHLORIDE 60 MG: 20 INJECTION, SOLUTION EPIDURAL; INFILTRATION; INTRACAUDAL; PERINEURAL at 14:34

## 2019-10-08 RX ADMIN — METHYLPREDNISOLONE SODIUM SUCCINATE 40 MG: 40 INJECTION, POWDER, FOR SOLUTION INTRAMUSCULAR; INTRAVENOUS at 07:21

## 2019-10-08 RX ADMIN — DIPHENOXYLATE HYDROCHLORIDE AND ATROPINE SULFATE 2 TABLET: 2.5; .025 TABLET ORAL at 22:40

## 2019-10-08 RX ADMIN — SODIUM CHLORIDE 100 ML/HR: 900 INJECTION, SOLUTION INTRAVENOUS at 04:59

## 2019-10-08 RX ADMIN — PROPOFOL 20 MG: 10 INJECTION, EMULSION INTRAVENOUS at 14:44

## 2019-10-08 RX ADMIN — Medication 10 ML: at 22:41

## 2019-10-08 RX ADMIN — DIPHENOXYLATE HYDROCHLORIDE AND ATROPINE SULFATE 2 TABLET: 2.5; .025 TABLET ORAL at 06:46

## 2019-10-08 RX ADMIN — DIPHENOXYLATE HYDROCHLORIDE AND ATROPINE SULFATE 2 TABLET: 2.5; .025 TABLET ORAL at 16:32

## 2019-10-08 RX ADMIN — HYDROMORPHONE HYDROCHLORIDE 1 MG: 1 INJECTION, SOLUTION INTRAMUSCULAR; INTRAVENOUS; SUBCUTANEOUS at 16:35

## 2019-10-08 RX ADMIN — GABAPENTIN 300 MG: 300 CAPSULE ORAL at 16:32

## 2019-10-08 RX ADMIN — HYDROMORPHONE HYDROCHLORIDE 1 MG: 1 INJECTION, SOLUTION INTRAMUSCULAR; INTRAVENOUS; SUBCUTANEOUS at 12:18

## 2019-10-08 RX ADMIN — SIMETHICONE CHEW TAB 80 MG 80 MG: 80 TABLET ORAL at 16:31

## 2019-10-08 RX ADMIN — SODIUM CHLORIDE 100 ML/HR: 900 INJECTION, SOLUTION INTRAVENOUS at 18:00

## 2019-10-08 RX ADMIN — GABAPENTIN 300 MG: 300 CAPSULE ORAL at 22:40

## 2019-10-08 RX ADMIN — Medication 10 ML: at 16:33

## 2019-10-08 RX ADMIN — SODIUM CHLORIDE: 900 INJECTION, SOLUTION INTRAVENOUS at 14:00

## 2019-10-08 RX ADMIN — DIPHENOXYLATE HYDROCHLORIDE AND ATROPINE SULFATE 2 TABLET: 2.5; .025 TABLET ORAL at 12:17

## 2019-10-08 RX ADMIN — PANTOPRAZOLE SODIUM 40 MG: 40 TABLET, DELAYED RELEASE ORAL at 08:02

## 2019-10-08 RX ADMIN — PROPOFOL 50 MG: 10 INJECTION, EMULSION INTRAVENOUS at 14:35

## 2019-10-08 RX ADMIN — PROPOFOL 50 MG: 10 INJECTION, EMULSION INTRAVENOUS at 14:36

## 2019-10-08 RX ADMIN — HYDROMORPHONE HYDROCHLORIDE 1 MG: 1 INJECTION, SOLUTION INTRAMUSCULAR; INTRAVENOUS; SUBCUTANEOUS at 03:51

## 2019-10-08 RX ADMIN — PROPOFOL 25 MG: 10 INJECTION, EMULSION INTRAVENOUS at 14:37

## 2019-10-08 RX ADMIN — METHYLPREDNISOLONE SODIUM SUCCINATE 40 MG: 40 INJECTION, POWDER, FOR SOLUTION INTRAMUSCULAR; INTRAVENOUS at 16:33

## 2019-10-08 RX ADMIN — PROPOFOL 25 MG: 10 INJECTION, EMULSION INTRAVENOUS at 14:40

## 2019-10-08 RX ADMIN — COLESTIPOL HYDROCHLORIDE 2 G: 1 TABLET, FILM COATED ORAL at 17:59

## 2019-10-08 RX ADMIN — HYDROMORPHONE HYDROCHLORIDE 1 MG: 1 INJECTION, SOLUTION INTRAMUSCULAR; INTRAVENOUS; SUBCUTANEOUS at 22:40

## 2019-10-08 RX ADMIN — GABAPENTIN 300 MG: 300 CAPSULE ORAL at 08:02

## 2019-10-08 RX ADMIN — PROPOFOL 50 MG: 10 INJECTION, EMULSION INTRAVENOUS at 14:34

## 2019-10-08 RX ADMIN — HYDROMORPHONE HYDROCHLORIDE 1 MG: 1 INJECTION, SOLUTION INTRAMUSCULAR; INTRAVENOUS; SUBCUTANEOUS at 08:05

## 2019-10-08 NOTE — PROGRESS NOTES
10/8/19 -   JAMES:  - Patient to have flexible sigmoidoscopy today, 10/8  - Potential steroid and humira plans are pending per GI  - CM discussed possible psych consult with bedside nursing  CRM: Nathan Yang, MPH, [address]; Z: [phone]

## 2019-10-08 NOTE — PROCEDURES
[address]  [address]                 Colonoscopy Procedure Note    Indications:   See Preoperative Diagnosis above  Referring Physician: Lalitha Boggs MD  Anesthesia/Sedation: MAC anesthesia Propofol  Endoscopist:  Dr. Akash Sanchez  Assistant:  Endoscopy Technician-1: Gustavo Lopez IV  Endoscopy RN-1: Sarah Rushing RN  Preoperative diagnosis: Crohn's  Postoperative diagnosis: No Active Crohn's    Procedure in Detail:  Informed consent was obtained for the procedure, including sedation. Risks of perforation, hemorrhage, adverse drug reaction, and aspiration were discussed. The patient was placed in the left lateral decubitus position. Based on the pre-procedure assessment, including review of the patient's medical history, medications, allergies, and review of systems, he had been deemed to be an appropriate candidate for moderate sedation; he was therefore sedated with the medications listed above. The patient was monitored continuously with ECG tracing, pulse oximetry, blood pressure monitoring, and direct observations. A rectal examination was performed. The UGI endoscope was inserted into the rectum and advanced under direct vision to the terminal ileum. The quality of the colonic preparation was adequate. A careful inspection was made as the colonoscope was withdrawn, including a retroflexed view of the rectum; findings and interventions are described below. Appropriate photodocumentation was obtained. Findings:  Rectum: normal mucosa, biopsies done  Sigmoid: normal appearing ileocolonic anastomosis  Terminal Ileum: normal    Specimens:    rectal biopsy    EBL: None    Complications: None; patient tolerated the procedure well. Recommendations:     - Await pathology.      - Low residue diet, start Colestid        Signed By: Akash Sanchez MD                        October 8, 2019

## 2019-10-08 NOTE — PROGRESS NOTES
Hospitalist Progress Note  Karen Aguilar DO  Answering service: [phone] OR [phone] from in house phone        Date of Service:  10/8/2019  NAME:  Pacheco Dang  :  1960  MRN:  [id number]      Admission Summary:       64 Y/o gentleman with a PMH significant for Crohn's disease, status post multiple surgeries, Short Bowel syndrome, Vitamin B12 deficiency, GERD, Incisional hernia and chronic pain syndrome was brought to the Er for complaints of worsening abdominal pain and dark stools. Patient denied having any nausea, vomiting, fevers or chills.       Interval history / Subjective:   Patient seen and examined. Underwent flex sig without evidence of Crohns flare. Reports continued diarrhea. Assessment & Plan:     Acute on chronic diarrhea  History of refractory Crohns disease:  -GI consult  -underwent flex sig without evidence of active crohns  -awaiting further recommendations  -currently on steroids, ?okay to discontinue  -IVFs  -replete lytes as needed  -colestipol     Anemia, macrocytic: monitor  Leukocytosis: no evidence infection, continue to monitor   Insomnia: melatonin, prn ativan     Code status: full   DVT prophylaxis: [address] discussed with: Patient/Family  Disposition: Home w/Family and TBD     Hospital Problems  Date Reviewed: 2019          Codes Class Noted POA    Abdominal pain ICD-10-CM: R10.9  ICD-9-CM: 789.00  2019 Unknown                Review of Systems:   Negative unless stated above      Vital Signs:    Last 24hrs VS reviewed since prior progress note.  Most recent are:  Visit Vitals  /80 (BP 1 Location: Right arm, BP Patient Position: At rest)   Pulse (!) 56   Temp 97.8 °F (36.6 °C)   Resp 14   Ht 5' 11\" (1.803 m)   Wt 79.4 kg (175 lb)   SpO2 99%   BMI 24.41 kg/m²         Intake/Output Summary (Last 24 hours) at 10/8/2019 1745  Last data filed at 10/8/2019 1450  Gross per 24 hour   Intake 300 ml   Output    Net 300 ml        Physical Examination:             Constitutional:  No acute distress, cooperative, pleasant    ENT:  Oral mucous moist, oropharynx benign. Resp:  CTA bilaterally. No wheezing/rhonchi/rales. No accessory muscle use   CV:  Regular rhythm, normal rate, no murmurs, gallops, rubs    GI:  Soft, tender to mild palpation, significant scarring, hernia present , +bowel sounds    Musculoskeletal:  No edema, warm, 2+ pulses throughout    Neurologic:  Moves all extremities. Data Review:    Review and/or order of clinical lab test      Labs:     Recent Labs     10/08/19  0406 10/07/19  0420   WBC 11.3* 9.1   HGB 9.7* 11.6*   HCT 30.4* 35.6*    254     Recent Labs     10/08/19  0406 10/07/19  0420 10/06/19  1907    138 139   K 4.5 4.8 4.0    108 110*   CO2 24 26 23   BUN 13 18 21*   CREA 0.85 1.02 1.03   * 90 90   CA 8.6 8.5 8.6     Recent Labs     10/08/19  0406 10/06/19  1907   SGOT 9* 11*   ALT 14 19   AP 52 60   TBILI 0.3 0.6   TP 5.9* 6.6   ALB 3.3* 3.6   GLOB 2.6 3.0   LPSE  --  128     Recent Labs     10/06/19  1907   INR 1.0   PTP 9.8   APTT 25.4      No results for input(s): FE, TIBC, PSAT, FERR in the last 72 hours. Lab Results   Component Value Date/Time    Folate 16.0 10/08/2019 04:06 AM      No results for input(s): PH, PCO2, PO2 in the last 72 hours. No results for input(s): CPK, CKNDX, TROIQ in the last 72 hours.     No lab exists for component: CPKMB  Lab Results   Component Value Date/Time    Cholesterol, total 134 12/01/2014 04:33 AM    HDL Cholesterol 35 12/01/2014 04:33 AM    LDL, calculated 58.2 12/01/2014 04:33 AM    Triglyceride 204 (H) 12/01/2014 04:33 AM    CHOL/HDL Ratio 3.8 12/01/2014 04:33 AM     Lab Results   Component Value Date/Time    Glucose (POC) 113 (H) 11/10/2017 11:35 AM    Glucose (POC) 120 (H) 11/10/2017 05:54 AM    Glucose (POC) 87 11/09/2017 11:43 PM    Glucose (POC) 107 (H) 11/09/2017 07:07 PM    Glucose (POC) 107 (H) 11/09/2017 11:22 AM     Lab Results   Component Value Date/Time    Color YELLOW/STRAW 10/07/2019 05:48 AM    Appearance CLEAR 10/07/2019 05:48 AM    Specific gravity 1.010 10/07/2019 05:48 AM    Specific gravity 1.028 02/14/2018 09:44 PM    pH (UA) 5.5 10/07/2019 05:48 AM    Protein NEGATIVE  10/07/2019 05:48 AM    Glucose NEGATIVE  10/07/2019 05:48 AM    Ketone NEGATIVE  10/07/2019 05:48 AM    Bilirubin NEGATIVE  10/07/2019 05:48 AM    Urobilinogen 0.2 10/07/2019 05:48 AM    Nitrites NEGATIVE  10/07/2019 05:48 AM    Leukocyte Esterase NEGATIVE  10/07/2019 05:48 AM    Epithelial cells FEW 10/07/2019 05:48 AM    Bacteria NEGATIVE  10/07/2019 05:48 AM    WBC 0-4 10/07/2019 05:48 AM    RBC 0-5 10/07/2019 05:48 AM         Medications Reviewed:     Current Facility-Administered Medications   Medication Dose Route Frequency    nicotine (NICODERM CQ) 21 mg/24 hr patch 1 Patch  1 Patch TransDERmal DAILY    0.9% sodium chloride infusion  50 mL/hr IntraVENous CONTINUOUS    sodium chloride (NS) flush 5-40 mL  5-40 mL IntraVENous Q8H    sodium chloride (NS) flush 5-40 mL  5-40 mL IntraVENous PRN    colestipol (COLESTID) tablet 2 g  2 g Oral BID    LORazepam (ATIVAN) tablet 1 mg  1 mg Oral QHS PRN    HYDROmorphone (PF) (DILAUDID) injection 1 mg  1 mg IntraVENous Q4H PRN    simethicone (MYLICON) tablet 80 mg  80 mg Oral QID PRN    melatonin tablet 3 mg  3 mg Oral QHS PRN    sodium chloride (NS) flush 5-40 mL  5-40 mL IntraVENous Q8H    sodium chloride (NS) flush 5-40 mL  5-40 mL IntraVENous PRN    gabapentin (NEURONTIN) capsule 300 mg  300 mg Oral TID    diphenoxylate-atropine (LOMOTIL) tablet 2 Tab  2 Tab Oral AC&HS    methylPREDNISolone (PF) (SOLU-MEDROL) injection 40 mg  40 mg IntraVENous Q8H    ondansetron (ZOFRAN) injection 4 mg  4 mg IntraVENous Q6H PRN    0.9% sodium chloride infusion  100 mL/hr IntraVENous CONTINUOUS    pantoprazole (PROTONIX) tablet 40 mg  40 mg Oral DAILY ______________________________________________________________________  EXPECTED LENGTH OF STAY: 3d 12h  ACTUAL LENGTH OF STAY:          [address] GREC[place] DO

## 2019-10-08 NOTE — ROUTINE PROCESS
[facility] 1960 
[id number] Situation: 
Verbal report received from: Mary Anne Procedure: Procedure(s): 
COLONOSCOPY 
COLON BIOPSY Background: 
 
Preoperative diagnosis: Crohn's Postoperative diagnosis: No Active Crohn's :  Dr. Janis Ty Assistant(s): Endoscopy Technician-1: Leon Rebolledo 
Endoscopy RN-1: Casie Gutierrez RN Specimens:  
ID Type Source Tests Collected by Time Destination 1 : rectum Preservative Rectum  April Martinez MD 10/8/2019 1448 Pathology H. Pylori  no Assessment: 
Intra-procedure medications Anesthesia gave intra-procedure sedation and medications, see anesthesia flow sheet yes Intravenous fluids: NS@ Lambert Caper Vital signs stable Abdominal assessment: round and soft Recommendation: 
Return to floor.

## 2019-10-08 NOTE — ANESTHESIA PREPROCEDURE EVALUATION
Relevant Problems   No relevant active problems       Anesthetic History               Review of Systems / Medical History  Patient summary reviewed, nursing notes reviewed and pertinent labs reviewed    Pulmonary    COPD               Neuro/Psych         Psychiatric history     Cardiovascular    Hypertension                   GI/Hepatic/Renal     GERD      PUD    Comments: Crohn's   partial colectomy Endo/Other        Arthritis     Other Findings              Physical Exam    Airway  Mallampati: I           Cardiovascular    Rhythm: regular  Rate: normal         Dental  No notable dental hx       Pulmonary  Breath sounds clear to auscultation               Abdominal         Other Findings            Anesthetic Plan    ASA: 3  Anesthesia type: MAC          Induction: Intravenous  Anesthetic plan and risks discussed with: Patient

## 2019-10-08 NOTE — PROGRESS NOTES
Bedside shift change report given to Earle Eckert (oncoming nurse) by Chrissy Velázquez (offgoing nurse). Report included the following information SBAR.

## 2019-10-08 NOTE — PROGRESS NOTES
Bedside shift change report given to Luis Curtis (oncoming nurse) by Blade Dyson RN (offgoing nurse). Report included the following information SBAR, Kardex, Intake/Output and MAR.

## 2019-10-08 NOTE — PROGRESS NOTES
NUTRITION COMPLETE ASSESSMENT    RECOMMENDATIONS:   1. Diet advancement per GI to regular, no-concentrated sweets     2. Supplement Vitamin B12 with low levels this admit    - pt would benefit from IM or nasal administration since likely caused by limited absorption with Short Bowel Syndrome    3. Consider rechecking fat-soluble vitamins w/ hx of deficiency. Add AquaDEKS if low or individual supplementation. Interventions/Plan:   Food/Nutrient Delivery:  (snacks once diet advanced) Commercial supplement(continue - Boost Glucose control)   (avoid high osmolality medications or adjust dose/frequency)      Assessment:   Reason for Assessment: At Nutrition Risk    Diet: full liquids  Supplements: Other: Boost Glucose Control  3x/day  Nutritionally Significant Medications: [x] Reviewed & Includes: lomotil, solu-medrol, protonix, NS @ 100ml/hr; PRN: dilaudid, zofran, simethicone  Meal Intake: No data found. Pre-Hospitalization:  Usual Appetite: Fair  Diet at Home: regular, low sugar, low fiber    Current Hospitalization:   Appetite:    PO Ability: Independent Average po intake:   Average supplements intake:        Subjective: \"I noticed increased gas when they switched me to the liquid pain meds, it's been better recently. \"    Objective:  Pt admitted for abd pain. PMHx: Crohn's dx, HTN, depression, SBS, COPD, enterocutaneous fistula (resolved), extensive colorectal surgical hx. S/p extensive THONY, and reduction of intussusception on 11/3. Extensive hx of bowel surgeries (>30 operations) d/t Crohn's. Pt with SBS per MD note. Per Colorectal Surgeon notes:              - 210cm bowel from LOT to ileocoloic anastomosis              - little to no colon              - entire rectum  Acute onset of diarrhea over past few days noted on top of chronic diarrhea. Plans for flex sig today to evaluate for Crohn's exacerbation vs other cause. B12 very low when checked this admit.  Hx of vitamin D deficiency along with vitamin A deficiency on 11/2017. Consider rechecking fat-soluble vitamins w/ hx of deficiency. Add AquaDEKS if low or individual supplementation. Of note: Pt reports changes in bowel function back in [place] when switched from pill pain meds to liquid pain meds. Notes increase in gas and diarrhea with higher doses less frequently. Did better with smaller pain meds doses more frequently during the day. Question if related to liquid pain med osmolality or added sorbitol which can contribute to diarrhea and GI distress. He reports improved symptoms with most current pain med regimen. Pt well known from previous admits. Follows low sugar diet at home in attempts to help control diarrhea. Likes: Boost Glucose Control (250kcal, 14g protein each), SF jello, cheese/crackers, PB and SF jelly sandwiches. Once diet advanced will add snacks between meals. No-concentrated sweets added to diet order. Wt up from last year but notable wt loss of 6% x 3-4 months. Wt Readings from Last 10 Encounters:   10/08/19 79.6 kg (175 lb 8 oz)   06/29/19 84.7 kg (186 lb 11.7 oz)   05/25/18 81.2 kg (179 lb)   02/14/18 78.8 kg (173 lb 12.8 oz)   12/18/17 73.5 kg (162 lb)   12/04/17 75.6 kg (166 lb 9.6 oz)   11/10/17 72.4 kg (159 lb 9.8 oz)   08/18/17 81.6 kg (180 lb)   06/22/17 81.2 kg (179 lb)   02/27/17 74.8 kg (165 lb)     Will continue to follow for GI workup, diet advancement, PO intake, labs, wt trends. Estimated Nutrition Needs:   Kcals/day: 2188 Kcals/day(2118-2280kcal)  Protein: 96 g(96-111g (1.2-1.4g/kg))  Fluid: 2200 ml(1ml/kcal)  Based On: [address] Danteor(x 1.3-1.4)   Weight Used: Actual wt(79.6kg)    Pt expected to meet estimated nutrient needs:  []   Yes     []  No [x] Unable to predict at this time  Nutrition Diagnosis:   1. Unintended weight loss related to inadequate oral intake 2/2 altered GI fx as evidenced by chronic diarrhea; hx Crohn's; 6% wt loss x 3-4 months     2.  Altered nutrition-related lab values related to impaired nutrient utilization 2/2 altered GI fx as evidenced by short bowel syndrome with B12 def    Goals:     Diet advancement and consumption of at least 75% meals and 2-3 supplenments/day in 4-5 days; wt maintenance     Monitoring & Evaluation:    - Total energy intake, Liquid meal replacement, Protein intake, Carbohydrate intake   - Weight/weight change, GI, Electrolyte and renal profile    Previous Nutrition Goals Met:   N/A  Previous Recommendations:    N/A    Education & Discharge Needs:   [] None Identified   [x] Identified and addressed    [x] Participated in care plan, discharge planning, and/or interdisciplinary rounds        Cultural, Baptist and ethnic food preferences identified: None    Skin Integrity: [x]Intact  []Other  Edema: [x]None []Other  Last BM: 10/6  Food Allergies: []None [x]Other: honey  Diet Restrictions: Cultural/Episcopal Preference(s): None     Anthropometrics:    Weight Loss Metrics 10/8/2019 6/29/2019 11/30/2018 5/25/2018 2/14/2018 12/18/2017 12/4/2017   Today's Wt 175 lb 8 oz 186 lb 11.7 oz 179 lb 179 lb 173 lb 12.8 oz 162 lb 166 lb 9.6 oz   BMI 24.48 kg/m2 26.04 kg/m2 24.97 kg/m2 24.97 kg/m2 24.24 kg/m2 22.59 kg/m2 23.24 kg/m2      Weight Source: Standing scale (comment)  Height: 5' 11\" (180.3 cm),    Body mass index is 24.48 kg/m².      IBW : 78 kg (172 lb), % IBW (Calculated): 102.03 %  Usual Body Weight: 83.9 kg (185 lb),      Labs:    Lab Results   Component Value Date/Time    Sodium 139 10/08/2019 04:06 AM    Potassium 4.5 10/08/2019 04:06 AM    Chloride 106 10/08/2019 04:06 AM    CO2 24 10/08/2019 04:06 AM    Glucose 119 (H) 10/08/2019 04:06 AM    BUN 13 10/08/2019 04:06 AM    Creatinine 0.85 10/08/2019 04:06 AM    Calcium 8.6 10/08/2019 04:06 AM    Magnesium 2.3 07/08/2019 12:40 AM    Phosphorus 3.7 07/08/2019 12:40 AM    Albumin 3.3 (L) 10/08/2019 04:06 AM     Lab Results   Component Value Date/Time    Hemoglobin A1c 4.8 11/15/2013 03:14 PM     Lucio Schneider RD [facility], Pager #[phone] or [phone]

## 2019-10-08 NOTE — CONSULTS
[address]    Name:  Ignacio Sheppard  MR#:  [id number]  :  1960  ACCOUNT #:  [de-identified]  DATE OF SERVICE:  10/07/2019    CHIEF COMPLAINT:  Diarrhea for 2 days. REASON FOR CONSULTATION:  Dr. Yanna Dugan asked us to evaluate the patient for Crohn's disease and diarrhea. HISTORY OF PRESENT ILLNESS:  The patient is a 55-year-old white male with a complicated medical history that involves longstanding Crohn's disease, short bowel syndrome, and numerous abdominal surgeries over the past 30 years. The patient stated that he developed abrupt-onset diarrhea and blood in stools about 2 days ago. He states that he has 18-20 bowel movements daily. He has chronic diarrhea and has earlier been evaluated for biologic therapy. He was started on Remicade several years ago and developed alphalytic reactions, and it was stopped. Subsequently, he was worked up for Humira, but the patient stated that the treatment was pretty expensive and he never started the Remicade. In the past, he has also been treated with Imuran, colestipol, Lomotil, Imodium, hydrocodone and Percocet for diarrhea. He states that he noticed black stools 2 days ago and subsequently saw some blood in his stools. He has also been complaining of pain in the right upper quadrant and in the periumbilical area. The pain is crampy and moderate to severe. He denies any nausea, vomiting, or fever. He was evaluated in the emergency room, and a CT scan of the abdomen and pelvis revealed colectomy and short segment wall thickening of the midsigmoid colon, which is unchanged and possibly secondary to a stricture. PAST MEDICAL HISTORY:  Significant for:  1. Crohn's disease for 40 years. 2.  Anemia. 3.  Chronic diarrhea. 4.  Anxiety. 5.  Depression. 6.  IBD-associated arthritis. 7.  Melanoma. 8.  Chronic pain syndrome. 9.  COPD. 10.  GERD. 11.  Hypertension. 12.  Migraine.   13.  History of numerous small bowel resections, intussusception and incisional hernia with multiple surgeries. 14.  History of subtotal colectomy. 15.  Ileocolostomy. His last surgical note revealed 210 cm of bowel remaining from the ligament of Treitz to the ileocolonic anastomosis. MEDICATIONS:  1.  Gabapentin. 2.  Hydrocodone. 3.  Lomotil. ALLERGIES:  REMICADE, CRESTOR, IMURAN, MERCAPTOPURINE, AND SULFA. SOCIAL HISTORY:  He smokes a pack of cigarettes daily. He does not drink. REVIEW OF SYSTEMS:  CONSTITUTIONAL:  Negative for anorexia, fever, or weight loss. RESPIRATORY:  No cough, expectoration, or hemoptysis. CARDIOVASCULAR:  No chest pain, syncope, or palpitations. GASTROINTESTINAL:  Positive for abdominal pain, diarrhea, hematochezia, and melena. CENTRAL NERVOUS SYSTEM:  No history of seizures or loss of consciousness. Review of lymphatic, hematologic, musculoskeletal, genitourinary, endocrine, and metabolic systems revealed no abnormalities. All other systems are negative. PHYSICAL EXAMINATION:  GENERAL:  He is alert, appears to be comfortable. VITAL SIGNS:  Temperature 98.9, blood pressure 154/82, pulse of 95, and respiratory rate of 18. HEAD, EYES, AND ENT:  Pupils are equal and reactive to light and accommodation. Eye movements are normal.  NECK:  Supple. There is no carotid bruit or jugular venous distention. CHEST:  Clear to auscultation. No crackles or wheeze. CARDIOVASCULAR:  Regular rate and rhythm. First and second sounds are normal.  No murmurs. ABDOMEN:  Soft. There are multiple healed scars on his abdomen. There is evidence of visible peristalsis, and the bowel appears to be subcutaneous. There is no rigidity. There is no rebound. There is tenderness in the right upper quadrant and the periumbilical area. No palpable masses. CENTRAL NERVOUS SYSTEM:  He is alert and oriented x3. There are no gross sensory, motor, or neurological deficits.   EXTREMITIES:  Negative for cyanosis, clubbing, or edema. LABORATORY DATA:  White count is 9.1, hemoglobin of 11.6, platelets are 428, MCV is 104.7. Sodium 139, potassium 4.0, chloride 110, BUN 21, creatinine is 1.03. Liver enzymes are normal.  Lipase is 128. ASSESSMENT:  A 55-year-old white male, who has presented with a history of diarrhea, hematochezia, and melena. He has a complicated medical history which involves longstanding Crohn's disease, numerous dense adhesions, and multiple surgeries for Crohn's disease and multiple small bowel resections and subtotal colectomy with ileocolonic anastomosis. His present symptoms may either be secondary to acute exacerbation of Crohn's disease, short bowel syndrome, or adhesions. We will order CRP and ESR, fecal calprotectin, vitamin B12 and folate levels, and proceed with a flexible sigmoidoscopy to rule out acute exacerbation of Crohn's disease. If an acute exacerbation of Crohn's is found, we will treat him with a course of steroids and discuss the options of starting a biologic agent. The patient states that he is allergic to Remicade, and he felt that Humira was too expensive. RECOMMENDATIONS:  1. Clear liquids p.o.  2.  N.p.o. after 5 a.m.  3.  Flexible sigmoidoscopy to assess for acute exacerbation of Crohn's disease. 4. If Crohn's disease is found, he will be treated with steroids. 5.  If the patient is willing, we will start him on Humira. 6.  Vitamin B12 levels. 7.  Folic acid level. 8.  Further recommendations to follow after flexible sigmoidoscopy. Thank you for the consultation.       Lauren Arguelles MD      [id number]  D:  10/07/2019 19:49  T:  10/08/2019 0:49  JOB #:  [id number]  CC:  MD Wayne Mccloud MD Riva Ghee, MD

## 2019-10-08 NOTE — ANESTHESIA POSTPROCEDURE EVALUATION
Post-Anesthesia Evaluation and Assessment    Patient: Cesar Neal MRN: [id number]  SSN: [id number]    YOB: 1960  Age: 63 y.o. Sex: male      I have evaluated the patient and they are stable and ready for discharge from the PACU. Cardiovascular Function/Vital Signs  Visit Vitals  /70   Pulse 65   Temp 36.6 °C (97.8 °F)   Resp 12   Ht 5' 11\" (1.803 m)   Wt 79.4 kg (175 lb)   SpO2 97%   BMI 24.41 kg/m²       Patient is status post MAC anesthesia for Procedure(s):  COLONOSCOPY  COLON BIOPSY. Nausea/Vomiting: None    Postoperative hydration reviewed and adequate. Pain:  Pain Scale 1: Numeric (0 - 10) (10/08/19 1500)  Pain Intensity 1: 0 (10/08/19 1500)   Managed    Neurological Status:   Neuro  Neurologic State: Alert; Appropriate for age (10/08/19 1100)  Orientation Level: Oriented X4 (10/08/19 1100)  Cognition: Follows commands; Appropriate safety awareness (10/08/19 1100)  Speech: Clear (10/07/19 2045)   At baseline    Mental Status, Level of Consciousness: Alert and  oriented to person, place, and time    Pulmonary Status:   O2 Device: Room air (10/08/19 1500)   Adequate oxygenation and airway patent    Complications related to anesthesia: None    Post-anesthesia assessment completed. No concerns    Signed By: Cristine Yanes MD     October 8, 2019              Procedure(s):  COLONOSCOPY  COLON BIOPSY. MAC    <BSHSIANPOST>    Vitals Value Taken Time   /70 10/8/2019  3:00 PM   Temp 36.6 °C (97.8 °F) 10/8/2019  2:57 PM   Pulse 66 10/8/2019  3:06 PM   Resp 13 10/8/2019  3:06 PM   SpO2 99 % 10/8/2019  3:06 PM   Vitals shown include unvalidated device data.

## 2019-10-09 VITALS
TEMPERATURE: 96.1 F | WEIGHT: 175 LBS | SYSTOLIC BLOOD PRESSURE: 134 MMHG | OXYGEN SATURATION: 99 % | DIASTOLIC BLOOD PRESSURE: 76 MMHG | HEART RATE: 73 BPM | HEIGHT: 71 IN | RESPIRATION RATE: 16 BRPM | BODY MASS INDEX: 24.5 KG/M2

## 2019-10-09 LAB
ANION GAP SERPL CALC-SCNC: 7 MMOL/L (ref 5–15)
BUN SERPL-MCNC: 20 MG/DL (ref 6–20)
BUN/CREAT SERPL: 22 (ref 12–20)
CALCIUM SERPL-MCNC: 8.9 MG/DL (ref 8.5–10.1)
CHLORIDE SERPL-SCNC: 110 MMOL/L (ref 97–108)
CO2 SERPL-SCNC: 26 MMOL/L (ref 21–32)
CREAT SERPL-MCNC: 0.89 MG/DL (ref 0.7–1.3)
ERYTHROCYTE [DISTWIDTH] IN BLOOD BY AUTOMATED COUNT: 14.5 % (ref 11.5–14.5)
GLUCOSE SERPL-MCNC: 139 MG/DL (ref 65–100)
HCT VFR BLD AUTO: 29 % (ref 36.6–50.3)
HGB BLD-MCNC: 9.5 G/DL (ref 12.1–17)
MCH RBC QN AUTO: 34.4 PG (ref 26–34)
MCHC RBC AUTO-ENTMCNC: 32.8 G/DL (ref 30–36.5)
MCV RBC AUTO: 105.1 FL (ref 80–99)
NRBC # BLD: 0.02 K/UL (ref 0–0.01)
NRBC BLD-RTO: 0.2 PER 100 WBC
PLATELET # BLD AUTO: 260 K/UL (ref 150–400)
PMV BLD AUTO: 10.2 FL (ref 8.9–12.9)
POTASSIUM SERPL-SCNC: 4 MMOL/L (ref 3.5–5.1)
RBC # BLD AUTO: 2.76 M/UL (ref 4.1–5.7)
SODIUM SERPL-SCNC: 143 MMOL/L (ref 136–145)
WBC # BLD AUTO: 10.4 K/UL (ref 4.1–11.1)

## 2019-10-09 PROCEDURE — 36415 COLL VENOUS BLD VENIPUNCTURE: CPT

## 2019-10-09 PROCEDURE — 74011250636 HC RX REV CODE- 250/636: Performed by: INTERNAL MEDICINE

## 2019-10-09 PROCEDURE — 74011250637 HC RX REV CODE- 250/637: Performed by: SPECIALIST

## 2019-10-09 PROCEDURE — 74011250637 HC RX REV CODE- 250/637: Performed by: HOSPITALIST

## 2019-10-09 PROCEDURE — 74011250636 HC RX REV CODE- 250/636: Performed by: SPECIALIST

## 2019-10-09 PROCEDURE — 74011250636 HC RX REV CODE- 250/636: Performed by: HOSPITALIST

## 2019-10-09 PROCEDURE — 74011250637 HC RX REV CODE- 250/637: Performed by: INTERNAL MEDICINE

## 2019-10-09 PROCEDURE — 90686 IIV4 VACC NO PRSV 0.5 ML IM: CPT | Performed by: INTERNAL MEDICINE

## 2019-10-09 PROCEDURE — 80048 BASIC METABOLIC PNL TOTAL CA: CPT

## 2019-10-09 PROCEDURE — 85027 COMPLETE CBC AUTOMATED: CPT

## 2019-10-09 PROCEDURE — 90471 IMMUNIZATION ADMIN: CPT

## 2019-10-09 RX ORDER — CYANOCOBALAMIN 1000 UG/ML
1000 INJECTION, SOLUTION INTRAMUSCULAR; SUBCUTANEOUS ONCE
Status: COMPLETED | OUTPATIENT
Start: 2019-10-09 | End: 2019-10-09

## 2019-10-09 RX ORDER — MONTELUKAST SODIUM 4 MG/1
4 TABLET, CHEWABLE ORAL 2 TIMES DAILY
Qty: 240 TAB | Refills: 0 | Status: SHIPPED | OUTPATIENT
Start: 2019-10-09 | End: 2019-11-08

## 2019-10-09 RX ADMIN — INFLUENZA VIRUS VACCINE 0.5 ML: 15; 15; 15; 15 SUSPENSION INTRAMUSCULAR at 17:36

## 2019-10-09 RX ADMIN — Medication 10 ML: at 05:23

## 2019-10-09 RX ADMIN — GABAPENTIN 300 MG: 300 CAPSULE ORAL at 09:28

## 2019-10-09 RX ADMIN — HYDROMORPHONE HYDROCHLORIDE 1 MG: 1 INJECTION, SOLUTION INTRAMUSCULAR; INTRAVENOUS; SUBCUTANEOUS at 05:22

## 2019-10-09 RX ADMIN — LORAZEPAM 1 MG: 1 TABLET ORAL at 00:47

## 2019-10-09 RX ADMIN — METHYLPREDNISOLONE SODIUM SUCCINATE 40 MG: 40 INJECTION, POWDER, FOR SOLUTION INTRAMUSCULAR; INTRAVENOUS at 08:53

## 2019-10-09 RX ADMIN — DIPHENOXYLATE HYDROCHLORIDE AND ATROPINE SULFATE 2 TABLET: 2.5; .025 TABLET ORAL at 06:41

## 2019-10-09 RX ADMIN — SODIUM CHLORIDE 100 ML/HR: 900 INJECTION, SOLUTION INTRAVENOUS at 04:44

## 2019-10-09 RX ADMIN — COLESTIPOL HYDROCHLORIDE 2 G: 1 TABLET, FILM COATED ORAL at 09:28

## 2019-10-09 RX ADMIN — HYDROMORPHONE HYDROCHLORIDE 1 MG: 1 INJECTION, SOLUTION INTRAMUSCULAR; INTRAVENOUS; SUBCUTANEOUS at 17:36

## 2019-10-09 RX ADMIN — COLESTIPOL HYDROCHLORIDE 2 G: 1 TABLET, FILM COATED ORAL at 17:36

## 2019-10-09 RX ADMIN — PANTOPRAZOLE SODIUM 40 MG: 40 TABLET, DELAYED RELEASE ORAL at 09:28

## 2019-10-09 RX ADMIN — METHYLPREDNISOLONE SODIUM SUCCINATE 40 MG: 40 INJECTION, POWDER, FOR SOLUTION INTRAMUSCULAR; INTRAVENOUS at 00:48

## 2019-10-09 RX ADMIN — DIPHENOXYLATE HYDROCHLORIDE AND ATROPINE SULFATE 2 TABLET: 2.5; .025 TABLET ORAL at 15:42

## 2019-10-09 RX ADMIN — GABAPENTIN 300 MG: 300 CAPSULE ORAL at 15:42

## 2019-10-09 RX ADMIN — DIPHENOXYLATE HYDROCHLORIDE AND ATROPINE SULFATE 2 TABLET: 2.5; .025 TABLET ORAL at 11:40

## 2019-10-09 RX ADMIN — HYDROMORPHONE HYDROCHLORIDE 1 MG: 1 INJECTION, SOLUTION INTRAMUSCULAR; INTRAVENOUS; SUBCUTANEOUS at 09:29

## 2019-10-09 RX ADMIN — HYDROMORPHONE HYDROCHLORIDE 1 MG: 1 INJECTION, SOLUTION INTRAMUSCULAR; INTRAVENOUS; SUBCUTANEOUS at 13:42

## 2019-10-09 RX ADMIN — CYANOCOBALAMIN 1000 MCG: 1000 INJECTION, SOLUTION INTRAMUSCULAR; SUBCUTANEOUS at 14:15

## 2019-10-09 NOTE — DISCHARGE INSTRUCTIONS
Dear Nhung Hatfield,    Thank you for choosing [address] [place] [address] for your healthcare needs. We strive to provide EXCELLENT care to you and your family. In an effort to explain clearly why you were here in the hospital, I've also written a very brief summary below. Other details including formal diagnosis, medication changes, and follow up appointment recommendations can also be found in this packet. You were admitted for Abdominal pain due to short gut syndrome for which you were started on colestipol. We did not find any evidence of active Crohn's disease on your flex sig. You also received care from specialist physicians in the following specialties:  [address] [place] [address]    Here are the updates to your medication list:  - Add colestipol 4g BID    Remember that it is important for you to take your medications exactly as they are prescribed. It is helpful to keep a list of your medication with the names, dosages, and times to be taken in your wallet. Additionally,  Remain well hydrated. Continue taking your home regimen for your chronic diarrhea. Get up slowly from a seated or laying position. Avoid tobacco, alcohol, and other illicit drug use. Make sure to also see your primary care doctor for follow-up. Bring these papers with you and be sure to review your medication list with your doctor. I cannot stress the importance of follow up enough. You should also follow up with Dr. Myesha Mitchell or an alternative GI physician (potentially Michelle Reyna), within 2 weeks. I've included the information for your follow-up appointments below:     Follow-up Information     Follow up With Specialties Details Why Contact Info    Lynne Keane MD [facility] In 1 week  [address] [place] [address]  [address] [facility]      Alea Winchester MD Gastroenterology In 2 weeks  [address] [facility]  [address]  [phone]            At this time, the following test results are still pending: Rectal Pathology (biopsy from your flex sig)  Again, please follow-up these results with your PCP or GI physician     Should you have any fever over 101 degrees for 24 hours, chest pain, shortness of breath, fever, chills, nausea, vomiting, diarrhea, change in mentation, falling, weakness, bleeding, or worsening pain, please seek medical attention immediately. Finally, as your discharging physician, you may be receiving a survey regarding my care. I would greatly value and appreciate your input in the survey as we strive for excellence. If you have any questions, I can be reached at [phone].   Thank you so much again for allowing me to care for you at [address] [place] [address].    Respectfully yours,  Danitza Srivastava MD

## 2019-10-09 NOTE — DISCHARGE SUMMARY
Discharge Summary       PATIENT ID: Cheryl Rowley  MRN: [id number]   YOB: 1960    DATE OF ADMISSION: 10/6/2019  6:23 PM    DATE OF DISCHARGE: 10/09/2019   PRIMARY CARE PROVIDER: Marlyn Lynne MD     DISCHARGING PROVIDER: Nita Robertson MD    To contact this individual call [phone] and ask the  to page. If unavailable ask to be transferred the Adult Hospitalist Department. CONSULTATIONS: IP CONSULT TO HOSPITALIST  IP CONSULT TO GASTROENTEROLOGY    PROCEDURES/SURGERIES: Procedure(s):  COLONOSCOPY  COLON BIOPSY    ADMITTING DIAGNOSES & HOSPITAL COURSE:   Abdominal pain and chronic diarrhea  H/o Crohn's disease    64 Y/o gentleman with a PMH significant for Crohn's disease, status post multiple surgeries, Short Bowel syndrome, Vitamin B12 deficiency, GERD, Incisional hernia and chronic pain syndrome was brought to the Er for complaints of worsening abdominal pain and dark stools. Patient denied having any nausea, vomiting, fevers or chills. Started on IV steroids on admission. GI consulted, and underwent flex sig without evidence of active colitis. Steroids discontinued. Since being last admitted in July (3 months ago), he has not yet followed up with GI outpatient, nor has he initiated his humira. He says he is unable to afford the medication, but there is conflicting reports from GI. He also has not followed up with Dr. Dick Burns, despite being told to do so. Today patient stated to me that he will just have to continue with narcotic medications, and eventually if he runs out, \"go back to using street drugs\". I advised against this. Stable for discharge as he does not have any inpatient criteria, no IVF, no IV medications. DISCHARGE DIAGNOSES / PLAN:      Acute on chronic diarrhea, secondary to short gut syndrome and adhesions. History of refractory Crohns disease:  -GI following.  Patient has been non compliant with outpatient follow up and initiation of biologic agent.  -underwent flex sig without evidence of active crohns 10/08  -DC steroids as no evidence of acute flare. -DC IVF today as tolerating PO  -replete lytes as needed  -colestipol, increase to 4gm per day per GI  -F/U with [facility] An[facility]. Poor transplant candidate secondary to non compliance.      Anemia, macrocytic: monitor  Leukocytosis: no evidence infection, continue to monitor   Insomnia: melatonin, prn ativan         ADDITIONAL CARE RECOMMENDATIONS:   1. Please take all medications as prescribed. Note changes as below. - Add colestipol 4g BID   2. Please make sure to follow up with your primary care physician within 1-2 weeks of discharge for hospital follow up. 3. Remain well hydrated. Continue taking your home regimen for your chronic diarrhea. 4. Get up slowly from a seated or laying position. 5. Avoid tobacco, alcohol, and other illicit drug use. PENDING TEST RESULTS:   At the time of discharge the following test results are still pending: Rectal biopsy from flex sig    FOLLOW UP APPOINTMENTS:    Follow-up Information     Follow up With Specialties Details Why Contact Info    Heladio Oliver MD [facility] 1 week  [address] [place] [address]  [phone]      Lisa Mi MD Gastroenterology In 2 weeks  [address] [facility]  [address]  [phone]               DIET: Resume previous diet    ACTIVITY: Activity as tolerated    WOUND CARE: None    EQUIPMENT needed: None      DISCHARGE MEDICATIONS:  Current Discharge Medication List      START taking these medications    Details   colestipol (COLESTID) 1 gram tablet Take 4 Tabs by mouth two (2) times a day for 30 days. Qty: 240 Tab, Refills: 0         CONTINUE these medications which have NOT CHANGED    Details   gabapentin (NEURONTIN) 300 mg capsule Take 300 mg by mouth three (3) times daily.       oxyCODONE (ROXICODONE) 5 mg/5 mL solution Take 10 mg by mouth every six to eight (6-8) hours as needed for Pain. omeprazole (PRILOSEC) 40 mg capsule take 1 capsule by mouth once daily  Refills: 0      chlorzoxazone (PARAFON FORTE) 500 mg tablet Take 500-1,000 mg by mouth daily as needed for Muscle Spasm(s). diphenoxylate-atropine (LOMOTIL) 2.5-0.025 mg per tablet Take 2 Tabs by mouth Before breakfast, lunch, dinner and at bedtime. loperamide (IMODIUM) 2 mg capsule Take 2 mg by mouth as needed for Diarrhea. Patient takes 14-16 capsules a day. ondansetron hcl (ZOFRAN, AS HYDROCHLORIDE,) 8 mg tablet Take 8 mg by mouth every eight (8) hours as needed for Nausea. NOTIFY YOUR PHYSICIAN FOR ANY OF THE FOLLOWING:   Fever over 101 degrees for 24 hours. Chest pain, shortness of breath, fever, chills, nausea, vomiting, diarrhea, change in mentation, falling, weakness, bleeding. Severe pain or pain not relieved by medications. Or, any other signs or symptoms that you may have questions about. DISPOSITION:   X Home With:   OT  PT  HH  RN       Long term SNF/Inpatient Rehab    Independent/assisted living    Hospice    Other:       PATIENT CONDITION AT DISCHARGE:     Functional status    Poor     Deconditioned    X Independent      Cognition    X Lucid     Forgetful     Dementia      Catheters/lines (plus indication)    Trejo     PICC     PEG    X None      Code status    X Full code     DNR      PHYSICAL EXAMINATION AT DISCHARGE:  Visit Vitals  /76 (BP 1 Location: Left arm, BP Patient Position: At rest;Supine)   Pulse 73   Temp 96.1 °F (35.6 °C)   Resp 16   Ht 5' 11\" (1.803 m)   Wt 79.4 kg (175 lb)   SpO2 99%   BMI 24.41 kg/m²     Gen: NAD, awake in bed  HEENT: NC/AT, sclera anicteric, PERRL, EOMI  CV: RRR no m/r/g  Resp: CTA b/l no increased work of breathing  Abd: NT, normal bowel sounds, multiple prior scars, tenderness in RUQ and RLQ.    Ext: 2+ pulses, no edema  Skin: No rashes        CHRONIC MEDICAL DIAGNOSES:  Problem List as of 10/9/2019 Date Reviewed: 7/8/2019 Codes Class Noted - Resolved    Croup ICD-10-CM: J05.0  ICD-9-CM: 464.4  6/29/2019 - Present        Crohn disease ([facility] ICD-10-CM: K50.90  ICD-9-CM: 555.9  6/29/2019 - Present        HTN (hypertension) ICD-10-CM: I10  ICD-9-CM: 401.9  6/29/2019 - Present        Abdominal pain ICD-10-CM: R10.9  ICD-9-CM: 789.00  6/29/2019 - Present        Abdominal wound dehiscence ICD-10-CM: T81.30XA  ICD-9-CM: 998.30  12/4/2017 - Present        Intussusception of intestine ([facility] ICD-10-CM: K56.1  ICD-9-CM: 560.0  11/3/2017 - Present        Bowel obstruction ([facility] ICD-10-CM: K33.500  ICD-9-CM: 560.9  11/3/2017 - Present        Incisional hernia, without obstruction or gangrene (Chronic) ICD-10-CM: K43.2  ICD-9-CM: 553.21  1/31/2017 - Present        Back pain, chronic ICD-10-CM: M54.9, G89.29  ICD-9-CM: 724.5, 338.29  9/30/2015 - Present        Short bowel syndrome (Chronic) ICD-10-CM: K91.2  ICD-9-CM: 579.3  9/28/2015 - Present        Chronic pain (Chronic) ICD-10-CM: L51.78  ICD-9-CM: 338.29  3/17/2015 - Present        Crohn's disease ([facility] (Chronic) ICD-10-CM: K50.90  ICD-9-CM: 555.9  6/15/2012 - Present        GERD (gastroesophageal reflux disease) (Chronic) ICD-10-CM: K21.9  ICD-9-CM: 530.81  6/15/2012 - Present        Hiatal hernia (Chronic) ICD-10-CM: K44.9  ICD-9-CM: 553.3  6/15/2012 - Present        B12 deficiency ICD-10-CM: E53.8  ICD-9-CM: 266.2  6/15/2012 - Present        RESOLVED: Abscess ICD-10-CM: L02.91  ICD-9-CM: 682.9  8/9/2016 - 8/12/2016        RESOLVED: Abdominal pain, acute, right lower quadrant ICD-10-CM: R10.31  ICD-9-CM: 789.03, 338.19  9/30/2015 - 12/1/2015        RESOLVED: Diarrhea ICD-10-CM: R19.7  ICD-9-CM: 787.91  9/30/2015 - 12/1/2015        RESOLVED: Abdominal abscess ICD-10-CM: OFG0700  ICD-9-CM: SAN6049  9/28/2015 - 12/1/2015        RESOLVED: Venous stasis of both lower extremities ICD-10-CM: I87.8  ICD-9-CM: 459.81  7/29/2015 - 9/28/2015        RESOLVED: Cellulitis of both lower extremities ICD-10-CM: [id number], [id number]  ICD-9-CM: 682.6  7/29/2015 - 9/28/2015        RESOLVED: Postoperative wound dehiscence ICD-10-CM: T81.31XA  ICD-9-CM: 998.32  7/26/2015 - 9/28/2015        RESOLVED: Syncope and collapse ICD-10-CM: R55  ICD-9-CM: 780.2  6/21/2015 - 7/14/2015        RESOLVED: Metabolic alkalosis A-76-ZB: E87.3  ICD-9-CM: 276.3  6/10/2015 - 7/14/2015        RESOLVED: Acute kidney injury ([facility] ICD-10-CM: N17.9  ICD-9-CM: 584.9  6/10/2015 - 9/28/2015        RESOLVED: Severe protein-calorie malnutrition ([facility] (Chronic) ICD-10-CM: E43  ICD-9-CM: 262  6/10/2015 - 9/28/2015        RESOLVED: Hypomagnesemia ICD-10-CM: E83.42  ICD-9-CM: 275.2  6/10/2015 - 7/14/2015        RESOLVED: Hyponatremia ICD-10-CM: E87.1  ICD-9-CM: 276.1  6/8/2015 - 9/28/2015        RESOLVED: Hypokalemia ICD-10-CM: E87.6  ICD-9-CM: 276.8  6/8/2015 - 7/14/2015        RESOLVED: Hyponatremia ICD-10-CM: E87.1  ICD-9-CM: 276.1  5/4/2015 - 5/10/2015        RESOLVED: Elevated serum creatinine (Chronic) ICD-10-CM: R79.89  ICD-9-CM: 790.99  4/20/2015 - 9/28/2015        RESOLVED: High output ileostomy (HCC) (Chronic) ICD-10-CM: R19.8, Z93.2  ICD-9-CM: 787.99, V44.2  4/20/2015 - 9/28/2015        RESOLVED: Ileocolic anastomotic leak ICD-10-CM: K91.89  ICD-9-CM: 997.49  3/23/2015 - 6/8/2015        RESOLVED: S/P ileostomy ([facility] (Chronic) ICD-10-CM: Z93.2  ICD-9-CM: V44.2  3/17/2015 - 9/28/2015        RESOLVED: Abscess of right shoulder ICD-10-CM: L02.413  ICD-9-CM: 682.3  11/30/2014 - 12/3/2014        RESOLVED: Dehydration ICD-10-CM: E86.0  ICD-9-CM: 276.51  10/16/2014 - 3/17/2015        RESOLVED: Blood loss anemia ICD-10-CM: D50.0  ICD-9-CM: 280.0  8/6/2014 - 3/17/2015        RESOLVED: Free intraperitoneal air ICD-10-CM: K66.8  ICD-9-CM: 568.89  7/28/2014 - 9/23/2014        RESOLVED: Abdominal pain (Chronic) ICD-10-CM: R10.9  ICD-9-CM: 789.00  7/8/2014 - 9/28/2015              Greater than 30 minutes were spent with the patient on counseling and coordination of care    Signed:   Jean Michaels MD  10/9/2019  1:11 PM

## 2019-10-09 NOTE — PROGRESS NOTES
10/9/19 -   JAMES:  - Patient's 10/8 flex sig was clear   - Patient's B12 is low  - Per RD, patient would benefit from B12 nasal spray or injections   - Patient is now having back pain  - Attending physician is awaiting GI Input for progression of care recommendations  CRM: Peggy Paez, MPH, [address] [place] [address]; Z: [phone]

## 2019-10-09 NOTE — PROGRESS NOTES
Bedside shift change report given to Mango Esquivel (oncoming nurse) by Francisca Espana RN (offgoing nurse). Report included the following information SBAR and Kardex.

## 2019-10-09 NOTE — PROGRESS NOTES
[address]  [address]   [phone]                GI PROGRESS NOTE  Jeffrey Best, [facility]  Work Cell: [phone]      NAME:   Jaiden Hernandez   :    1960   MRN:    [id number]     Assessment/Plan   1. Abdominal pain and diarrhea - suspect symptoms are likely related to short bowel syndrome and adhesions from multiple previous surgeries. Flexible sigmoidoscopy w/o evidence of active inflammation. CRP normal.   - Supportive management per primary team  - Continue colestipol - may increase dose to 4 g BID if needed   - Recommend follow-up at [facility] re: query about small bowel transplant . Re: B12 deficiency, we will give him first dose here and he will follow up with PCP for further management. 2. Crohn's disease s/p subtotal colectomy with ileocolonic anastomosis   3. HTN    Okay to discharge from GI standpoint. Patient Active Problem List   Diagnosis Code    Crohn's disease ([facility] K50.90    GERD (gastroesophageal reflux disease) K21.9    Hiatal hernia K44.9    B12 deficiency E53.8    Chronic pain G89.29    Short bowel syndrome K91.2    Back pain, chronic M54.9, G89.29    Incisional hernia, without obstruction or gangrene K43.2    Intussusception of intestine (HCC) K56.1    Bowel obstruction (HCC) K56.609    Abdominal wound dehiscence T81.30XA    Croup J05.0    Crohn disease ([facility] K50.90    HTN (hypertension) I10    Abdominal pain R10.9       Subjective:     Reports diffuse abdominal pain and diarrhea. Tolerating diet. Denies any melena or hematochezia. Objective:     VITALS:   Last 24hrs VS reviewed since prior hospitalist progress note.  Most recent are:  Visit Vitals  /76 (BP 1 Location: Left arm, BP Patient Position: At rest;Supine)   Pulse 73   Temp 96.1 °F (35.6 °C)   Resp 16   Ht 5' 11\" (1.803 m)   Wt 79.4 kg (175 lb)   SpO2 99%   BMI 24.41 kg/m²       Intake/Output Summary (Last 24 hours) at 10/9/2019 1230  Last data filed at 10/8/2019 1800  Gross per 24 hour   Intake 540 ml   Output    Net 540 ml        PHYSICAL EXAM:  General   well developed, alert, in no acute distress  EENT  Normocephalic, Atraumatic, PERRLA, EOMI, sclera clear  Respiratory   Clear To Auscultation bilaterally - no wheezes, rales, rhonchi, or crackles  Cardiology  Regular Rate and Rythmn  - no murmurs, rubs or gallops  Abdominal  Soft, non-distended, mild diffuse tenderness, multiple scars present, positive bowel sounds, no hepatosplenomegaly, no palpable mass  Extremities  No clubbing, cyanosis, or edema. Pulses intact. Neurological  No focal neurological deficits noted  Psychological  Oriented x 3. Depressed affect. Lab Data   Recent Results (from the past 12 hour(s))   METABOLIC PANEL, BASIC    Collection Time: 10/09/19  4:46 AM   Result Value Ref Range    Sodium 143 136 - 145 mmol/L    Potassium 4.0 3.5 - 5.1 mmol/L    Chloride 110 (H) 97 - 108 mmol/L    CO2 26 21 - 32 mmol/L    Anion gap 7 5 - 15 mmol/L    Glucose 139 (H) 65 - 100 mg/dL    BUN 20 6 - 20 MG/DL    Creatinine 0.89 0.70 - 1.30 MG/DL    BUN/Creatinine ratio 22 (H) 12 - 20      GFR est AA >60 >60 ml/min/1.73m2    GFR est non-AA >60 >60 ml/min/1.73m2    Calcium 8.9 8.5 - 10.1 MG/DL   CBC W/O DIFF    Collection Time: 10/09/19  4:46 AM   Result Value Ref Range    WBC 10.4 4.1 - 11.1 K/uL    RBC 2.76 (L) 4.10 - 5.70 M/uL    HGB 9.5 (L) 12.1 - 17.0 g/dL    HCT 29.0 (L) 36.6 - 50.3 %    .1 (H) 80.0 - 99.0 FL    MCH 34.4 (H) 26.0 - 34.0 PG    MCHC 32.8 30.0 - 36.5 g/dL    RDW 14.5 11.5 - 14.5 %    PLATELET 850 521 - 590 K/uL    MPV 10.2 8.9 - 12.9 FL    NRBC 0.2 (H) 0  WBC    ABSOLUTE NRBC 0.02 (H) 0.00 - 0.01 K/uL         Medications: Reviewed    PMH/SH reviewed - no change compared to H&P  Mid-Level Provider: Lisa Negro NP   Date/Time:  10/9/2019    I have examined the patient.   I have reviewed the chart and agree with the documentation recorded by the NP, including the assessment, treatment plan, and disposition.       Santy Rowe MD

## 2019-10-09 NOTE — PROGRESS NOTES
Bedside shift change report given to Rossy Solis RN (oncoming nurse) by Sharyn Blackburn RN (offgoing nurse). Report included the following information SBAR, Kardex, Procedure Summary, Intake/Output and MAR.

## 2019-10-10 LAB — CALPROTECTIN STL-MCNT: <16 UG/G (ref 0–120)

## 2019-10-11 NOTE — CDMP QUERY
Pt admitted with abdominal pain and blood in stool. If possible, please clarify in progress notes and d/c summary the cause of the patient's symptoms. ? Due to short bowel syndrome and adhesions from multiple previous surgeries. ? Due to Chrohn's disease ? Unrelated to each other 
? Other, please specify ? Unable to determine ? The medical record reflects the following: 
  Risk Factors: Hx. Short gut syndrome, Hx. Refractory Crohn's non compliant with OP follow up and initiation of biologic agent, Incisional hernia Clinical Indicators: 10/6- H&P-He has more than 30 surgeries and has short bowel syndrome. He has chronic diarrhea, approximately 10-20 loose bowel movements per day which has not changed. In the emergency room, his stool was heme positive 10/9-GI note-Abdominal pain and diarrhea - suspect symptoms are likely related to short bowel syndrome and adhesions from multiple previous surgeries. Flexible sigmoidoscopy w/o evidence of active inflammation. Treatment: Steroids, GI consult, Flexible sigmoidoscopy,  monitor hemoglobin, NS @ 50 cc/hour, Colestipol 2 g po bid Thank you, Darrel MENDEZN, RN 
[facility]  
[phone]

## 2020-05-21 ENCOUNTER — HOSPITAL ENCOUNTER (EMERGENCY)
Age: 60
Discharge: HOME | End: 2020-05-22
Attending: EMERGENCY MEDICINE | Admitting: EMERGENCY MEDICINE
Payer: MEDICARE

## 2020-05-21 ENCOUNTER — APPOINTMENT (OUTPATIENT)
Dept: CT IMAGING | Age: 60
End: 2020-05-21
Attending: EMERGENCY MEDICINE
Payer: MEDICARE

## 2020-05-21 ENCOUNTER — APPOINTMENT (OUTPATIENT)
Dept: GENERAL RADIOLOGY | Age: 60
End: 2020-05-21
Attending: EMERGENCY MEDICINE
Payer: MEDICARE

## 2020-05-21 VITALS
TEMPERATURE: 98.3 F | SYSTOLIC BLOOD PRESSURE: 163 MMHG | RESPIRATION RATE: 16 BRPM | HEART RATE: 84 BPM | DIASTOLIC BLOOD PRESSURE: 96 MMHG | OXYGEN SATURATION: 99 %

## 2020-05-21 DIAGNOSIS — S16.1XXA NECK STRAIN, INITIAL ENCOUNTER: ICD-10-CM

## 2020-05-21 DIAGNOSIS — S60.212A CONTUSION OF LEFT WRIST, INITIAL ENCOUNTER: ICD-10-CM
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
## 2020-05-21 LAB
COMMENT, HOLDF: NORMAL
SAMPLES BEING HELD,HOLD: NORMAL

## 2020-05-21 PROCEDURE — 36415 COLL VENOUS BLD VENIPUNCTURE: CPT

## 2020-05-21 PROCEDURE — 80048 BASIC METABOLIC PNL TOTAL CA: CPT

## 2020-05-21 PROCEDURE — 74011250636 HC RX REV CODE- 250/636: Performed by: EMERGENCY MEDICINE

## 2020-05-21 PROCEDURE — 96374 THER/PROPH/DIAG INJ IV PUSH: CPT

## 2020-05-21 PROCEDURE — 99282 EMERGENCY DEPT VISIT SF MDM: CPT

## 2020-05-21 PROCEDURE — 73100 X-RAY EXAM OF WRIST: CPT

## 2020-05-21 RX ORDER — SODIUM CHLORIDE 0.9 % (FLUSH) 0.9 %
10 SYRINGE (ML) INJECTION
Status: COMPLETED | OUTPATIENT
Start: 2020-05-21 | End: 2020-05-22

## 2020-05-21 RX ORDER — KETOROLAC TROMETHAMINE 30 MG/ML
15 INJECTION, SOLUTION INTRAMUSCULAR; INTRAVENOUS
Status: COMPLETED | OUTPATIENT
Start: 2020-05-21 | End: 2020-05-21

## 2020-05-21 RX ADMIN — KETOROLAC TROMETHAMINE 15 MG: 30 INJECTION, SOLUTION INTRAMUSCULAR at 23:36

## 2020-05-22 ENCOUNTER — APPOINTMENT (OUTPATIENT)
Dept: CT IMAGING | Age: 60
End: 2020-05-22
Attending: EMERGENCY MEDICINE
Payer: MEDICARE

## 2020-05-22 LAB
ANION GAP SERPL CALC-SCNC: 6 MMOL/L (ref 5–15)
BUN SERPL-MCNC: 14 MG/DL (ref 6–20)
BUN/CREAT SERPL: 11 (ref 12–20)
CALCIUM SERPL-MCNC: 9 MG/DL (ref 8.5–10.1)
CHLORIDE SERPL-SCNC: 102 MMOL/L (ref 97–108)
CO2 SERPL-SCNC: 28 MMOL/L (ref 21–32)
CREAT SERPL-MCNC: 1.24 MG/DL (ref 0.7–1.3)
GLUCOSE SERPL-MCNC: 88 MG/DL (ref 65–100)
POTASSIUM SERPL-SCNC: 3.8 MMOL/L (ref 3.5–5.1)
SODIUM SERPL-SCNC: 136 MMOL/L (ref 136–145)

## 2020-05-22 PROCEDURE — 74011636320 HC RX REV CODE- 636/320: Performed by: RADIOLOGY

## 2020-05-22 PROCEDURE — 74011000258 HC RX REV CODE- 258: Performed by: RADIOLOGY

## 2020-05-22 PROCEDURE — 74177 CT ABD & PELVIS W/CONTRAST: CPT

## 2020-05-22 PROCEDURE — 72125 CT NECK SPINE W/O DYE: CPT

## 2020-05-22 RX ADMIN — SODIUM CHLORIDE 100 ML: 900 INJECTION, SOLUTION INTRAVENOUS at 00:43

## 2020-05-22 RX ADMIN — Medication 10 ML: at 00:43

## 2020-05-22 RX ADMIN — IOPAMIDOL 100 ML: 755 INJECTION, SOLUTION INTRAVENOUS at 00:43

## 2020-05-22 NOTE — ED NOTES
Pt given written and verbal discharge instructions, 0 Rx; pt verbalized understanding of such. VSS at time of discharge. Belongings in pt possession at time of discharge. Pt ambulatory out of ED without difficulty in NAD. No complaints, needs, or questions at this time. Pt to call PCP ASAP for follow-up.
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
## 2020-06-03 ENCOUNTER — HOSPITAL ENCOUNTER (EMERGENCY)
Age: 60
Discharge: HOME | End: 2020-06-03
Attending: EMERGENCY MEDICINE | Admitting: EMERGENCY MEDICINE
Payer: MEDICARE

## 2020-06-03 VITALS
SYSTOLIC BLOOD PRESSURE: 152 MMHG | DIASTOLIC BLOOD PRESSURE: 95 MMHG | RESPIRATION RATE: 16 BRPM | TEMPERATURE: 97.9 F | OXYGEN SATURATION: 96 % | WEIGHT: 200 LBS | HEIGHT: 71 IN | HEART RATE: 104 BPM | BODY MASS INDEX: 28 KG/M2

## 2020-06-03 DIAGNOSIS — M62.838 TRAPEZIUS MUSCLE SPASM: Primary | ICD-10-CM

## 2020-06-03 DIAGNOSIS — G56.22 ULNAR NERVE COMPRESSION, LEFT: ICD-10-CM

## 2020-06-03 PROCEDURE — 99282 EMERGENCY DEPT VISIT SF MDM: CPT

## 2020-06-03 PROCEDURE — 74011250636 HC RX REV CODE- 250/636: Performed by: EMERGENCY MEDICINE

## 2020-06-03 PROCEDURE — 96372 THER/PROPH/DIAG INJ SC/IM: CPT

## 2020-06-03 RX ORDER — KETOROLAC TROMETHAMINE 10 MG/1
10 TABLET, FILM COATED ORAL
Qty: 20 TAB | Refills: 0 | Status: SHIPPED | OUTPATIENT
Start: 2020-06-03 | End: 2020-06-08

## 2020-06-03 RX ORDER — KETOROLAC TROMETHAMINE 30 MG/ML
15 INJECTION, SOLUTION INTRAMUSCULAR; INTRAVENOUS
Status: COMPLETED | OUTPATIENT
Start: 2020-06-03 | End: 2020-06-03

## 2020-06-03 RX ADMIN — KETOROLAC TROMETHAMINE 15 MG: 30 INJECTION, SOLUTION INTRAMUSCULAR at 18:06
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
## 2020-12-15 ENCOUNTER — TRANSCRIBE ORDER (OUTPATIENT)
Dept: SCHEDULING | Age: 60
End: 2020-12-15

## 2020-12-15 DIAGNOSIS — M54.12 CERVICAL MYELOPATHY WITH CERVICAL RADICULOPATHY (HCC): Primary | ICD-10-CM

## 2020-12-15 DIAGNOSIS — G95.9 CERVICAL MYELOPATHY WITH CERVICAL RADICULOPATHY (HCC): Primary | ICD-10-CM

## 2020-12-15 DIAGNOSIS — M54.2 CERVICAL PAIN: ICD-10-CM

## 2022-03-18 PROBLEM — K43.2 INCISIONAL HERNIA, WITHOUT OBSTRUCTION OR GANGRENE: Status: ACTIVE | Noted: 2017-01-31

## 2022-03-18 PROBLEM — J05.0 CROUP: Status: ACTIVE | Noted: 2019-06-29

## 2022-03-18 PROBLEM — K56.1 INTUSSUSCEPTION OF INTESTINE (HCC): Status: ACTIVE | Noted: 2017-11-03

## 2022-03-19 PROBLEM — I10 HTN (HYPERTENSION): Status: ACTIVE | Noted: 2019-06-29

## 2022-03-19 PROBLEM — R10.9 ABDOMINAL PAIN: Status: ACTIVE | Noted: 2019-06-29

## 2022-03-19 PROBLEM — T81.321A ABDOMINAL WOUND DEHISCENCE: Status: ACTIVE | Noted: 2017-12-04

## 2022-03-19 PROBLEM — K50.90 CROHN DISEASE (HCC): Status: ACTIVE | Noted: 2019-06-29

## 2022-03-19 PROBLEM — T81.30XA ABDOMINAL WOUND DEHISCENCE: Status: ACTIVE | Noted: 2017-12-04

## 2022-03-19 PROBLEM — K56.609 BOWEL OBSTRUCTION (HCC): Status: ACTIVE | Noted: 2017-11-03

## 2022-08-26 ENCOUNTER — APPOINTMENT (OUTPATIENT)
Dept: GENERAL RADIOLOGY | Age: 62
DRG: 871 | End: 2022-08-26
Attending: EMERGENCY MEDICINE
Payer: MEDICARE

## 2022-08-26 ENCOUNTER — HOSPITAL ENCOUNTER (INPATIENT)
Age: 62
LOS: 16 days | Discharge: HOME HEALTH | DRG: 871 | End: 2022-09-12
Attending: EMERGENCY MEDICINE | Admitting: FAMILY MEDICINE
Payer: MEDICARE

## 2022-08-26 ENCOUNTER — APPOINTMENT (OUTPATIENT)
Dept: CT IMAGING | Age: 62
DRG: 871 | End: 2022-08-26
Attending: EMERGENCY MEDICINE
Payer: MEDICARE

## 2022-08-26 DIAGNOSIS — K63.2 ENTEROCUTANEOUS FISTULA: ICD-10-CM

## 2022-08-26 DIAGNOSIS — K74.60 CIRRHOSIS OF LIVER WITHOUT ASCITES, UNSPECIFIED HEPATIC CIRRHOSIS TYPE (HCC): ICD-10-CM

## 2022-08-26 DIAGNOSIS — K50.019 CROHN'S DISEASE OF SMALL INTESTINE WITH COMPLICATION (HCC): ICD-10-CM

## 2022-08-26 DIAGNOSIS — R17 SERUM TOTAL BILIRUBIN ELEVATED: ICD-10-CM

## 2022-08-26 DIAGNOSIS — E87.1 HYPONATREMIA: ICD-10-CM

## 2022-08-26 DIAGNOSIS — J18.9 COMMUNITY ACQUIRED PNEUMONIA OF RIGHT MIDDLE LOBE OF LUNG: ICD-10-CM

## 2022-08-26 DIAGNOSIS — R74.8 ELEVATED ALKALINE PHOSPHATASE LEVEL: ICD-10-CM

## 2022-08-26 DIAGNOSIS — D64.9 ANEMIA, UNSPECIFIED TYPE: ICD-10-CM

## 2022-08-26 DIAGNOSIS — D68.9 COAGULOPATHY (HCC): ICD-10-CM

## 2022-08-26 DIAGNOSIS — E87.20 METABOLIC ACIDOSIS: ICD-10-CM

## 2022-08-26 DIAGNOSIS — R74.8 ELEVATED LIVER ENZYMES: ICD-10-CM

## 2022-08-26 DIAGNOSIS — N39.0 URINARY TRACT INFECTION WITHOUT HEMATURIA, SITE UNSPECIFIED: ICD-10-CM

## 2022-08-26 DIAGNOSIS — E86.1 HYPOTENSION DUE TO HYPOVOLEMIA: ICD-10-CM

## 2022-08-26 DIAGNOSIS — K50.911 EXACERBATION OF CROHN'S DISEASE WITH RECTAL BLEEDING (HCC): ICD-10-CM

## 2022-08-26 DIAGNOSIS — R77.0 LOW SERUM ALBUMIN: ICD-10-CM

## 2022-08-26 DIAGNOSIS — R65.20 SEVERE SEPSIS (HCC): Primary | ICD-10-CM

## 2022-08-26 DIAGNOSIS — N17.9 AKI (ACUTE KIDNEY INJURY) (HCC): ICD-10-CM

## 2022-08-26 DIAGNOSIS — I95.89 HYPOTENSION DUE TO HYPOVOLEMIA: ICD-10-CM

## 2022-08-26 DIAGNOSIS — D50.8 OTHER IRON DEFICIENCY ANEMIA: ICD-10-CM

## 2022-08-26 DIAGNOSIS — K91.2 SHORT GUT SYNDROME: ICD-10-CM

## 2022-08-26 DIAGNOSIS — A41.9 SEVERE SEPSIS (HCC): Primary | ICD-10-CM

## 2022-08-26 LAB
ALBUMIN SERPL-MCNC: 2.6 G/DL (ref 3.5–5)
ALBUMIN/GLOB SERPL: 0.5 {RATIO} (ref 1.1–2.2)
ALP SERPL-CCNC: 260 U/L (ref 45–117)
ALT SERPL-CCNC: 25 U/L (ref 12–78)
AMORPH CRY URNS QL MICRO: ABNORMAL
ANION GAP SERPL CALC-SCNC: 14 MMOL/L (ref 5–15)
APPEARANCE UR: ABNORMAL
AST SERPL-CCNC: 38 U/L (ref 15–37)
BACTERIA URNS QL MICRO: ABNORMAL /HPF
BASOPHILS # BLD: 0 K/UL (ref 0–0.1)
BASOPHILS NFR BLD: 0 % (ref 0–1)
BILIRUB SERPL-MCNC: 10.2 MG/DL (ref 0.2–1)
BILIRUB UR QL CFM: POSITIVE
BUN SERPL-MCNC: 67 MG/DL (ref 6–20)
BUN/CREAT SERPL: 20 (ref 12–20)
CALCIUM SERPL-MCNC: 9.3 MG/DL (ref 8.5–10.1)
CHLORIDE SERPL-SCNC: 96 MMOL/L (ref 97–108)
CO2 SERPL-SCNC: 17 MMOL/L (ref 21–32)
COLOR UR: ABNORMAL
COMMENT, HOLDF: NORMAL
CREAT SERPL-MCNC: 3.27 MG/DL (ref 0.7–1.3)
DIFFERENTIAL METHOD BLD: ABNORMAL
EOSINOPHIL # BLD: 0 K/UL (ref 0–0.4)
EOSINOPHIL NFR BLD: 0 % (ref 0–7)
EPITH CASTS URNS QL MICRO: ABNORMAL /LPF
ERYTHROCYTE [DISTWIDTH] IN BLOOD BY AUTOMATED COUNT: 15.7 % (ref 11.5–14.5)
GLOBULIN SER CALC-MCNC: 5 G/DL (ref 2–4)
GLUCOSE BLD STRIP.AUTO-MCNC: 105 MG/DL (ref 65–117)
GLUCOSE SERPL-MCNC: 117 MG/DL (ref 65–100)
GLUCOSE UR STRIP.AUTO-MCNC: NEGATIVE MG/DL
GRAN CASTS URNS QL MICRO: ABNORMAL /LPF
HCT VFR BLD AUTO: 36.5 % (ref 36.6–50.3)
HEMOCCULT STL QL: NEGATIVE
HGB BLD-MCNC: 12.9 G/DL (ref 12.1–17)
HGB UR QL STRIP: NEGATIVE
IMM GRANULOCYTES # BLD AUTO: 0 K/UL
IMM GRANULOCYTES NFR BLD AUTO: 0 %
INR PPP: 1.1 (ref 0.9–1.1)
KETONES UR QL STRIP.AUTO: ABNORMAL MG/DL
LACTATE BLD-SCNC: 1.39 MMOL/L (ref 0.4–2)
LEUKOCYTE ESTERASE UR QL STRIP.AUTO: ABNORMAL
LIPASE SERPL-CCNC: 102 U/L (ref 73–393)
LYMPHOCYTES # BLD: 2 K/UL (ref 0.8–3.5)
LYMPHOCYTES NFR BLD: 7 % (ref 12–49)
MCH RBC QN AUTO: 31.8 PG (ref 26–34)
MCHC RBC AUTO-ENTMCNC: 35.3 G/DL (ref 30–36.5)
MCV RBC AUTO: 89.9 FL (ref 80–99)
METAMYELOCYTES NFR BLD MANUAL: 1 %
MONOCYTES # BLD: 2.6 K/UL (ref 0–1)
MONOCYTES NFR BLD: 9 % (ref 5–13)
MUCOUS THREADS URNS QL MICRO: ABNORMAL /LPF
MYELOCYTES NFR BLD MANUAL: 1 %
NEUTS SEG # BLD: 23.9 K/UL (ref 1.8–8)
NEUTS SEG NFR BLD: 82 % (ref 32–75)
NITRITE UR QL STRIP.AUTO: POSITIVE
PH UR STRIP: 5 [PH] (ref 5–8)
PLATELET # BLD AUTO: 334 K/UL (ref 150–400)
PMV BLD AUTO: 11.7 FL (ref 8.9–12.9)
POTASSIUM SERPL-SCNC: 3.6 MMOL/L (ref 3.5–5.1)
PROT SERPL-MCNC: 7.6 G/DL (ref 6.4–8.2)
PROT UR STRIP-MCNC: 100 MG/DL
PROTHROMBIN TIME: 11.6 SEC (ref 9–11.1)
RBC # BLD AUTO: 4.06 M/UL (ref 4.1–5.7)
RBC #/AREA URNS HPF: ABNORMAL /HPF (ref 0–5)
RBC MORPH BLD: ABNORMAL
SAMPLES BEING HELD,HOLD: NORMAL
SERVICE CMNT-IMP: NORMAL
SODIUM SERPL-SCNC: 127 MMOL/L (ref 136–145)
SP GR UR REFRACTOMETRY: 1.02 (ref 1–1.03)
TROPONIN-HIGH SENSITIVITY: 30 NG/L (ref 0–76)
UR CULT HOLD, URHOLD: NORMAL
UROBILINOGEN UR QL STRIP.AUTO: 0.2 EU/DL (ref 0.2–1)
WBC # BLD AUTO: 29.1 K/UL (ref 4.1–11.1)
WBC URNS QL MICRO: ABNORMAL /HPF (ref 0–4)

## 2022-08-26 PROCEDURE — 84484 ASSAY OF TROPONIN QUANT: CPT

## 2022-08-26 PROCEDURE — 71045 X-RAY EXAM CHEST 1 VIEW: CPT

## 2022-08-26 PROCEDURE — 83690 ASSAY OF LIPASE: CPT

## 2022-08-26 PROCEDURE — 36415 COLL VENOUS BLD VENIPUNCTURE: CPT

## 2022-08-26 PROCEDURE — 74011000258 HC RX REV CODE- 258: Performed by: EMERGENCY MEDICINE

## 2022-08-26 PROCEDURE — 99285 EMERGENCY DEPT VISIT HI MDM: CPT

## 2022-08-26 PROCEDURE — 96367 TX/PROPH/DG ADDL SEQ IV INF: CPT

## 2022-08-26 PROCEDURE — 87086 URINE CULTURE/COLONY COUNT: CPT

## 2022-08-26 PROCEDURE — 96375 TX/PRO/DX INJ NEW DRUG ADDON: CPT

## 2022-08-26 PROCEDURE — 85025 COMPLETE CBC W/AUTO DIFF WBC: CPT

## 2022-08-26 PROCEDURE — 84300 ASSAY OF URINE SODIUM: CPT

## 2022-08-26 PROCEDURE — 96376 TX/PRO/DX INJ SAME DRUG ADON: CPT

## 2022-08-26 PROCEDURE — 93005 ELECTROCARDIOGRAM TRACING: CPT

## 2022-08-26 PROCEDURE — 96365 THER/PROPH/DIAG IV INF INIT: CPT

## 2022-08-26 PROCEDURE — 96366 THER/PROPH/DIAG IV INF ADDON: CPT

## 2022-08-26 PROCEDURE — 80053 COMPREHEN METABOLIC PANEL: CPT

## 2022-08-26 PROCEDURE — 85610 PROTHROMBIN TIME: CPT

## 2022-08-26 PROCEDURE — 83605 ASSAY OF LACTIC ACID: CPT

## 2022-08-26 PROCEDURE — 87506 IADNA-DNA/RNA PROBE TQ 6-11: CPT

## 2022-08-26 PROCEDURE — 74011000250 HC RX REV CODE- 250: Performed by: EMERGENCY MEDICINE

## 2022-08-26 PROCEDURE — 81001 URINALYSIS AUTO W/SCOPE: CPT

## 2022-08-26 PROCEDURE — 83935 ASSAY OF URINE OSMOLALITY: CPT

## 2022-08-26 PROCEDURE — 74176 CT ABD & PELVIS W/O CONTRAST: CPT

## 2022-08-26 PROCEDURE — 74011250636 HC RX REV CODE- 250/636: Performed by: EMERGENCY MEDICINE

## 2022-08-26 PROCEDURE — 82272 OCCULT BLD FECES 1-3 TESTS: CPT

## 2022-08-26 PROCEDURE — 87040 BLOOD CULTURE FOR BACTERIA: CPT

## 2022-08-26 PROCEDURE — 82962 GLUCOSE BLOOD TEST: CPT

## 2022-08-26 RX ORDER — SODIUM CHLORIDE 0.9 % (FLUSH) 0.9 %
5-10 SYRINGE (ML) INJECTION AS NEEDED
Status: DISCONTINUED | OUTPATIENT
Start: 2022-08-26 | End: 2022-08-27 | Stop reason: SDUPTHER

## 2022-08-26 RX ORDER — HYDROMORPHONE HYDROCHLORIDE 1 MG/ML
1 INJECTION, SOLUTION INTRAMUSCULAR; INTRAVENOUS; SUBCUTANEOUS
Status: COMPLETED | OUTPATIENT
Start: 2022-08-26 | End: 2022-08-26

## 2022-08-26 RX ORDER — VANCOMYCIN 2 GRAM/500 ML IN 0.9 % SODIUM CHLORIDE INTRAVENOUS
2000 ONCE
Status: COMPLETED | OUTPATIENT
Start: 2022-08-26 | End: 2022-08-27

## 2022-08-26 RX ORDER — ONDANSETRON 2 MG/ML
4 INJECTION INTRAMUSCULAR; INTRAVENOUS
Status: COMPLETED | OUTPATIENT
Start: 2022-08-26 | End: 2022-08-26

## 2022-08-26 RX ORDER — SODIUM BICARBONATE 1 MEQ/ML
50 SYRINGE (ML) INTRAVENOUS
Status: COMPLETED | OUTPATIENT
Start: 2022-08-26 | End: 2022-08-26

## 2022-08-26 RX ORDER — HYDROMORPHONE HYDROCHLORIDE 1 MG/ML
1 INJECTION, SOLUTION INTRAMUSCULAR; INTRAVENOUS; SUBCUTANEOUS ONCE
Status: COMPLETED | OUTPATIENT
Start: 2022-08-26 | End: 2022-08-26

## 2022-08-26 RX ADMIN — HYDROMORPHONE HYDROCHLORIDE 1 MG: 1 INJECTION, SOLUTION INTRAMUSCULAR; INTRAVENOUS; SUBCUTANEOUS at 19:49

## 2022-08-26 RX ADMIN — SODIUM CHLORIDE 1000 ML: 9 INJECTION, SOLUTION INTRAVENOUS at 17:47

## 2022-08-26 RX ADMIN — HYDROMORPHONE HYDROCHLORIDE 1 MG: 1 INJECTION, SOLUTION INTRAMUSCULAR; INTRAVENOUS; SUBCUTANEOUS at 17:47

## 2022-08-26 RX ADMIN — SODIUM BICARBONATE 50 MEQ: 84 INJECTION, SOLUTION INTRAVENOUS at 19:49

## 2022-08-26 RX ADMIN — ONDANSETRON HYDROCHLORIDE 4 MG: 2 INJECTION, SOLUTION INTRAMUSCULAR; INTRAVENOUS at 17:36

## 2022-08-26 RX ADMIN — VANCOMYCIN HYDROCHLORIDE 2000 MG: 10 INJECTION, POWDER, LYOPHILIZED, FOR SOLUTION INTRAVENOUS at 22:24

## 2022-08-26 RX ADMIN — PIPERACILLIN AND TAZOBACTAM 3.38 G: 3; .375 INJECTION, POWDER, FOR SOLUTION INTRAVENOUS at 19:49

## 2022-08-26 NOTE — ED TRIAGE NOTES
Patient reports blood in urine and stool for one week. \"All my minerals are depleted\". Had 3 near syncopal episodes today when standing. Patient has crohn's and has had multiple bowel resections, now has open sore on abdomen draining foul smelling brown, green, redish thick fluid.

## 2022-08-27 ENCOUNTER — APPOINTMENT (OUTPATIENT)
Dept: ULTRASOUND IMAGING | Age: 62
DRG: 871 | End: 2022-08-27
Attending: FAMILY MEDICINE
Payer: MEDICARE

## 2022-08-27 PROBLEM — N30.00 ACUTE CYSTITIS: Status: ACTIVE | Noted: 2022-08-27

## 2022-08-27 LAB
ANION GAP SERPL CALC-SCNC: 14 MMOL/L (ref 5–15)
ATRIAL RATE: 121 BPM
ATRIAL RATE: 95 BPM
BILIRUB DIRECT SERPL-MCNC: 11.1 MG/DL (ref 0–0.2)
BUN SERPL-MCNC: 73 MG/DL (ref 6–20)
BUN/CREAT SERPL: 26 (ref 12–20)
CALCIUM SERPL-MCNC: 8.4 MG/DL (ref 8.5–10.1)
CALCULATED P AXIS, ECG09: 55 DEGREES
CALCULATED P AXIS, ECG09: 71 DEGREES
CALCULATED R AXIS, ECG10: 70 DEGREES
CALCULATED R AXIS, ECG10: 70 DEGREES
CALCULATED T AXIS, ECG11: 70 DEGREES
CALCULATED T AXIS, ECG11: 70 DEGREES
CHLORIDE SERPL-SCNC: 102 MMOL/L (ref 97–108)
CO2 SERPL-SCNC: 14 MMOL/L (ref 21–32)
COMMENT, HOLDF: NORMAL
CREAT SERPL-MCNC: 2.77 MG/DL (ref 0.7–1.3)
DIAGNOSIS, 93000: NORMAL
DIAGNOSIS, 93000: NORMAL
GLUCOSE BLD STRIP.AUTO-MCNC: 113 MG/DL (ref 65–117)
GLUCOSE SERPL-MCNC: 90 MG/DL (ref 65–100)
LDH SERPL L TO P-CCNC: 1182 U/L (ref 85–241)
MAGNESIUM SERPL-MCNC: 1.8 MG/DL (ref 1.6–2.4)
OSMOLALITY UR: 345 MOSM/KG H2O
P-R INTERVAL, ECG05: 134 MS
P-R INTERVAL, ECG05: 144 MS
POTASSIUM SERPL-SCNC: 3.4 MMOL/L (ref 3.5–5.1)
Q-T INTERVAL, ECG07: 330 MS
Q-T INTERVAL, ECG07: 348 MS
QRS DURATION, ECG06: 74 MS
QRS DURATION, ECG06: 84 MS
QTC CALCULATION (BEZET), ECG08: 437 MS
QTC CALCULATION (BEZET), ECG08: 468 MS
SAMPLES BEING HELD,HOLD: NORMAL
SERVICE CMNT-IMP: NORMAL
SODIUM SERPL-SCNC: 130 MMOL/L (ref 136–145)
SODIUM UR-SCNC: 9 MMOL/L
TROPONIN-HIGH SENSITIVITY: 71 NG/L (ref 0–76)
VENTRICULAR RATE, ECG03: 121 BPM
VENTRICULAR RATE, ECG03: 95 BPM

## 2022-08-27 PROCEDURE — 65660000001 HC RM ICU INTERMED STEPDOWN

## 2022-08-27 PROCEDURE — 74011250636 HC RX REV CODE- 250/636: Performed by: PHYSICIAN ASSISTANT

## 2022-08-27 PROCEDURE — 36415 COLL VENOUS BLD VENIPUNCTURE: CPT

## 2022-08-27 PROCEDURE — 82248 BILIRUBIN DIRECT: CPT

## 2022-08-27 PROCEDURE — 82962 GLUCOSE BLOOD TEST: CPT

## 2022-08-27 PROCEDURE — 74011250637 HC RX REV CODE- 250/637: Performed by: FAMILY MEDICINE

## 2022-08-27 PROCEDURE — 74011250636 HC RX REV CODE- 250/636: Performed by: INTERNAL MEDICINE

## 2022-08-27 PROCEDURE — 76700 US EXAM ABDOM COMPLETE: CPT

## 2022-08-27 PROCEDURE — 84484 ASSAY OF TROPONIN QUANT: CPT

## 2022-08-27 PROCEDURE — 80048 BASIC METABOLIC PNL TOTAL CA: CPT

## 2022-08-27 PROCEDURE — 74011000250 HC RX REV CODE- 250: Performed by: FAMILY MEDICINE

## 2022-08-27 PROCEDURE — 74011250636 HC RX REV CODE- 250/636: Performed by: FAMILY MEDICINE

## 2022-08-27 PROCEDURE — 83615 LACTATE (LD) (LDH) ENZYME: CPT

## 2022-08-27 PROCEDURE — P9047 ALBUMIN (HUMAN), 25%, 50ML: HCPCS | Performed by: INTERNAL MEDICINE

## 2022-08-27 PROCEDURE — 74011000250 HC RX REV CODE- 250: Performed by: PHYSICIAN ASSISTANT

## 2022-08-27 PROCEDURE — 74011000258 HC RX REV CODE- 258: Performed by: FAMILY MEDICINE

## 2022-08-27 PROCEDURE — 83735 ASSAY OF MAGNESIUM: CPT

## 2022-08-27 RX ORDER — ONDANSETRON 2 MG/ML
4 INJECTION INTRAMUSCULAR; INTRAVENOUS
Status: DISCONTINUED | OUTPATIENT
Start: 2022-08-27 | End: 2022-09-12 | Stop reason: HOSPADM

## 2022-08-27 RX ORDER — ALBUMIN HUMAN 250 G/1000ML
25 SOLUTION INTRAVENOUS EVERY 6 HOURS
Status: DISPENSED | OUTPATIENT
Start: 2022-08-27 | End: 2022-08-28

## 2022-08-27 RX ORDER — ACETAMINOPHEN 325 MG/1
650 TABLET ORAL
Status: DISCONTINUED | OUTPATIENT
Start: 2022-08-27 | End: 2022-09-12 | Stop reason: HOSPADM

## 2022-08-27 RX ORDER — DEXTROSE MONOHYDRATE AND SODIUM CHLORIDE 5; .9 G/100ML; G/100ML
75 INJECTION, SOLUTION INTRAVENOUS CONTINUOUS
Status: DISCONTINUED | OUTPATIENT
Start: 2022-08-27 | End: 2022-08-27

## 2022-08-27 RX ORDER — OXYCODONE HYDROCHLORIDE 5 MG/1
10 TABLET ORAL
Status: DISCONTINUED | OUTPATIENT
Start: 2022-08-27 | End: 2022-08-31

## 2022-08-27 RX ORDER — HEPARIN SODIUM 5000 [USP'U]/ML
5000 INJECTION, SOLUTION INTRAVENOUS; SUBCUTANEOUS EVERY 8 HOURS
Status: DISCONTINUED | OUTPATIENT
Start: 2022-08-28 | End: 2022-09-12 | Stop reason: HOSPADM

## 2022-08-27 RX ORDER — SODIUM BICARBONATE IN D5W 150/1000ML
PLASTIC BAG, INJECTION (ML) INTRAVENOUS CONTINUOUS
Status: DISCONTINUED | OUTPATIENT
Start: 2022-08-27 | End: 2022-08-27

## 2022-08-27 RX ORDER — PANTOPRAZOLE SODIUM 40 MG/1
40 TABLET, DELAYED RELEASE ORAL
Status: DISCONTINUED | OUTPATIENT
Start: 2022-08-28 | End: 2022-08-28

## 2022-08-27 RX ORDER — POTASSIUM CHLORIDE 7.45 MG/ML
10 INJECTION INTRAVENOUS
Status: DISCONTINUED | OUTPATIENT
Start: 2022-08-27 | End: 2022-08-27

## 2022-08-27 RX ORDER — ACETAMINOPHEN 650 MG/1
650 SUPPOSITORY RECTAL
Status: DISCONTINUED | OUTPATIENT
Start: 2022-08-27 | End: 2022-09-12 | Stop reason: HOSPADM

## 2022-08-27 RX ORDER — ONDANSETRON 4 MG/1
4 TABLET, ORALLY DISINTEGRATING ORAL
Status: DISCONTINUED | OUTPATIENT
Start: 2022-08-27 | End: 2022-09-12 | Stop reason: HOSPADM

## 2022-08-27 RX ORDER — SODIUM CHLORIDE 0.9 % (FLUSH) 0.9 %
5-40 SYRINGE (ML) INJECTION AS NEEDED
Status: DISCONTINUED | OUTPATIENT
Start: 2022-08-27 | End: 2022-09-12 | Stop reason: HOSPADM

## 2022-08-27 RX ORDER — POTASSIUM CHLORIDE 7.45 MG/ML
10 INJECTION INTRAVENOUS
Status: DISPENSED | OUTPATIENT
Start: 2022-08-27 | End: 2022-08-27

## 2022-08-27 RX ORDER — SODIUM CHLORIDE 0.9 % (FLUSH) 0.9 %
5-40 SYRINGE (ML) INJECTION EVERY 8 HOURS
Status: DISCONTINUED | OUTPATIENT
Start: 2022-08-27 | End: 2022-09-12 | Stop reason: HOSPADM

## 2022-08-27 RX ADMIN — SODIUM BICARBONATE: 84 INJECTION, SOLUTION INTRAVENOUS at 12:25

## 2022-08-27 RX ADMIN — ACETAMINOPHEN 650 MG: 325 TABLET ORAL at 03:56

## 2022-08-27 RX ADMIN — ALBUMIN (HUMAN) 25 G: 0.25 INJECTION, SOLUTION INTRAVENOUS at 14:38

## 2022-08-27 RX ADMIN — SODIUM CHLORIDE, PRESERVATIVE FREE 10 ML: 5 INJECTION INTRAVENOUS at 14:42

## 2022-08-27 RX ADMIN — ALBUMIN (HUMAN) 25 G: 0.25 INJECTION, SOLUTION INTRAVENOUS at 18:33

## 2022-08-27 RX ADMIN — CEFEPIME 1 G: 1 INJECTION, POWDER, FOR SOLUTION INTRAMUSCULAR; INTRAVENOUS at 03:54

## 2022-08-27 RX ADMIN — POTASSIUM CHLORIDE 10 MEQ: 7.46 INJECTION, SOLUTION INTRAVENOUS at 09:55

## 2022-08-27 NOTE — ED NOTES
Has a final transfer review been performed?  Yes    Reason for Admission: Sepsis  Patient comes from: home  Mental Status: Alert and oriented  ADL:partial assistance  Ambulation:mild difficulty  Pertinent Info/Safety Concerns: jaundiced (skin + eyes)  COVID Status: Not Tested  MEWS Score: 2    Vitals:    08/27/22 0630 08/27/22 0645 08/27/22 0700 08/27/22 0715   BP: (!) 100/59 (!) 107/59 (!) 112/57 (!) 101/57   Pulse: 93 91 95 93   Resp: 27 26 26 26   Temp:    98 °F (36.7 °C)   SpO2: 92% 93% 94% 97%   Weight:       Height:         Lines:   Peripheral IV 08/27/22 Right Hand (Active)   Site Assessment Clean, dry, & intact 08/27/22 0407   Phlebitis Assessment 0 08/27/22 0407   Infiltration Assessment 0 08/27/22 0407   Dressing Status Clean, dry, & intact 08/27/22 0407   Hub Color/Line Status Green 08/27/22 0407      Transport mode:ED stretcher    Gracie Hope  being transferred to (unit) for routine progression of care     \"Notification of etransfer note given to (Name) Bin Maria (Title) RN

## 2022-08-27 NOTE — PROGRESS NOTES
[address] [facility]                                                                                          Hospitalist Progress Note  Raudel Johnston PA-C  Answering service: [phone] from in house phone        Date of Service:  2022  NAME:  Dong Estevez  :  1960  MRN:  [id number]      Admission Summary:   Dong Estevez is a 65 y.o. male with PMHx of anemia, depression, COPD, malnutrition and Vitamin B12 deficiency, short bowel syndrome due to severe Crohn's Disease,  s/p >30 surgeries (210cm small bowel remaining from lig treitz to ileosigmoid anastomosis) s/p >30 surgeries with ileosigmoid anastomosis currently not on treatment, and chronic pain syndrome who is now admitted with severe sepsis, SUHA, metabolic acidosis, and direct hyperbilirubinemia. Nephrology and GI following. Interval history / Subjective:   Mr. Derald Canavan reports feeling somewhat better this morning, but feeling \"like shit. \" He says his hernia hurts but denies any discharge from the wound on his anterior stomach. He reports his skin/eyes have been turning more and more yellow over the past several days. He notes making minimal urine. Denies any headaches or vision changes or chest pain. He is extremely upset with the IV's and they are painful. I discussed the nursing are noting they are working properly and if he does not allow us to use them, he can get significantly worse and decompensate. He ultimately agreed. Due to critical illness he was transferred to Intermediate Care    Multiple members of nursing staff attempted to get labs for him but unfortunately were unsuccessful. I saw and examined the patient 5-6 times during the day to evaluate and reassess with nursing staff. I contacted anesthesia due to his limited access and critical illness and assistance with placing an emergent central line.  He had working pIV's, but they were painful and the nursing staff has been unable to complete repeat labs. Anesthesia unable to complete placement 8/27. I discussed with patient the critical nature of his illness. I discussed that he is at risk for death during this hospitalization and rapid decompensation. He expressed understanding. I asked if I can contact family/friends and he told me he has no family to contact. He notes his best friend, Rodrigo KhourySr ([phone]) and is his Health Care Proxy. I contacted William Kenny and updated him regarding his critical illness and risk for decompensation. He talked with Mr. Shyann Luong today. He was transferred to Intermediate Care. The nursing staff went to repeat labs, and the patient refused completion. Assessment & Plan:     Severe Sepsis, Pulmonary v urinary source v rectocutaneous fistula/ulcerated abdominal wall wound  Severe SUHA  AG Metabolic Acidosis  Direct Hyperbilirubinemia  Leukocytosis  -- Renal following. IVF with Sodium Bicarb 125cc/hr. Albumin 25g x4 doses q6h ordered  -- Potassium repleted  -- Lactate: 1.39  -- Abx: On Vancomycin and Cefepime. The antibiotic dosing/timing was reviewed with pharmacy 8/27 and confirmed appropriate dosing. Need to dose adjust based on renal function trend. -- Ulcerated Abdominal Wound: Wound care consulted. -- GI following   -- Diarrhea: Enteric Pathogen panel (pending from 8/26) and C diff testing (ordered). O&P testing ordered. -- Hyperbilirubinemia: Per GI due to sepsis, decompensated cirrhosis, or abx-related. U/S completed with no biliary dilation  -- Continue cardiac/tele monitoring    Crohn's Disease with small bowel syndrome  Hypoalbuminemia  Malnutrition  -- GI Following:  If PNA and sepsis improves, to pursue EGD and Colonoscopy  -- Make NPO for bowel rest  -- Nutrition labs ordered per GI  -- Ordered for central access (PICC) to pursue TPN    Enlarged liver with nodular contour  -- Concerning for new cirrhosis  -- Serologies sent per GI  -- TB Screening  -- Trend coags    Chronic Pain  -- Takes Oxycodone 15mg 5x/day as an outpatient. Will start with lower dose to try and not cloud clinical picture in the setting of critical illness. Code status: Full Code  Prophylaxis: SQH PPx  Care Plan discussed with: Pt, RN, consulting teams, HCP   Anticipated Disposition: TBD     Hospital Problems  Date Reviewed: 10/9/2019            Codes Class Noted POA    Acute cystitis ICD-10-CM: N30.00  ICD-9-CM: 595.0  8/27/2022 Unknown             Review of Systems:   A comprehensive review of systems was negative except for that written in the HPI. Vital Signs:    Last 24hrs VS reviewed since prior progress note. Most recent are:  Visit Vitals  /68 (BP 1 Location: Left upper arm, BP Patient Position: At rest;Lying)   Pulse (!) 101   Temp 99.6 °F (37.6 °C)   Resp 18   Ht 5' 9\" (1.753 m)   Wt 82.1 kg (181 lb)   SpO2 97%   BMI 26.73 kg/m²         Intake/Output Summary (Last 24 hours) at 8/27/2022 1636  Last data filed at 8/27/2022 0043  Gross per 24 hour   Intake 1550 ml   Output --   Net 1550 ml        Physical Examination:     I had a face to face encounter with this patient and independently examined them on 8/27/2022 as outlined below:          Constitutional:  Tachypneic. Appears uncomfortable. No acute distress, cooperative, pleasant    ENT:  Oral mucosa dry. Eyes with severe scleral icterus. Resp:  CTA bilaterally. No wheezing/rhonchi/rales. Tachypneic   CV:  Regular rhythm, normal rate, no murmurs appreciated    GI:  Midline abdominal incision with large abdominal wall hernia. Ulcerated midline abdominal wound with yellow/green granulation tissue overlying. Surrounding erythema from wound. Mildly tender to palpation. Musculoskeletal:  No edema, warm and strong palpable DP pulses    Neurologic:  Moves all extremities. AAOx3, CN II-XII reviewed and intact  SKIN: Severe jaundice diffusely.              Data Review:    Review and/or order of clinical lab test  Review and/or order of tests in the radiology section of CPT  Review and/or order of tests in the medicine section of CPT      Labs:     Recent Labs     08/26/22  1733   WBC 29.1*   HGB 12.9   HCT 36.5*        Recent Labs     08/27/22  0331 08/26/22  1733   * 127*   K 3.4* 3.6    96*   CO2 14* 17*   BUN 73* 67*   CREA 2.77* 3.27*   GLU 90 117*   CA 8.4* 9.3     Recent Labs     08/26/22  1733   ALT 25   *   TBILI 10.2*   TP 7.6   ALB 2.6*   GLOB 5.0*   LPSE 102     Recent Labs     08/26/22  1733   INR 1.1   PTP 11.6*      No results for input(s): FE, TIBC, PSAT, FERR in the last 72 hours. Lab Results   Component Value Date/Time    Folate 16.0 10/08/2019 04:06 AM      No results for input(s): PH, PCO2, PO2 in the last 72 hours. No results for input(s): CPK, CKNDX, TROIQ in the last 72 hours.     No lab exists for component: CPKMB  Lab Results   Component Value Date/Time    Cholesterol, total 134 12/01/2014 04:33 AM    HDL Cholesterol 35 12/01/2014 04:33 AM    LDL, calculated 58.2 12/01/2014 04:33 AM    Triglyceride 204 (H) 12/01/2014 04:33 AM    CHOL/HDL Ratio 3.8 12/01/2014 04:33 AM     Lab Results   Component Value Date/Time    Glucose (POC) 113 08/27/2022 05:26 AM    Glucose (POC) 105 08/26/2022 08:20 PM    Glucose (POC) 113 (H) 11/10/2017 11:35 AM    Glucose (POC) 120 (H) 11/10/2017 05:54 AM    Glucose (POC) 87 11/09/2017 11:43 PM     Lab Results   Component Value Date/Time    Color DARK YELLOW 08/26/2022 07:27 PM    Appearance TURBID (A) 08/26/2022 07:27 PM    Specific gravity 1.025 08/26/2022 07:27 PM    Specific gravity 1.010 10/07/2019 05:48 AM    pH (UA) 5.0 08/26/2022 07:27 PM    Protein 100 (A) 08/26/2022 07:27 PM    Glucose Negative 08/26/2022 07:27 PM    Ketone TRACE (A) 08/26/2022 07:27 PM    Bilirubin NEGATIVE  10/07/2019 05:48 AM    Urobilinogen 0.2 08/26/2022 07:27 PM    Nitrites Positive (A) 08/26/2022 07:27 PM    Leukocyte Esterase SMALL (A) 08/26/2022 07:27 PM    Epithelial cells FEW 08/26/2022 07:27 PM    Bacteria 1+ (A) 08/26/2022 07:27 PM    WBC 5-10 08/26/2022 07:27 PM    RBC 0-5 08/26/2022 07:27 PM     Radiology  -- CT A/P 8/26:  IMPRESSION  1. Status post ileocolonic anastomosis at the sigmoid level, with the  anastomosis and postoperative change immediately deep to the anterior abdominal  wall which is thin in the midline but with no mass, hematoma or fluid  collection. No intra-abdominal free air or fluid. 2. Patchy small infiltrates in the right middle lobe, new since prior study. 3. Stable nonobstructing right intrarenal calculi. 4. Other stable incidental and postoperative changes. -- US Abdomen 8/27: IMPRESSION  1. Evidence of hepatic parenchymal disease. Nodular contour suggests cirrhosis. 2. No biliary ductal dilatation. 3. Status post cholecystectomy.     Medications Reviewed:     Current Facility-Administered Medications   Medication Dose Route Frequency    sodium chloride (NS) flush 5-40 mL  5-40 mL IntraVENous Q8H    sodium chloride (NS) flush 5-40 mL  5-40 mL IntraVENous PRN    acetaminophen (TYLENOL) tablet 650 mg  650 mg Oral Q6H PRN    Or    acetaminophen (TYLENOL) suppository 650 mg  650 mg Rectal Q6H PRN    ondansetron (ZOFRAN ODT) tablet 4 mg  4 mg Oral Q8H PRN    Or    ondansetron (ZOFRAN) injection 4 mg  4 mg IntraVENous Q6H PRN    [START ON 8/28/2022] cefepime (MAXIPIME) 1 g in 0.9% sodium chloride (MBP/ADV) 50 mL MBP  1 g IntraVENous Q24H    Vancomycin - Pharmacy to Dose   Other Rx Dosing/Monitoring    [START ON 8/28/2022] Vancomycin Random Due 8/28 @ 04:00   Other ONCE    sodium bicarbonate (8.4%) 150 mEq in dextrose 5% 1,000 mL infusion   IntraVENous CONTINUOUS    [START ON 8/28/2022] pantoprazole (PROTONIX) tablet 40 mg  40 mg Oral ACB    albumin human 25% (BUMINATE) solution 25 g  25 g IntraVENous Q6H     ______________________________________________________________________  EXPECTED LENGTH OF STAY: - - -  ACTUAL LENGTH OF STAY:          0 Raudel Johnston PA-C       CRITICAL CARE ATTESTATION:  I had a face to face encounter with the patient, reviewed and interpreted patient data including clinical events, labs, images, vital signs, I/O's, and examined patient. I have discussed the case and the plan and management of the patient's care with the consulting services, the bedside nurses and necessary ancillary providers. NOTE OF PERSONAL INVOLVEMENT IN CARE   This patient has a high probability of imminent, clinically significant deterioration, which requires the highest level of preparedness to intervene urgently. I participated in the decision-making and personally managed or directed the management of the following life and organ supporting interventions that required my frequent assessment to treat or prevent imminent deterioration. I personally spent 45 minutes of critical care time. This is time spent at this critically ill patient's bedside actively involved in patient care as well as the coordination of care and discussions with the patient's family. This does not include any procedural time which has been billed separately.

## 2022-08-27 NOTE — PROGRESS NOTES
Problem: Falls - Risk of  Goal: *Absence of Falls  Description: Document Juan R Hunter Fall Risk and appropriate interventions in the flowsheet.   Outcome: Progressing Towards Goal  Note: Fall Risk Interventions:  Mobility Interventions: PT Consult for mobility concerns, PT Consult for assist device competence                             Problem: Discharge Planning  Goal: *Knowledge of medication management  Outcome: Progressing Towards Goal

## 2022-08-27 NOTE — H&P
[address] [facility] Adult  Hospitalist Group  History and Physical    Date of Service:  8/27/2022  Primary Care Provider: Rusty Fletcher MD  Source of information: The patient and Chart review    Chief Complaint: Hypotension, Lethargy, Abdominal Pain, and Vomiting      History of Presenting Illness:   Mario Ivan is a 65 y.o. male with apmhx anemia, anxiety/depression, COPD, crohs disease s/p multiple surgeries, short bowel syndrome, incisional abdominal hernia with overlying skin wound, B12 deficiency GERD,and HTN who presents with report of dark stool, and dark urine. In the ED, he was tahcycardic to 124, and became hypoxic to 85%, improved to 93% on 4Lnc. WBC was 29.1,  Hgb was stable at 12.9, Na 127, CO2 17, BUN 67, and creatinine 3.27, UA with positive nitrites, small leukocyte esterase, 5-10 WBC, and 1+ bacteria, hemoccult was negative. In the ED, he received dilaudid zosyn, zofran, NaHc03 push, and 3Lns. REVIEW OF SYSTEMS:  A comprehensive review of systems was negative except for that written in the History of Present Illness. Past Medical History:   Diagnosis Date    Abdominal wall hernia 6/15/2012    Anal fissure     Anemia     Anemia     Anxiety and depression     Arthritis     Related to the Crohn's disease. Cancer ([address]     MELANOMA HEAD AND FACE    Chronic pain     GENERALIZED    COPD (chronic obstructive pulmonary disease) (HCC)     Crohn disease (HCC)     Diagnosed at age 17. Echocardiogram normal (EF: 55-60%) 07/2015    Esophageal ulcer     GERD (gastroesophageal reflux disease)     H/[facility] blood transfusion 2013    [facility]    Hypertension     denies    Migraine     Short bowel syndrome     Ventral hernia without obstruction or gangrene       Past Surgical History:   Procedure Laterality Date    ABDOMEN SURGERY PROC UNLISTED      33 abdominal operations for treatment of Crohn's disease and its complications.     COLONOSCOPY N/A 10/8/2019 COLONOSCOPY performed by Cyndie Luciano MD at [facility] ENDOSCOPY    [address] needle, takes 1 inch needle    HX APPENDECTOMY      HX CHOLECYSTECTOMY      HX GI      colectomy x2, one ileostomy    HX GI  7/28/14    exp lap,partial colectomy with end ileostomy by Dr Debra Boston      neck fusion    HX ORTHOPAEDIC  1980s    wrist right,     HX ORTHOPAEDIC  2006, 11/2013    neck, L4 L5 L6    HX ORTHOPAEDIC  1990s    right knee scope    HX OTHER SURGICAL      surgical repair from spider bite    HX OTHER SURGICAL  12/1/14    Incision and drainage of posterior right subcutaneous shoulder abscess    HX OTHER SURGICAL  2014    LEFT INDEX FINGER SPIDER BITE    HX OTHER SURGICAL  2014    RECTAL FISTULA    HX OTHER SURGICAL  3/17/2015    Ileostomy takedown with extensive lysis of adhesions (greater than three hours), mobilization of the splenic flexure, left colon resection, ileocolic anastomosis, and excision of scar; Dr. Rachel Bowman. HX OTHER SURGICAL  3/22/2015    Exploratory laparotomy with washout of abdomen, suture repair of small bowel/anastomosis, and diverting protective loop ileostomy; Camille Blanco MD.    HX OTHER SURGICAL  4/9/2015    Laparotomy, extensive lysis of adhesions, abdominal lavage, and resection of ileocolic anastomosis (including the efferent limb of the loop ileostomy) with creation of Demetrius's pouch; Dr. Rachel Bowman. HX OTHER SURGICAL  7/14/2015    Cystoscopy and placement of bilateral ureteral catheters; Adalberto Adame MD.    HX OTHER SURGICAL  7/14/2015    Ileostomy takedown with extensive lysis of adhesions, small bowel resection, and enterocolostomy; Dr. Rachel Bowman. HX OTHER SURGICAL  9/29/2015    CT-guided percutaneous drainage of intraabdominal abscess; Dr. Bernard Trevizo. HX OTHER SURGICAL  11/16/2015    CT-guided percutaneous aspiration of abdominal wall cavity; Dr. Aldo Farris.     HX OTHER SURGICAL  12/1/2015    Incision and drainage of abdominal wall abscess; Dr. Tien Archibald.  OTHER SURGICAL  2/11/2016    Incision and drainage of abdominal wall abscess; Dr. Tien Archibald.  OTHER SURGICAL  2/23/2016    Cystoscopy and placement of bilateral temporary ureteral catheters; Dr. Brendan Henderson.  OTHER SURGICAL  2/23/2016    Exploratory laparotomy, extensive lysis of adhesions, removal of mesh, and repair of enterocutaneous fistula; Dr. Tien Archibald.  OTHER SURGICAL  11/03/2017    Exploratory laparotomy, extensive lysis of adhesions, and reduction of intussusception; Dr. Tien Archibald. Prior to Admission medications    Medication Sig Start Date End Date Taking? Authorizing Provider   gabapentin (NEURONTIN) 300 mg capsule Take 300 mg by mouth three (3) times daily. Provider, Historical   oxyCODONE (ROXICODONE) 5 mg/5 mL solution Take 10 mg by mouth every six to eight (6-8) hours as needed for Pain. Provider, Historical   omeprazole (PRILOSEC) 40 mg capsule take 1 capsule by mouth once daily 11/15/17   Provider, Historical   chlorzoxazone (PARAFON FORTE) 500 mg tablet Take 500-1,000 mg by mouth daily as needed for Muscle Spasm(s). Provider, Historical   diphenoxylate-atropine (LOMOTIL) 2.5-0.025 mg per tablet Take 2 Tabs by mouth Before breakfast, lunch, dinner and at bedtime. Provider, Historical   loperamide (IMODIUM) 2 mg capsule Take 2 mg by mouth as needed for Diarrhea. Patient takes 14-16 capsules a day. Provider, Historical   ondansetron hcl (ZOFRAN, AS HYDROCHLORIDE,) 8 mg tablet Take 8 mg by mouth every eight (8) hours as needed for Nausea. Provider, Historical     Allergies   Allergen Reactions    Honey Anaphylaxis    Remicade [Infliximab] Anaphylaxis    Crestor [Rosuvastatin] Myalgia    Other Plant, Animal, Environmental Other (comments)     Developed \"boils\" on right arm that required I &D after receiving FENTANYL patch.   Is able to take FENTANYL orally; states is allergic to fentanyl patch GLUE    Imuran [Azathioprine] Diarrhea and Nausea Only    Mercaptopurine Diarrhea    Sulfa (Sulfonamide Antibiotics) Other (comments)     Causes diarrhea      Family History   Problem Relation Age of Onset    Stroke Mother     Heart Disease Mother     Kidney Disease Mother     Anesth Problems Mother         TAKES A LONG TIME TO WAKE UP    Heart Disease Father     Thyroid Disease Sister       Social History:  reports that he has been smoking. He has a 44.00 pack-year smoking history. He uses smokeless tobacco. He reports that he does not drink alcohol and does not use drugs. Family and social history were personally reviewed, all pertinent and relevant details are outlined as above. Objective:   Visit Vitals  /67   Pulse (!) 124   Temp 98 °F (36.7 °C)   Resp 25   Ht 5' 9\" (1.753 m)   Wt 82.1 kg (181 lb)   SpO2 92%   BMI 26.73 kg/m²    O2 Flow Rate (L/min): 4 l/min O2 Device: Nasal cannula    PHYSICAL EXAM:   General: Alert x oriented x 3, awake, no acute distress, resting in bed, pleasant mlae, appears to be stated age  HEENT: PEERL, EOMI, moist mucus membranes  Neck: Supple, no JVD, no meningeal signs  Chest: Clear to auscultation bilaterally   CVS: RRR, S1 S2 heard, no murmurs/rubs/gallops  Abd: Soft, non-tender, non-distended, +bowel sounds   Ext: No clubbing, no cyanosis, no edema  Neuro/Psych: Pleasant mood and affect, CN 2-12 grossly intact, sensory grossly within normal limit, Strength 5/5 in all extremities  Cap refill: Brisk, less than 3 seconds  Pulses: 2+radial pulses  Skin: Warm, dry, icteric, jaundice    Data Review: All diagnostic labs and studies have been reviewed. Abnormal Labs Reviewed   CBC WITH AUTOMATED DIFF - Abnormal; Notable for the following components:       Result Value    WBC 29.1 (*)     RBC 4.06 (*)     HCT 36.5 (*)     RDW 15.7 (*)     NEUTROPHILS 82 (*)     LYMPHOCYTES 7 (*)     METAMYELOCYTES 1 (*)     MYELOCYTES 1 (*)     ABS. NEUTROPHILS 23.9 (*)     ABS.  MONOCYTES 2.6 (*)     All other components within normal limits   METABOLIC PANEL, COMPREHENSIVE - Abnormal; Notable for the following components:    Sodium 127 (*)     Chloride 96 (*)     CO2 17 (*)     Glucose 117 (*)     BUN 67 (*)     Creatinine 3.27 (*)     GFR est AA 23 (*)     GFR est non-AA 19 (*)     Bilirubin, total 10.2 (*)     AST (SGOT) 38 (*)     Alk. phosphatase 260 (*)     Albumin 2.6 (*)     Globulin 5.0 (*)     A-G Ratio 0.5 (*)     All other components within normal limits   URINALYSIS W/MICROSCOPIC - Abnormal; Notable for the following components:    Appearance TURBID (*)     Protein 100 (*)     Ketone TRACE (*)     Nitrites Positive (*)     Leukocyte Esterase SMALL (*)     Bacteria 1+ (*)     Mucus 1+ (*)     Amorphous Crystals 2+ (*)     Granular cast 2-5 (*)     All other components within normal limits   PROTHROMBIN TIME + INR - Abnormal; Notable for the following components:    Prothrombin time 11.6 (*)     All other components within normal limits   BILIRUBIN, CONFIRM - Abnormal; Notable for the following components:    Bilirubin UA, confirm Positive (*)     All other components within normal limits       All Micro Results       Procedure Component Value Units Date/Time    CULTURE, URINE [[id number]] Collected: 08/26/22 1927    Order Status: Sent Specimen: Urine from Clean catch Updated: 08/27/22 0130    CULTURE, URINE [[id number]]     Order Status: Canceled Specimen: Urine from [address] [employer], DNA [[id number]] Collected: 08/26/22 1927    Order Status: Sent Specimen: Stool Updated: 08/26/22 1944    URINE CULTURE HOLD SAMPLE [[id number]] Collected: 08/26/22 1927    Order Status: Completed Specimen: Urine from Serum Updated: 08/26/22 1943     Urine culture hold       Urine on hold in Microbiology dept for 2 days. If unpreserved urine is submitted, it cannot be used for addtional testing after 24 hours, recollection will be required.           OVA & PARASITES, STOOL [[id number]]     Order Status: Sent Specimen: Feces from Stool     CULTURE, BLOOD, PAIRED [[id number]] Collected: 08/26/22 1731    Order Status: Sent Specimen: Blood Updated: 08/26/22 1830            IMAGING:   CT ABD PELV WO CONT   Final Result   1. Status post ileocolonic anastomosis at the sigmoid level, with the   anastomosis and postoperative change immediately deep to the anterior abdominal   wall which is thin in the midline but with no mass, hematoma or fluid   collection. No intra-abdominal free air or fluid. 2. Patchy small infiltrates in the right middle lobe, new since prior study. 3. Stable nonobstructing right intrarenal calculi. 4. Other stable incidental and postoperative changes. XR CHEST PORT   Final Result   New area of consolidation in the right upper lobe extending to the right apex. This may be related to infection. Recommend follow-up to clearing. ECG/ECHO:    Results for orders placed or performed during the hospital encounter of 08/26/22   EKG, 12 LEAD, INITIAL   Result Value Ref Range    Ventricular Rate 121 BPM    Atrial Rate 121 BPM    P-R Interval 134 ms    QRS Duration 74 ms    Q-T Interval 330 ms    QTC Calculation (Bezet) 468 ms    Calculated P Axis 55 degrees    Calculated R Axis 70 degrees    Calculated T Axis 70 degrees    Diagnosis       Sinus tachycardia  Anterior infarct , age undetermined  Abnormal ECG  When compared with ECG of 26-AUG-2022 20:24,  MANUAL COMPARISON REQUIRED, DATA IS UNCONFIRMED          Assessment:   Given the patient's current clinical presentation, there is a high level of concern for decompensation if discharged from the emergency department. Complex decision making was performed, which includes reviewing the patient's available past medical records, laboratory results, and imaging studies. Active Problems:    * No active hospital problems.  *    Plan:     #Sepsis, POA  #Acute Cystitis  #Abdominal Wound, enterocolonic fistula  #pna  -sepsis reassessment completed  -enterocolonic fistula, right middle lobe infiltrates, UA + for nitrites, and small LE, 5-10 WBC, and bacteria  -follow ucx, and blood culture  -continue with cefepime, and vanc  -s/p sepsis fluids with continue maintenance IVF    #SUHA  #metabolic acidosis  #hyponatremia  #hypercalcemia  -s/p IVF, and Na bicarb push  -check shahrzad, and usom  -do not correct more than 6meq/L  -maintenance IV with d5ns    #Hyperbilirubinemia  #Chronic Abdominal pain  #GERD  #Crohns with multiple surgeries and adhesions  #short bowel sydroms  -Crohn's disease s/p subtotal colectomy with ileocolonic anastomosis  -bilirubin 10.2  -check abdominal US    #COPD  -not in exacerbation    DIET: No diet orders on file   ISOLATION PRECAUTIONS: There are currently no Active Isolations  CODE STATUS: Prior   DVT PROPHYLAXIS: SCDs  ANTICIPATED DISCHARGE: Greater than 48 hours. EMERGENCY CONTACT/SURROGATE DECISION MAKER: Lorna Truong (friend)    Signed By: Kuldip Fernando MD     August 27, 2022         Please note that this dictation may have been completed with Dragon, the [email or web address] voice recognition software. Quite often unanticipated grammatical, syntax, homophones, and other interpretive errors are inadvertently transcribed by the computer software. Please disregard these errors. Please excuse any errors that have escaped final proofreading.

## 2022-08-27 NOTE — PROGRESS NOTES
NIKHIL Zambrano attempted to grab blood work - patient thrashed his arm in the middle of blood draw and is refusing any further attempts.

## 2022-08-27 NOTE — ED PROVIDER NOTES
60-year-old male with history as below, presents to the emergency department stating that for the last week he has had blood in his stool as well as in his urine and he has had increasing abdominal pain and lightheadedness. Today he reportedly had 3 episodes of orthostatic near syncope but no full syncope. He reportedly has had multiple prior bowel resections and has a chronic open sore on his right lower quadrant abdomen periodically draining some foul-smelling brownish-green fluid suspicious for enterocutaneous fistula. He denies any fever, cough, URI symptoms. Denies any significant dysuria without hematuria. He is on no blood thinners. Hypotension   Pertinent negatives include no weakness, no self-injury and no numbness. Functional status baseline:  [EPIC#1537^NOTE}   Lethargy  Associated symptoms include abdominal pain. Pertinent negatives include no chest pain, no headaches and no shortness of breath. Abdominal Pain   Associated symptoms include vomiting and hematuria. Pertinent negatives include no fever, no diarrhea, no nausea, no constipation, no dysuria, no headaches, no arthralgias, no myalgias, no chest pain, no testicular pain and no back pain. Vomiting   Associated symptoms include abdominal pain. Pertinent negatives include no chills, no fever, no diarrhea, no headaches, no arthralgias, no myalgias, no cough and no headaches. Past Medical History:   Diagnosis Date    Abdominal wall hernia 6/15/2012    Anal fissure     Anemia     Anemia     Anxiety and depression     Arthritis     Related to the Crohn's disease. Cancer ([facility] [address]     MELANOMA HEAD AND FACE    Chronic pain     GENERALIZED    COPD (chronic obstructive pulmonary disease) (HCC)     Crohn disease (HCC)     Diagnosed at age 17.     Echocardiogram normal (EF: 55-60%) 07/2015    Esophageal ulcer     GERD (gastroesophageal reflux disease)     H/O blood transfusion 2013    [address] [facility]    Hypertension     denies Migraine     Short bowel syndrome     Ventral hernia without obstruction or gangrene        Past Surgical History:   Procedure Laterality Date    ABDOMEN SURGERY PROC UNLISTED      33 abdominal operations for treatment of Crohn's disease and its complications. COLONOSCOPY N/A 10/8/2019    COLONOSCOPY performed by Amanuel Carreon MD at [facility] ENDOSCOPY    [address] needle, takes 1 inch needle    HX APPENDECTOMY      HX CHOLECYSTECTOMY      HX GI      colectomy x2, one ileostomy    HX GI  7/28/14    exp lap,partial colectomy with end ileostomy by Dr Julia Hatfield      neck fusion    HX ORTHOPAEDIC  1980s    wrist right,     HX ORTHOPAEDIC  2006, 11/2013    neck, L4 L5 L6    HX ORTHOPAEDIC  1990s    right knee scope    HX OTHER SURGICAL      surgical repair from spider bite    HX OTHER SURGICAL  12/1/14    Incision and drainage of posterior right subcutaneous shoulder abscess    HX OTHER SURGICAL  2014    LEFT INDEX FINGER SPIDER BITE    HX OTHER SURGICAL  2014    RECTAL FISTULA    HX OTHER SURGICAL  3/17/2015    Ileostomy takedown with extensive lysis of adhesions (greater than three hours), mobilization of the splenic flexure, left colon resection, ileocolic anastomosis, and excision of scar; Dr. Johanny Hannah. HX OTHER SURGICAL  3/22/2015    Exploratory laparotomy with washout of abdomen, suture repair of small bowel/anastomosis, and diverting protective loop ileostomy; Steff Castro MD.    HX OTHER SURGICAL  4/9/2015    Laparotomy, extensive lysis of adhesions, abdominal lavage, and resection of ileocolic anastomosis (including the efferent limb of the loop ileostomy) with creation of Demetrius's pouch; Dr. Johanny Hannah. HX OTHER SURGICAL  7/14/2015    Cystoscopy and placement of bilateral ureteral catheters; Yumiko Reid MD.    HX OTHER SURGICAL  7/14/2015    Ileostomy takedown with extensive lysis of adhesions, small bowel resection, and enterocolostomy; Dr. Johanny Hannah.     HX OTHER SURGICAL  9/29/2015    CT-guided percutaneous drainage of intraabdominal abscess; Dr. Felix Garcia. HX OTHER SURGICAL  11/16/2015    CT-guided percutaneous aspiration of abdominal wall cavity; Dr. Valentin Barnes. HX OTHER SURGICAL  12/1/2015    Incision and drainage of abdominal wall abscess; Dr. Yumiko Barker. HX OTHER SURGICAL  2/11/2016    Incision and drainage of abdominal wall abscess; Dr. Yumiko Barker. HX OTHER SURGICAL  2/23/2016    Cystoscopy and placement of bilateral temporary ureteral catheters; Dr. Corrie Calhoun. HX OTHER SURGICAL  2/23/2016    Exploratory laparotomy, extensive lysis of adhesions, removal of mesh, and repair of enterocutaneous fistula; Dr. Yumiko Barker. HX OTHER SURGICAL  11/03/2017    Exploratory laparotomy, extensive lysis of adhesions, and reduction of intussusception; Dr. Yumiko Barker.          Family History:   Problem Relation Age of Onset    Stroke Mother     Heart Disease Mother     Kidney Disease Mother     Anesth Problems Mother         TAKES A LONG TIME TO WAKE UP    Heart Disease Father     Thyroid Disease Sister        Social History     Socioeconomic History    Marital status: LEGALLY      Spouse name: Not on file    Number of children: Not on file    Years of education: Not on file    Highest education level: Not on file   Occupational History    Not on file   Tobacco Use    Smoking status: Every Day     Packs/day: 1.00     Years: 44.00     Pack years: 44.00     Types: Cigarettes    Smokeless tobacco: Current    Tobacco comments:     CURRENT E [employer]   Substance and Sexual Activity    Alcohol use: No     Comment: RARELY    Drug use: No    Sexual activity: Not on file   Other Topics Concern    Not on file   Social History Narrative    Not on file     Social Determinants of Health     Financial Resource Strain: Not on file   Food Insecurity: Not on file   Transportation Needs: Not on file   Physical Activity: Not on file   Stress: Not on file   Social Connections: Not on file   Intimate Partner Violence: Not on file   Housing Stability: Not on file         ALLERGIES: Honey; Remicade [infliximab]; Crestor [rosuvastatin]; Other plant, animal, environmental; Imuran [azathioprine]; Mercaptopurine; and Sulfa (sulfonamide antibiotics)    Review of Systems   Constitutional:  Negative for activity change, appetite change, chills and fever. HENT:  Negative for congestion, rhinorrhea, sinus pain, sneezing and sore throat. Eyes:  Negative for photophobia and visual disturbance. Respiratory:  Negative for cough and shortness of breath. Cardiovascular:  Negative for chest pain. Gastrointestinal:  Positive for abdominal pain, blood in stool and vomiting. Negative for constipation, diarrhea and nausea. Genitourinary:  Positive for hematuria. Negative for difficulty urinating, dysuria, flank pain, penile pain and testicular pain. Musculoskeletal:  Negative for arthralgias, back pain, myalgias and neck pain. Skin:  Negative for rash and wound. Neurological:  Negative for syncope, weakness, light-headedness, numbness and headaches. Psychiatric/Behavioral:  Negative for self-injury and suicidal ideas. All other systems reviewed and are negative. Vitals:    08/26/22 1649 08/26/22 1734 08/26/22 1741 08/26/22 2029   BP: (!) 96/56 103/67  123/65   Pulse: (!) 117 (!) 105  95   Resp: 18 21  23   Temp: 98 °F (36.7 °C)      SpO2: 97% 92%  95%   Weight:   82.1 kg (181 lb)    Height:   5' 9\" (1.753 m)             Physical Exam  Vitals and nursing note reviewed. Constitutional:       General: He is not in acute distress. Appearance: Normal appearance. He is well-developed. He is ill-appearing. He is not diaphoretic. HENT:      Head: Normocephalic and atraumatic. Nose: Nose normal.   Eyes:      General: Scleral icterus present. Extraocular Movements: Extraocular movements intact.       Conjunctiva/sclera: Conjunctivae normal.      Pupils: Pupils are equal, round, and reactive to light. Cardiovascular:      Rate and Rhythm: Regular rhythm. Tachycardia present. Heart sounds: Normal heart sounds. Pulmonary:      Effort: Pulmonary effort is normal.      Breath sounds: Normal breath sounds. Abdominal:      General: A surgical scar is present. There is no distension. Palpations: Abdomen is soft. Tenderness: There is generalized abdominal tenderness. Musculoskeletal:         General: No tenderness. Cervical back: Neck supple. Skin:     General: Skin is warm and dry. Neurological:      General: No focal deficit present. Mental Status: He is alert and oriented to person, place, and time. Cranial Nerves: No cranial nerve deficit. Sensory: No sensory deficit. Motor: No weakness. Coordination: Coordination normal.        [facility]    ED Course as of 08/27/22 0011   Fri Aug 26, 2022   2342 EKG time 2238  Interpreted by me  Sinus tachycardia rate of 121, normal axis, narrow QRS, no AV blocks, nonspecific ST-T wave changes without ST elevations or depressions [CC]      ED Course User Index  [CC] Eleno Fournier DO     71-year-old male with a history of Crohn's presents with increased abdominal pain and blood in stool and urine. On arrival he is tachycardic with low blood pressure 90s/50s. Labs returned showing significant leukocytosis of 29.1, normal Hg at 12.9, , NA low at 127, bicarb low at 17, but normal anion gap, normal lactic acid. T bili markedly elevated at 10.2, alk phos 260, trace elevated AST at 38, ALT 25. Creatinine significant elevated from prior measures at 3.27. BUN 67. Normal lipase. Lactate normal at 1.39.    CT abdomen pelvis shows no acute intra-abdominal abnormalities but does show patchy small infiltrates in the right middle lobe. UA also shows some evidence of UTI with nitrite positive, small leuk and 5-10 WBCs with 1+ bacteria.     He was given IV fluid bolus and empiric ABX per sepsis protocol and had improvement in his blood pressure normalizing to 123/65. Please note that this dictation was completed with Chatty, the computer voice recognition software. Quite often unanticipated grammatical, syntax, homophones, and other interpretive errors are inadvertently transcribed by the computer software. Please disregard these errors. Please excuse any errors that have escaped final proofreading. Procedures      Perfect Serve Consult for Admission  9:42 PM    ED Room Number: XV53/42  Patient Name and age:  Bernice Thomas 65 y.o.  male  Working Diagnosis:   1. Severe sepsis ([address]    2. Exacerbation of Crohn's disease with rectal bleeding ([address]    3. SHUA (acute kidney injury) ([address]    4. Hypotension due to hypovolemia    5. Community acquired pneumonia of right middle lobe of lung    6. Hyponatremia    7. Metabolic acidosis    8. Urinary tract infection without hematuria, site unspecified        COVID-19 Suspicion:  no  Sepsis present:  yes  Reassessment needed: no  Code Status:  Full Code  Readmission: no  Isolation Requirements:  no  Recommended Level of Care:  telemetry  Department:[facility] Adult ED - 21   Other: 78-year-old male with a history of Crohn's presents with increased abdominal pain and blood in the stool and urine. Initially hypotensive and tachycardic but responded nicely to fluids. Now 123/65. UA shows possible UTI and CT shows possible PNA. Goten IVF and IV abx    Sepsis Re-Assessment Documentation:     Date: 8/26/2022   Time: 9:43 PM    The sepsis reassessment was performed at 9:40pmtime    IMPRESSION:  1. Severe sepsis ([address]    2. Exacerbation of Crohn's disease with rectal bleeding ([address]    3. SUHA (acute kidney injury) ([address]    4. Hypotension due to hypovolemia    5. Community acquired pneumonia of right middle lobe of lung    6. Hyponatremia    7. Metabolic acidosis    8. Urinary tract infection without hematuria, site unspecified    9.  Enterocutaneous fistula        - Broad Spectrum Antibiotics ordered: Zosyn and Vancomycin    - Re-assessment performed at time 9:40 pm and clinical condition improving.    - Hypotension or Lactic Acidosis present (SBP<90, MAP<65, Lactate >4): YES IVF:  Not indicated due to septic shock not present  - Persistent Hypotension despite IVF resuscitation: NO  Vasopressors: Not indicated due to septic shock not present    Plan:  Admit to Step down    Total critical care time spent exclusive of procedures:  60 minutes    Ben Napier DO

## 2022-08-27 NOTE — PROGRESS NOTES
Day #1 of Cefepime  Indication:  UTI  Current regimen:  1g every 8 hours  Abx regimen: cefepime, vancomycin  Recent Labs     22  1733   WBC 29.1*   CREA 3.27*   BUN 67*     Est CrCl: 23.7 ml/min  Temp (24hrs), Av.5 °F (37.5 °C), Min:98 °F (36.7 °C), Max:100.9 °F (38.3 °C)    Cultures: Urine - In process    Plan: Change to 1g every 24 hours per protocol for patients with crcl 11-29 mL/min.

## 2022-08-27 NOTE — CONSULTS
[facility] Consult Note        NAME: Magui Richard       :  1960       MRN:  [id number]     Date/Time: 2022    Risk of deterioration: medium       Assessment:    Plan: SUHA with AG acidosis (Corrected for low albumin)  Hyperbilirubinemia  Hx of crohns/bowel resection  Leukocytosis/uti  Nonobstructing renal stones  (R) M L infiltrate     Calcium corrects to normal  I have changed fluid to bicarb gtt   Renal function improving-creatinine down from 3.27 to 2.77,  K repleted   No hydro on CT  Agree with antibiotics  GI to see  Lactic acid normal  IV albumin x 4  On broad spectrum antibiotics   Asked to see for suha/acidosis. Pt admitted last night with hypotension/tachycardia/orthostasis  Given volume in ER, but has remained acidotic. There is concern for a enterocutaneous  Fistula from previous bowel surgeries. Renal function elevated at 67/3.27 on admission. Ct reviewed. Subjective:     Chief Complaint:  I'm cold, cover me up. Review of Systems: currently no n/v/cp/sob    Objective:     VITALS:   Last 24hrs VS reviewed since prior progress note. Most recent are:  Visit Vitals  BP 98/62 (BP 1 Location: Left upper arm, BP Patient Position: At rest;Supine)   Pulse (!) 109   Temp 98.9 °F (37.2 °C)   Resp 24   Ht 5' 9\" (1.753 m)   Wt 82.1 kg (181 lb)   SpO2 97%   BMI 26.73 kg/m²     SpO2 Readings from Last 6 Encounters:   22 97%   20 96%   20 99%   10/09/19 99%   19 98%   18 98%    O2 Flow Rate (L/min): 4 l/min     Intake/Output Summary (Last 24 hours) at 2022 1333  Last data filed at 2022 0043  Gross per 24 hour   Intake 1550 ml   Output --   Net 1550 ml        PHYSICAL EXAM:    General   well developed, well nourished, appears stated age, in no acute distress  EENT  Normocephalic, Atraumatic  Respiratory   Clear To Auscultation bilaterally  Cardiology  Regular Rate and Rythmn  Extremities  No clubbing, cyanosis, or edema.       Lab Data Reviewed: (see below)    Medications Reviewed: (see below)    PMH/SH reviewed - no change compared to H&P  ________________________________\  ___________________________________________________    Attending Physician: Manolo Mercado MD     ____________________________________________________  MEDICATIONS:  Current Facility-Administered Medications   Medication Dose Route Frequency    sodium chloride (NS) flush 5-40 mL  5-40 mL IntraVENous Q8H    sodium chloride (NS) flush 5-40 mL  5-40 mL IntraVENous PRN    acetaminophen (TYLENOL) tablet 650 mg  650 mg Oral Q6H PRN    Or    acetaminophen (TYLENOL) suppository 650 mg  650 mg Rectal Q6H PRN    ondansetron (ZOFRAN ODT) tablet 4 mg  4 mg Oral Q8H PRN    Or    ondansetron (ZOFRAN) injection 4 mg  4 mg IntraVENous Q6H PRN    [START ON 8/28/2022] cefepime (MAXIPIME) 1 g in 0.9% sodium chloride (MBP/ADV) 50 mL MBP  1 g IntraVENous Q24H    Vancomycin - Pharmacy to Dose   Other Rx Dosing/Monitoring    [START ON 8/28/2022] Vancomycin Random Due 8/28 @ 04:00   Other ONCE    sodium bicarbonate (8.4%) 150 mEq in dextrose 5% 1,000 mL infusion   IntraVENous CONTINUOUS    [START ON 8/28/2022] pantoprazole (PROTONIX) tablet 40 mg  40 mg Oral ACB        LABS:  Recent Labs     08/26/22  1733   WBC 29.1*   HGB 12.9   HCT 36.5*        Recent Labs     08/27/22  0331 08/26/22  1733   * 127*   K 3.4* 3.6    96*   CO2 14* 17*   BUN 73* 67*   CREA 2.77* 3.27*   GLU 90 117*   CA 8.4* 9.3     Recent Labs     08/26/22  1733   ALT 25   *   TBILI 10.2*   TP 7.6   ALB 2.6*   GLOB 5.0*   LPSE 102     Recent Labs     08/26/22  1733   INR 1.1   PTP 11.6*      No results for input(s): FE, TIBC, PSAT, FERR in the last 72 hours. No results for input(s): PH, PCO2, PO2 in the last 72 hours. No results for input(s): CPK, CKNDX, TROIQ in the last 72 hours.     No lab exists for component: CPKMB  Lab Results   Component Value Date/Time    Glucose (POC) 113 08/27/2022 05:26 AM    Glucose (POC) 105 08/26/2022 08:20 PM    Glucose (POC) 113 (H) 11/10/2017 11:35 AM    Glucose (POC) 120 (H) 11/10/2017 05:54 AM    Glucose (POC) 87 11/09/2017 11:43 PM     IMPRESSION  1. Status post ileocolonic anastomosis at the sigmoid level, with the  anastomosis and postoperative change immediately deep to the anterior abdominal  wall which is thin in the midline but with no mass, hematoma or fluid  collection. No intra-abdominal free air or fluid. 2. Patchy small infiltrates in the right middle lobe, new since prior study. 3. Stable nonobstructing right intrarenal calculi. 4. Other stable incidental and postoperative changes.

## 2022-08-27 NOTE — PROGRESS NOTES
Pharmacist Note - Vancomycin Dosing    Consult provided for this 65 y.o. male for indication of sepsis of unknown etiology. Antibiotic regimen(s): Vancomycin + cefepime  Patient on vancomycin PTA? NO     Recent Labs     22  1733   WBC 29.1*   CREA 3.27*   BUN 67*     Frequency of BMP: Daily through   Height: 175.3 cm  Weight: 82.1 kg  Est CrCl: 23.7 ml/min; UO: -- ml/kg/hr  Temp (24hrs), Av.5 °F (37.5 °C), Min:98 °F (36.7 °C), Max:100.9 °F (38.3 °C)    Cultures:   Blood - NG - prelim   Urine - in process    MRSA Swab ordered (if applicable)? YES    The plan below is expected to result in a target range of Trough 10-15 mcg/mL (dosing by levels due to SUHA)    Therapy was initiated with a loading dose of 2000 mg IV x 1. Pharmacy to follow patient daily and order levels / make dose adjustments as appropriate.     Dolores Howard, PharmD

## 2022-08-27 NOTE — CONSULTS
Gastroenterology Consult     Referring Physician:     Consult Date: 8/27/2022     Subjective:     Chief Complaint: hyperbilirubinemia and severe Crohn's disease. History of Present Illness: Edis Hanson is a 65 y.o. male who is seen in consultation for hyperbilirubinemia and severe Crohn's disease. Seen by Dr. Nito Ogden in hospital in 2019 and has not followed up with us as an outpt. Pmhx/o anemia, depression, copd, incisional abd hernia w/ overlying skin wound, malnutrition and b12 deficiency, short bowel syndrome due to severe Crohn's disease s/p >30 surgeries with ileosigmoid anastomosis currently not on treatment, and chronic pain syndrome and per report is on oxycodone 15mg 5x/d, gabapentin. No NSAIDs. Social - He smokes ~1ppd. Denies alcohol. Very remote use of cocaine >10yrs ago. HCV Ab neg 2015. Lives alone. Currently admitted with RUL pneumonia, sepsis, cystitis. WBC 29 with left shift. He has felt horribly and progressively worse over the past month. Abdominal pain and he has had worsening drainage out of his lower abdomen. Unaware of his jaundice. He has not seen a doctor about the drainage until now. Has 4-5 loose bowels per day and another 4-5 per night. Uses kaopectate/bismuth 8x per day. No appreciable blood in stool, maybe some slight blood when he wipes. Past Medical History:   Diagnosis Date    Abdominal wall hernia 6/15/2012    Anal fissure     Anemia     Anemia     Anxiety and depression     Arthritis     Related to the Crohn's disease. Cancer ([address]     MELANOMA HEAD AND FACE    Chronic pain     GENERALIZED    COPD (chronic obstructive pulmonary disease) (HCC)     Crohn disease (HCC)     Diagnosed at age 17.     Echocardiogram normal (EF: 55-60%) 07/2015    Esophageal ulcer     GERD (gastroesophageal reflux disease)     H/O blood transfusion 2013    [facility], [address]    Hypertension     denies    Migraine     Short bowel syndrome     Ventral hernia without obstruction or gangrene      Past Surgical History:   Procedure Laterality Date    ABDOMEN SURGERY PROC UNLISTED      33 abdominal operations for treatment of Crohn's disease and its complications. COLONOSCOPY N/A 10/8/2019    COLONOSCOPY performed by Evelia Cisneros MD at [facility] ENDOSCOPY    [address] needle, takes 1 inch needle    HX APPENDECTOMY      HX CHOLECYSTECTOMY      HX GI      colectomy x2, one ileostomy    HX GI  7/28/14    exp lap,partial colectomy with end ileostomy by Dr Emanuel Barcenas      neck fusion    HX ORTHOPAEDIC  1980s    wrist right,     HX ORTHOPAEDIC  2006, 11/2013    neck, L4 L5 L6    HX ORTHOPAEDIC  1990s    right knee scope    HX OTHER SURGICAL      surgical repair from spider bite    HX OTHER SURGICAL  12/1/14    Incision and drainage of posterior right subcutaneous shoulder abscess    HX OTHER SURGICAL  2014    LEFT INDEX FINGER SPIDER BITE    HX OTHER SURGICAL  2014    RECTAL FISTULA    HX OTHER SURGICAL  3/17/2015    Ileostomy takedown with extensive lysis of adhesions (greater than three hours), mobilization of the splenic flexure, left colon resection, ileocolic anastomosis, and excision of scar; Dr. Tien Archibald. HX OTHER SURGICAL  3/22/2015    Exploratory laparotomy with washout of abdomen, suture repair of small bowel/anastomosis, and diverting protective loop ileostomy; Kimmie Stinson MD.    HX OTHER SURGICAL  4/9/2015    Laparotomy, extensive lysis of adhesions, abdominal lavage, and resection of ileocolic anastomosis (including the efferent limb of the loop ileostomy) with creation of Demetrius's pouch; Dr. Tien Archibald. HX OTHER SURGICAL  7/14/2015    Cystoscopy and placement of bilateral ureteral catheters; Guy Trevino MD.    HX OTHER SURGICAL  7/14/2015    Ileostomy takedown with extensive lysis of adhesions, small bowel resection, and enterocolostomy; Dr. Tien Archibald.     HX OTHER SURGICAL  9/29/2015    CT-guided percutaneous drainage of intraabdominal abscess; Dr. Nikki De Santiago. HX OTHER SURGICAL  11/16/2015    CT-guided percutaneous aspiration of abdominal wall cavity; Dr. Benita Quinn.  OTHER SURGICAL  12/1/2015    Incision and drainage of abdominal wall abscess; Dr. Cierra Johnson.  OTHER SURGICAL  2/11/2016    Incision and drainage of abdominal wall abscess; Dr. Cierra Johnson. HX OTHER SURGICAL  2/23/2016    Cystoscopy and placement of bilateral temporary ureteral catheters; Dr. Judson Smith. HX OTHER SURGICAL  2/23/2016    Exploratory laparotomy, extensive lysis of adhesions, removal of mesh, and repair of enterocutaneous fistula; Dr. Cierra Johnson.  OTHER SURGICAL  11/03/2017    Exploratory laparotomy, extensive lysis of adhesions, and reduction of intussusception; Dr. Cierra Johnson. Family History   Problem Relation Age of Onset    Stroke Mother     Heart Disease Mother     Kidney Disease Mother     Anesth Problems Mother         TAKES A LONG TIME TO WAKE UP    Heart Disease Father     Thyroid Disease Sister      Social History     Tobacco Use    Smoking status: Every Day     Packs/day: 1.00     Years: 44.00     Pack years: 44.00     Types: Cigarettes    Smokeless tobacco: Current    Tobacco comments:     CURRENT E CIGS   Substance Use Topics    Alcohol use: No     Comment: RARELY      Allergies   Allergen Reactions    Honey Anaphylaxis    Remicade [Infliximab] Anaphylaxis    Crestor [Rosuvastatin] Myalgia    Other Plant, Animal, Environmental Other (comments)     Developed \"boils\" on right arm that required I &D after receiving FENTANYL patch.   Is able to take FENTANYL orally; states is allergic to fentanyl patch GLUE    Imuran [Azathioprine] Diarrhea and Nausea Only    Mercaptopurine Diarrhea    Sulfa (Sulfonamide Antibiotics) Other (comments)     Causes diarrhea     Current Facility-Administered Medications   Medication Dose Route Frequency    sodium chloride (NS) flush 5-40 mL  5-40 mL IntraVENous Q8H    sodium chloride (NS) flush 5-40 mL  5-40 mL IntraVENous PRN    acetaminophen (TYLENOL) tablet 650 mg  650 mg Oral Q6H PRN    Or    acetaminophen (TYLENOL) suppository 650 mg  650 mg Rectal Q6H PRN    ondansetron (ZOFRAN ODT) tablet 4 mg  4 mg Oral Q8H PRN    Or    ondansetron (ZOFRAN) injection 4 mg  4 mg IntraVENous Q6H PRN    [START ON 8/28/2022] cefepime (MAXIPIME) 1 g in 0.9% sodium chloride (MBP/ADV) 50 mL MBP  1 g IntraVENous Q24H    Vancomycin - Pharmacy to Dose   Other Rx Dosing/Monitoring    [START ON 8/28/2022] Vancomycin Random Due 8/28 @ 04:00   Other ONCE    potassium chloride 10 mEq in 100 ml IVPB  10 mEq IntraVENous Q1H    sodium bicarbonate (8.4%) 150 mEq in dextrose 5% 1,000 mL infusion   IntraVENous CONTINUOUS        Review of Systems:  14pt ROS negative except per HPI      Objective:     Physical Exam:  Visit Vitals  BP 98/62 (BP 1 Location: Left upper arm, BP Patient Position: At rest;Supine)   Pulse (!) 115   Temp 98.9 °F (37.2 °C)   Resp 24   Ht 5' 9\" (1.753 m)   Wt 82.1 kg (181 lb)   SpO2 97%   BMI 26.73 kg/m²      Gen: frail, chronically ill, no distress but is uncomfortable  Skin:  Extremities and face reveal no rashes. He is frankly jaundiced. See abd exam for the drainage. HEENT: Sclerae icteric. Extra-occular muscles are intact. No oral ulcers. No abnormal pigmentation of the lips. The neck is supple. Cardiovascular: tachycardic rate and regular rhythm. No murmurs, gallops, or rubs. PMI nondisplaced. Carotids without bruits. Respiratory:  Comfortable breathing with no accessory muscle use. Coarse throughout. GI:  Abdomen nondistended, soft. There is a 5x5cm ulcerated abscess in his anterior abdomen draining feculent material, as well as multiple old scars. It is very tender to the touch. He has an anterior wall hernia which bulges with breathing. Normal active bowel sounds. No enlargement of the liver or spleen.    Rectal:  Deferred due to pt preference  Musculoskeletal:  No pitting edema of the lower legs. Extremities have good range of motion. No costovertebral tenderness. Neurological:  Gross memory appears intact. Patient is alert and oriented. No asterixis. Psychiatric:  Mood appears appropriate with judgement intact. Lymphatic:  No cervical or supraclavicular adenopathy. Laboratory:    Recent Results (from the past 24 hour(s))   POC LACTIC ACID    Collection Time: 08/26/22  5:25 PM   Result Value Ref Range    Lactic Acid (POC) 1.39 0.40 - 2.00 mmol/L   SAMPLES BEING HELD    Collection Time: 08/26/22  5:31 PM   Result Value Ref Range    SAMPLES BEING HELD 1RED, 1BLU     COMMENT        Add-on orders for these samples will be processed based on acceptable specimen integrity and analyte stability, which may vary by analyte. CULTURE, BLOOD, PAIRED    Collection Time: 08/26/22  5:31 PM    Specimen: Blood   Result Value Ref Range    Special Requests: NO SPECIAL REQUESTS      Culture result: NO GROWTH AFTER 11 HOURS     CBC WITH AUTOMATED DIFF    Collection Time: 08/26/22  5:33 PM   Result Value Ref Range    WBC 29.1 (H) 4.1 - 11.1 K/uL    RBC 4.06 (L) 4.10 - 5.70 M/uL    HGB 12.9 12.1 - 17.0 g/dL    HCT 36.5 (L) 36.6 - 50.3 %    MCV 89.9 80.0 - 99.0 FL    MCH 31.8 26.0 - 34.0 PG    MCHC 35.3 30.0 - 36.5 g/dL    RDW 15.7 (H) 11.5 - 14.5 %    PLATELET 752 077 - 083 K/uL    MPV 11.7 8.9 - 12.9 FL    NEUTROPHILS 82 (H) 32 - 75 %    LYMPHOCYTES 7 (L) 12 - 49 %    MONOCYTES 9 5 - 13 %    EOSINOPHILS 0 0 - 7 %    BASOPHILS 0 0 - 1 %    METAMYELOCYTES 1 (H) 0 %    MYELOCYTES 1 (H) 0 %    IMMATURE GRANULOCYTES 0 %    ABS. NEUTROPHILS 23.9 (H) 1.8 - 8.0 K/UL    ABS. LYMPHOCYTES 2.0 0.8 - 3.5 K/UL    ABS. MONOCYTES 2.6 (H) 0.0 - 1.0 K/UL    ABS. EOSINOPHILS 0.0 0.0 - 0.4 K/UL    ABS. BASOPHILS 0.0 0.0 - 0.1 K/UL    ABS. IMM.  GRANS. 0.0 K/UL    DF MANUAL      RBC COMMENTS ANISOCYTOSIS  1+       METABOLIC PANEL, COMPREHENSIVE    Collection Time: 08/26/22  5:33 PM   Result Value Ref Range    Sodium 127 (L) 136 - 145 mmol/L    Potassium 3.6 3.5 - 5.1 mmol/L    Chloride 96 (L) 97 - 108 mmol/L    CO2 17 (L) 21 - 32 mmol/L    Anion gap 14 5 - 15 mmol/L    Glucose 117 (H) 65 - 100 mg/dL    BUN 67 (H) 6 - 20 MG/DL    Creatinine 3.27 (H) 0.70 - 1.30 MG/DL    BUN/Creatinine ratio 20 12 - 20      GFR est AA 23 (L) >60 ml/min/1.73m2    GFR est non-AA 19 (L) >60 ml/min/1.73m2    Calcium 9.3 8.5 - 10.1 MG/DL    Bilirubin, total 10.2 (H) 0.2 - 1.0 MG/DL    ALT (SGPT) 25 12 - 78 U/L    AST (SGOT) 38 (H) 15 - 37 U/L    Alk. phosphatase 260 (H) 45 - 117 U/L    Protein, total 7.6 6.4 - 8.2 g/dL    Albumin 2.6 (L) 3.5 - 5.0 g/dL    Globulin 5.0 (H) 2.0 - 4.0 g/dL    A-G Ratio 0.5 (L) 1.1 - 2.2     LIPASE    Collection Time: 08/26/22  5:33 PM   Result Value Ref Range    Lipase 102 73 - 393 U/L   PROTHROMBIN TIME + INR    Collection Time: 08/26/22  5:33 PM   Result Value Ref Range    INR 1.1 0.9 - 1.1      Prothrombin time 11.6 (H) 9.0 - 11.1 sec   TROPONIN-HIGH SENSITIVITY    Collection Time: 08/26/22  5:33 PM   Result Value Ref Range    Troponin-High Sensitivity 30 0 - 76 ng/L   URINALYSIS W/MICROSCOPIC    Collection Time: 08/26/22  7:27 PM   Result Value Ref Range    Color DARK YELLOW      Appearance TURBID (A) CLEAR      Specific gravity 1.025 1.003 - 1.030      pH (UA) 5.0 5.0 - 8.0      Protein 100 (A) NEG mg/dL    Glucose Negative NEG mg/dL    Ketone TRACE (A) NEG mg/dL    Blood Negative NEG      Urobilinogen 0.2 0.2 - 1.0 EU/dL    Nitrites Positive (A) NEG      Leukocyte Esterase SMALL (A) NEG      WBC 5-10 0 - 4 /hpf    RBC 0-5 0 - 5 /hpf    Epithelial cells FEW FEW /lpf    Bacteria 1+ (A) NEG /hpf    Mucus 1+ (A) NEG /lpf    Amorphous Crystals 2+ (A) NEG    Granular cast 2-5 (A) NEG /lpf   URINE CULTURE HOLD SAMPLE    Collection Time: 08/26/22  7:27 PM    Specimen: Serum; Urine   Result Value Ref Range    Urine culture hold        Urine on hold in Microbiology dept for 2 days.   If unpreserved urine is submitted, it cannot be used for addtional testing after 24 hours, recollection will be required.    OCCULT BLOOD, STOOL    Collection Time: 08/26/22  7:27 PM   Result Value Ref Range    Occult blood, stool Negative NEG     BILIRUBIN, CONFIRM    Collection Time: 08/26/22  7:27 PM   Result Value Ref Range    Bilirubin UA, confirm Positive (A) NEG     OSMOLALITY, UR    Collection Time: 08/26/22  7:27 PM   Result Value Ref Range    Osmolality,urine 345 MOSM/kg H2O   SODIUM, UR, RANDOM    Collection Time: 08/26/22  7:27 PM   Result Value Ref Range    Sodium,urine random 9 MMOL/L   EKG, 12 LEAD, INITIAL    Collection Time: 08/26/22  8:03 PM   Result Value Ref Range    Ventricular Rate 95 BPM    Atrial Rate 95 BPM    P-R Interval 144 ms    QRS Duration 84 ms    Q-T Interval 348 ms    QTC Calculation (Bezet) 437 ms    Calculated P Axis 71 degrees    Calculated R Axis 70 degrees    Calculated T Axis 70 degrees    Diagnosis       Normal sinus rhythm  Cannot rule out Anterior infarct , age undetermined  Abnormal ECG  When compared with ECG of 21-JUN-2015 15:54,  Minimal criteria for Anterior infarct are now present     GLUCOSE, POC    Collection Time: 08/26/22  8:20 PM   Result Value Ref Range    Glucose (POC) 105 65 - 117 mg/dL    Performed by Rene Patrick (CON)    EKG, 12 LEAD, INITIAL    Collection Time: 08/26/22 10:38 PM   Result Value Ref Range    Ventricular Rate 121 BPM    Atrial Rate 121 BPM    P-R Interval 134 ms    QRS Duration 74 ms    Q-T Interval 330 ms    QTC Calculation (Bezet) 468 ms    Calculated P Axis 55 degrees    Calculated R Axis 70 degrees    Calculated T Axis 70 degrees    Diagnosis       Sinus tachycardia  Anterior infarct , age undetermined  Abnormal ECG  When compared with ECG of 26-AUG-2022 20:24,  MANUAL COMPARISON REQUIRED, DATA IS UNCONFIRMED     TROPONIN-HIGH SENSITIVITY    Collection Time: 08/27/22  3:31 AM   Result Value Ref Range    Troponin-High Sensitivity 71 0 - 76 ng/L   SAMPLES BEING HELD    Collection Time: 08/27/22  3:31 AM   Result Value Ref Range    SAMPLES BEING HELD 1red 1lav 1blu     COMMENT        Add-on orders for these samples will be processed based on acceptable specimen integrity and analyte stability, which may vary by analyte. METABOLIC PANEL, BASIC    Collection Time: 08/27/22  3:31 AM   Result Value Ref Range    Sodium 130 (L) 136 - 145 mmol/L    Potassium 3.4 (L) 3.5 - 5.1 mmol/L    Chloride 102 97 - 108 mmol/L    CO2 14 (LL) 21 - 32 mmol/L    Anion gap 14 5 - 15 mmol/L    Glucose 90 65 - 100 mg/dL    BUN 73 (H) 6 - 20 MG/DL    Creatinine 2.77 (H) 0.70 - 1.30 MG/DL    BUN/Creatinine ratio 26 (H) 12 - 20      GFR est AA 28 (L) >60 ml/min/1.73m2    GFR est non-AA 23 (L) >60 ml/min/1.73m2    Calcium 8.4 (L) 8.5 - 10.1 MG/DL   GLUCOSE, POC    Collection Time: 08/27/22  5:26 AM   Result Value Ref Range    Glucose (POC) 113 65 - 117 mg/dL    Performed by Shu Jimenez      CT Results (most recent):  Results from Hospital Encounter encounter on 08/26/22    CT ABD PELV WO CONT    Narrative  EXAM: CT ABD PELV WO CONT    INDICATION: crohns, rectal bleeding and what looks like EC fistula in abd wall    COMPARISON: 5/22/2020    IV CONTRAST: None. ORAL CONTRAST: None    TECHNIQUE:  Thin axial images were obtained through the abdomen and pelvis. Coronal and  sagittal reformats were generated. CT dose reduction was achieved through use of  a standardized protocol tailored for this examination and automatic exposure  control for dose modulation. The absence of intravenous contrast material reduces the sensitivity for  evaluation of the vasculature and solid organs. FINDINGS:  LOWER THORAX: Small patchy infiltrates in the right middle lobe minimal  bibasilar atelectasis. LIVER: No mass. BILIARY TREE: The gallbladder is surgically absent. CBD is not dilated. SPLEEN: within normal limits. PANCREAS: No focal abnormality. ADRENALS: Unremarkable.   KIDNEYS/URETERS: Tiny nonobstructing right intrarenal calculus is suggested. STOMACH: Unremarkable. SMALL BOWEL: Status post ileocolonic anastomosis suggested in the lower pelvis,  without associated obstruction or inflammatory change. COLON: Subtotal colectomy with ileocolonic anastomosis suggested at the sigmoid  level. No inflammatory changes obvious in the residual colon at this time. APPENDIX: Surgically absent  PERITONEUM: No ascites or pneumoperitoneum. RETROPERITONEUM: No lymphadenopathy or aortic aneurysm. REPRODUCTIVE ORGANS: Unremarkable for age. URINARY BLADDER: No mass or calculus. BONES: No destructive bone lesion. Mild stable compression deformity at T11. Bilateral pars defects at L5. ABDOMINAL WALL: The anterior abdominal wall is thin and in the midline from the  liver proximally to the pelvis distally at the site of the ileocolonic  anastomosis, where postsurgical bowel is closely apposed to the anterior  abdominal wall. There is no discrete fluid collection, hematoma or mass, and no  definite inflammatory changes are noted in the intra-abdominal mesenteric fat. ADDITIONAL COMMENTS: N/A    Impression  1. Status post ileocolonic anastomosis at the sigmoid level, with the  anastomosis and postoperative change immediately deep to the anterior abdominal  wall which is thin in the midline but with no mass, hematoma or fluid  collection. No intra-abdominal free air or fluid. 2. Patchy small infiltrates in the right middle lobe, new since prior study. 3. Stable nonobstructing right intrarenal calculi. 4. Other stable incidental and postoperative changes.       Assessment/Plan:     Active Problems:    Acute cystitis (8/27/2022)         Mr. Colleen Delarosa is a 64yo Pmhx/o anemia, depression, copd, incisional abd hernia w/ overlying skin wound, malnutrition and b12 deficiency, short bowel syndrome due to severe Crohn's disease (210cm small bowel remaining from lig treitz to ileosigmoid anastomosis) s/p >30 surgeries with ileosigmoid anastomosis currently not on treatment, and chronic pain syndrome and per report is on oxycodone 15mg 5x/d, gabapentin. He is at high risk of decompensation if not death. Last colonoscopy 10/2019 with normal appearing rectum, sigmoid, anastomosis, and ileum and all biopsies were unremarkable. Previous treatment with infliximab very remotely but stopped due to anaphylaxis. Adalimumab was attempted but not taken due to cost, other treatments included azathioprine. Currently admitted with pneumonia, cystitis, with what appears to be an ulcerated abdominal wall with enterocutaneous fistula. I strongly recommend wound care as well as a colorectal surgery consultation. Currently also has multiple severe medical issues. He is severely acidotic and hyponatremic, with a severe SUHA. He is hypoalbuminemic. He has an enlarged liver with a nodular contour on CT concerning for cirrhosis. Will send off serologies for cirrhosis etiology as he denies a strong alcohol history. Likely will be abnormal ferritin and inflammatory markers due to his sepsis. Will also send off quantiferon gold to screen for TB. With the RUQ infiltrate this is also of a concern, and if a cough I do recommend primary team r/o TB. There was no biliary dilation on ultrasound, so the hyperbilirubinemia is likely due to either sepsis, decompensated cirrhosis, or antibiotic-related rather than biliary obstruction. Awaiting a direct Tb. Once his pneumonia and sepsis improves, he should have an EGD & Colonoscopy to evaluate for varices & assess Crohn's activity; likely Tuesday or Wednesday. For his diarrhea, recommend enteric pathogen panel and cdiff testing. If negative, recommend loperamide 2mg every 4hrs prn. Recommend ppi. Ok to attempt colestipol/bile acid binder as he does have some colon remaining. I am also sending off a slew of nutrition labs. I recommend TPN and bowel rest for the enterocutaneous fistula.     Will follow closely.

## 2022-08-28 ENCOUNTER — APPOINTMENT (OUTPATIENT)
Dept: GENERAL RADIOLOGY | Age: 62
DRG: 871 | End: 2022-08-28
Attending: ANESTHESIOLOGY
Payer: MEDICARE

## 2022-08-28 LAB
ALBUMIN SERPL-MCNC: 2.4 G/DL (ref 3.5–5)
ALBUMIN SERPL-MCNC: 2.7 G/DL (ref 3.5–5)
ALBUMIN/GLOB SERPL: 0.6 {RATIO} (ref 1.1–2.2)
ALBUMIN/GLOB SERPL: 1 {RATIO} (ref 1.1–2.2)
ALP SERPL-CCNC: 146 U/L (ref 45–117)
ALP SERPL-CCNC: 162 U/L (ref 45–117)
ALT SERPL-CCNC: 19 U/L (ref 12–78)
ALT SERPL-CCNC: 21 U/L (ref 12–78)
AMMONIA PLAS-SCNC: <10 UMOL/L
ANION GAP SERPL CALC-SCNC: 7 MMOL/L (ref 5–15)
APTT PPP: 35.8 SEC (ref 22.1–31)
AST SERPL-CCNC: 42 U/L (ref 15–37)
AST SERPL-CCNC: 46 U/L (ref 15–37)
BASOPHILS # BLD: 0 K/UL (ref 0–0.1)
BASOPHILS # BLD: 0 K/UL (ref 0–0.1)
BASOPHILS NFR BLD: 0 % (ref 0–1)
BASOPHILS NFR BLD: 0 % (ref 0–1)
BILIRUB DIRECT SERPL-MCNC: 13.1 MG/DL (ref 0–0.2)
BILIRUB SERPL-MCNC: 15.9 MG/DL (ref 0.2–1)
BILIRUB SERPL-MCNC: 17.2 MG/DL (ref 0.2–1)
BUN SERPL-MCNC: 82 MG/DL (ref 6–20)
BUN/CREAT SERPL: 40 (ref 12–20)
CALCIUM SERPL-MCNC: 8.7 MG/DL (ref 8.5–10.1)
CAMPYLOBACTER SPECIES, DNA: NEGATIVE
CHLORIDE SERPL-SCNC: 103 MMOL/L (ref 97–108)
CO2 SERPL-SCNC: 23 MMOL/L (ref 21–32)
COMMENT, HOLDF: NORMAL
CREAT SERPL-MCNC: 2.06 MG/DL (ref 0.7–1.3)
DIFFERENTIAL METHOD BLD: ABNORMAL
DIFFERENTIAL METHOD BLD: ABNORMAL
ENTEROTOXIGEN E COLI, DNA: NEGATIVE
EOSINOPHIL # BLD: 0 K/UL (ref 0–0.4)
EOSINOPHIL # BLD: 0.2 K/UL (ref 0–0.4)
EOSINOPHIL NFR BLD: 0 % (ref 0–7)
EOSINOPHIL NFR BLD: 1 % (ref 0–7)
ERYTHROCYTE [DISTWIDTH] IN BLOOD BY AUTOMATED COUNT: 15.2 % (ref 11.5–14.5)
ERYTHROCYTE [DISTWIDTH] IN BLOOD BY AUTOMATED COUNT: 15.3 % (ref 11.5–14.5)
GLOBULIN SER CALC-MCNC: 2.8 G/DL (ref 2–4)
GLOBULIN SER CALC-MCNC: 4 G/DL (ref 2–4)
GLUCOSE BLD STRIP.AUTO-MCNC: 105 MG/DL (ref 65–117)
GLUCOSE SERPL-MCNC: 103 MG/DL (ref 65–100)
HCT VFR BLD AUTO: 23.5 % (ref 36.6–50.3)
HCT VFR BLD AUTO: 24.1 % (ref 36.6–50.3)
HGB BLD-MCNC: 8.6 G/DL (ref 12.1–17)
HGB BLD-MCNC: 8.7 G/DL (ref 12.1–17)
IMM GRANULOCYTES # BLD AUTO: 0 K/UL
IMM GRANULOCYTES # BLD AUTO: 0.4 K/UL (ref 0–0.04)
IMM GRANULOCYTES NFR BLD AUTO: 0 %
IMM GRANULOCYTES NFR BLD AUTO: 2 % (ref 0–0.5)
INR PPP: 1.4 (ref 0.9–1.1)
LACTATE SERPL-SCNC: 1.2 MMOL/L (ref 0.4–2)
LDH SERPL L TO P-CCNC: 664 U/L (ref 85–241)
LYMPHOCYTES # BLD: 0.4 K/UL (ref 0.8–3.5)
LYMPHOCYTES # BLD: 1.4 K/UL (ref 0.8–3.5)
LYMPHOCYTES NFR BLD: 2 % (ref 12–49)
LYMPHOCYTES NFR BLD: 7 % (ref 12–49)
MAGNESIUM SERPL-MCNC: 2.6 MG/DL (ref 1.6–2.4)
MCH RBC QN AUTO: 31.4 PG (ref 26–34)
MCH RBC QN AUTO: 32.6 PG (ref 26–34)
MCHC RBC AUTO-ENTMCNC: 35.7 G/DL (ref 30–36.5)
MCHC RBC AUTO-ENTMCNC: 37 G/DL (ref 30–36.5)
MCV RBC AUTO: 88 FL (ref 80–99)
MCV RBC AUTO: 88 FL (ref 80–99)
MONOCYTES # BLD: 1 K/UL (ref 0–1)
MONOCYTES # BLD: 1.2 K/UL (ref 0–1)
MONOCYTES NFR BLD: 5 % (ref 5–13)
MONOCYTES NFR BLD: 6 % (ref 5–13)
NEUTS BAND NFR BLD MANUAL: 3 % (ref 0–6)
NEUTS SEG # BLD: 16.7 K/UL (ref 1.8–8)
NEUTS SEG # BLD: 17.6 K/UL (ref 1.8–8)
NEUTS SEG NFR BLD: 86 % (ref 32–75)
NEUTS SEG NFR BLD: 88 % (ref 32–75)
NRBC # BLD: 0 K/UL (ref 0–0.01)
NRBC # BLD: 0 K/UL (ref 0–0.01)
NRBC BLD-RTO: 0 PER 100 WBC
NRBC BLD-RTO: 0 PER 100 WBC
P SHIGELLOIDES DNA STL QL NAA+PROBE: NEGATIVE
PLATELET # BLD AUTO: 572 K/UL (ref 150–400)
PLATELET # BLD AUTO: 577 K/UL (ref 150–400)
PMV BLD AUTO: 10.2 FL (ref 8.9–12.9)
PMV BLD AUTO: 10.3 FL (ref 8.9–12.9)
POTASSIUM SERPL-SCNC: 2.8 MMOL/L (ref 3.5–5.1)
PROT SERPL-MCNC: 5.5 G/DL (ref 6.4–8.2)
PROT SERPL-MCNC: 6.4 G/DL (ref 6.4–8.2)
PROTHROMBIN TIME: 14 SEC (ref 9–11.1)
RBC # BLD AUTO: 2.67 M/UL (ref 4.1–5.7)
RBC # BLD AUTO: 2.74 M/UL (ref 4.1–5.7)
RBC MORPH BLD: ABNORMAL
RBC MORPH BLD: ABNORMAL
SALMONELLA SPECIES, DNA: NEGATIVE
SAMPLES BEING HELD,HOLD: NORMAL
SERVICE CMNT-IMP: NORMAL
SHIGA TOXIN PRODUCING, DNA: NEGATIVE
SHIGELLA SP+EIEC IPAH STL QL NAA+PROBE: NEGATIVE
SODIUM SERPL-SCNC: 133 MMOL/L (ref 136–145)
THERAPEUTIC RANGE,PTTT: ABNORMAL SECS (ref 58–77)
TROPONIN-HIGH SENSITIVITY: 26 NG/L (ref 0–76)
VIBRIO SPECIES, DNA: NEGATIVE
WBC # BLD AUTO: 19.4 K/UL (ref 4.1–11.1)
WBC # BLD AUTO: 19.5 K/UL (ref 4.1–11.1)
Y. ENTEROCOLITICA, DNA: NEGATIVE

## 2022-08-28 PROCEDURE — 74011250636 HC RX REV CODE- 250/636: Performed by: FAMILY MEDICINE

## 2022-08-28 PROCEDURE — 85025 COMPLETE CBC W/AUTO DIFF WBC: CPT

## 2022-08-28 PROCEDURE — 93005 ELECTROCARDIOGRAM TRACING: CPT

## 2022-08-28 PROCEDURE — P9047 ALBUMIN (HUMAN), 25%, 50ML: HCPCS | Performed by: INTERNAL MEDICINE

## 2022-08-28 PROCEDURE — 83605 ASSAY OF LACTIC ACID: CPT

## 2022-08-28 PROCEDURE — 36415 COLL VENOUS BLD VENIPUNCTURE: CPT

## 2022-08-28 PROCEDURE — 71045 X-RAY EXAM CHEST 1 VIEW: CPT

## 2022-08-28 PROCEDURE — 74011250636 HC RX REV CODE- 250/636: Performed by: INTERNAL MEDICINE

## 2022-08-28 PROCEDURE — 85610 PROTHROMBIN TIME: CPT

## 2022-08-28 PROCEDURE — C9113 INJ PANTOPRAZOLE SODIUM, VIA: HCPCS | Performed by: INTERNAL MEDICINE

## 2022-08-28 PROCEDURE — 74011250636 HC RX REV CODE- 250/636

## 2022-08-28 PROCEDURE — 02HV33Z INSERTION OF INFUSION DEVICE INTO SUPERIOR VENA CAVA, PERCUTANEOUS APPROACH: ICD-10-PCS | Performed by: INTERNAL MEDICINE

## 2022-08-28 PROCEDURE — 74011250636 HC RX REV CODE- 250/636: Performed by: PHYSICIAN ASSISTANT

## 2022-08-28 PROCEDURE — 65660000001 HC RM ICU INTERMED STEPDOWN

## 2022-08-28 PROCEDURE — 36556 INSERT NON-TUNNEL CV CATH: CPT

## 2022-08-28 PROCEDURE — 74011250637 HC RX REV CODE- 250/637: Performed by: PHYSICIAN ASSISTANT

## 2022-08-28 PROCEDURE — 74011000258 HC RX REV CODE- 258: Performed by: FAMILY MEDICINE

## 2022-08-28 PROCEDURE — 74011000250 HC RX REV CODE- 250: Performed by: INTERNAL MEDICINE

## 2022-08-28 PROCEDURE — 74011000250 HC RX REV CODE- 250: Performed by: PHYSICIAN ASSISTANT

## 2022-08-28 PROCEDURE — 85730 THROMBOPLASTIN TIME PARTIAL: CPT

## 2022-08-28 PROCEDURE — 83615 LACTATE (LD) (LDH) ENZYME: CPT

## 2022-08-28 PROCEDURE — 82140 ASSAY OF AMMONIA: CPT

## 2022-08-28 PROCEDURE — 80076 HEPATIC FUNCTION PANEL: CPT

## 2022-08-28 PROCEDURE — 74011000250 HC RX REV CODE- 250: Performed by: FAMILY MEDICINE

## 2022-08-28 PROCEDURE — 84484 ASSAY OF TROPONIN QUANT: CPT

## 2022-08-28 PROCEDURE — 83735 ASSAY OF MAGNESIUM: CPT

## 2022-08-28 PROCEDURE — 74011250637 HC RX REV CODE- 250/637: Performed by: FAMILY MEDICINE

## 2022-08-28 PROCEDURE — 74011250637 HC RX REV CODE- 250/637: Performed by: INTERNAL MEDICINE

## 2022-08-28 PROCEDURE — 82962 GLUCOSE BLOOD TEST: CPT

## 2022-08-28 PROCEDURE — 80053 COMPREHEN METABOLIC PANEL: CPT

## 2022-08-28 RX ORDER — IBUPROFEN 200 MG
1 TABLET ORAL DAILY
Status: DISCONTINUED | OUTPATIENT
Start: 2022-08-28 | End: 2022-09-12 | Stop reason: HOSPADM

## 2022-08-28 RX ORDER — MIDAZOLAM HYDROCHLORIDE 1 MG/ML
2 INJECTION, SOLUTION INTRAMUSCULAR; INTRAVENOUS ONCE
Status: COMPLETED | OUTPATIENT
Start: 2022-08-28 | End: 2022-08-28

## 2022-08-28 RX ORDER — METOPROLOL TARTRATE 25 MG/1
6.25 TABLET, FILM COATED ORAL EVERY 12 HOURS
Status: DISCONTINUED | OUTPATIENT
Start: 2022-08-28 | End: 2022-09-12 | Stop reason: HOSPADM

## 2022-08-28 RX ORDER — POTASSIUM CHLORIDE 7.45 MG/ML
10 INJECTION INTRAVENOUS
Status: DISCONTINUED | OUTPATIENT
Start: 2022-08-28 | End: 2022-08-28 | Stop reason: SDUPTHER

## 2022-08-28 RX ORDER — METOPROLOL TARTRATE 5 MG/5ML
2.5 INJECTION INTRAVENOUS
Status: DISCONTINUED | OUTPATIENT
Start: 2022-08-28 | End: 2022-09-12 | Stop reason: HOSPADM

## 2022-08-28 RX ORDER — FACIAL-BODY WIPES
10 EACH TOPICAL DAILY PRN
Status: DISCONTINUED | OUTPATIENT
Start: 2022-08-28 | End: 2022-09-12 | Stop reason: HOSPADM

## 2022-08-28 RX ORDER — NALOXONE HYDROCHLORIDE 0.4 MG/ML
0.4 INJECTION, SOLUTION INTRAMUSCULAR; INTRAVENOUS; SUBCUTANEOUS
Status: DISCONTINUED | OUTPATIENT
Start: 2022-08-28 | End: 2022-09-12 | Stop reason: HOSPADM

## 2022-08-28 RX ORDER — HYDROMORPHONE HYDROCHLORIDE 2 MG/ML
2 INJECTION, SOLUTION INTRAMUSCULAR; INTRAVENOUS; SUBCUTANEOUS
Status: DISCONTINUED | OUTPATIENT
Start: 2022-08-28 | End: 2022-08-31

## 2022-08-28 RX ORDER — MIDAZOLAM HYDROCHLORIDE 1 MG/ML
2 INJECTION, SOLUTION INTRAMUSCULAR; INTRAVENOUS ONCE
Status: CANCELLED | OUTPATIENT
Start: 2022-08-28 | End: 2022-08-29

## 2022-08-28 RX ORDER — FENTANYL CITRATE 50 UG/ML
50 INJECTION, SOLUTION INTRAMUSCULAR; INTRAVENOUS ONCE
Status: CANCELLED | OUTPATIENT
Start: 2022-08-28 | End: 2022-08-29

## 2022-08-28 RX ORDER — ALBUMIN HUMAN 250 G/1000ML
25 SOLUTION INTRAVENOUS EVERY 8 HOURS
Status: COMPLETED | OUTPATIENT
Start: 2022-08-28 | End: 2022-08-29

## 2022-08-28 RX ORDER — FENTANYL CITRATE 50 UG/ML
100 INJECTION, SOLUTION INTRAMUSCULAR; INTRAVENOUS ONCE
Status: COMPLETED | OUTPATIENT
Start: 2022-08-28 | End: 2022-08-28

## 2022-08-28 RX ORDER — MIDAZOLAM HYDROCHLORIDE 1 MG/ML
INJECTION, SOLUTION INTRAMUSCULAR; INTRAVENOUS
Status: DISCONTINUED
Start: 2022-08-28 | End: 2022-08-28

## 2022-08-28 RX ORDER — POTASSIUM CHLORIDE 7.45 MG/ML
10 INJECTION INTRAVENOUS
Status: COMPLETED | OUTPATIENT
Start: 2022-08-28 | End: 2022-08-28

## 2022-08-28 RX ORDER — FENTANYL CITRATE 50 UG/ML
INJECTION, SOLUTION INTRAMUSCULAR; INTRAVENOUS
Status: DISCONTINUED
Start: 2022-08-28 | End: 2022-08-28

## 2022-08-28 RX ADMIN — ALBUMIN (HUMAN) 25 G: 0.25 INJECTION, SOLUTION INTRAVENOUS at 19:10

## 2022-08-28 RX ADMIN — SODIUM CHLORIDE, PRESERVATIVE FREE 40 MG: 5 INJECTION INTRAVENOUS at 22:51

## 2022-08-28 RX ADMIN — ALBUMIN (HUMAN) 25 G: 0.25 INJECTION, SOLUTION INTRAVENOUS at 02:13

## 2022-08-28 RX ADMIN — FENTANYL CITRATE 100 MCG: 50 INJECTION, SOLUTION INTRAMUSCULAR; INTRAVENOUS at 17:50

## 2022-08-28 RX ADMIN — POTASSIUM CHLORIDE 10 MEQ: 7.46 INJECTION, SOLUTION INTRAVENOUS at 21:42

## 2022-08-28 RX ADMIN — POTASSIUM CHLORIDE 10 MEQ: 7.46 INJECTION, SOLUTION INTRAVENOUS at 22:51

## 2022-08-28 RX ADMIN — CEFEPIME 1 G: 1 INJECTION, POWDER, FOR SOLUTION INTRAMUSCULAR; INTRAVENOUS at 05:28

## 2022-08-28 RX ADMIN — ONDANSETRON 4 MG: 4 TABLET, ORALLY DISINTEGRATING ORAL at 03:14

## 2022-08-28 RX ADMIN — SODIUM BICARBONATE: 84 INJECTION, SOLUTION INTRAVENOUS at 22:52

## 2022-08-28 RX ADMIN — HEPARIN SODIUM 5000 UNITS: 5000 INJECTION INTRAVENOUS; SUBCUTANEOUS at 22:50

## 2022-08-28 RX ADMIN — HYDROMORPHONE HYDROCHLORIDE 2 MG: 2 INJECTION, SOLUTION INTRAMUSCULAR; INTRAVENOUS; SUBCUTANEOUS at 22:51

## 2022-08-28 RX ADMIN — HYDROMORPHONE HYDROCHLORIDE 2 MG: 2 INJECTION, SOLUTION INTRAMUSCULAR; INTRAVENOUS; SUBCUTANEOUS at 19:39

## 2022-08-28 RX ADMIN — ALBUMIN (HUMAN) 25 G: 0.25 INJECTION, SOLUTION INTRAVENOUS at 22:52

## 2022-08-28 RX ADMIN — MIDAZOLAM 2 MG: 1 INJECTION, SOLUTION INTRAMUSCULAR; INTRAVENOUS at 17:51

## 2022-08-28 RX ADMIN — PANTOPRAZOLE SODIUM 40 MG: 40 TABLET, DELAYED RELEASE ORAL at 07:13

## 2022-08-28 RX ADMIN — HEPARIN SODIUM 5000 UNITS: 5000 INJECTION INTRAVENOUS; SUBCUTANEOUS at 13:50

## 2022-08-28 RX ADMIN — POTASSIUM CHLORIDE 10 MEQ: 7.46 INJECTION, SOLUTION INTRAVENOUS at 19:15

## 2022-08-28 RX ADMIN — FOLIC ACID: 5 INJECTION, SOLUTION INTRAMUSCULAR; INTRAVENOUS; SUBCUTANEOUS at 19:16

## 2022-08-28 RX ADMIN — OXYCODONE 10 MG: 5 TABLET ORAL at 09:11

## 2022-08-28 RX ADMIN — METOPROLOL TARTRATE 6.25 MG: 25 TABLET, FILM COATED ORAL at 13:49

## 2022-08-28 RX ADMIN — METOPROLOL TARTRATE 6.25 MG: 25 TABLET, FILM COATED ORAL at 22:53

## 2022-08-28 RX ADMIN — SODIUM CHLORIDE, PRESERVATIVE FREE 10 ML: 5 INJECTION INTRAVENOUS at 22:51

## 2022-08-28 RX ADMIN — POTASSIUM CHLORIDE 10 MEQ: 7.46 INJECTION, SOLUTION INTRAVENOUS at 20:46

## 2022-08-28 RX ADMIN — VANCOMYCIN HYDROCHLORIDE 1000 MG: 1 INJECTION, POWDER, LYOPHILIZED, FOR SOLUTION INTRAVENOUS at 19:17

## 2022-08-28 RX ADMIN — MIDAZOLAM HYDROCHLORIDE 2 MG: 1 INJECTION, SOLUTION INTRAMUSCULAR; INTRAVENOUS at 17:51

## 2022-08-28 RX ADMIN — HYDROMORPHONE HYDROCHLORIDE 2 MG: 2 INJECTION, SOLUTION INTRAMUSCULAR; INTRAVENOUS; SUBCUTANEOUS at 14:27

## 2022-08-28 NOTE — PROCEDURES
Central Line Placement    Start time: 8/28/2022 5:42 PM  End time: 8/28/2022 6:00 PM  Performed by: Tyrell Meraz MD  Authorized by: Tyrell Meraz MD     Indications: vascular access  Preanesthetic Checklist: patient identified, risks and benefits discussed, anesthesia consent, site marked, patient being monitored and timeout performed      Pre-procedure: All elements of maximal sterile barrier technique followed?  Yes    2% Chlorhexidine for cutaneous antisepsis, Hand hygiene performed prior to catheter insertion and Ultrasound guidance    Sterile Ultrasound Technique followed?: Yes            Procedure:   Prep:  ChloraPrep  Location: internal jugular  Orientation:  Left  Patient position:  Trendelenburg  Catheter type:  Triple lumen  Catheter size:  7 Fr  Catheter length:  16 cm  Number of attempts:  1  Successful placement: Yes      Assessment:   Post-procedure:  Catheter secured, sterile dressing applied and sterile dressing with CHG applied  Assessment:  Free fluid flow, blood return through all ports, placement verified by x-ray and guidewire removal verified  Insertion:  Uncomplicated  Patient tolerance:  Patient tolerated the procedure well with no immediate complications

## 2022-08-28 NOTE — PROGRESS NOTES
0215: Patient informed writer he refuses to have blood drawn for AM labwork. 0800: Bedside shift change report given to Mo RN (oncoming nurse) by Daisy Bartlett RN (offgoing nurse). Report included the following information SBAR, Kardex, ED Summary, Intake/Output, MAR, Recent Results, and Cardiac Rhythm NSR/Sinus Tach . Problem: Falls - Risk of  Goal: *Absence of Falls  Description: Document Rosy Donaldson Fall Risk and appropriate interventions in the flowsheet. Outcome: Progressing Towards Goal  Note: Fall Risk Interventions:  Mobility Interventions: PT Consult for mobility concerns, PT Consult for assist device competence    Medication Interventions: Teach patient to arise slowly    Problem: Patient Education: Go to Patient Education Activity  Goal: Patient/Family Education  Outcome: Progressing Towards Goal     Problem: Discharge Planning  Goal: *Discharge to safe environment  Outcome: Progressing Towards Goal  Goal: *Knowledge of medication management  Outcome: Progressing Towards Goal  Goal: *Knowledge of discharge instructions  Outcome: Progressing Towards Goal     Problem: Patient Education: Go to Patient Education Activity  Goal: Patient/Family Education  Outcome: Progressing Towards Goal     Problem: Risk for Spread of Infection  Goal: Prevent transmission of infectious organism to others  Description: Prevent the transmission of infectious organisms to other patients, staff members, and visitors.   Outcome: Progressing Towards Goal     Problem: Patient Education:  Go to Education Activity  Goal: Patient/Family Education  Outcome: Progressing Towards Goal

## 2022-08-28 NOTE — PROGRESS NOTES
Critical Care Documentation    Name: Fan Braynt  YOB: 1960  MRN: [id number]  Admission Date: 8/26/2022  5:13 PM    Date of service: 8/28/2022    Active Diagnoses:     Severe sepsis   Liver dysfunction        Admission Summary:   Fan Bryant is a 65 y.o. male with PMHx of anemia, depression, COPD, malnutrition and Vitamin B12 deficiency, short bowel syndrome due to severe Crohn's Disease,  s/p >30 surgeries (210cm small bowel remaining from lig treitz to ileosigmoid anastomosis) s/p >30 surgeries with ileosigmoid anastomosis currently not on treatment, and chronic pain syndrome who is now admitted with severe sepsis, SUHA, metabolic acidosis, and direct hyperbilirubinemia. Nephrology and GI following. Interval history / Subjective:   Rapid response which nurse reports rate up to 150 that resolved on its own withOut any change in bp and without any symptoms per pt verbal reported. He has long hx of chronic pain which he gets meds but denies any acute onset of uncontrolled pain or anxiety around the timing of the jacob  Ekg at bedside noted infrequent PVC (on on ekg print out) and otherwise in sinus rhythm--use beta blocker. Staff reports pt refused labs earlier and therefore late. He wants nicotine patch so this was ordered. Constitutional:  Tachypneic. Appears uncomfortable. No acute distress, cooperative, pleasant    ENT:  Oral mucosa dry. Icteric    Resp:  No overt wheezing, nonlabored breathing   CV:  Regular rhythm, normal rate, no rubs    GI:  Extensive scaring. Ulcerated midline abdominal wound with granulation tissue overlying. Minimal erythema from wound. No high pitch sounds    Musculoskeletal:  No edema, warm and dry, symmetrical muscle tone    Neurologic:  Moves all extremities. AAOx3, responds appropriately to questions asked.   SKIN: warm and dry, jaundice                Assessment & Plan:      Severe Sepsis with suha, leukocytosis, elevated temp, tachypneic on presentation  SUHA  Metabolic Acidosis  Lactic acidosis  Direct Hyperbilirubinemia  Leukocytosis  -started on bicarb drip  -nephrology consulted   -Empiric abx  -Ulcerated Abdominal Wound: Wound care consulted. -- GI following   -- Diarrhea: Enteric Pathogen panel (pending from 8/26) and C diff testing (ordered)---GI consulted and following/guiding  - Hyperbilirubinemia: Per GI due to sepsis, decompensated cirrhosis, or abx-related. U/S completed with no biliary dilation  -Continue cardiac/tele monitoring     8/28/2022:  Rapid response which nurse reports rate up to 150 that resolved on its own withOut any change in bp and without any symptoms per pt verbal reported. He has long hx of chronic pain which he gets meds but denies any acute onset of uncontrolled pain or anxiety around the timing of the jacob  Ekg at bedside noted infrequent PVC (on on ekg print out) and otherwise in sinus rhythm--use beta blocker   Labs, image ordered  Staff reports pt refused labs earlier and therefore late. Crohn's Disease with small bowel syndrome, hx of surgery  Hypoalbuminemia  Malnutrition--check prealbumin  -Gi consulted and following/guiding  -nutrition consult  -  Enlarged liver with nodular contour  -concerns for cirrhosis  -GI consulted and following/guiding     Tobacco use history and /recommend cessation  -per pt request ordered nicotine patch  -recommend tobacco cessation/     Chronic Pain  -- Takes Oxycodone 15mg 5x/day as an outpatient and was started on lower dose on admission  -adjust as needed         Data Reviewed: All diagnostic labs and studies have been reviewed. Medications Administered:   Sedation: [ ] yes [ ] no   Anxiolytics: [ ] yes [ ] no   Antiarrhythmics: [x ] yes [ ] no   Antihypertensives: [ ] yes [ ] no   Pressors: [ ] yes [ ] no   IVF's: [ ] yes [ ] no       Critical Care Attestation: This patient is unstable and critically ill.  Due to a high probability of clinically significant, life threatening deterioration, the patient required my highest level of preparedness to intervene emergently and I personally spent this critical care time directly and personally managing the patient. This critical care time included obtaining a history; examining the patient; pulse oximetry; ordering and review of studies; arranging urgent treatment with development of a management plan; evaluation of patient's response to treatment; frequent reassessment; and, discussions with other providers and/or family. This critical care time was performed to assess and manage the high probability of imminent, life-threatening deterioration that could result in multi-organ failure and death. It was exclusive of separately billable procedures, treating other patients, and teaching time. Time Spent:     I personally spent 36 minutes in providing critical care time.     Veronica Prieto MD  8/28/2022  1:01 PM

## 2022-08-28 NOTE — PROGRESS NOTES
[facility]  Hospitalist Group                                                                                          Hospitalist Progress Note  Aleksandr Bowman MD  Answering service: [phone] from in house phone        Date of Service:  2022  NAME:  Ioana Delaney  :  1960  MRN:  [id number]      Admission Summary:   Ioana Delaney is a 65 y.o. male with PMHx of anemia, depression, COPD, malnutrition and Vitamin B12 deficiency, short bowel syndrome due to severe Crohn's Disease,  s/p >30 surgeries (210cm small bowel remaining from lig treitz to ileosigmoid anastomosis) s/p >30 surgeries with ileosigmoid anastomosis currently not on treatment, and chronic pain syndrome who is now admitted with severe sepsis, SUHA, metabolic acidosis, and direct hyperbilirubinemia. Nephrology and GI following. Interval history / Subjective:   Rapid response which nurse reports rate up to 150 that resolved on its own withOut any change in bp and without any symptoms per pt verbal reported. He has long hx of chronic pain which he gets meds but denies any acute onset of uncontrolled pain or anxiety around the timing of the jacob  Ekg at bedside noted infrequent PVC (on on ekg print out) and otherwise in sinus rhythm--use beta blocker. Staff reports pt refused labs earlier and therefore late. He wants nicotine patch so this was ordered. Assessment & Plan:     Severe Sepsis with suha, leukocytosis, elevated temp, tachypneic on presentation  SUHA  Metabolic Acidosis  Lactic acidosis  Direct Hyperbilirubinemia  Leukocytosis  -started on bicarb drip  -nephrology consulted   -Empiric abx  -Ulcerated Abdominal Wound: Wound care consulted. -- GI following   -- Diarrhea: Enteric Pathogen panel (pending from ) and C diff testing (ordered)---GI consulted and following/guiding  - Hyperbilirubinemia: Per GI due to sepsis, decompensated cirrhosis, or abx-related.  U/S completed with no biliary dilation  -Continue cardiac/tele monitoring    8/28/2022:  Rapid response which nurse reports rate up to 150 that resolved on its own withOut any change in bp and without any symptoms per pt verbal reported. He has long hx of chronic pain which he gets meds but denies any acute onset of uncontrolled pain or anxiety around the timing of the jacob  Ekg at bedside noted infrequent PVC (on on ekg print out) and otherwise in sinus rhythm--use beta blocker. Labs, image ordered  Staff reports pt refused labs earlier and therefore late. Crohn's Disease with small bowel syndrome, hx of surgery  Hypoalbuminemia  Malnutrition--check prealbumin  -Gi consulted and following/guiding  -nutrition consult  -  Enlarged liver with nodular contour  -concerns for cirrhosis  -GI consulted and following/guiding    Tobacco use history and /recommend cessation  -per pt request ordered nicotine patch  -recommend tobacco cessation/    Chronic Pain  -- Takes Oxycodone 15mg 5x/day as an outpatient and was started on lower dose on admission  -adjust as needed    Code status: Full Code  Prophylaxis: heparin  Care Plan discussed with: Pt, RN, staff  Anticipated Disposition: TBD     Hospital Problems  Date Reviewed: 10/9/2019            Codes Class Noted POA    Acute cystitis ICD-10-CM: N30.00  ICD-9-CM: 595.0  8/27/2022 Unknown           Review of Systems:   A comprehensive review of systems was negative except for that written in the HPI. Vital Signs:    Last 24hrs VS reviewed since prior progress note.  Most recent are:  Visit Vitals  BP (!) 115/51 (BP 1 Location: Left upper arm, BP Patient Position: At rest)   Pulse 96   Temp 97.5 °F (36.4 °C)   Resp 18   Ht 5' 9\" (1.753 m)   Wt 83 kg (182 lb 15.7 oz)   SpO2 96%   BMI 27.02 kg/m²       No intake or output data in the 24 hours ending 08/28/22 1240       Physical Examination:     I had a face to face encounter with this patient and independently examined them on 8/28/2022 as outlined below:          Constitutional:  Tachypneic. Appears uncomfortable. No acute distress, cooperative, pleasant    ENT:  Oral mucosa dry. Icteric    Resp:  No overt wheezing, nonlabored breathing   CV:  Regular rhythm, normal rate, no rubs    GI:  Extensive scaring. Ulcerated midline abdominal wound with granulation tissue overlying. Minimal erythema from wound. No high pitch sounds    Musculoskeletal:  No edema, warm and dry, symmetrical muscle tone    Neurologic:  Moves all extremities. AAOx3, responds appropriately to questions asked.   SKIN: warm and dry, jaundice            Data Review:    Review and/or order of clinical lab test  Review and/or order of tests in the radiology section of CPT  Review and/or order of tests in the medicine section of CPT      Labs:     Recent Labs     08/28/22  1039 08/26/22  1733   WBC 19.4* 29.1*   HGB 8.7* 12.9   HCT 23.5* 36.5*   * 334       Recent Labs     08/28/22  1039 08/27/22  0800 08/27/22  0331 08/26/22  1733   NA  --   --  130* 127*   K  --   --  3.4* 3.6   CL  --   --  102 96*   CO2  --   --  14* 17*   BUN  --   --  73* 67*   CREA  --   --  2.77* 3.27*   GLU  --   --  90 117*   CA  --   --  8.4* 9.3   MG 2.6* 1.8  --   --        Recent Labs     08/28/22  1039 08/26/22  1733   ALT 21 25   * 260*   TBILI 17.2* 10.2*   TP 5.5* 7.6   ALB 2.7* 2.6*   GLOB 2.8 5.0*   LPSE  --  102       Recent Labs     08/26/22  1733   INR 1.1   PTP 11.6*       Lab Results   Component Value Date/Time    Cholesterol, total 134 12/01/2014 04:33 AM    HDL Cholesterol 35 12/01/2014 04:33 AM    LDL, calculated 58.2 12/01/2014 04:33 AM    Triglyceride 204 (H) 12/01/2014 04:33 AM    CHOL/HDL Ratio 3.8 12/01/2014 04:33 AM     Lab Results   Component Value Date/Time    Glucose (POC) 105 08/28/2022 12:09 PM    Glucose (POC) 113 08/27/2022 05:26 AM    Glucose (POC) 105 08/26/2022 08:20 PM    Glucose (POC) 113 (H) 11/10/2017 11:35 AM    Glucose (POC) 120 (H) 11/10/2017 05:54 AM     Lab Results   Component Value Date/Time    Color DARK YELLOW 08/26/2022 07:27 PM    Appearance TURBID (A) 08/26/2022 07:27 PM    Specific gravity 1.025 08/26/2022 07:27 PM    Specific gravity 1.010 10/07/2019 05:48 AM    pH (UA) 5.0 08/26/2022 07:27 PM    Protein 100 (A) 08/26/2022 07:27 PM    Glucose Negative 08/26/2022 07:27 PM    Ketone TRACE (A) 08/26/2022 07:27 PM    Bilirubin NEGATIVE  10/07/2019 05:48 AM    Urobilinogen 0.2 08/26/2022 07:27 PM    Nitrites Positive (A) 08/26/2022 07:27 PM    Leukocyte Esterase SMALL (A) 08/26/2022 07:27 PM    Epithelial cells FEW 08/26/2022 07:27 PM    Bacteria 1+ (A) 08/26/2022 07:27 PM    WBC 5-10 08/26/2022 07:27 PM    RBC 0-5 08/26/2022 07:27 PM       Medications Reviewed:     Current Facility-Administered Medications   Medication Dose Route Frequency    nicotine (NICODERM CQ) 21 mg/24 hr patch 1 Patch  1 Patch TransDERmal DAILY    sodium chloride (NS) flush 5-40 mL  5-40 mL IntraVENous Q8H    sodium chloride (NS) flush 5-40 mL  5-40 mL IntraVENous PRN    acetaminophen (TYLENOL) tablet 650 mg  650 mg Oral Q6H PRN    Or    acetaminophen (TYLENOL) suppository 650 mg  650 mg Rectal Q6H PRN    ondansetron (ZOFRAN ODT) tablet 4 mg  4 mg Oral Q8H PRN    Or    ondansetron (ZOFRAN) injection 4 mg  4 mg IntraVENous Q6H PRN    cefepime (MAXIPIME) 1 g in 0.9% sodium chloride (MBP/ADV) 50 mL MBP  1 g IntraVENous Q24H    Vancomycin - Pharmacy to Dose   Other Rx Dosing/Monitoring    Vancomycin Random Due 8/28 @ 04:00   Other ONCE    sodium bicarbonate (8.4%) 150 mEq in dextrose 5% 1,000 mL infusion   IntraVENous CONTINUOUS    pantoprazole (PROTONIX) tablet 40 mg  40 mg Oral ACB    oxyCODONE IR (ROXICODONE) tablet 10 mg  10 mg Oral Q6H PRN    heparin (porcine) injection 5,000 Units  5,000 Units SubCUTAneous Q8H     ______________________________________________________________________  EXPECTED LENGTH OF STAY: - - -  ACTUAL LENGTH OF STAY:          1                 Rose BARROS Arnulfo Doe MD

## 2022-08-28 NOTE — PROGRESS NOTES
ICU Consult - received    Patient seen, examined. Mentating well. HD stable. Concern by teams that he could decompensate. Continue NaBicarb. Continue TPN. Continue Cefepime & Vanco.  Caution using IV metop. Would favor PRN IV with less long acting characteristics. IVF as needed. If worsens, would add antifungal coverage. Will continue to be available, but doesn't necessitate ICU care at this time.     Gretchen Saeed,    Staff Intensivist/Anesthesiologist  Critical Care Medicine

## 2022-08-28 NOTE — PROGRESS NOTES
Rapid response called overhead at thsi time, RRT responding. RRT called for increased heart rate and some chest pain. Heart rate back down at this time. Labs and EKG ordered, Dr. Shailesh Brink paged. Labs and EKG obtained, Dr. Shailesh Brink at bedside to evaluate patient at this time.     RRT will continue to follow

## 2022-08-28 NOTE — PROGRESS NOTES
Tele called with HR of 156 that did not sustain. Pt is asymptomatic in sinus tachy at this time. MD was notified.  Night RN notified, will continue to monitor

## 2022-08-28 NOTE — PROGRESS NOTES
1745: Dr. Vu Lazo at patient bedside for insertion of central line. Medication administered per Dr. Vu Lazo, see MAR. Patient VSS, tolerating well.    1800: CXR performed to confirm placement. SBAR report called to [employer].

## 2022-08-28 NOTE — PROGRESS NOTES
[facility] Progress Note        NAME: Brodie Nix       :  1960       MRN:  [id number]     Date/Time: 2022    Risk of deterioration: medium       Assessment:    Plan: SUHA with AG acidosis (Corrected for low albumin)  VOlume depletion/diarrhea  Hyperbilirubinemia  Hx of crohns/bowel resection  Leukocytosis/uti=coag (-)  Nonobstructing renal stones  (R) M L infiltrate  Fistula     Calcium corrects to normal  On bicarb gtt   Renal function improving-creatinine down from 3.27 to 2.77,-No labs today as he is requesting a central line. CRS needs to see/reviewed gi note  No hydro on CT  Agree with antibiotics  Lactic acid normal  On broad spectrum antibiotics       Subjective:     Chief Complaint:  I want a line in my neck (see that it is ordered)    Review of Systems: currently no n/v/cp/sob    Objective:     VITALS:   Last 24hrs VS reviewed since prior progress note. Most recent are:  Visit Vitals  BP (!) 115/55 (BP 1 Location: Left upper arm, BP Patient Position: At rest)   Pulse 91   Temp 98.1 °F (36.7 °C)   Resp 18   Ht 5' 9\" (1.753 m)   Wt 83 kg (182 lb 15.7 oz)   SpO2 96%   BMI 27.02 kg/m²     SpO2 Readings from Last 6 Encounters:   22 96%   20 96%   20 99%   10/09/19 99%   19 98%   18 98%    O2 Flow Rate (L/min): 4 l/min   No intake or output data in the 24 hours ending 22 1048       PHYSICAL EXAM:    General   well developed, well nourished, appears stated age, in no acute distress  EENT  Normocephalic, Atraumatic  Respiratory   Clear To Auscultation bilaterally  Cardiology  Regular Rate and Rythmn  Abd wound  Extremities  No clubbing, cyanosis, or edema.       Lab Data Reviewed: (see below)    Medications Reviewed: (see below)    PMH/SH reviewed - no change compared to H&P  ________________________________\  ___________________________________________________    Attending Physician: Kiersten Alvarado MD ____________________________________________________  MEDICATIONS:  Current Facility-Administered Medications   Medication Dose Route Frequency    sodium chloride (NS) flush 5-40 mL  5-40 mL IntraVENous Q8H    sodium chloride (NS) flush 5-40 mL  5-40 mL IntraVENous PRN    acetaminophen (TYLENOL) tablet 650 mg  650 mg Oral Q6H PRN    Or    acetaminophen (TYLENOL) suppository 650 mg  650 mg Rectal Q6H PRN    ondansetron (ZOFRAN ODT) tablet 4 mg  4 mg Oral Q8H PRN    Or    ondansetron (ZOFRAN) injection 4 mg  4 mg IntraVENous Q6H PRN    cefepime (MAXIPIME) 1 g in 0.9% sodium chloride (MBP/ADV) 50 mL MBP  1 g IntraVENous Q24H    Vancomycin - Pharmacy to Dose   Other Rx Dosing/Monitoring    Vancomycin Random Due 8/28 @ 04:00   Other ONCE    sodium bicarbonate (8.4%) 150 mEq in dextrose 5% 1,000 mL infusion   IntraVENous CONTINUOUS    pantoprazole (PROTONIX) tablet 40 mg  40 mg Oral ACB    albumin human 25% (BUMINATE) solution 25 g  25 g IntraVENous Q6H    oxyCODONE IR (ROXICODONE) tablet 10 mg  10 mg Oral Q6H PRN    heparin (porcine) injection 5,000 Units  5,000 Units SubCUTAneous Q8H        LABS:  Recent Labs     08/26/22  1733   WBC 29.1*   HGB 12.9   HCT 36.5*          Recent Labs     08/27/22  0800 08/27/22  0331 08/26/22  1733   NA  --  130* 127*   K  --  3.4* 3.6   CL  --  102 96*   CO2  --  14* 17*   BUN  --  73* 67*   CREA  --  2.77* 3.27*   GLU  --  90 117*   CA  --  8.4* 9.3   MG 1.8  --   --        Recent Labs     08/26/22  1733   ALT 25   *   TBILI 10.2*   TP 7.6   ALB 2.6*   GLOB 5.0*   LPSE 102       Recent Labs     08/26/22  1733   INR 1.1   PTP 11.6*        No results for input(s): FE, TIBC, PSAT, FERR in the last 72 hours. No results for input(s): PH, PCO2, PO2 in the last 72 hours. No results for input(s): CPK, CKNDX, TROIQ in the last 72 hours.     No lab exists for component: CPKMB  Lab Results   Component Value Date/Time    Glucose (POC) 113 08/27/2022 05:26 AM    Glucose (POC) 105 08/26/2022 08:20 PM    Glucose (POC) 113 (H) 11/10/2017 11:35 AM    Glucose (POC) 120 (H) 11/10/2017 05:54 AM    Glucose (POC) 87 11/09/2017 11:43 PM     IMPRESSION  1. Status post ileocolonic anastomosis at the sigmoid level, with the  anastomosis and postoperative change immediately deep to the anterior abdominal  wall which is thin in the midline but with no mass, hematoma or fluid  collection. No intra-abdominal free air or fluid. 2. Patchy small infiltrates in the right middle lobe, new since prior study. 3. Stable nonobstructing right intrarenal calculi. 4. Other stable incidental and postoperative changes.

## 2022-08-29 ENCOUNTER — APPOINTMENT (OUTPATIENT)
Dept: CT IMAGING | Age: 62
DRG: 871 | End: 2022-08-29
Attending: COLON & RECTAL SURGERY
Payer: MEDICARE

## 2022-08-29 LAB
25(OH)D3 SERPL-MCNC: 15.8 NG/ML (ref 30–100)
ALBUMIN SERPL-MCNC: 2.7 G/DL (ref 3.5–5)
ALBUMIN SERPL-MCNC: 2.8 G/DL (ref 3.5–5)
ALBUMIN/GLOB SERPL: 1 {RATIO} (ref 1.1–2.2)
ALP SERPL-CCNC: 131 U/L (ref 45–117)
ALT SERPL-CCNC: 30 U/L (ref 12–78)
ANION GAP SERPL CALC-SCNC: 9 MMOL/L (ref 5–15)
APTT PPP: 37 SEC (ref 22.1–31)
AST SERPL-CCNC: 66 U/L (ref 15–37)
ATRIAL RATE: 91 BPM
BACTERIA SPEC CULT: NORMAL
BASOPHILS # BLD: 0 K/UL (ref 0–0.1)
BASOPHILS NFR BLD: 0 % (ref 0–1)
BILIRUB DIRECT SERPL-MCNC: 10.1 MG/DL (ref 0–0.2)
BILIRUB DIRECT SERPL-MCNC: 10.4 MG/DL (ref 0–0.2)
BILIRUB DIRECT SERPL-MCNC: 8.5 MG/DL (ref 0–0.2)
BILIRUB SERPL-MCNC: 13.9 MG/DL (ref 0.2–1)
BUN SERPL-MCNC: 75 MG/DL (ref 6–20)
BUN/CREAT SERPL: 41 (ref 12–20)
C DIFF GDH STL QL: NEGATIVE
C DIFF TOX A+B STL QL IA: NEGATIVE
CALCIUM SERPL-MCNC: 8.2 MG/DL (ref 8.5–10.1)
CALCULATED P AXIS, ECG09: 119 DEGREES
CALCULATED R AXIS, ECG10: 139 DEGREES
CALCULATED T AXIS, ECG11: 167 DEGREES
CC UR VC: NORMAL
CHLORIDE SERPL-SCNC: 99 MMOL/L (ref 97–108)
CO2 SERPL-SCNC: 25 MMOL/L (ref 21–32)
CREAT SERPL-MCNC: 1.81 MG/DL (ref 0.7–1.3)
CRP SERPL-MCNC: 15.3 MG/DL (ref 0–0.6)
DIAGNOSIS, 93000: NORMAL
DIFFERENTIAL METHOD BLD: ABNORMAL
EOSINOPHIL # BLD: 0.1 K/UL (ref 0–0.4)
EOSINOPHIL NFR BLD: 1 % (ref 0–7)
ERYTHROCYTE [DISTWIDTH] IN BLOOD BY AUTOMATED COUNT: 15.3 % (ref 11.5–14.5)
ERYTHROCYTE [SEDIMENTATION RATE] IN BLOOD: 126 MM/HR (ref 0–20)
FOLATE SERPL-MCNC: 18.2 NG/ML (ref 5–21)
GLOBULIN SER CALC-MCNC: 2.6 G/DL (ref 2–4)
GLUCOSE BLD STRIP.AUTO-MCNC: 139 MG/DL (ref 65–117)
GLUCOSE SERPL-MCNC: 124 MG/DL (ref 65–100)
HCT VFR BLD AUTO: 20.8 % (ref 36.6–50.3)
HGB BLD-MCNC: 7.3 G/DL (ref 12.1–17)
IMM GRANULOCYTES # BLD AUTO: 0.2 K/UL (ref 0–0.04)
IMM GRANULOCYTES NFR BLD AUTO: 1 % (ref 0–0.5)
INR PPP: 1.3 (ref 0.9–1.1)
INTERPRETATION: NORMAL
LDH SERPL L TO P-CCNC: 593 U/L (ref 85–241)
LYMPHOCYTES # BLD: 1.2 K/UL (ref 0.8–3.5)
LYMPHOCYTES NFR BLD: 9 % (ref 12–49)
MAGNESIUM SERPL-MCNC: 2.5 MG/DL (ref 1.6–2.4)
MCH RBC QN AUTO: 30.8 PG (ref 26–34)
MCHC RBC AUTO-ENTMCNC: 35.1 G/DL (ref 30–36.5)
MCV RBC AUTO: 87.8 FL (ref 80–99)
MONOCYTES # BLD: 0.8 K/UL (ref 0–1)
MONOCYTES NFR BLD: 6 % (ref 5–13)
NEUTS SEG # BLD: 11.5 K/UL (ref 1.8–8)
NEUTS SEG NFR BLD: 84 % (ref 32–75)
NRBC # BLD: 0.02 K/UL (ref 0–0.01)
NRBC BLD-RTO: 0.1 PER 100 WBC
P-R INTERVAL, ECG05: 158 MS
PHOSPHATE SERPL-MCNC: 2.6 MG/DL (ref 2.6–4.7)
PLATELET # BLD AUTO: 505 K/UL (ref 150–400)
PMV BLD AUTO: 10.5 FL (ref 8.9–12.9)
POTASSIUM SERPL-SCNC: 3.1 MMOL/L (ref 3.5–5.1)
PROT SERPL-MCNC: 5.3 G/DL (ref 6.4–8.2)
PROTHROMBIN TIME: 13.7 SEC (ref 9–11.1)
Q-T INTERVAL, ECG07: 390 MS
QRS DURATION, ECG06: 92 MS
QTC CALCULATION (BEZET), ECG08: 479 MS
RBC # BLD AUTO: 2.37 M/UL (ref 4.1–5.7)
SERVICE CMNT-IMP: ABNORMAL
SERVICE CMNT-IMP: NORMAL
SODIUM SERPL-SCNC: 133 MMOL/L (ref 136–145)
THERAPEUTIC RANGE,PTTT: ABNORMAL SECS (ref 58–77)
VENTRICULAR RATE, ECG03: 91 BPM
VIT B12 SERPL-MCNC: 153 PG/ML (ref 193–986)
WBC # BLD AUTO: 13.7 K/UL (ref 4.1–11.1)

## 2022-08-29 PROCEDURE — 74011250636 HC RX REV CODE- 250/636: Performed by: INTERNAL MEDICINE

## 2022-08-29 PROCEDURE — 74011250636 HC RX REV CODE- 250/636: Performed by: PHYSICIAN ASSISTANT

## 2022-08-29 PROCEDURE — 82746 ASSAY OF FOLIC ACID SERUM: CPT

## 2022-08-29 PROCEDURE — 74011000250 HC RX REV CODE- 250: Performed by: INTERNAL MEDICINE

## 2022-08-29 PROCEDURE — 84630 ASSAY OF ZINC: CPT

## 2022-08-29 PROCEDURE — 84425 ASSAY OF VITAMIN B-1: CPT

## 2022-08-29 PROCEDURE — 85652 RBC SED RATE AUTOMATED: CPT

## 2022-08-29 PROCEDURE — 85730 THROMBOPLASTIN TIME PARTIAL: CPT

## 2022-08-29 PROCEDURE — 86140 C-REACTIVE PROTEIN: CPT

## 2022-08-29 PROCEDURE — C9113 INJ PANTOPRAZOLE SODIUM, VIA: HCPCS | Performed by: INTERNAL MEDICINE

## 2022-08-29 PROCEDURE — P9047 ALBUMIN (HUMAN), 25%, 50ML: HCPCS | Performed by: INTERNAL MEDICINE

## 2022-08-29 PROCEDURE — 86664 EPSTEIN-BARR NUCLEAR ANTIGEN: CPT

## 2022-08-29 PROCEDURE — 86644 CMV ANTIBODY: CPT

## 2022-08-29 PROCEDURE — 82607 VITAMIN B-12: CPT

## 2022-08-29 PROCEDURE — 86665 EPSTEIN-BARR CAPSID VCA: CPT

## 2022-08-29 PROCEDURE — 87324 CLOSTRIDIUM AG IA: CPT

## 2022-08-29 PROCEDURE — 85025 COMPLETE CBC W/AUTO DIFF WBC: CPT

## 2022-08-29 PROCEDURE — 87798 DETECT AGENT NOS DNA AMP: CPT

## 2022-08-29 PROCEDURE — 84255 ASSAY OF SELENIUM: CPT

## 2022-08-29 PROCEDURE — 82248 BILIRUBIN DIRECT: CPT

## 2022-08-29 PROCEDURE — 82306 VITAMIN D 25 HYDROXY: CPT

## 2022-08-29 PROCEDURE — 80074 ACUTE HEPATITIS PANEL: CPT

## 2022-08-29 PROCEDURE — 86706 HEP B SURFACE ANTIBODY: CPT

## 2022-08-29 PROCEDURE — 74011250636 HC RX REV CODE- 250/636: Performed by: FAMILY MEDICINE

## 2022-08-29 PROCEDURE — 82390 ASSAY OF CERULOPLASMIN: CPT

## 2022-08-29 PROCEDURE — 86704 HEP B CORE ANTIBODY TOTAL: CPT

## 2022-08-29 PROCEDURE — 83615 LACTATE (LD) (LDH) ENZYME: CPT

## 2022-08-29 PROCEDURE — 65660000001 HC RM ICU INTERMED STEPDOWN

## 2022-08-29 PROCEDURE — 86038 ANTINUCLEAR ANTIBODIES: CPT

## 2022-08-29 PROCEDURE — 36415 COLL VENOUS BLD VENIPUNCTURE: CPT

## 2022-08-29 PROCEDURE — 82103 ALPHA-1-ANTITRYPSIN TOTAL: CPT

## 2022-08-29 PROCEDURE — 82962 GLUCOSE BLOOD TEST: CPT

## 2022-08-29 PROCEDURE — 80076 HEPATIC FUNCTION PANEL: CPT

## 2022-08-29 PROCEDURE — 82525 ASSAY OF COPPER: CPT

## 2022-08-29 PROCEDURE — 74011000258 HC RX REV CODE- 258: Performed by: INTERNAL MEDICINE

## 2022-08-29 PROCEDURE — 74011000250 HC RX REV CODE- 250: Performed by: PHYSICIAN ASSISTANT

## 2022-08-29 PROCEDURE — 83735 ASSAY OF MAGNESIUM: CPT

## 2022-08-29 PROCEDURE — 74011000250 HC RX REV CODE- 250: Performed by: FAMILY MEDICINE

## 2022-08-29 PROCEDURE — 80069 RENAL FUNCTION PANEL: CPT

## 2022-08-29 PROCEDURE — 86803 HEPATITIS C AB TEST: CPT

## 2022-08-29 PROCEDURE — 74011250637 HC RX REV CODE- 250/637: Performed by: INTERNAL MEDICINE

## 2022-08-29 PROCEDURE — 87389 HIV-1 AG W/HIV-1&-2 AB AG IA: CPT

## 2022-08-29 PROCEDURE — 85610 PROTHROMBIN TIME: CPT

## 2022-08-29 RX ORDER — POTASSIUM CHLORIDE 7.45 MG/ML
10 INJECTION INTRAVENOUS
Status: COMPLETED | OUTPATIENT
Start: 2022-08-29 | End: 2022-08-29

## 2022-08-29 RX ORDER — FLUCONAZOLE 2 MG/ML
400 INJECTION, SOLUTION INTRAVENOUS ONCE
Status: COMPLETED | OUTPATIENT
Start: 2022-08-29 | End: 2022-08-29

## 2022-08-29 RX ORDER — FLUCONAZOLE 2 MG/ML
200 INJECTION, SOLUTION INTRAVENOUS EVERY 24 HOURS
Status: DISCONTINUED | OUTPATIENT
Start: 2022-08-30 | End: 2022-08-31 | Stop reason: DRUGHIGH

## 2022-08-29 RX ADMIN — HYDROMORPHONE HYDROCHLORIDE 2 MG: 2 INJECTION, SOLUTION INTRAMUSCULAR; INTRAVENOUS; SUBCUTANEOUS at 13:30

## 2022-08-29 RX ADMIN — COLLAGENASE SANTYL: 250 OINTMENT TOPICAL at 12:28

## 2022-08-29 RX ADMIN — POTASSIUM CHLORIDE 10 MEQ: 7.46 INJECTION, SOLUTION INTRAVENOUS at 09:33

## 2022-08-29 RX ADMIN — HYDROMORPHONE HYDROCHLORIDE 2 MG: 2 INJECTION, SOLUTION INTRAMUSCULAR; INTRAVENOUS; SUBCUTANEOUS at 10:21

## 2022-08-29 RX ADMIN — HYDROMORPHONE HYDROCHLORIDE 2 MG: 2 INJECTION, SOLUTION INTRAMUSCULAR; INTRAVENOUS; SUBCUTANEOUS at 17:23

## 2022-08-29 RX ADMIN — VANCOMYCIN HYDROCHLORIDE 1000 MG: 1 INJECTION, POWDER, LYOPHILIZED, FOR SOLUTION INTRAVENOUS at 16:02

## 2022-08-29 RX ADMIN — HYDROMORPHONE HYDROCHLORIDE 2 MG: 2 INJECTION, SOLUTION INTRAMUSCULAR; INTRAVENOUS; SUBCUTANEOUS at 21:01

## 2022-08-29 RX ADMIN — ALBUMIN (HUMAN) 25 G: 0.25 INJECTION, SOLUTION INTRAVENOUS at 07:15

## 2022-08-29 RX ADMIN — HYDROMORPHONE HYDROCHLORIDE 2 MG: 2 INJECTION, SOLUTION INTRAMUSCULAR; INTRAVENOUS; SUBCUTANEOUS at 02:41

## 2022-08-29 RX ADMIN — HEPARIN SODIUM 5000 UNITS: 5000 INJECTION INTRAVENOUS; SUBCUTANEOUS at 07:14

## 2022-08-29 RX ADMIN — CEFEPIME 2 G: 2 INJECTION, POWDER, FOR SOLUTION INTRAVENOUS at 03:43

## 2022-08-29 RX ADMIN — CALCIUM GLUCONATE: 94 INJECTION, SOLUTION INTRAVENOUS at 18:47

## 2022-08-29 RX ADMIN — SODIUM CHLORIDE, PRESERVATIVE FREE 10 ML: 5 INJECTION INTRAVENOUS at 07:15

## 2022-08-29 RX ADMIN — METOPROLOL TARTRATE 6.25 MG: 25 TABLET, FILM COATED ORAL at 21:07

## 2022-08-29 RX ADMIN — POTASSIUM CHLORIDE 10 MEQ: 7.46 INJECTION, SOLUTION INTRAVENOUS at 10:20

## 2022-08-29 RX ADMIN — SMOFLIPID 250 ML: 6; 6; 5; 3 INJECTION, EMULSION INTRAVENOUS at 18:46

## 2022-08-29 RX ADMIN — SODIUM CHLORIDE, PRESERVATIVE FREE 40 MG: 5 INJECTION INTRAVENOUS at 09:32

## 2022-08-29 RX ADMIN — SODIUM CHLORIDE, PRESERVATIVE FREE 10 ML: 5 INJECTION INTRAVENOUS at 22:24

## 2022-08-29 RX ADMIN — HEPARIN SODIUM 5000 UNITS: 5000 INJECTION INTRAVENOUS; SUBCUTANEOUS at 13:19

## 2022-08-29 RX ADMIN — FLUCONAZOLE 400 MG: 400 INJECTION, SOLUTION INTRAVENOUS at 10:21

## 2022-08-29 RX ADMIN — SODIUM CHLORIDE, PRESERVATIVE FREE 40 MG: 5 INJECTION INTRAVENOUS at 21:07

## 2022-08-29 RX ADMIN — SODIUM BICARBONATE: 84 INJECTION, SOLUTION INTRAVENOUS at 09:35

## 2022-08-29 RX ADMIN — HEPARIN SODIUM 5000 UNITS: 5000 INJECTION INTRAVENOUS; SUBCUTANEOUS at 21:09

## 2022-08-29 RX ADMIN — POTASSIUM CHLORIDE 10 MEQ: 7.46 INJECTION, SOLUTION INTRAVENOUS at 12:23

## 2022-08-29 RX ADMIN — SODIUM CHLORIDE, PRESERVATIVE FREE 10 ML: 5 INJECTION INTRAVENOUS at 13:19

## 2022-08-29 RX ADMIN — ONDANSETRON HYDROCHLORIDE 4 MG: 2 INJECTION, SOLUTION INTRAMUSCULAR; INTRAVENOUS at 07:24

## 2022-08-29 RX ADMIN — HYDROMORPHONE HYDROCHLORIDE 2 MG: 2 INJECTION, SOLUTION INTRAMUSCULAR; INTRAVENOUS; SUBCUTANEOUS at 07:15

## 2022-08-29 RX ADMIN — POTASSIUM CHLORIDE 10 MEQ: 7.46 INJECTION, SOLUTION INTRAVENOUS at 13:19

## 2022-08-29 RX ADMIN — METOPROLOL TARTRATE 6.25 MG: 25 TABLET, FILM COATED ORAL at 09:33

## 2022-08-29 RX ADMIN — SODIUM BICARBONATE: 84 INJECTION, SOLUTION INTRAVENOUS at 22:24

## 2022-08-29 NOTE — WOUND CARE
WOCN Note:     New consult placed for assessment of chronic abdominal wound from previous surgery. Chart reviewed. Assessed in room 442. Admitted DX:  Acute cystitis  Past Medical History:   Diagnosis Date    Abdominal wall hernia 6/15/2012    Anal fissure     Anemia     Anemia     Anxiety and depression     Arthritis     Related to the Crohn's disease. Cancer ([address]     MELANOMA HEAD AND FACE    Chronic pain     GENERALIZED    COPD (chronic obstructive pulmonary disease) (HCC)     Crohn disease (HCC)     Diagnosed at age 17. Echocardiogram normal (EF: 55-60%) 07/2015    Esophageal ulcer     GERD (gastroesophageal reflux disease)     H/O blood transfusion 2013    [facility], Javed    Hypertension     denies    Migraine     Short bowel syndrome     Ventral hernia without obstruction or gangrene      Assessment:   Patient is A&O x 4, communicative, continent and mobile independently. Bed: foam mattress. Patient pre-medicated for pain by RN. Patient declined sacral assessment. 1. POA Abdomen, chronic non healing wound from prior surgery within area of healed incision: 3 x 4.2 x 0.3 cm  50% red 50% yellow; scant serous exudate; no odor. Periwound distally has a pinhole opening with pink base. Wound, Pressure Prevention & Skin Care Recommendations:    Minimize layers of linen/pads under patient to optimize support surface. 2.  Turn/reposition approximately every 2 hours and offload heels. 3.  Manage moisture/ Keep skin folds clean and dry/minimize brief usage. 4.  Abdomen:  Daily cleanse with saline; apply carlos alberto depth amount of Santyl and saline moist gauze; cover with dry dressing. Discussed above plan with patient, Dr Tien Seymour and Alesha Cobian RN.     Transition of Care:   Plan to follow as needed while admitted to hospital.    VANESSA LuxN RN [facility] [place] [facility]  Available on [employer]  Office [phone]

## 2022-08-29 NOTE — CONSULTS
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 I believe that the last time I saw him was probably in 2017. He was admitted on 2022 after presenting with sepsis. He has been stabilized and is doing better now, but I have been asked to see him because of a chronic, draining abdominal wound. He reports that the wound has been draining for approximately one month. The raw tissue sometimes bleeds. He does not recall ever having seen any air bubbling from it. He has continued to have diarrhea (which is chronic secondary to his multiple bowel resections and the absence fo a gallbladder), and he has not been on any Crohn's disease medication for approximately 3 years. His last colonoscopy was performed by Dr. Clemente Hinds on 10/8/2019, and it revealed no active Crohn's disease. PMH:  Past Medical History:   Diagnosis Date    Abdominal wall hernia 6/15/2012    Anal fissure     Anemia     Anemia     Anxiety and depression     Arthritis     Related to the Crohn's disease. Cancer ([address]     MELANOMA HEAD AND FACE    Chronic pain     GENERALIZED    COPD (chronic obstructive pulmonary disease) (HCC)     Crohn disease (HCC)     Diagnosed at age 17.     Echocardiogram normal (EF: 55-60%) 2015    Esophageal ulcer     GERD (gastroesophageal reflux disease)     H/O blood transfusion     [facility], [address]    Hypertension     denies    Migraine     Short bowel syndrome     Ventral hernia without obstruction or gangrene        PSH:  Past Surgical History:   Procedure Laterality Date    ABDOMEN SURGERY PROC UNLISTED      33 abdominal operations for treatment of Crohn's disease and its complications. COLONOSCOPY N/A 10/8/2019    COLONOSCOPY performed by Kathy Machado MD at [facility] ENDOSCOPY    [address] needle, takes 1 inch needle    HX APPENDECTOMY      HX CHOLECYSTECTOMY      HX GI      colectomy x2, one ileostomy    HX GI  7/28/14    exp lap,partial colectomy with end ileostomy by Dr Nasreen Serrano      neck fusion    HX ORTHOPAEDIC  1980s    wrist right,     HX ORTHOPAEDIC  2006, 11/2013    neck, L4 L5 L6    HX ORTHOPAEDIC  1990s    right knee scope    HX OTHER SURGICAL      surgical repair from spider bite    HX OTHER SURGICAL  12/1/14    Incision and drainage of posterior right subcutaneous shoulder abscess    HX OTHER SURGICAL  2014    LEFT INDEX FINGER SPIDER BITE    HX OTHER SURGICAL  2014    RECTAL FISTULA    HX OTHER SURGICAL  3/17/2015    Ileostomy takedown with extensive lysis of adhesions (greater than three hours), mobilization of the splenic flexure, left colon resection, ileocolic anastomosis, and excision of scar; Dr. Yumiko Barker. HX OTHER SURGICAL  3/22/2015    Exploratory laparotomy with washout of abdomen, suture repair of small bowel/anastomosis, and diverting protective loop ileostomy; Eladio Linton MD.    HX OTHER SURGICAL  4/9/2015    Laparotomy, extensive lysis of adhesions, abdominal lavage, and resection of ileocolic anastomosis (including the efferent limb of the loop ileostomy) with creation of Demetrius's pouch; Dr. Yumiko Barker. HX OTHER SURGICAL  7/14/2015    Cystoscopy and placement of bilateral ureteral catheters; Zuhair Chery MD.    HX OTHER SURGICAL  7/14/2015    Ileostomy takedown with extensive lysis of adhesions, small bowel resection, and enterocolostomy; Dr. Yumiko Barker.  OTHER SURGICAL  9/29/2015    CT-guided percutaneous drainage of intraabdominal abscess; Dr. Felix Garcia.  OTHER SURGICAL  11/16/2015    CT-guided percutaneous aspiration of abdominal wall cavity; Dr. Valentin Barnes.      OTHER SURGICAL  12/1/2015    Incision and drainage of abdominal wall abscess; Dr. Rachel Bowman. HX OTHER SURGICAL  2/11/2016    Incision and drainage of abdominal wall abscess; Dr. Rachel Bowman. HX OTHER SURGICAL  2/23/2016    Cystoscopy and placement of bilateral temporary ureteral catheters; Dr. Veronica Powell. HX OTHER SURGICAL  2/23/2016    Exploratory laparotomy, extensive lysis of adhesions, removal of mesh, and repair of enterocutaneous fistula; Dr. Rachel Bowman. HX OTHER SURGICAL  11/03/2017    Exploratory laparotomy, extensive lysis of adhesions, and reduction of intussusception; Dr. Rachel Bowman. Home Medications:  Prior to Admission Medications   Prescriptions Last Dose Informant Patient Reported? Taking?   chlorzoxazone (PARAFON FORTE) 500 mg tablet   Yes No   Sig: Take 500-1,000 mg by mouth daily as needed for Muscle Spasm(s). diphenoxylate-atropine (LOMOTIL) 2.5-0.025 mg per tablet   Yes No   Sig: Take 2 Tabs by mouth Before breakfast, lunch, dinner and at bedtime. gabapentin (NEURONTIN) 300 mg capsule   Yes No   Sig: Take 300 mg by mouth three (3) times daily. loperamide (IMODIUM) 2 mg capsule   Yes No   Sig: Take 2 mg by mouth as needed for Diarrhea. Patient takes 14-16 capsules a day. omeprazole (PRILOSEC) 40 mg capsule   Yes No   Sig: take 1 capsule by mouth once daily   ondansetron hcl (ZOFRAN, AS HYDROCHLORIDE,) 8 mg tablet   Yes No   Sig: Take 8 mg by mouth every eight (8) hours as needed for Nausea. oxyCODONE (ROXICODONE) 5 mg/5 mL solution   Yes No   Sig: Take 10 mg by mouth every six to eight (6-8) hours as needed for Pain.       Facility-Administered Medications: None       Hospital Medications:  Current Facility-Administered Medications   Medication Dose Route Frequency    [START ON 8/30/2022] cefepime (MAXIPIME) 2 g in 0.9% sodium chloride (MBP/ADV) 100 mL MBP  2 g IntraVENous Q24H    [START ON 8/30/2022] fluconazole (DIFLUCAN) 200mg/100 mL IVPB (premix)  200 mg IntraVENous Q24H    collagenase (SANTYL) 250 unit/gram ointment   Topical DAILY    [START ON 8/30/2022] vancomycin random level with am labs on 8/30 @ 04:00   Other ONCE    TPN ADULT - CENTRAL   IntraVENous CONTINUOUS    fat emulsion 20% soy-oliv-fish oil (SMOFlipid) infusion 250 mL  250 mL IntraVENous Q24H    nicotine (NICODERM CQ) 21 mg/24 hr patch 1 Patch  1 Patch TransDERmal DAILY    HYDROmorphone (DILAUDID) injection 2 mg  2 mg IntraVENous Q3H PRN    naloxone (NARCAN) injection 0.4 mg  0.4 mg IntraVENous EVERY 2 MINUTES AS NEEDED    metoprolol tartrate (LOPRESSOR) tablet 6.25 mg  6.25 mg Oral Q12H    metoprolol (LOPRESSOR) injection 2.5 mg  2.5 mg IntraVENous Q4H PRN    bisacodyL (DULCOLAX) suppository 10 mg  10 mg Rectal DAILY PRN    pantoprazole (PROTONIX) 40 mg in 0.9% sodium chloride 10 mL injection  40 mg IntraVENous Q12H    vancomycin (VANCOCIN) 1,000 mg in 0.9% sodium chloride 250 mL ([id number])  1,000 mg IntraVENous Q24H    sodium chloride (NS) flush 5-40 mL  5-40 mL IntraVENous Q8H    sodium chloride (NS) flush 5-40 mL  5-40 mL IntraVENous PRN    acetaminophen (TYLENOL) tablet 650 mg  650 mg Oral Q6H PRN    Or    acetaminophen (TYLENOL) suppository 650 mg  650 mg Rectal Q6H PRN    ondansetron (ZOFRAN ODT) tablet 4 mg  4 mg Oral Q8H PRN    Or    ondansetron (ZOFRAN) injection 4 mg  4 mg IntraVENous Q6H PRN    Vancomycin - Pharmacy to Dose   Other Rx Dosing/Monitoring    sodium bicarbonate (8.4%) 150 mEq in dextrose 5% 1,000 mL infusion   IntraVENous CONTINUOUS    oxyCODONE IR (ROXICODONE) tablet 10 mg  10 mg Oral Q6H PRN    heparin (porcine) injection 5,000 Units  5,000 Units SubCUTAneous Q8H       Allergies: Allergies   Allergen Reactions    Honey Anaphylaxis    Remicade [Infliximab] Anaphylaxis    Crestor [Rosuvastatin] Myalgia    Other Plant, Animal, Environmental Other (comments)     Developed \"boils\" on right arm that required I &D after receiving FENTANYL patch.   Is able to take FENTANYL orally; states is allergic to fentanyl patch GLUE    Imuran [Azathioprine] Diarrhea and Nausea Only    Mercaptopurine Diarrhea    Sulfa (Sulfonamide Antibiotics) Other (comments)     Causes diarrhea       Family History:  Family History   Problem Relation Age of Onset    Stroke Mother     Heart Disease Mother     Kidney Disease Mother     Anesth Problems Mother         TAKES A LONG TIME TO WAKE UP    Heart Disease Father     Thyroid Disease Sister        Social History:  Social History     Tobacco Use    Smoking status: Every Day     Packs/day: 1.00     Years: 44.00     Pack years: 44.00     Types: Cigarettes    Smokeless tobacco: Current    Tobacco comments:     CURRENT E CIGS   Substance Use Topics    Alcohol use: No     Comment: RARELY       Review of Systems:    Symptom Y/N Comments  Symptom Y/N Comments   Fever/Chills    Chest Pain     Cough    Abdominal Pain     Sputum    Joint Pain     SOB/YOUNGER    Pruritis/Rash     Nausea/vomit    Tolerating PT/OT     Diarrhea    Tolerating Diet     Constipation    Other       Could NOT obtain due to:        Objective:   Patient Vitals for the past 8 hrs:   BP Temp Pulse Resp SpO2   08/29/22 1800 -- -- (!) 108 -- --   08/29/22 1606 -- -- 95 -- --   08/29/22 1543 (!) 122/48 99.1 °F (37.3 °C) 92 18 96 %   08/29/22 1400 -- -- 100 -- --   08/29/22 1200 -- -- 90 -- --   08/29/22 1125 (!) 101/53 98.7 °F (37.1 °C) 89 18 93 %     No intake/output data recorded. 08/28 0701 - 08/29 1900  In: 3311.6 [I.V.:3311.6]  Out: 550 [Urine:550]    PHYSICAL EXAMINATION:    GENERAL:  No distress. Jaundiced. HEENT:  Left IJ central venous catheter in place. LUNGS:  Clear to auscultation bilaterally. HEART:  Regular rate and rhythm with no murmurs, gallops, or rubs. ABDOMEN:  Tender. Soft. Chronic ventral hernia. Chronic open wound in the ventral midline with dimensions as documented by WOCN (3 x 4.2 x 0.3 cm).   Small amount of yellow-green fluid on the wound without a visible opening that appears to communicate with the bowel or the peritoneal cavity. NEURO:  Alert and oriented. Data Review     Recent Labs     08/29/22  0355 08/28/22  1214   WBC 13.7* 19.5*   HGB 7.3* 8.6*   HCT 20.8* 24.1*   * 572*     Recent Labs     08/29/22  0357 08/28/22  1214   * 133*   K 3.1* 2.8*   CL 99 103   CO2 25 23   BUN 75* 82*   CREA 1.81* 2.06*   * 103*   PHOS 2.6  --    CA 8.2* 8.7     Recent Labs     08/29/22  0357 08/29/22  0355 08/28/22  1214   AP  --  131* 146*   TP  --  5.3* 6.4   ALB 2.8* 2.7* 2.4*   GLOB  --  2.6 4.0     Recent Labs     08/29/22  0355 08/28/22  1214   INR 1.3* 1.4*   PTP 13.7* 14.0*   APTT 37.0* 35.8*                       Assessment and Plan:      The patient has a chronic abdominal wound that is closely associated with the underlying bowel, but it is not yet clear that there is an actual enterocutaneous fistula.  I agree with the plan for bowel rest and nutritional support via TPN, and I have ordered a CT scan of the abdomen and pelvis with oral contrast.      Active Problems:    Severe protein-calorie malnutrition ([facility] [address] (6/10/2015)      Acute cystitis (8/27/2022)

## 2022-08-29 NOTE — PROGRESS NOTES
Bedside shift change report given to NIKHIL Samayoa (oncoming nurse) by Cathrine Dandy, RN (offgoing nurse). Report included the following information SBAR, Kardex, MAR, Recent Results, and Cardiac Rhythm NSR .

## 2022-08-29 NOTE — PROGRESS NOTES
[address] [facility] Adult  Hospitalist Group                                                                                          Hospitalist Progress Note  Demetrius Barahona MD  Answering service: [phone] from in house phone        Date of Service:  2022  NAME:  Mckenna Cook  :  1960  MRN:  [id number]      Admission Summary:   cMkenna Cook is a 65 y.o. male with PMHx of anemia, depression, COPD, malnutrition and Vitamin B12 deficiency, short bowel syndrome due to severe Crohn's Disease,  s/p >30 surgeries (210cm small bowel remaining from lig treitz to ileosigmoid anastomosis) s/p >30 surgeries with ileosigmoid anastomosis currently not on treatment, and chronic pain syndrome who is now admitted with severe sepsis, SUHA, metabolic acidosis, and direct hyperbilirubinemia. Nephrology and GI following. Interval history / Subjective:   Feels better today, GI has seen, pending colorectal service to see     Assessment & Plan:     Severe Sepsis with suha, leukocytosis, elevated temp, tachypneic on presentation  SUHA  Metabolic Acidosis  Lactic acidosis  Direct Hyperbilirubinemia  Leukocytosis  -started on bicarb drip  -nephrology consulted   -Empiric abx  -Ulcerated Abdominal Wound: Wound care consulted. -- GI following   -- Diarrhea: Enteric Pathogen panel (pending from ) and C diff testing (ordered)---GI consulted and following/guiding  - Hyperbilirubinemia: Per GI due to sepsis, decompensated cirrhosis, or abx-related. U/S completed with no biliary dilation  -Continue cardiac/tele monitoring    2022:  Rapid response which nurse reports rate up to 150 that resolved on its own withOut any change in bp and without any symptoms per pt verbal reported.  He has long hx of chronic pain which he gets meds but denies any acute onset of uncontrolled pain or anxiety around the timing of the jacob  Ekg at bedside noted infrequent PVC (on on ekg print out) and otherwise in sinus rhythm--use beta blocker. Labs, image ordered  Staff reports pt refused labs earlier and therefore late. Crohn's Disease with small bowel syndrome, hx of surgery  Hypoalbuminemia  Malnutrition--check prealbumin  -Gi consulted and following/guiding  -nutrition consult  -  Enlarged liver with nodular contour  -concerns for cirrhosis  -GI consulted and following/guiding    Tobacco use history and /recommend cessation  -per pt request ordered nicotine patch  -recommend tobacco cessation/    Electrolyte derangement  -replete as needed  -monitor with intermittent labs    Chronic Pain  -- Takes Oxycodone 15mg 5x/day as an outpatient and was started on lower dose on admission  -adjust as needed    Code status: Full Code  Prophylaxis: heparin  Care Plan discussed with: Pt, RN, staff  Anticipated Disposition: TBD     Hospital Problems  Date Reviewed: 10/9/2019            Codes Class Noted POA    Acute cystitis ICD-10-CM: N30.00  ICD-9-CM: 595.0  8/27/2022 Unknown        Severe protein-calorie malnutrition ([address] (Chronic) ICD-10-CM: U34  ICD-9-CM: 262  6/10/2015 Yes         Review of Systems:   A comprehensive review of systems was negative except for that written in the HPI. Vital Signs:    Last 24hrs VS reviewed since prior progress note. Most recent are:  Visit Vitals  BP (!) 122/48 (BP Patient Position: At rest)   Pulse (!) 108   Temp 99.1 °F (37.3 °C)   Resp 18   Ht 5' 9\" (1.753 m)   Wt 83.1 kg (183 lb 3.2 oz)   SpO2 96%   BMI 27.05 kg/m²         Intake/Output Summary (Last 24 hours) at 8/29/2022 1831  Last data filed at 8/29/2022 1543  Gross per 24 hour   Intake 3311.63 ml   Output 550 ml   Net 2761.63 ml          Physical Examination:     I had a face to face encounter with this patient and independently examined them on 8/29/2022 as outlined below:          Constitutional:  Tachypneic. Appears uncomfortable. No acute distress, cooperative, pleasant    ENT:  Oral mucosa dry.  Icteric    Resp: No overt wheezing, nonlabored breathing   CV:  Regular rhythm, normal rate, no rubs    GI:  Extensive scaring. Ulcerated midline abdominal wound with granulation tissue overlying. Minimal erythema from wound. No high pitch sounds    Musculoskeletal:  No edema, warm and dry, symmetrical muscle tone    Neurologic:  Moves all extremities. AAOx3, responds appropriately to questions asked.   SKIN: warm and dry, jaundice            Data Review:    Review and/or order of clinical lab test  Review and/or order of tests in the radiology section of CPT  Review and/or order of tests in the medicine section of CPT      Labs:     Recent Labs     08/29/22 0355 08/28/22 1214   WBC 13.7* 19.5*   HGB 7.3* 8.6*   HCT 20.8* 24.1*   * 572*       Recent Labs     08/29/22 0357 08/29/22 0355 08/28/22  1214 08/28/22  1039 08/27/22  0800 08/27/22  0331   *  --  133*  --   --  130*   K 3.1*  --  2.8*  --   --  3.4*   CL 99  --  103  --   --  102   CO2 25  --  23  --   --  14*   BUN 75*  --  82*  --   --  73*   CREA 1.81*  --  2.06*  --   --  2.77*   *  --  103*  --   --  90   CA 8.2*  --  8.7  --   --  8.4*   MG  --  2.5*  --  2.6* 1.8  --    PHOS 2.6  --   --   --   --   --        Recent Labs     08/29/22 0357 08/29/22 0355 08/28/22  1214 08/28/22  1039   ALT  --  30 19 21   AP  --  131* 146* 162*   TBILI  --  13.9* 15.9* 17.2*   TP  --  5.3* 6.4 5.5*   ALB 2.8* 2.7* 2.4* 2.7*   GLOB  --  2.6 4.0 2.8       Recent Labs     08/29/22 0355 08/28/22  1214   INR 1.3* 1.4*   PTP 13.7* 14.0*   APTT 37.0* 35.8*       Lab Results   Component Value Date/Time    Cholesterol, total 134 12/01/2014 04:33 AM    HDL Cholesterol 35 12/01/2014 04:33 AM    LDL, calculated 58.2 12/01/2014 04:33 AM    Triglyceride 204 (H) 12/01/2014 04:33 AM    CHOL/HDL Ratio 3.8 12/01/2014 04:33 AM     Lab Results   Component Value Date/Time    Glucose (POC) 105 08/28/2022 12:09 PM    Glucose (POC) 113 08/27/2022 05:26 AM    Glucose (POC) 105 08/26/2022 08:20 PM    Glucose (POC) 113 (H) 11/10/2017 11:35 AM    Glucose (POC) 120 (H) 11/10/2017 05:54 AM     Lab Results   Component Value Date/Time    Color DARK YELLOW 08/26/2022 07:27 PM    Appearance TURBID (A) 08/26/2022 07:27 PM    Specific gravity 1.025 08/26/2022 07:27 PM    Specific gravity 1.010 10/07/2019 05:48 AM    pH (UA) 5.0 08/26/2022 07:27 PM    Protein 100 (A) 08/26/2022 07:27 PM    Glucose Negative 08/26/2022 07:27 PM    Ketone TRACE (A) 08/26/2022 07:27 PM    Bilirubin NEGATIVE  10/07/2019 05:48 AM    Urobilinogen 0.2 08/26/2022 07:27 PM    Nitrites Positive (A) 08/26/2022 07:27 PM    Leukocyte Esterase SMALL (A) 08/26/2022 07:27 PM    Epithelial cells FEW 08/26/2022 07:27 PM    Bacteria 1+ (A) 08/26/2022 07:27 PM    WBC 5-10 08/26/2022 07:27 PM    RBC 0-5 08/26/2022 07:27 PM       Medications Reviewed:     Current Facility-Administered Medications   Medication Dose Route Frequency    [START ON 8/30/2022] cefepime (MAXIPIME) 2 g in 0.9% sodium chloride (MBP/ADV) 100 mL MBP  2 g IntraVENous Q24H    [START ON 8/30/2022] fluconazole (DIFLUCAN) 200mg/100 mL IVPB (premix)  200 mg IntraVENous Q24H    collagenase (SANTYL) 250 unit/gram ointment   Topical DAILY    [START ON 8/30/2022] vancomycin random level with am labs on 8/30 @ 04:00   Other ONCE    TPN ADULT - CENTRAL   IntraVENous CONTINUOUS    fat emulsion 20% soy-oliv-fish oil (SMOFlipid) infusion 250 mL  250 mL IntraVENous Q24H    nicotine (NICODERM CQ) 21 mg/24 hr patch 1 Patch  1 Patch TransDERmal DAILY    HYDROmorphone (DILAUDID) injection 2 mg  2 mg IntraVENous Q3H PRN    naloxone (NARCAN) injection 0.4 mg  0.4 mg IntraVENous EVERY 2 MINUTES AS NEEDED    metoprolol tartrate (LOPRESSOR) tablet 6.25 mg  6.25 mg Oral Q12H    TPN ADULT - PERIPHERAL AA 4.25% D10% W/ ELECTROLYTES AND CA   IntraVENous CONTINUOUS    metoprolol (LOPRESSOR) injection 2.5 mg  2.5 mg IntraVENous Q4H PRN    bisacodyL (DULCOLAX) suppository 10 mg  10 mg Rectal DAILY PRN    pantoprazole (PROTONIX) 40 mg in 0.9% sodium chloride 10 mL injection  40 mg IntraVENous Q12H    vancomycin (VANCOCIN) 1,000 mg in 0.9% sodium chloride 250 mL ([id number])  1,000 mg IntraVENous Q24H    sodium chloride (NS) flush 5-40 mL  5-40 mL IntraVENous Q8H    sodium chloride (NS) flush 5-40 mL  5-40 mL IntraVENous PRN    acetaminophen (TYLENOL) tablet 650 mg  650 mg Oral Q6H PRN    Or    acetaminophen (TYLENOL) suppository 650 mg  650 mg Rectal Q6H PRN    ondansetron (ZOFRAN ODT) tablet 4 mg  4 mg Oral Q8H PRN    Or    ondansetron (ZOFRAN) injection 4 mg  4 mg IntraVENous Q6H PRN    Vancomycin - Pharmacy to Dose   Other Rx Dosing/Monitoring    sodium bicarbonate (8.4%) 150 mEq in dextrose 5% 1,000 mL infusion   IntraVENous CONTINUOUS    oxyCODONE IR (ROXICODONE) tablet 10 mg  10 mg Oral Q6H PRN    heparin (porcine) injection 5,000 Units  5,000 Units SubCUTAneous Q8H     ______________________________________________________________________  EXPECTED LENGTH OF STAY: - - -  ACTUAL LENGTH OF STAY:          2                 Rosana Montenegro MD

## 2022-08-29 NOTE — PROGRESS NOTES
Problem: Falls - Risk of  Goal: *Absence of Falls  Description: Document Carlos Hart Fall Risk and appropriate interventions in the flowsheet. Outcome: Progressing Towards Goal  Note: Fall Risk Interventions:  Mobility Interventions: PT Consult for mobility concerns, PT Consult for assist device competence         Medication Interventions: Teach patient to arise slowly                   Problem: Patient Education: Go to Patient Education Activity  Goal: Patient/Family Education  Outcome: Progressing Towards Goal     Problem: Discharge Planning  Goal: *Discharge to safe environment  Outcome: Progressing Towards Goal  Goal: *Knowledge of medication management  Outcome: Progressing Towards Goal  Goal: *Knowledge of discharge instructions  Outcome: Progressing Towards Goal     Problem: Patient Education: Go to Patient Education Activity  Goal: Patient/Family Education  Outcome: Progressing Towards Goal     Problem: Risk for Spread of Infection  Goal: Prevent transmission of infectious organism to others  Description: Prevent the transmission of infectious organisms to other patients, staff members, and visitors.   Outcome: Progressing Towards Goal     Problem: Patient Education:  Go to Education Activity  Goal: Patient/Family Education  Outcome: Progressing Towards Goal

## 2022-08-29 NOTE — PROGRESS NOTES
[address]   [address] [address] [address], [address]  [phone]                     GI PROGRESS NOTE    Patient Name: Normajean Apley      : 1960      MRN: [id number]  Admit Date: 2022  Today's Date: 2022  CC: hyperbilirubinemia and severe Crohn's disease    Subjective:     Patient having right side abdominal tenderness. Continues with loose mucoid stools, having 10-12 per day. Denies nausea, asking for cup of coffee. Has congested wet cough throughout assessment. Objective:     Blood pressure (!) 101/53, pulse 100, temperature 98.7 °F (37.1 °C), resp. rate 18, height 5' 9\" (1.753 m), weight 83.1 kg (183 lb 3.2 oz), SpO2 93 %. Physical Exam:  General appearance: cooperative,  male, NAD. Skin: jaundice  HEENT: Sclerae icteric. Cardiovascular: Regular rate and rhythm. No murmurs, gallops, or rubs. Respiratory: Coarse breath sounds, congested wet cough. GI: Abdomen nondistended, soft, right sided tenderness. Normal active bowel sounds. + anterior wall hernia which bulges with breathing. Multiple abdominal scars, 5x5cm ulcerated abscess in his anterior abdomen draining feculent material currently covered with   Rectal: Deferred   Musculoskeletal: No pitting edema of the lower legs. Neurological:  Patient is alert and oriented. Psychiatric:  No anxiety or agitation. Data Review:    Recent Results (from the past 24 hour(s))   HEPATIC FUNCTION PANEL    Collection Time: 22  3:55 AM   Result Value Ref Range    Protein, total 5.3 (L) 6.4 - 8.2 g/dL    Albumin 2.7 (L) 3.5 - 5.0 g/dL    Globulin 2.6 2.0 - 4.0 g/dL    A-G Ratio 1.0 (L) 1.1 - 2.2      Bilirubin, total 13.9 (H) 0.2 - 1.0 MG/DL    Bilirubin, direct 10.4 (H) 0.0 - 0.2 MG/DL    Alk.  phosphatase 131 (H) 45 - 117 U/L    AST (SGOT) 66 (H) 15 - 37 U/L    ALT (SGPT) 30 12 - 78 U/L   CBC WITH AUTOMATED DIFF    Collection Time: 22  3:55 AM   Result Value Ref Range    WBC 13.7 (H) 4.1 - 11.1 K/uL    RBC 2.37 (L) 4.10 - 5.70 M/uL    HGB 7.3 (L) 12.1 - 17.0 g/dL    HCT 20.8 (L) 36.6 - 50.3 %    MCV 87.8 80.0 - 99.0 FL    MCH 30.8 26.0 - 34.0 PG    MCHC 35.1 30.0 - 36.5 g/dL    RDW 15.3 (H) 11.5 - 14.5 %    PLATELET 771 (H) 241 - 400 K/uL    MPV 10.5 8.9 - 12.9 FL    NRBC 0.1 (H) 0  WBC    ABSOLUTE NRBC 0.02 (H) 0.00 - 0.01 K/uL    NEUTROPHILS 84 (H) 32 - 75 %    LYMPHOCYTES 9 (L) 12 - 49 %    MONOCYTES 6 5 - 13 %    EOSINOPHILS 1 0 - 7 %    BASOPHILS 0 0 - 1 %    IMMATURE GRANULOCYTES 1 (H) 0.0 - 0.5 %    ABS. NEUTROPHILS 11.5 (H) 1.8 - 8.0 K/UL    ABS. LYMPHOCYTES 1.2 0.8 - 3.5 K/UL    ABS. MONOCYTES 0.8 0.0 - 1.0 K/UL    ABS. EOSINOPHILS 0.1 0.0 - 0.4 K/UL    ABS. BASOPHILS 0.0 0.0 - 0.1 K/UL    ABS. IMM.  GRANS. 0.2 (H) 0.00 - 0.04 K/UL    DF AUTOMATED     MAGNESIUM    Collection Time: 08/29/22  3:55 AM   Result Value Ref Range    Magnesium 2.5 (H) 1.6 - 2.4 mg/dL   LD    Collection Time: 08/29/22  3:55 AM   Result Value Ref Range     (H) 85 - 241 U/L   PROTHROMBIN TIME + INR    Collection Time: 08/29/22  3:55 AM   Result Value Ref Range    INR 1.3 (H) 0.9 - 1.1      Prothrombin time 13.7 (H) 9.0 - 11.1 sec   PTT    Collection Time: 08/29/22  3:55 AM   Result Value Ref Range    aPTT 37.0 (H) 22.1 - 31.0 sec    aPTT, therapeutic range     58.0 - 77.0 SECS   BILIRUBIN, DIRECT    Collection Time: 08/29/22  3:57 AM   Result Value Ref Range    Bilirubin, direct 10.1 (H) 0.0 - 0.2 MG/DL   RENAL FUNCTION PANEL    Collection Time: 08/29/22  3:57 AM   Result Value Ref Range    Sodium 133 (L) 136 - 145 mmol/L    Potassium 3.1 (L) 3.5 - 5.1 mmol/L    Chloride 99 97 - 108 mmol/L    CO2 25 21 - 32 mmol/L    Anion gap 9 5 - 15 mmol/L    Glucose 124 (H) 65 - 100 mg/dL    BUN 75 (H) 6 - 20 MG/DL    Creatinine 1.81 (H) 0.70 - 1.30 MG/DL    BUN/Creatinine ratio 41 (H) 12 - 20      GFR est AA 46 (L) >60 ml/min/1.73m2    GFR est non-AA 38 (L) >60 ml/min/1.73m2    Calcium 8.2 (L) 8.5 - 10.1 MG/DL Phosphorus 2.6 2.6 - 4.7 MG/DL    Albumin 2.8 (L) 3.5 - 5.0 g/dL         Assessment / Plan :     Mr. 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 There was no biliary dilation on ultrasound, so the hyperbilirubinemia is likely due to either sepsis, decompensated cirrhosis, or antibiotic-related rather than biliary obstruction.     - He has an enlarged liver with a nodular contour on CT concerning for cirrhosis, multiple serologies for cirrhosis etiology pending   - Obtain MRCP for further evaluation of hyperbilirubinemia  - Consult hepatology, Dr. Mirtha Reyes re: cirrhosis   - Enteric panel negative, c-diff still pending. If negative, recommend loperamide 2mg every 4hrs prn or colestipol/bile acid binder - he was previously taking   -RUL PNA, has quantiferon gold pending to screen for TB  - Continue bowel rest and transition PPN to TPN this evening  - nutrition following, appreciate input  - CRS consult re: ulcerated abdominal wall with enterocutaneous fistula  - renal following re: SUHA with acidosis      Patient Active Hospital Problem List:   Active Problems:    Severe protein-calorie malnutrition ([facility] [address] (6/10/2015)      Acute cystitis (8/27/2022)        Time Spent with Patient: Get Jewell NP            I have personally reviewed the history and independently examined the patient. I have reviewed the chart and agree with the documentation recorded by the Mid Level Provider, including the assessment, treatment plan, and disposition.     ASSESSMENT AND PLAN:  65 yr old male with history of quiescent Crohn's and multiple abdominal surgeries and short bowel syndrome presented with Jaundice, diarrhea and abdominal pain. Initial CT did not show EC fistula but he has a skin wound. CT is consistent with Cirrhosis and jaundice may be secondary to decompensated Cirrhosis , Sepsis or another etiology. IV antibiotics  CRS consult  IV fluids  He will likely need CT with oral contrast or small bowel series to rule out EC fistula, will await CR Surgery evaluation. Hepatology consult  MRCP  Thank you for consult, his medical management is of high complexity.     Karrie Shaffer MD

## 2022-08-29 NOTE — PROGRESS NOTES
Dr. Eric Rosas was consulted on this case. He asked me to eval. It looks like Dr. Shawna Pope saw the patient on his last admission in [facility] in 2017. Would recommend reaching back out to Dr. rPakash Mason to assess.

## 2022-08-29 NOTE — PROGRESS NOTES
0800: Bedside and Verbal shift change report given to Mo, RN/DavidRN (oncoming nurse) by Magdaleno Cali RN (offgoing nurse). Report included the following information SBAR, Kardex, MAR, Recent Results, and Cardiac Rhythm SR/ST. 1205: Rapid response called  that resolved on its own zero change in bp complained of mid sternal CP  physician/RRT at bedside  physician ordered  Ekg  and Labs     2000: Bedside and Verbal shift change report given to Yanely Foote RN (oncoming nurse) by Filiberto Jessica RN/NIKHIL Hernandez (offgoing nurse). Report included the following information SBAR, Kardex, MAR, Recent Results, and Cardiac Rhythm SR/ST .

## 2022-08-29 NOTE — PROGRESS NOTES
Order for PICC line received. Per review of record, patient had IJ placed 8/28/22. PICC order discontinued.

## 2022-08-29 NOTE — PROGRESS NOTES
[id number] Progress Note        NAME: Gina Bellamy       :  1960       MRN:  [id number]     Date/Time: 2022    Risk of deterioration: medium       Assessment:    Plan: SUHA with AG acidosis (Corrected for low albumin)  Hypkalemia  Volume depletion/diarrhea  Hyperbilirubinemia  Hx of crohns/bowel resection  Leukocytosis/uti=coag (-)  Nonobstructing renal stones  (R) M L infiltrate  Fistula     Calcium corrects to normal  On bicarb gtt - CO2 upto 25    Renal function improving-creatinine down from 3.27 to 2.77 to 1.8s  No hydro on CT  Agree with antibiotics  Lactic acid normal  On broad spectrum antibiotics    Decrease IV HCO3 drip rate to 100 ml/hr. Also on TPN       Subjective:     Chief Complaint:  on potty. Diarrhea better  Very happy with central line in place for lab draws    Review of Systems: currently no n/v/cp/sob    Objective:     VITALS:   Last 24hrs VS reviewed since prior progress note. Most recent are:  Visit Vitals  BP (!) 101/53 (BP 1 Location: Left upper arm, BP Patient Position: At rest)   Pulse 89   Temp 98.7 °F (37.1 °C)   Resp 18   Ht 5' 9\" (1.753 m)   Wt 83.1 kg (183 lb 3.2 oz)   SpO2 93%   BMI 27.05 kg/m²     SpO2 Readings from Last 6 Encounters:   22 93%   20 96%   20 99%   10/09/19 99%   19 98%   18 98%    O2 Flow Rate (L/min): 4 l/min     Intake/Output Summary (Last 24 hours) at 2022 1136  Last data filed at 2022 0504  Gross per 24 hour   Intake 1736.6 ml   Output 550 ml   Net 1186.6 ml          PHYSICAL EXAM:    General   well developed, well nourished, appears stated age, in no acute distress  EENT  Normocephalic, Atraumatic, +scleral icterus  No resp distress  Extremities  No edema.       Lab Data Reviewed: (see below)    Medications Reviewed: (see below)    PMH/SH reviewed - no change compared to H&P  ________________________________\  ___________________________________________________    Attending Physician: Rodriguez Tanya, MD ____________________________________________________  MEDICATIONS:  Current Facility-Administered Medications   Medication Dose Route Frequency    [START ON 8/30/2022] cefepime (MAXIPIME) 2 g in 0.9% sodium chloride (MBP/ADV) 100 mL MBP  2 g IntraVENous Q24H    fluconazole (DIFLUCAN) 400mg/200 mL IVPB (premix)  400 mg IntraVENous ONCE    [START ON 8/30/2022] fluconazole (DIFLUCAN) 200mg/100 mL IVPB (premix)  200 mg IntraVENous Q24H    potassium chloride 10 mEq in 100 ml IVPB  10 mEq IntraVENous Q1H    collagenase (SANTYL) 250 unit/gram ointment   Topical DAILY    nicotine (NICODERM CQ) 21 mg/24 hr patch 1 Patch  1 Patch TransDERmal DAILY    HYDROmorphone (DILAUDID) injection 2 mg  2 mg IntraVENous Q3H PRN    naloxone (NARCAN) injection 0.4 mg  0.4 mg IntraVENous EVERY 2 MINUTES AS NEEDED    metoprolol tartrate (LOPRESSOR) tablet 6.25 mg  6.25 mg Oral Q12H    TPN ADULT - PERIPHERAL AA 4.25% D10% W/ ELECTROLYTES AND CA   IntraVENous CONTINUOUS    metoprolol (LOPRESSOR) injection 2.5 mg  2.5 mg IntraVENous Q4H PRN    bisacodyL (DULCOLAX) suppository 10 mg  10 mg Rectal DAILY PRN    pantoprazole (PROTONIX) 40 mg in 0.9% sodium chloride 10 mL injection  40 mg IntraVENous Q12H    vancomycin (VANCOCIN) 1,000 mg in 0.9% sodium chloride 250 mL ([id number])  1,000 mg IntraVENous Q24H    sodium chloride (NS) flush 5-40 mL  5-40 mL IntraVENous Q8H    sodium chloride (NS) flush 5-40 mL  5-40 mL IntraVENous PRN    acetaminophen (TYLENOL) tablet 650 mg  650 mg Oral Q6H PRN    Or    acetaminophen (TYLENOL) suppository 650 mg  650 mg Rectal Q6H PRN    ondansetron (ZOFRAN ODT) tablet 4 mg  4 mg Oral Q8H PRN    Or    ondansetron (ZOFRAN) injection 4 mg  4 mg IntraVENous Q6H PRN    Vancomycin - Pharmacy to Dose   Other Rx Dosing/Monitoring    sodium bicarbonate (8.4%) 150 mEq in dextrose 5% 1,000 mL infusion   IntraVENous CONTINUOUS    oxyCODONE IR (ROXICODONE) tablet 10 mg  10 mg Oral Q6H PRN    heparin (porcine) injection 5,000 Units  5,000 Units SubCUTAneous Q8H        LABS:  Recent Labs     08/29/22 0355 08/28/22  1214   WBC 13.7* 19.5*   HGB 7.3* 8.6*   HCT 20.8* 24.1*   * 572*       Recent Labs     08/29/22 0357 08/29/22 0355 08/28/22  1214 08/28/22  1039 08/27/22  0800 08/27/22  0331   *  --  133*  --   --  130*   K 3.1*  --  2.8*  --   --  3.4*   CL 99  --  103  --   --  102   CO2 25  --  23  --   --  14*   BUN 75*  --  82*  --   --  73*   CREA 1.81*  --  2.06*  --   --  2.77*   *  --  103*  --   --  90   CA 8.2*  --  8.7  --   --  8.4*   MG  --  2.5*  --  2.6* 1.8  --    PHOS 2.6  --   --   --   --   --        Recent Labs     08/29/22 0357 08/29/22 0355 08/28/22  1214 08/28/22  1039 08/26/22  1733   ALT  --  30 19 21 25   AP  --  131* 146* 162* 260*   TBILI  --  13.9* 15.9* 17.2* 10.2*   TP  --  5.3* 6.4 5.5* 7.6   ALB 2.8* 2.7* 2.4* 2.7* 2.6*   GLOB  --  2.6 4.0 2.8 5.0*   LPSE  --   --   --   --  102       Recent Labs     08/29/22 0355 08/28/22  1214 08/26/22  1733   INR 1.3* 1.4* 1.1   PTP 13.7* 14.0* 11.6*   APTT 37.0* 35.8*  --         No results for input(s): FE, TIBC, PSAT, FERR in the last 72 hours. No results for input(s): PH, PCO2, PO2 in the last 72 hours. No results for input(s): CPK, CKNDX, TROIQ in the last 72 hours. No lab exists for component: CPKMB  Lab Results   Component Value Date/Time    Glucose (POC) 105 08/28/2022 12:09 PM    Glucose (POC) 113 08/27/2022 05:26 AM    Glucose (POC) 105 08/26/2022 08:20 PM    Glucose (POC) 113 (H) 11/10/2017 11:35 AM    Glucose (POC) 120 (H) 11/10/2017 05:54 AM     IMPRESSION  1. Status post ileocolonic anastomosis at the sigmoid level, with the  anastomosis and postoperative change immediately deep to the anterior abdominal  wall which is thin in the midline but with no mass, hematoma or fluid  collection. No intra-abdominal free air or fluid. 2. Patchy small infiltrates in the right middle lobe, new since prior study.   3. Stable nonobstructing right intrarenal calculi. 4. Other stable incidental and postoperative changes.

## 2022-08-29 NOTE — CONSULTS
Comprehensive Nutrition Assessment    Type and Reason for Visit: Initial (TPN)    Nutrition Recommendations/Plan:     Convert PPN to TPN tonight  Day 1 - 6.5% AA, 15% dex @ 63 mL/hr  Day 2 - 6.5% AA, 20% dex @ 83 mL/hr (goal)    Add 250 mL 20% lipids daily (OK to add now)    Initiated general parenteral nutrition focused order set to ensure proper lab monitoring    Monitor lytes for any s/s of refeeding - start thiamine/ add to TPN if indicated. Await nutritional lab results for additional needs/intervention (Vit D, selenium, B6, b3, b1, copper, zinc, folate, B12 ordered by GI on 8/27)       Malnutrition Assessment:  Malnutrition Status:  Severe malnutrition (08/29/22 1210)    Context:  Acute illness     Findings of the 6 clinical characteristics of malnutrition:   Energy Intake:  50% or less of est energy requirements for 5 or more days  Weight Loss:  Greater than 5% over 1 month     Body Fat Loss:  Mild body fat loss, Orbital, Triceps   Muscle Mass Loss:  Mild muscle mass loss, Clavicles (pectoralis & deltoids), Temples (temporalis)  Fluid Accumulation:  No significant fluid accumulation,     Strength:  Not performed        Nutrition Assessment:    Pt admitted with Acute cystitis. PMHx includes anemia, anxiety/depression, COPD, Crohn's disease s/p >30 surgeries (including s/p subtotal colectomy with ileocolonic anastomosis), short bowel syndrome (210cm small bowel remaining from lig treitz to ileosigmoid anastomosis) , incisional abdominal hernia with overlying skin wound, B12 deficiency, GERD, HTN. Presents to ER with report of dark stool and dark urine. Medically noted for sepsis, POA. Acute cystitis. Abdominal wounds, entercolonic fistula. PNA. SUHA with AG acidosis. Leukocytosis. Enlarged liver with nodular contour - concerning for new cirrhosis. +jaundice. Initially GI consulted 8/27. TB screening d/t enlarged liver without strong alcohol hx.   Also diarrhea - enteric pathogen panel and C diff testing ordered as well as a slew of nutrition labs (including Vit D, selenium, B6, b3, b1, copper, zinc, folate, B12) - all still active orders, appears pt was refusing lab draws over weekend, but triple lumen placed last night and able to get labs today. TPN recommended for bowel rest for enterocutaneous fistula, which PPN was initiated last night ---> can convert to TPN today. Colorectal surgery and wound care consults recommended and pending. PPN of 4.25%AA, 10% dex @ 42 mL/hr (providing ~100 gm dex). Recommend goal TPN of 6.5%AA, 20% dex @ 83 mL/hr with 250 mL 20% lipids daily to provide 2242 mL, 2373 kcal, 130 gm pro, 398 gm dex (GIR 3.33 mg/kg/min) which meets 88-99% kcal needs and 100% of pro needs respectively. Recommend advancing gradually as to not increasing dex too quickly. Met with pt in room. Very pleasant. Reports UBW of 233 lb about a month ago- down to 181 lb in ER. States minimal PO intake for past month, at most 1 meal/day which included something light like soup. Pt reports -240 lb, throughout all surgeries had dropped as low as 113 lb. On/off colostomies, currently without ostomy. Tells me it didn't matter what he ate, within 30 minutes he would have diarrhea. Took him 10 years to regain weight, but was able to do so. Familiar with TPN - has required in past.  No questions/ concerns. Asking for cup of coffee which I told him I had to defer to MD.  Pt meet severe PCM based on intake and wt loss alone, however some muscle wasting and loss of SQ fat noted. Recommend monitoring lytes and starting thiamine if showing s/s of refeeding.       Nutritionally Significant Medications:  Diflucan, Protonix, KCl, sodium bicarb in D5, vancomycin  PRN: dilaudid, zofran, oxycodone,    Estimated Daily Nutrient Needs:  Energy Requirements Based On: Formula  Weight Used for Energy Requirements: Admission (82.1 kg)  Energy (kcal/day): 9616-8992 (MSJ x 1.5-1.7 or ~30-33 kcal/kg)  Weight Used for Protein Requirements: Admission (82.1 kg)  Protein (g/day): 120-140 (1.5-1.7 gm/kg or at least 20%)     Fluid (ml/day): 1 mL/kcal    Nutrition Related Findings:   Edema: No data recorded    Last BM: 08/28/22, Loose    Wounds: Open wounds, Wound consult pending (entercutaneous fistula)      Current Nutrition Therapies:  Diet: NPO  Nutrition Support: PPN as above        Anthropometric Measures:  Height: 5' 9\" (175.3 cm)  Ideal Body Weight (IBW): 160 lbs (73 kg)  Admission Body Weight: 181 lb (82.1 kg - standing scale)  Current Body Wt:  83.1 kg (183 lb 3.2 oz), 114.5 % IBW. Bed scale  Current BMI (kg/m2): 27        Weight Adjustment: No adjustment                 BMI Category: Overweight (BMI 25.0-29. 9)    Wt Readings from Last 15 Encounters:   08/29/22 83.1 kg (183 lb 3.2 oz) - bed   08/26/22 82.1 kg (181 lb) - standing scale, admission   06/03/20 90.7 kg (200 lb)   10/08/19 79.4 kg (175 lb)   06/29/19 84.7 kg (186 lb 11.7 oz)   05/25/18 81.2 kg (179 lb)   02/14/18 78.8 kg (173 lb 12.8 oz)   12/18/17 73.5 kg (162 lb)   12/04/17 75.6 kg (166 lb 9.6 oz)   11/10/17 72.4 kg (159 lb 9.8 oz)   08/18/17 81.6 kg (180 lb)   06/22/17 81.2 kg (179 lb)   02/27/17 74.8 kg (165 lb)   01/31/17 73.5 kg (162 lb)   08/09/16 72.6 kg (160 lb)   02/29/16 74.6 kg (164 lb 6.4 oz)       Nutrition Diagnosis:   Inadequate oral intake related to altered GI function, altered GI structure as evidenced by NPO or clear liquid status due to medical condition, nutrition support-parenteral nutrition    Nutrition Interventions:   Food and/or Nutrient Delivery: Continue NPO, Continue parenteral nutrition, Modify parenteral nutrition  Nutrition Education/Counseling: No recommendations at this time  Coordination of Nutrition Care: Continue to monitor while inpatient       Goals:     Goals: other (specify)  Specify Other Goals: TPN to meet >90% of EEN over next 7 days    Nutrition Monitoring and Evaluation: Behavioral-Environmental Outcomes: None identified  Food/Nutrient Intake Outcomes: Parenteral nutrition intake/tolerance, IVF intake, Vitamin/mineral intake, Diet advancement/tolerance  Physical Signs/Symptoms Outcomes: Biochemical data, GI status, Hemodynamic status, Nutrition focused physical findings, Weight, Skin    Discharge Planning:     Too soon to determine    Recent Labs     08/29/22 0357 08/29/22 0355 08/28/22  1214 08/28/22  1039 08/27/22  0800 08/27/22  0331 08/26/22  1733   *  --  103*  --   --  90 117*   BUN 75*  --  82*  --   --  73* 67*   CREA 1.81*  --  2.06*  --   --  2.77* 3.27*   *  --  133*  --   --  130* 127*   K 3.1*  --  2.8*  --   --  3.4* 3.6   CL 99  --  103  --   --  102 96*   CO2 25  --  23  --   --  14* 17*   CA 8.2*  --  8.7  --   --  8.4* 9.3   PHOS 2.6  --   --   --   --   --   --    MG  --  2.5*  --  2.6* 1.8  --   --        Recent Labs     08/29/22 0357 08/29/22  0355 08/28/22  1214 08/28/22  1039 08/26/22  1733   ALT  --  30 19 21 25   AST  --  66* 42* 46* 38*   AP  --  131* 146* 162* 260*   TBILI  --  13.9* 15.9* 17.2* 10.2*   TP  --  5.3* 6.4 5.5* 7.6   ALB 2.8* 2.7* 2.4* 2.7* 2.6*   GLOB  --  2.6 4.0 2.8 5.0*   LPSE  --   --   --   --  102       Recent Labs     08/28/22  1219   LAC 1.2       Recent Labs     08/29/22  0355 08/28/22  1214   WBC 13.7* 19.5*   HGB 7.3* 8.6*   HCT 20.8* 24.1*   * 572*       Recent Labs     08/28/22  1209 08/27/22  0526 08/26/22 2020   GLUCPOC 105 113 105       Lab Results   Component Value Date/Time    Hemoglobin A1c 4.8 11/15/2013 03:14 PM       Lauren Sams RD  Available via [employer]

## 2022-08-30 ENCOUNTER — APPOINTMENT (OUTPATIENT)
Dept: MRI IMAGING | Age: 62
DRG: 871 | End: 2022-08-30
Attending: NURSE PRACTITIONER
Payer: MEDICARE

## 2022-08-30 ENCOUNTER — APPOINTMENT (OUTPATIENT)
Dept: CT IMAGING | Age: 62
DRG: 871 | End: 2022-08-30
Attending: COLON & RECTAL SURGERY
Payer: MEDICARE

## 2022-08-30 LAB
A1AT SERPL-MCNC: 286 MG/DL (ref 101–187)
ALBUMIN SERPL-MCNC: 2.5 G/DL (ref 3.5–5)
ALBUMIN SERPL-MCNC: 2.6 G/DL (ref 3.5–5)
ALBUMIN/GLOB SERPL: 1.1 {RATIO} (ref 1.1–2.2)
ALP SERPL-CCNC: 114 U/L (ref 45–117)
ALT SERPL-CCNC: 24 U/L (ref 12–78)
ANA SER QL: NEGATIVE
ANION GAP SERPL CALC-SCNC: 10 MMOL/L (ref 5–15)
AST SERPL-CCNC: 50 U/L (ref 15–37)
BACTERIA SPEC CULT: NORMAL
BACTERIA SPEC CULT: NORMAL
BILIRUB DIRECT SERPL-MCNC: 6.7 MG/DL (ref 0–0.2)
BILIRUB SERPL-MCNC: 10 MG/DL (ref 0.2–1)
BUN SERPL-MCNC: 50 MG/DL (ref 6–20)
BUN/CREAT SERPL: 33 (ref 12–20)
CALCIUM SERPL-MCNC: 8.3 MG/DL (ref 8.5–10.1)
CERULOPLASMIN SERPL-MCNC: 22.9 MG/DL (ref 16–31)
CHLORIDE SERPL-SCNC: 95 MMOL/L (ref 97–108)
CO2 SERPL-SCNC: 28 MMOL/L (ref 21–32)
CREAT SERPL-MCNC: 1.53 MG/DL (ref 0.7–1.3)
GLOBULIN SER CALC-MCNC: 2.4 G/DL (ref 2–4)
GLUCOSE BLD STRIP.AUTO-MCNC: 144 MG/DL (ref 65–117)
GLUCOSE BLD STRIP.AUTO-MCNC: 144 MG/DL (ref 65–117)
GLUCOSE SERPL-MCNC: 102 MG/DL (ref 65–100)
HBV CORE AB SERPL QL IA: NEGATIVE
HBV SURFACE AB SER QL: NONREACTIVE
HBV SURFACE AB SER-ACNC: <3.1 MIU/ML
HCV AB S/CO SERPL IA: <0.1 S/CO RATIO (ref 0–0.9)
HCV AB SERPL QL IA: NORMAL
PHOSPHATE SERPL-MCNC: 2.9 MG/DL (ref 2.6–4.7)
POTASSIUM SERPL-SCNC: 3.2 MMOL/L (ref 3.5–5.1)
PROT SERPL-MCNC: 5 G/DL (ref 6.4–8.2)
SERVICE CMNT-IMP: ABNORMAL
SERVICE CMNT-IMP: ABNORMAL
SERVICE CMNT-IMP: NORMAL
SODIUM SERPL-SCNC: 133 MMOL/L (ref 136–145)
VANCOMYCIN SERPL-MCNC: 13.3 UG/ML

## 2022-08-30 PROCEDURE — 65660000001 HC RM ICU INTERMED STEPDOWN

## 2022-08-30 PROCEDURE — 74011250637 HC RX REV CODE- 250/637: Performed by: INTERNAL MEDICINE

## 2022-08-30 PROCEDURE — 74011250636 HC RX REV CODE- 250/636: Performed by: INTERNAL MEDICINE

## 2022-08-30 PROCEDURE — 80202 ASSAY OF VANCOMYCIN: CPT

## 2022-08-30 PROCEDURE — C9113 INJ PANTOPRAZOLE SODIUM, VIA: HCPCS | Performed by: INTERNAL MEDICINE

## 2022-08-30 PROCEDURE — 74011250636 HC RX REV CODE- 250/636: Performed by: PHYSICIAN ASSISTANT

## 2022-08-30 PROCEDURE — 74181 MRI ABDOMEN W/O CONTRAST: CPT

## 2022-08-30 PROCEDURE — 36415 COLL VENOUS BLD VENIPUNCTURE: CPT

## 2022-08-30 PROCEDURE — 80069 RENAL FUNCTION PANEL: CPT

## 2022-08-30 PROCEDURE — 74011000258 HC RX REV CODE- 258: Performed by: INTERNAL MEDICINE

## 2022-08-30 PROCEDURE — 74176 CT ABD & PELVIS W/O CONTRAST: CPT

## 2022-08-30 PROCEDURE — 99223 1ST HOSP IP/OBS HIGH 75: CPT | Performed by: INTERNAL MEDICINE

## 2022-08-30 PROCEDURE — 82962 GLUCOSE BLOOD TEST: CPT

## 2022-08-30 PROCEDURE — 74011000250 HC RX REV CODE- 250: Performed by: INTERNAL MEDICINE

## 2022-08-30 PROCEDURE — 74011000250 HC RX REV CODE- 250: Performed by: FAMILY MEDICINE

## 2022-08-30 PROCEDURE — 80076 HEPATIC FUNCTION PANEL: CPT

## 2022-08-30 RX ORDER — MONTELUKAST SODIUM 4 MG/1
2 TABLET, CHEWABLE ORAL 2 TIMES DAILY
Status: DISCONTINUED | OUTPATIENT
Start: 2022-08-30 | End: 2022-08-31

## 2022-08-30 RX ORDER — DIAZEPAM 10 MG/2ML
2.5 INJECTION INTRAMUSCULAR
Status: COMPLETED | OUTPATIENT
Start: 2022-08-30 | End: 2022-08-30

## 2022-08-30 RX ORDER — POTASSIUM CHLORIDE 7.45 MG/ML
10 INJECTION INTRAVENOUS
Status: DISPENSED | OUTPATIENT
Start: 2022-08-30 | End: 2022-08-30

## 2022-08-30 RX ADMIN — HEPARIN SODIUM 5000 UNITS: 5000 INJECTION INTRAVENOUS; SUBCUTANEOUS at 06:22

## 2022-08-30 RX ADMIN — METOPROLOL TARTRATE 6.25 MG: 25 TABLET, FILM COATED ORAL at 09:11

## 2022-08-30 RX ADMIN — HEPARIN SODIUM 5000 UNITS: 5000 INJECTION INTRAVENOUS; SUBCUTANEOUS at 14:14

## 2022-08-30 RX ADMIN — HYDROMORPHONE HYDROCHLORIDE 2 MG: 2 INJECTION, SOLUTION INTRAMUSCULAR; INTRAVENOUS; SUBCUTANEOUS at 06:22

## 2022-08-30 RX ADMIN — POTASSIUM CHLORIDE 10 MEQ: 7.46 INJECTION, SOLUTION INTRAVENOUS at 12:28

## 2022-08-30 RX ADMIN — SMOFLIPID 250 ML: 6; 6; 5; 3 INJECTION, EMULSION INTRAVENOUS at 19:10

## 2022-08-30 RX ADMIN — POTASSIUM CHLORIDE 10 MEQ: 7.46 INJECTION, SOLUTION INTRAVENOUS at 09:10

## 2022-08-30 RX ADMIN — HYDROMORPHONE HYDROCHLORIDE 2 MG: 2 INJECTION, SOLUTION INTRAMUSCULAR; INTRAVENOUS; SUBCUTANEOUS at 15:40

## 2022-08-30 RX ADMIN — HYDROMORPHONE HYDROCHLORIDE 2 MG: 2 INJECTION, SOLUTION INTRAMUSCULAR; INTRAVENOUS; SUBCUTANEOUS at 21:44

## 2022-08-30 RX ADMIN — HEPARIN SODIUM 5000 UNITS: 5000 INJECTION INTRAVENOUS; SUBCUTANEOUS at 21:45

## 2022-08-30 RX ADMIN — DIAZEPAM 2.5 MG: 5 INJECTION, SOLUTION INTRAMUSCULAR; INTRAVENOUS at 22:05

## 2022-08-30 RX ADMIN — SODIUM BICARBONATE: 84 INJECTION, SOLUTION INTRAVENOUS at 12:27

## 2022-08-30 RX ADMIN — HYDROMORPHONE HYDROCHLORIDE 2 MG: 2 INJECTION, SOLUTION INTRAMUSCULAR; INTRAVENOUS; SUBCUTANEOUS at 12:27

## 2022-08-30 RX ADMIN — HYDROMORPHONE HYDROCHLORIDE 2 MG: 2 INJECTION, SOLUTION INTRAMUSCULAR; INTRAVENOUS; SUBCUTANEOUS at 19:10

## 2022-08-30 RX ADMIN — HYDROMORPHONE HYDROCHLORIDE 2 MG: 2 INJECTION, SOLUTION INTRAMUSCULAR; INTRAVENOUS; SUBCUTANEOUS at 03:26

## 2022-08-30 RX ADMIN — SODIUM CHLORIDE, PRESERVATIVE FREE 10 ML: 5 INJECTION INTRAVENOUS at 06:23

## 2022-08-30 RX ADMIN — FLUCONAZOLE IN SODIUM CHLORIDE 200 MG: 2 INJECTION, SOLUTION INTRAVENOUS at 09:40

## 2022-08-30 RX ADMIN — CEFEPIME 2 G: 2 INJECTION, POWDER, FOR SOLUTION INTRAVENOUS at 03:26

## 2022-08-30 RX ADMIN — SODIUM CHLORIDE, PRESERVATIVE FREE 40 MG: 5 INJECTION INTRAVENOUS at 21:45

## 2022-08-30 RX ADMIN — HYDROMORPHONE HYDROCHLORIDE 2 MG: 2 INJECTION, SOLUTION INTRAMUSCULAR; INTRAVENOUS; SUBCUTANEOUS at 00:01

## 2022-08-30 RX ADMIN — VANCOMYCIN HYDROCHLORIDE 1000 MG: 1 INJECTION, POWDER, LYOPHILIZED, FOR SOLUTION INTRAVENOUS at 09:11

## 2022-08-30 RX ADMIN — HYDROMORPHONE HYDROCHLORIDE 2 MG: 2 INJECTION, SOLUTION INTRAMUSCULAR; INTRAVENOUS; SUBCUTANEOUS at 09:05

## 2022-08-30 RX ADMIN — POTASSIUM CHLORIDE: 2 INJECTION, SOLUTION, CONCENTRATE INTRAVENOUS at 19:10

## 2022-08-30 RX ADMIN — SODIUM CHLORIDE, PRESERVATIVE FREE 40 MG: 5 INJECTION INTRAVENOUS at 09:11

## 2022-08-30 RX ADMIN — COLLAGENASE SANTYL: 250 OINTMENT TOPICAL at 09:12

## 2022-08-30 RX ADMIN — SODIUM CHLORIDE, PRESERVATIVE FREE 10 ML: 5 INJECTION INTRAVENOUS at 14:14

## 2022-08-30 RX ADMIN — POTASSIUM CHLORIDE 10 MEQ: 7.46 INJECTION, SOLUTION INTRAVENOUS at 16:21

## 2022-08-30 RX ADMIN — METOPROLOL TARTRATE 6.25 MG: 25 TABLET, FILM COATED ORAL at 21:55

## 2022-08-30 NOTE — PROGRESS NOTES
[email or web address] Progress Note        NAME: Patricia Heart       :  1960       MRN:  [id number]     Date/Time: 2022    Risk of deterioration: medium       Assessment:    Plan: SUHA   Acidosis  Hyokalemia  Volume depletion/diarrhea  Hyperbilirubinemia  Hx of crohns/bowel resection  Leukocytosis/uti=coag (-)  Nonobstructing renal stones  (R) M L infiltrate  Fistula     SUHA is improving. Cr down to 1.5  Acidosis resolved    Still on TPN    D/C HCO3 drip  I have added KCL 40 meq/L to TPN bag and removed K Acetate  No hydro on CT    On broad spectrum antibiotics    D/W pt and RN       Subjective:     Chief Complaint:  feels little better. Taking ice chips  \"Hungry\". Wants to eat solid foods    Review of Systems: currently no n/v/cp/sob    Objective:     VITALS:   Last 24hrs VS reviewed since prior progress note. Most recent are:  Visit Vitals  BP (!) 122/46 (BP 1 Location: Left upper arm, BP Patient Position: Supine)   Pulse 100   Temp 99.8 °F (37.7 °C)   Resp 22   Ht 5' 9\" (1.753 m)   Wt 83.1 kg (183 lb 3.2 oz)   SpO2 (!) 86%   BMI 27.05 kg/m²     SpO2 Readings from Last 6 Encounters:   22 (!) 86%   20 96%   20 99%   10/09/19 99%   19 98%   18 98%    O2 Flow Rate (L/min): 4 l/min     Intake/Output Summary (Last 24 hours) at 2022 1308  Last data filed at 2022 5333  Gross per 24 hour   Intake 2384.83 ml   Output --   Net 2384.83 ml          PHYSICAL EXAM:    General   well developed, well nourished, appears stated age, in no acute distress  EENT  Normocephalic, Atraumatic, +scleral icterus  Extremities  No edema.       Lab Data Reviewed: (see below)    Medications Reviewed: (see below)    PMH/SH reviewed - no change compared to H&P  ________________________________\  ___________________________________________________    Attending Physician: Aggie Yuen, MD     ____________________________________________________  MEDICATIONS:  Current Facility-Administered Medications Medication Dose Route Frequency    vancomycin (VANCOCIN) 1,000 mg in 0.9% sodium chloride 250 mL ([id number])  1,000 mg IntraVENous Q18H    iohexoL (OMNIPAQUE) 240 mg iodine/mL solution 50 mL  50 mL Oral RAD ONCE    diazePAM (VALIUM) injection 2.5 mg  2.5 mg IntraVENous ONCE PRN    TPN ADULT - CENTRAL   IntraVENous CONTINUOUS    cefepime (MAXIPIME) 2 g in 0.9% sodium chloride (MBP/ADV) 100 mL MBP  2 g IntraVENous Q24H    fluconazole (DIFLUCAN) 200mg/100 mL IVPB (premix)  200 mg IntraVENous Q24H    collagenase (SANTYL) 250 unit/gram ointment   Topical DAILY    TPN ADULT - CENTRAL   IntraVENous CONTINUOUS    fat emulsion 20% soy-oliv-fish oil (SMOFlipid) infusion 250 mL  250 mL IntraVENous Q24H    nicotine (NICODERM CQ) 21 mg/24 hr patch 1 Patch  1 Patch TransDERmal DAILY    HYDROmorphone (DILAUDID) injection 2 mg  2 mg IntraVENous Q3H PRN    naloxone (NARCAN) injection 0.4 mg  0.4 mg IntraVENous EVERY 2 MINUTES AS NEEDED    metoprolol tartrate (LOPRESSOR) tablet 6.25 mg  6.25 mg Oral Q12H    metoprolol (LOPRESSOR) injection 2.5 mg  2.5 mg IntraVENous Q4H PRN    bisacodyL (DULCOLAX) suppository 10 mg  10 mg Rectal DAILY PRN    pantoprazole (PROTONIX) 40 mg in 0.9% sodium chloride 10 mL injection  40 mg IntraVENous Q12H    sodium chloride (NS) flush 5-40 mL  5-40 mL IntraVENous Q8H    sodium chloride (NS) flush 5-40 mL  5-40 mL IntraVENous PRN    acetaminophen (TYLENOL) tablet 650 mg  650 mg Oral Q6H PRN    Or    acetaminophen (TYLENOL) suppository 650 mg  650 mg Rectal Q6H PRN    ondansetron (ZOFRAN ODT) tablet 4 mg  4 mg Oral Q8H PRN    Or    ondansetron (ZOFRAN) injection 4 mg  4 mg IntraVENous Q6H PRN    Vancomycin - Pharmacy to Dose   Other Rx Dosing/Monitoring    oxyCODONE IR (ROXICODONE) tablet 10 mg  10 mg Oral Q6H PRN    heparin (porcine) injection 5,000 Units  5,000 Units SubCUTAneous Q8H        LABS:  Recent Labs     08/29/22  0355 08/28/22  1214   WBC 13.7* 19.5*   HGB 7.3* 8.6*   HCT 20.8* 24.1*   * Orase 98     08/30/22  0240 08/29/22 0357 08/29/22 0355 08/28/22  1214 08/28/22  1039   * 133*  --  133*  --    K 3.2* 3.1*  --  2.8*  --    CL 95* 99  --  103  --    CO2 28 25  --  23  --    BUN 50* 75*  --  82*  --    CREA 1.53* 1.81*  --  2.06*  --    * 124*  --  103*  --    CA 8.3* 8.2*  --  8.7  --    MG  --   --  2.5*  --  2.6*   PHOS 2.9 2.6  --   --   --        Recent Labs     08/30/22 0240 08/29/22 0357 08/29/22 0355 08/28/22  1214   ALT 24  --  30 19     --  131* 146*   TBILI 10.0*  --  13.9* 15.9*   TP 5.0*  --  5.3* 6.4   ALB 2.6*  2.5* 2.8* 2.7* 2.4*   GLOB 2.4  --  2.6 4.0       Recent Labs     08/29/22 0355 08/28/22  1214   INR 1.3* 1.4*   PTP 13.7* 14.0*   APTT 37.0* 35.8*

## 2022-08-30 NOTE — PROGRESS NOTES
[address] [facility]  [address] [address] [address], [address]  [phone]                     GI PROGRESS NOTE    Patient Name: Prem Soriano      : 1960      MRN: [id number]  Admit Date: 2022  Today's Date: 2022  CC: hyperbilirubinemia and severe Crohn's disease    Subjective:     Patient drowsy today, falling asleep mid conversation. States he has not slept well since hospital admission. Did not agree to MRI last night due to claustrophobia. Continues with right side abdominal tenderness and loose mucoid stools. Objective:     Blood pressure (!) 145/40, pulse 97, temperature 99.8 °F (37.7 °C), resp. rate 16, height 5' 9\" (1.753 m), weight 83.1 kg (183 lb 3.2 oz), SpO2 97 %. Physical Exam:  General appearance: Drowsy,  male    Skin: jaundice  HEENT: Sclerae icteric. Cardiovascular: Regular rate and rhythm. No murmurs, gallops, or rubs. Respiratory: Coarse breath sounds, congested wet cough. GI: Abdomen nondistended, soft, increased right side abd tenderness. Normal active bowel sounds. + anterior wall hernia which bulges with breathing. Multiple abdominal scars, abd bandage over abdomen   Rectal: Deferred   Musculoskeletal: No pitting edema of the lower legs. Neurological:  drowsy and oriented. Psychiatric:  No anxiety or agitation.       Data Review:    Recent Results (from the past 24 hour(s))   GLUCOSE, POC    Collection Time: 22 10:34 PM   Result Value Ref Range    Glucose (POC) 139 (H) 65 - 117 mg/dL    Performed by MARY Maynard    Collection Time: 22  2:39 AM   Result Value Ref Range    Vancomycin, random 13.3 UG/ML   RENAL FUNCTION PANEL    Collection Time: 22  2:40 AM   Result Value Ref Range    Sodium 133 (L) 136 - 145 mmol/L    Potassium 3.2 (L) 3.5 - 5.1 mmol/L    Chloride 95 (L) 97 - 108 mmol/L    CO2 28 21 - 32 mmol/L    Anion gap 10 5 - 15 mmol/L    Glucose 102 (H) 65 - 100 mg/dL    BUN 50 (H) 6 - 20 MG/DL    Creatinine 1.53 (H) 0.70 - 1.30 MG/DL    BUN/Creatinine ratio 33 (H) 12 - 20      GFR est AA 56 (L) >60 ml/min/1.73m2    GFR est non-AA 47 (L) >60 ml/min/1.73m2    Calcium 8.3 (L) 8.5 - 10.1 MG/DL    Phosphorus 2.9 2.6 - 4.7 MG/DL    Albumin 2.5 (L) 3.5 - 5.0 g/dL   HEPATIC FUNCTION PANEL    Collection Time: 08/30/22  2:40 AM   Result Value Ref Range    Protein, total 5.0 (L) 6.4 - 8.2 g/dL    Albumin 2.6 (L) 3.5 - 5.0 g/dL    Globulin 2.4 2.0 - 4.0 g/dL    A-G Ratio 1.1 1.1 - 2.2      Bilirubin, total 10.0 (H) 0.2 - 1.0 MG/DL    Bilirubin, direct PENDING MG/DL    Alk. phosphatase 114 45 - 117 U/L    AST (SGOT) 50 (H) 15 - 37 U/L    ALT (SGPT) 24 12 - 78 U/L   GLUCOSE, POC    Collection Time: 08/30/22  6:32 AM   Result Value Ref Range    Glucose (POC) 144 (H) 65 - 117 mg/dL    Performed by Ja Rodriguez, POC    Collection Time: 08/30/22 12:57 PM   Result Value Ref Range    Glucose (POC) 144 (H) 65 - 117 mg/dL    Performed by Joe Peterson / Plan :     Mr. 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   There was no biliary dilation on ultrasound, so the hyperbilirubinemia is likely due to either sepsis, decompensated cirrhosis, or antibiotic-related rather than biliary obstruction.     - He has an enlarged liver with a nodular contour on CT concerning for cirrhosis, multiple serologies for cirrhosis etiology pending   - Obtain MRCP for further evaluation of hyperbilirubinemia, ok to give anxiolytic 1 hr prior to MRI    - Consult hepatology, Dr. Lowell Maynard re: cirrhosis   - Enteric panel and C-diff negative; ok to start bile salt binder colestipol 2mg po BID for diarrhea  - CT with oral contrast to rule out EC fistula   - Continue bowel rest and transition PPN to TPN this evening  - Nutrition following, appreciate input  - CRS following, appreciate input  - renal following re: SUHA with acidosis   -RUL PNA, has quantiferon gold pending to screen for TB       Patient Active Hospital Problem List:   Active Problems:    Severe protein-calorie malnutrition ([facility] [address] (6/10/2015)      Acute cystitis (8/27/2022)      Time Spent with Patient: Felice Angel NP            II have personally reviewed the history and independently examined the patient. I have reviewed the chart and agree with the documentation recorded by the Mid Level Provider, including the assessment, treatment plan, and disposition. ASSESSMENT AND PLAN:  65 yr old with history of Crohn disease, diarrhea and short bowel syndrome awaiting CT to rule out Enterocutaneous fistula. Jaundice with Cirrhotic appearing liver on CT, will defer to Dr Jenny Forbes for management of liver disease.   MRCP pending  CRS consult appreciated    Darryl Almaguer MD

## 2022-08-30 NOTE — PROGRESS NOTES
NUTRITION brief    Nutrition Recommendations/Plan:      Recommend starting Vit B12 and Vit D supplementation for deficiency  Still pending zinc, B1, selenium, and copper    Recommend checking Vit A - has hx of deficiency in 2017. Pt is certainly at risk. Advance TPN to goal of 6.5% AA, 20% dex @ 83 mL/hr tonight. Would add thiamine. Continue lipids as ordered    Recommend removing trace elements from TPN until t bili improves    Monitor lytes for any s/s of refeeding. Phos stable at this time. Diet: NPO  Nutrition Support: TPN of 6.5%AA, 15% dex @ 63 mL/hr + 250 mL 20% lipids daily  Nutrition-related meds: cefepime, diflucan, protonix, KCl, sodium bicarb, vancomycin    New events impacting nutrition plan of care: some nutritional labs resulted. Folate WNL. However, B12 and Vit D deficient. Still pending zinc, B1, selenium, and copper. Appears Vit B6 and B3 were canceled. Further noted hx of Vit A deficiency. Would recheck this and also additional fat soluble vitamins. TPN advanced as recommended last night. K+ conitnues to be low, but Phos stable. Recommend advancing to goal TPN of 6.5%AA, 20% dex @ 83 mL/hr with 250 mL 20% lipids daily to provide 2242 mL, 2373 kcal, 130 gm pro, 398 gm dex (GIR 3.33 mg/kg/min) which meets 88-99% kcal needs and 100% of pro needs respectively. Noted trace elements in TPN last night. Given elevated t bili (which is improving), recommend keeping trace elements out of TPN until this improves further as pt would be at risk for toxicity. See full RD assessment from 8/29 for additional details, goals, and monitoring/evaluation.    Estimated Nutrition Needs:   Energy: 1023-7721 (MSJ x 1.5-1.7 or ~30-33 kcal/kg)  Wt used: Admission (82.1 kg)  Protein: 120-140 (1.5-1.7 gm/kg or at least 20%)  Wt used: Admission (82.1 kg)   Fluid: 1 mL/kcal     Recent Labs     08/30/22  0240 08/29/22  0357 08/29/22  0355 08/28/22  1214 08/28/22  1039   * 124*  -- 103*  --    BUN 50* 75*  --  82*  --    CREA 1.53* 1.81*  --  2.06*  --    * 133*  --  133*  --    K 3.2* 3.1*  --  2.8*  --    CL 95* 99  --  103  --    CO2 28 25  --  23  --    CA 8.3* 8.2*  --  8.7  --    PHOS 2.9 2.6  --   --   --    MG  --   --  2.5*  --  2.6*       Recent Labs     08/30/22  0240 08/29/22  0357 08/29/22  0355 08/28/22  1214 08/28/22  1039   ALT  --   --  30 19 21   AST  --   --  66* 42* 46*   AP  --   --  131* 146* 162*   TBILI  --   --  13.9* 15.9* 17.2*   TP  --   --  5.3* 6.4 5.5*   ALB 2.5* 2.8* 2.7* 2.4* 2.7*   GLOB  --   --  2.6 4.0 2.8       Recent Labs     08/28/22  1219   LAC 1.2       Recent Labs     08/29/22  0355 08/28/22  1214   WBC 13.7* 19.5*   HGB 7.3* 8.6*   HCT 20.8* 24.1*   * 572*       Recent Labs     08/30/22  0632 08/29/22  2234 08/28/22  1209   GLUCPOC 144* 139* 105       Recent Labs     08/29/22  1312   FOL 18.2   B12LT 153*       Vitamin A, serum   Date Value Ref Range Status   11/06/2017 17 (L) 24 - 85 ug/dL Final     Vitamin D 25-Hydroxy   Date Value Ref Range Status   08/29/2022 15.8 (L) 30 - 100 ng/mL Final               Rody Loja RD  Available via [employer]

## 2022-08-30 NOTE — PROGRESS NOTES
Faculty or Preceptor Review of Student Work    8/30/2022  - Shift times - 0522 to [phone]    The student documentation of patient care for Dianne Diaz has been reviewed and approved. All medications have been administered under the direct supervision of the faculty or preceptor.     Shyann Ross RN

## 2022-08-30 NOTE — PROGRESS NOTES
Physician Progress Note      Tamika Gardner  CSN #:                  [id number]  :                       1960  ADMIT DATE:       2022 5:13 PM  [address] [facility] [place] DATE:  RESPONDING  PROVIDER #:        Mela Severin MD          QUERY TEXT:    Pt admitted with sepsis and has malnutrition documented. RD saw patient on  and noted severe malnutrition based on energy intake, weight loss, body fat loss, and muscle mass loss. Please further specify type of malnutrition with documentation in the medical record. The medical record reflects the following:  Risk Factors: 61yo with Crohn's disease and chronic diarrhea    Clinical Indicators: Malnutrition Status:  Severe malnutrition (22 1210)  Energy Intake:  50% or less of est energy requirements for 5 or more days  Weight Loss:  Greater than 5% over 1 month  Body Fat Loss:  Mild body fat loss, Orbital, Triceps  Muscle Mass Loss:  Mild muscle mass loss, Clavicles (pectoralis & deltoids), Temples (temporalis)    Inadequate oral intake related to altered GI function, altered GI structure as evidenced by NPO or clear liquid status due to medical condition, nutrition support-parenteral nutrition    Treatment: RD following, TPN, I&Os    ASPEN Criteria:  https://aspenjournals. onlinelibrary. clay. com/doi/full/10.1177/5975556799168605    Thank you,  Alberto Hearing  [phone]  I am also available via [employer]. Options provided:  -- Severe Malnutrition  -- Other - I will add my own diagnosis  -- Disagree - Not applicable / Not valid  -- Disagree - Clinically unable to determine / Unknown  -- Refer to Clinical Documentation Reviewer    PROVIDER RESPONSE TEXT:    This patient has severe malnutrition. Query created by: Binh Garcia on 2022 2:39 PM      QUERY TEXT:    Patient admitted with sepsis. Pneumonia was documented on the H&P and then dropped from Hospitalist's notes, but is mentioned in other provider notes.  If possible, please document in the progress notes and discharge summary if PNA was: The medical record reflects the following:  Risk Factors: 63yo with sepsis and COPD    Clinical Indicators:  8/26 chest x-ray  New area of consolidation in the right upper lobe extending to the right apex. This may be related to infection. 8/26 ABD PELV CT  Patchy small infiltrates in the right middle lobe, new since prior study. 8/28 chest x-ray  Increased consolidation of the right upper lobe. Treatment: chest x-ray, Maxipime, Vancomycin, Zosyn    Thank you,  Esther Diaz  [phone]  I am also available via [employer]. Options provided:  -- PNA confirmed after study  -- PNA treated and resolved  -- PNA ruled out after study  -- Other - I will add my own diagnosis  -- Disagree - Not applicable / Not valid  -- Disagree - Clinically unable to determine / Unknown  -- Refer to Clinical Documentation Reviewer    PROVIDER RESPONSE TEXT:    PNA treated and resolved. Query created by: Daria Farmer on 8/30/2022 2:48 PM      QUERY TEXT:    Patient admitted with sepsis. Noted documentation of acute cystitis in multiple notes since admission. UA showed positive nitrites, small leukocyte esterase, 5-10 WBC, 1+ bacteria, and the urine culture showed 3000 CFUs. In order to support the diagnosis of acute cystitis, please include additional clinical indicators in your documentation. Or please document if the diagnosis of acute cystitis has been ruled out after further study. The medical record reflects the following:  Risk Factors: 63yo with SUHA and Nonobstructing renal stones    Clinical Indicators:  8/26/22 19:27  Nitrites: Positive ! Leukocyte Esterase: SMALL ! Epithelial cells: FEW  Mucus: 1+ ! WBC: 5-10  RBC: 0-5  Bacteria: 1+ ! Amorphous Crystals: 2+ !     Urine culture:  3000 CFUs  Mixed Urogenital Stacey Isolated    Treatment: UA, Urine culture, Maxipime, Vancomycin, Zosyn    Thank you,  Fabián Shirley Green  [phone]  I am also available via [employer]. Options provided:  -- Acute cystitis present as evidenced by, Please document evidence. -- Acute cystitis was ruled out  -- Other - I will add my own diagnosis  -- Disagree - Not applicable / Not valid  -- Disagree - Clinically unable to determine / Unknown  -- Refer to Clinical Documentation Reviewer    PROVIDER RESPONSE TEXT:    Provider disagreed with this query.   possible contaminant, epithelial cells present and only mixed urogenital darin as Query has noted    Query created by: Elaina Estrada on 8/30/2022 3:01 PM      Electronically signed by:  Elen Culp MD 8/30/2022 4:26 PM

## 2022-08-30 NOTE — PROGRESS NOTES
JAMES- Likely d/c back to his apartment. Reason for Admission:  hypotension                     RUR Score:    14%                 Plan for utilizing home health:   TBD       PCP: First and Last name:  Mansoor Cobian MD     Name of Practice:    Are you a current patient: Yes/No:    Approximate date of last visit:    Can you participate in a virtual visit with your PCP:                     Current Advanced Directive/Advance Care Plan: Full Code      Healthcare Decision Maker:   Click here to complete [place] including selection of the Healthcare Decision Maker Relationship (ie \"Primary\")                             Transition of Care Plan:      CM met with pt to introduce him to the role of CM and transition of care. This pt lives in a one level apartment with 7 steps to enter the building. Once he is in the building, he must negotiate around 18 steps up to his apartment. Once he is inside the apt home, he is on 1 level. Prior to admission, this pt was independent with his ADL's and IADL's. He is also a . At baseline, this pt uses a cane for ambulation. The cane is his only DME. Most recently, he has been ordering his groceries to be delivered to his home, as he has been feeling poorly. CM will follow for d/c needs. [address] [facility] [address] Management Interventions  PCP Verified by CM:  Yes  Palliative Care Criteria Met (RRAT>21 & CHF Dx)?: No  Transition of Care Consult (CM Consult): Discharge Planning  MyChart Signup: No  Discharge Durable Medical Equipment: No  Physical Therapy Consult: No  Occupational Therapy Consult: No  Speech Therapy Consult: No  Support Systems: Friend/Neighbor  Confirm Follow Up Transport: Self  The Plan for Transition of Care is Related to the Following Treatment Goals : safe d/c  The Patient and/or Patient Representative was Provided with a Choice of Provider and Agrees with the Discharge Plan?: Yes  Freedom of Choice List was Provided with Basic Dialogue that Supports the Patient's Individualized Plan of Care/Goals, Treatment Preferences and Shares the Quality Data Associated with the Providers?: Yes   Resource Information Provided?: No  Discharge Location  Patient Expects to be Discharged to[de-identified] Home

## 2022-08-30 NOTE — PROGRESS NOTES
0940: Called MRI for possible timeframe, angella said would be this evening. Plan to get CT  done around same time. 1535: Called MRI, tech said will still try to get pt in this evening. I asked to be notified when they think MRI can be completed as pt will need to drink contrast for CT. Trying to coordinate both test for same timeframe as RN will need to travel with patient off floor for testing.

## 2022-08-30 NOTE — PROGRESS NOTES
Spiritual Care Partner Volunteer visited patient at . [facility] [place] [facility] [place]   Documented by:    Rev. Cheryl Toledo MDiv, MS, [facility]  Staff

## 2022-08-30 NOTE — PROGRESS NOTES
General Daily Progress Note    Admission Date: 8/26/2022  Hospital Day 5  Post-Op Day Not Applicable  Subjective:   Pain about the same. Awaiting CT scan and MRCP. Dressing has not required replacement since it was changed yesterday evening. Objective:   Patient Vitals for the past 24 hrs:   BP Temp Pulse Resp SpO2   08/30/22 1800 -- -- 99 -- --   08/30/22 1507 (!) 145/40 99.8 °F (37.7 °C) 97 16 97 %   08/30/22 1400 -- -- 99 -- --   08/30/22 1246 -- -- -- -- (!) 86 %   08/30/22 1220 (!) 122/46 99.8 °F (37.7 °C) 100 22 93 %   08/30/22 1213 -- -- -- -- 98 %   08/30/22 1200 -- -- 99 -- --   08/30/22 1158 -- -- -- -- (!) 85 %   08/30/22 1000 -- -- 97 -- --   08/30/22 0835 (!) 132/46 99.7 °F (37.6 °C) (!) 114 17 97 %   08/30/22 0553 -- -- 96 -- --   08/30/22 0357 -- -- 92 -- --   08/30/22 0330 (!) 125/45 99.1 °F (37.3 °C) -- 16 96 %   08/30/22 0328 (!) 125/45 -- 98 16 --   08/30/22 0153 -- -- (!) 101 -- --   08/30/22 0023 (!) 128/45 99.6 °F (37.6 °C) 96 16 96 %   08/30/22 0000 -- -- 96 -- --   08/29/22 2314 -- -- 97 16 94 %   08/29/22 2153 -- -- (!) 101 -- --   08/29/22 1956 -- -- 99 -- --     No intake/output data recorded. 08/29 0701 - 08/30 1900  In: 1922 [I.V.:3199]  Out: -       PHYSICAL EXAMINATION:    GENERAL:  Somewhat groggy (recently medicated). Jaundiced. HEENT:  Left IJ central venous catheter in place. ABDOMEN:  Tender. Soft. Chronic ventral hernia. Chronic open wound in the ventral midline with dimensions as documented by WOCN (3 x 4.2 x 0.3 cm). Small amount of yellow-green fluid on the wound appears to be unchanged compared to yesterday when the dressing was last changed. Still no visible opening that appears to communicate with the bowel or the peritoneal cavity.         Data Review   Recent Results (from the past 24 hour(s))   GLUCOSE, POC    Collection Time: 08/29/22 10:34 PM   Result Value Ref Range    Glucose (POC) 139 (H) 65 - 117 mg/dL    Performed by Cristian Tellez, RANDOM    Collection Time: 08/30/22  2:39 AM   Result Value Ref Range    Vancomycin, random 13.3 UG/ML   RENAL FUNCTION PANEL    Collection Time: 08/30/22  2:40 AM   Result Value Ref Range    Sodium 133 (L) 136 - 145 mmol/L    Potassium 3.2 (L) 3.5 - 5.1 mmol/L    Chloride 95 (L) 97 - 108 mmol/L    CO2 28 21 - 32 mmol/L    Anion gap 10 5 - 15 mmol/L    Glucose 102 (H) 65 - 100 mg/dL    BUN 50 (H) 6 - 20 MG/DL    Creatinine 1.53 (H) 0.70 - 1.30 MG/DL    BUN/Creatinine ratio 33 (H) 12 - 20      GFR est AA 56 (L) >60 ml/min/1.73m2    GFR est non-AA 47 (L) >60 ml/min/1.73m2    Calcium 8.3 (L) 8.5 - 10.1 MG/DL    Phosphorus 2.9 2.6 - 4.7 MG/DL    Albumin 2.5 (L) 3.5 - 5.0 g/dL   HEPATIC FUNCTION PANEL    Collection Time: 08/30/22  2:40 AM   Result Value Ref Range    Protein, total 5.0 (L) 6.4 - 8.2 g/dL    Albumin 2.6 (L) 3.5 - 5.0 g/dL    Globulin 2.4 2.0 - 4.0 g/dL    A-G Ratio 1.1 1.1 - 2.2      Bilirubin, total 10.0 (H) 0.2 - 1.0 MG/DL    Bilirubin, direct 6.7 (H) 0.0 - 0.2 MG/DL    Alk. phosphatase 114 45 - 117 U/L    AST (SGOT) 50 (H) 15 - 37 U/L    ALT (SGPT) 24 12 - 78 U/L   GLUCOSE, POC    Collection Time: 08/30/22  6:32 AM   Result Value Ref Range    Glucose (POC) 144 (H) 65 - 117 mg/dL    Performed by [address] [place] [address], POC    Collection Time: 08/30/22 12:57 PM   Result Value Ref Range    Glucose (POC) 144 (H) 65 - 117 mg/dL    Performed by [facility] and Plan: Active Problems:    Severe protein-calorie malnutrition ([address] (6/10/2015)      Acute cystitis (8/27/2022)      Presence of enterocutaneous fistula is still doubtful. Awaiting CT scan. Continue local wound care.

## 2022-08-30 NOTE — PROGRESS NOTES
[facility]  Hospitalist Group                                                                                          Hospitalist Progress Note  Trung Downey MD  Answering service: [phone] from in house phone        Date of Service:  2022  NAME:  Edis Hanson  :  1960  MRN:  [id number]      Admission Summary:   Edis Hanson is a 65 y.o. male with PMHx of anemia, depression, COPD, malnutrition and Vitamin B12 deficiency, short bowel syndrome due to severe Crohn's Disease,  s/p >30 surgeries (210cm small bowel remaining from lig treitz to ileosigmoid anastomosis) s/p >30 surgeries with ileosigmoid anastomosis currently not on treatment, and chronic pain syndrome who is now admitted with severe sepsis, SUHA, metabolic acidosis, and direct hyperbilirubinemia. Nephrology and GI following. Interval history / Subjective:   Some right sided ache, he endorses bowel movement and flatus     Assessment & Plan:     Severe Sepsis with suha, leukocytosis, elevated temp, tachypneic on presentation  SUHA  Metabolic Acidosis  Lactic acidosis  Direct Hyperbilirubinemia  Leukocytosis  -started on bicarb drip  -nephrology consulted   -Empiric abx  -Ulcerated Abdominal Wound: Wound care consulted. -- GI following   -- Diarrhea: Enteric Pathogen panel (from ) and C diff testing (ordered)---GI consulted and following/guiding  - Hyperbilirubinemia: Per GI due to sepsis, decompensated cirrhosis, or abx-related. U/S completed with no biliary dilation  -Continue cardiac/tele monitoring    2022:  Rapid response which nurse reports rate up to 150 that resolved on its own withOut any change in bp and without any symptoms per pt verbal reported.  He has long hx of chronic pain which he gets meds but denies any acute onset of uncontrolled pain or anxiety around the timing of the jacob  Ekg at bedside noted infrequent PVC (on on ekg print out) and otherwise in sinus rhythm--use beta blocker. Labs, image ordered  Staff reports pt refused labs earlier and therefore late. Crohn's Disease with small bowel syndrome, hx of surgery  Hypoalbuminemia  Malnutrition--check prealbumin  -Gi consulted and following/guiding  -nutrition consult  -  Enlarged liver with nodular contour  -concerns for cirrhosis  -GI consulted and following/guiding    Tobacco use history and /recommend cessation  -per pt request ordered nicotine patch  -recommend tobacco cessation/    Electrolyte derangement  -replete as needed  -monitor with intermittent labs    Chronic Pain  -- Takes Oxycodone 15mg 5x/day as an outpatient and was started on lower dose on admission  -adjust as needed    Code status: Full Code  Prophylaxis: heparin  Care Plan discussed with: Pt, RN, staff  Anticipated Disposition: TBD     Hospital Problems  Date Reviewed: 10/9/2019            Codes Class Noted POA    Acute cystitis ICD-10-CM: N30.00  ICD-9-CM: 595.0  8/27/2022 Unknown        Severe protein-calorie malnutrition ([facility] [address] (Chronic) ICD-10-CM: I32  ICD-9-CM: 262  6/10/2015 Yes         Review of Systems:   A comprehensive review of systems was negative except for that written in the HPI. Vital Signs:    Last 24hrs VS reviewed since prior progress note. Most recent are:  Visit Vitals  BP (!) 132/46 (BP 1 Location: Left upper arm, BP Patient Position: Semi fowlers)   Pulse 97   Temp 99.7 °F (37.6 °C)   Resp 17   Ht 5' 9\" (1.753 m)   Wt 83.1 kg (183 lb 3.2 oz)   SpO2 97%   BMI 27.05 kg/m²         Intake/Output Summary (Last 24 hours) at 8/30/2022 1202  Last data filed at 8/30/2022 8193  Gross per 24 hour   Intake 2384.83 ml   Output --   Net 2384.83 ml          Physical Examination:     I had a face to face encounter with this patient and independently examined them on 8/30/2022 as outlined below:          Constitutional:  Tachypneic. Appears uncomfortable. No acute distress, cooperative, pleasant    ENT:  Oral mucosa dry. Icteric    Resp:  No overt wheezing, nonlabored breathing   CV:  Regular rhythm, normal rate, no rubs    GI:  Extensive scaring. Ulcerated midline abdominal wound with granulation tissue overlying on initial eval. Minimal erythema from wound. No high pitch sounds. No high pitch sounds    Musculoskeletal:  No edema, warm and dry, symmetrical muscle tone    Neurologic:  Moves all extremities. AAOx3, responds appropriately to questions asked.   SKIN: warm and dry, jaundice       Psych: not agitated, not anxious     Data Review:    Review and/or order of clinical lab test  Review and/or order of tests in the radiology section of CPT  Review and/or order of tests in the medicine section of [facility]      Labs:     Recent Labs     08/29/22 0355 08/28/22  1214   WBC 13.7* 19.5*   HGB 7.3* 8.6*   HCT 20.8* 24.1*   * 572*       Recent Labs     08/30/22  0240 08/29/22 0357 08/29/22 0355 08/28/22  1214 08/28/22  1039   * 133*  --  133*  --    K 3.2* 3.1*  --  2.8*  --    CL 95* 99  --  103  --    CO2 28 25  --  23  --    BUN 50* 75*  --  82*  --    CREA 1.53* 1.81*  --  2.06*  --    * 124*  --  103*  --    CA 8.3* 8.2*  --  8.7  --    MG  --   --  2.5*  --  2.6*   PHOS 2.9 2.6  --   --   --        Recent Labs     08/30/22  0240 08/29/22 0357 08/29/22 0355 08/28/22  1214 08/28/22  1039   ALT  --   --  30 19 21   AP  --   --  131* 146* 162*   TBILI  --   --  13.9* 15.9* 17.2*   TP  --   --  5.3* 6.4 5.5*   ALB 2.5* 2.8* 2.7* 2.4* 2.7*   GLOB  --   --  2.6 4.0 2.8       Recent Labs     08/29/22  0355 08/28/22  1214   INR 1.3* 1.4*   PTP 13.7* 14.0*   APTT 37.0* 35.8*       Lab Results   Component Value Date/Time    Cholesterol, total 134 12/01/2014 04:33 AM    HDL Cholesterol 35 12/01/2014 04:33 AM    LDL, calculated 58.2 12/01/2014 04:33 AM    Triglyceride 204 (H) 12/01/2014 04:33 AM    CHOL/HDL Ratio 3.8 12/01/2014 04:33 AM     Lab Results   Component Value Date/Time    Glucose (POC) 144 (H) 08/30/2022 06:32 AM    Glucose (POC) 139 (H) 08/29/2022 10:34 PM    Glucose (POC) 105 08/28/2022 12:09 PM    Glucose (POC) 113 08/27/2022 05:26 AM    Glucose (POC) 105 08/26/2022 08:20 PM     Lab Results   Component Value Date/Time    Color DARK YELLOW 08/26/2022 07:27 PM    Appearance TURBID (A) 08/26/2022 07:27 PM    Specific gravity 1.025 08/26/2022 07:27 PM    Specific gravity 1.010 10/07/2019 05:48 AM    pH (UA) 5.0 08/26/2022 07:27 PM    Protein 100 (A) 08/26/2022 07:27 PM    Glucose Negative 08/26/2022 07:27 PM    Ketone TRACE (A) 08/26/2022 07:27 PM    Bilirubin NEGATIVE  10/07/2019 05:48 AM    Urobilinogen 0.2 08/26/2022 07:27 PM    Nitrites Positive (A) 08/26/2022 07:27 PM    Leukocyte Esterase SMALL (A) 08/26/2022 07:27 PM    Epithelial cells FEW 08/26/2022 07:27 PM    Bacteria 1+ (A) 08/26/2022 07:27 PM    WBC 5-10 08/26/2022 07:27 PM    RBC 0-5 08/26/2022 07:27 PM       Medications Reviewed:     Current Facility-Administered Medications   Medication Dose Route Frequency    vancomycin (VANCOCIN) 1,000 mg in 0.9% sodium chloride 250 mL ([id number])  1,000 mg IntraVENous Q18H    iohexoL (OMNIPAQUE) 240 mg iodine/mL solution 50 mL  50 mL Oral RAD ONCE    diazePAM (VALIUM) injection 2.5 mg  2.5 mg IntraVENous ONCE PRN    TPN ADULT - CENTRAL   IntraVENous CONTINUOUS    cefepime (MAXIPIME) 2 g in 0.9% sodium chloride (MBP/ADV) 100 mL MBP  2 g IntraVENous Q24H    fluconazole (DIFLUCAN) 200mg/100 mL IVPB (premix)  200 mg IntraVENous Q24H    collagenase (SANTYL) 250 unit/gram ointment   Topical DAILY    TPN ADULT - CENTRAL   IntraVENous CONTINUOUS    fat emulsion 20% soy-oliv-fish oil (SMOFlipid) infusion 250 mL  250 mL IntraVENous Q24H    nicotine (NICODERM CQ) 21 mg/24 hr patch 1 Patch  1 Patch TransDERmal DAILY    HYDROmorphone (DILAUDID) injection 2 mg  2 mg IntraVENous Q3H PRN    naloxone (NARCAN) injection 0.4 mg  0.4 mg IntraVENous EVERY 2 MINUTES AS NEEDED    metoprolol tartrate (LOPRESSOR) tablet 6.25 mg  6.25 mg Oral Q12H metoprolol (LOPRESSOR) injection 2.5 mg  2.5 mg IntraVENous Q4H PRN    bisacodyL (DULCOLAX) suppository 10 mg  10 mg Rectal DAILY PRN    pantoprazole (PROTONIX) 40 mg in 0.9% sodium chloride 10 mL injection  40 mg IntraVENous Q12H    sodium chloride (NS) flush 5-40 mL  5-40 mL IntraVENous Q8H    sodium chloride (NS) flush 5-40 mL  5-40 mL IntraVENous PRN    acetaminophen (TYLENOL) tablet 650 mg  650 mg Oral Q6H PRN    Or    acetaminophen (TYLENOL) suppository 650 mg  650 mg Rectal Q6H PRN    ondansetron (ZOFRAN ODT) tablet 4 mg  4 mg Oral Q8H PRN    Or    ondansetron (ZOFRAN) injection 4 mg  4 mg IntraVENous Q6H PRN    Vancomycin - Pharmacy to Dose   Other Rx Dosing/Monitoring    sodium bicarbonate (8.4%) 150 mEq in dextrose 5% 1,000 mL infusion   IntraVENous CONTINUOUS    oxyCODONE IR (ROXICODONE) tablet 10 mg  10 mg Oral Q6H PRN    heparin (porcine) injection 5,000 Units  5,000 Units SubCUTAneous Q8H     ______________________________________________________________________  EXPECTED LENGTH OF STAY: - - -  ACTUAL LENGTH OF STAY:          3                 Osmin Stanford MD

## 2022-08-30 NOTE — PROGRESS NOTES
Pharmacist Note - Vancomycin Dosing  Therapy day 5  Indication: Sepsis  Current regimen: 1000 mg IV q24 hrs    Recent Labs     08/30/22  0240 08/29/22  0357 08/29/22  0355 08/28/22  1214 08/28/22  1039   WBC  --   --  13.7* 19.5* 19.4*   CREA 1.53* 1.81*  --  2.06*  --    BUN 50* 75*  --  82*  --        A random vancomycin level of 13.3 mcg/mL was obtained ~ 10.5 hr p dose and from this level, the patient's AUC24 is calculated to be 339 with the current regimen. Goal target range AUC/SHASHANK 400-600      Plan: Will adjust to 1000 mg IV q18 hrs for predicted   Pharmacy will continue to monitor this patient daily for changes in clinical status and renal function. *Random vancomycin levels are used to calculate AUC/SHASHANK, this level should not be interpreted as a trough. Vancomycin has been dosed using Bayesian kinetics software to target an AUC24:SHASHANK of 400-600, which provides adequate exposure for as assumed infection due to MRSA with an SHASHANK of 1 or less while reducing the risk of nephrotoxicity as seen with traditional trough based dosing goals.

## 2022-08-30 NOTE — PROGRESS NOTES
Problem: Falls - Risk of  Goal: *Absence of Falls  Description: Document Teofilo Hernandez Fall Risk and appropriate interventions in the flowsheet. Outcome: Progressing Towards Goal  Note: Fall Risk Interventions:  Mobility Interventions: Bed/chair exit alarm, Patient to call before getting OOB         Medication Interventions: Teach patient to arise slowly                   Problem: Patient Education: Go to Patient Education Activity  Goal: Patient/Family Education  Outcome: Progressing Towards Goal     Problem: Discharge Planning  Goal: *Discharge to safe environment  Outcome: Progressing Towards Goal  Goal: *Knowledge of medication management  Outcome: Progressing Towards Goal  Goal: *Knowledge of discharge instructions  Outcome: Progressing Towards Goal     Problem: Patient Education: Go to Patient Education Activity  Goal: Patient/Family Education  Outcome: Progressing Towards Goal     Problem: Risk for Spread of Infection  Goal: Prevent transmission of infectious organism to others  Description: Prevent the transmission of infectious organisms to other patients, staff members, and visitors.   Outcome: Progressing Towards Goal     Problem: Patient Education:  Go to Education Activity  Goal: Patient/Family Education  Outcome: Progressing Towards Goal

## 2022-08-31 LAB
ALBUMIN SERPL-MCNC: 2.2 G/DL (ref 3.5–5)
ALBUMIN/GLOB SERPL: 0.8 {RATIO} (ref 1.1–2.2)
ALP SERPL-CCNC: 98 U/L (ref 45–117)
ALT SERPL-CCNC: 22 U/L (ref 12–78)
APTT PPP: 35.9 SEC (ref 22.1–31)
AST SERPL-CCNC: 35 U/L (ref 15–37)
BACTERIA SPEC CULT: NORMAL
BILIRUB DIRECT SERPL-MCNC: 5 MG/DL (ref 0–0.2)
BILIRUB SERPL-MCNC: 6.3 MG/DL (ref 0.2–1)
CEA SERPL-MCNC: 2.6 NG/ML
CMV IGG SERPL IA-ACNC: >10 U/ML (ref 0–0.59)
CMV IGM SERPL IA-ACNC: <30 AU/ML (ref 0–29.9)
EBV NA IGG SER-ACNC: 112 U/ML (ref 0–17.9)
GLOBULIN SER CALC-MCNC: 2.6 G/DL (ref 2–4)
GLUCOSE BLD STRIP.AUTO-MCNC: 141 MG/DL (ref 65–117)
HAV IGM SER QL: NONREACTIVE
HBV CORE IGM SER QL: NONREACTIVE
HBV SURFACE AG SER QL: <0.1 INDEX
HBV SURFACE AG SER QL: NEGATIVE
HCV AB SERPL QL IA: NONREACTIVE
HIV 1+2 AB+HIV1 P24 AG SERPL QL IA: NONREACTIVE
HIV12 RESULT COMMENT, HHIVC: NORMAL
LDH SERPL L TO P-CCNC: 472 U/L (ref 85–241)
PHOSPHATE SERPL-MCNC: 2.3 MG/DL (ref 2.6–4.7)
PROT SERPL-MCNC: 4.8 G/DL (ref 6.4–8.2)
SELENIUM SERPL-MCNC: 77 UG/L (ref 93–198)
SERVICE CMNT-IMP: ABNORMAL
SERVICE CMNT-IMP: NORMAL
SP1: NORMAL
SP2: NORMAL
SP3: NORMAL
THERAPEUTIC RANGE,PTTT: ABNORMAL SECS (ref 58–77)
VIT B1 BLD-SCNC: 143 NMOL/L (ref 66.5–200)

## 2022-08-31 PROCEDURE — 82962 GLUCOSE BLOOD TEST: CPT

## 2022-08-31 PROCEDURE — 74011000258 HC RX REV CODE- 258: Performed by: INTERNAL MEDICINE

## 2022-08-31 PROCEDURE — 77010033678 HC OXYGEN DAILY

## 2022-08-31 PROCEDURE — 84100 ASSAY OF PHOSPHORUS: CPT

## 2022-08-31 PROCEDURE — 85025 COMPLETE CBC W/AUTO DIFF WBC: CPT

## 2022-08-31 PROCEDURE — 99233 SBSQ HOSP IP/OBS HIGH 50: CPT | Performed by: INTERNAL MEDICINE

## 2022-08-31 PROCEDURE — 85730 THROMBOPLASTIN TIME PARTIAL: CPT

## 2022-08-31 PROCEDURE — 74011250636 HC RX REV CODE- 250/636: Performed by: INTERNAL MEDICINE

## 2022-08-31 PROCEDURE — 80076 HEPATIC FUNCTION PANEL: CPT

## 2022-08-31 PROCEDURE — 65660000001 HC RM ICU INTERMED STEPDOWN

## 2022-08-31 PROCEDURE — 74011000250 HC RX REV CODE- 250: Performed by: INTERNAL MEDICINE

## 2022-08-31 PROCEDURE — 83615 LACTATE (LD) (LDH) ENZYME: CPT

## 2022-08-31 PROCEDURE — C9113 INJ PANTOPRAZOLE SODIUM, VIA: HCPCS | Performed by: INTERNAL MEDICINE

## 2022-08-31 PROCEDURE — 80048 BASIC METABOLIC PNL TOTAL CA: CPT

## 2022-08-31 PROCEDURE — 82378 CARCINOEMBRYONIC ANTIGEN: CPT

## 2022-08-31 PROCEDURE — 94760 N-INVAS EAR/PLS OXIMETRY 1: CPT

## 2022-08-31 PROCEDURE — 74011250636 HC RX REV CODE- 250/636: Performed by: PHYSICIAN ASSISTANT

## 2022-08-31 PROCEDURE — 86301 IMMUNOASSAY TUMOR CA 19-9: CPT

## 2022-08-31 PROCEDURE — 36415 COLL VENOUS BLD VENIPUNCTURE: CPT

## 2022-08-31 PROCEDURE — 82105 ALPHA-FETOPROTEIN SERUM: CPT

## 2022-08-31 PROCEDURE — 74011250637 HC RX REV CODE- 250/637: Performed by: INTERNAL MEDICINE

## 2022-08-31 PROCEDURE — 74011250637 HC RX REV CODE- 250/637: Performed by: NURSE PRACTITIONER

## 2022-08-31 PROCEDURE — 74011000250 HC RX REV CODE- 250: Performed by: FAMILY MEDICINE

## 2022-08-31 RX ORDER — DIPHENOXYLATE HYDROCHLORIDE AND ATROPINE SULFATE 2.5; .025 MG/1; MG/1
1 TABLET ORAL
Status: DISCONTINUED | OUTPATIENT
Start: 2022-08-31 | End: 2022-09-12 | Stop reason: HOSPADM

## 2022-08-31 RX ORDER — CYANOCOBALAMIN 1000 UG/ML
1000 INJECTION, SOLUTION INTRAMUSCULAR; SUBCUTANEOUS ONCE
Status: COMPLETED | OUTPATIENT
Start: 2022-08-31 | End: 2022-08-31

## 2022-08-31 RX ORDER — HYDROMORPHONE HYDROCHLORIDE 2 MG/ML
3 INJECTION, SOLUTION INTRAMUSCULAR; INTRAVENOUS; SUBCUTANEOUS
Status: DISCONTINUED | OUTPATIENT
Start: 2022-08-31 | End: 2022-09-04

## 2022-08-31 RX ORDER — OXYCODONE HYDROCHLORIDE 5 MG/1
12.5 TABLET ORAL
Status: DISCONTINUED | OUTPATIENT
Start: 2022-08-31 | End: 2022-09-10

## 2022-08-31 RX ORDER — FLUCONAZOLE 2 MG/ML
400 INJECTION, SOLUTION INTRAVENOUS DAILY
Status: DISCONTINUED | OUTPATIENT
Start: 2022-09-01 | End: 2022-09-08

## 2022-08-31 RX ORDER — FLUCONAZOLE 2 MG/ML
400 INJECTION, SOLUTION INTRAVENOUS DAILY
Status: DISCONTINUED | OUTPATIENT
Start: 2022-08-31 | End: 2022-08-31

## 2022-08-31 RX ORDER — DIPHENOXYLATE HYDROCHLORIDE AND ATROPINE SULFATE 2.5; .025 MG/1; MG/1
1 TABLET ORAL 4 TIMES DAILY
Status: DISCONTINUED | OUTPATIENT
Start: 2022-08-31 | End: 2022-09-12 | Stop reason: HOSPADM

## 2022-08-31 RX ORDER — POTASSIUM CHLORIDE 7.45 MG/ML
10 INJECTION INTRAVENOUS
Status: COMPLETED | OUTPATIENT
Start: 2022-08-31 | End: 2022-08-31

## 2022-08-31 RX ORDER — ERGOCALCIFEROL 1.25 MG/1
50000 CAPSULE ORAL
Status: DISCONTINUED | OUTPATIENT
Start: 2022-08-31 | End: 2022-09-12 | Stop reason: HOSPADM

## 2022-08-31 RX ADMIN — HYDROMORPHONE HYDROCHLORIDE 2 MG: 2 INJECTION, SOLUTION INTRAMUSCULAR; INTRAVENOUS; SUBCUTANEOUS at 05:20

## 2022-08-31 RX ADMIN — ERGOCALCIFEROL 50000 UNITS: 1.25 CAPSULE ORAL at 14:42

## 2022-08-31 RX ADMIN — HYDROMORPHONE HYDROCHLORIDE 2 MG: 2 INJECTION, SOLUTION INTRAMUSCULAR; INTRAVENOUS; SUBCUTANEOUS at 01:00

## 2022-08-31 RX ADMIN — METOPROLOL TARTRATE 6.25 MG: 25 TABLET, FILM COATED ORAL at 08:32

## 2022-08-31 RX ADMIN — HEPARIN SODIUM 5000 UNITS: 5000 INJECTION INTRAVENOUS; SUBCUTANEOUS at 21:00

## 2022-08-31 RX ADMIN — DIPHENOXYLATE HYDROCHLORIDE AND ATROPINE SULFATE 1 TABLET: 2.5; .025 TABLET ORAL at 14:13

## 2022-08-31 RX ADMIN — HEPARIN SODIUM 5000 UNITS: 5000 INJECTION INTRAVENOUS; SUBCUTANEOUS at 14:13

## 2022-08-31 RX ADMIN — SMOFLIPID 250 ML: 6; 6; 5; 3 INJECTION, EMULSION INTRAVENOUS at 20:42

## 2022-08-31 RX ADMIN — SALINE NASAL SPRAY 2 SPRAY: 1.5 SOLUTION NASAL at 14:14

## 2022-08-31 RX ADMIN — VANCOMYCIN HYDROCHLORIDE 1000 MG: 1 INJECTION, POWDER, LYOPHILIZED, FOR SOLUTION INTRAVENOUS at 21:01

## 2022-08-31 RX ADMIN — HYDROMORPHONE HYDROCHLORIDE 3 MG: 2 INJECTION, SOLUTION INTRAMUSCULAR; INTRAVENOUS; SUBCUTANEOUS at 16:25

## 2022-08-31 RX ADMIN — SODIUM CHLORIDE, PRESERVATIVE FREE 40 MG: 5 INJECTION INTRAVENOUS at 20:42

## 2022-08-31 RX ADMIN — SODIUM CHLORIDE, PRESERVATIVE FREE 10 ML: 5 INJECTION INTRAVENOUS at 16:25

## 2022-08-31 RX ADMIN — SODIUM CHLORIDE, PRESERVATIVE FREE 40 MG: 5 INJECTION INTRAVENOUS at 08:32

## 2022-08-31 RX ADMIN — METOPROLOL TARTRATE 6.25 MG: 25 TABLET, FILM COATED ORAL at 20:43

## 2022-08-31 RX ADMIN — POTASSIUM CHLORIDE 10 MEQ: 7.46 INJECTION, SOLUTION INTRAVENOUS at 08:32

## 2022-08-31 RX ADMIN — DIPHENOXYLATE HYDROCHLORIDE AND ATROPINE SULFATE 1 TABLET: 2.5; .025 TABLET ORAL at 19:10

## 2022-08-31 RX ADMIN — CEFEPIME 2 G: 2 INJECTION, POWDER, FOR SOLUTION INTRAVENOUS at 16:24

## 2022-08-31 RX ADMIN — VANCOMYCIN HYDROCHLORIDE 1000 MG: 1 INJECTION, POWDER, LYOPHILIZED, FOR SOLUTION INTRAVENOUS at 04:27

## 2022-08-31 RX ADMIN — FLUCONAZOLE IN SODIUM CHLORIDE 200 MG: 2 INJECTION, SOLUTION INTRAVENOUS at 10:16

## 2022-08-31 RX ADMIN — CYANOCOBALAMIN 1000 MCG: 1000 INJECTION, SOLUTION INTRAMUSCULAR; SUBCUTANEOUS at 14:12

## 2022-08-31 RX ADMIN — HEPARIN SODIUM 5000 UNITS: 5000 INJECTION INTRAVENOUS; SUBCUTANEOUS at 05:17

## 2022-08-31 RX ADMIN — COLLAGENASE SANTYL: 250 OINTMENT TOPICAL at 08:32

## 2022-08-31 RX ADMIN — OXYCODONE 12.5 MG: 5 TABLET ORAL at 11:35

## 2022-08-31 RX ADMIN — HYDROMORPHONE HYDROCHLORIDE 3 MG: 2 INJECTION, SOLUTION INTRAMUSCULAR; INTRAVENOUS; SUBCUTANEOUS at 20:35

## 2022-08-31 RX ADMIN — CEFEPIME 2 G: 2 INJECTION, POWDER, FOR SOLUTION INTRAVENOUS at 04:28

## 2022-08-31 RX ADMIN — POTASSIUM CHLORIDE: 2 INJECTION, SOLUTION, CONCENTRATE INTRAVENOUS at 19:10

## 2022-08-31 RX ADMIN — HYDROMORPHONE HYDROCHLORIDE 2 MG: 2 INJECTION, SOLUTION INTRAMUSCULAR; INTRAVENOUS; SUBCUTANEOUS at 08:32

## 2022-08-31 RX ADMIN — POTASSIUM CHLORIDE 10 MEQ: 7.46 INJECTION, SOLUTION INTRAVENOUS at 10:16

## 2022-08-31 RX ADMIN — HYDROMORPHONE HYDROCHLORIDE 3 MG: 2 INJECTION, SOLUTION INTRAMUSCULAR; INTRAVENOUS; SUBCUTANEOUS at 12:32

## 2022-08-31 RX ADMIN — DIPHENOXYLATE HYDROCHLORIDE AND ATROPINE SULFATE 1 TABLET: 2.5; .025 TABLET ORAL at 20:43

## 2022-08-31 NOTE — PROGRESS NOTES
[address]  [address]  [address] [place], [address]  [phone]                     GI PROGRESS NOTE    Patient Name: Charlie Islas      : 1960      MRN: [id number]  Admit Date: 2022  Today's Date: 2022  CC: hyperbilirubinemia and severe Crohn's disease    Subjective:     Patient continues with right sided abdominal pain and loose mucoid stools. Declining to take colestipol stating it the medication does not work, requesting to use lomotil instead. Having hiccups that started overnight. Asking for coffee. Objective:     Blood pressure (!) 138/57, pulse (!) 105, temperature 99.1 °F (37.3 °C), resp. rate 20, height 5' 9\" (1.753 m), weight 90.1 kg (198 lb 10.2 oz), SpO2 98 %. Physical Exam:  General appearance: cooperative,  male, NAD  Skin: jaundice  HEENT: Sclerae icteric. Cardiovascular: Tachycardiac. No murmurs, gallops, or rubs. Respiratory: Coarse breath sounds, congested wet cough. GI: Abdomen nondistended, soft, increased right side abd tenderness. Normal active bowel sounds. + anterior wall hernia which bulges with breathing. Multiple abdominal scars, abd bandage over abdomen   Rectal: Deferred   Musculoskeletal: No pitting edema of the lower legs. Neurological:  Alert and oriented. Psychiatric:  No anxiety or agitation. Data Review:    Recent Results (from the past 24 hour(s))   HEPATIC FUNCTION PANEL    Collection Time: 22  1:10 AM   Result Value Ref Range    Protein, total 4.8 (L) 6.4 - 8.2 g/dL    Albumin 2.2 (L) 3.5 - 5.0 g/dL    Globulin 2.6 2.0 - 4.0 g/dL    A-G Ratio 0.8 (L) 1.1 - 2.2      Bilirubin, total 6.3 (H) 0.2 - 1.0 MG/DL    Bilirubin, direct 5.0 (H) 0.0 - 0.2 MG/DL    Alk.  phosphatase 98 45 - 117 U/L    AST (SGOT) 35 15 - 37 U/L    ALT (SGPT) 22 12 - 78 U/L   LD    Collection Time: 22  1:10 AM   Result Value Ref Range     (H) 85 - 241 U/L   PTT    Collection Time: 22  1:10 AM Result Value Ref Range    aPTT 35.9 (H) 22.1 - 31.0 sec    aPTT, therapeutic range     58.0 - 89.3 SECS   METABOLIC PANEL, BASIC    Collection Time: 08/31/22  1:10 AM   Result Value Ref Range    Sodium 132 (L) 136 - 145 mmol/L    Potassium 3.3 (L) 3.5 - 5.1 mmol/L    Chloride 94 (L) 97 - 108 mmol/L    CO2 30 21 - 32 mmol/L    Anion gap 8 5 - 15 mmol/L    Glucose 109 (H) 65 - 100 mg/dL    BUN 39 (H) 6 - 20 MG/DL    Creatinine 1.37 (H) 0.70 - 1.30 MG/DL    BUN/Creatinine ratio 28 (H) 12 - 20      GFR est AA >60 >60 ml/min/1.73m2    GFR est non-AA 53 (L) >60 ml/min/1.73m2    Calcium 8.0 (L) 8.5 - 10.1 MG/DL   CBC WITH AUTOMATED DIFF    Collection Time: 08/31/22  1:17 AM   Result Value Ref Range    WBC 15.0 (H) 4.1 - 11.1 K/uL    RBC 2.26 (L) 4.10 - 5.70 M/uL    HGB 7.1 (L) 12.1 - 17.0 g/dL    HCT 20.4 (L) 36.6 - 50.3 %    MCV 90.3 80.0 - 99.0 FL    MCH 31.4 26.0 - 34.0 PG    MCHC 34.8 30.0 - 36.5 g/dL    RDW 16.7 (H) 11.5 - 14.5 %    PLATELET 667 (H) 075 - 400 K/uL    MPV 10.3 8.9 - 12.9 FL    NRBC 0.1 (H) 0  WBC    ABSOLUTE NRBC 0.02 (H) 0.00 - 0.01 K/uL    NEUTROPHILS 85 (H) 32 - 75 %    LYMPHOCYTES 7 (L) 12 - 49 %    MONOCYTES 5 5 - 13 %    EOSINOPHILS 2 0 - 7 %    BASOPHILS 0 0 - 1 %    IMMATURE GRANULOCYTES 1 (H) 0.0 - 0.5 %    ABS. NEUTROPHILS 12.6 (H) 1.8 - 8.0 K/UL    ABS. LYMPHOCYTES 1.1 0.8 - 3.5 K/UL    ABS. MONOCYTES 0.8 0.0 - 1.0 K/UL    ABS. EOSINOPHILS 0.3 0.0 - 0.4 K/UL    ABS. BASOPHILS 0.0 0.0 - 0.1 K/UL    ABS. IMM.  GRANS. 0.2 (H) 0.00 - 0.04 K/UL    DF SMEAR SCANNED      RBC COMMENTS ANISOCYTOSIS  1+        RBC COMMENTS TARGET CELLS  1+        RBC COMMENTS POLYCHROMASIA  1+       PHOSPHORUS    Collection Time: 08/31/22 11:00 AM   Result Value Ref Range    Phosphorus 2.3 (L) 2.6 - 4.7 MG/DL   GLUCOSE, POC    Collection Time: 08/31/22 11:37 AM   Result Value Ref Range    Glucose (POC) 141 (H) 65 - 117 mg/dL    Performed by Maritza Limon / Plan :     Mr. Sonja Red is a 61yo Pmhx/o anemia, depression, copd, incisional abd hernia w/ overlying skin wound, malnutrition and b12 deficiency, short bowel syndrome due to severe Crohn's disease (210cm small bowel remaining from lig treitz to ileosigmoid anastomosis) s/p >30 surgeries with ileosigmoid anastomosis currently not on treatment, and chronic pain syndrome and per report is on oxycodone 15mg 5x/d, gabapentin. There was no biliary dilation on ultrasound, so the hyperbilirubinemia is likely due to either sepsis, decompensated cirrhosis, or antibiotic-related rather than biliary obstruction. CTAP w/ Oral CON 8/30/22:  no definite enterocutaneous fistula    MRCP 8/30/22: no choledocholithiasis, focal narrowing vs defect/artifact in proximal CBD on preliminary read. Called MRI to request final read, spoke with Yuki Vickers     - He has an enlarged liver with a nodular contour on CT concerning for cirrhosis, multiple serologies for cirrhosis etiology pending   - Appreciate hepatology input, possible liver bx for further evaluation of cirrhosis  - Check AFP, , CEA   - plan for flex sig tomorrow to evaluate extent of crohns disease; Give TWE in the morning and 2 hrs prior to proceedure  - stool studies negative. Stop colestipol per patient request, trial lomotil for diarrhea  - Continue bowel rest, continue TPN/Lipids per renal  - Start vitamin B12 and Vitamin D replacement, check Vitamin A  - Nutrition following, appreciate input  - CRS following, appreciate input  - renal following re: SUHA with acidosis   -RUL PNA, has quantiferon gold pending to screen for TB       Patient Active Hospital Problem List:   Active Problems:    Severe protein-calorie malnutrition ([facility] [address] (6/10/2015)      Acute cystitis (8/27/2022)      Time Spent with Patient: Felice Angel NP            I have personally reviewed the history and independently examined the patient.  I have reviewed the chart and agree with the documentation recorded by the Mid Level Provider, including the assessment, treatment plan, and disposition. ASSESSMENT AND PLAN:  65 yr old with history of Crohn and multiple surgeries presented with jaundice, abdominal wound and pain. CT did not show EC fistula.   Liver biopsy tomorrow  Flex Sig likely Friday  Tumor markers    Alejandro Corbin MD

## 2022-08-31 NOTE — CONSULTS
[facility] [facility] [place], Kevin RIDER, Debora Narendra Enrrique, [place]      Za Singh, [place] [facility] Hale, [facility]   KATLYN Newberry, [facility]       Layo Gibbs De Sinha 136    at [address], [address] [place] [address], Mary Jane  22.    [phone]    FAX: [address] [place] [address], [address]    [phone]    FAX: [phone]       HEPATOLOGY CONSULT NOTE  I was asked to see this patient in consultation by for management of cirrhosis. I have reviewed the Emergency room note,   Hospital admission note, Notes by all other physicians who have seen the patient during this hospitalization to date. I have reviewed the problem list, all past medical history and the reason for this hospitalization. I have reviewed the allergies and the medications the patient was taking at home prior to this hospitalization. HISTORY:  The patient is a 65 y.o.  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 He has been treated with [employer] in the past.  He has 4-5 stool per day and an equal number at night that wake him up from sleep. There was no previous history of liver disease. He came to the ED because of feeling poorly with increased weakness over several days. In the ED Laboratory studies were significant for:    WBC 29.1, HB 12.9 gms, , Sna 127, Scr 3.27 mg, AST 25, ALT 38, , TBILI 10.2 mg, INR 1.1, Sammonia <10,     Imaging of the liver with Ultrasound CT scan demonstrated increased echogenicity and surface nodularity consistent with cirrhosis. NO liver mass. No dilated bile ducts. No ascites.     Hospital Course to date:  He has been treated with IV ABX. Metabolic acidosis was corrected  All cultures have been negative  MRI with MRCP has been done. Report is pending. Hepatology Plan:  Await results of MRI MRCP to see if he has [facility]  Consider liver biopsy if MRCP negative. I could do this Thursday    ASSESSMENT AND PLAN:  Cirrhosis  The diagnosis of cirrhosis is based upon imaging only, laboratory studies  Cirrhosis of unclear etiology. He has Crohns disease and an elevation in ALP and so the most likely etiology would be PSC. The CTP is 9. Child class B. The MELD score is 24. Elevated liver enzymes  AST is elevated. ALT is normal.  ALP is elevated. Total bilirubin is elevated. Serum albumin is depressed. INR is prolonged. The platelet count is normal.      The pattern of AST>ALT is consistent with cirrhosis    The elevation in ALP, TBILI and h/o Crohns disease suggests he may have [facility]. MRCP has been performed. Await results. IF the MRCP is normal a liver biopsy would be indicated and helpful to define the etiology for the elevation in ALP and TBILI    Serologic testing for causes of chronic liver disease was negative. Will get ANCA, ASMA, IGG4    Low serum albumin  The low serum albumin is secondary to malabsorption from the patients short cut and may nt reflect worsening liver disease. Coagulopathy  This is secondary to cirrhosis, and malnutrition form the patients short gut and may not reflect more advanced liver disease. Will treat with 3 doses of Vitamin K over 3 days. Anemia   This is due to multifactorial causes including inability to absorb FE due to short gut and Crohns disease. Will obtain FE panel to assess for iron stores. The most recent FE studies were from 2012. The FE saturation is 3%    If he still has FE deficiency will need IV FE since he not absorbing PO. SYSTEM REVIEW:  Constitution systems: Negative for fever, chills, weight gain, weight loss.    Eyes: Negative for visual changes. ENT: Negative for sore throat, painful swallowing. Respiratory: Negative for cough, hemoptysis, SOB. Cardiology: Negative for chest pain, palpitations. GI:  Chronic diarrhea multiple times per day. : Negative for urinary frequency, dysuria, hematuria, nocturia. Skin: Negative for rash. Hematology: Negative for easy bruising, blood clots. Musculo-skelatal: Negative for back pain, muscle pain, weakness. Neurologic: Negative for headaches, dizziness, vertigo, memory problems not related to HE. Psychology: Negative for anxiety, depression. FAMILY HISTORY:  The father is alive at 80years of age  The mother  of heart disease. There is no family history of liver disease. SOCIAL HISTORY:  The patient is . The patient has 2 children,   The patient currently smokes 1.5 pack of tobacco daily. 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 PHYSICAL EXAMINATION:  VS: per nursing note  General:  No acute distress. Eyes:  Sclera icteric  ENT:  No oral lesions. Thyroid normal.  Nodes:  No adenopathy. Skin:  No spider angiomata. Jaundice. Respiratory:  Lungs clear to auscultation. Cardiovascular:  Regular heart rate. Abdomen:  Soft non-tender, Multiple scars. No obvious ascites. Extremities:  No lower extremity edema. Neurologic:  Alert and oriented. Cranial nerves grossly intact. No asterixis.     LABORATORY:   Latest Reference Range & Units 22 17:33 22 03:31 22 12:14 22 03:57 22 02:40   HGB 12.1 - 17.0 g/dL 12.9  8.6 (L) 7.3 (L)    PLATELET 662 - 912 K/uL 334  572 (H) 505 (H)    INR 0.9 - 1.1   1.1  1.4 (H) 1.3 (H)    Sodium 136 - 145 mmol/L 127 (L) 130 (L) 133 (L) 133 (L) 133 (L)   Potassium 3.5 - 5.1 mmol/L 3.6 3.4 (L) 2.8 (L) 3.1 (L) 3.2 (L)   Chloride 97 - 108 mmol/L 96 (L) 102 103 99 95 (L)   CO2 21 - 32 mmol/L 17 (L) 14 (LL) 23 25 28   Glucose 65 - 100 mg/dL 117 (H) 90 103 (H) 124 (H) 102 (H)   BUN 6 - 20 MG/DL 67 (H) 73 (H) 82 (H) 75 (H) 50 (H)   Creatinine 0.70 - 1.30 MG/DL 3.27 (H) 2.77 (H) 2.06 (H) 1.81 (H) 1.53 (H)   Bilirubin, total 0.2 - 1.0 MG/DL 10.2 (H)  15.9 (H) 13.9 (H) 10.0 (H)   Bilirubin, direct 0.0 - 0.2 MG/DL  11.1 (H)  10.1 (H) 6.7 (H)   Albumin 3.5 - 5.0 g/dL  3.5 - 5.0 g/dL 2.6 (L)  2.4 (L) 2.8 (L) 2.6 (L)  2.5 (L)   ALT 12 - 78 U/L 25  19 30 24   AST 15 - 37 U/L 38 (H)  42 (H) 66 (H) 50 (H)   Alk.  phosphatase 45 - 117 U/L 260 (H)  146 (H) 131 (H) 114   Ammonia, plasma <32 UMOL/L   <10     (LL): Data is critically low  (L): Data is abnormally low  (H): Data is abnormally high      MD [facility] 13 of [address] [facility] 22  201 [facility]

## 2022-08-31 NOTE — PROGRESS NOTES
Pharmacy Renal Dose Protocol  Medication: fluconazole  Current regimen:  200 mg IV every 24 hr  Recent Labs     08/29/22  0357 08/28/22  1214 08/27/22  0331   CREA 1.81* 2.06* 2.77*   BUN 75* 82* 73*     Estimated CrCl:  62 ml/min  Plan: Change to 400 mg IV q24h for improvement in renal function with CrCl > 50 ml/min per renal adjustment protocol. 2. Medication: cefepime  Current regimen:  2 g IV every 24 hr  Estimated CrCl:  62 ml/min  Plan: Change to 2 g IV q12h for improvement in renal function with CrCl > 60 ml/min per renal adjustment protocol.

## 2022-08-31 NOTE — PROGRESS NOTES
[address] [employer] Progress Note        NAME: Patricia Heart       :  1960       MRN:  [id number]     Date/Time: 2022    Risk of deterioration: medium       Assessment:    Plan: SUHA   Acidosis-resolved  Hyokalemia  Volume depletion/diarrhea  Hyperbilirubinemia  Hx of crohns/bowel resection/short gut  Leukocytosis/uti=coag (-)  Nonobstructing renal stones  (R) M L infiltrate       SUHA is improving. Cr down to 1.5 to 1.37  Acidosis resolved  Off HCO3 drip    I renewed TPN- added extra Potassium acetate and ct KCL    AM labs to include Mg/Phos    D/W pt        Subjective:     Chief Complaint:  resting. Feels OK    Review of Systems: currently no n/v/cp/sob    Objective:     VITALS:   Last 24hrs VS reviewed since prior progress note. Most recent are:  Visit Vitals  BP (!) 138/57 (BP 1 Location: Right upper arm, BP Patient Position: At rest;Supine)   Pulse 98   Temp 99.1 °F (37.3 °C)   Resp 20   Ht 5' 9\" (1.753 m)   Wt 90.1 kg (198 lb 10.2 oz)   SpO2 98%   BMI 29.33 kg/m²     SpO2 Readings from Last 6 Encounters:   22 98%   20 96%   20 99%   10/09/19 99%   19 98%   18 98%    O2 Flow Rate (L/min): 2 l/min     Intake/Output Summary (Last 24 hours) at 2022 1145  Last data filed at 2022 0305  Gross per 24 hour   Intake 2336.08 ml   Output 250 ml   Net 2086.08 ml          PHYSICAL EXAM:    General   well developed, well nourished, appears stated age, in no acute distress  EENT  Normocephalic, Atraumatic, +scleral icterus  Extremities  No edema.    AOx3     Lab Data Reviewed: (see below)    Medications Reviewed: (see below)    PMH/SH reviewed - no change compared to H&P  ________________________________\  ___________________________________________________    Attending Physician: Aggie Yuen MD     ____________________________________________________  MEDICATIONS:  Current Facility-Administered Medications   Medication Dose Route Frequency    diphenoxylate-atropine (LOMOTIL) tablet 1 Tablet  1 Tablet Oral QID PRN    sodium chloride (OCEAN) 0.65 % nasal squeeze bottle 2 Spray  2 Spray Both Nostrils Q2H PRN    HYDROmorphone (DILAUDID) injection 3 mg  3 mg IntraVENous Q4H PRN    oxyCODONE IR (ROXICODONE) tablet 12.5 mg  12.5 mg Oral Q6H PRN    TPN ADULT - CENTRAL   IntraVENous CONTINUOUS    vancomycin (VANCOCIN) 1,000 mg in 0.9% sodium chloride 250 mL ([id number])  1,000 mg IntraVENous Q18H    TPN ADULT - CENTRAL   IntraVENous CONTINUOUS    colestipoL (COLESTID) tablet 2 g  2 g Oral BID    cefepime (MAXIPIME) 2 g in 0.9% sodium chloride (MBP/ADV) 100 mL MBP  2 g IntraVENous Q24H    fluconazole (DIFLUCAN) 200mg/100 mL IVPB (premix)  200 mg IntraVENous Q24H    collagenase (SANTYL) 250 unit/gram ointment   Topical DAILY    fat emulsion 20% soy-oliv-fish oil (SMOFlipid) infusion 250 mL  250 mL IntraVENous Q24H    nicotine (NICODERM CQ) 21 mg/24 hr patch 1 Patch  1 Patch TransDERmal DAILY    naloxone (NARCAN) injection 0.4 mg  0.4 mg IntraVENous EVERY 2 MINUTES AS NEEDED    metoprolol tartrate (LOPRESSOR) tablet 6.25 mg  6.25 mg Oral Q12H    metoprolol (LOPRESSOR) injection 2.5 mg  2.5 mg IntraVENous Q4H PRN    bisacodyL (DULCOLAX) suppository 10 mg  10 mg Rectal DAILY PRN    pantoprazole (PROTONIX) 40 mg in 0.9% sodium chloride 10 mL injection  40 mg IntraVENous Q12H    sodium chloride (NS) flush 5-40 mL  5-40 mL IntraVENous Q8H    sodium chloride (NS) flush 5-40 mL  5-40 mL IntraVENous PRN    acetaminophen (TYLENOL) tablet 650 mg  650 mg Oral Q6H PRN    Or    acetaminophen (TYLENOL) suppository 650 mg  650 mg Rectal Q6H PRN    ondansetron (ZOFRAN ODT) tablet 4 mg  4 mg Oral Q8H PRN    Or    ondansetron (ZOFRAN) injection 4 mg  4 mg IntraVENous Q6H PRN    Vancomycin - Pharmacy to Dose   Other Rx Dosing/Monitoring    heparin (porcine) injection 5,000 Units  5,000 Units SubCUTAneous Q8H        LABS:  Recent Labs     08/31/22  0117 08/29/22  0355   WBC 15.0* 13.7*   HGB 7.1* 7.3*   HCT 20.4* 20.8*   PLT 434* 505*       Recent Labs     08/31/22 0110 08/30/22 0240 08/29/22 0357 08/29/22 0355   * 133* 133*  --    K 3.3* 3.2* 3.1*  --    CL 94* 95* 99  --    CO2 30 28 25  --    BUN 39* 50* 75*  --    CREA 1.37* 1.53* 1.81*  --    * 102* 124*  --    CA 8.0* 8.3* 8.2*  --    MG  --   --   --  2.5*   PHOS  --  2.9 2.6  --        Recent Labs     08/31/22 0110 08/30/22 0240 08/29/22 0357 08/29/22  0355   ALT 22 24  --  30   AP 98 114  --  131*   TBILI 6.3* 10.0*  --  13.9*   TP 4.8* 5.0*  --  5.3*   ALB 2.2* 2.6*  2.5* 2.8* 2.7*   GLOB 2.6 2.4  --  2.6       Recent Labs     08/31/22 0110 08/29/22 0355 08/28/22  1214   INR  --  1.3* 1.4*   PTP  --  13.7* 14.0*   APTT 35.9* 37.0* 35.8*

## 2022-08-31 NOTE — PROGRESS NOTES
[address] [facility], MD, FACP, Debora Narendra Osuna, [facility], [place] Mensah, GENEVIEVE-IRMA Velasco S Alexia, ROMAIN-BC   KATLYN Tim Genetristan, [facility]-BC       Layomarie Corrales Sai De Sinha 136    at [address], [facility] Mary Jane Shah  22.    [phone]    FAX: [address] [place] [address], [address]    [phone]    FAX: [phone]       HEPATOLOGY PROGRESS NOTE  The patient is a 65 y.o.  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 He has been treated with [employer] in the past.  He has 4-5 stool per day and an equal number at night that wake him up from sleep. There was no previous history of liver disease. He came to the ED because of feeling poorly with increased weakness over several days. In the ED Laboratory studies were significant for:    WBC 29.1, HB 12.9 gms, , Sna 127, Scr 3.27 mg, AST 25, ALT 38, , TBILI 10.2 mg, INR 1.1, Sammonia <10,     Imaging of the liver with Ultrasound CT scan demonstrated increased echogenicity and surface nodularity consistent with cirrhosis. No liver mass. No dilated bile ducts. No ascites. Hospital Course to date:  He has been treated with IV ABX. Metabolic acidosis was corrected  All cultures have been negative  MRI with MRCP does not show any evidence of bile duct stricturing suggestive of PSC. Hepatology Plan:  Plan for liver biopsy tomorrow/Thursday. I have discussed pros/cons and risks of procedure. He agrees to proceed. ASSESSMENT AND PLAN:  Cirrhosis  The diagnosis of cirrhosis is based upon imaging only, laboratory studies  Cirrhosis of unclear etiology.        The CTP is 9.  Child class B. The MELD score is 24. Elevated liver enzymes  AST is elevated. ALT is normal.  ALP is elevated. Total bilirubin is elevated. Serum albumin is depressed. INR is prolonged. The platelet count is normal.      The pattern of AST>ALT is consistent with cirrhosis    The elevation in ALP, TBILI and h/o Crohns disease suggests he may have [facility]. MRCP does not show PSC. He could have small duct PSC or this could be cholestatic/Jaundice due to DILI  I now suspect the later since the ALP has come down to normal and TBILI is coming down as well. Will proceed with liver biopsy to clarify if he has cirrhosis or not and the etiology for the cholestatic jaundice. Serologic testing for causes of chronic liver disease was negative. Will get ANCA, ASMA, IGG4    Low serum albumin  The low serum albumin is secondary to malabsorption from the patients short cut and may nt reflect worsening liver disease. Coagulopathy  This is secondary to cirrhosis, and malnutrition form the patients short gut and may not reflect more advanced liver disease. Will treat with 3 doses of Vitamin K over 3 days. Anemia   This is due to multifactorial causes including inability to absorb FE due to short gut and Crohns disease. Will obtain FE panel to assess for iron stores. The most recent FE studies were from 2012. The FE saturation is 3%    If he still has FE deficiency will need IV FE since he not absorbing PO.      PHYSICAL EXAMINATION:  VS: per nursing note  General:  No acute distress. Eyes:  Sclera icteric  ENT:  No oral lesions. Thyroid normal.  Nodes:  No adenopathy. Skin:  No spider angiomata. Jaundice. Respiratory:  Lungs clear to auscultation. Cardiovascular:  Regular heart rate. Abdomen:  Soft non-tender, Multiple scars. No obvious ascites. Extremities:  No lower extremity edema. Neurologic:  Alert and oriented. Cranial nerves grossly intact. No asterixis.     LABORATORY: Latest Reference Range & Units 8/28/22 12:14 8/29/22 03:55 8/30/22 02:40 8/31/22 01:10   WBC 4.1 - 11.1 K/uL 19.5 (H) 13.7 (H)  15.0 (H)   HGB 12.1 - 17.0 g/dL 8.6 (L) 7.3 (L)  7.1 (L)   PLATELET 184 - 683 K/uL 572 (H) 505 (H)  434 (H)   INR 0.9 - 1.1   1.4 (H) 1.3 (H)     Sodium 136 - 145 mmol/L 133 (L) 133 (L) 133 (L) 132 (L)   Potassium 3.5 - 5.1 mmol/L 2.8 (L) 3.1 (L) 3.2 (L) 3.3 (L)   Chloride 97 - 108 mmol/L 103 99 95 (L) 94 (L)   CO2 21 - 32 mmol/L 23 25 28 30   Glucose 65 - 100 mg/dL 103 (H) 124 (H) 102 (H) 109 (H)   BUN 6 - 20 MG/DL 82 (H) 75 (H) 50 (H) 39 (H)   Creatinine 0.70 - 1.30 MG/DL 2.06 (H) 1.81 (H) 1.53 (H) 1.37 (H)   Bilirubin, total 0.2 - 1.0 MG/DL 15.9 (H) 13.9 (H) 10.0 (H) 6.3 (H)   Bilirubin, direct 0.0 - 0.2 MG/DL  10.4 (H) 6.7 (H) 5.0 (H)   Albumin 3.5 - 5.0 g/dL 2.4 (L) 2.7 (L) 2.6 (L)  2.5 (L) 2.2 (L)   ALT 12 - 78 U/L 19 30 24 22   AST 15 - 37 U/L 42 (H) 66 (H) 50 (H) 35   Alk.  phosphatase 45 - 117 U/L 146 (H) 131 (H) 114 98   (H): Data is abnormally high  (L): Data is abnormally low      Mark Franklin MD  [facility] of [address].  [phone]  [address] [facility]

## 2022-08-31 NOTE — PROGRESS NOTES
General Daily Progress Note    Admission Date: 8/26/2022  Hospital Day 6  Post-Op Day Not Applicable  Subjective:   Pain about the same. Objective:   Patient Vitals for the past 24 hrs:   BP Temp Pulse Resp SpO2 Weight   08/31/22 1412 -- -- (!) 109 -- -- --   08/31/22 1207 -- -- (!) 105 -- -- --   08/31/22 1124 (!) 138/57 99.1 °F (37.3 °C) 98 20 98 % --   08/31/22 1034 -- -- 98 -- -- --   08/31/22 0739 (!) 148/47 99.1 °F (37.3 °C) (!) 102 18 94 % --   08/31/22 0450 -- -- -- -- 90 % --   08/31/22 0351 (!) 140/48 99.1 °F (37.3 °C) (!) 104 28 91 % 90.1 kg (198 lb 10.2 oz)   08/31/22 0308 (!) 162/42 -- (!) 104 (!) 35 92 % --   08/31/22 0154 -- -- (!) 105 -- -- --   08/30/22 2351 (!) 132/42 99.4 °F (37.4 °C) (!) 104 18 91 % --   08/30/22 2203 -- -- (!) 104 -- -- --   08/30/22 2131 (!) 151/53 99.6 °F (37.6 °C) (!) 103 17 99 % --   08/30/22 2002 -- -- (!) 104 -- -- --   08/30/22 1800 -- -- 99 -- -- --     No intake/output data recorded. 08/29 1901 - 08/31 0700  In: 4260.1 [I.V.:3960.1]  Out: 250 [Urine:250]    PHYSICAL EXAMINATION:    GENERAL:  Jaundiced. HEENT:  Left IJ central venous catheter in place. ABDOMEN:  Tender. Soft. Chronic ventral hernia. Chronic open wound in the ventral midline with dimensions as documented by WOCN (3 x 4.2 x 0.3 cm). Small amount of yellow-green fluid on the wound appears to be unchanged compared to yesterday. Stll no visible opening that appears to communicate with the bowel or the peritoneal cavity. Neuro:  Alert and oriented. Data Review   Recent Results (from the past 24 hour(s))   HEPATIC FUNCTION PANEL    Collection Time: 08/31/22  1:10 AM   Result Value Ref Range    Protein, total 4.8 (L) 6.4 - 8.2 g/dL    Albumin 2.2 (L) 3.5 - 5.0 g/dL    Globulin 2.6 2.0 - 4.0 g/dL    A-G Ratio 0.8 (L) 1.1 - 2.2      Bilirubin, total 6.3 (H) 0.2 - 1.0 MG/DL    Bilirubin, direct 5.0 (H) 0.0 - 0.2 MG/DL    Alk.  phosphatase 98 45 - 117 U/L    AST (SGOT) 35 15 - 37 U/L ALT (SGPT) 22 12 - 78 U/L   LD    Collection Time: 08/31/22  1:10 AM   Result Value Ref Range     (H) 85 - 241 U/L   PTT    Collection Time: 08/31/22  1:10 AM   Result Value Ref Range    aPTT 35.9 (H) 22.1 - 31.0 sec    aPTT, therapeutic range     58.0 - 00.7 SECS   METABOLIC PANEL, BASIC    Collection Time: 08/31/22  1:10 AM   Result Value Ref Range    Sodium 132 (L) 136 - 145 mmol/L    Potassium 3.3 (L) 3.5 - 5.1 mmol/L    Chloride 94 (L) 97 - 108 mmol/L    CO2 30 21 - 32 mmol/L    Anion gap 8 5 - 15 mmol/L    Glucose 109 (H) 65 - 100 mg/dL    BUN 39 (H) 6 - 20 MG/DL    Creatinine 1.37 (H) 0.70 - 1.30 MG/DL    BUN/Creatinine ratio 28 (H) 12 - 20      GFR est AA >60 >60 ml/min/1.73m2    GFR est non-AA 53 (L) >60 ml/min/1.73m2    Calcium 8.0 (L) 8.5 - 10.1 MG/DL   CBC WITH AUTOMATED DIFF    Collection Time: 08/31/22  1:17 AM   Result Value Ref Range    WBC 15.0 (H) 4.1 - 11.1 K/uL    RBC 2.26 (L) 4.10 - 5.70 M/uL    HGB 7.1 (L) 12.1 - 17.0 g/dL    HCT 20.4 (L) 36.6 - 50.3 %    MCV 90.3 80.0 - 99.0 FL    MCH 31.4 26.0 - 34.0 PG    MCHC 34.8 30.0 - 36.5 g/dL    RDW 16.7 (H) 11.5 - 14.5 %    PLATELET 500 (H) 764 - 400 K/uL    MPV 10.3 8.9 - 12.9 FL    NRBC 0.1 (H) 0  WBC    ABSOLUTE NRBC 0.02 (H) 0.00 - 0.01 K/uL    NEUTROPHILS 85 (H) 32 - 75 %    LYMPHOCYTES 7 (L) 12 - 49 %    MONOCYTES 5 5 - 13 %    EOSINOPHILS 2 0 - 7 %    BASOPHILS 0 0 - 1 %    IMMATURE GRANULOCYTES 1 (H) 0.0 - 0.5 %    ABS. NEUTROPHILS 12.6 (H) 1.8 - 8.0 K/UL    ABS. LYMPHOCYTES 1.1 0.8 - 3.5 K/UL    ABS. MONOCYTES 0.8 0.0 - 1.0 K/UL    ABS. EOSINOPHILS 0.3 0.0 - 0.4 K/UL    ABS. BASOPHILS 0.0 0.0 - 0.1 K/UL    ABS. IMM.  GRANS. 0.2 (H) 0.00 - 0.04 K/UL    DF SMEAR SCANNED      RBC COMMENTS ANISOCYTOSIS  1+        RBC COMMENTS TARGET CELLS  1+        RBC COMMENTS POLYCHROMASIA  1+       PHOSPHORUS    Collection Time: 08/31/22 11:00 AM   Result Value Ref Range    Phosphorus 2.3 (L) 2.6 - 4.7 MG/DL   GLUCOSE, POC    Collection Time: 08/31/22 11:37 AM   Result Value Ref Range    Glucose (POC) 141 (H) 65 - 117 mg/dL    Performed by Darlene Delgado          CT scan of abdomen and pelvis with oral contrast (8/31/2022): No evidence of fistula. Assessment and Plan: Active Problems:    Severe protein-calorie malnutrition ([facility] [address] (6/10/2015)      Acute cystitis (8/27/2022)      No fistula. Needs continued wound care. Consider outpatient consultation with a plastic surgeon re. possible skin graft or other procedure to facilitate healing.

## 2022-08-31 NOTE — PROGRESS NOTES
[facility] Adult  Hospitalist Group                                                                                          Hospitalist Progress Note  Rosana Montenegro MD  Answering service: [phone] from in house phone        Date of Service:  2022  NAME:  Yao Agrawal  :  1960  MRN:  [id number]      Admission Summary:   Yao Agrawal is a 65 y.o. male with PMHx of anemia, depression, COPD, malnutrition and Vitamin B12 deficiency, short bowel syndrome due to severe Crohn's Disease,  s/p >30 surgeries (210cm small bowel remaining from lig treitz to ileosigmoid anastomosis) s/p >30 surgeries with ileosigmoid anastomosis currently not on treatment, and chronic pain syndrome who is now admitted with severe sepsis, SUHA, metabolic acidosis, and direct hyperbilirubinemia. Nephrology and GI following. Interval history / Subjective:   Pt states colestipol does Not work for him and wants lomotil, he wants his opiod dose increased stating current dose not controlling his pain, pt wants saline nasal spray. Pt states Dr Brian Gibson told him he ordered him to have coffee daily     Assessment & Plan:     Severe Sepsis with suha, leukocytosis, elevated temp, tachypneic on presentation  SUHA  Metabolic Acidosis  Lactic acidosis  Direct Hyperbilirubinemia  Leukocytosis  -started on bicarb drip  -nephrology consulted   -Empiric abx  -Ulcerated Abdominal Wound: Wound care consulted. -- GI following   -- Diarrhea: Enteric Pathogen panel (from ) and C diff testing (ordered)---GI consulted and following/guiding  - Hyperbilirubinemia: Per GI due to sepsis, decompensated cirrhosis, or abx-related. U/S completed with no biliary dilation  -Continue cardiac/tele monitoring    2022:  Rapid response which nurse reports rate up to 150 that resolved on its own withOut any change in bp and without any symptoms per pt verbal reported.  He has long hx of chronic pain which he gets meds but denies any acute onset of uncontrolled pain or anxiety around the timing of the jacob  Ekg at bedside noted infrequent PVC (on on ekg print out) and otherwise in sinus rhythm--use beta blocker. Labs, image ordered  Staff reports pt refused labs earlier and therefore late. Crohn's Disease with small bowel syndrome, hx of surgery  Hypoalbuminemia  Malnutrition--check prealbumin  -Gi consulted and following/guiding  -nutrition consult  -8/31 B12 injection ordered    Enlarged liver with nodular contour  -concerns for cirrhosis  -hepatology consulted    Tobacco use history and /recommend cessation  -per pt request ordered nicotine patch  -recommend tobacco cessation/    Electrolyte derangement  -replete as needed  -monitor with intermittent labs    Chronic Pain  -8/31 adjusted pain meds as pt states current dose not effective for pain management  -adjust as needed    Code status: Full Code  Prophylaxis: heparin  Care Plan discussed with: Pt, RN, staff  Anticipated Disposition: TBD      8/31/2022 Pt states colestipol does Not work for him and wants lomotil, he wants his opiod dose increased stating current dose not controlling his pain, pt wants saline nasal spray. Pt states Dr Aga Guardado told him he ordered him to have coffee daily       Review of Systems:   A comprehensive review of systems was negative except for that written in the HPI. Vital Signs:    Last 24hrs VS reviewed since prior progress note.  Most recent are:  Visit Vitals  BP (!) 138/57 (BP 1 Location: Right upper arm, BP Patient Position: At rest;Supine)   Pulse (!) 105   Temp 99.1 °F (37.3 °C)   Resp 20   Ht 5' 9\" (1.753 m)   Wt 90.1 kg (198 lb 10.2 oz)   SpO2 98%   BMI 29.33 kg/m²         Intake/Output Summary (Last 24 hours) at 8/31/2022 1234  Last data filed at 8/31/2022 0305  Gross per 24 hour   Intake 2336.08 ml   Output 250 ml   Net 2086.08 ml          Physical Examination:     I had a face to face encounter with this patient and independently examined them on 8/31/2022 as outlined below:          Constitutional:  No acute distress, pt states that his current pain regimen not effective enough   ENT:  Oral mucosa dry. Icteric    Resp:  No overt wheezing, nonlabored breathing   CV:  Regular rhythm, normal rate, no rubs    GI:  Extensive scaring. (Ulcerated midline abdominal wound with granulation tissue overlying on initial eval). Dressing covering---pt states that Dr. Bard Baeza changed the gauze below the prior night  No high pitch sounds. No high pitch sounds    Musculoskeletal:  No edema, warm and dry, symmetrical muscle tone    Neurologic:  Moves all extremities. AAOx3, responds appropriately to questions asked.   SKIN: warm and dry, jaundice       Psych: not agitated, not anxious     Data Review:    Review and/or order of clinical lab test  Review and/or order of tests in the radiology section of CPT  Review and/or order of tests in the medicine section of CPT      Labs:     Recent Labs     08/31/22  0117 08/29/22 0355   WBC 15.0* 13.7*   HGB 7.1* 7.3*   HCT 20.4* 20.8*   * 505*       Recent Labs     08/31/22  1100 08/31/22  0110 08/30/22  0240 08/29/22  0357 08/29/22  0355   NA  --  132* 133* 133*  --    K  --  3.3* 3.2* 3.1*  --    CL  --  94* 95* 99  --    CO2  --  30 28 25  --    BUN  --  39* 50* 75*  --    CREA  --  1.37* 1.53* 1.81*  --    GLU  --  109* 102* 124*  --    CA  --  8.0* 8.3* 8.2*  --    MG  --   --   --   --  2.5*   PHOS 2.3*  --  2.9 2.6  --        Recent Labs     08/31/22  0110 08/30/22  0240 08/29/22  0357 08/29/22  0355   ALT 22 24  --  30   AP 98 114  --  131*   TBILI 6.3* 10.0*  --  13.9*   TP 4.8* 5.0*  --  5.3*   ALB 2.2* 2.6*  2.5* 2.8* 2.7*   GLOB 2.6 2.4  --  2.6       Recent Labs     08/31/22  0110 08/29/22  0355   INR  --  1.3*   PTP  --  13.7*   APTT 35.9* 37.0*         Lab Results   Component Value Date/Time    Glucose (POC) 141 (H) 08/31/2022 11:37 AM    Glucose (POC) 144 (H) 08/30/2022 12:57 PM Glucose (POC) 144 (H) 08/30/2022 06:32 AM    Glucose (POC) 139 (H) 08/29/2022 10:34 PM    Glucose (POC) 105 08/28/2022 12:09 PM       Medications Reviewed:     Current Facility-Administered Medications   Medication Dose Route Frequency    diphenoxylate-atropine (LOMOTIL) tablet 1 Tablet  1 Tablet Oral QID PRN    sodium chloride (OCEAN) 0.65 % nasal squeeze bottle 2 Spray  2 Spray Both Nostrils Q2H PRN    HYDROmorphone (DILAUDID) injection 3 mg  3 mg IntraVENous Q4H PRN    oxyCODONE IR (ROXICODONE) tablet 12.5 mg  12.5 mg Oral Q6H PRN    TPN ADULT - CENTRAL   IntraVENous CONTINUOUS    vancomycin (VANCOCIN) 1,000 mg in 0.9% sodium chloride 250 mL ([id number])  1,000 mg IntraVENous Q18H    TPN ADULT - CENTRAL   IntraVENous CONTINUOUS    colestipoL (COLESTID) tablet 2 g  2 g Oral BID    cefepime (MAXIPIME) 2 g in 0.9% sodium chloride (MBP/ADV) 100 mL MBP  2 g IntraVENous Q24H    fluconazole (DIFLUCAN) 200mg/100 mL IVPB (premix)  200 mg IntraVENous Q24H    collagenase (SANTYL) 250 unit/gram ointment   Topical DAILY    fat emulsion 20% soy-oliv-fish oil (SMOFlipid) infusion 250 mL  250 mL IntraVENous Q24H    nicotine (NICODERM CQ) 21 mg/24 hr patch 1 Patch  1 Patch TransDERmal DAILY    naloxone (NARCAN) injection 0.4 mg  0.4 mg IntraVENous EVERY 2 MINUTES AS NEEDED    metoprolol tartrate (LOPRESSOR) tablet 6.25 mg  6.25 mg Oral Q12H    metoprolol (LOPRESSOR) injection 2.5 mg  2.5 mg IntraVENous Q4H PRN    bisacodyL (DULCOLAX) suppository 10 mg  10 mg Rectal DAILY PRN    pantoprazole (PROTONIX) 40 mg in 0.9% sodium chloride 10 mL injection  40 mg IntraVENous Q12H    sodium chloride (NS) flush 5-40 mL  5-40 mL IntraVENous Q8H    sodium chloride (NS) flush 5-40 mL  5-40 mL IntraVENous PRN    acetaminophen (TYLENOL) tablet 650 mg  650 mg Oral Q6H PRN    Or    acetaminophen (TYLENOL) suppository 650 mg  650 mg Rectal Q6H PRN    ondansetron (ZOFRAN ODT) tablet 4 mg  4 mg Oral Q8H PRN    Or    ondansetron (ZOFRAN) injection 4 mg 4 mg IntraVENous Q6H PRN    Vancomycin - Pharmacy to Dose   Other Rx Dosing/Monitoring    heparin (porcine) injection 5,000 Units  5,000 Units SubCUTAneous Q8H     ______________________________________________________________________  EXPECTED LENGTH OF STAY: 4d 19h  ACTUAL LENGTH OF STAY:          [address] Childress MD

## 2022-09-01 ENCOUNTER — ANESTHESIA EVENT (OUTPATIENT)
Dept: ENDOSCOPY | Age: 62
DRG: 871 | End: 2022-09-01
Payer: MEDICARE

## 2022-09-01 ENCOUNTER — ANESTHESIA (OUTPATIENT)
Dept: ENDOSCOPY | Age: 62
DRG: 871 | End: 2022-09-01
Payer: MEDICARE

## 2022-09-01 LAB
ALBUMIN SERPL-MCNC: 2.1 G/DL (ref 3.5–5)
ALBUMIN/GLOB SERPL: 0.8 {RATIO} (ref 1.1–2.2)
ALP SERPL-CCNC: 108 U/L (ref 45–117)
ALT SERPL-CCNC: 30 U/L (ref 12–78)
ANION GAP SERPL CALC-SCNC: 6 MMOL/L (ref 5–15)
ANION GAP SERPL CALC-SCNC: 8 MMOL/L (ref 5–15)
APTT PPP: 36.3 SEC (ref 22.1–31)
AST SERPL-CCNC: 48 U/L (ref 15–37)
BASOPHILS # BLD: 0 K/UL (ref 0–0.1)
BASOPHILS NFR BLD: 0 % (ref 0–1)
BILIRUB DIRECT SERPL-MCNC: 3.5 MG/DL (ref 0–0.2)
BILIRUB DIRECT SERPL-MCNC: 3.8 MG/DL (ref 0–0.2)
BILIRUB SERPL-MCNC: 4.7 MG/DL (ref 0.2–1)
BUN SERPL-MCNC: 31 MG/DL (ref 6–20)
BUN SERPL-MCNC: 33 MG/DL (ref 6–20)
BUN/CREAT SERPL: 24 (ref 12–20)
BUN/CREAT SERPL: 26 (ref 12–20)
CALCIUM SERPL-MCNC: 7.8 MG/DL (ref 8.5–10.1)
CALCIUM SERPL-MCNC: 7.9 MG/DL (ref 8.5–10.1)
CHLORIDE SERPL-SCNC: 102 MMOL/L (ref 97–108)
CHLORIDE SERPL-SCNC: 99 MMOL/L (ref 97–108)
CMV DNA SERPL NAA+PROBE-ACNC: NEGATIVE IU/ML
CMV DNA SERPL NAA+PROBE-LOG IU: NORMAL LOG10 IU/ML
CO2 SERPL-SCNC: 24 MMOL/L (ref 21–32)
CO2 SERPL-SCNC: 26 MMOL/L (ref 21–32)
COPPER SERPL-MCNC: 106 UG/DL (ref 69–132)
CREAT SERPL-MCNC: 1.27 MG/DL (ref 0.7–1.3)
CREAT SERPL-MCNC: 1.29 MG/DL (ref 0.7–1.3)
DIFFERENTIAL METHOD BLD: ABNORMAL
EBV VCA IGM SER-ACNC: 42 U/ML (ref 0–35.9)
EOSINOPHIL # BLD: 0.6 K/UL (ref 0–0.4)
EOSINOPHIL NFR BLD: 4 % (ref 0–7)
ERYTHROCYTE [DISTWIDTH] IN BLOOD BY AUTOMATED COUNT: 20.5 % (ref 11.5–14.5)
FERRITIN SERPL-MCNC: 1886 NG/ML (ref 26–388)
GLOBULIN SER CALC-MCNC: 2.7 G/DL (ref 2–4)
GLUCOSE BLD STRIP.AUTO-MCNC: 122 MG/DL (ref 65–117)
GLUCOSE BLD STRIP.AUTO-MCNC: 124 MG/DL (ref 65–117)
GLUCOSE SERPL-MCNC: 113 MG/DL (ref 65–100)
GLUCOSE SERPL-MCNC: 116 MG/DL (ref 65–100)
HCT VFR BLD AUTO: 16.1 % (ref 36.6–50.3)
HCT VFR BLD AUTO: 16.6 % (ref 36.6–50.3)
HGB BLD-MCNC: 5.9 G/DL (ref 12.1–17)
HGB BLD-MCNC: 6 G/DL (ref 12.1–17)
HISTORY CHECKED?,CKHIST: NORMAL
IMM GRANULOCYTES # BLD AUTO: 0 K/UL
IMM GRANULOCYTES NFR BLD AUTO: 0 %
INR PPP: 1.1 (ref 0.9–1.1)
IRON SATN MFR SERPL: 16 % (ref 20–50)
IRON SERPL-MCNC: 23 UG/DL (ref 35–150)
LDH SERPL L TO P-CCNC: 400 U/L (ref 85–241)
LYMPHOCYTES # BLD: 1 K/UL (ref 0.8–3.5)
LYMPHOCYTES NFR BLD: 7 % (ref 12–49)
MAGNESIUM SERPL-MCNC: 1.7 MG/DL (ref 1.6–2.4)
MCH RBC QN AUTO: 37.7 PG (ref 26–34)
MCHC RBC AUTO-ENTMCNC: 37.3 G/DL (ref 30–36.5)
MCV RBC AUTO: 101.3 FL (ref 80–99)
MONOCYTES # BLD: 0.7 K/UL (ref 0–1)
MONOCYTES NFR BLD: 5 % (ref 5–13)
NEUTS BAND NFR BLD MANUAL: 4 % (ref 0–6)
NEUTS SEG # BLD: 12.4 K/UL (ref 1.8–8)
NEUTS SEG NFR BLD: 80 % (ref 32–75)
NRBC # BLD: 0 K/UL (ref 0–0.01)
NRBC BLD-RTO: 0 PER 100 WBC
PHOSPHATE SERPL-MCNC: 3.1 MG/DL (ref 2.6–4.7)
PLATELET # BLD AUTO: 456 K/UL (ref 150–400)
PMV BLD AUTO: 10.1 FL (ref 8.9–12.9)
POTASSIUM SERPL-SCNC: 3.9 MMOL/L (ref 3.5–5.1)
POTASSIUM SERPL-SCNC: 4.4 MMOL/L (ref 3.5–5.1)
PROT SERPL-MCNC: 4.8 G/DL (ref 6.4–8.2)
PROTHROMBIN TIME: 11.4 SEC (ref 9–11.1)
RBC # BLD AUTO: 1.59 M/UL (ref 4.1–5.7)
RBC MORPH BLD: ABNORMAL
SERVICE CMNT-IMP: ABNORMAL
SERVICE CMNT-IMP: ABNORMAL
SODIUM SERPL-SCNC: 131 MMOL/L (ref 136–145)
SODIUM SERPL-SCNC: 134 MMOL/L (ref 136–145)
THERAPEUTIC RANGE,PTTT: ABNORMAL SECS (ref 58–77)
TIBC SERPL-MCNC: 142 UG/DL (ref 250–450)
WBC # BLD AUTO: 14.7 K/UL (ref 4.1–11.1)
ZINC SERPL-MCNC: 67 UG/DL (ref 44–115)

## 2022-09-01 PROCEDURE — 74011000250 HC RX REV CODE- 250: Performed by: INTERNAL MEDICINE

## 2022-09-01 PROCEDURE — 74011250636 HC RX REV CODE- 250/636: Performed by: PHYSICIAN ASSISTANT

## 2022-09-01 PROCEDURE — 74011250636 HC RX REV CODE- 250/636: Performed by: ANESTHESIOLOGY

## 2022-09-01 PROCEDURE — 82962 GLUCOSE BLOOD TEST: CPT

## 2022-09-01 PROCEDURE — C9113 INJ PANTOPRAZOLE SODIUM, VIA: HCPCS | Performed by: INTERNAL MEDICINE

## 2022-09-01 PROCEDURE — 86923 COMPATIBILITY TEST ELECTRIC: CPT

## 2022-09-01 PROCEDURE — 74011250637 HC RX REV CODE- 250/637: Performed by: INTERNAL MEDICINE

## 2022-09-01 PROCEDURE — 74011000258 HC RX REV CODE- 258: Performed by: INTERNAL MEDICINE

## 2022-09-01 PROCEDURE — 83540 ASSAY OF IRON: CPT

## 2022-09-01 PROCEDURE — 74011000250 HC RX REV CODE- 250: Performed by: FAMILY MEDICINE

## 2022-09-01 PROCEDURE — 86015 ACTIN ANTIBODY EACH: CPT

## 2022-09-01 PROCEDURE — 0DBB8ZX EXCISION OF ILEUM, VIA NATURAL OR ARTIFICIAL OPENING ENDOSCOPIC, DIAGNOSTIC: ICD-10-PCS | Performed by: INTERNAL MEDICINE

## 2022-09-01 PROCEDURE — 85730 THROMBOPLASTIN TIME PARTIAL: CPT

## 2022-09-01 PROCEDURE — 86037 ANCA TITER EACH ANTIBODY: CPT

## 2022-09-01 PROCEDURE — P9016 RBC LEUKOCYTES REDUCED: HCPCS

## 2022-09-01 PROCEDURE — 36430 TRANSFUSION BLD/BLD COMPNT: CPT

## 2022-09-01 PROCEDURE — 99233 SBSQ HOSP IP/OBS HIGH 50: CPT | Performed by: INTERNAL MEDICINE

## 2022-09-01 PROCEDURE — 76060000031 HC ANESTHESIA FIRST 0.5 HR: Performed by: INTERNAL MEDICINE

## 2022-09-01 PROCEDURE — 74011250636 HC RX REV CODE- 250/636: Performed by: NURSE ANESTHETIST, CERTIFIED REGISTERED

## 2022-09-01 PROCEDURE — 88305 TISSUE EXAM BY PATHOLOGIST: CPT

## 2022-09-01 PROCEDURE — 85610 PROTHROMBIN TIME: CPT

## 2022-09-01 PROCEDURE — 76040000019: Performed by: INTERNAL MEDICINE

## 2022-09-01 PROCEDURE — 86900 BLOOD TYPING SEROLOGIC ABO: CPT

## 2022-09-01 PROCEDURE — 94760 N-INVAS EAR/PLS OXIMETRY 1: CPT

## 2022-09-01 PROCEDURE — 80048 BASIC METABOLIC PNL TOTAL CA: CPT

## 2022-09-01 PROCEDURE — 36415 COLL VENOUS BLD VENIPUNCTURE: CPT

## 2022-09-01 PROCEDURE — 77010033678 HC OXYGEN DAILY

## 2022-09-01 PROCEDURE — 74011250636 HC RX REV CODE- 250/636: Performed by: INTERNAL MEDICINE

## 2022-09-01 PROCEDURE — 83735 ASSAY OF MAGNESIUM: CPT

## 2022-09-01 PROCEDURE — 74011250637 HC RX REV CODE- 250/637: Performed by: NURSE PRACTITIONER

## 2022-09-01 PROCEDURE — 86682 HELMINTH ANTIBODY: CPT

## 2022-09-01 PROCEDURE — 85025 COMPLETE CBC W/AUTO DIFF WBC: CPT

## 2022-09-01 PROCEDURE — 82728 ASSAY OF FERRITIN: CPT

## 2022-09-01 PROCEDURE — 84100 ASSAY OF PHOSPHORUS: CPT

## 2022-09-01 PROCEDURE — 65660000001 HC RM ICU INTERMED STEPDOWN

## 2022-09-01 PROCEDURE — 80076 HEPATIC FUNCTION PANEL: CPT

## 2022-09-01 PROCEDURE — 77030021593 HC FCPS BIOP ENDOSC BSC -A: Performed by: INTERNAL MEDICINE

## 2022-09-01 PROCEDURE — 82248 BILIRUBIN DIRECT: CPT

## 2022-09-01 PROCEDURE — 83615 LACTATE (LD) (LDH) ENZYME: CPT

## 2022-09-01 PROCEDURE — 0DBN8ZX EXCISION OF SIGMOID COLON, VIA NATURAL OR ARTIFICIAL OPENING ENDOSCOPIC, DIAGNOSTIC: ICD-10-PCS | Performed by: INTERNAL MEDICINE

## 2022-09-01 PROCEDURE — 85018 HEMOGLOBIN: CPT

## 2022-09-01 RX ORDER — MIDAZOLAM HYDROCHLORIDE 1 MG/ML
.25-1 INJECTION, SOLUTION INTRAMUSCULAR; INTRAVENOUS
Status: DISCONTINUED | OUTPATIENT
Start: 2022-09-01 | End: 2022-09-01 | Stop reason: HOSPADM

## 2022-09-01 RX ORDER — PHENYLEPHRINE HCL IN 0.9% NACL 0.4MG/10ML
SYRINGE (ML) INTRAVENOUS AS NEEDED
Status: DISCONTINUED | OUTPATIENT
Start: 2022-09-01 | End: 2022-09-01 | Stop reason: HOSPADM

## 2022-09-01 RX ORDER — SODIUM CHLORIDE 0.9 % (FLUSH) 0.9 %
5-40 SYRINGE (ML) INJECTION AS NEEDED
Status: DISCONTINUED | OUTPATIENT
Start: 2022-09-01 | End: 2022-09-12 | Stop reason: HOSPADM

## 2022-09-01 RX ORDER — NALOXONE HYDROCHLORIDE 0.4 MG/ML
0.4 INJECTION, SOLUTION INTRAMUSCULAR; INTRAVENOUS; SUBCUTANEOUS
Status: DISCONTINUED | OUTPATIENT
Start: 2022-09-01 | End: 2022-09-01 | Stop reason: HOSPADM

## 2022-09-01 RX ORDER — SODIUM CHLORIDE 9 MG/ML
INJECTION, SOLUTION INTRAVENOUS
Status: DISCONTINUED | OUTPATIENT
Start: 2022-09-01 | End: 2022-09-01 | Stop reason: HOSPADM

## 2022-09-01 RX ORDER — SODIUM CHLORIDE 0.9 % (FLUSH) 0.9 %
5-40 SYRINGE (ML) INJECTION EVERY 8 HOURS
Status: DISCONTINUED | OUTPATIENT
Start: 2022-09-01 | End: 2022-09-12 | Stop reason: HOSPADM

## 2022-09-01 RX ORDER — SODIUM CHLORIDE 9 MG/ML
250 INJECTION, SOLUTION INTRAVENOUS AS NEEDED
Status: DISCONTINUED | OUTPATIENT
Start: 2022-09-01 | End: 2022-09-02 | Stop reason: SDUPTHER

## 2022-09-01 RX ORDER — PROPOFOL 10 MG/ML
INJECTION, EMULSION INTRAVENOUS AS NEEDED
Status: DISCONTINUED | OUTPATIENT
Start: 2022-09-01 | End: 2022-09-01 | Stop reason: HOSPADM

## 2022-09-01 RX ORDER — EPINEPHRINE 0.1 MG/ML
1 INJECTION INTRACARDIAC; INTRAVENOUS
Status: DISCONTINUED | OUTPATIENT
Start: 2022-09-01 | End: 2022-09-01 | Stop reason: HOSPADM

## 2022-09-01 RX ORDER — DEXTROMETHORPHAN/PSEUDOEPHED 2.5-7.5/.8
1.2 DROPS ORAL
Status: DISCONTINUED | OUTPATIENT
Start: 2022-09-01 | End: 2022-09-01 | Stop reason: HOSPADM

## 2022-09-01 RX ORDER — FLUMAZENIL 0.1 MG/ML
0.2 INJECTION INTRAVENOUS
Status: DISCONTINUED | OUTPATIENT
Start: 2022-09-01 | End: 2022-09-01 | Stop reason: HOSPADM

## 2022-09-01 RX ORDER — FENTANYL CITRATE 50 UG/ML
100 INJECTION, SOLUTION INTRAMUSCULAR; INTRAVENOUS
Status: DISCONTINUED | OUTPATIENT
Start: 2022-09-01 | End: 2022-09-01 | Stop reason: HOSPADM

## 2022-09-01 RX ORDER — ATROPINE SULFATE 0.1 MG/ML
0.5 INJECTION INTRAVENOUS
Status: DISCONTINUED | OUTPATIENT
Start: 2022-09-01 | End: 2022-09-01 | Stop reason: HOSPADM

## 2022-09-01 RX ORDER — SODIUM CHLORIDE 9 MG/ML
50 INJECTION, SOLUTION INTRAVENOUS CONTINUOUS
Status: DISPENSED | OUTPATIENT
Start: 2022-09-01 | End: 2022-09-01

## 2022-09-01 RX ADMIN — HYDROMORPHONE HYDROCHLORIDE 3 MG: 2 INJECTION, SOLUTION INTRAMUSCULAR; INTRAVENOUS; SUBCUTANEOUS at 00:31

## 2022-09-01 RX ADMIN — DIPHENOXYLATE HYDROCHLORIDE AND ATROPINE SULFATE 1 TABLET: 2.5; .025 TABLET ORAL at 21:01

## 2022-09-01 RX ADMIN — CEFEPIME 2 G: 2 INJECTION, POWDER, FOR SOLUTION INTRAVENOUS at 04:28

## 2022-09-01 RX ADMIN — METOPROLOL TARTRATE 6.25 MG: 25 TABLET, FILM COATED ORAL at 21:01

## 2022-09-01 RX ADMIN — VANCOMYCIN HYDROCHLORIDE 1000 MG: 1 INJECTION, POWDER, LYOPHILIZED, FOR SOLUTION INTRAVENOUS at 15:43

## 2022-09-01 RX ADMIN — COLLAGENASE SANTYL: 250 OINTMENT TOPICAL at 08:34

## 2022-09-01 RX ADMIN — HYDROMORPHONE HYDROCHLORIDE 3 MG: 2 INJECTION, SOLUTION INTRAMUSCULAR; INTRAVENOUS; SUBCUTANEOUS at 04:29

## 2022-09-01 RX ADMIN — DIPHENOXYLATE HYDROCHLORIDE AND ATROPINE SULFATE 1 TABLET: 2.5; .025 TABLET ORAL at 12:33

## 2022-09-01 RX ADMIN — SODIUM PHOSPHATE, DIBASIC AND SODIUM PHOSPHATE, MONOBASIC 1 ENEMA: 7; 19 ENEMA RECTAL at 09:42

## 2022-09-01 RX ADMIN — METOPROLOL TARTRATE 6.25 MG: 25 TABLET, FILM COATED ORAL at 08:35

## 2022-09-01 RX ADMIN — DIPHENOXYLATE HYDROCHLORIDE AND ATROPINE SULFATE 1 TABLET: 2.5; .025 TABLET ORAL at 08:34

## 2022-09-01 RX ADMIN — PROPOFOL 25 MG: 10 INJECTION, EMULSION INTRAVENOUS at 11:15

## 2022-09-01 RX ADMIN — PROPOFOL 25 MG: 10 INJECTION, EMULSION INTRAVENOUS at 11:16

## 2022-09-01 RX ADMIN — FLUCONAZOLE IN SODIUM CHLORIDE 400 MG: 2 INJECTION, SOLUTION INTRAVENOUS at 08:33

## 2022-09-01 RX ADMIN — PROPOFOL 50 MG: 10 INJECTION, EMULSION INTRAVENOUS at 11:11

## 2022-09-01 RX ADMIN — PROPOFOL 25 MG: 10 INJECTION, EMULSION INTRAVENOUS at 11:14

## 2022-09-01 RX ADMIN — HEPARIN SODIUM 5000 UNITS: 5000 INJECTION INTRAVENOUS; SUBCUTANEOUS at 21:01

## 2022-09-01 RX ADMIN — DIPHENOXYLATE HYDROCHLORIDE AND ATROPINE SULFATE 1 TABLET: 2.5; .025 TABLET ORAL at 17:03

## 2022-09-01 RX ADMIN — HYDROMORPHONE HYDROCHLORIDE 3 MG: 2 INJECTION, SOLUTION INTRAMUSCULAR; INTRAVENOUS; SUBCUTANEOUS at 16:58

## 2022-09-01 RX ADMIN — PROPOFOL 50 MG: 10 INJECTION, EMULSION INTRAVENOUS at 11:12

## 2022-09-01 RX ADMIN — Medication 40 MCG: at 11:21

## 2022-09-01 RX ADMIN — HYDROMORPHONE HYDROCHLORIDE 3 MG: 2 INJECTION, SOLUTION INTRAMUSCULAR; INTRAVENOUS; SUBCUTANEOUS at 21:00

## 2022-09-01 RX ADMIN — POTASSIUM CHLORIDE: 2 INJECTION, SOLUTION, CONCENTRATE INTRAVENOUS at 18:25

## 2022-09-01 RX ADMIN — SODIUM CHLORIDE: 900 INJECTION, SOLUTION INTRAVENOUS at 11:03

## 2022-09-01 RX ADMIN — CEFEPIME 2 G: 2 INJECTION, POWDER, FOR SOLUTION INTRAVENOUS at 15:42

## 2022-09-01 RX ADMIN — SODIUM CHLORIDE, PRESERVATIVE FREE 10 ML: 5 INJECTION INTRAVENOUS at 21:03

## 2022-09-01 RX ADMIN — SMOFLIPID 250 ML: 6; 6; 5; 3 INJECTION, EMULSION INTRAVENOUS at 18:23

## 2022-09-01 RX ADMIN — HYDROMORPHONE HYDROCHLORIDE 3 MG: 2 INJECTION, SOLUTION INTRAMUSCULAR; INTRAVENOUS; SUBCUTANEOUS at 12:33

## 2022-09-01 RX ADMIN — SODIUM CHLORIDE, PRESERVATIVE FREE 40 MG: 5 INJECTION INTRAVENOUS at 21:01

## 2022-09-01 RX ADMIN — HYDROMORPHONE HYDROCHLORIDE 3 MG: 2 INJECTION, SOLUTION INTRAMUSCULAR; INTRAVENOUS; SUBCUTANEOUS at 08:33

## 2022-09-01 RX ADMIN — SODIUM CHLORIDE, PRESERVATIVE FREE 40 MG: 5 INJECTION INTRAVENOUS at 08:34

## 2022-09-01 RX ADMIN — HEPARIN SODIUM 5000 UNITS: 5000 INJECTION INTRAVENOUS; SUBCUTANEOUS at 15:43

## 2022-09-01 NOTE — ANESTHESIA PREPROCEDURE EVALUATION
Relevant Problems   CARDIOVASCULAR   (+) HTN (hypertension)      GASTROINTESTINAL   (+) GERD (gastroesophageal reflux disease)   (+) Hiatal hernia       Anesthetic History   No history of anesthetic complications            Review of Systems / Medical History  Patient summary reviewed, nursing notes reviewed and pertinent labs reviewed    Pulmonary    COPD      Smoker         Neuro/Psych             Comments: Chronic pain Cardiovascular    Hypertension              Exercise tolerance: <4 METS     GI/Hepatic/Renal     GERD    Renal disease: CRI  PUD    Comments: [facility]/Other        Arthritis and anemia (Hgb 6.0)     Other Findings            Physical Exam    Airway  Mallampati: II  TM Distance: 4 - 6 cm  Neck ROM: decreased range of motion   Mouth opening: Normal     Cardiovascular  Regular rate and rhythm,  S1 and S2 normal,  no murmur, click, rub, or gallop             Dental  No notable dental hx       Pulmonary  Breath sounds clear to auscultation               Abdominal         Other Findings            Anesthetic Plan    ASA: 4  Anesthesia type: MAC            Anesthetic plan and risks discussed with: Patient

## 2022-09-01 NOTE — PROGRESS NOTES
Physician Progress Note      PATIENTDiego Worrell  CSN #:                  [id number]  :                       1960  ADMIT DATE:       2022 5:13 PM  [address] DATE:  RESPONDING  PROVIDER #:        Herminio Hilton MD          QUERY TEXT:    Pt admitted with sepsis and PNA. Pt noted to have hypoxia (dropped to 85% on RA and went up to 93% on 2L) and tachypnenia. If possible, please document in the progress notes and discharge summary if you are evaluating and/or treating any of the following: The medical record reflects the following:  Risk Factors: 61yo with PNA    Clinical Indicators:    2320: 85% on RA  2325: 93% on 2L    ED  Respiratory:  Negative for cough and shortness of breath.     Hospitalist  Tachypneic. Severe Sepsis with kelvin, leukocytosis, elevated temp, tachypneic on presentation    Nursing respiratory assessments: WNLs    Treatment: supplemental O2, vital signs PRN    Thank you,  George Laughlin  [phone]  I am also available via [employer]. Options provided:  -- Acute respiratory failure with hypoxia  -- Hypoxia only  -- Other - I will add my own diagnosis  -- Disagree - Not applicable / Not valid  -- Disagree - Clinically unable to determine / Unknown  -- Refer to Clinical Documentation Reviewer    PROVIDER RESPONSE TEXT:    This patient is in acute respiratory failure with hypoxia.     Query created by: Liz Colby on 2022 8:55 AM      Electronically signed by:  Herminio Hilton MD 2022 3:31 PM

## 2022-09-01 NOTE — PROGRESS NOTES
Nutrition Note    Nutrition Recommendations/Plan:     Recommend obtaining labs - BMP, trigs, Vit A (no new Vit A result available -- previous low result from 2017)    Recommend starting supplemental iron and selenium d/t deficiency    Maintenance selenium dose is part of trace elements, however with pt's elevated T bili, would continue to omit the trace elements from TPN d/t its contents of manganese and copper    ?if able to order selenium seperately in TPN    Continue TPN for now. Monitor for ability to advance diet. Follow-up on lab results/ TPN:     Several more nutritional labs results available. Pt with selenium deficiency as well as iron. B1, zinc, and copper WNL. Vit A ordered and appreciated, but not yet drawn by nursing. Add on Phos yesterday returned low, no repletion given, but WNL today. K+ low yesterday, given repletion, but not rechecked today. Hgb 6, plan to transfuse 1 unit PRBC now and recheck Hgb 2 hours post transfusion. T bili its trending down nicely. Would continue to omit trace elements d/t contents of manganese and copper and until t bili < 3.5, but ?if possible to give selenium separately. Pt given a 1 x dose of IM B12 (1000 mcg). This is considered a maintenance dose when given monthly, so ?if should receive more d/t deficiency. He should continue this monthly dose for maintenance as well. Also started on weekly ergocalciferol 50,000 units. Pt on TPN @ goal of 6.5%AA, 20% dex @ 83 mL/hr with 250 mL 20% lipids daily providing 2242 mL, 2373 kcal, 130 gm AA, and 398 gm dex (GIR 3.7 mg/kg/min). Per surgery, CT scan of abdomen and pelvis with oral contrast (8/31/2022): No evidence of fistula. Recommends continued wound care and OP consult with plastic surgeon re: possible skin graft or other procedure to facilitate healing. Pt remains NPO, still with loose, mucoid stools. GI following. On Lomotil for diarrhea. Stool studies negative.   Note today still indicates to continue bowel rest/ TPN. Plan for flex sig today to evaluate extent of Crohn's disease. Hepatology following. Plan for liver bx today as well/    See full RD assessment from 8/29 and 8/30 for additional details, goals, and monitoring/evaluation.    Estimated Nutrition Needs:   Energy: 8585-0032 (MSJ x 1.5-1.7 or ~30-33 kcal/kg)  Wt used: Admission (82.1 kg)  Protein: 120-140 (1.5-1.7 gm/kg or at least 20%)  Wt used: Admission (82.1 kg)   Fluid: 1 mL/kcal     Recent Labs     09/01/22  0451 08/31/22  1100 08/31/22  0110 08/30/22  0240   GLU  --   --  109* 102*   BUN  --   --  39* 50*   CREA  --   --  1.37* 1.53*   NA  --   --  132* 133*   K  --   --  3.3* 3.2*   CL  --   --  94* 95*   CO2  --   --  30 28   CA  --   --  8.0* 8.3*   PHOS 3.1 2.3*  --  2.9   MG 1.7  --   --   --        Recent Labs     09/01/22  0451 08/31/22  0110 08/30/22  0240   ALT 30 22 24   AST 48* 35 50*    98 114   TBILI 4.7* 6.3* 10.0*   TP 4.8* 4.8* 5.0*   ALB 2.1* 2.2* 2.6*  2.5*   GLOB 2.7 2.6 2.4       Recent Labs     09/01/22  0451 08/31/22  0117   WBC 14.7* 15.0*   HGB 6.0* 7.1*   HCT 16.1* 20.4*   * 434*       Recent Labs     08/31/22  1137 08/30/22  1257 08/30/22  0632 08/29/22  2234   GLUCPOC 141* 144* 144* 139*       Iron   Date Value Ref Range Status   09/01/2022 23 (L) 35 - 150 ug/dL Final     TIBC   Date Value Ref Range Status   09/01/2022 142 (L) 250 - 450 ug/dL Final     Iron % saturation   Date Value Ref Range Status   09/01/2022 16 (L) 20 - 50 % Final     Folate   Date Value Ref Range Status   08/29/2022 18.2 5.0 - 21.0 ng/mL Final     Vitamin B12   Date Value Ref Range Status   08/29/2022 153 (L) 193 - 986 pg/mL Final     Vitamin B1   Date Value Ref Range Status   08/29/2022 143.0 66.5 - 200.0 nmol/L Final       Zinc, plasma/serum   Date Value Ref Range Status   08/29/2022 67 44 - 115 ug/dL Final       Copper, serum   Date Value Ref Range Status   08/29/2022 106 69 - 132 ug/dL Final Selenium, serum/plasma   Date Value Ref Range Status   08/29/2022 77 (L) 93 - 198 ug/L Final         Vitamin A, serum   Date Value Ref Range Status   11/06/2017 17 (L) 24 - 85 ug/dL Final     Vitamin D 25-Hydroxy   Date Value Ref Range Status   08/29/2022 15.8 (L) 30 - 100 ng/mL Final         Akbar Lange RD  Available via [employer]

## 2022-09-01 NOTE — PROGRESS NOTES
[address] [facility] Adult  Hospitalist Group                                                                                          Hospitalist Progress Note  Rosa Maria Rubio MD  Answering service: [phone] or [phone] from in house phone        Date of Service:  2022  NAME:  Joaquin Benoit  :  1960  MRN:  [id number]      Admission Summary:     Patient presents with severe sepsis with SUHA, hyperbilirubinemia. History of Crohn's disease with multiple surgeries in the past.  Hepatology and GI following. Interval history / Subjective:     Still complaining of abdominal pain.      Assessment & Plan:     Severe sepsis  -Patient presents with fever, tachycardia, tachypnea, leukocytosis  -Sepsis is due to pneumonia, initial chest x-ray shows consolidation of the right upper lobe, patient with symptoms of cough and expectoration  -Blood cultures so far negative, monitor hemodynamics    Pneumonia  -Initial chest x-ray  shows new area of consolidation in the right upper lobe extending into the right apex, repeat chest x-ray on  shows increased consolidation  -Blood cultures so far negative, add sputum culture  -Continue vancomycin and cefepime    SUHA  -SUHA due to volume depletion and sepsis  -Overall improving renal function, SUHA resolved  -Nephrology signed off    Metabolic acidosis  -This is due to SUHA, metabolic acidosis resolved    Diarrhea  -Stool for C. difficile and enteric bacterial panel negative  -Patient with multiple bowel resections in the past for Crohn's with short gut syndrome  -GI following, trial of antidiarrheal agent    Nephrolithiasis  -Nonobstructing right intrarenal calculi  -Follow-up as outpatient    Crohn's disease  -S/p multiple abdominal surgery with short gut syndrome and chronic pain  -Flex sig today per GI to evaluate for extent of Crohn's disease  -Continue bowel rest, continue TPN    Acute anemia  -Hemoglobin down to 5.9, plan for 1 unit PRBCs transfusion  -His anemia is chronic and multifactorial including impaired iron absorption due to short gut and Crohn's disease  -Further work-up per GI as appropriate    Hyperbilirubinemia  -Conjugated hyperbilirubinemia, unclear etiology  -Nodular contour of the liver on the ultrasound  -MRCP negative for choledocholithiasis or bile duct obstruction  -Serologic testing for causes of chronic liver disease negative  -Hepatology evaluating the patient, plan for liver biopsy    Coagulopathy  -Patient presents with elevated INR, this is likely due to advanced liver disease  -S/p vitamin K for 3 days    Chronic abdominal wound  -Chronic abdominal wound on anterior abdomen present for 3 years  -Negative fistula on CT  -Wound care following, on Santyl    PVCs  -Continue Lopressor    Tobacco use  -On nicotine patch     Code status: Full  Prophylaxis: SCDs  Care Plan discussed with: Patient  Anticipated Disposition: More than 48 hours     Hospital Problems  Date Reviewed: 9/1/2022            Codes Class Noted POA    Acute cystitis ICD-10-CM: N30.00  ICD-9-CM: 595.0  8/27/2022 Unknown        Severe protein-calorie malnutrition ([facility] [address] (Chronic) ICD-10-CM: B48  ICD-9-CM: 262  6/10/2015 Yes             Review of Systems:   A comprehensive review of systems was negative except for that written in the HPI. Vital Signs:    Last 24hrs VS reviewed since prior progress note.  Most recent are:  Visit Vitals  BP (!) 127/46   Pulse 93   Temp 99.7 °F (37.6 °C)   Resp 20   Ht 5' 9\" (1.753 m)   Wt 90.1 kg (198 lb 10.2 oz)   SpO2 97%   BMI 29.33 kg/m²         Intake/Output Summary (Last 24 hours) at 9/1/2022 1444  Last data filed at 9/1/2022 1127  Gross per 24 hour   Intake 200 ml   Output 0 ml   Net 200 ml        Physical Examination:     I had a face to face encounter with this patient and independently examined them on 9/1/2022 as outlined below:          Constitutional:  No acute distress, cooperative, pleasant    ENT:  Oral mucosa moist, oropharynx benign. Resp:  CTA bilaterally. No wheezing/rhonchi/rales. No accessory muscle use. CV:  Regular rhythm, normal rate, no murmurs, gallops, rubs    GI:  Soft, non distended, non tender. normoactive bowel sounds, no hepatosplenomegaly     Musculoskeletal:  No edema, warm, 2+ pulses throughout    Neurologic:  Moves all extremities. AAOx3, CN II-XII reviewed            Data Review:    Review and/or order of clinical lab test      Labs:     Recent Labs     09/01/22  1224 09/01/22  0451 08/31/22  0117   WBC  --  14.7* 15.0*   HGB 5.9* 6.0* 7.1*   HCT 16.6* 16.1* 20.4*   PLT  --  456* 434*     Recent Labs     09/01/22  0451 08/31/22  1100 08/31/22  0110 08/30/22  0240   *  --  132* 133*   K 3.9  --  3.3* 3.2*   CL 99  --  94* 95*   CO2 24  --  30 28   BUN 31*  --  39* 50*   CREA 1.29  --  1.37* 1.53*   *  --  109* 102*   CA 7.8*  --  8.0* 8.3*   MG 1.7  --   --   --    PHOS 3.1 2.3*  --  2.9     Recent Labs     09/01/22  0451 08/31/22  0110 08/30/22  0240   ALT 30 22 24    98 114   TBILI 4.7* 6.3* 10.0*   TP 4.8* 4.8* 5.0*   ALB 2.1* 2.2* 2.6*  2.5*   GLOB 2.7 2.6 2.4     Recent Labs     09/01/22  0451 08/31/22  0110   INR 1.1  --    PTP 11.4*  --    APTT 36.3* 35.9*      Recent Labs     09/01/22 0451   TIBC 142*   PSAT 16*   FERR 1,886*      Lab Results   Component Value Date/Time    Folate 18.2 08/29/2022 01:12 PM      No results for input(s): PH, PCO2, PO2 in the last 72 hours. No results for input(s): CPK, CKNDX, TROIQ in the last 72 hours.     No lab exists for component: CPKMB  Lab Results   Component Value Date/Time    Cholesterol, total 134 12/01/2014 04:33 AM    HDL Cholesterol 35 12/01/2014 04:33 AM    LDL, calculated 58.2 12/01/2014 04:33 AM    Triglyceride 204 (H) 12/01/2014 04:33 AM    CHOL/HDL Ratio 3.8 12/01/2014 04:33 AM     Lab Results   Component Value Date/Time    Glucose (POC) 122 (H) 09/01/2022 12:46 PM    Glucose (POC) 141 (H) 08/31/2022 11:37 AM    Glucose (POC) 144 (H) 08/30/2022 12:57 PM    Glucose (POC) 144 (H) 08/30/2022 06:32 AM    Glucose (POC) 139 (H) 08/29/2022 10:34 PM     Lab Results   Component Value Date/Time    Color DARK YELLOW 08/26/2022 07:27 PM    Appearance TURBID (A) 08/26/2022 07:27 PM    Specific gravity 1.025 08/26/2022 07:27 PM    Specific gravity 1.010 10/07/2019 05:48 AM    pH (UA) 5.0 08/26/2022 07:27 PM    Protein 100 (A) 08/26/2022 07:27 PM    Glucose Negative 08/26/2022 07:27 PM    Ketone TRACE (A) 08/26/2022 07:27 PM    Bilirubin NEGATIVE  10/07/2019 05:48 AM    Urobilinogen 0.2 08/26/2022 07:27 PM    Nitrites Positive (A) 08/26/2022 07:27 PM    Leukocyte Esterase SMALL (A) 08/26/2022 07:27 PM    Epithelial cells FEW 08/26/2022 07:27 PM    Bacteria 1+ (A) 08/26/2022 07:27 PM    WBC 5-10 08/26/2022 07:27 PM    RBC 0-5 08/26/2022 07:27 PM         Medications Reviewed:     Current Facility-Administered Medications   Medication Dose Route Frequency    0.9% sodium chloride infusion 250 mL  250 mL IntraVENous PRN    0.9% sodium chloride infusion  50 mL/hr IntraVENous CONTINUOUS    sodium chloride (NS) flush 5-40 mL  5-40 mL IntraVENous Q8H    sodium chloride (NS) flush 5-40 mL  5-40 mL IntraVENous PRN    TPN ADULT - CENTRAL   IntraVENous CONTINUOUS    diphenoxylate-atropine (LOMOTIL) tablet 1 Tablet  1 Tablet Oral QID PRN    sodium chloride (OCEAN) 0.65 % nasal squeeze bottle 2 Spray  2 Spray Both Nostrils Q2H PRN    HYDROmorphone (DILAUDID) injection 3 mg  3 mg IntraVENous Q4H PRN    oxyCODONE IR (ROXICODONE) tablet 12.5 mg  12.5 mg Oral Q6H PRN    TPN ADULT - CENTRAL   IntraVENous CONTINUOUS    diphenoxylate-atropine (LOMOTIL) tablet 1 Tablet  1 Tablet Oral QID    fluconazole (DIFLUCAN) 400mg/200 mL IVPB (premix)  400 mg IntraVENous DAILY    cefepime (MAXIPIME) 2 g in 0.9% sodium chloride (MBP/ADV) 100 mL MBP  2 g IntraVENous Q12H    ergocalciferol capsule 50,000 Units  50,000 Units Oral Q7D    vancomycin (VANCOCIN) 1,000 mg in 0.9% sodium chloride 250 mL ([id number])  1,000 mg IntraVENous Q18H    collagenase (SANTYL) 250 unit/gram ointment   Topical DAILY    fat emulsion 20% soy-oliv-fish oil (SMOFlipid) infusion 250 mL  250 mL IntraVENous Q24H    nicotine (NICODERM CQ) 21 mg/24 hr patch 1 Patch  1 Patch TransDERmal DAILY    naloxone (NARCAN) injection 0.4 mg  0.4 mg IntraVENous EVERY 2 MINUTES AS NEEDED    metoprolol tartrate (LOPRESSOR) tablet 6.25 mg  6.25 mg Oral Q12H    metoprolol (LOPRESSOR) injection 2.5 mg  2.5 mg IntraVENous Q4H PRN    bisacodyL (DULCOLAX) suppository 10 mg  10 mg Rectal DAILY PRN    pantoprazole (PROTONIX) 40 mg in 0.9% sodium chloride 10 mL injection  40 mg IntraVENous Q12H    sodium chloride (NS) flush 5-40 mL  5-40 mL IntraVENous Q8H    sodium chloride (NS) flush 5-40 mL  5-40 mL IntraVENous PRN    acetaminophen (TYLENOL) tablet 650 mg  650 mg Oral Q6H PRN    Or    acetaminophen (TYLENOL) suppository 650 mg  650 mg Rectal Q6H PRN    ondansetron (ZOFRAN ODT) tablet 4 mg  4 mg Oral Q8H PRN    Or    ondansetron (ZOFRAN) injection 4 mg  4 mg IntraVENous Q6H PRN    Vancomycin - Pharmacy to Dose   Other Rx Dosing/Monitoring    heparin (porcine) injection 5,000 Units  5,000 Units SubCUTAneous Q8H     ______________________________________________________________________  EXPECTED LENGTH OF STAY: 4d 19h  ACTUAL LENGTH OF STAY:          5                 Hortensia Maradiaga MD

## 2022-09-01 NOTE — ANESTHESIA POSTPROCEDURE EVALUATION
Procedure(s):  SIGMOIDOSCOPY FLEXIBLE  COLON BIOPSY. MAC    Anesthesia Post Evaluation        Patient location during evaluation: PACU  Patient participation: complete - patient participated  Level of consciousness: awake  Pain management: adequate  Airway patency: patent  Anesthetic complications: no  Cardiovascular status: acceptable  Respiratory status: acceptable  Hydration status: acceptable  Post anesthesia nausea and vomiting:  none  Final Post Anesthesia Temperature Assessment:  Normothermia (36.0-37.5 degrees C)      INITIAL Post-op Vital signs:   Vitals Value Taken Time   /56 09/01/22 1128   Temp     Pulse 92 09/01/22 1130   Resp 28 09/01/22 1130   SpO2 96 % 09/01/22 1130   Vitals shown include unvalidated device data.

## 2022-09-01 NOTE — PROGRESS NOTES
TRANSFER - OUT REPORT:    Verbal report given to Candice TEJEDA(name) on [facility]  being transferred to [facility](unit) for routine post - op       Report consisted of patients Situation, Background, Assessment and   Recommendations(SBAR). Information from the following report(s) Procedure Summary was reviewed with the receiving nurse. Lines:   Triple Lumen 08/28/22 Left (Active)   Central Line Being Utilized Yes 08/31/22 1600   Criteria for Appropriate Use Total parenteral nutrition 08/31/22 1600   Site Assessment Clean, dry, & intact 08/31/22 1600   Infiltration Assessment 0 08/31/22 1600   Affected Extremity/Extremities Color distal to insertion site pink (or appropriate for race) 08/31/22 1600   Date of Last Dressing Change 08/30/22 08/31/22 0739   Dressing Status Clean, dry, & intact 08/31/22 1600   Dressing Type Transparent 08/31/22 1600   Action Taken Dressing reinforced 08/31/22 1600   Proximal Hub Color/Line Status White; Infusing 08/31/22 1600   Positive Blood Return (Medial Site) Yes 08/31/22 1600   Medial Hub Color/Line Status Brown; Infusing 08/31/22 1600   Positive Blood Return (Lateral Site) Yes 08/31/22 1600   Distal Hub Color/Line Status Blue 08/31/22 1600   Positive Blood Return (Site #3) No 08/31/22 0739   Alcohol Cap Used Yes 08/31/22 1600        Opportunity for questions and clarification was provided.       Patient transported with:   O2 @ 2 liters         [facility]

## 2022-09-01 NOTE — PROGRESS NOTES
Physician Progress Note      Ehsan Long  [facility] #:                  [id number]  :                       1960  ADMIT DATE:       2022 5:13 PM  DISCH DATE:  RESPONDING  PROVIDER #:        Bettie Ryan MD        QUERY TEXT:    Type of Anemia: Please provide further specificity, if known. Clinical indicators include: hb, platelet, vitamin k, anemia, fe, iron stores, hemoptysis, hematuria, clots, hgb  Options provided:  -- Anemia due to acute blood loss  -- Anemia due to chronic blood loss  -- Anemia due to iron deficiency  -- Anemia due to postoperative blood loss  -- Anemia due to chronic disease  -- Other - I will add my own diagnosis  -- Disagree - Not applicable / Not valid  -- Disagree - Clinically Unable to determine / Unknown        PROVIDER RESPONSE TEXT:    The patient has anemia due to iron deficiency.       Electronically signed by:  Bettie Ryan MD 2022 4:10 AM

## 2022-09-01 NOTE — PROGRESS NOTES
[address] [facility].  [address] [address] [address], [address]  [phone]                     GI PROGRESS NOTE    Patient Name: Jessy Eid      : 1960      MRN: [id number]  Admit Date: 2022  Today's Date: 2022  CC: hyperbilirubinemia and severe Crohn's disease    Subjective:     Patient continues with right sided abdominal pain and loose mucoid stools. States only had 3 nocturnal stools since starting lomotil. Still has hiccups and congested cough. Objective:     Blood pressure (!) 125/52, pulse (!) 104, temperature 98.4 °F (36.9 °C), resp. rate 20, height 5' 9\" (1.753 m), weight 90.1 kg (198 lb 10.2 oz), SpO2 94 %. Physical Exam:  General appearance: cooperative,  male, NAD  Skin: jaundice  HEENT: Sclerae icteric. Cardiovascular: Tachycardiac. No murmurs, gallops, or rubs. Respiratory: Coarse breath sounds, congested wet cough. GI: Abdomen nondistended, soft, increased right side abd tenderness. Normal active bowel sounds. + anterior wall hernia which bulges with breathing. Multiple abdominal scars, abd bandage over abdomen   Rectal: Deferred   Musculoskeletal: No pitting edema of the lower legs. Neurological:  Alert and oriented. Psychiatric:  No anxiety or agitation.       Data Review:    Recent Results (from the past 24 hour(s))   PHOSPHORUS    Collection Time: 22 11:00 AM   Result Value Ref Range    Phosphorus 2.3 (L) 2.6 - 4.7 MG/DL   GLUCOSE, POC    Collection Time: 22 11:37 AM   Result Value Ref Range    Glucose (POC) 141 (H) 65 - 117 mg/dL    Performed by Thony Russell    CEA    Collection Time: 22  6:21 PM   Result Value Ref Range    CEA 2.6 ng/mL   HEPATIC FUNCTION PANEL    Collection Time: 22  4:51 AM   Result Value Ref Range    Protein, total 4.8 (L) 6.4 - 8.2 g/dL    Albumin 2.1 (L) 3.5 - 5.0 g/dL    Globulin 2.7 2.0 - 4.0 g/dL    A-G Ratio 0.8 (L) 1.1 - 2.2      Bilirubin, total 4.7 (H) 0.2 - 1.0 MG/DL Bilirubin, direct 3.8 (H) 0.0 - 0.2 MG/DL    Alk. phosphatase 108 45 - 117 U/L    AST (SGOT) 48 (H) 15 - 37 U/L    ALT (SGPT) 30 12 - 78 U/L   CBC WITH AUTOMATED DIFF    Collection Time: 09/01/22  4:51 AM   Result Value Ref Range    WBC 14.7 (H) 4.1 - 11.1 K/uL    RBC 1.59 (L) 4.10 - 5.70 M/uL    HGB 6.0 (L) 12.1 - 17.0 g/dL    HCT 16.1 (LL) 36.6 - 50.3 %    .3 (H) 80.0 - 99.0 FL    MCH 37.7 (H) 26.0 - 34.0 PG    MCHC 37.3 (H) 30.0 - 36.5 g/dL    RDW 20.5 (H) 11.5 - 14.5 %    PLATELET 250 (H) 475 - 400 K/uL    MPV 10.1 8.9 - 12.9 FL    NRBC 0.0 0  WBC    ABSOLUTE NRBC 0.00 0.00 - 0.01 K/uL    NEUTROPHILS 80 (H) 32 - 75 %    BAND NEUTROPHILS 4 0 - 6 %    LYMPHOCYTES 7 (L) 12 - 49 %    MONOCYTES 5 5 - 13 %    EOSINOPHILS 4 0 - 7 %    BASOPHILS 0 0 - 1 %    IMMATURE GRANULOCYTES 0 %    ABS. NEUTROPHILS 12.4 (H) 1.8 - 8.0 K/UL    ABS. LYMPHOCYTES 1.0 0.8 - 3.5 K/UL    ABS. MONOCYTES 0.7 0.0 - 1.0 K/UL    ABS. EOSINOPHILS 0.6 (H) 0.0 - 0.4 K/UL    ABS. BASOPHILS 0.0 0.0 - 0.1 K/UL    ABS. IMM.  GRANS. 0.0 K/UL    DF MANUAL      RBC COMMENTS ANISOCYTOSIS  2+        RBC COMMENTS MACROCYTOSIS  1+        RBC COMMENTS ATYPICAL LYMPHOCYTES PRESENT     MAGNESIUM    Collection Time: 09/01/22  4:51 AM   Result Value Ref Range    Magnesium 1.7 1.6 - 2.4 mg/dL   LD    Collection Time: 09/01/22  4:51 AM   Result Value Ref Range     (H) 85 - 241 U/L   PROTHROMBIN TIME + INR    Collection Time: 09/01/22  4:51 AM   Result Value Ref Range    INR 1.1 0.9 - 1.1      Prothrombin time 11.4 (H) 9.0 - 11.1 sec   PTT    Collection Time: 09/01/22  4:51 AM   Result Value Ref Range    aPTT 36.3 (H) 22.1 - 31.0 sec    aPTT, therapeutic range     58.0 - 77.0 SECS   PHOSPHORUS    Collection Time: 09/01/22  4:51 AM   Result Value Ref Range    Phosphorus 3.1 2.6 - 4.7 MG/DL   FERRITIN    Collection Time: 09/01/22  4:51 AM   Result Value Ref Range    Ferritin 1,886 (H) 26 - 388 NG/ML   IRON PROFILE    Collection Time: 09/01/22 4:51 AM   Result Value Ref Range    Iron 23 (L) 35 - 150 ug/dL    TIBC 142 (L) 250 - 450 ug/dL    Iron % saturation 16 (L) 20 - 50 %   RBC, ALLOCATE    Collection Time: 09/01/22  9:30 AM   Result Value Ref Range    HISTORY CHECKED? Historical check performed          Assessment / Plan :     Mr. Erica Kumar is a 64yo Pmhx/o anemia, depression, copd, incisional abd hernia w/ overlying skin wound, malnutrition and b12 deficiency, short bowel syndrome due to severe Crohn's disease (210cm small bowel remaining from lig treitz to ileosigmoid anastomosis) s/p >30 surgeries with ileosigmoid anastomosis currently not on treatment, and chronic pain syndrome and per report is on oxycodone 15mg 5x/d, gabapentin. There was no biliary dilation on ultrasound, so the hyperbilirubinemia is likely due to either sepsis, decompensated cirrhosis, or antibiotic-related rather than biliary obstruction. CTAP w/ Oral CON 8/30/22:  no definite enterocutaneous fistula    MRCP 8/30/22: no choledocholithiasis, focal narrowing vs defect/artifact in proximal CBD on preliminary read. Called MRI to request final read, spoke with Thalia Pablo     - Hgb 6, transfuse 1 unit PRBC's now and recheck hgb 2 hrs post transfusion  - He has an enlarged liver with a nodular contour on CT concerning for cirrhosis, multiple serologies for cirrhosis etiology pending   - Appreciate hepatology input, plans for liver bx today for further evaluation of cirrhosis  - Check AFP, - pending, CEA 2.6  - plan for flex sig today to evaluate extent of crohns disease; Give TWE in the and another fleets enema now    - stool studies negative. trial lomotil for diarrhea  - Continue bowel rest, continue TPN/Lipids per renal  - Start vitamin B12 and Vitamin D replacement,  Vitamin A low at 17  - Nutrition following, appreciate input  - CRS following, appreciate input  - renal following re:  SUHA with acidosis   -RUL PNA, has quantiferon gold pending to screen for TB       Patient Active Hospital Problem List:   Active Problems:    Severe protein-calorie malnutrition ([facility] [address] (6/10/2015)      Acute cystitis (8/27/2022)      Time Spent with Patient: BRINA Monroe NP  I have personally reviewed the history and independently examined the patient. I have reviewed the chart and agree with the documentation recorded by the Mid Level Provider, including the assessment, treatment plan, and disposition. ASSESSMENT AND PLAN:  Flex Sig done today showed possible mild Crohn.  He has severe anemia which appears to be multifactorial including IBD, malnutrition and malabsorption, chronic liver disease, etc.  Transfuse as needed  Await bx results    Pro Velasco MD

## 2022-09-01 NOTE — PROGRESS NOTES
[address] [employer] Progress Note        NAME: Patricia Heart       :  1960       MRN:  [id number]     Date/Time: 2022    Risk of deterioration: medium       Assessment:    Plan: SUHA   Acidosis-resolved  Hyokalemia-better  Volume depletion/diarrhea  Hyperbilirubinemia  Hx of crohns/bowel resection/short gut  Leukocytosis/uti=coag (-)  Nonobstructing renal stones  (R) M L infiltrate       SUHA has resolved. K is better  Mild low Na  Phos better    Acidosis resolved  Off HCO3 drip  D/C NaPHOSPHATE fleet enema's -risk of SUHA    Ct TPN- wean off once able to take PO    AM labs to include Mg/Phos    Will sign off at this point. Please call us back with any questions/concerns         Subjective:     Chief Complaint:  resting. Still with diarrhea-but better    Review of Systems: currently no n/v/cp/sob    Objective:     VITALS:   Last 24hrs VS reviewed since prior progress note. Most recent are:  Visit Vitals  BP (!) 122/52   Pulse 97   Temp 99.8 °F (37.7 °C)   Resp 25   Ht 5' 9\" (1.753 m)   Wt 90.1 kg (198 lb 10.2 oz)   SpO2 97%   BMI 29.33 kg/m²     SpO2 Readings from Last 6 Encounters:   22 97%   20 96%   20 99%   10/09/19 99%   19 98%   18 98%    O2 Flow Rate (L/min): 2 l/min   No intake or output data in the 24 hours ending 22 1100       PHYSICAL EXAM:    General   well developed, well nourished, appears stated age, in no acute distress  EENT   +scleral icterus  Extremities  No edema.    Resting      Lab Data Reviewed: (see below)    Medications Reviewed: (see below)    PMH/SH reviewed - no change compared to H&P  ________________________________\  ___________________________________________________    Attending Physician: Aggie Yuen MD     ____________________________________________________  MEDICATIONS:  Current Facility-Administered Medications   Medication Dose Route Frequency    0.9% sodium chloride infusion 250 mL  250 mL IntraVENous PRN    sodium phosphate (FLEET'S) enema 1 Enema  1 Enema Rectal Q2H    diphenoxylate-atropine (LOMOTIL) tablet 1 Tablet  1 Tablet Oral QID PRN    sodium chloride (OCEAN) 0.65 % nasal squeeze bottle 2 Spray  2 Spray Both Nostrils Q2H PRN    HYDROmorphone (DILAUDID) injection 3 mg  3 mg IntraVENous Q4H PRN    oxyCODONE IR (ROXICODONE) tablet 12.5 mg  12.5 mg Oral Q6H PRN    TPN ADULT - CENTRAL   IntraVENous CONTINUOUS    diphenoxylate-atropine (LOMOTIL) tablet 1 Tablet  1 Tablet Oral QID    fluconazole (DIFLUCAN) 400mg/200 mL IVPB (premix)  400 mg IntraVENous DAILY    cefepime (MAXIPIME) 2 g in 0.9% sodium chloride (MBP/ADV) 100 mL MBP  2 g IntraVENous Q12H    ergocalciferol capsule 50,000 Units  50,000 Units Oral Q7D    vancomycin (VANCOCIN) 1,000 mg in 0.9% sodium chloride 250 mL ([id number])  1,000 mg IntraVENous Q18H    collagenase (SANTYL) 250 unit/gram ointment   Topical DAILY    fat emulsion 20% soy-oliv-fish oil (SMOFlipid) infusion 250 mL  250 mL IntraVENous Q24H    nicotine (NICODERM CQ) 21 mg/24 hr patch 1 Patch  1 Patch TransDERmal DAILY    naloxone (NARCAN) injection 0.4 mg  0.4 mg IntraVENous EVERY 2 MINUTES AS NEEDED    metoprolol tartrate (LOPRESSOR) tablet 6.25 mg  6.25 mg Oral Q12H    metoprolol (LOPRESSOR) injection 2.5 mg  2.5 mg IntraVENous Q4H PRN    bisacodyL (DULCOLAX) suppository 10 mg  10 mg Rectal DAILY PRN    pantoprazole (PROTONIX) 40 mg in 0.9% sodium chloride 10 mL injection  40 mg IntraVENous Q12H    sodium chloride (NS) flush 5-40 mL  5-40 mL IntraVENous Q8H    sodium chloride (NS) flush 5-40 mL  5-40 mL IntraVENous PRN    acetaminophen (TYLENOL) tablet 650 mg  650 mg Oral Q6H PRN    Or    acetaminophen (TYLENOL) suppository 650 mg  650 mg Rectal Q6H PRN    ondansetron (ZOFRAN ODT) tablet 4 mg  4 mg Oral Q8H PRN    Or    ondansetron (ZOFRAN) injection 4 mg  4 mg IntraVENous Q6H PRN    Vancomycin - Pharmacy to Dose   Other Rx Dosing/Monitoring    heparin (porcine) injection 5,000 Units  5,000 Units SubCUTAneous Q8H LABS:  Recent Labs     09/01/22 0451 08/31/22  0117   WBC 14.7* 15.0*   HGB 6.0* 7.1*   HCT 16.1* 20.4*   * 434*       Recent Labs     09/01/22 0451 08/31/22  1100 08/31/22  0110 08/30/22  0240   *  --  132* 133*   K 3.9  --  3.3* 3.2*   CL 99  --  94* 95*   CO2 24  --  30 28   BUN 31*  --  39* 50*   CREA 1.29  --  1.37* 1.53*   *  --  109* 102*   CA 7.8*  --  8.0* 8.3*   MG 1.7  --   --   --    PHOS 3.1 2.3*  --  2.9       Recent Labs     09/01/22 0451 08/31/22  0110 08/30/22  0240   ALT 30 22 24    98 114   TBILI 4.7* 6.3* 10.0*   TP 4.8* 4.8* 5.0*   ALB 2.1* 2.2* 2.6*  2.5*   GLOB 2.7 2.6 2.4       Recent Labs     09/01/22 0451 08/31/22  0110   INR 1.1  --    PTP 11.4*  --    APTT 36.3* 35.9*

## 2022-09-01 NOTE — PROCEDURES
118 [facility].  [address] [place] [address] [address], [address]  [phone]                              Flex Sig Procedure Note      Indications:   Fistulizing Crohn's disease, short gut syndrome      :  London Foley MD    Staff: Endoscopy Technician-1: Lay Beltran  Endoscopy RN-1: Hanna Agustin RN    Referring Provider: Juanito Villarreal MD    Sedation:  MAC anesthesia    Procedure Details:  After informed consent was obtained with all risks and benefits of procedure explained and preoperative exam completed, the patient was taken to the endoscopy suite and placed in the left lateral decubitus position. Upon sequential sedation as per above, a digital rectal exam was performed per below. The Olympus videocolonoscope was inserted in the rectum and carefully advanced to the terminal ileum. The colonoscope was slowly withdrawn with careful evaluation between folds. Retroflexion in the rectum was performed. Findings:   Rectum: mild scarring in distal rectum, small internal hemorrhoids, small amount adherent tan stool   Sigmoid: normal mucosa, biopsied, small amount adherent tan stool precluding some views, healthy appearing anastomosis around 15 m from anal verge  Terminal Ileum: > 20 cm of ileum examined, overall healthy mucosa with small (1 cm) superficial scattered erosions, biopsied     Interventions:  biopsies    Specimen Removed:    ID Type Source Tests Collected by Time Destination   1 : pathology Preservative Ileum  Myra Mckeon MD 9/1/2022 1113 Pathology   2 : random colon Preservative   Myra Mckeon MD 9/1/2022 1118 Pathology       Complications: None. EBL:  minimal     Impression:    See Postoperative diagnosis above    Recommendations:   - Await pathology. Possible mild small bowel Crohn's activity.    - Further care per inpatient team.     London Foley MD  9/1/2022  11:21 AM

## 2022-09-01 NOTE — PROGRESS NOTES
Problem: Falls - Risk of  Goal: *Absence of Falls  Description: Document Mickey Ruiz Fall Risk and appropriate interventions in the flowsheet.   Outcome: Progressing Towards Goal  Note: Fall Risk Interventions:  Mobility Interventions: Assess mobility with egress test, Patient to call before getting OOB         Medication Interventions: Patient to call before getting OOB                   Problem: Patient Education: Go to Patient Education Activity  Goal: Patient/Family Education  Outcome: Progressing Towards Goal     Problem: Nutrition Deficit  Goal: *Optimize nutritional status  Outcome: Progressing Towards Goal

## 2022-09-01 NOTE — H&P
[address] [facility] [address]  217 [facility] 140 [facility], [address]  [phone]                                History and Physical     NAME: Jesus Alberto Pineda   :  1960   MRN:  [id number]     HPI:  The patient was seen and examined. Past Surgical History:   Procedure Laterality Date    COLONOSCOPY N/A 10/8/2019    COLONOSCOPY performed by Percy Landau, MD at [facility] ENDOSCOPY    [address] needle, takes 1 inch needle    HX APPENDECTOMY      HX CHOLECYSTECTOMY      HX GI      colectomy x2, one ileostomy    HX GI  14    exp lap,partial colectomy with end ileostomy by Dr Sharan Schroeder      neck fusion    HX ORTHOPAEDIC      wrist right,     HX ORTHOPAEDIC  , 2013    neck, L4 L5 L6    HX ORTHOPAEDIC      right knee scope    HX OTHER SURGICAL      surgical repair from spider bite    HX OTHER SURGICAL  14    Incision and drainage of posterior right subcutaneous shoulder abscess    HX OTHER SURGICAL      LEFT INDEX FINGER SPIDER BITE    HX OTHER SURGICAL      RECTAL FISTULA    HX OTHER SURGICAL  3/17/2015    Ileostomy takedown with extensive lysis of adhesions (greater than three hours), mobilization of the splenic flexure, left colon resection, ileocolic anastomosis, and excision of scar; Dr. Christine Mcdowell. HX OTHER SURGICAL  3/22/2015    Exploratory laparotomy with washout of abdomen, suture repair of small bowel/anastomosis, and diverting protective loop ileostomy; Ivanna Donohue MD.    HX OTHER SURGICAL  2015    Laparotomy, extensive lysis of adhesions, abdominal lavage, and resection of ileocolic anastomosis (including the efferent limb of the loop ileostomy) with creation of Demetrius's pouch; Dr. Christine Mcdowell. HX OTHER SURGICAL  2015    Cystoscopy and placement of bilateral ureteral catheters;  Denver Razo MD.    HX OTHER SURGICAL  2015    Ileostomy takedown with extensive lysis of adhesions, small bowel resection, and enterocolostomy; Dr. Ramy Stover. HX OTHER SURGICAL  9/29/2015    CT-guided percutaneous drainage of intraabdominal abscess; Dr. Joya Mir. HX OTHER SURGICAL  11/16/2015    CT-guided percutaneous aspiration of abdominal wall cavity; Dr. Ngoc Iverson. HX OTHER SURGICAL  12/1/2015    Incision and drainage of abdominal wall abscess; Dr. Ramy Stover. HX OTHER SURGICAL  2/11/2016    Incision and drainage of abdominal wall abscess; Dr. Ramy Stover. HX OTHER SURGICAL  2/23/2016    Cystoscopy and placement of bilateral temporary ureteral catheters; Dr. Paola Foote. HX OTHER SURGICAL  2/23/2016    Exploratory laparotomy, extensive lysis of adhesions, removal of mesh, and repair of enterocutaneous fistula; Dr. Ramy Stover. HX OTHER SURGICAL  11/03/2017    Exploratory laparotomy, extensive lysis of adhesions, and reduction of intussusception; Dr. Ramy Stover. [place] ABDOMEN SURGERY PROC UNLISTED      33 abdominal operations for treatment of Crohn's disease and its complications. Past Medical History:   Diagnosis Date    Abdominal wall hernia 6/15/2012    Anal fissure     Anemia     Anemia     Anxiety and depression     Arthritis     Related to the Crohn's disease. Cancer ([facility] [address]     MELANOMA HEAD AND FACE    Chronic pain     GENERALIZED    COPD (chronic obstructive pulmonary disease) (HCC)     Crohn disease (HCC)     Diagnosed at age 17.     Echocardiogram normal (EF: 55-60%) 07/2015    Esophageal ulcer     GERD (gastroesophageal reflux disease)     H/O blood transfusion 2013    [address] [facility] [address]    Hypertension     denies    Migraine     Short bowel syndrome     Ventral hernia without obstruction or gangrene      Social History     Tobacco Use    Smoking status: Every Day     Packs/day: 1.00     Years: 44.00     Pack years: 44.00     Types: Cigarettes    Smokeless tobacco: Current    Tobacco comments:     CURRENT E CIGS   Substance Use Topics    Alcohol use: No     Comment: RARELY    Drug use: No     Allergies   Allergen Reactions    Honey Anaphylaxis    Remicade [Infliximab] Anaphylaxis    Crestor [Rosuvastatin] Myalgia    Other Plant, Animal, Environmental Other (comments)     Developed \"boils\" on right arm that required I &D after receiving FENTANYL patch.   Is able to take FENTANYL orally; states is allergic to fentanyl patch GLUE    Imuran [Azathioprine] Diarrhea and Nausea Only    Mercaptopurine Diarrhea    Sulfa (Sulfonamide Antibiotics) Other (comments)     Causes diarrhea     Family History   Problem Relation Age of Onset    Stroke Mother     Heart Disease Mother     Kidney Disease Mother     Anesth Problems Mother         TAKES A LONG TIME TO WAKE UP    Heart Disease Father     Thyroid Disease Sister      Current Facility-Administered Medications   Medication Dose Route Frequency    0.9% sodium chloride infusion 250 mL  250 mL IntraVENous PRN    sodium phosphate (FLEET'S) enema 1 Enema  1 Enema Rectal Q2H    diphenoxylate-atropine (LOMOTIL) tablet 1 Tablet  1 Tablet Oral QID PRN    sodium chloride (OCEAN) 0.65 % nasal squeeze bottle 2 Spray  2 Spray Both Nostrils Q2H PRN    HYDROmorphone (DILAUDID) injection 3 mg  3 mg IntraVENous Q4H PRN    oxyCODONE IR (ROXICODONE) tablet 12.5 mg  12.5 mg Oral Q6H PRN    TPN ADULT - CENTRAL   IntraVENous CONTINUOUS    diphenoxylate-atropine (LOMOTIL) tablet 1 Tablet  1 Tablet Oral QID    fluconazole (DIFLUCAN) 400mg/200 mL IVPB (premix)  400 mg IntraVENous DAILY    cefepime (MAXIPIME) 2 g in 0.9% sodium chloride (MBP/ADV) 100 mL MBP  2 g IntraVENous Q12H    ergocalciferol capsule 50,000 Units  50,000 Units Oral Q7D    vancomycin (VANCOCIN) 1,000 mg in 0.9% sodium chloride 250 mL ([id number])  1,000 mg IntraVENous Q18H    collagenase (SANTYL) 250 unit/gram ointment   Topical DAILY    fat emulsion 20% soy-oliv-fish oil (SMOFlipid) infusion 250 mL  250 mL IntraVENous Q24H    nicotine (NICODERM CQ) 21 mg/24 hr patch 1 Patch  1 Patch TransDERmal DAILY    naloxone [facility]) injection 0.4 mg  0.4 mg IntraVENous EVERY 2 MINUTES AS NEEDED    metoprolol tartrate (LOPRESSOR) tablet 6.25 mg  6.25 mg Oral Q12H    metoprolol (LOPRESSOR) injection 2.5 mg  2.5 mg IntraVENous Q4H PRN    bisacodyL (DULCOLAX) suppository 10 mg  10 mg Rectal DAILY PRN    pantoprazole (PROTONIX) 40 mg in 0.9% sodium chloride 10 mL injection  40 mg IntraVENous Q12H    sodium chloride (NS) flush 5-40 mL  5-40 mL IntraVENous Q8H    sodium chloride (NS) flush 5-40 mL  5-40 mL IntraVENous PRN    acetaminophen (TYLENOL) tablet 650 mg  650 mg Oral Q6H PRN    Or    acetaminophen (TYLENOL) suppository 650 mg  650 mg Rectal Q6H PRN    ondansetron (ZOFRAN ODT) tablet 4 mg  4 mg Oral Q8H PRN    Or    ondansetron (ZOFRAN) injection 4 mg  4 mg IntraVENous Q6H PRN    Vancomycin - Pharmacy to Dose   Other Rx Dosing/Monitoring    heparin (porcine) injection 5,000 Units  5,000 Units SubCUTAneous Q8H         PHYSICAL EXAM:  General: WD, WN. Alert, cooperative, no acute distress    HEENT: NC, Atraumatic. PERRLA, EOMI. Anicteric sclerae. Lungs:  CTA Bilaterally. No Wheezing/Rhonchi/Rales. Heart:  Regular  rhythm,  No murmur, No Rubs, No Gallops  Abdomen: Soft, Non distended, Non tender. +Bowel sounds, no HSM  Extremities: No c/c/e  Neurologic:  CN 2-12 gi, Alert and oriented X 3. No acute neurological distress   Psych:   Good insight. Not anxious nor agitated. The heart, lungs and mental status were satisfactory for the administration of MAC sedation and for the procedure. Mallampati score: 2     The patient was counseled at length about the risks of elma Covid-19 in the kulwant-operative and post-operative states including the recovery window of their procedure. The patient was made aware that elma Covid-19 after a surgical procedure may worsen their prognosis for recovering from the virus and lend to a higher morbidity and or mortality risk.   The patient was given the options of postponing their procedure. All of the risks, benefits, and alternatives were discussed. The patient does wish to proceed with the procedure.       Assessment:   crohns    Plan:   Endoscopic procedure :flex sig  MAC sedation

## 2022-09-01 NOTE — PROGRESS NOTES
TRANSFER - IN REPORT:    Verbal report received from [address] RN(name) on Ioana Delaney  being received from 442(unit) for ordered procedure      Report consisted of patients Situation, Background, Assessment and   Recommendations(SBAR). Information from the following report(s) SBAR was reviewed with the receiving nurse. Opportunity for questions and clarification was provided. Assessment completed upon patients arrival to unit and care assumed.

## 2022-09-01 NOTE — PROGRESS NOTES
Bedside shift change report given to Burton Walker (oncoming nurse) by Loni Lanier (offgoing nurse). Report included the following information SBAR, Kardex, Intake/Output, MAR, and Cardiac Rhythm NSR/ST . Problem: Falls - Risk of  Goal: *Absence of Falls  Description: Document Melrose Fall Risk and appropriate interventions in the flowsheet. Outcome: Progressing Towards Goal  Note: Fall Risk Interventions:  Mobility Interventions: Assess mobility with egress test, Patient to call before getting OOB         Medication Interventions: Patient to call before getting OOB, Teach patient to arise slowly                   Problem: Patient Education: Go to Patient Education Activity  Goal: Patient/Family Education  Outcome: Progressing Towards Goal     Problem: Discharge Planning  Goal: *Discharge to safe environment  Outcome: Progressing Towards Goal  Goal: *Knowledge of medication management  Outcome: Progressing Towards Goal  Goal: *Knowledge of discharge instructions  Outcome: Progressing Towards Goal     Problem: Patient Education: Go to Patient Education Activity  Goal: Patient/Family Education  Outcome: Progressing Towards Goal     Problem: Risk for Spread of Infection  Goal: Prevent transmission of infectious organism to others  Description: Prevent the transmission of infectious organisms to other patients, staff members, and visitors.   Outcome: Progressing Towards Goal     Problem: Patient Education:  Go to Education Activity  Goal: Patient/Family Education  Outcome: Progressing Towards Goal     Problem: Nutrition Deficit  Goal: *Optimize nutritional status  Outcome: Progressing Towards Goal

## 2022-09-01 NOTE — PROGRESS NOTES
Spiritual Care Assessment/Progress Note  [facility]      NAME: Mckenna Cook      MRN: [id number]  AGE: 65 y.o. SEX: male  Lutheran Affiliation: Episcopalian   Language: English     9/1/2022     Total Time (in minutes): 10     Spiritual Assessment begun in [address] [place] [address] through conversation with:         [x]Patient        [] Family    [] Friend(s)        Reason for Consult: Initial/Spiritual assessment, patient floor     Spiritual beliefs: (Please include comment if needed)     [] Identifies with a denny tradition:         [] Supported by a denny community:            [] Claims no spiritual orientation:           [] Seeking spiritual identity:                [] Adheres to an individual form of spirituality:           [x] Not able to assess:                           Identified resources for coping:      [] Prayer                               [] Music                  [] Guided Imagery     [] Family/friends                 [] Pet visits     [] Devotional reading                         [x] Unknown     [] Other:                                               Interventions offered during this visit: (See comments for more details)    Patient Interventions: Initial visit           Plan of Care:     [] Support spiritual and/or cultural needs    [] Support AMD and/or advance care planning process      [] Support grieving process   [] Coordinate Rites and/or Rituals    [] Coordination with community clergy   [] No spiritual needs identified at this time   [] Detailed Plan of Care below (See Comments)  [] Make referral to Music Therapy  [] Make referral to Pet Therapy     [] Make referral to Addiction services  [] Make referral to [facility]  [x] Make referral to Spiritual Care Partner  [] No future visits requested        [] Contact Spiritual Care for further referrals     Comments:      visit as a result of Mckenna Cook length of stay. I reviewed the patient's chart prior to this encounter. Assessment: Unable to assess  Mr. Brandon Jack was receiving staff care when I rounded      [facility] for any additional needs ([phone])    _________________________________________  Rev.  Greg Chaudhary, Staff Jennifer Dang, MS, [facility]

## 2022-09-01 NOTE — ROUTINE PROCESS
[facility]  1960  [id number]    Situation:  Verbal report received from: Naya RN  Procedure: Procedure(s):  SIGMOIDOSCOPY FLEXIBLE  COLON BIOPSY    Background:    Preoperative diagnosis: Crohn's Disease  Postoperative diagnosis: crohns    :  Dr. Bernardo Dai  Assistant(s): Endoscopy Technician-1: Izabela Andres  Endoscopy RN-1: Kiki Solis RN    Specimens:   ID Type Source Tests Collected by Time Destination   1 : pathology Preservative Ileum  Hiral Daily MD 9/1/2022 1113 Pathology   2 : random colon Preservative   Hiral Daily MD 9/1/2022 1118 Pathology     H. Pylori  no    Assessment:  Intra-procedure medications   Anesthesia gave intra-procedure sedation and medications, see anesthesia flow sheet yes    Intravenous fluids: NS@ KVO     Vital signs stable     Abdominal assessment: round and soft     Recommendation:  Return to floor.

## 2022-09-02 ENCOUNTER — ANESTHESIA (OUTPATIENT)
Dept: ENDOSCOPY | Age: 62
DRG: 871 | End: 2022-09-02
Payer: MEDICARE

## 2022-09-02 ENCOUNTER — ANESTHESIA EVENT (OUTPATIENT)
Dept: ENDOSCOPY | Age: 62
DRG: 871 | End: 2022-09-02
Payer: MEDICARE

## 2022-09-02 LAB
AFP-TM SERPL-MCNC: <0.9 NG/ML (ref 0–8.4)
ALBUMIN SERPL-MCNC: 1.9 G/DL (ref 3.5–5)
ALBUMIN/GLOB SERPL: 0.5 {RATIO} (ref 1.1–2.2)
ALP SERPL-CCNC: 114 U/L (ref 45–117)
ALT SERPL-CCNC: 39 U/L (ref 12–78)
ANION GAP SERPL CALC-SCNC: 5 MMOL/L (ref 5–15)
APTT PPP: 32.7 SEC (ref 22.1–31)
AST SERPL-CCNC: 52 U/L (ref 15–37)
BASOPHILS # BLD: 0 K/UL (ref 0–0.1)
BASOPHILS NFR BLD: 0 % (ref 0–1)
BILIRUB DIRECT SERPL-MCNC: 2.9 MG/DL (ref 0–0.2)
BILIRUB SERPL-MCNC: 3.4 MG/DL (ref 0.2–1)
BUN SERPL-MCNC: 30 MG/DL (ref 6–20)
BUN/CREAT SERPL: 23 (ref 12–20)
CALCIUM SERPL-MCNC: 8.4 MG/DL (ref 8.5–10.1)
CANCER AG19-9 SERPL-ACNC: 10 U/ML (ref 0–35)
CHLORIDE SERPL-SCNC: 106 MMOL/L (ref 97–108)
CO2 SERPL-SCNC: 23 MMOL/L (ref 21–32)
CREAT SERPL-MCNC: 1.31 MG/DL (ref 0.7–1.3)
DIFFERENTIAL METHOD BLD: ABNORMAL
EBV DNA SPEC QL NAA+PROBE: POSITIVE
EOSINOPHIL # BLD: 0.4 K/UL (ref 0–0.4)
EOSINOPHIL NFR BLD: 3 % (ref 0–7)
ERYTHROCYTE [DISTWIDTH] IN BLOOD BY AUTOMATED COUNT: 18.8 % (ref 11.5–14.5)
GLOBULIN SER CALC-MCNC: 3.9 G/DL (ref 2–4)
GLUCOSE BLD STRIP.AUTO-MCNC: 138 MG/DL (ref 65–117)
GLUCOSE BLD STRIP.AUTO-MCNC: 146 MG/DL (ref 65–117)
GLUCOSE BLD STRIP.AUTO-MCNC: 83 MG/DL (ref 65–117)
GLUCOSE SERPL-MCNC: 118 MG/DL (ref 65–100)
HCT VFR BLD AUTO: 18.5 % (ref 36.6–50.3)
HCT VFR BLD AUTO: 24.1 % (ref 36.6–50.3)
HGB BLD-MCNC: 6.5 G/DL (ref 12.1–17)
HGB BLD-MCNC: 8.4 G/DL (ref 12.1–17)
HISTORY CHECKED?,CKHIST: NORMAL
IMM GRANULOCYTES # BLD AUTO: 0.2 K/UL (ref 0–0.04)
IMM GRANULOCYTES NFR BLD AUTO: 2 % (ref 0–0.5)
INR PPP: 1.1 (ref 0.9–1.1)
LDH SERPL L TO P-CCNC: 352 U/L (ref 85–241)
LYMPHOCYTES # BLD: 1.1 K/UL (ref 0.8–3.5)
LYMPHOCYTES NFR BLD: 9 % (ref 12–49)
MAGNESIUM SERPL-MCNC: 1.7 MG/DL (ref 1.6–2.4)
MCH RBC QN AUTO: 34.9 PG (ref 26–34)
MCHC RBC AUTO-ENTMCNC: 35.1 G/DL (ref 30–36.5)
MCV RBC AUTO: 99.5 FL (ref 80–99)
MONOCYTES # BLD: 0.8 K/UL (ref 0–1)
MONOCYTES NFR BLD: 7 % (ref 5–13)
NEUTS SEG # BLD: 9.6 K/UL (ref 1.8–8)
NEUTS SEG NFR BLD: 79 % (ref 32–75)
NRBC # BLD: 0 K/UL (ref 0–0.01)
NRBC BLD-RTO: 0 PER 100 WBC
PLATELET # BLD AUTO: 468 K/UL (ref 150–400)
PMV BLD AUTO: 10.1 FL (ref 8.9–12.9)
POTASSIUM SERPL-SCNC: 4.1 MMOL/L (ref 3.5–5.1)
PROT SERPL-MCNC: 5.8 G/DL (ref 6.4–8.2)
PROTHROMBIN TIME: 11.1 SEC (ref 9–11.1)
RBC # BLD AUTO: 1.86 M/UL (ref 4.1–5.7)
RBC MORPH BLD: ABNORMAL
RBC MORPH BLD: ABNORMAL
SERVICE CMNT-IMP: ABNORMAL
SERVICE CMNT-IMP: ABNORMAL
SERVICE CMNT-IMP: NORMAL
SODIUM SERPL-SCNC: 134 MMOL/L (ref 136–145)
SPECIMEN SOURCE: ABNORMAL
THERAPEUTIC RANGE,PTTT: ABNORMAL SECS (ref 58–77)
VANCOMYCIN SERPL-MCNC: 12.3 UG/ML
WBC # BLD AUTO: 12.1 K/UL (ref 4.1–11.1)

## 2022-09-02 PROCEDURE — 74011250636 HC RX REV CODE- 250/636: Performed by: INTERNAL MEDICINE

## 2022-09-02 PROCEDURE — 86037 ANCA TITER EACH ANTIBODY: CPT

## 2022-09-02 PROCEDURE — 88305 TISSUE EXAM BY PATHOLOGIST: CPT

## 2022-09-02 PROCEDURE — 74011250637 HC RX REV CODE- 250/637: Performed by: FAMILY MEDICINE

## 2022-09-02 PROCEDURE — 74011000258 HC RX REV CODE- 258: Performed by: INTERNAL MEDICINE

## 2022-09-02 PROCEDURE — 76060000031 HC ANESTHESIA FIRST 0.5 HR: Performed by: SPECIALIST

## 2022-09-02 PROCEDURE — 74011250637 HC RX REV CODE- 250/637: Performed by: NURSE PRACTITIONER

## 2022-09-02 PROCEDURE — 36415 COLL VENOUS BLD VENIPUNCTURE: CPT

## 2022-09-02 PROCEDURE — 85730 THROMBOPLASTIN TIME PARTIAL: CPT

## 2022-09-02 PROCEDURE — 82962 GLUCOSE BLOOD TEST: CPT

## 2022-09-02 PROCEDURE — 76040000019: Performed by: SPECIALIST

## 2022-09-02 PROCEDURE — 80202 ASSAY OF VANCOMYCIN: CPT

## 2022-09-02 PROCEDURE — 74011000258 HC RX REV CODE- 258: Performed by: FAMILY MEDICINE

## 2022-09-02 PROCEDURE — 85610 PROTHROMBIN TIME: CPT

## 2022-09-02 PROCEDURE — 83735 ASSAY OF MAGNESIUM: CPT

## 2022-09-02 PROCEDURE — 74011250636 HC RX REV CODE- 250/636: Performed by: FAMILY MEDICINE

## 2022-09-02 PROCEDURE — 74011000250 HC RX REV CODE- 250: Performed by: INTERNAL MEDICINE

## 2022-09-02 PROCEDURE — 0DB98ZX EXCISION OF DUODENUM, VIA NATURAL OR ARTIFICIAL OPENING ENDOSCOPIC, DIAGNOSTIC: ICD-10-PCS | Performed by: INTERNAL MEDICINE

## 2022-09-02 PROCEDURE — 74011000250 HC RX REV CODE- 250: Performed by: NURSE ANESTHETIST, CERTIFIED REGISTERED

## 2022-09-02 PROCEDURE — 85018 HEMOGLOBIN: CPT

## 2022-09-02 PROCEDURE — 80076 HEPATIC FUNCTION PANEL: CPT

## 2022-09-02 PROCEDURE — 74011000250 HC RX REV CODE- 250: Performed by: FAMILY MEDICINE

## 2022-09-02 PROCEDURE — 0FB03ZX EXCISION OF LIVER, PERCUTANEOUS APPROACH, DIAGNOSTIC: ICD-10-PCS | Performed by: INTERNAL MEDICINE

## 2022-09-02 PROCEDURE — 86682 HELMINTH ANTIBODY: CPT

## 2022-09-02 PROCEDURE — 74011250637 HC RX REV CODE- 250/637: Performed by: INTERNAL MEDICINE

## 2022-09-02 PROCEDURE — 65660000001 HC RM ICU INTERMED STEPDOWN

## 2022-09-02 PROCEDURE — 36430 TRANSFUSION BLD/BLD COMPNT: CPT

## 2022-09-02 PROCEDURE — 74011250636 HC RX REV CODE- 250/636: Performed by: NURSE ANESTHETIST, CERTIFIED REGISTERED

## 2022-09-02 PROCEDURE — 83615 LACTATE (LD) (LDH) ENZYME: CPT

## 2022-09-02 PROCEDURE — 86015 ACTIN ANTIBODY EACH: CPT

## 2022-09-02 PROCEDURE — 80048 BASIC METABOLIC PNL TOTAL CA: CPT

## 2022-09-02 PROCEDURE — 85025 COMPLETE CBC W/AUTO DIFF WBC: CPT

## 2022-09-02 PROCEDURE — P9016 RBC LEUKOCYTES REDUCED: HCPCS

## 2022-09-02 PROCEDURE — 74011250636 HC RX REV CODE- 250/636: Performed by: PHYSICIAN ASSISTANT

## 2022-09-02 PROCEDURE — 2709999900 HC NON-CHARGEABLE SUPPLY: Performed by: SPECIALIST

## 2022-09-02 PROCEDURE — C9113 INJ PANTOPRAZOLE SODIUM, VIA: HCPCS | Performed by: INTERNAL MEDICINE

## 2022-09-02 PROCEDURE — 77030021593 HC FCPS BIOP ENDOSC BSC -A: Performed by: SPECIALIST

## 2022-09-02 PROCEDURE — 0DB68ZX EXCISION OF STOMACH, VIA NATURAL OR ARTIFICIAL OPENING ENDOSCOPIC, DIAGNOSTIC: ICD-10-PCS | Performed by: INTERNAL MEDICINE

## 2022-09-02 RX ORDER — LOPERAMIDE HYDROCHLORIDE 2 MG/1
4 CAPSULE ORAL
Status: DISCONTINUED | OUTPATIENT
Start: 2022-09-02 | End: 2022-09-12 | Stop reason: HOSPADM

## 2022-09-02 RX ORDER — LIDOCAINE HYDROCHLORIDE 20 MG/ML
INJECTION, SOLUTION EPIDURAL; INFILTRATION; INTRACAUDAL; PERINEURAL AS NEEDED
Status: DISCONTINUED | OUTPATIENT
Start: 2022-09-02 | End: 2022-09-02 | Stop reason: HOSPADM

## 2022-09-02 RX ORDER — SODIUM CHLORIDE 9 MG/ML
250 INJECTION, SOLUTION INTRAVENOUS AS NEEDED
Status: DISCONTINUED | OUTPATIENT
Start: 2022-09-02 | End: 2022-09-12 | Stop reason: HOSPADM

## 2022-09-02 RX ORDER — SODIUM CHLORIDE 9 MG/ML
INJECTION, SOLUTION INTRAVENOUS
Status: DISCONTINUED | OUTPATIENT
Start: 2022-09-02 | End: 2022-09-02 | Stop reason: HOSPADM

## 2022-09-02 RX ORDER — PROPOFOL 10 MG/ML
INJECTION, EMULSION INTRAVENOUS AS NEEDED
Status: DISCONTINUED | OUTPATIENT
Start: 2022-09-02 | End: 2022-09-02 | Stop reason: HOSPADM

## 2022-09-02 RX ORDER — ALBUTEROL SULFATE 0.83 MG/ML
2.5 SOLUTION RESPIRATORY (INHALATION)
Status: DISCONTINUED | OUTPATIENT
Start: 2022-09-02 | End: 2022-09-12 | Stop reason: HOSPADM

## 2022-09-02 RX ADMIN — HEPARIN SODIUM 5000 UNITS: 5000 INJECTION INTRAVENOUS; SUBCUTANEOUS at 05:45

## 2022-09-02 RX ADMIN — PROPOFOL 20 MG: 10 INJECTION, EMULSION INTRAVENOUS at 15:12

## 2022-09-02 RX ADMIN — SODIUM CHLORIDE, PRESERVATIVE FREE 10 ML: 5 INJECTION INTRAVENOUS at 18:26

## 2022-09-02 RX ADMIN — ALTEPLASE 1 MG: 2.2 INJECTION, POWDER, LYOPHILIZED, FOR SOLUTION INTRAVENOUS at 22:52

## 2022-09-02 RX ADMIN — LOPERAMIDE HYDROCHLORIDE 4 MG: 2 CAPSULE ORAL at 18:24

## 2022-09-02 RX ADMIN — HYDROMORPHONE HYDROCHLORIDE 3 MG: 2 INJECTION, SOLUTION INTRAMUSCULAR; INTRAVENOUS; SUBCUTANEOUS at 10:04

## 2022-09-02 RX ADMIN — CEFEPIME 2 G: 2 INJECTION, POWDER, FOR SOLUTION INTRAVENOUS at 05:46

## 2022-09-02 RX ADMIN — HYDROMORPHONE HYDROCHLORIDE 3 MG: 2 INJECTION, SOLUTION INTRAMUSCULAR; INTRAVENOUS; SUBCUTANEOUS at 01:25

## 2022-09-02 RX ADMIN — DIPHENOXYLATE HYDROCHLORIDE AND ATROPINE SULFATE 1 TABLET: 2.5; .025 TABLET ORAL at 21:33

## 2022-09-02 RX ADMIN — HYDROMORPHONE HYDROCHLORIDE 3 MG: 2 INJECTION, SOLUTION INTRAMUSCULAR; INTRAVENOUS; SUBCUTANEOUS at 18:44

## 2022-09-02 RX ADMIN — HYDROMORPHONE HYDROCHLORIDE 3 MG: 2 INJECTION, SOLUTION INTRAMUSCULAR; INTRAVENOUS; SUBCUTANEOUS at 05:46

## 2022-09-02 RX ADMIN — SODIUM CHLORIDE, PRESERVATIVE FREE 10 ML: 5 INJECTION INTRAVENOUS at 21:34

## 2022-09-02 RX ADMIN — VANCOMYCIN HYDROCHLORIDE 1000 MG: 1 INJECTION, POWDER, LYOPHILIZED, FOR SOLUTION INTRAVENOUS at 09:24

## 2022-09-02 RX ADMIN — POTASSIUM CHLORIDE: 2 INJECTION, SOLUTION, CONCENTRATE INTRAVENOUS at 19:41

## 2022-09-02 RX ADMIN — DIPHENOXYLATE HYDROCHLORIDE AND ATROPINE SULFATE 1 TABLET: 2.5; .025 TABLET ORAL at 12:47

## 2022-09-02 RX ADMIN — ACETAMINOPHEN 650 MG: 325 TABLET ORAL at 23:12

## 2022-09-02 RX ADMIN — PROPOFOL 40 MG: 10 INJECTION, EMULSION INTRAVENOUS at 15:09

## 2022-09-02 RX ADMIN — COLLAGENASE SANTYL: 250 OINTMENT TOPICAL at 12:47

## 2022-09-02 RX ADMIN — ALTEPLASE 1 MG: 2.2 INJECTION, POWDER, LYOPHILIZED, FOR SOLUTION INTRAVENOUS at 18:19

## 2022-09-02 RX ADMIN — PROPOFOL 50 MG: 10 INJECTION, EMULSION INTRAVENOUS at 15:08

## 2022-09-02 RX ADMIN — DIPHENOXYLATE HYDROCHLORIDE AND ATROPINE SULFATE 1 TABLET: 2.5; .025 TABLET ORAL at 18:24

## 2022-09-02 RX ADMIN — SODIUM CHLORIDE: 900 INJECTION, SOLUTION INTRAVENOUS at 15:01

## 2022-09-02 RX ADMIN — HYDROMORPHONE HYDROCHLORIDE 3 MG: 2 INJECTION, SOLUTION INTRAMUSCULAR; INTRAVENOUS; SUBCUTANEOUS at 22:50

## 2022-09-02 RX ADMIN — ALTEPLASE 1 MG: 2.2 INJECTION, POWDER, LYOPHILIZED, FOR SOLUTION INTRAVENOUS at 20:28

## 2022-09-02 RX ADMIN — METOPROLOL TARTRATE 6.25 MG: 25 TABLET, FILM COATED ORAL at 08:57

## 2022-09-02 RX ADMIN — FLUCONAZOLE IN SODIUM CHLORIDE 400 MG: 2 INJECTION, SOLUTION INTRAVENOUS at 08:57

## 2022-09-02 RX ADMIN — PROPOFOL 10 MG: 10 INJECTION, EMULSION INTRAVENOUS at 15:14

## 2022-09-02 RX ADMIN — SODIUM CHLORIDE, PRESERVATIVE FREE 40 MG: 5 INJECTION INTRAVENOUS at 08:56

## 2022-09-02 RX ADMIN — HYDROMORPHONE HYDROCHLORIDE 3 MG: 2 INJECTION, SOLUTION INTRAMUSCULAR; INTRAVENOUS; SUBCUTANEOUS at 14:25

## 2022-09-02 RX ADMIN — CEFEPIME 2 G: 2 INJECTION, POWDER, FOR SOLUTION INTRAVENOUS at 18:19

## 2022-09-02 RX ADMIN — METOPROLOL TARTRATE 6.25 MG: 25 TABLET, FILM COATED ORAL at 20:31

## 2022-09-02 RX ADMIN — SODIUM CHLORIDE, PRESERVATIVE FREE 40 MG: 5 INJECTION INTRAVENOUS at 20:31

## 2022-09-02 RX ADMIN — SMOFLIPID 250 ML: 6; 6; 5; 3 INJECTION, EMULSION INTRAVENOUS at 20:23

## 2022-09-02 RX ADMIN — LIDOCAINE HYDROCHLORIDE 40 MG: 20 INJECTION, SOLUTION EPIDURAL; INFILTRATION; INTRACAUDAL; PERINEURAL at 15:08

## 2022-09-02 RX ADMIN — DIPHENOXYLATE HYDROCHLORIDE AND ATROPINE SULFATE 1 TABLET: 2.5; .025 TABLET ORAL at 08:57

## 2022-09-02 NOTE — PROGRESS NOTES
118 [facility].  [address] [address] [address], [address]  [phone]                     GI PROGRESS NOTE    Patient Name: Edis Hanson      : 1960      MRN: [id number]  Admit Date: 2022  Today's Date: 2022  CC: hyperbilirubinemia and severe Crohn's disease    Subjective:     Patient does not feel good today, having increased weakness and SOB. Hgb 6.5 this morning. States having \"tea colored\" watery stools, had 10 in past 24hrs. Continues with right sided abdominal pain. Objective:     Blood pressure (!) 130/55, pulse 88, temperature 98.5 °F (36.9 °C), resp. rate 24, height 5' 9\" (1.753 m), weight 92.1 kg (203 lb 0.7 oz), SpO2 97 %. Physical Exam:  General appearance: cooperative,  male, appears tired  Skin: jaundice  HEENT: Sclerae icteric. Cardiovascular: RRR,  No murmurs, gallops, or rubs. Respiratory: Coarse breath sounds, congested cough. GI: Abdomen nondistended, soft, right side abd tenderness. + active bowel sounds. + anterior wall hernia which bulges with breathing. Multiple abdominal scars, abd bandage over abdomen   Rectal: Deferred   Musculoskeletal: No pitting edema of the lower legs. Neurological:  Alert and oriented. Psychiatric:  No anxiety or agitation.       Data Review:    Recent Results (from the past 24 hour(s))   TYPE & SCREEN    Collection Time: 22 12:24 PM   Result Value Ref Range    Crossmatch Expiration 2022,2359     ABO/Rh(D) Sandra Benito POSITIVE     Antibody screen NEG     Unit number [id number]     Blood component type RC LR,1     Unit division 00     Status of unit TRANSFUSED     Crossmatch result Compatible    HGB & HCT    Collection Time: 22 12:24 PM   Result Value Ref Range    HGB 5.9 (LL) 12.1 - 17.0 g/dL    HCT 16.6 (LL) 36.6 - 50.3 %   BILIRUBIN, DIRECT    Collection Time: 22 12:24 PM   Result Value Ref Range    Bilirubin, direct 3.5 (H) 0.0 - 0.2 MG/DL   METABOLIC PANEL, BASIC    Collection Time: 09/01/22 12:24 PM   Result Value Ref Range    Sodium 134 (L) 136 - 145 mmol/L    Potassium 4.4 3.5 - 5.1 mmol/L    Chloride 102 97 - 108 mmol/L    CO2 26 21 - 32 mmol/L    Anion gap 6 5 - 15 mmol/L    Glucose 113 (H) 65 - 100 mg/dL    BUN 33 (H) 6 - 20 MG/DL    Creatinine 1.27 0.70 - 1.30 MG/DL    BUN/Creatinine ratio 26 (H) 12 - 20      GFR est AA >60 >60 ml/min/1.73m2    GFR est non-AA 58 (L) >60 ml/min/1.73m2    Calcium 7.9 (L) 8.5 - 10.1 MG/DL   GLUCOSE, POC    Collection Time: 09/01/22 12:46 PM   Result Value Ref Range    Glucose (POC) 122 (H) 65 - 117 mg/dL    Performed by [address] [place] [address],[address]  Travel NIKHIL    GLUCOSE, POC    Collection Time: 09/01/22  4:26 PM   Result Value Ref Range    Glucose (POC) 124 (H) 65 - 117 mg/dL    Performed by Danis Mt    HEPATIC FUNCTION PANEL    Collection Time: 09/02/22  5:48 AM   Result Value Ref Range    Protein, total 5.8 (L) 6.4 - 8.2 g/dL    Albumin 1.9 (L) 3.5 - 5.0 g/dL    Globulin 3.9 2.0 - 4.0 g/dL    A-G Ratio 0.5 (L) 1.1 - 2.2      Bilirubin, total 3.4 (H) 0.2 - 1.0 MG/DL    Bilirubin, direct 2.9 (H) 0.0 - 0.2 MG/DL    Alk. phosphatase 114 45 - 117 U/L    AST (SGOT) 52 (H) 15 - 37 U/L    ALT (SGPT) 39 12 - 78 U/L   CBC WITH AUTOMATED DIFF    Collection Time: 09/02/22  5:48 AM   Result Value Ref Range    WBC 12.1 (H) 4.1 - 11.1 K/uL    RBC 1.86 (L) 4.10 - 5.70 M/uL    HGB 6.5 (L) 12.1 - 17.0 g/dL    HCT 18.5 (L) 36.6 - 50.3 %    MCV 99.5 (H) 80.0 - 99.0 FL    MCH 34.9 (H) 26.0 - 34.0 PG    MCHC 35.1 30.0 - 36.5 g/dL    RDW 18.8 (H) 11.5 - 14.5 %    PLATELET 892 (H) 125 - 400 K/uL    MPV 10.1 8.9 - 12.9 FL    NRBC 0.0 0  WBC    ABSOLUTE NRBC 0.00 0.00 - 0.01 K/uL    NEUTROPHILS 79 (H) 32 - 75 %    LYMPHOCYTES 9 (L) 12 - 49 %    MONOCYTES 7 5 - 13 %    EOSINOPHILS 3 0 - 7 %    BASOPHILS 0 0 - 1 %    IMMATURE GRANULOCYTES 2 (H) 0.0 - 0.5 %    ABS. NEUTROPHILS 9.6 (H) 1.8 - 8.0 K/UL    ABS. LYMPHOCYTES 1.1 0.8 - 3.5 K/UL    ABS. MONOCYTES 0.8 0.0 - 1.0 K/UL    ABS. EOSINOPHILS 0.4 0.0 - 0.4 K/UL    ABS. BASOPHILS 0.0 0.0 - 0.1 K/UL    ABS. IMM. GRANS. 0.2 (H) 0.00 - 0.04 K/UL    DF SMEAR SCANNED      RBC COMMENTS ROULEAUX  ANISOCYTOSIS  1+        RBC COMMENTS MACROCYTOSIS  1+       MAGNESIUM    Collection Time: 09/02/22  5:48 AM   Result Value Ref Range    Magnesium 1.7 1.6 - 2.4 mg/dL   LD    Collection Time: 09/02/22  5:48 AM   Result Value Ref Range     (H) 85 - 241 U/L   PROTHROMBIN TIME + INR    Collection Time: 09/02/22  5:48 AM   Result Value Ref Range    INR 1.1 0.9 - 1.1      Prothrombin time 11.1 9.0 - 11.1 sec   PTT    Collection Time: 09/02/22  5:48 AM   Result Value Ref Range    aPTT 32.7 (H) 22.1 - 31.0 sec    aPTT, therapeutic range     58.0 - 44.6 SECS   METABOLIC PANEL, BASIC    Collection Time: 09/02/22  5:48 AM   Result Value Ref Range    Sodium 134 (L) 136 - 145 mmol/L    Potassium 4.1 3.5 - 5.1 mmol/L    Chloride 106 97 - 108 mmol/L    CO2 23 21 - 32 mmol/L    Anion gap 5 5 - 15 mmol/L    Glucose 118 (H) 65 - 100 mg/dL    BUN 30 (H) 6 - 20 MG/DL    Creatinine 1.31 (H) 0.70 - 1.30 MG/DL    BUN/Creatinine ratio 23 (H) 12 - 20      GFR est AA >60 >60 ml/min/1.73m2    GFR est non-AA 56 (L) >60 ml/min/1.73m2    Calcium 8.4 (L) 8.5 - 10.1 MG/DL   VANCOMYCIN, RANDOM    Collection Time: 09/02/22  5:48 AM   Result Value Ref Range    Vancomycin, random 12.3 UG/ML   GLUCOSE, POC    Collection Time: 09/02/22  9:30 AM   Result Value Ref Range    Glucose (POC) 138 (H) 65 - 117 mg/dL    Performed by SUNDEEP Castrejon RN          Assessment / Plan :     Mr. 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 CTAP w/ Oral CON 8/30/22:  no definite enterocutaneous fistula  MRCP 8/30/22: no choledocholithiasis, or pancreatic head mass, overt ampullary mass   Flex sigmoidoscopy 9/1/22: mild scarring in distal rectum, small internal hemorrhoids, Sigmoid: normal mucosa, biopsied, small amount adherent tan stool precluding some views, healthy appearing anastomosis around 15 m from anal verge, Terminal Ileum: > 20 cm of ileum examined, overall healthy mucosa with small (1 cm) superficial scattered erosions, biopsied     - Await pathology. Possible mild small bowel Crohn's activity. - anemia is multifactorial including IBD, malnutrition and malabsorption, chronic liver disease; plan for EGD today for further workup  - Hgb 6.5, transfuse 1 unit PRBC's now   - trend hgb and transfuse to maintain hgb >7 or with symptomatic anemia  - Appreciate hepatology input, plans for liver bx aborted d/t anemia, consideration for outpatient fibroscan  - stool studies negative.  Continue lomotil for diarrhea  - Continue bowel rest, continue TPN/Lipids   - vitamin B12 and Vitamin D replacement,  Vitamin A low at 17  - Nutrition following, appreciate input  - CRS following, appreciate input       Patient Active Hospital Problem List:   Active Problems:    Severe protein-calorie malnutrition ([facility] [address] (6/10/2015)      Acute cystitis (8/27/2022)      Time Spent with Patient: Allen Scherer NP

## 2022-09-02 NOTE — PROGRESS NOTES
Problem: Falls - Risk of  Goal: *Absence of Falls  Description: Document Mateo Millan Fall Risk and appropriate interventions in the flowsheet. Outcome: Progressing Towards Goal  Note: Fall Risk Interventions:  Mobility Interventions: Patient to call before getting OOB         Medication Interventions: Patient to call before getting OOB                   Problem: Patient Education: Go to Patient Education Activity  Goal: Patient/Family Education  Outcome: Progressing Towards Goal     Problem: Discharge Planning  Goal: *Discharge to safe environment  Outcome: Progressing Towards Goal  Goal: *Knowledge of medication management  Outcome: Progressing Towards Goal  Goal: *Knowledge of discharge instructions  Outcome: Progressing Towards Goal     Problem: Patient Education: Go to Patient Education Activity  Goal: Patient/Family Education  Outcome: Progressing Towards Goal     Problem: Risk for Spread of Infection  Goal: Prevent transmission of infectious organism to others  Description: Prevent the transmission of infectious organisms to other patients, staff members, and visitors.   Outcome: Progressing Towards Goal     Problem: Patient Education:  Go to Education Activity  Goal: Patient/Family Education  Outcome: Progressing Towards Goal     Problem: Nutrition Deficit  Goal: *Optimize nutritional status  Outcome: Progressing Towards Goal

## 2022-09-02 NOTE — ANESTHESIA PREPROCEDURE EVALUATION
Relevant Problems   CARDIOVASCULAR   (+) HTN (hypertension)      GASTROINTESTINAL   (+) GERD (gastroesophageal reflux disease)   (+) Hiatal hernia       Anesthetic History   No history of anesthetic complications            Review of Systems / Medical History  Patient summary reviewed, nursing notes reviewed and pertinent labs reviewed    Pulmonary    COPD: moderate               Neuro/Psych   Within defined limits           Cardiovascular    Hypertension: well controlled              Exercise tolerance: >4 METS     GI/Hepatic/Renal     GERD          Comments: crohns  Mult SB resections, therefore short gut Endo/Other        Arthritis and anemia     Other Findings            Physical Exam    Airway  Mallampati: II  TM Distance: > 6 cm  Neck ROM: normal range of motion   Mouth opening: Normal     Cardiovascular  Regular rate and rhythm,  S1 and S2 normal,  no murmur, click, rub, or gallop             Dental  No notable dental hx       Pulmonary  Breath sounds clear to auscultation               Abdominal  GI exam deferred       Other Findings            Anesthetic Plan    ASA: 3  Anesthesia type: MAC          Induction: Intravenous  Anesthetic plan and risks discussed with: Patient

## 2022-09-02 NOTE — PROGRESS NOTES
Pharmacist Note - Vancomycin Dosing  Therapy day 8  Indication:  Sepsis of unknown etiology, PNA  - crohn's disease w/short bowl syndrome (multiple surgeries)  - ulcerated Abdominal Wound  Current regimen:  1 gram IV q 18 hours    Recent Labs     09/02/22  0548 09/01/22  1224 09/01/22  0451 08/31/22  0117   WBC 12.1*  --  14.7* 15.0*   CREA 1.31* 1.27 1.29  --    BUN 30* 33* 31*  --      A random vancomycin level of 12.3 mcg/mL was obtained and from this level, the patient's AUC24 is calculated to be 370 with the current regimen. Goal target range AUC/SHASHANK 400-600      Plan: Change to 1250 mg IV q18h . Pharmacy will continue to monitor this patient daily for changes in clinical status and renal function. *Random vancomycin levels are used to calculate AUC/SHASHANK, this level should not be interpreted as a trough. Vancomycin has been dosed using Bayesian kinetics software to target an AUC24:SHASHANK of 400-600, which provides adequate exposure for as assumed infection due to MRSA with an SHASHANK of 1 or less while reducing the risk of nephrotoxicity as seen with traditional trough based dosing goals.

## 2022-09-02 NOTE — PROGRESS NOTES
TRANSFER - IN REPORT:    Verbal report received from Paulina Berman RN(name) on June Rote  being received from 442(unit) for ordered procedure      Report consisted of patients Situation, Background, Assessment and   Recommendations(SBAR). Information from the following report(s) SBAR was reviewed with the receiving nurse. Opportunity for questions and clarification was provided. Assessment completed upon patients arrival to unit and care assumed.

## 2022-09-02 NOTE — PROGRESS NOTES
TRANSFER - OUT REPORT:    Verbal report given to [address] BAUMAN RN(name) on Magui Richard  being transferred to [facility](unit) for routine post - op       Report consisted of patients Situation, Background, Assessment and   Recommendations(SBAR). Information from the following report(s) Procedure Summary was reviewed with the receiving nurse. Lines:   Triple Lumen 08/28/22 Left (Active)   Central Line Being Utilized Yes 09/02/22 1021   Criteria for Appropriate Use Total parenteral nutrition 09/02/22 1021   Site Assessment Clean, dry, & intact 09/02/22 1021   Infiltration Assessment 0 09/02/22 1021   Affected Extremity/Extremities Color distal to insertion site pink (or appropriate for race) 08/31/22 1600   Date of Last Dressing Change 09/01/22 09/02/22 1021   Dressing Status Clean, dry, & intact 09/02/22 1021   Dressing Type Transparent 08/31/22 1600   Action Taken Dressing reinforced 08/31/22 1600   Proximal Hub Color/Line Status White; Infusing 08/31/22 1600   Positive Blood Return (Medial Site) Yes 08/31/22 1600   Medial Hub Color/Line Status Brown; Infusing 08/31/22 1600   Positive Blood Return (Lateral Site) Yes 08/31/22 1600   Distal Hub Color/Line Status Blue 08/31/22 1600   Positive Blood Return (Site #3) No 08/31/22 0739   Alcohol Cap Used Yes 09/02/22 1021        Opportunity for questions and clarification was provided.       Patient transported with:   [employer]

## 2022-09-02 NOTE — PROGRESS NOTES
Bedside and Verbal shift change report given to [facility] (oncoming nurse) by Elgin Us (offgoing nurse). Report included the following information SBAR and Kardex.

## 2022-09-02 NOTE — PROGRESS NOTES
[address] \[facility]\"", MD, FACP, Debora Narendra [place], [place]      GEORGIA Marcum, [facility]     Krista PAIGE Alexia, [facility]   Ruthanna Opitz, FNP-MANUELA Key, [facility]       Layo Corrales Sai De Sinha 136    at [address], [address] [place] [address], Mary Jane  22.    [phone]    FAX: [address] [place] [address] Drive, [address], [address]    [phone]    FAX: [phone]       HEPATOLOGY PROGRESS NOTE  The patient is a 65 y.o.  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 He has been treated with [employer] in the past.  He has 4-5 stool per day and an equal number at night that wake him up from sleep. There was no previous history of liver disease. He came to the ED because of feeling poorly with increased weakness over several days. In the ED Laboratory studies were significant for:    WBC 29.1, HB 12.9 gms, , Sna 127, Scr 3.27 mg, AST 25, ALT 38, , TBILI 10.2 mg, INR 1.1, Sammonia <10,     Imaging of the liver with Ultrasound CT scan demonstrated increased echogenicity and surface nodularity consistent with cirrhosis. No liver mass. No dilated bile ducts. No ascites. Hospital Course to date:  He has been treated with IV ABX. Metabolic acidosis was corrected. WBC is coming down. All cultures have been negative  MRI with MRCP does not show any evidence of bile duct stricturing suggestive of PSC. ALP has declined to normal and TBILI coming down from 15 to <5 mg  HB has dropped stepwise since admission to 6.0 gms today      Hepatology Plan: Will defer LBX today because of anemia and HB 6 gms.     With return to normal of ALP and marked decline in TBILI need for LBX is clearly less urgent. Will consider if we need this at all or will just due Fibroscan as OP depending upon clinical course. ASSESSMENT AND PLAN:  Cirrhosis  The diagnosis of cirrhosis is based upon imaging, laboratory studies  Cirrhosis is of unclear etiology. The CTP is 9. Child class B. The MELD score is 24. Elevated liver enzymes  AST is elevated. ALT is normal.  ALP was elevated but has declined to normal.  Total bilirubin is coming down from 15 to <5 gms. Serum albumin is depressed. INR is prolonged. The platelet count is normal.      The pattern of AST>ALT is consistent with cirrhosis    The elevation in ALP, TBILI and h/o Crohns disease suggests he may have [facility]. However, MRCP does not show PSC and labs have improved with IV ABX. Unlikely he has small duct PSC as this would not improve this much spontaneously     I suspect the elevation in ALP which has now come down to normal and TBILI which is coming down is due to sepsis and resolution with treatment. Serologic testing for causes of chronic liver disease was negative. ANCA, ASMA, IGG4 are pending    Low serum albumin  The low serum albumin is secondary to malabsorption from the patients short cut and does not reflect worsening liver disease. Coagulopathy  This is secondary to cirrhosis, and malnutrition form the patients short gut and sepsis and may not reflect more advanced liver disease. The INR has come down to normal with IV Vitamin K     Anemia   This is due to multifactorial causes including inability to absorb FE due to short gut and Crohns disease. Will obtain FE panel to assess for iron stores. Repeat FE studies show Ferritin 1886 and FE 16%. If we knew he had cirrhosis the FE saturation would be consistent with FE deficiency, since FE sat is typically elevated in patiens with cirrhosis because of low transferrin levels. For now probably best just transfuse. FLex sig this AM was unremarkable. PHYSICAL EXAMINATION:  VS: per nursing note  General:  No acute distress. Eyes:  Sclera icteric  ENT:  No oral lesions. Thyroid normal.  Nodes:  No adenopathy. Skin:  No spider angiomata. Jaundice. Respiratory:  Lungs clear to auscultation. Cardiovascular:  Regular heart rate. Abdomen:  Soft non-tender, Multiple scars. No obvious ascites. Extremities:  No lower extremity edema. Neurologic:  Alert and oriented. Cranial nerves grossly intact. No asterixis. LABORATORY:   Latest Reference Range & Units 8/28/22 12:14 8/29/22 03:55 8/30/22 02:40 8/31/22 01:10 9/1/22 04:51   WBC 4.1 - 11.1 K/uL 19.5 (H) 13.7 (H)  15.0 (H) 14.7 (H)   HGB 12.1 - 17.0 g/dL 8.6 (L) 7.3 (L)  7.1 (L) 6.0 (L)   PLATELET 331 - 008 K/uL 572 (H) 505 (H)  434 (H) 456 (H)   INR 0.9 - 1.1   1.4 (H) 1.3 (H)   1.1   Sodium 136 - 145 mmol/L 133 (L)  133 (L) 132 (L) 131 (L)   Potassium 3.5 - 5.1 mmol/L 2.8 (L)  3.2 (L) 3.3 (L) 3.9   Chloride 97 - 108 mmol/L 103  95 (L) 94 (L) 99   CO2 21 - 32 mmol/L 23  28 30 24   BUN 6 - 20 MG/DL 82 (H)  50 (H) 39 (H) 31 (H)   Creatinine 0.70 - 1.30 MG/DL 2.06 (H)  1.53 (H) 1.37 (H) 1.29   Bilirubin, total 0.2 - 1.0 MG/DL 15.9 (H) 13.9 (H) 10.0 (H) 6.3 (H) 4.7 (H)   Albumin 3.5 - 5.0 g/dL 2.4 (L) 2.7 (L) 2.6 (L)  2.5 (L) 2.2 (L) 2.1 (L)   ALT 12 - 78 U/L 19 30 24 22 30   AST 15 - 37 U/L 42 (H) 66 (H) 50 (H) 35 48 (H)   Alk.  phosphatase 45 - 117 U/L 146 (H) 131 (H) 114 98 108   (H): Data is abnormally high  (L): Data is abnormally low      Carla Pabon MD  [address] 22.  [phone]  [address]

## 2022-09-02 NOTE — PROGRESS NOTES
[address] [facility]  Hospitalist Group                                                                                          Hospitalist Progress Note  Aide Delgado MD  Answering service: [phone] OR [phone] from in house phone        Date of Service:  2022  NAME:  Jessy Eid  :  1960  MRN:  [id number]      Admission Summary:     Patient presents with severe sepsis with SUHA, hyperbilirubinemia. History of Crohn's disease with multiple surgeries in the past.  Hepatology and GI following. Interval history / Subjective:     Patient is weak, having a lot of diarrhea.      Assessment & Plan:     Severe sepsis  -Patient presents with fever, tachycardia, tachypnea, leukocytosis  -Sepsis is due to pneumonia, initial chest x-ray shows consolidation of the right upper lobe, patient with symptoms of cough and expectoration  -Blood cultures so far negative, monitor hemodynamics    Pneumonia  -Initial chest x-ray  shows new area of consolidation in the right upper lobe extending into the right apex, repeat chest x-ray on  shows increased consolidation  -Blood cultures so far negative, add sputum culture  -Continue vancomycin and cefepime    SUHA  -SUHA due to volume depletion and sepsis  -Overall improving renal function, SUHA resolved  -Nephrology signed off    Metabolic acidosis  -This is due to SUHA, metabolic acidosis resolved    Diarrhea  -Stool for C. difficile and enteric bacterial panel negative  -Patient with multiple bowel resections in the past for Crohn's with short gut syndrome  -GI following, continue antidiarrheal agents as needed    Nephrolithiasis  -Nonobstructing right intrarenal calculi  -Follow-up as outpatient    Crohn's disease  -S/p multiple abdominal surgery with short gut syndrome and chronic pain  -Flex sig done , no apparent abnormalities on flex sig  -Continue bowel rest, continue TPN    Acute anemia  -His anemia is chronic and multifactorial including impaired iron absorption due to short gut and Crohn's disease  -S/p transfusion of 2 units PRBC so far  -For EGD today    Hyperbilirubinemia  -Conjugated hyperbilirubinemia, unclear etiology  -Nodular contour of the liver on the ultrasound  -MRCP negative for choledocholithiasis or bile duct obstruction  -Serologic testing for causes of chronic liver disease negative  -Hepatology evaluating the patient, liver biopsy is now deferred, tentative plan for outpatient FibroScan    Coagulopathy  -Patient presents with elevated INR, this is likely due to advanced liver disease  -S/p vitamin K for 3 days    Chronic abdominal wound  -Chronic abdominal wound on anterior abdomen present for 3 years  -Negative fistula on CT  -Wound care following, on Santyl    PVCs  -Continue Lopressor    Tobacco use  -On nicotine patch     Code status: Full  Prophylaxis: SCDs  Care Plan discussed with: Patient  Anticipated Disposition: More than 48 hours     Hospital Problems  Date Reviewed: 9/1/2022            Codes Class Noted POA    Acute cystitis ICD-10-CM: N30.00  ICD-9-CM: 595.0  8/27/2022 Unknown        Severe protein-calorie malnutrition ([facility] [address] (Chronic) ICD-10-CM: H88  ICD-9-CM: 262  6/10/2015 Yes             Review of Systems:   A comprehensive review of systems was negative except for that written in the HPI. Vital Signs:    Last 24hrs VS reviewed since prior progress note.  Most recent are:  Visit Vitals  /63   Pulse 87   Temp 98.1 °F (36.7 °C)   Resp 20   Ht 5' 9\" (1.753 m)   Wt 92.1 kg (203 lb 0.7 oz)   SpO2 95%   BMI 29.98 kg/m²         Intake/Output Summary (Last 24 hours) at 9/2/2022 1252  Last data filed at 9/1/2022 2047  Gross per 24 hour   Intake 197.5 ml   Output --   Net 197.5 ml        Physical Examination:     I had a face to face encounter with this patient and independently examined them on 9/2/2022 as outlined below:          Constitutional:  No acute distress, cooperative, pleasant    ENT:  Oral mucosa moist, oropharynx benign. Resp:  CTA bilaterally. No wheezing/rhonchi/rales. No accessory muscle use. CV:  Regular rhythm, normal rate, no murmurs, gallops, rubs    GI:  Soft, non distended, non tender. normoactive bowel sounds, no hepatosplenomegaly     Musculoskeletal:  No edema, warm, 2+ pulses throughout    Neurologic:  Moves all extremities. AAOx3, CN II-XII reviewed            Data Review:    Review and/or order of clinical lab test      Labs:     Recent Labs     09/02/22 0548 09/01/22 1224 09/01/22 0451   WBC 12.1*  --  14.7*   HGB 6.5* 5.9* 6.0*   HCT 18.5* 16.6* 16.1*   *  --  456*     Recent Labs     09/02/22 0548 09/01/22 1224 09/01/22 0451 08/31/22  1100   * 134* 131*  --    K 4.1 4.4 3.9  --     102 99  --    CO2 23 26 24  --    BUN 30* 33* 31*  --    CREA 1.31* 1.27 1.29  --    * 113* 116*  --    CA 8.4* 7.9* 7.8*  --    MG 1.7  --  1.7  --    PHOS  --   --  3.1 2.3*     Recent Labs     09/02/22 0548 09/01/22 0451 08/31/22  0110   ALT 39 30 22    108 98   TBILI 3.4* 4.7* 6.3*   TP 5.8* 4.8* 4.8*   ALB 1.9* 2.1* 2.2*   GLOB 3.9 2.7 2.6     Recent Labs     09/02/22 0548 09/01/22 0451 08/31/22  0110   INR 1.1 1.1  --    PTP 11.1 11.4*  --    APTT 32.7* 36.3* 35.9*      Recent Labs     09/01/22 0451   TIBC 142*   PSAT 16*   FERR 1,886*      Lab Results   Component Value Date/Time    Folate 18.2 08/29/2022 01:12 PM      No results for input(s): PH, PCO2, PO2 in the last 72 hours. No results for input(s): CPK, CKNDX, TROIQ in the last 72 hours.     No lab exists for component: CPKMB  Lab Results   Component Value Date/Time    Cholesterol, total 134 12/01/2014 04:33 AM    HDL Cholesterol 35 12/01/2014 04:33 AM    LDL, calculated 58.2 12/01/2014 04:33 AM    Triglyceride 204 (H) 12/01/2014 04:33 AM    CHOL/HDL Ratio 3.8 12/01/2014 04:33 AM     Lab Results   Component Value Date/Time    Glucose (POC) 146 (H) 09/02/2022 12:00 PM    Glucose (POC) 138 (H) 09/02/2022 09:30 AM    Glucose (POC) 124 (H) 09/01/2022 04:26 PM    Glucose (POC) 122 (H) 09/01/2022 12:46 PM    Glucose (POC) 141 (H) 08/31/2022 11:37 AM     Lab Results   Component Value Date/Time    Color DARK YELLOW 08/26/2022 07:27 PM    Appearance TURBID (A) 08/26/2022 07:27 PM    Specific gravity 1.025 08/26/2022 07:27 PM    Specific gravity 1.010 10/07/2019 05:48 AM    pH (UA) 5.0 08/26/2022 07:27 PM    Protein 100 (A) 08/26/2022 07:27 PM    Glucose Negative 08/26/2022 07:27 PM    Ketone TRACE (A) 08/26/2022 07:27 PM    Bilirubin NEGATIVE  10/07/2019 05:48 AM    Urobilinogen 0.2 08/26/2022 07:27 PM    Nitrites Positive (A) 08/26/2022 07:27 PM    Leukocyte Esterase SMALL (A) 08/26/2022 07:27 PM    Epithelial cells FEW 08/26/2022 07:27 PM    Bacteria 1+ (A) 08/26/2022 07:27 PM    WBC 5-10 08/26/2022 07:27 PM    RBC 0-5 08/26/2022 07:27 PM         Medications Reviewed:     Current Facility-Administered Medications   Medication Dose Route Frequency    0.9% sodium chloride infusion 250 mL  250 mL IntraVENous PRN    TPN ADULT - CENTRAL   IntraVENous CONTINUOUS    sodium chloride (NS) flush 5-40 mL  5-40 mL IntraVENous Q8H    sodium chloride (NS) flush 5-40 mL  5-40 mL IntraVENous PRN    TPN ADULT - CENTRAL   IntraVENous CONTINUOUS    diphenoxylate-atropine (LOMOTIL) tablet 1 Tablet  1 Tablet Oral QID PRN    sodium chloride (OCEAN) 0.65 % nasal squeeze bottle 2 Spray  2 Spray Both Nostrils Q2H PRN    HYDROmorphone (DILAUDID) injection 3 mg  3 mg IntraVENous Q4H PRN    oxyCODONE IR (ROXICODONE) tablet 12.5 mg  12.5 mg Oral Q6H PRN    diphenoxylate-atropine (LOMOTIL) tablet 1 Tablet  1 Tablet Oral QID    fluconazole (DIFLUCAN) 400mg/200 mL IVPB (premix)  400 mg IntraVENous DAILY    cefepime (MAXIPIME) 2 g in 0.9% sodium chloride (MBP/ADV) 100 mL MBP  2 g IntraVENous Q12H    ergocalciferol capsule 50,000 Units  50,000 Units Oral Q7D    vancomycin (VANCOCIN) 1,000 mg in 0.9% sodium chloride 250 mL ([id number])  1,000 mg IntraVENous Q18H    collagenase (SANTYL) 250 unit/gram ointment   Topical DAILY    fat emulsion 20% soy-oliv-fish oil (SMOFlipid) infusion 250 mL  250 mL IntraVENous Q24H    nicotine (NICODERM CQ) 21 mg/24 hr patch 1 Patch  1 Patch TransDERmal DAILY    naloxone (NARCAN) injection 0.4 mg  0.4 mg IntraVENous EVERY 2 MINUTES AS NEEDED    metoprolol tartrate (LOPRESSOR) tablet 6.25 mg  6.25 mg Oral Q12H    metoprolol (LOPRESSOR) injection 2.5 mg  2.5 mg IntraVENous Q4H PRN    bisacodyL (DULCOLAX) suppository 10 mg  10 mg Rectal DAILY PRN    pantoprazole (PROTONIX) 40 mg in 0.9% sodium chloride 10 mL injection  40 mg IntraVENous Q12H    sodium chloride (NS) flush 5-40 mL  5-40 mL IntraVENous Q8H    sodium chloride (NS) flush 5-40 mL  5-40 mL IntraVENous PRN    acetaminophen (TYLENOL) tablet 650 mg  650 mg Oral Q6H PRN    Or    acetaminophen (TYLENOL) suppository 650 mg  650 mg Rectal Q6H PRN    ondansetron (ZOFRAN ODT) tablet 4 mg  4 mg Oral Q8H PRN    Or    ondansetron (ZOFRAN) injection 4 mg  4 mg IntraVENous Q6H PRN    Vancomycin - Pharmacy to Dose   Other Rx Dosing/Monitoring    heparin (porcine) injection 5,000 Units  5,000 Units SubCUTAneous Q8H     ______________________________________________________________________  EXPECTED LENGTH OF STAY: 4d 19h  ACTUAL LENGTH OF STAY:          6                 Zaina Howard MD

## 2022-09-02 NOTE — ANESTHESIA POSTPROCEDURE EVALUATION
Post-Anesthesia Evaluation and Assessment    Patient: Christopher Nix MRN: [id number]  SSN: [id number]    YOB: 1960  Age: 65 y.o. Sex: male      I have evaluated the patient and they are stable and ready for discharge from the PACU. Cardiovascular Function/Vital Signs  Visit Vitals  /62 (BP 1 Location: Right upper arm, BP Patient Position: At rest;Semi fowlers)   Pulse 95   Temp 37.1 °C (98.8 °F)   Resp 29   Ht 5' 9\" (1.753 m)   Wt 92.1 kg (203 lb 0.7 oz)   SpO2 99%   BMI 29.98 kg/m²       Patient is status post MAC anesthesia for Procedure(s):  ESOPHAGOGASTRODUODENOSCOPY (EGD)  ESOPHAGOGASTRODUODENAL (EGD) BIOPSY. Nausea/Vomiting: None    Postoperative hydration reviewed and adequate. Pain:  Pain Scale 1: Numeric (0 - 10) (09/02/22 1533)  Pain Intensity 1: 8 (09/02/22 1533)   Managed    Neurological Status:   Neuro  Neurologic State: Alert (09/02/22 0800)  Orientation Level: Oriented X4 (09/02/22 0800)  Cognition: Appropriate decision making (09/02/22 0800)  Speech: Clear (09/02/22 0800)   At baseline    Mental Status, Level of Consciousness: Alert and  oriented to person, place, and time    Pulmonary Status:   O2 Device: None (Room air) (09/02/22 1536)   Adequate oxygenation and airway patent    Complications related to anesthesia: None    Post-anesthesia assessment completed. No concerns    Signed By: Michelle Peoples MD     September 2, 2022              Procedure(s):  ESOPHAGOGASTRODUODENOSCOPY (EGD)  ESOPHAGOGASTRODUODENAL (EGD) BIOPSY. MAC    <BSHSIANPOST>    INITIAL Post-op Vital signs:   Vitals Value Taken Time   /62 09/02/22 1657   Temp 37.1 °C (98.8 °F) 09/02/22 1640   Pulse 84 09/02/22 1727   Resp 32 09/02/22 1727   SpO2 98 % 09/02/22 1641   Vitals shown include unvalidated device data.

## 2022-09-02 NOTE — PROCEDURES
[address]  [address]                 NAME:  Magui Richard   :   1960   MRN:   [id number]     Date/Time:  2022 3:33 PM    Esophagogastroduodenoscopy (EGD) Procedure Note    :  Diego Mc MD    Staff: Endoscopy Technician-1: Ga Kelsey  Endoscopy Technician-Relief: Bear Rizo  Endoscopy RN-1: Blanca Haq RN     Referring Provider:  Michoacano Brown MD    Anethesia/Sedation:  MAC anesthesia Propofol    Preoperative diagnosis: Anemia    Postoperative diagnosis: duodenal ulcer    Procedure Details     After infom consent was obtained for the procedure, with all risks and benefits of procedure explained the patient was taken to the endoscopy suite and placed in the left lateral decubitus position. Following sequential administration of sedation as per above, the [id number] gastroscope was inserted into the mouth and advanced under direct vision to second portion of the duodenum. A careful inspection was made as the gastroscope was withdrawn, including a retroflexed view of the proximal stomach; findings and interventions are described below. Findings:  Esophagus:normal  Stomach:normal, antral biopsies done  Duodenum/jejunum: 5 mm non bleeding , benign appearing ulcer seen in bulb. Biopsies obtained from  descending duodenum. Therapies:  none    Specimens: gastric bx, descending duodenum bx           EBL: None    Complications:   None; patient tolerated the procedure well. Impression:    See Postoperative diagnosis above    Recommendations:  -Acid suppression with a proton pump inhibitor. Await pathology, transfuse as needed, GI lite diet      Diego Mc MD

## 2022-09-03 LAB
ABO + RH BLD: NORMAL
ALBUMIN SERPL-MCNC: 2 G/DL (ref 3.5–5)
ALBUMIN/GLOB SERPL: 0.5 {RATIO} (ref 1.1–2.2)
ALP SERPL-CCNC: 143 U/L (ref 45–117)
ALT SERPL-CCNC: 52 U/L (ref 12–78)
ANION GAP SERPL CALC-SCNC: 8 MMOL/L (ref 5–15)
APTT PPP: 30.2 SEC (ref 22.1–31)
AST SERPL-CCNC: 62 U/L (ref 15–37)
BASOPHILS # BLD: 0 K/UL (ref 0–0.1)
BASOPHILS NFR BLD: 0 % (ref 0–1)
BILIRUB DIRECT SERPL-MCNC: 2.5 MG/DL (ref 0–0.2)
BILIRUB SERPL-MCNC: 3 MG/DL (ref 0.2–1)
BLD PROD TYP BPU: NORMAL
BLD PROD TYP BPU: NORMAL
BLOOD GROUP ANTIBODIES SERPL: NORMAL
BPU ID: NORMAL
BPU ID: NORMAL
BUN SERPL-MCNC: 29 MG/DL (ref 6–20)
BUN/CREAT SERPL: 24 (ref 12–20)
CALCIUM SERPL-MCNC: 8.6 MG/DL (ref 8.5–10.1)
CHLORIDE SERPL-SCNC: 107 MMOL/L (ref 97–108)
CO2 SERPL-SCNC: 22 MMOL/L (ref 21–32)
CREAT SERPL-MCNC: 1.22 MG/DL (ref 0.7–1.3)
CROSSMATCH RESULT,%XM: NORMAL
CROSSMATCH RESULT,%XM: NORMAL
DIFFERENTIAL METHOD BLD: ABNORMAL
EOSINOPHIL # BLD: 0.3 K/UL (ref 0–0.4)
EOSINOPHIL NFR BLD: 3 % (ref 0–7)
ERYTHROCYTE [DISTWIDTH] IN BLOOD BY AUTOMATED COUNT: 18.4 % (ref 11.5–14.5)
GLOBULIN SER CALC-MCNC: 4.2 G/DL (ref 2–4)
GLUCOSE BLD STRIP.AUTO-MCNC: 112 MG/DL (ref 65–117)
GLUCOSE BLD STRIP.AUTO-MCNC: 141 MG/DL (ref 65–117)
GLUCOSE SERPL-MCNC: 106 MG/DL (ref 65–100)
HCT VFR BLD AUTO: 22.1 % (ref 36.6–50.3)
HGB BLD-MCNC: 7.6 G/DL (ref 12.1–17)
IMM GRANULOCYTES # BLD AUTO: 0.2 K/UL (ref 0–0.04)
IMM GRANULOCYTES NFR BLD AUTO: 2 % (ref 0–0.5)
INR PPP: 1.1 (ref 0.9–1.1)
LDH SERPL L TO P-CCNC: 319 U/L (ref 85–241)
LYMPHOCYTES # BLD: 1.1 K/UL (ref 0.8–3.5)
LYMPHOCYTES NFR BLD: 11 % (ref 12–49)
MAGNESIUM SERPL-MCNC: 2 MG/DL (ref 1.6–2.4)
MCH RBC QN AUTO: 32.8 PG (ref 26–34)
MCHC RBC AUTO-ENTMCNC: 34.4 G/DL (ref 30–36.5)
MCV RBC AUTO: 95.3 FL (ref 80–99)
MONOCYTES # BLD: 0.8 K/UL (ref 0–1)
MONOCYTES NFR BLD: 7 % (ref 5–13)
NEUTS SEG # BLD: 7.8 K/UL (ref 1.8–8)
NEUTS SEG NFR BLD: 77 % (ref 32–75)
NRBC # BLD: 0 K/UL (ref 0–0.01)
NRBC BLD-RTO: 0 PER 100 WBC
PLATELET # BLD AUTO: 471 K/UL (ref 150–400)
PMV BLD AUTO: 9.9 FL (ref 8.9–12.9)
POTASSIUM SERPL-SCNC: 4.5 MMOL/L (ref 3.5–5.1)
PROT SERPL-MCNC: 6.2 G/DL (ref 6.4–8.2)
PROTHROMBIN TIME: 11.2 SEC (ref 9–11.1)
RBC # BLD AUTO: 2.32 M/UL (ref 4.1–5.7)
SERVICE CMNT-IMP: ABNORMAL
SERVICE CMNT-IMP: NORMAL
SODIUM SERPL-SCNC: 137 MMOL/L (ref 136–145)
SPECIMEN EXP DATE BLD: NORMAL
STATUS OF UNIT,%ST: NORMAL
STATUS OF UNIT,%ST: NORMAL
THERAPEUTIC RANGE,PTTT: NORMAL SECS (ref 58–77)
UNIT DIVISION, %UDIV: 0
UNIT DIVISION, %UDIV: 0
WBC # BLD AUTO: 10.1 K/UL (ref 4.1–11.1)

## 2022-09-03 PROCEDURE — 85730 THROMBOPLASTIN TIME PARTIAL: CPT

## 2022-09-03 PROCEDURE — C9113 INJ PANTOPRAZOLE SODIUM, VIA: HCPCS | Performed by: INTERNAL MEDICINE

## 2022-09-03 PROCEDURE — 65660000001 HC RM ICU INTERMED STEPDOWN

## 2022-09-03 PROCEDURE — 83735 ASSAY OF MAGNESIUM: CPT

## 2022-09-03 PROCEDURE — 85025 COMPLETE CBC W/AUTO DIFF WBC: CPT

## 2022-09-03 PROCEDURE — 36415 COLL VENOUS BLD VENIPUNCTURE: CPT

## 2022-09-03 PROCEDURE — 82248 BILIRUBIN DIRECT: CPT

## 2022-09-03 PROCEDURE — 74011000250 HC RX REV CODE- 250: Performed by: FAMILY MEDICINE

## 2022-09-03 PROCEDURE — 74011000250 HC RX REV CODE- 250: Performed by: INTERNAL MEDICINE

## 2022-09-03 PROCEDURE — 74011250636 HC RX REV CODE- 250/636: Performed by: INTERNAL MEDICINE

## 2022-09-03 PROCEDURE — 74011000258 HC RX REV CODE- 258: Performed by: FAMILY MEDICINE

## 2022-09-03 PROCEDURE — 74011250636 HC RX REV CODE- 250/636: Performed by: FAMILY MEDICINE

## 2022-09-03 PROCEDURE — 83615 LACTATE (LD) (LDH) ENZYME: CPT

## 2022-09-03 PROCEDURE — 94760 N-INVAS EAR/PLS OXIMETRY 1: CPT

## 2022-09-03 PROCEDURE — 82962 GLUCOSE BLOOD TEST: CPT

## 2022-09-03 PROCEDURE — 99232 SBSQ HOSP IP/OBS MODERATE 35: CPT | Performed by: INTERNAL MEDICINE

## 2022-09-03 PROCEDURE — 80053 COMPREHEN METABOLIC PANEL: CPT

## 2022-09-03 PROCEDURE — 74011250637 HC RX REV CODE- 250/637: Performed by: NURSE PRACTITIONER

## 2022-09-03 PROCEDURE — 74011250637 HC RX REV CODE- 250/637: Performed by: INTERNAL MEDICINE

## 2022-09-03 PROCEDURE — 85610 PROTHROMBIN TIME: CPT

## 2022-09-03 PROCEDURE — 74011000258 HC RX REV CODE- 258: Performed by: INTERNAL MEDICINE

## 2022-09-03 PROCEDURE — 74011250636 HC RX REV CODE- 250/636: Performed by: PHYSICIAN ASSISTANT

## 2022-09-03 RX ADMIN — CEFEPIME 2 G: 2 INJECTION, POWDER, FOR SOLUTION INTRAVENOUS at 04:38

## 2022-09-03 RX ADMIN — SODIUM CHLORIDE, PRESERVATIVE FREE 40 MG: 5 INJECTION INTRAVENOUS at 21:52

## 2022-09-03 RX ADMIN — DIPHENOXYLATE HYDROCHLORIDE AND ATROPINE SULFATE 1 TABLET: 2.5; .025 TABLET ORAL at 12:16

## 2022-09-03 RX ADMIN — CEFEPIME 2 G: 2 INJECTION, POWDER, FOR SOLUTION INTRAVENOUS at 15:04

## 2022-09-03 RX ADMIN — SODIUM CHLORIDE, PRESERVATIVE FREE 10 ML: 5 INJECTION INTRAVENOUS at 15:04

## 2022-09-03 RX ADMIN — POTASSIUM CHLORIDE: 2 INJECTION, SOLUTION, CONCENTRATE INTRAVENOUS at 18:48

## 2022-09-03 RX ADMIN — FLUCONAZOLE IN SODIUM CHLORIDE 400 MG: 2 INJECTION, SOLUTION INTRAVENOUS at 08:00

## 2022-09-03 RX ADMIN — SODIUM CHLORIDE, PRESERVATIVE FREE 10 ML: 5 INJECTION INTRAVENOUS at 21:51

## 2022-09-03 RX ADMIN — DIPHENOXYLATE HYDROCHLORIDE AND ATROPINE SULFATE 1 TABLET: 2.5; .025 TABLET ORAL at 08:07

## 2022-09-03 RX ADMIN — VANCOMYCIN HYDROCHLORIDE 1000 MG: 1 INJECTION, POWDER, LYOPHILIZED, FOR SOLUTION INTRAVENOUS at 04:37

## 2022-09-03 RX ADMIN — HYDROMORPHONE HYDROCHLORIDE 3 MG: 2 INJECTION, SOLUTION INTRAMUSCULAR; INTRAVENOUS; SUBCUTANEOUS at 17:50

## 2022-09-03 RX ADMIN — SMOFLIPID 250 ML: 6; 6; 5; 3 INJECTION, EMULSION INTRAVENOUS at 18:58

## 2022-09-03 RX ADMIN — HYDROMORPHONE HYDROCHLORIDE 3 MG: 2 INJECTION, SOLUTION INTRAMUSCULAR; INTRAVENOUS; SUBCUTANEOUS at 12:16

## 2022-09-03 RX ADMIN — SODIUM CHLORIDE, PRESERVATIVE FREE 10 ML: 5 INJECTION INTRAVENOUS at 15:05

## 2022-09-03 RX ADMIN — METOPROLOL TARTRATE 6.25 MG: 25 TABLET, FILM COATED ORAL at 21:39

## 2022-09-03 RX ADMIN — DIPHENOXYLATE HYDROCHLORIDE AND ATROPINE SULFATE 1 TABLET: 2.5; .025 TABLET ORAL at 21:39

## 2022-09-03 RX ADMIN — HYDROMORPHONE HYDROCHLORIDE 3 MG: 2 INJECTION, SOLUTION INTRAMUSCULAR; INTRAVENOUS; SUBCUTANEOUS at 21:50

## 2022-09-03 RX ADMIN — HYDROMORPHONE HYDROCHLORIDE 3 MG: 2 INJECTION, SOLUTION INTRAMUSCULAR; INTRAVENOUS; SUBCUTANEOUS at 04:37

## 2022-09-03 RX ADMIN — COLLAGENASE SANTYL: 250 OINTMENT TOPICAL at 08:14

## 2022-09-03 RX ADMIN — DIPHENOXYLATE HYDROCHLORIDE AND ATROPINE SULFATE 1 TABLET: 2.5; .025 TABLET ORAL at 17:49

## 2022-09-03 RX ADMIN — SODIUM CHLORIDE, PRESERVATIVE FREE 10 ML: 5 INJECTION INTRAVENOUS at 04:38

## 2022-09-03 RX ADMIN — HYDROMORPHONE HYDROCHLORIDE 3 MG: 2 INJECTION, SOLUTION INTRAMUSCULAR; INTRAVENOUS; SUBCUTANEOUS at 07:57

## 2022-09-03 RX ADMIN — SODIUM CHLORIDE, PRESERVATIVE FREE 40 MG: 5 INJECTION INTRAVENOUS at 08:07

## 2022-09-03 RX ADMIN — METOPROLOL TARTRATE 6.25 MG: 25 TABLET, FILM COATED ORAL at 08:07

## 2022-09-03 RX ADMIN — VANCOMYCIN HYDROCHLORIDE 1250 MG: 1.25 INJECTION, POWDER, LYOPHILIZED, FOR SOLUTION INTRAVENOUS at 21:52

## 2022-09-03 RX ADMIN — HEPARIN SODIUM 5000 UNITS: 5000 INJECTION INTRAVENOUS; SUBCUTANEOUS at 15:03

## 2022-09-03 NOTE — PROGRESS NOTES
[facility] [facility] Adult  Hospitalist Group                                                                                          Hospitalist Progress Note  Michelle Biswas MD  Answering service: [phone] from in house phone        Date of Service:  9/3/2022  NAME:  Gina Bellamy  :  1960  MRN:  [id number]      Admission Summary:     Patient presents with severe sepsis with SUHA, hyperbilirubinemia. History of Crohn's disease with multiple surgeries in the past.  Hepatology and GI following. Interval history / Subjective:     Patient's stool is more formed. Overall feeling better.      Assessment & Plan:     Severe sepsis  -Patient presents with fever, tachycardia, tachypnea, leukocytosis  -Sepsis is due to pneumonia, initial chest x-ray shows consolidation of the right upper lobe, patient with symptoms of cough and expectoration  -Blood cultures so far negative, monitor hemodynamics    Pneumonia  -Initial chest x-ray  shows new area of consolidation in the right upper lobe extending into the right apex, repeat chest x-ray on  shows increased consolidation  -Blood cultures so far negative, add sputum culture  -Continue vancomycin and cefepime    SUHA  -SUHA due to volume depletion and sepsis  -Overall improving renal function, SUHA resolved  -Nephrology signed off    Metabolic acidosis  -This is due to SUHA, metabolic acidosis resolved    Diarrhea  -Stool for C. difficile and enteric bacterial panel negative  -Patient with multiple bowel resections in the past for Crohn's with short gut syndrome  -GI following, continue antidiarrheal agents as needed    Nephrolithiasis  -Nonobstructing right intrarenal calculi  -Follow-up as outpatient    Crohn's disease  -S/p multiple abdominal surgery with short gut syndrome and chronic pain  -Flex sig done , no apparent abnormalities on flex sig  -Continue bowel rest, continue TPN    Acute anemia  -His anemia is chronic and multifactorial including impaired iron absorption due to short gut and Crohn's disease  -S/p transfusion of 2 units PRBC so far  -For EGD today    Hyperbilirubinemia  -Conjugated hyperbilirubinemia which is likely due to sepsis, bilirubin level improving  -Nodular contour of the liver on the ultrasound  -MRCP negative for choledocholithiasis or bile duct obstruction  -Serologic testing for causes of chronic liver disease negative  -Hepatology evaluating the patient, liver biopsy is now deferred, plan for outpatient FibroScan    Coagulopathy  -Patient presents with elevated INR, this is likely due to advanced liver disease  -S/p vitamin K for 3 days    Chronic abdominal wound  -Chronic abdominal wound on anterior abdomen present for 3 years  -Negative fistula on CT  -Wound care following, on Santyl    PVCs  -Continue Lopressor    Tobacco use  -On nicotine patch     Code status: Full  Prophylaxis: SCDs  Care Plan discussed with: Patient  Anticipated Disposition: More than 48 hours     Hospital Problems  Date Reviewed: 9/2/2022            Codes Class Noted POA    Acute cystitis ICD-10-CM: N30.00  ICD-9-CM: 595.0  8/27/2022 Unknown        Severe protein-calorie malnutrition ([facility] [address] (Chronic) ICD-10-CM: E02  ICD-9-CM: 262  6/10/2015 Yes             Review of Systems:   A comprehensive review of systems was negative except for that written in the HPI. Vital Signs:    Last 24hrs VS reviewed since prior progress note. Most recent are:  Visit Vitals  BP (!) 144/63   Pulse 82   Temp 98.2 °F (36.8 °C)   Resp 24   Ht 5' 9\" (1.753 m)   Wt 87.7 kg (193 lb 5.5 oz)   SpO2 95%   BMI 28.55 kg/m²       No intake or output data in the 24 hours ending 09/03/22 1506       Physical Examination:     I had a face to face encounter with this patient and independently examined them on 9/3/2022 as outlined below:          Constitutional:  No acute distress, cooperative, pleasant    ENT:  Oral mucosa moist, oropharynx benign. Resp:  CTA bilaterally.  No wheezing/rhonchi/rales. No accessory muscle use. CV:  Regular rhythm, normal rate, no murmurs, gallops, rubs    GI:  Soft, non distended, non tender. normoactive bowel sounds, no hepatosplenomegaly     Musculoskeletal:  No edema, warm, 2+ pulses throughout    Neurologic:  Moves all extremities. AAOx3, CN II-XII reviewed            Data Review:    Review and/or order of clinical lab test      Labs:     Recent Labs     09/03/22 0655 09/02/22 2023 09/02/22  0548   WBC 10.1  --  12.1*   HGB 7.6* 8.4* 6.5*   HCT 22.1* 24.1* 18.5*   *  --  468*     Recent Labs     09/03/22 0655 09/02/22 0548 09/01/22  1224 09/01/22  0451    134* 134* 131*   K 4.5 4.1 4.4 3.9    106 102 99   CO2 22 23 26 24   BUN 29* 30* 33* 31*   CREA 1.22 1.31* 1.27 1.29   * 118* 113* 116*   CA 8.6 8.4* 7.9* 7.8*   MG 2.0 1.7  --  1.7   PHOS  --   --   --  3.1     Recent Labs     09/03/22 0655 09/02/22 0548 09/01/22  0451   ALT 52 39 30   * 114 108   TBILI 3.0* 3.4* 4.7*   TP 6.2* 5.8* 4.8*   ALB 2.0* 1.9* 2.1*   GLOB 4.2* 3.9 2.7     Recent Labs     09/03/22 0655 09/02/22  0548 09/01/22  0451   INR 1.1 1.1 1.1   PTP 11.2* 11.1 11.4*   APTT 30.2 32.7* 36.3*      Recent Labs     09/01/22 0451   TIBC 142*   PSAT 16*   FERR 1,886*      Lab Results   Component Value Date/Time    Folate 18.2 08/29/2022 01:12 PM      No results for input(s): PH, PCO2, PO2 in the last 72 hours. No results for input(s): CPK, CKNDX, TROIQ in the last 72 hours.     No lab exists for component: CPKMB  Lab Results   Component Value Date/Time    Cholesterol, total 134 12/01/2014 04:33 AM    HDL Cholesterol 35 12/01/2014 04:33 AM    LDL, calculated 58.2 12/01/2014 04:33 AM    Triglyceride 204 (H) 12/01/2014 04:33 AM    CHOL/HDL Ratio 3.8 12/01/2014 04:33 AM     Lab Results   Component Value Date/Time    Glucose (POC) 141 (H) 09/03/2022 12:21 PM    Glucose (POC) 83 09/02/2022 04:59 PM    Glucose (POC) 146 (H) 09/02/2022 12:00 PM    Glucose (POC) 138 (H) 09/02/2022 09:30 AM    Glucose (POC) 124 (H) 09/01/2022 04:26 PM     Lab Results   Component Value Date/Time    Color DARK YELLOW 08/26/2022 07:27 PM    Appearance TURBID (A) 08/26/2022 07:27 PM    Specific gravity 1.025 08/26/2022 07:27 PM    Specific gravity 1.010 10/07/2019 05:48 AM    pH (UA) 5.0 08/26/2022 07:27 PM    Protein 100 (A) 08/26/2022 07:27 PM    Glucose Negative 08/26/2022 07:27 PM    Ketone TRACE (A) 08/26/2022 07:27 PM    Bilirubin NEGATIVE  10/07/2019 05:48 AM    Urobilinogen 0.2 08/26/2022 07:27 PM    Nitrites Positive (A) 08/26/2022 07:27 PM    Leukocyte Esterase SMALL (A) 08/26/2022 07:27 PM    Epithelial cells FEW 08/26/2022 07:27 PM    Bacteria 1+ (A) 08/26/2022 07:27 PM    WBC 5-10 08/26/2022 07:27 PM    RBC 0-5 08/26/2022 07:27 PM         Medications Reviewed:     Current Facility-Administered Medications   Medication Dose Route Frequency    TPN ADULT - CENTRAL   IntraVENous CONTINUOUS    vancomycin (VANCOCIN) 1,250 mg in 0.9% sodium chloride 250 mL ([id number])  1,250 mg IntraVENous Q18H    0.9% sodium chloride infusion 250 mL  250 mL IntraVENous PRN    TPN ADULT - CENTRAL   IntraVENous CONTINUOUS    loperamide (IMODIUM) capsule 4 mg  4 mg Oral Q4H PRN    albuterol (PROVENTIL VENTOLIN) nebulizer solution 2.5 mg  2.5 mg Nebulization Q4H PRN    alteplase (CATHFLO) 1 mg in sterile water (preservative free) 1 mL injection  1 mg InterCATHeter PRN    sodium chloride (NS) flush 5-40 mL  5-40 mL IntraVENous Q8H    sodium chloride (NS) flush 5-40 mL  5-40 mL IntraVENous PRN    diphenoxylate-atropine (LOMOTIL) tablet 1 Tablet  1 Tablet Oral QID PRN    sodium chloride (OCEAN) 0.65 % nasal squeeze bottle 2 Spray  2 Spray Both Nostrils Q2H PRN    HYDROmorphone (DILAUDID) injection 3 mg  3 mg IntraVENous Q4H PRN    oxyCODONE IR (ROXICODONE) tablet 12.5 mg  12.5 mg Oral Q6H PRN    diphenoxylate-atropine (LOMOTIL) tablet 1 Tablet  1 Tablet Oral QID    fluconazole (DIFLUCAN) 400mg/200 mL IVPB (premix)  400 mg IntraVENous DAILY    cefepime (MAXIPIME) 2 g in 0.9% sodium chloride (MBP/ADV) 100 mL MBP  2 g IntraVENous Q12H    ergocalciferol capsule 50,000 Units  50,000 Units Oral Q7D    collagenase (SANTYL) 250 unit/gram ointment   Topical DAILY    fat emulsion 20% soy-oliv-fish oil (SMOFlipid) infusion 250 mL  250 mL IntraVENous Q24H    nicotine (NICODERM CQ) 21 mg/24 hr patch 1 Patch  1 Patch TransDERmal DAILY    naloxone (NARCAN) injection 0.4 mg  0.4 mg IntraVENous EVERY 2 MINUTES AS NEEDED    metoprolol tartrate (LOPRESSOR) tablet 6.25 mg  6.25 mg Oral Q12H    metoprolol (LOPRESSOR) injection 2.5 mg  2.5 mg IntraVENous Q4H PRN    bisacodyL (DULCOLAX) suppository 10 mg  10 mg Rectal DAILY PRN    pantoprazole (PROTONIX) 40 mg in 0.9% sodium chloride 10 mL injection  40 mg IntraVENous Q12H    sodium chloride (NS) flush 5-40 mL  5-40 mL IntraVENous Q8H    sodium chloride (NS) flush 5-40 mL  5-40 mL IntraVENous PRN    acetaminophen (TYLENOL) tablet 650 mg  650 mg Oral Q6H PRN    Or    acetaminophen (TYLENOL) suppository 650 mg  650 mg Rectal Q6H PRN    ondansetron (ZOFRAN ODT) tablet 4 mg  4 mg Oral Q8H PRN    Or    ondansetron (ZOFRAN) injection 4 mg  4 mg IntraVENous Q6H PRN    Vancomycin - Pharmacy to Dose   Other Rx Dosing/Monitoring    heparin (porcine) injection 5,000 Units  5,000 Units SubCUTAneous Q8H     ______________________________________________________________________  EXPECTED LENGTH OF STAY: 4d 19h  ACTUAL LENGTH OF STAY:          7                 Tomasz Douglass MD

## 2022-09-03 NOTE — PROGRESS NOTES
[address] \[facility] [place] [facility]\"", MD, FACP, [facility]University [facility], [place]      GEORGIA Quinn, [facility]     April S Alexia, [facility]   KATLYN Conley, [facility]       Layomarie EverettE[facility] 136    at [address] [place], [address] Mary Jane Shah  22.    [phone]    FAX: [address] [place] [address] Drive, [address], [address]    [phone]    FAX: [phone]       HEPATOLOGY PROGRESS NOTE  The patient is a 65 y.o.  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 He has been treated with [employer] in the past.  He has 4-5 stool per day and an equal number at night that wake him up from sleep. There was no previous history of liver disease. He came to the ED because of feeling poorly with increased weakness over several days. In the ED Laboratory studies were significant for:    WBC 29.1, HB 12.9 gms, , Sna 127, Scr 3.27 mg, AST 25, ALT 38, , TBILI 10.2 mg, INR 1.1, Sammonia <10,     Imaging of the liver with Ultrasound CT scan demonstrated increased echogenicity and surface nodularity consistent with cirrhosis. No liver mass. No dilated bile ducts. No ascites. Hospital Course to date:  He has been treated with IV ABX. Metabolic acidosis was corrected. WBC is coming down. All cultures have been negative  MRI with MRCP does not show any evidence of bile duct stricturing suggestive of PSC. ALP has declined to normal and TBILI is now down from 15 to 3.4 mg  HB has dropped stepwise since admission to 6.0 gms today      Hepatology Plan: Will continue to monitor and see if he really needs liver biopsy to define cirrhosis and etiology.     We can do Margarita Renee as OP and determine with high degree of certainty if he has cirrhosis     ASSESSMENT AND PLAN:  Cirrhosis  The diagnosis of cirrhosis is based upon imaging, laboratory studies  Cirrhosis is of unclear etiology. The CTP is 9. Child class B. The MELD score is down from 24 t 17. Elevated liver enzymes  AST is elevated. ALT is normal.  ALP was elevated but has declined to normal.  Total bilirubin is coming down from 15 to <5 gms. Serum albumin is depressed. INR is prolonged. The platelet count is normal.      The pattern of AST>ALT is consistent with cirrhosis    The elevation in ALP, TBILI and h/o Crohns disease suggests he may have [facility]. However, MRCP does not show PSC and labs have improved with IV ABX. Unlikely he has small duct PSC as this would not improve this much spontaneously     I suspect the elevation in ALP which has now come down to normal and TBILI which is coming down is due to sepsis and resolution with treatment. Serologic testing for causes of chronic liver disease was negative. ANCA, ASMA, IGG4 are pending    Low serum albumin  The low serum albumin is secondary to malabsorption from the patients short cut and does not reflect worsening liver disease. Coagulopathy  This is secondary to cirrhosis, and malnutrition form the patients short gut and sepsis and may not reflect more advanced liver disease. The INR has come down to normal with IV Vitamin K     Anemia   This is due to multifactorial causes including inability to absorb FE due to short gut and Crohns disease. Will obtain FE panel to assess for iron stores. Repeat FE studies show Ferritin 1886 and FE 16%. If we knew he had cirrhosis the FE saturation would be consistent with FE deficiency. FE sat is typically elevated in patiens with cirrhosis because of low transferrin. A normal FE sat this therefore FE deficient in cirrhosis    For now probably best just transfuse. FLex sig this was unremarkable. PHYSICAL EXAMINATION:  VS: per nursing note  General:  No acute distress. Eyes:  Sclera icteric  ENT:  No oral lesions. Thyroid normal.  Nodes:  No adenopathy. Skin:  No spider angiomata. Jaundice. Respiratory:  Lungs clear to auscultation. Cardiovascular:  Regular heart rate. Abdomen:  Soft non-tender, Multiple scars. No obvious ascites. Extremities:  No lower extremity edema. Neurologic:  Alert and oriented. Cranial nerves grossly intact. No asterixis. LABORATORY:   Latest Reference Range & Units 8/28/22 12:14 8/29/22 03:55 8/30/22 02:40 8/31/22 01:10 9/1/22 04:51   WBC 4.1 - 11.1 K/uL 19.5 (H) 13.7 (H)  15.0 (H) 14.7 (H)   HGB 12.1 - 17.0 g/dL 8.6 (L) 7.3 (L)  7.1 (L) 6.0 (L)   PLATELET 018 - 157 K/uL 572 (H) 505 (H)  434 (H) 456 (H)   INR 0.9 - 1.1   1.4 (H) 1.3 (H)   1.1   Sodium 136 - 145 mmol/L 133 (L)  133 (L) 132 (L) 131 (L)   Potassium 3.5 - 5.1 mmol/L 2.8 (L)  3.2 (L) 3.3 (L) 3.9   Chloride 97 - 108 mmol/L 103  95 (L) 94 (L) 99   CO2 21 - 32 mmol/L 23  28 30 24   BUN 6 - 20 MG/DL 82 (H)  50 (H) 39 (H) 31 (H)   Creatinine 0.70 - 1.30 MG/DL 2.06 (H)  1.53 (H) 1.37 (H) 1.29   Bilirubin, total 0.2 - 1.0 MG/DL 15.9 (H) 13.9 (H) 10.0 (H) 6.3 (H) 4.7 (H)   Albumin 3.5 - 5.0 g/dL 2.4 (L) 2.7 (L) 2.6 (L)  2.5 (L) 2.2 (L) 2.1 (L)   ALT 12 - 78 U/L 19 30 24 22 30   AST 15 - 37 U/L 42 (H) 66 (H) 50 (H) 35 48 (H)   Alk.  phosphatase 45 - 117 U/L 146 (H) 131 (H) 114 98 108   (H): Data is abnormally high  (L): Data is abnormally low      Bernardo Malik MD  [facility] [address], [facility] 22.  [phone]  [address]

## 2022-09-03 NOTE — PROGRESS NOTES
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)  - ulcerated Abdominal Wound  Current regimen:  1 gram IV q 18 hours  Abx regimen: cefepime, fluconazole, vanc  ID Following ?: NO  Concomitant nephrotoxic drugs (requires more frequent monitoring): None  Frequency of BMP?: qod    Recent Labs     22  0655 22  0548 22  1224 22  0451   WBC 10.1 12.1*  --  14.7*   CREA 1.22 1.31* 1.27 1.29   BUN 29* 30* 33* 31*     Est CrCl: 65-70 ml/min; UO: n/a ml/kg/hr  Temp (24hrs), Av °F (37.2 °C), Min:98.2 °F (36.8 °C), Max:100.4 °F (38 °C)    Cultures:    Blood: NG - final   Urine: mixed, 30K - final   CDiff: neg   MRSA screen: Neg (staph aureus identified but not mrsa) - final    Goal target range AUC/SHASHANK 400-600    Recent level history:  Date/Time Dose & Interval Measured Level (mcg/mL) Associated AUC/SHASHANK Dose Adjustment     @ 02:39 1000 mg q24h 13.3 ~ 10.5 hrs p dose 339 1000 mg q18 hrs    0548 1000 mg q18h 12.3 ~ 14 hrs p dose 370 1250 mg IV q18h                                  Plan: change to vanc 1.25g q18h given predicted AUC/SHASHANK below target range on current regimen.

## 2022-09-03 NOTE — PROGRESS NOTES
0800: Bedside shift change report given to [address] (oncoming nurse) by Micaela Quinonez RN (offgoing nurse). Report included the following information SBAR, Kardex, ED Summary, Intake/Output, MAR, Recent Results, and Cardiac Rhythm NSR/Sinus Tach . Problem: Falls - Risk of  Goal: *Absence of Falls  Description: Document Juan R Hunter Fall Risk and appropriate interventions in the flowsheet. Outcome: Progressing Towards Goal  Note: Fall Risk Interventions:  Mobility Interventions: Patient to call before getting OOB  Medication Interventions: Patient to call before getting OOB       Problem: Patient Education: Go to Patient Education Activity  Goal: Patient/Family Education  Outcome: Progressing Towards Goal     Problem: Discharge Planning  Goal: *Discharge to safe environment  Outcome: Progressing Towards Goal  Goal: *Knowledge of medication management  Outcome: Progressing Towards Goal  Goal: *Knowledge of discharge instructions  Outcome: Progressing Towards Goal     Problem: Patient Education: Go to Patient Education Activity  Goal: Patient/Family Education  Outcome: Progressing Towards Goal     Problem: Risk for Spread of Infection  Goal: Prevent transmission of infectious organism to others  Description: Prevent the transmission of infectious organisms to other patients, staff members, and visitors.   Outcome: Progressing Towards Goal     Problem: Patient Education:  Go to Education Activity  Goal: Patient/Family Education  Outcome: Progressing Towards Goal     Problem: Nutrition Deficit  Goal: *Optimize nutritional status  Outcome: Progressing Towards Goal

## 2022-09-04 LAB
ALBUMIN SERPL-MCNC: 2 G/DL (ref 3.5–5)
ALBUMIN/GLOB SERPL: 0.4 {RATIO} (ref 1.1–2.2)
ALP SERPL-CCNC: 186 U/L (ref 45–117)
ALT SERPL-CCNC: 58 U/L (ref 12–78)
ANION GAP SERPL CALC-SCNC: 5 MMOL/L (ref 5–15)
APTT PPP: 30.9 SEC (ref 22.1–31)
AST SERPL-CCNC: 57 U/L (ref 15–37)
BASOPHILS # BLD: 0 K/UL (ref 0–0.1)
BASOPHILS NFR BLD: 0 % (ref 0–1)
BILIRUB DIRECT SERPL-MCNC: 2 MG/DL (ref 0–0.2)
BILIRUB SERPL-MCNC: 2.5 MG/DL (ref 0.2–1)
BUN SERPL-MCNC: 34 MG/DL (ref 6–20)
BUN/CREAT SERPL: 27 (ref 12–20)
CALCIUM SERPL-MCNC: 8.4 MG/DL (ref 8.5–10.1)
CHLORIDE SERPL-SCNC: 107 MMOL/L (ref 97–108)
CO2 SERPL-SCNC: 22 MMOL/L (ref 21–32)
CREAT SERPL-MCNC: 1.27 MG/DL (ref 0.7–1.3)
DIFFERENTIAL METHOD BLD: ABNORMAL
EOSINOPHIL # BLD: 0.3 K/UL (ref 0–0.4)
EOSINOPHIL NFR BLD: 3 % (ref 0–7)
ERYTHROCYTE [DISTWIDTH] IN BLOOD BY AUTOMATED COUNT: 17.6 % (ref 11.5–14.5)
GLOBULIN SER CALC-MCNC: 4.6 G/DL (ref 2–4)
GLUCOSE BLD STRIP.AUTO-MCNC: 117 MG/DL (ref 65–117)
GLUCOSE BLD STRIP.AUTO-MCNC: 128 MG/DL (ref 65–117)
GLUCOSE SERPL-MCNC: 110 MG/DL (ref 65–100)
HCT VFR BLD AUTO: 23.3 % (ref 36.6–50.3)
HGB BLD-MCNC: 7.8 G/DL (ref 12.1–17)
IMM GRANULOCYTES # BLD AUTO: 0.1 K/UL (ref 0–0.04)
IMM GRANULOCYTES NFR BLD AUTO: 1 % (ref 0–0.5)
INR PPP: 1.1 (ref 0.9–1.1)
LDH SERPL L TO P-CCNC: 277 U/L (ref 85–241)
LYMPHOCYTES # BLD: 1.3 K/UL (ref 0.8–3.5)
LYMPHOCYTES NFR BLD: 13 % (ref 12–49)
MAGNESIUM SERPL-MCNC: 1.6 MG/DL (ref 1.6–2.4)
MCH RBC QN AUTO: 32.4 PG (ref 26–34)
MCHC RBC AUTO-ENTMCNC: 33.5 G/DL (ref 30–36.5)
MCV RBC AUTO: 96.7 FL (ref 80–99)
MONOCYTES # BLD: 0.7 K/UL (ref 0–1)
MONOCYTES NFR BLD: 7 % (ref 5–13)
NEUTS SEG # BLD: 7.5 K/UL (ref 1.8–8)
NEUTS SEG NFR BLD: 76 % (ref 32–75)
NRBC # BLD: 0 K/UL (ref 0–0.01)
NRBC BLD-RTO: 0 PER 100 WBC
PLATELET # BLD AUTO: 440 K/UL (ref 150–400)
PMV BLD AUTO: 9.7 FL (ref 8.9–12.9)
POTASSIUM SERPL-SCNC: 5 MMOL/L (ref 3.5–5.1)
PROT SERPL-MCNC: 6.6 G/DL (ref 6.4–8.2)
PROTHROMBIN TIME: 11.1 SEC (ref 9–11.1)
RBC # BLD AUTO: 2.41 M/UL (ref 4.1–5.7)
SERVICE CMNT-IMP: ABNORMAL
SERVICE CMNT-IMP: NORMAL
SMA IGG SER-ACNC: 4 UNITS (ref 0–19)
SODIUM SERPL-SCNC: 134 MMOL/L (ref 136–145)
THERAPEUTIC RANGE,PTTT: NORMAL SECS (ref 58–77)
WBC # BLD AUTO: 9.9 K/UL (ref 4.1–11.1)

## 2022-09-04 PROCEDURE — 74011250636 HC RX REV CODE- 250/636: Performed by: INTERNAL MEDICINE

## 2022-09-04 PROCEDURE — 74011250636 HC RX REV CODE- 250/636: Performed by: PHYSICIAN ASSISTANT

## 2022-09-04 PROCEDURE — 85730 THROMBOPLASTIN TIME PARTIAL: CPT

## 2022-09-04 PROCEDURE — 74011000250 HC RX REV CODE- 250: Performed by: INTERNAL MEDICINE

## 2022-09-04 PROCEDURE — 36415 COLL VENOUS BLD VENIPUNCTURE: CPT

## 2022-09-04 PROCEDURE — 85610 PROTHROMBIN TIME: CPT

## 2022-09-04 PROCEDURE — 74011000250 HC RX REV CODE- 250: Performed by: FAMILY MEDICINE

## 2022-09-04 PROCEDURE — 74011000258 HC RX REV CODE- 258: Performed by: INTERNAL MEDICINE

## 2022-09-04 PROCEDURE — 74011000258 HC RX REV CODE- 258: Performed by: FAMILY MEDICINE

## 2022-09-04 PROCEDURE — 74011250637 HC RX REV CODE- 250/637: Performed by: NURSE PRACTITIONER

## 2022-09-04 PROCEDURE — 82962 GLUCOSE BLOOD TEST: CPT

## 2022-09-04 PROCEDURE — 83615 LACTATE (LD) (LDH) ENZYME: CPT

## 2022-09-04 PROCEDURE — 74011250637 HC RX REV CODE- 250/637: Performed by: INTERNAL MEDICINE

## 2022-09-04 PROCEDURE — 74011250637 HC RX REV CODE- 250/637: Performed by: FAMILY MEDICINE

## 2022-09-04 PROCEDURE — 80048 BASIC METABOLIC PNL TOTAL CA: CPT

## 2022-09-04 PROCEDURE — 85025 COMPLETE CBC W/AUTO DIFF WBC: CPT

## 2022-09-04 PROCEDURE — 80076 HEPATIC FUNCTION PANEL: CPT

## 2022-09-04 PROCEDURE — 65660000001 HC RM ICU INTERMED STEPDOWN

## 2022-09-04 PROCEDURE — 83735 ASSAY OF MAGNESIUM: CPT

## 2022-09-04 PROCEDURE — C9113 INJ PANTOPRAZOLE SODIUM, VIA: HCPCS | Performed by: INTERNAL MEDICINE

## 2022-09-04 PROCEDURE — 74011250636 HC RX REV CODE- 250/636: Performed by: FAMILY MEDICINE

## 2022-09-04 RX ORDER — HYDROMORPHONE HYDROCHLORIDE 2 MG/ML
3 INJECTION, SOLUTION INTRAMUSCULAR; INTRAVENOUS; SUBCUTANEOUS
Status: DISCONTINUED | OUTPATIENT
Start: 2022-09-04 | End: 2022-09-06 | Stop reason: SDUPTHER

## 2022-09-04 RX ORDER — DIPHENHYDRAMINE HCL 25 MG
25 CAPSULE ORAL
Status: DISCONTINUED | OUTPATIENT
Start: 2022-09-04 | End: 2022-09-06

## 2022-09-04 RX ADMIN — POTASSIUM CHLORIDE: 2 INJECTION, SOLUTION, CONCENTRATE INTRAVENOUS at 18:20

## 2022-09-04 RX ADMIN — VANCOMYCIN HYDROCHLORIDE 1250 MG: 1.25 INJECTION, POWDER, LYOPHILIZED, FOR SOLUTION INTRAVENOUS at 16:16

## 2022-09-04 RX ADMIN — DIPHENOXYLATE HYDROCHLORIDE AND ATROPINE SULFATE 1 TABLET: 2.5; .025 TABLET ORAL at 21:10

## 2022-09-04 RX ADMIN — SODIUM CHLORIDE, PRESERVATIVE FREE 10 ML: 5 INJECTION INTRAVENOUS at 05:18

## 2022-09-04 RX ADMIN — SMOFLIPID 250 ML: 6; 6; 5; 3 INJECTION, EMULSION INTRAVENOUS at 18:20

## 2022-09-04 RX ADMIN — FLUCONAZOLE IN SODIUM CHLORIDE 400 MG: 2 INJECTION, SOLUTION INTRAVENOUS at 09:28

## 2022-09-04 RX ADMIN — SODIUM CHLORIDE, PRESERVATIVE FREE 40 MG: 5 INJECTION INTRAVENOUS at 21:10

## 2022-09-04 RX ADMIN — DIPHENOXYLATE HYDROCHLORIDE AND ATROPINE SULFATE 1 TABLET: 2.5; .025 TABLET ORAL at 09:12

## 2022-09-04 RX ADMIN — HEPARIN SODIUM 5000 UNITS: 5000 INJECTION INTRAVENOUS; SUBCUTANEOUS at 21:09

## 2022-09-04 RX ADMIN — METOPROLOL TARTRATE 6.25 MG: 25 TABLET, FILM COATED ORAL at 09:12

## 2022-09-04 RX ADMIN — HYDROMORPHONE HYDROCHLORIDE 3 MG: 2 INJECTION, SOLUTION INTRAMUSCULAR; INTRAVENOUS; SUBCUTANEOUS at 07:36

## 2022-09-04 RX ADMIN — HEPARIN SODIUM 5000 UNITS: 5000 INJECTION INTRAVENOUS; SUBCUTANEOUS at 13:47

## 2022-09-04 RX ADMIN — CEFEPIME 2 G: 2 INJECTION, POWDER, FOR SOLUTION INTRAVENOUS at 05:05

## 2022-09-04 RX ADMIN — DIPHENHYDRAMINE HYDROCHLORIDE 25 MG: 25 CAPSULE ORAL at 21:10

## 2022-09-04 RX ADMIN — SODIUM CHLORIDE, PRESERVATIVE FREE 10 ML: 5 INJECTION INTRAVENOUS at 05:17

## 2022-09-04 RX ADMIN — DIPHENOXYLATE HYDROCHLORIDE AND ATROPINE SULFATE 1 TABLET: 2.5; .025 TABLET ORAL at 17:56

## 2022-09-04 RX ADMIN — HYDROMORPHONE HYDROCHLORIDE 3 MG: 2 INJECTION, SOLUTION INTRAMUSCULAR; INTRAVENOUS; SUBCUTANEOUS at 14:48

## 2022-09-04 RX ADMIN — METOPROLOL TARTRATE 6.25 MG: 25 TABLET, FILM COATED ORAL at 21:10

## 2022-09-04 RX ADMIN — HYDROMORPHONE HYDROCHLORIDE 3 MG: 2 INJECTION, SOLUTION INTRAMUSCULAR; INTRAVENOUS; SUBCUTANEOUS at 17:56

## 2022-09-04 RX ADMIN — SODIUM CHLORIDE, PRESERVATIVE FREE 10 ML: 5 INJECTION INTRAVENOUS at 14:49

## 2022-09-04 RX ADMIN — HYDROMORPHONE HYDROCHLORIDE 3 MG: 2 INJECTION, SOLUTION INTRAMUSCULAR; INTRAVENOUS; SUBCUTANEOUS at 21:10

## 2022-09-04 RX ADMIN — SODIUM CHLORIDE, PRESERVATIVE FREE 10 ML: 5 INJECTION INTRAVENOUS at 21:24

## 2022-09-04 RX ADMIN — DIPHENOXYLATE HYDROCHLORIDE AND ATROPINE SULFATE 1 TABLET: 2.5; .025 TABLET ORAL at 13:46

## 2022-09-04 RX ADMIN — DIPHENHYDRAMINE HYDROCHLORIDE 25 MG: 25 CAPSULE ORAL at 13:47

## 2022-09-04 RX ADMIN — SODIUM CHLORIDE, PRESERVATIVE FREE 10 ML: 5 INJECTION INTRAVENOUS at 21:23

## 2022-09-04 RX ADMIN — HYDROMORPHONE HYDROCHLORIDE 3 MG: 2 INJECTION, SOLUTION INTRAMUSCULAR; INTRAVENOUS; SUBCUTANEOUS at 11:39

## 2022-09-04 RX ADMIN — SODIUM CHLORIDE, PRESERVATIVE FREE 40 MG: 5 INJECTION INTRAVENOUS at 09:12

## 2022-09-04 RX ADMIN — CEFEPIME 2 G: 2 INJECTION, POWDER, FOR SOLUTION INTRAVENOUS at 16:17

## 2022-09-04 RX ADMIN — COLLAGENASE SANTYL: 250 OINTMENT TOPICAL at 09:13

## 2022-09-04 RX ADMIN — HYDROMORPHONE HYDROCHLORIDE 3 MG: 2 INJECTION, SOLUTION INTRAMUSCULAR; INTRAVENOUS; SUBCUTANEOUS at 02:54

## 2022-09-04 NOTE — PROGRESS NOTES
Bedside shift change report given to [facility] (oncoming nurse) by 70 [facility] (offgoing nurse). Report included the following information SBAR, Kardex, Intake/Output, MAR, and Cardiac Rhythm NSR . Problem: Falls - Risk of  Goal: *Absence of Falls  Description: Document Diane Reason Fall Risk and appropriate interventions in the flowsheet. Outcome: Progressing Towards Goal  Note: Fall Risk Interventions:  Mobility Interventions: Assess mobility with egress test, Patient to call before getting OOB         Medication Interventions: Evaluate medications/consider consulting pharmacy, Teach patient to arise slowly    Elimination Interventions: Call light in reach              Problem: Patient Education: Go to Patient Education Activity  Goal: Patient/Family Education  Outcome: Progressing Towards Goal     Problem: Discharge Planning  Goal: *Discharge to safe environment  Outcome: Progressing Towards Goal  Goal: *Knowledge of medication management  Outcome: Progressing Towards Goal  Goal: *Knowledge of discharge instructions  Outcome: Progressing Towards Goal     Problem: Patient Education: Go to Patient Education Activity  Goal: Patient/Family Education  Outcome: Progressing Towards Goal     Problem: Risk for Spread of Infection  Goal: Prevent transmission of infectious organism to others  Description: Prevent the transmission of infectious organisms to other patients, staff members, and visitors.   Outcome: Progressing Towards Goal     Problem: Patient Education:  Go to Education Activity  Goal: Patient/Family Education  Outcome: Progressing Towards Goal     Problem: Nutrition Deficit  Goal: *Optimize nutritional status  Outcome: Progressing Towards Goal

## 2022-09-04 NOTE — PROGRESS NOTES
[address] [facility] Adult  Hospitalist Group                                                                                          Hospitalist Progress Note  Benjamín Hernandez MD  Answering service: [phone] OR 48 from in house phone        Date of Service:  2022  NAME:  Magui Richard  :  1960  MRN:  [id number]      Admission Summary:     Patient presents with severe sepsis with SUHA, hyperbilirubinemia. History of Crohn's disease with multiple surgeries in the past.  Hepatology and GI following. Interval history / Subjective:     Patient's stool is more formed. Overall feeling better.      Assessment & Plan:     Severe sepsis  -Patient presents with fever, tachycardia, tachypnea, leukocytosis  -Sepsis is due to pneumonia, initial chest x-ray shows consolidation of the right upper lobe, patient with symptoms of cough and expectoration  -Blood cultures negative, sepsis resolved    Pneumonia  -Initial chest x-ray  shows new area of consolidation in the right upper lobe extending into the right apex, repeat chest x-ray on  shows increased consolidation  -Blood cultures so far negative, add sputum culture  -Continue vancomycin and cefepime    SUHA  -SUHA due to volume depletion and sepsis  -Overall improving renal function, SUHA resolved  -Nephrology signed off    Metabolic acidosis  -This is due to SUHA, metabolic acidosis resolved    Diarrhea  -Stool for C. difficile and enteric bacterial panel negative  -Patient with multiple bowel resections in the past for Crohn's with short gut syndrome  -GI following, continue antidiarrheal agents as needed    Nephrolithiasis  -Nonobstructing right intrarenal calculi  -Follow-up as outpatient    Crohn's disease  -S/p multiple abdominal surgery with short gut syndrome and chronic pain  -Flex sig done , no apparent abnormalities on flex sig  -Continue bowel rest, continue TPN    Acute anemia  -His anemia is chronic and multifactorial including impaired iron absorption due to short gut and Crohn's disease  -S/p transfusion of 2 units PRBC so far  -For EGD today    Hyperbilirubinemia  -Conjugated hyperbilirubinemia which is likely due to sepsis, bilirubin level improving  -Nodular contour of the liver on the ultrasound  -MRCP negative for choledocholithiasis or bile duct obstruction  -Serologic testing for causes of chronic liver disease negative  -Hepatology evaluating the patient, liver biopsy is now deferred, plan for outpatient FibroScan    Coagulopathy  -Patient presents with elevated INR, this is likely due to advanced liver disease  -S/p vitamin K for 3 days    Chronic abdominal wound  -Chronic abdominal wound on anterior abdomen present for 3 years  -Negative fistula on CT  -Wound care following, on Santyl    PVCs  -Continue Lopressor    Tobacco use  -On nicotine patch    Nutrition  -Overall patient's p.o. intake has improved, plan for discontinuing TPN soon     Code status: Full  Prophylaxis: SCDs  Care Plan discussed with: Patient  Anticipated Disposition: 24 to 48 hours     Hospital Problems  Date Reviewed: 9/2/2022            Codes Class Noted POA    Acute cystitis ICD-10-CM: N30.00  ICD-9-CM: 595.0  8/27/2022 Unknown        Severe protein-calorie malnutrition ([facility] [address] (Chronic) ICD-10-CM: D80  ICD-9-CM: 262  6/10/2015 Yes             Review of Systems:   A comprehensive review of systems was negative except for that written in the HPI. Vital Signs:    Last 24hrs VS reviewed since prior progress note.  Most recent are:  Visit Vitals  BP (!) 137/41 (BP 1 Location: Right leg, BP Patient Position: At rest;Supine)   Pulse 80   Temp 98.4 °F (36.9 °C)   Resp 26   Ht 5' 9\" (1.753 m)   Wt 86.3 kg (190 lb 4.1 oz)   SpO2 97%   BMI 28.10 kg/m²       No intake or output data in the 24 hours ending 09/04/22 1350       Physical Examination:     I had a face to face encounter with this patient and independently examined them on 9/4/2022 as outlined below:          Constitutional:  No acute distress, cooperative, pleasant    ENT:  Oral mucosa moist, oropharynx benign. Resp:  CTA bilaterally. No wheezing/rhonchi/rales. No accessory muscle use. CV:  Regular rhythm, normal rate, no murmurs, gallops, rubs    GI:  Soft, non distended, non tender. normoactive bowel sounds, no hepatosplenomegaly     Musculoskeletal:  No edema, warm, 2+ pulses throughout    Neurologic:  Moves all extremities. AAOx3, CN II-XII reviewed            Data Review:    Review and/or order of clinical lab test      Labs:     Recent Labs     09/04/22  0745 09/03/22  0655   WBC 9.9 10.1   HGB 7.8* 7.6*   HCT 23.3* 22.1*   * 471*     Recent Labs     09/04/22  0745 09/03/22  0655 09/02/22  0548   * 137 134*   K 5.0 4.5 4.1    107 106   CO2 22 22 23   BUN 34* 29* 30*   CREA 1.27 1.22 1.31*   * 106* 118*   CA 8.4* 8.6 8.4*   MG 1.6 2.0 1.7     Recent Labs     09/04/22  0745 09/03/22  0655 09/02/22  0548   ALT 58 52 39   * 143* 114   TBILI 2.5* 3.0* 3.4*   TP 6.6 6.2* 5.8*   ALB 2.0* 2.0* 1.9*   GLOB 4.6* 4.2* 3.9     Recent Labs     09/04/22  0745 09/03/22  0655 09/02/22  0548   INR 1.1 1.1 1.1   PTP 11.1 11.2* 11.1   APTT 30.9 30.2 32.7*      No results for input(s): FE, TIBC, PSAT, FERR in the last 72 hours. Lab Results   Component Value Date/Time    Folate 18.2 08/29/2022 01:12 PM      No results for input(s): PH, PCO2, PO2 in the last 72 hours. No results for input(s): CPK, CKNDX, TROIQ in the last 72 hours.     No lab exists for component: CPKMB  Lab Results   Component Value Date/Time    Cholesterol, total 134 12/01/2014 04:33 AM    HDL Cholesterol 35 12/01/2014 04:33 AM    LDL, calculated 58.2 12/01/2014 04:33 AM    Triglyceride 204 (H) 12/01/2014 04:33 AM    CHOL/HDL Ratio 3.8 12/01/2014 04:33 AM     Lab Results   Component Value Date/Time    Glucose (POC) 128 (H) 09/04/2022 12:00 PM    Glucose (POC) 112 09/03/2022 05:55 PM    Glucose (POC) 141 (H) 09/03/2022 12:21 PM    Glucose (POC) 83 09/02/2022 04:59 PM    Glucose (POC) 146 (H) 09/02/2022 12:00 PM     Lab Results   Component Value Date/Time    Color DARK YELLOW 08/26/2022 07:27 PM    Appearance TURBID (A) 08/26/2022 07:27 PM    Specific gravity 1.025 08/26/2022 07:27 PM    Specific gravity 1.010 10/07/2019 05:48 AM    pH (UA) 5.0 08/26/2022 07:27 PM    Protein 100 (A) 08/26/2022 07:27 PM    Glucose Negative 08/26/2022 07:27 PM    Ketone TRACE (A) 08/26/2022 07:27 PM    Bilirubin NEGATIVE  10/07/2019 05:48 AM    Urobilinogen 0.2 08/26/2022 07:27 PM    Nitrites Positive (A) 08/26/2022 07:27 PM    Leukocyte Esterase SMALL (A) 08/26/2022 07:27 PM    Epithelial cells FEW 08/26/2022 07:27 PM    Bacteria 1+ (A) 08/26/2022 07:27 PM    WBC 5-10 08/26/2022 07:27 PM    RBC 0-5 08/26/2022 07:27 PM         Medications Reviewed:     Current Facility-Administered Medications   Medication Dose Route Frequency    TPN ADULT - CENTRAL   IntraVENous CONTINUOUS    [START ON 9/5/2022] vancomycin random level 9/5 with AM labs   Other ONCE    HYDROmorphone (DILAUDID) injection 3 mg  3 mg IntraVENous Q3H PRN    diphenhydrAMINE (BENADRYL) capsule 25 mg  25 mg Oral Q6H PRN    TPN ADULT - CENTRAL   IntraVENous CONTINUOUS    vancomycin (VANCOCIN) 1,250 mg in 0.9% sodium chloride 250 mL ([id number])  1,250 mg IntraVENous Q18H    0.9% sodium chloride infusion 250 mL  250 mL IntraVENous PRN    loperamide (IMODIUM) capsule 4 mg  4 mg Oral Q4H PRN    albuterol (PROVENTIL VENTOLIN) nebulizer solution 2.5 mg  2.5 mg Nebulization Q4H PRN    alteplase (CATHFLO) 1 mg in sterile water (preservative free) 1 mL injection  1 mg InterCATHeter PRN    sodium chloride (NS) flush 5-40 mL  5-40 mL IntraVENous Q8H    sodium chloride (NS) flush 5-40 mL  5-40 mL IntraVENous PRN    diphenoxylate-atropine (LOMOTIL) tablet 1 Tablet  1 Tablet Oral QID PRN    sodium chloride (OCEAN) 0.65 % nasal squeeze bottle 2 Spray  2 Spray Both Nostrils Q2H PRN    oxyCODONE IR (ROXICODONE) tablet 12.5 mg  12.5 mg Oral Q6H PRN    diphenoxylate-atropine (LOMOTIL) tablet 1 Tablet  1 Tablet Oral QID    fluconazole (DIFLUCAN) 400mg/200 mL IVPB (premix)  400 mg IntraVENous DAILY    cefepime (MAXIPIME) 2 g in 0.9% sodium chloride (MBP/ADV) 100 mL MBP  2 g IntraVENous Q12H    ergocalciferol capsule 50,000 Units  50,000 Units Oral Q7D    collagenase (SANTYL) 250 unit/gram ointment   Topical DAILY    fat emulsion 20% soy-oliv-fish oil (SMOFlipid) infusion 250 mL  250 mL IntraVENous Q24H    nicotine (NICODERM CQ) 21 mg/24 hr patch 1 Patch  1 Patch TransDERmal DAILY    naloxone (NARCAN) injection 0.4 mg  0.4 mg IntraVENous EVERY 2 MINUTES AS NEEDED    metoprolol tartrate (LOPRESSOR) tablet 6.25 mg  6.25 mg Oral Q12H    metoprolol (LOPRESSOR) injection 2.5 mg  2.5 mg IntraVENous Q4H PRN    bisacodyL (DULCOLAX) suppository 10 mg  10 mg Rectal DAILY PRN    pantoprazole (PROTONIX) 40 mg in 0.9% sodium chloride 10 mL injection  40 mg IntraVENous Q12H    sodium chloride (NS) flush 5-40 mL  5-40 mL IntraVENous Q8H    sodium chloride (NS) flush 5-40 mL  5-40 mL IntraVENous PRN    acetaminophen (TYLENOL) tablet 650 mg  650 mg Oral Q6H PRN    Or    acetaminophen (TYLENOL) suppository 650 mg  650 mg Rectal Q6H PRN    ondansetron (ZOFRAN ODT) tablet 4 mg  4 mg Oral Q8H PRN    Or    ondansetron (ZOFRAN) injection 4 mg  4 mg IntraVENous Q6H PRN    Vancomycin - Pharmacy to Dose   Other Rx Dosing/Monitoring    heparin (porcine) injection 5,000 Units  5,000 Units SubCUTAneous Q8H     ______________________________________________________________________  EXPECTED LENGTH OF STAY: 4d 19h  ACTUAL LENGTH OF STAY:          8                 Argelia Wadsworth MD

## 2022-09-04 NOTE — PROGRESS NOTES
0800: Bedside shift change report given to [facility] (oncoming nurse) by Bonny Bhatia (offgoing nurse). Report included the following information SBAR, Kardex, ED Summary, Intake/Output, MAR, Recent Results, and Cardiac Rhythm NSR . Problem: Falls - Risk of  Goal: *Absence of Falls  Description: Document Mateo Millan Fall Risk and appropriate interventions in the flowsheet. Outcome: Progressing Towards Goal  Note: Fall Risk Interventions:  Mobility Interventions: Patient to call before getting OOB      Medication Interventions: Patient to call before getting OOB       Problem: Patient Education: Go to Patient Education Activity  Goal: Patient/Family Education  Outcome: Progressing Towards Goal     Problem: Discharge Planning  Goal: *Discharge to safe environment  Outcome: Progressing Towards Goal  Goal: *Knowledge of medication management  Outcome: Progressing Towards Goal  Goal: *Knowledge of discharge instructions  Outcome: Progressing Towards Goal     Problem: Patient Education: Go to Patient Education Activity  Goal: Patient/Family Education  Outcome: Progressing Towards Goal     Problem: Risk for Spread of Infection  Goal: Prevent transmission of infectious organism to others  Description: Prevent the transmission of infectious organisms to other patients, staff members, and visitors.   Outcome: Progressing Towards Goal     Problem: Patient Education:  Go to Education Activity  Goal: Patient/Family Education  Outcome: Progressing Towards Goal     Problem: Nutrition Deficit  Goal: *Optimize nutritional status  Outcome: Progressing Towards Goal

## 2022-09-04 NOTE — PROGRESS NOTES
Red rash noted BLE during AM assessment. Pt stated that he noticed some itching overnight. No rash anywhere else. Pt right eye slightly closed in comparison to left eye, pt stated this is due to him rubbing and cleaning eye as it has been irritated. MD notified and order of PRN benadryl placed.

## 2022-09-05 LAB
ALBUMIN SERPL-MCNC: 2 G/DL (ref 3.5–5)
ALBUMIN SERPL-MCNC: 2.1 G/DL (ref 3.5–5)
ALBUMIN SERPL-MCNC: 2.2 G/DL (ref 3.5–5)
ALBUMIN/GLOB SERPL: 0.4 {RATIO} (ref 1.1–2.2)
ALBUMIN/GLOB SERPL: 0.4 {RATIO} (ref 1.1–2.2)
ALP SERPL-CCNC: 235 U/L (ref 45–117)
ALP SERPL-CCNC: 253 U/L (ref 45–117)
ALT SERPL-CCNC: 70 U/L (ref 12–78)
ALT SERPL-CCNC: 72 U/L (ref 12–78)
ANION GAP SERPL CALC-SCNC: 4 MMOL/L (ref 5–15)
ANION GAP SERPL CALC-SCNC: 4 MMOL/L (ref 5–15)
ANION GAP SERPL CALC-SCNC: 6 MMOL/L (ref 5–15)
APTT PPP: 32.7 SEC (ref 22.1–31)
AST SERPL-CCNC: 70 U/L (ref 15–37)
AST SERPL-CCNC: 73 U/L (ref 15–37)
BILIRUB DIRECT SERPL-MCNC: 1.9 MG/DL (ref 0–0.2)
BILIRUB SERPL-MCNC: 2.1 MG/DL (ref 0.2–1)
BILIRUB SERPL-MCNC: 2.3 MG/DL (ref 0.2–1)
BUN SERPL-MCNC: 36 MG/DL (ref 6–20)
BUN SERPL-MCNC: 38 MG/DL (ref 6–20)
BUN SERPL-MCNC: 44 MG/DL (ref 6–20)
BUN/CREAT SERPL: 25 (ref 12–20)
BUN/CREAT SERPL: 25 (ref 12–20)
BUN/CREAT SERPL: 30 (ref 12–20)
CALCIUM SERPL-MCNC: 8.7 MG/DL (ref 8.5–10.1)
CALCIUM SERPL-MCNC: 8.9 MG/DL (ref 8.5–10.1)
CALCIUM SERPL-MCNC: 9.1 MG/DL (ref 8.5–10.1)
CHLORIDE SERPL-SCNC: 105 MMOL/L (ref 97–108)
CHLORIDE SERPL-SCNC: 106 MMOL/L (ref 97–108)
CHLORIDE SERPL-SCNC: 108 MMOL/L (ref 97–108)
CO2 SERPL-SCNC: 18 MMOL/L (ref 21–32)
CO2 SERPL-SCNC: 19 MMOL/L (ref 21–32)
CO2 SERPL-SCNC: 20 MMOL/L (ref 21–32)
CREAT SERPL-MCNC: 1.44 MG/DL (ref 0.7–1.3)
CREAT SERPL-MCNC: 1.46 MG/DL (ref 0.7–1.3)
CREAT SERPL-MCNC: 1.52 MG/DL (ref 0.7–1.3)
GLOBULIN SER CALC-MCNC: 4.8 G/DL (ref 2–4)
GLOBULIN SER CALC-MCNC: 5 G/DL (ref 2–4)
GLUCOSE BLD STRIP.AUTO-MCNC: 106 MG/DL (ref 65–117)
GLUCOSE BLD STRIP.AUTO-MCNC: 106 MG/DL (ref 65–117)
GLUCOSE BLD STRIP.AUTO-MCNC: 110 MG/DL (ref 65–117)
GLUCOSE BLD STRIP.AUTO-MCNC: 117 MG/DL (ref 65–117)
GLUCOSE SERPL-MCNC: 110 MG/DL (ref 65–100)
GLUCOSE SERPL-MCNC: 92 MG/DL (ref 65–100)
GLUCOSE SERPL-MCNC: 93 MG/DL (ref 65–100)
INR PPP: 1 (ref 0.9–1.1)
LDH SERPL L TO P-CCNC: 290 U/L (ref 85–241)
MAGNESIUM SERPL-MCNC: 1.8 MG/DL (ref 1.6–2.4)
PHOSPHATE SERPL-MCNC: 4.4 MG/DL (ref 2.6–4.7)
POTASSIUM SERPL-SCNC: 5.8 MMOL/L (ref 3.5–5.1)
POTASSIUM SERPL-SCNC: 5.8 MMOL/L (ref 3.5–5.1)
POTASSIUM SERPL-SCNC: 6 MMOL/L (ref 3.5–5.1)
PROT SERPL-MCNC: 6.8 G/DL (ref 6.4–8.2)
PROT SERPL-MCNC: 7.2 G/DL (ref 6.4–8.2)
PROTHROMBIN TIME: 10.9 SEC (ref 9–11.1)
SERVICE CMNT-IMP: NORMAL
SODIUM SERPL-SCNC: 129 MMOL/L (ref 136–145)
SODIUM SERPL-SCNC: 130 MMOL/L (ref 136–145)
SODIUM SERPL-SCNC: 131 MMOL/L (ref 136–145)
THERAPEUTIC RANGE,PTTT: ABNORMAL SECS (ref 58–77)
VANCOMYCIN SERPL-MCNC: 20.3 UG/ML

## 2022-09-05 PROCEDURE — 65660000001 HC RM ICU INTERMED STEPDOWN

## 2022-09-05 PROCEDURE — 80069 RENAL FUNCTION PANEL: CPT

## 2022-09-05 PROCEDURE — 74011000250 HC RX REV CODE- 250: Performed by: INTERNAL MEDICINE

## 2022-09-05 PROCEDURE — 85730 THROMBOPLASTIN TIME PARTIAL: CPT

## 2022-09-05 PROCEDURE — 83615 LACTATE (LD) (LDH) ENZYME: CPT

## 2022-09-05 PROCEDURE — 74011250636 HC RX REV CODE- 250/636: Performed by: INTERNAL MEDICINE

## 2022-09-05 PROCEDURE — 74011000250 HC RX REV CODE- 250: Performed by: FAMILY MEDICINE

## 2022-09-05 PROCEDURE — 82962 GLUCOSE BLOOD TEST: CPT

## 2022-09-05 PROCEDURE — 82248 BILIRUBIN DIRECT: CPT

## 2022-09-05 PROCEDURE — C9113 INJ PANTOPRAZOLE SODIUM, VIA: HCPCS | Performed by: INTERNAL MEDICINE

## 2022-09-05 PROCEDURE — 80202 ASSAY OF VANCOMYCIN: CPT

## 2022-09-05 PROCEDURE — 36415 COLL VENOUS BLD VENIPUNCTURE: CPT

## 2022-09-05 PROCEDURE — 80053 COMPREHEN METABOLIC PANEL: CPT

## 2022-09-05 PROCEDURE — 74011250636 HC RX REV CODE- 250/636: Performed by: FAMILY MEDICINE

## 2022-09-05 PROCEDURE — 74011000258 HC RX REV CODE- 258: Performed by: FAMILY MEDICINE

## 2022-09-05 PROCEDURE — 74011250636 HC RX REV CODE- 250/636: Performed by: PHYSICIAN ASSISTANT

## 2022-09-05 PROCEDURE — 74011000258 HC RX REV CODE- 258: Performed by: INTERNAL MEDICINE

## 2022-09-05 PROCEDURE — 83735 ASSAY OF MAGNESIUM: CPT

## 2022-09-05 PROCEDURE — 74011250637 HC RX REV CODE- 250/637: Performed by: NURSE PRACTITIONER

## 2022-09-05 PROCEDURE — 74011250637 HC RX REV CODE- 250/637: Performed by: INTERNAL MEDICINE

## 2022-09-05 PROCEDURE — 85610 PROTHROMBIN TIME: CPT

## 2022-09-05 PROCEDURE — 74011250637 HC RX REV CODE- 250/637: Performed by: FAMILY MEDICINE

## 2022-09-05 RX ORDER — HYDROMORPHONE HYDROCHLORIDE 2 MG/ML
3 INJECTION, SOLUTION INTRAMUSCULAR; INTRAVENOUS; SUBCUTANEOUS
Status: DISCONTINUED | OUTPATIENT
Start: 2022-09-05 | End: 2022-09-05

## 2022-09-05 RX ORDER — CLINDAMYCIN PHOSPHATE 600 MG/50ML
600 INJECTION INTRAVENOUS EVERY 8 HOURS
Status: DISCONTINUED | OUTPATIENT
Start: 2022-09-05 | End: 2022-09-12 | Stop reason: HOSPADM

## 2022-09-05 RX ORDER — VANCOMYCIN/0.9 % SOD CHLORIDE 1.5G/250ML
1500 PLASTIC BAG, INJECTION (ML) INTRAVENOUS EVERY 24 HOURS
Status: DISCONTINUED | OUTPATIENT
Start: 2022-09-05 | End: 2022-09-05

## 2022-09-05 RX ORDER — SODIUM CHLORIDE 9 MG/ML
75 INJECTION, SOLUTION INTRAVENOUS CONTINUOUS
Status: DISCONTINUED | OUTPATIENT
Start: 2022-09-05 | End: 2022-09-08

## 2022-09-05 RX ADMIN — DIPHENOXYLATE HYDROCHLORIDE AND ATROPINE SULFATE 1 TABLET: 2.5; .025 TABLET ORAL at 21:23

## 2022-09-05 RX ADMIN — HEPARIN SODIUM 5000 UNITS: 5000 INJECTION INTRAVENOUS; SUBCUTANEOUS at 15:06

## 2022-09-05 RX ADMIN — HEPARIN SODIUM 5000 UNITS: 5000 INJECTION INTRAVENOUS; SUBCUTANEOUS at 21:22

## 2022-09-05 RX ADMIN — SODIUM CHLORIDE, PRESERVATIVE FREE 10 ML: 5 INJECTION INTRAVENOUS at 22:00

## 2022-09-05 RX ADMIN — SODIUM CHLORIDE, PRESERVATIVE FREE 10 ML: 5 INJECTION INTRAVENOUS at 01:20

## 2022-09-05 RX ADMIN — METOPROLOL TARTRATE 6.25 MG: 25 TABLET, FILM COATED ORAL at 21:23

## 2022-09-05 RX ADMIN — HEPARIN SODIUM 5000 UNITS: 5000 INJECTION INTRAVENOUS; SUBCUTANEOUS at 07:12

## 2022-09-05 RX ADMIN — SODIUM CHLORIDE, PRESERVATIVE FREE 40 MG: 5 INJECTION INTRAVENOUS at 08:19

## 2022-09-05 RX ADMIN — COLLAGENASE SANTYL: 250 OINTMENT TOPICAL at 08:23

## 2022-09-05 RX ADMIN — CEFEPIME 2 G: 2 INJECTION, POWDER, FOR SOLUTION INTRAVENOUS at 04:31

## 2022-09-05 RX ADMIN — CALCIUM GLUCONATE: 94 INJECTION, SOLUTION INTRAVENOUS at 18:26

## 2022-09-05 RX ADMIN — DIPHENOXYLATE HYDROCHLORIDE AND ATROPINE SULFATE 1 TABLET: 2.5; .025 TABLET ORAL at 08:20

## 2022-09-05 RX ADMIN — DIPHENOXYLATE HYDROCHLORIDE AND ATROPINE SULFATE 1 TABLET: 2.5; .025 TABLET ORAL at 17:23

## 2022-09-05 RX ADMIN — FLUCONAZOLE IN SODIUM CHLORIDE 400 MG: 2 INJECTION, SOLUTION INTRAVENOUS at 08:19

## 2022-09-05 RX ADMIN — METOPROLOL TARTRATE 6.25 MG: 25 TABLET, FILM COATED ORAL at 08:20

## 2022-09-05 RX ADMIN — SODIUM CHLORIDE 75 ML/HR: 900 INJECTION, SOLUTION INTRAVENOUS at 10:46

## 2022-09-05 RX ADMIN — DIPHENOXYLATE HYDROCHLORIDE AND ATROPINE SULFATE 1 TABLET: 2.5; .025 TABLET ORAL at 12:05

## 2022-09-05 RX ADMIN — SMOFLIPID 250 ML: 6; 6; 5; 3 INJECTION, EMULSION INTRAVENOUS at 18:25

## 2022-09-05 RX ADMIN — SODIUM CHLORIDE, PRESERVATIVE FREE 40 MG: 5 INJECTION INTRAVENOUS at 21:23

## 2022-09-05 RX ADMIN — HYDROMORPHONE HYDROCHLORIDE 3 MG: 2 INJECTION, SOLUTION INTRAMUSCULAR; INTRAVENOUS; SUBCUTANEOUS at 15:06

## 2022-09-05 RX ADMIN — SODIUM CHLORIDE, PRESERVATIVE FREE 10 ML: 5 INJECTION INTRAVENOUS at 15:07

## 2022-09-05 RX ADMIN — SODIUM CHLORIDE, PRESERVATIVE FREE 10 ML: 5 INJECTION INTRAVENOUS at 21:24

## 2022-09-05 RX ADMIN — HYDROMORPHONE HYDROCHLORIDE 3 MG: 2 INJECTION, SOLUTION INTRAMUSCULAR; INTRAVENOUS; SUBCUTANEOUS at 01:20

## 2022-09-05 RX ADMIN — DIPHENHYDRAMINE HYDROCHLORIDE 25 MG: 25 CAPSULE ORAL at 21:23

## 2022-09-05 RX ADMIN — HYDROMORPHONE HYDROCHLORIDE 3 MG: 2 INJECTION, SOLUTION INTRAMUSCULAR; INTRAVENOUS; SUBCUTANEOUS at 04:31

## 2022-09-05 RX ADMIN — SODIUM CHLORIDE, PRESERVATIVE FREE 10 ML: 5 INJECTION INTRAVENOUS at 21:25

## 2022-09-05 RX ADMIN — HYDROMORPHONE HYDROCHLORIDE 3 MG: 2 INJECTION, SOLUTION INTRAMUSCULAR; INTRAVENOUS; SUBCUTANEOUS at 18:25

## 2022-09-05 RX ADMIN — CLINDAMYCIN PHOSPHATE 600 MG: 600 INJECTION, SOLUTION INTRAVENOUS at 16:00

## 2022-09-05 RX ADMIN — HYDROMORPHONE HYDROCHLORIDE 3 MG: 2 INJECTION, SOLUTION INTRAMUSCULAR; INTRAVENOUS; SUBCUTANEOUS at 10:46

## 2022-09-05 RX ADMIN — HYDROMORPHONE HYDROCHLORIDE 3 MG: 2 INJECTION, SOLUTION INTRAMUSCULAR; INTRAVENOUS; SUBCUTANEOUS at 21:23

## 2022-09-05 NOTE — PROGRESS NOTES
[address] [facility] Adult  Hospitalist Group                                                                                          Hospitalist Progress Note  Tomasz Douglass MD  Answering service: [phone] from in house phone        Date of Service:  2022  NAME:  Fer Sahu  :  1960  MRN:  [id number]      Admission Summary:     Patient presents with severe sepsis with SUHA, hyperbilirubinemia. History of Crohn's disease with multiple surgeries in the past.  Hepatology and GI following. Interval history / Subjective:     Patient's stool is more formed. Overall feeling better. Noted bilateral lower extremity rash with itching, couple of days ago.      Assessment & Plan:     Severe sepsis  -Patient presents with fever, tachycardia, tachypnea, leukocytosis  -Sepsis is due to pneumonia, initial chest x-ray shows consolidation of the right upper lobe, patient with symptoms of cough and expectoration  -Blood cultures negative, sepsis resolved    Pneumonia  -Initial chest x-ray  shows new area of consolidation in the right upper lobe extending into the right apex, repeat chest x-ray on  shows increased consolidation  -Blood cultures so far negative, add sputum culture  -Continue vancomycin and cefepime    Bilateral lower extremity skin rash  -Exam shows maculopapular rash on bilateral lower extremities associated with itching  -Likely antibiotic side effects, patient has been on the same antibiotics  -Other skin infection ?  -ID consult for further evaluation    SUHA  -SUHA due to volume depletion and sepsis  -Renal function mildly bumped likely due to ongoing diarrhea past few days  -Resume IV fluid and monitor renal function    Metabolic acidosis  -This is due to SUHA, metabolic acidosis resolved    Diarrhea  -Stool for C. difficile and enteric bacterial panel negative  -Patient with multiple bowel resections in the past for Crohn's with short gut syndrome  -GI following, continue antidiarrheal agents as needed    Nephrolithiasis  -Nonobstructing right intrarenal calculi  -Follow-up as outpatient    Crohn's disease  -S/p multiple abdominal surgery with short gut syndrome and chronic pain  -Flex sig done 9/1, no apparent abnormalities on flex sig  -Continue bowel rest, continue TPN    Acute anemia  -His anemia is chronic and multifactorial including impaired iron absorption due to short gut and Crohn's disease  -S/p transfusion of 2 units PRBC so far  -For EGD today    Hyperbilirubinemia  -Conjugated hyperbilirubinemia which is likely due to sepsis, bilirubin level improving  -Nodular contour of the liver on the ultrasound  -MRCP negative for choledocholithiasis or bile duct obstruction  -Serologic testing for causes of chronic liver disease negative  -Hepatology evaluating the patient, liver biopsy is now deferred, plan for outpatient FibroScan    Coagulopathy  -Patient presents with elevated INR, this is likely due to advanced liver disease  -S/p vitamin K for 3 days    Chronic abdominal wound  -Chronic abdominal wound on anterior abdomen present for 3 years  -Negative fistula on CT  -Wound care following, on Santyl    PVCs  -Continue Lopressor    Tobacco use  -On nicotine patch    Nutrition  -Overall patient's p.o. intake has improved, plan for discontinuing TPN soon  -Nutrition reevaluation in a.m. Code status: Full  Prophylaxis: SCDs  Care Plan discussed with: Patient  Anticipated Disposition: 24 to 48 hours     Hospital Problems  Date Reviewed: 9/2/2022            Codes Class Noted POA    Acute cystitis ICD-10-CM: N30.00  ICD-9-CM: 595.0  8/27/2022 Unknown        Severe protein-calorie malnutrition ([facility] [address] (Chronic) ICD-10-CM: U04  ICD-9-CM: 262  6/10/2015 Yes             Review of Systems:   A comprehensive review of systems was negative except for that written in the HPI. Vital Signs:    Last 24hrs VS reviewed since prior progress note.  Most recent are:  Visit Vitals  BP (!) 149/59 (BP 1 Location: Left leg, BP Patient Position: Supine; At rest)   Pulse 73   Temp 98.7 °F (37.1 °C)   Resp 20   Ht 5' 9\" (1.753 m)   Wt 87 kg (191 lb 12.8 oz)   SpO2 95%   BMI 28.32 kg/m²       No intake or output data in the 24 hours ending 09/05/22 1131       Physical Examination:     I had a face to face encounter with this patient and independently examined them on 9/5/2022 as outlined below:          Constitutional:  No acute distress, cooperative, pleasant    ENT:  Oral mucosa moist, oropharynx benign. Resp:  CTA bilaterally. No wheezing/rhonchi/rales. No accessory muscle use. CV:  Regular rhythm, normal rate, no murmurs, gallops, rubs    GI:  Soft, non distended, non tender. normoactive bowel sounds, no hepatosplenomegaly     Musculoskeletal:  No edema, warm, 2+ pulses throughout    Neurologic:  Moves all extremities. AAOx3, CN II-XII reviewed            Data Review:    Review and/or order of clinical lab test      Labs:     Recent Labs     09/04/22 0745 09/03/22  0655   WBC 9.9 10.1   HGB 7.8* 7.6*   HCT 23.3* 22.1*   * 471*     Recent Labs     09/05/22 0410 09/05/22 0409 09/04/22  0745 09/03/22  0655   * 130* 134* 137   K 5.8* 5.8* 5.0 4.5    106 107 107   CO2 20* 18* 22 22   BUN 38* 36* 34* 29*   CREA 1.52* 1.44* 1.27 1.22   GLU 93 92 110* 106*   CA 8.9 9.1 8.4* 8.6   MG  --  1.8 1.6 2.0   PHOS 4.4  --   --   --      Recent Labs     09/05/22 0410 09/05/22 0409 09/04/22  0745 09/03/22  0655   ALT  --  72 58 52   AP  --  253* 186* 143*   TBILI  --  2.3* 2.5* 3.0*   TP  --  7.2 6.6 6.2*   ALB 2.1* 2.2* 2.0* 2.0*   GLOB  --  5.0* 4.6* 4.2*     Recent Labs     09/05/22  0409 09/04/22  0745 09/03/22  0655   INR 1.0 1.1 1.1   PTP 10.9 11.1 11.2*   APTT 32.7* 30.9 30.2      No results for input(s): FE, TIBC, PSAT, FERR in the last 72 hours.      Lab Results   Component Value Date/Time    Folate 18.2 08/29/2022 01:12 PM      No results for input(s): PH, PCO2, PO2 in the last 72 hours. No results for input(s): CPK, CKNDX, TROIQ in the last 72 hours.     No lab exists for component: CPKMB  Lab Results   Component Value Date/Time    Cholesterol, total 134 12/01/2014 04:33 AM    HDL Cholesterol 35 12/01/2014 04:33 AM    LDL, calculated 58.2 12/01/2014 04:33 AM    Triglyceride 204 (H) 12/01/2014 04:33 AM    CHOL/HDL Ratio 3.8 12/01/2014 04:33 AM     Lab Results   Component Value Date/Time    Glucose (POC) 110 09/05/2022 07:09 AM    Glucose (POC) 106 09/05/2022 01:27 AM    Glucose (POC) 117 09/04/2022 05:46 PM    Glucose (POC) 128 (H) 09/04/2022 12:00 PM    Glucose (POC) 112 09/03/2022 05:55 PM     Lab Results   Component Value Date/Time    Color DARK YELLOW 08/26/2022 07:27 PM    Appearance TURBID (A) 08/26/2022 07:27 PM    Specific gravity 1.025 08/26/2022 07:27 PM    Specific gravity 1.010 10/07/2019 05:48 AM    pH (UA) 5.0 08/26/2022 07:27 PM    Protein 100 (A) 08/26/2022 07:27 PM    Glucose Negative 08/26/2022 07:27 PM    Ketone TRACE (A) 08/26/2022 07:27 PM    Bilirubin NEGATIVE  10/07/2019 05:48 AM    Urobilinogen 0.2 08/26/2022 07:27 PM    Nitrites Positive (A) 08/26/2022 07:27 PM    Leukocyte Esterase SMALL (A) 08/26/2022 07:27 PM    Epithelial cells FEW 08/26/2022 07:27 PM    Bacteria 1+ (A) 08/26/2022 07:27 PM    WBC 5-10 08/26/2022 07:27 PM    RBC 0-5 08/26/2022 07:27 PM         Medications Reviewed:     Current Facility-Administered Medications   Medication Dose Route Frequency    vancomycin (VANCOCIN) 1500 mg in  ml infusion  1,500 mg IntraVENous Q24H    0.9% sodium chloride infusion  75 mL/hr IntraVENous CONTINUOUS    [START ON 9/6/2022] cefepime (MAXIPIME) 2 g in 0.9% sodium chloride (MBP/ADV) 100 mL MBP  2 g IntraVENous Q24H    TPN ADULT - CENTRAL   IntraVENous CONTINUOUS    HYDROmorphone (DILAUDID) injection 3 mg  3 mg IntraVENous Q3H PRN    diphenhydrAMINE (BENADRYL) capsule 25 mg  25 mg Oral Q6H PRN    0.9% sodium chloride infusion 250 mL  250 mL IntraVENous PRN    loperamide (IMODIUM) capsule 4 mg  4 mg Oral Q4H PRN    albuterol (PROVENTIL VENTOLIN) nebulizer solution 2.5 mg  2.5 mg Nebulization Q4H PRN    alteplase (CATHFLO) 1 mg in sterile water (preservative free) 1 mL injection  1 mg InterCATHeter PRN    sodium chloride (NS) flush 5-40 mL  5-40 mL IntraVENous Q8H    sodium chloride (NS) flush 5-40 mL  5-40 mL IntraVENous PRN    diphenoxylate-atropine (LOMOTIL) tablet 1 Tablet  1 Tablet Oral QID PRN    sodium chloride (OCEAN) 0.65 % nasal squeeze bottle 2 Spray  2 Spray Both Nostrils Q2H PRN    oxyCODONE IR (ROXICODONE) tablet 12.5 mg  12.5 mg Oral Q6H PRN    diphenoxylate-atropine (LOMOTIL) tablet 1 Tablet  1 Tablet Oral QID    fluconazole (DIFLUCAN) 400mg/200 mL IVPB (premix)  400 mg IntraVENous DAILY    ergocalciferol capsule 50,000 Units  50,000 Units Oral Q7D    collagenase (SANTYL) 250 unit/gram ointment   Topical DAILY    fat emulsion 20% soy-oliv-fish oil (SMOFlipid) infusion 250 mL  250 mL IntraVENous Q24H    nicotine (NICODERM CQ) 21 mg/24 hr patch 1 Patch  1 Patch TransDERmal DAILY    naloxone (NARCAN) injection 0.4 mg  0.4 mg IntraVENous EVERY 2 MINUTES AS NEEDED    metoprolol tartrate (LOPRESSOR) tablet 6.25 mg  6.25 mg Oral Q12H    metoprolol (LOPRESSOR) injection 2.5 mg  2.5 mg IntraVENous Q4H PRN    bisacodyL (DULCOLAX) suppository 10 mg  10 mg Rectal DAILY PRN    pantoprazole (PROTONIX) 40 mg in 0.9% sodium chloride 10 mL injection  40 mg IntraVENous Q12H    sodium chloride (NS) flush 5-40 mL  5-40 mL IntraVENous Q8H    sodium chloride (NS) flush 5-40 mL  5-40 mL IntraVENous PRN    acetaminophen (TYLENOL) tablet 650 mg  650 mg Oral Q6H PRN    Or    acetaminophen (TYLENOL) suppository 650 mg  650 mg Rectal Q6H PRN    ondansetron (ZOFRAN ODT) tablet 4 mg  4 mg Oral Q8H PRN    Or    ondansetron (ZOFRAN) injection 4 mg  4 mg IntraVENous Q6H PRN    Vancomycin - Pharmacy to Dose   Other Rx Dosing/Monitoring    heparin (porcine) injection 5,000 Units  5,000 Units SubCUTAneous Q8H     ______________________________________________________________________  EXPECTED LENGTH OF STAY: 4d 19h  ACTUAL LENGTH OF STAY:          9                 Jessenia Pollen, MD

## 2022-09-05 NOTE — PROGRESS NOTES
Renal Monitoring of cefepime  Indication:  UTI/intraabdominal infection  Current regimen:  cefepime 2g q12h    Recent Labs     22  0410 22  0409 22  0745 22  0655   WBC  --   --  9.9 10.1   CREA 1.52* 1.44* 1.27 1.22   BUN 38* 36* 34* 29*     Est CrCl: 55-60 ml/min;    Temp (24hrs), Av °F (37.2 °C), Min:98.4 °F (36.9 °C), Max:99.8 °F (37.7 °C)      Plan: Change to cefepime 2g q24h per protocol for estimated renal function < 60 ml/min

## 2022-09-05 NOTE — CONSULTS
Infectious Disease Consult    Today's Date: 9/5/2022   Admit Date: 8/26/2022    Impression:   Pneumonia  UTI  Crohn flare  Bilateral LE rash--not typical for E nodosum or a drug rash--might need skin biopsy    Plan:   Zosyn already changed  Will hold vancomycin  Add clindamycin   Consider skin biopsy if rash doesn't improve    Anti-infectives:   Vancomycin   Cefepime   Fluconazole     Subjective:   Date of Consultation:  September 5, 2022  Referring Physician: Dr Linus Paredes    Patient is a 65 y.o. male admitted with UTI, Crohn flare, pneumonia, etc. He has developed pruritic rash over B LEs. He has been on antibiotic therapy for pneumonia for several days prior to onset of rash. We are asked to see him in consultation. Patient Active Problem List   Diagnosis Code    Crohn's disease ([facility] K50.90    GERD (gastroesophageal reflux disease) K21.9    Hiatal hernia K44.9    B12 deficiency E53.8    Chronic pain G89.29    Severe protein-calorie malnutrition ([facility] E43    Short bowel syndrome K91.2    Back pain, chronic M54.9, G89.29    Incisional hernia, without obstruction or gangrene K43.2    Intussusception of intestine (HCC) K56.1    Bowel obstruction ([facility] K56.609    Abdominal wound dehiscence T81.30XA    Croup J05.0    Crohn disease ([facility] K50.90    HTN (hypertension) I10    Abdominal pain R10.9    Acute cystitis N30.00     Past Medical History:   Diagnosis Date    Abdominal wall hernia 6/15/2012    Anal fissure     Anemia     Anemia     Anxiety and depression     Arthritis     Related to the Crohn's disease. Cancer ([facility]     MELANOMA HEAD AND FACE    Chronic pain     GENERALIZED    COPD (chronic obstructive pulmonary disease) (HCC)     Crohn disease (HCC)     Diagnosed at age 17.     Echocardiogram normal (EF: 55-60%) 07/2015    Esophageal ulcer     GERD (gastroesophageal reflux disease)     H/O blood transfusion 2013    [facility], [address]    Hypertension     denies    Migraine     Short bowel syndrome Ventral hernia without obstruction or gangrene       Family History   Problem Relation Age of Onset    Stroke Mother     Heart Disease Mother     Kidney Disease Mother     Anesth Problems Mother         TAKES A LONG TIME TO WAKE UP    Heart Disease Father     Thyroid Disease Sister       Social History     Tobacco Use    Smoking status: Every Day     Packs/day: 1.00     Years: 44.00     Pack years: 44.00     Types: Cigarettes    Smokeless tobacco: Current    Tobacco comments:     CURRENT E CIGS   Substance Use Topics    Alcohol use: No     Comment: RARELY     Past Surgical History:   Procedure Laterality Date    COLONOSCOPY N/A 10/8/2019    COLONOSCOPY performed by Compa Garrison MD at [place] [address] N/A 9/1/2022    [address] performed by Jc Morales MD at [facility] ENDOSCOPY    [address] needle, takes 1 inch needle    HX APPENDECTOMY      HX CHOLECYSTECTOMY      HX GI      colectomy x2, one ileostomy    HX GI  7/28/14    exp lap,partial colectomy with end ileostomy by Dr Manan Lazo    HX HEENT      neck fusion    HX ORTHOPAEDIC  1980s    wrist right,     HX ORTHOPAEDIC  2006, 11/2013    neck, L4 L5 L6    HX ORTHOPAEDIC  1990s    right knee scope    HX OTHER SURGICAL      surgical repair from spider bite    HX OTHER SURGICAL  12/1/14    Incision and drainage of posterior right subcutaneous shoulder abscess    HX OTHER SURGICAL  2014    LEFT INDEX FINGER SPIDER BITE    HX OTHER SURGICAL  2014    RECTAL FISTULA    HX OTHER SURGICAL  3/17/2015    Ileostomy takedown with extensive lysis of adhesions (greater than three hours), mobilization of the splenic flexure, left colon resection, ileocolic anastomosis, and excision of scar; Dr. Toby Munoz. HX OTHER SURGICAL  3/22/2015    Exploratory laparotomy with washout of abdomen, suture repair of small bowel/anastomosis, and diverting protective loop ileostomy;  Lauro Walden MD.    HX OTHER SURGICAL  4/9/2015 Laparotomy, extensive lysis of adhesions, abdominal lavage, and resection of ileocolic anastomosis (including the efferent limb of the loop ileostomy) with creation of Demetrius's pouch; Dr. Obi Huff.  OTHER SURGICAL  7/14/2015    Cystoscopy and placement of bilateral ureteral catheters; Diana Hameed MD.     OTHER SURGICAL  7/14/2015    Ileostomy takedown with extensive lysis of adhesions, small bowel resection, and enterocolostomy; Dr. Obi Huff.  OTHER SURGICAL  9/29/2015    CT-guided percutaneous drainage of intraabdominal abscess; Dr. Jacquelyn Cheng.  OTHER SURGICAL  11/16/2015    CT-guided percutaneous aspiration of abdominal wall cavity; Dr. Tobi Calderon.  OTHER SURGICAL  12/1/2015    Incision and drainage of abdominal wall abscess; Dr. Obi Huff.  OTHER SURGICAL  2/11/2016    Incision and drainage of abdominal wall abscess; Dr. Obi Huff.  OTHER SURGICAL  2/23/2016    Cystoscopy and placement of bilateral temporary ureteral catheters; Dr. Zaria Zhang.  OTHER SURGICAL  2/23/2016    Exploratory laparotomy, extensive lysis of adhesions, removal of mesh, and repair of enterocutaneous fistula; Dr. Obi Huff.  OTHER SURGICAL  11/03/2017    Exploratory laparotomy, extensive lysis of adhesions, and reduction of intussusception; Dr. Obi Huff. LA ABDOMEN SURGERY PROC UNLISTED      33 abdominal operations for treatment of Crohn's disease and its complications. Prior to Admission medications    Medication Sig Start Date End Date Taking? Authorizing Provider   gabapentin (NEURONTIN) 300 mg capsule Take 300 mg by mouth three (3) times daily. Provider, Historical   oxyCODONE (ROXICODONE) 5 mg/5 mL solution Take 10 mg by mouth every six to eight (6-8) hours as needed for Pain.     Provider, Historical   omeprazole (PRILOSEC) 40 mg capsule take 1 capsule by mouth once daily 11/15/17   Provider, Historical   chlorzoxazone (PARAFON FORTE) 500 mg tablet Take 500-1,000 mg by mouth daily as needed for Muscle Spasm(s). Provider, Historical   diphenoxylate-atropine (LOMOTIL) 2.5-0.025 mg per tablet Take 2 Tabs by mouth Before breakfast, lunch, dinner and at bedtime. Provider, Historical   loperamide (IMODIUM) 2 mg capsule Take 2 mg by mouth as needed for Diarrhea. Patient takes 14-16 capsules a day. Provider, Historical   ondansetron hcl (ZOFRAN, AS HYDROCHLORIDE,) 8 mg tablet Take 8 mg by mouth every eight (8) hours as needed for Nausea. Provider, Historical       Allergies   Allergen Reactions    Honey Anaphylaxis    Remicade [Infliximab] Anaphylaxis    Crestor [Rosuvastatin] Myalgia    Other Plant, Animal, Environmental Other (comments)     Developed \"boils\" on right arm that required I &D after receiving FENTANYL patch. Is able to take FENTANYL orally; states is allergic to fentanyl patch GLUE    Imuran [Azathioprine] Diarrhea and Nausea Only    Mercaptopurine Diarrhea    Sulfa (Sulfonamide Antibiotics) Other (comments)     Causes diarrhea        Review of Systems:  A comprehensive review of systems was negative except for that written in the History of Present Illness.     Objective:     Visit Vitals  BP (!) 165/51 (BP 1 Location: Left leg, BP Patient Position: At rest;Lying)   Pulse 72   Temp 98.8 °F (37.1 °C)   Resp 14   Ht 5' 9\" (1.753 m)   Wt 87 kg (191 lb 12.8 oz)   SpO2 96%   BMI 28.32 kg/m²     Temp (24hrs), Av °F (37.2 °C), Min:98.7 °F (37.1 °C), Max:99.8 °F (37.7 °C)       Lines:  Central Venous Catheter:       Physical Exam:  Lungs:  clear to auscultation bilaterally  Heart:  regular rate and rhythm  Abdomen:  abnormal findings:  tenderness mild in the epigastrium  Skin:  rash noted on extremities    Data Review:     CBC:  Recent Labs     22  0745 22  0655 22   WBC 9.9 10.1  --    GRANS 76* 77*  --    MONOS 7 7  --    EOS 3 3  --    ANEU 7.5 7.8  --    ABL 1.3 1.1  --    HGB 7.8* 7.6* 8.4*   HCT 23.3* 22.1* 24.1*   * 471*  -- BMP:  Recent Labs     09/05/22  1206 09/05/22  0410 09/05/22  0409   CREA 1.46* 1.52* 1.44*   BUN 44* 38* 36*   * 129* 130*   K 6.0* 5.8* 5.8*    105 106   CO2 19* 20* 18*   AGAP 4* 4* 6   * 93 92       LFTS:  Recent Labs     09/05/22  1206 09/05/22  0410 09/05/22  0409 09/04/22  0745   TBILI 2.1*  --  2.3* 2.5*   ALT 70  --  72 58   *  --  253* 186*   TP 6.8  --  7.2 6.6   ALB 2.0* 2.1* 2.2* 2.0*       Microbiology:     All Micro Results       Procedure Component Value Units Date/Time    CMV BY PCR, QT [[id number]] Collected: 08/29/22 1312    Order Status: Completed Specimen: Blood from Plasma Updated: 09/01/22 0736     CMV IU/mL Negative IU/mL      Comment: (NOTE)  No CMV DNA detected. The quantitative range of this assay is 200 to 1 million IU/mL. Performed At: [facility] & [address] [id number]  Adalberto Gaucher MD PW:[id number]          CMV log 10 IU/mL TEST NOT PERFORMED log10 IU/mL      Comment: (NOTE)  Unable to calculate result since non-numeric result obtained for  component test.         CULTURE, BLOOD, PAIRED [[id number]] Collected: 08/26/22 1731    Order Status: Completed Specimen: Blood Updated: 08/31/22 0954     Special Requests: NO SPECIAL REQUESTS        Culture result: NO GROWTH 5 DAYS       CULTURE, MRSA [[id number]] Collected: 08/29/22 1312    Order Status: Completed Specimen: Nasal from Nares Updated: 08/30/22 1655     Special Requests: NO SPECIAL REQUESTS        Culture result:       MRSA NOT PRESENT. Apparent Staphylococus aureus (not MRSA noted). Screening of patient nares for MRSA is for surveillance purposes and, if positive, to facilitate isolation considerations in high risk settings. It is not intended for automatic decolonization interventions per se as regimens are not sufficiently effective to warrant routine use.       C. DIFFICILE AG & TOXIN A/B [[id number]] Collected: 08/29/22 1130    Order Status: Completed Specimen: Stool Updated: 08/29/22 1811     GDH ANTIGEN Negative        C. difficile toxin Negative        INTERPRETATION       NEGATIVE FOR TOXIGENIC C. DIFFICILE          CULTURE, URINE [[id number]] Collected: 08/26/22 1927    Order Status: Completed Specimen: Urine from Clean catch Updated: 08/29/22 1304     Special Requests: NO SPECIAL REQUESTS        [place] Count --        [id number]  COLONIES/mL       Culture result:       MIXED UROGENITAL LOTTIE ISOLATED          ENTERIC BACTERIA PANEL, DNA [[id number]] Collected: 08/26/22 1927    Order Status: Completed Specimen: Stool Updated: 08/28/22 0109     Shigella species, DNA Negative        Campylobacter species, DNA Negative        Vibrio species, DNA Negative        Enterotoxigen E Coli, DNA Negative        Shiga toxin producing, DNA Negative        Salmonella species, DNA Negative        P. shigelloides, DNA Negative        Y. enterocolitica, DNA Negative       C. DIFFICILE AG & TOXIN A/B [[id number]]     Order Status: Canceled Specimen: Stool     CMV BY PCR, QT [[id number]]     Order Status: Canceled Specimen: Blood     CULTURE, MRSA [[id number]]     Order Status: Canceled Specimen: Nasal from Nares     CULTURE, URINE [[id number]]     Order Status: Canceled Specimen: Urine from [address] [place] [address] [[id number]] Collected: 08/26/22 1927    Order Status: Completed Specimen: Urine from Serum Updated: 08/26/22 1943     Urine culture hold       Urine on hold in Microbiology dept for 2 days. If unpreserved urine is submitted, it cannot be used for addtional testing after 24 hours, recollection will be required.           OVA & Daniela Curet [[id number]] Collected: 08/26/22 1927    Order Status: Canceled Specimen: Feces from Stool             Imaging:   Reviewed     Signed By: Henrry Kim MD     September 5, 2022

## 2022-09-05 NOTE — PROGRESS NOTES
Day #2 of Vancomycin  Indication:  Sepsis of unknown etiology, PNA  - crohn's disease w/short bowl syndrome (multiple surgeries)  - ulcerated Abdominal Wound  Current regimen:  1.25 gram IV q 18 hours  Abx regimen: cefepime, fluconazole, vanc  ID Following ?: NO  Concomitant nephrotoxic drugs (requires more frequent monitoring): None  Frequency of BMP?: daily    Recent Labs     22  0410 22  0409 22  0745 22  0655   WBC  --   --  9.9 10.1   CREA 1.52* 1.44* 1.27 1.22   BUN 38* 36* 34* 29*     Est CrCl: 55-60 ml/min; UO: n/a ml/kg/hr  Temp (24hrs), Av °F (37.2 °C), Min:98.4 °F (36.9 °C), Max:99.8 °F (37.7 °C)    Cultures:    Blood: NG - final   Urine: mixed, 30K - final   CDiff: neg   MRSA screen: Neg (staph aureus identified but not mrsa) - final    Goal target range AUC/SHASHANK 400-600    Recent level history:  Date/Time Dose & Interval Measured Level (mcg/mL) Associated AUC/SHASHANK Dose Adjustment     @ 02:39 1000 mg q24h 13.3 ~ 10.5 hrs p dose 339 1000 mg q18 hrs    @ 0548 1000 mg q18h 12.3 ~ 14 hrs p dose 370 1250 mg IV q18h    @ 0410 Vanc 1g q18h 20.3 (~12h post-dose) 575 Vanc 1.5g q24h                           Plan: Change to vanc 1.5g q24h for extrapolated AUC/SHASHANK at upper range of goal and noted change in Scr from previous. Will reassess new dosing ~24-48h after initiation.

## 2022-09-05 NOTE — PROGRESS NOTES
Bedside and Verbal shift change report given to NKIHIL Demarco (oncoming nurse) by Kimberly Martinez (offgoing nurse). Report included the following information SBAR, MAR, Recent Results, Cardiac Rhythm SR, and Alarm Parameters .

## 2022-09-06 ENCOUNTER — APPOINTMENT (OUTPATIENT)
Dept: GENERAL RADIOLOGY | Age: 62
DRG: 871 | End: 2022-09-06
Attending: FAMILY MEDICINE
Payer: MEDICARE

## 2022-09-06 LAB
ALBUMIN SERPL-MCNC: 2 G/DL (ref 3.5–5)
ALBUMIN/GLOB SERPL: 0.4 {RATIO} (ref 1.1–2.2)
ALBUMIN/GLOB SERPL: 0.4 {RATIO} (ref 1.1–2.2)
ALP SERPL-CCNC: 224 U/L (ref 45–117)
ALP SERPL-CCNC: 226 U/L (ref 45–117)
ALT SERPL-CCNC: 67 U/L (ref 12–78)
ALT SERPL-CCNC: 69 U/L (ref 12–78)
ANION GAP SERPL CALC-SCNC: 7 MMOL/L (ref 5–15)
ANION GAP SERPL CALC-SCNC: 7 MMOL/L (ref 5–15)
ANION GAP SERPL CALC-SCNC: 8 MMOL/L (ref 5–15)
APTT PPP: 30.8 SEC (ref 22.1–31)
AST SERPL-CCNC: 56 U/L (ref 15–37)
AST SERPL-CCNC: 57 U/L (ref 15–37)
BASOPHILS # BLD: 0 K/UL (ref 0–0.1)
BASOPHILS NFR BLD: 0 % (ref 0–1)
BILIRUB DIRECT SERPL-MCNC: 1.6 MG/DL (ref 0–0.2)
BILIRUB DIRECT SERPL-MCNC: 1.6 MG/DL (ref 0–0.2)
BILIRUB SERPL-MCNC: 1.7 MG/DL (ref 0.2–1)
BILIRUB SERPL-MCNC: 1.8 MG/DL (ref 0.2–1)
BUN SERPL-MCNC: 42 MG/DL (ref 6–20)
BUN SERPL-MCNC: 44 MG/DL (ref 6–20)
BUN SERPL-MCNC: 45 MG/DL (ref 6–20)
BUN/CREAT SERPL: 29 (ref 12–20)
BUN/CREAT SERPL: 30 (ref 12–20)
BUN/CREAT SERPL: 31 (ref 12–20)
CALCIUM SERPL-MCNC: 8.9 MG/DL (ref 8.5–10.1)
CALCIUM SERPL-MCNC: 8.9 MG/DL (ref 8.5–10.1)
CALCIUM SERPL-MCNC: 9 MG/DL (ref 8.5–10.1)
CHLORIDE SERPL-SCNC: 108 MMOL/L (ref 97–108)
CHLORIDE SERPL-SCNC: 109 MMOL/L (ref 97–108)
CHLORIDE SERPL-SCNC: 109 MMOL/L (ref 97–108)
CO2 SERPL-SCNC: 16 MMOL/L (ref 21–32)
CO2 SERPL-SCNC: 17 MMOL/L (ref 21–32)
CO2 SERPL-SCNC: 17 MMOL/L (ref 21–32)
COMMENT, HOLDF: NORMAL
CREAT SERPL-MCNC: 1.44 MG/DL (ref 0.7–1.3)
CREAT SERPL-MCNC: 1.46 MG/DL (ref 0.7–1.3)
CREAT SERPL-MCNC: 1.47 MG/DL (ref 0.7–1.3)
DIFFERENTIAL METHOD BLD: ABNORMAL
EOSINOPHIL # BLD: 0.2 K/UL (ref 0–0.4)
EOSINOPHIL NFR BLD: 3 % (ref 0–7)
ERYTHROCYTE [DISTWIDTH] IN BLOOD BY AUTOMATED COUNT: 16.4 % (ref 11.5–14.5)
GLOBULIN SER CALC-MCNC: 4.6 G/DL (ref 2–4)
GLOBULIN SER CALC-MCNC: 4.7 G/DL (ref 2–4)
GLUCOSE BLD STRIP.AUTO-MCNC: 107 MG/DL (ref 65–117)
GLUCOSE BLD STRIP.AUTO-MCNC: 109 MG/DL (ref 65–117)
GLUCOSE BLD STRIP.AUTO-MCNC: 112 MG/DL (ref 65–117)
GLUCOSE BLD STRIP.AUTO-MCNC: 114 MG/DL (ref 65–117)
GLUCOSE SERPL-MCNC: 111 MG/DL (ref 65–100)
GLUCOSE SERPL-MCNC: 111 MG/DL (ref 65–100)
GLUCOSE SERPL-MCNC: 112 MG/DL (ref 65–100)
HCT VFR BLD AUTO: 24.5 % (ref 36.6–50.3)
HGB BLD-MCNC: 7.8 G/DL (ref 12.1–17)
IMM GRANULOCYTES # BLD AUTO: 0.1 K/UL (ref 0–0.04)
IMM GRANULOCYTES NFR BLD AUTO: 1 % (ref 0–0.5)
INR PPP: 1 (ref 0.9–1.1)
LYMPHOCYTES # BLD: 1.1 K/UL (ref 0.8–3.5)
LYMPHOCYTES NFR BLD: 14 % (ref 12–49)
MAGNESIUM SERPL-MCNC: 1.7 MG/DL (ref 1.6–2.4)
MCH RBC QN AUTO: 31.8 PG (ref 26–34)
MCHC RBC AUTO-ENTMCNC: 31.8 G/DL (ref 30–36.5)
MCV RBC AUTO: 100 FL (ref 80–99)
MONOCYTES # BLD: 0.6 K/UL (ref 0–1)
MONOCYTES NFR BLD: 7 % (ref 5–13)
NEUTS SEG # BLD: 6.2 K/UL (ref 1.8–8)
NEUTS SEG NFR BLD: 75 % (ref 32–75)
NRBC # BLD: 0 K/UL (ref 0–0.01)
NRBC BLD-RTO: 0 PER 100 WBC
PHOSPHATE SERPL-MCNC: 4 MG/DL (ref 2.6–4.7)
PLATELET # BLD AUTO: 479 K/UL (ref 150–400)
PMV BLD AUTO: 9.7 FL (ref 8.9–12.9)
POTASSIUM SERPL-SCNC: 4.8 MMOL/L (ref 3.5–5.1)
POTASSIUM SERPL-SCNC: 4.9 MMOL/L (ref 3.5–5.1)
POTASSIUM SERPL-SCNC: 4.9 MMOL/L (ref 3.5–5.1)
PROT SERPL-MCNC: 6.6 G/DL (ref 6.4–8.2)
PROT SERPL-MCNC: 6.7 G/DL (ref 6.4–8.2)
PROTHROMBIN TIME: 10.9 SEC (ref 9–11.1)
RBC # BLD AUTO: 2.45 M/UL (ref 4.1–5.7)
SAMPLES BEING HELD,HOLD: NORMAL
SERVICE CMNT-IMP: NORMAL
SODIUM SERPL-SCNC: 132 MMOL/L (ref 136–145)
SODIUM SERPL-SCNC: 133 MMOL/L (ref 136–145)
SODIUM SERPL-SCNC: 133 MMOL/L (ref 136–145)
THERAPEUTIC RANGE,PTTT: NORMAL SECS (ref 58–77)
WBC # BLD AUTO: 8.2 K/UL (ref 4.1–11.1)

## 2022-09-06 PROCEDURE — 74011250636 HC RX REV CODE- 250/636

## 2022-09-06 PROCEDURE — 74011250636 HC RX REV CODE- 250/636: Performed by: INTERNAL MEDICINE

## 2022-09-06 PROCEDURE — 83735 ASSAY OF MAGNESIUM: CPT

## 2022-09-06 PROCEDURE — 97530 THERAPEUTIC ACTIVITIES: CPT

## 2022-09-06 PROCEDURE — 80076 HEPATIC FUNCTION PANEL: CPT

## 2022-09-06 PROCEDURE — 74011000250 HC RX REV CODE- 250: Performed by: FAMILY MEDICINE

## 2022-09-06 PROCEDURE — 86480 TB TEST CELL IMMUN MEASURE: CPT

## 2022-09-06 PROCEDURE — 74011000250 HC RX REV CODE- 250: Performed by: INTERNAL MEDICINE

## 2022-09-06 PROCEDURE — 71101 X-RAY EXAM UNILAT RIBS/CHEST: CPT

## 2022-09-06 PROCEDURE — 74011250637 HC RX REV CODE- 250/637: Performed by: INTERNAL MEDICINE

## 2022-09-06 PROCEDURE — 82248 BILIRUBIN DIRECT: CPT

## 2022-09-06 PROCEDURE — 36415 COLL VENOUS BLD VENIPUNCTURE: CPT

## 2022-09-06 PROCEDURE — 74011250636 HC RX REV CODE- 250/636: Performed by: PHYSICIAN ASSISTANT

## 2022-09-06 PROCEDURE — 65270000029 HC RM PRIVATE

## 2022-09-06 PROCEDURE — 99232 SBSQ HOSP IP/OBS MODERATE 35: CPT | Performed by: INTERNAL MEDICINE

## 2022-09-06 PROCEDURE — 85610 PROTHROMBIN TIME: CPT

## 2022-09-06 PROCEDURE — 97161 PT EVAL LOW COMPLEX 20 MIN: CPT

## 2022-09-06 PROCEDURE — C9113 INJ PANTOPRAZOLE SODIUM, VIA: HCPCS | Performed by: INTERNAL MEDICINE

## 2022-09-06 PROCEDURE — 97110 THERAPEUTIC EXERCISES: CPT

## 2022-09-06 PROCEDURE — 85730 THROMBOPLASTIN TIME PARTIAL: CPT

## 2022-09-06 PROCEDURE — 74011250637 HC RX REV CODE- 250/637: Performed by: NURSE PRACTITIONER

## 2022-09-06 PROCEDURE — 80053 COMPREHEN METABOLIC PANEL: CPT

## 2022-09-06 PROCEDURE — 74011000258 HC RX REV CODE- 258: Performed by: FAMILY MEDICINE

## 2022-09-06 PROCEDURE — 80069 RENAL FUNCTION PANEL: CPT

## 2022-09-06 PROCEDURE — 74011250636 HC RX REV CODE- 250/636: Performed by: FAMILY MEDICINE

## 2022-09-06 PROCEDURE — 82962 GLUCOSE BLOOD TEST: CPT

## 2022-09-06 PROCEDURE — 85025 COMPLETE CBC W/AUTO DIFF WBC: CPT

## 2022-09-06 RX ORDER — HYDROMORPHONE HYDROCHLORIDE 1 MG/ML
1 INJECTION, SOLUTION INTRAMUSCULAR; INTRAVENOUS; SUBCUTANEOUS ONCE
Status: COMPLETED | OUTPATIENT
Start: 2022-09-06 | End: 2022-09-06

## 2022-09-06 RX ORDER — DIPHENHYDRAMINE HCL 50 MG
50 CAPSULE ORAL
Status: DISCONTINUED | OUTPATIENT
Start: 2022-09-06 | End: 2022-09-12 | Stop reason: HOSPADM

## 2022-09-06 RX ORDER — HYDROMORPHONE HYDROCHLORIDE 2 MG/ML
3 INJECTION, SOLUTION INTRAMUSCULAR; INTRAVENOUS; SUBCUTANEOUS
Status: DISCONTINUED | OUTPATIENT
Start: 2022-09-06 | End: 2022-09-10

## 2022-09-06 RX ADMIN — SODIUM CHLORIDE 75 ML/HR: 900 INJECTION, SOLUTION INTRAVENOUS at 01:05

## 2022-09-06 RX ADMIN — SODIUM CHLORIDE, PRESERVATIVE FREE 40 MG: 5 INJECTION INTRAVENOUS at 08:37

## 2022-09-06 RX ADMIN — DIPHENOXYLATE HYDROCHLORIDE AND ATROPINE SULFATE 1 TABLET: 2.5; .025 TABLET ORAL at 21:00

## 2022-09-06 RX ADMIN — CLINDAMYCIN PHOSPHATE 600 MG: 600 INJECTION, SOLUTION INTRAVENOUS at 15:59

## 2022-09-06 RX ADMIN — HYDROMORPHONE HYDROCHLORIDE 3 MG: 2 INJECTION, SOLUTION INTRAMUSCULAR; INTRAVENOUS; SUBCUTANEOUS at 05:37

## 2022-09-06 RX ADMIN — DIPHENOXYLATE HYDROCHLORIDE AND ATROPINE SULFATE 1 TABLET: 2.5; .025 TABLET ORAL at 12:19

## 2022-09-06 RX ADMIN — HYDROMORPHONE HYDROCHLORIDE 3 MG: 2 INJECTION, SOLUTION INTRAMUSCULAR; INTRAVENOUS; SUBCUTANEOUS at 12:19

## 2022-09-06 RX ADMIN — DIPHENOXYLATE HYDROCHLORIDE AND ATROPINE SULFATE 1 TABLET: 2.5; .025 TABLET ORAL at 08:39

## 2022-09-06 RX ADMIN — DIPHENHYDRAMINE HCL 50 MG: 50 CAPSULE ORAL at 21:07

## 2022-09-06 RX ADMIN — CLINDAMYCIN PHOSPHATE 600 MG: 600 INJECTION, SOLUTION INTRAVENOUS at 23:21

## 2022-09-06 RX ADMIN — CLINDAMYCIN PHOSPHATE 600 MG: 600 INJECTION, SOLUTION INTRAVENOUS at 00:57

## 2022-09-06 RX ADMIN — METOPROLOL TARTRATE 6.25 MG: 25 TABLET, FILM COATED ORAL at 08:39

## 2022-09-06 RX ADMIN — SODIUM CHLORIDE 75 ML/HR: 900 INJECTION, SOLUTION INTRAVENOUS at 20:11

## 2022-09-06 RX ADMIN — SODIUM CHLORIDE, PRESERVATIVE FREE 10 ML: 5 INJECTION INTRAVENOUS at 13:20

## 2022-09-06 RX ADMIN — CEFEPIME 2 G: 2 INJECTION, POWDER, FOR SOLUTION INTRAVENOUS at 05:36

## 2022-09-06 RX ADMIN — CALCIUM GLUCONATE: 94 INJECTION, SOLUTION INTRAVENOUS at 20:11

## 2022-09-06 RX ADMIN — HEPARIN SODIUM 5000 UNITS: 5000 INJECTION INTRAVENOUS; SUBCUTANEOUS at 05:36

## 2022-09-06 RX ADMIN — SODIUM CHLORIDE, PRESERVATIVE FREE 10 ML: 5 INJECTION INTRAVENOUS at 22:00

## 2022-09-06 RX ADMIN — ALTEPLASE 1 MG: 2.2 INJECTION, POWDER, LYOPHILIZED, FOR SOLUTION INTRAVENOUS at 17:31

## 2022-09-06 RX ADMIN — SODIUM CHLORIDE, PRESERVATIVE FREE 40 MG: 5 INJECTION INTRAVENOUS at 20:57

## 2022-09-06 RX ADMIN — SODIUM CHLORIDE, PRESERVATIVE FREE 10 ML: 5 INJECTION INTRAVENOUS at 05:37

## 2022-09-06 RX ADMIN — SMOFLIPID 250 ML: 6; 6; 5; 3 INJECTION, EMULSION INTRAVENOUS at 20:11

## 2022-09-06 RX ADMIN — CLINDAMYCIN PHOSPHATE 600 MG: 600 INJECTION, SOLUTION INTRAVENOUS at 08:38

## 2022-09-06 RX ADMIN — FLUCONAZOLE IN SODIUM CHLORIDE 400 MG: 2 INJECTION, SOLUTION INTRAVENOUS at 08:38

## 2022-09-06 RX ADMIN — HEPARIN SODIUM 5000 UNITS: 5000 INJECTION INTRAVENOUS; SUBCUTANEOUS at 21:00

## 2022-09-06 RX ADMIN — SODIUM CHLORIDE, PRESERVATIVE FREE 10 ML: 5 INJECTION INTRAVENOUS at 06:00

## 2022-09-06 RX ADMIN — METOPROLOL TARTRATE 6.25 MG: 25 TABLET, FILM COATED ORAL at 20:58

## 2022-09-06 RX ADMIN — HYDROMORPHONE HYDROCHLORIDE 3 MG: 2 INJECTION, SOLUTION INTRAMUSCULAR; INTRAVENOUS; SUBCUTANEOUS at 20:59

## 2022-09-06 RX ADMIN — HYDROMORPHONE HYDROCHLORIDE 3 MG: 2 INJECTION, SOLUTION INTRAMUSCULAR; INTRAVENOUS; SUBCUTANEOUS at 00:57

## 2022-09-06 RX ADMIN — HEPARIN SODIUM 5000 UNITS: 5000 INJECTION INTRAVENOUS; SUBCUTANEOUS at 13:17

## 2022-09-06 RX ADMIN — COLLAGENASE SANTYL: 250 OINTMENT TOPICAL at 08:39

## 2022-09-06 RX ADMIN — HYDROMORPHONE HYDROCHLORIDE 3 MG: 2 INJECTION, SOLUTION INTRAMUSCULAR; INTRAVENOUS; SUBCUTANEOUS at 08:38

## 2022-09-06 RX ADMIN — HYDROMORPHONE HYDROCHLORIDE 1 MG: 1 INJECTION, SOLUTION INTRAMUSCULAR; INTRAVENOUS; SUBCUTANEOUS at 15:58

## 2022-09-06 RX ADMIN — SODIUM CHLORIDE, PRESERVATIVE FREE 10 ML: 5 INJECTION INTRAVENOUS at 21:09

## 2022-09-06 NOTE — PROGRESS NOTES
Problem: Mobility Impaired (Adult and Pediatric)  Goal: *Acute Goals and Plan of Care (Insert Text)  Description: FUNCTIONAL STATUS PRIOR TO ADMISSION: Patient was independent and active without use of DME.    HOME SUPPORT PRIOR TO ADMISSION: The patient lived alone with no local support. Physical Therapy Goals  Initiated 9/6/2022  1. Patient will move from supine to sit and sit to supine  in bed with modified independence within 7 day(s). 2.  Patient will transfer from bed to chair and chair to bed with modified independence using the least restrictive device within 7 day(s). 3.  Patient will perform sit to stand with modified independence within 7 day(s). 4.  Patient will ambulate with modified independence for 150 feet with the least restrictive device within 7 day(s). 5.  Patient will ascend/descend 20 stairs with left handrail(s) with modified independence within 7 day(s). Outcome: Progressing Towards Goal   PHYSICAL THERAPY EVALUATION  Patient: Normajean Apley (63 y.o. male)  Date: 9/6/2022  Primary Diagnosis: Acute cystitis [N30.00]  Procedure(s) (LRB):  ESOPHAGOGASTRODUODENOSCOPY (EGD) (N/A)  ESOPHAGOGASTRODUODENAL (EGD) BIOPSY (N/A) 4 Days Post-Op   Precautions:   Fall      ASSESSMENT  Based on the objective data described below, the patient presents with decreased activity tolerance, increased pain, and moderate risk for falls in setting of sepsis, Crohn's flare up, and PNA. Patient found supine in bed and agreeable to PT. Throughout session patient demonstrated 3x sudden onset R lateral flank pain, with audible gasp for air and endorsement of SOB - VS stable with SPO2 99% on RA each time. Patient states he had coughing fit last night and feels he has rib spasms. Patient requested pain medication prior to mobilization, however after prolonged wait for pain medication, patient not able to receive 2/2 medication dispenser malfunction per RN.  During wait, patient provided with extensive education on use of incentive spirometer to prevent shallow breathing in setting of rib pain and current PNA dx. Patient also educated on log rolling for abdominal pain and positioning to promote improved rib position to prevent spasms. Patient did agree to mobilize without pain medication, and was overall SBA bed > chair ambulating approx. 5 feet, though declining further gait training 2/2 pain. Patient mobilizes well and may be able to return home with [facility] PT however patient lives alone with [address] apartment and may benefit from IPR at discharge pending progress and ability to complete stair training safely. Current Level of Function Impacting Discharge (mobility/balance): ambulates 5 feet bed > chair with SBA     Functional Outcome Measure: The patient scored Total Score: 23/28 on the Tinetti outcome measure which is indicative of moderate fall risk. Other factors to consider for discharge: R flank pain     Patient will benefit from skilled therapy intervention to address the above noted impairments. PLAN :  Recommendations and Planned Interventions: bed mobility training, transfer training, gait training, therapeutic exercises, neuromuscular re-education, patient and family training/education, and therapeutic activities      Frequency/Duration: Patient will be followed by physical therapy:  5 times a week to address goals. Recommendation for discharge: (in order for the patient to meet his/her long term goals)  Physical therapy at least 2 days/week in the home vs IPR pending progress    This discharge recommendation:  A follow-up discussion with the attending provider and/or case management is planned    IF patient discharges home will need the following DME: to be determined (TBD)         SUBJECTIVE:   Patient stated I don't need a gait belt I can walk.     OBJECTIVE DATA SUMMARY:   HISTORY:    Past Medical History:   Diagnosis Date    Abdominal wall hernia 6/15/2012    Anal fissure Anemia     Anemia     Anxiety and depression     Arthritis     Related to the Crohn's disease. Cancer ([address]     MELANOMA HEAD AND FACE    Chronic pain     GENERALIZED    COPD (chronic obstructive pulmonary disease) (HCC)     Crohn disease (HCC)     Diagnosed at age 17. Echocardiogram normal (EF: 55-60%) 07/2015    Esophageal ulcer     GERD (gastroesophageal reflux disease)     H/O blood transfusion 2013    [facility] [place], [address]    Hypertension     denies    Migraine     Short bowel syndrome     Ventral hernia without obstruction or gangrene      Past Surgical History:   Procedure Laterality Date    COLONOSCOPY N/A 10/8/2019    COLONOSCOPY performed by Addison Cary MD at [place] [address] N/A 9/1/2022    SIGMOIDOSCOPY [address] performed by Jennifer Newman MD at [facility] ENDOSCOPY    [address] needle, takes 1 inch needle    HX APPENDECTOMY      HX CHOLECYSTECTOMY      HX GI      colectomy x2, one ileostomy    HX GI  7/28/14    exp lap,partial colectomy with end ileostomy by Dr Prema Miner      neck fusion    HX ORTHOPAEDIC  1980s    wrist right,     HX ORTHOPAEDIC  2006, 11/2013    neck, L4 L5 L6    HX ORTHOPAEDIC  1990s    right knee scope    HX OTHER SURGICAL      surgical repair from spider bite    HX OTHER SURGICAL  12/1/14    Incision and drainage of posterior right subcutaneous shoulder abscess    HX OTHER SURGICAL  2014    LEFT INDEX FINGER SPIDER BITE    HX OTHER SURGICAL  2014    RECTAL FISTULA    HX OTHER SURGICAL  3/17/2015    Ileostomy takedown with extensive lysis of adhesions (greater than three hours), mobilization of the splenic flexure, left colon resection, ileocolic anastomosis, and excision of scar; Dr. Adriane Galeazzi. HX OTHER SURGICAL  3/22/2015    Exploratory laparotomy with washout of abdomen, suture repair of small bowel/anastomosis, and diverting protective loop ileostomy;  Jim Oseguera MD.    HX OTHER SURGICAL  4/9/2015 Laparotomy, extensive lysis of adhesions, abdominal lavage, and resection of ileocolic anastomosis (including the efferent limb of the loop ileostomy) with creation of Demetrius's pouch; Dr. Nash James.  OTHER SURGICAL  7/14/2015    Cystoscopy and placement of bilateral ureteral catheters; Queen Julián MD.     OTHER SURGICAL  7/14/2015    Ileostomy takedown with extensive lysis of adhesions, small bowel resection, and enterocolostomy; Dr. Nash James.  OTHER SURGICAL  9/29/2015    CT-guided percutaneous drainage of intraabdominal abscess; Dr. Heri Tay.  OTHER SURGICAL  11/16/2015    CT-guided percutaneous aspiration of abdominal wall cavity; Dr. Hernesto Jewell.  OTHER SURGICAL  12/1/2015    Incision and drainage of abdominal wall abscess; Dr. Nash James.  OTHER SURGICAL  2/11/2016    Incision and drainage of abdominal wall abscess; Dr. Nash James.  OTHER SURGICAL  2/23/2016    Cystoscopy and placement of bilateral temporary ureteral catheters; Dr. Melissa Anand.  OTHER SURGICAL  2/23/2016    Exploratory laparotomy, extensive lysis of adhesions, removal of mesh, and repair of enterocutaneous fistula; Dr. Nash James.  OTHER SURGICAL  11/03/2017    Exploratory laparotomy, extensive lysis of adhesions, and reduction of intussusception; Dr. Nash James. [place] ABDOMEN SURGERY PROC UNLISTED      33 abdominal operations for treatment of Crohn's disease and its complications.        Personal factors and/or comorbidities impacting plan of care: Crohn's disease     Home Situation  Home Environment: Apartment  # Steps to Enter: 20  Hand Rails : Left  One/Two Story Residence: One story  Living Alone: Yes  Support Systems: Friend/Neighbor  Patient Expects to be Discharged to[de-identified] Home  Current DME Used/Available at Home: Cane, straight  Tub or Shower Type: Tub/Shower combination    EXAMINATION/PRESENTATION/DECISION MAKING:   Critical Behavior:  Neurologic State: Alert  Orientation Level: Oriented X4  Cognition: Follows commands  Safety/Judgement: Fall prevention, Home safety  Hearing: Auditory  Auditory Impairment: None  Skin:  intact  Edema: none noted  Range Of Motion:  AROM: Generally decreased, functional           PROM: Within functional limits           Strength:    Strength: Generally decreased, functional                    Tone & Sensation:   Tone: Normal              Sensation: Impaired (diminished sensation L5-S1 bilaterally; keiko endorses neuropathy at baseline)               Coordination:  Coordination: Within functional limits  Vision:      Functional Mobility:  Bed Mobility:  Rolling: Stand-by assistance  Supine to Sit: Stand-by assistance     Scooting: Stand-by assistance  Transfers:  Sit to Stand: Stand-by assistance  Stand to Sit: Stand-by assistance        Bed to Chair: Stand-by assistance              Balance:   Sitting: Intact; Without support  Standing: Impaired; Without support  Standing - Static: Good  Standing - Dynamic : Good;Fair  Ambulation/Gait Training:  Distance (ft): 5 Feet (ft)  Assistive Device: Gait belt  Ambulation - Level of Assistance: Contact guard assistance        Gait Abnormalities: Decreased step clearance;Trunk sway increased; Other; Antalgic (forward flexed posture)        Base of Support: Widened     Speed/Cata: Pace decreased (<100 feet/min)             Therapeutic Exercises:   Patient educated on use of incentive spirometer and benefits to reduce risk of PNA 2/2 shallow breathing related to rib pain. Patient educated on log rolling for abdominal comfort for supine <> sit   Patient educated on upright positioning and trunk rotation stretch to reduce rib pain.      Functional Measure:  Tinetti test:    Sitting Balance: 1  Arises: 1  Attempts to Rise: 2  Immediate Standing Balance: 2  Standing Balance: 2  Nudged: 2  Eyes Closed: 0  Turn 360 Degrees - Continuous/Discontinuous: 1  Turn 360 Degrees - Steady/Unsteady: 1  Sitting Down: 1  Balance Score: 13 Balance total score  Indication of Gait: 1  R Step Length/Height: 1  L Step Length/Height: 1  R Foot Clearance: 1  L Foot Clearance: 1  Step Symmetry: 1  Step Continuity: 1  Path: 2  Trunk: 1  Walking Time: 0  Gait Score: 10 Gait total score  Total Score: 23/28 Overall total score         Tinetti Tool Score Risk of Falls  <19 = High Fall Risk  19-24 = Moderate Fall Risk  25-28 = Low Fall Risk  Tinetti ME. Performance-Oriented Assessment of Mobility Problems in Elderly Patients. [facility]; [id number]. (Scoring Description: PT Bulletin Feb. 10, 1993)    Older adults: Lamonte Guy et al, 2009; n = 1000 [facility] elderly evaluated with ABC, CORTNEY, ADL, and IADL)  · Mean CORTNEY score for males aged 69-68 years = 26.21(3.40)  · Mean CORTNEY score for females age 69-68 years = 25.16(4.30)  · Mean CORTNEY score for males over 80 years = 23.29(6.02)  · Mean CORTNEY score for females over 80 years = 17.20(8.32)        Physical Therapy Evaluation Charge Determination   History Examination Presentation Decision-Making   MEDIUM  Complexity : 1-2 comorbidities / personal factors will impact the outcome/ POC  LOW Complexity : 1-2 Standardized tests and measures addressing body structure, function, activity limitation and / or participation in recreation  LOW Complexity : Stable, uncomplicated  Other outcome measures Tinetti  MEDIUM      Based on the above components, the patient evaluation is determined to be of the following complexity level: LOW     Pain Rating:  10/10 R lateral flank/ rib pain - RN aware     Activity Tolerance:   Fair and limited by rib pain      After treatment patient left in no apparent distress:   Sitting in chair, Call bell within reach, and Bed / chair alarm activated    COMMUNICATION/EDUCATION:   The patients plan of care was discussed with: Registered nurse. Fall prevention education was provided and the patient/caregiver indicated understanding.     Thank you for this referral.  Shane Rosen, PT   Time Calculation: 41 mins

## 2022-09-06 NOTE — PROGRESS NOTES
Bedside shift change report given to [address]ie [address] (oncoming nurse) by Gely Lowe (offgoing nurse). Report included the following information SBAR, Kardex, Intake/Output, MAR, and Cardiac Rhythm NSR . Problem: Falls - Risk of  Goal: *Absence of Falls  Description: Document Papo Mckenna Fall Risk and appropriate interventions in the flowsheet. Outcome: Progressing Towards Goal  Note: Fall Risk Interventions:  Mobility Interventions: Assess mobility with egress test, Patient to call before getting OOB         Medication Interventions: Evaluate medications/consider consulting pharmacy, Teach patient to arise slowly, Patient to call before getting OOB    Elimination Interventions: Call light in reach              Problem: Patient Education: Go to Patient Education Activity  Goal: Patient/Family Education  Outcome: Progressing Towards Goal     Problem: Discharge Planning  Goal: *Discharge to safe environment  Outcome: Progressing Towards Goal  Goal: *Knowledge of medication management  Outcome: Progressing Towards Goal  Goal: *Knowledge of discharge instructions  Outcome: Progressing Towards Goal     Problem: Patient Education: Go to Patient Education Activity  Goal: Patient/Family Education  Outcome: Progressing Towards Goal     Problem: Risk for Spread of Infection  Goal: Prevent transmission of infectious organism to others  Description: Prevent the transmission of infectious organisms to other patients, staff members, and visitors.   Outcome: Progressing Towards Goal     Problem: Patient Education:  Go to Education Activity  Goal: Patient/Family Education  Outcome: Progressing Towards Goal     Problem: Nutrition Deficit  Goal: *Optimize nutritional status  Outcome: Progressing Towards Goal

## 2022-09-06 NOTE — CONSULTS
Comprehensive Nutrition Assessment    Type and Reason for Visit: Reassess    Nutrition Recommendations/Plan:     Recommend weaning TPN now (either drop rate of current bag to 42 mL/hr and do not reorder new bag, or continue current bag as ordered and order new bag for tonight/tomorrow to run @ 42 mL/hr). D/c lipids now. Continue GI light diet. Preferences updated. Requesting sugar-free items and lactaid milk. Recommend starting supplemental iron and selenium d/t deficiency    Discussed vitamin deficiencies with pt and encouraged supplementation, follow-up with labs as OP. He is already prescribed B12 injected q 2-4 weeks, but had not been taking d/t feeling so poorly. Malnutrition Assessment:  Malnutrition Status:  Severe malnutrition (08/29/22 1210)    Context:  Acute illness     Findings of the 6 clinical characteristics of malnutrition:   Energy Intake:  50% or less of est energy requirements for 5 or more days  Weight Loss:  Greater than 5% over 1 month     Body Fat Loss:  Mild body fat loss, Orbital, Triceps   Muscle Mass Loss:  Mild muscle mass loss, Clavicles (pectoralis & deltoids), Temples (temporalis)  Fluid Accumulation:  No significant fluid accumulation,     Strength:  Not performed        Nutrition Assessment:      9/6: follow-up. Also reconsulted to evaluate PO and assess if can wean off TPN. Initially TPN was for bowel rest.  However Negative fistula on CT, so PO diet resumed on 9/2. Discussed with pt. Reports PO intake would be better if he was at home, c/o mystery meats and hospital food in general.  States he cannot have anything cooked with broccoli or cauliflower (or eat them whole). Also, needs lactaid milk. Has to limit his dairy. Takes lactaid pills usually if he consumes something dairy. Friend brought him in a milkshake as we were talking. He states he can do Ensure maybe once/ week, as well as yogurt once/week. Tired of eggs and potatoes for BF.   Would like pancakes, or Dominican toast.  Requesting Splenda over sugar and sugar-free syrup. Pt states usually he has 30-45 minutes before he has a bowel movement after he eats, now with lomotil he is going ~1-1.5 hours after he eats. Since plans will not be to leave with TPN and pt tolerating a diet, recommend weaning TPN now. We discussed his vitamin/ mineral deficiences. He states he usually takes B12 injected q 2-4 weeks, but hasn't recently d/t feeling so poorly. He only takes Elderberry zinc and Vit C orally at home. Discussed fat soluble vitamins and selenium as well. Futher noted for SUHA - IVF stated. Per MD, hyperbilirubinemia likely due to sepsis, bilirubin level improving. MRCP negative for choledocholithiasis or bile duct obstruction  -Serologic testing for causes of chronic liver disease negative  -Hepatology evaluating the patient, liver biopsy is now deferred, plan for outpatient FibroScan      9/1: Pt with selenium deficiency as well as iron. B1, zinc, and copper WNL. Vit A ordered and appreciated, but not yet drawn by nursing. Add on Phos yesterday returned low, no repletion given, but WNL today. K+ low yesterday, given repletion, but not rechecked today. Hgb 6, plan to transfuse 1 unit PRBC now and recheck Hgb 2 hours post transfusion. T bili its trending down nicely. Would continue to omit trace elements d/t contents of manganese and copper and until t bili < 3.5, but ?if possible to give selenium separately. Pt given a 1 x dose of IM B12 (1000 mcg). This is considered a maintenance dose when given monthly, so ?if should receive more d/t deficiency. He should continue this monthly dose for maintenance as well. Also started on weekly ergocalciferol 50,000 units. Pt on TPN @ goal of 6.5%AA, 20% dex @ 83 mL/hr with 250 mL 20% lipids daily providing 2242 mL, 2373 kcal, 130 gm AA, and 398 gm dex (GIR 3.7 mg/kg/min).      Per surgery, CT scan of abdomen and pelvis with oral contrast (8/31/2022): No evidence of fistula. Recommends continued wound care and OP consult with plastic surgeon re: possible skin graft or other procedure to facilitate healing. Pt remains NPO, still with loose, mucoid stools. GI following. On Lomotil for diarrhea. Stool studies negative. Note today still indicates to continue bowel rest/ TPN. Plan for flex sig today to evaluate extent of Crohn's disease. Hepatology following. Plan for liver bx today as well. 8/30: some nutritional labs resulted. Folate WNL. However, B12 and Vit D deficient. Still pending zinc, B1, selenium, and copper. Appears Vit B6 and B3 were canceled. Further noted hx of Vit A deficiency. Would recheck this and also additional fat soluble vitamins. TPN advanced as recommended last night. K+ conitnues to be low, but Phos stable. Recommend advancing to goal TPN of 6.5%AA, 20% dex @ 83 mL/hr with 250 mL 20% lipids daily to provide 2242 mL, 2373 kcal, 130 gm pro, 398 gm dex (GIR 3.33 mg/kg/min) which meets 88-99% kcal needs and 100% of pro needs respectively. Noted trace elements in TPN last night. Given elevated t bili (which is improving), recommend keeping trace elements out of TPN until this improves further as pt would be at risk for toxicity. 8/29: initial RD assessment. Pt admitted with Acute cystitis. PMHx includes anemia, anxiety/depression, COPD, Crohn's disease s/p >30 surgeries (including s/p subtotal colectomy with ileocolonic anastomosis), short bowel syndrome (210cm small bowel remaining from lig treitz to ileosigmoid anastomosis) , incisional abdominal hernia with overlying skin wound, B12 deficiency, GERD, HTN. Presents to ER with report of dark stool and dark urine. Medically noted for sepsis, POA. Acute cystitis. Abdominal wounds, entercolonic fistula. PNA. SUHA with AG acidosis. Leukocytosis. Enlarged liver with nodular contour - concerning for new cirrhosis. +jaundice.   Initially GI consulted 8/27. TB screening d/t enlarged liver without strong alcohol hx. Also diarrhea - enteric pathogen panel and C diff testing ordered as well as a slew of nutrition labs (including Vit D, selenium, B6, b3, b1, copper, zinc, folate, B12) - all still active orders, appears pt was refusing lab draws over weekend, but triple lumen placed last night and able to get labs today. TPN recommended for bowel rest for enterocutaneous fistula, which PPN was initiated last night ---> can convert to TPN today. Colorectal surgery and wound care consults recommended and pending. PPN of 4.25%AA, 10% dex @ 42 mL/hr (providing ~100 gm dex). Recommend goal TPN of 6.5%AA, 20% dex @ 83 mL/hr with 250 mL 20% lipids daily to provide 2242 mL, 2373 kcal, 130 gm pro, 398 gm dex (GIR 3.33 mg/kg/min) which meets 88-99% kcal needs and 100% of pro needs respectively. Recommend advancing gradually as to not increasing dex too quickly. Met with pt in room. Very pleasant. Reports UBW of 233 lb about a month ago- down to 181 lb in ER. States minimal PO intake for past month, at most 1 meal/day which included something light like soup. Pt reports -240 lb, throughout all surgeries had dropped as low as 113 lb. On/off colostomies, currently without ostomy. Tells me it didn't matter what he ate, within 30 minutes he would have diarrhea. Took him 10 years to regain weight, but was able to do so. Familiar with TPN - has required in past.  No questions/ concerns. Asking for cup of coffee which I told him I had to defer to MD.  Pt meet severe PCM based on intake and wt loss alone, however some muscle wasting and loss of SQ fat noted. Recommend monitoring lytes and starting thiamine if showing s/s of refeeding.       Nutritionally Significant Medications:  NS@ 75 mL/hr, cefepime, Cleocin, Lomotil QID, Diflucan, Protonix  PRN: dilaudid, lomotil, zofran, oxycodone,    Estimated Daily Nutrient Needs:  Energy Requirements Based On: Formula  Weight Used for Energy Requirements: Admission (82.1 kg)  Energy (kcal/day): 0782-8808 (MSJ x 1.5-1.7 or ~30-33 kcal/kg)  Weight Used for Protein Requirements: Admission (82.1 kg)  Protein (g/day): 120-140 (1.5-1.7 gm/kg or at least 20%)     Fluid (ml/day): 1 mL/kcal    Nutrition Related Findings:   Edema: No data recorded    Last BM: 09/06/22, Loose    Wounds: Open wounds (not a fistula per surgery)      Current Nutrition Therapies:  Diet: regular/ GI light  Supplements: none at this time  Meal intake: No data found. Supplement intake: No data found. EN/PN Order: TPN of 6.5%AA, 20% dex @ 83 mL/hr with 250 mL 20% lipids daily          Anthropometric Measures:  Height: 5' 9\" (175.3 cm)  Ideal Body Weight (IBW): 160 lbs (73 kg)  Admission Body Weight: 181 lb (82.1 kg - standing scale)  Current Body Wt:  85.7 kg (188 lb 15 oz), 118.1 % IBW. Bed scale  Current BMI (kg/m2): 27.9        Weight Adjustment: No adjustment                 BMI Category: Overweight (BMI 25.0-29. 9)    Wt Readings from Last 15 Encounters:   09/06/22 85.7 kg (188 lb 15 oz) - bed   08/29/22 83.1 kg (183 lb 3.2 oz) - bed   08/26/22 82.1 kg (181 lb) - standing scale, admission   06/03/20 90.7 kg (200 lb)   10/08/19 79.4 kg (175 lb)   06/29/19 84.7 kg (186 lb 11.7 oz)   05/25/18 81.2 kg (179 lb)   02/14/18 78.8 kg (173 lb 12.8 oz)   12/18/17 73.5 kg (162 lb)   12/04/17 75.6 kg (166 lb 9.6 oz)   11/10/17 72.4 kg (159 lb 9.8 oz)   08/18/17 81.6 kg (180 lb)   06/22/17 81.2 kg (179 lb)   02/27/17 74.8 kg (165 lb)   01/31/17 73.5 kg (162 lb)   08/09/16 72.6 kg (160 lb)   02/29/16 74.6 kg (164 lb 6.4 oz)       Nutrition Diagnosis:   Inadequate oral intake (RESOLVING) related to altered GI function, altered GI structure as evidenced by intake 51-75%  Altered GI function related to altered GI structure as evidenced by diarrhea, lab values, GI abnormality (short gut)    Nutrition Interventions:   Food and/or Nutrient Delivery: Continue current diet  Nutrition Education/Counseling: No recommendations at this time  Coordination of Nutrition Care: Continue to monitor while inpatient       Goals:  Previous Goal Met: Goal(s) achieved  Goals: other (specify)  Specify Other Goals: PO intake >/=75% of meals/ONS within 7 days    Nutrition Monitoring and Evaluation:   Behavioral-Environmental Outcomes: None identified  Food/Nutrient Intake Outcomes: Food and nutrient intake, Supplement intake, Vitamin/mineral intake  Physical Signs/Symptoms Outcomes: Biochemical data, GI status, Hemodynamic status, Nutrition focused physical findings, Weight, Skin    Discharge Planning:    Continue current diet      Recent Labs     09/06/22  0535 09/06/22  0534 09/05/22  1206 09/05/22  0410 09/05/22  0409 09/04/22  0745   *  111* 111* 110* 93 92 110*   BUN 44*  42* 45* 44* 38* 36* 34*   CREA 1.46*  1.44* 1.47* 1.46* 1.52* 1.44* 1.27   *  132* 133* 131* 129* 130* 134*   K 4.9  4.8 4.9 6.0* 5.8* 5.8* 5.0   *  108 109* 108 105 106 107   CO2 16*  17* 17* 19* 20* 18* 22   CA 8.9  8.9 9.0 8.7 8.9 9.1 8.4*   PHOS 4.0  --   --  4.4  --   --    MG  --  1.7  --   --  1.8 1.6       Recent Labs     09/06/22  0535 09/06/22  0534 09/05/22  1206   ALT 67 69 70   AST 56* 57* 70*   * 224* 235*   TBILI 1.7* 1.8* 2.1*   TP 6.6 6.7 6.8   ALB 2.0*  2.0* 2.0* 2.0*   GLOB 4.6* 4.7* 4.8*       No results for input(s): LAC in the last 72 hours.     Recent Labs     09/06/22  0534 09/04/22  0745   WBC 8.2 9.9   HGB 7.8* 7.8*   HCT 24.5* 23.3*   * 440*         Recent Labs     09/06/22  0556 09/06/22  0109 09/05/22  1841 09/05/22  1217 09/05/22  0709 09/05/22  0127 09/04/22  1746 09/04/22  1200 09/03/22  1755 09/03/22  1221   GLUCPOC 114 112 106 117 110 106 117 128* 112 141*       Lab Results   Component Value Date/Time    Hemoglobin A1c 4.8 11/15/2013 03:14 PM       Iron   Date Value Ref Range Status   09/01/2022 23 (L) 35 - 150 ug/dL Final   06/20/2012 11 (L) 40 - 155 ug/dL Final     TIBC   Date Value Ref Range Status   09/01/2022 142 (L) 250 - 450 ug/dL Final   06/20/2012 433 250 - 450 ug/dL Final     Iron % saturation   Date Value Ref Range Status   09/01/2022 16 (L) 20 - 50 % Final   06/20/2012 3 (LL) 15 - 55 % Final       Ferritin   Date Value Ref Range Status   09/01/2022 1,886 (H) 26 - 388 NG/ML Final       Folate   Date Value Ref Range Status   08/29/2022 18.2 5.0 - 21.0 ng/mL Final   10/08/2019 16.0 5.0 - 21.0 ng/mL Final     Vitamin B12   Date Value Ref Range Status   08/29/2022 153 (L) 193 - 986 pg/mL Final   10/08/2019 <60 (L) 193 - 986 pg/mL Final   11/06/2017 239 211 - 911 pg/mL Final     Comment:     Method used is Siemens Advia Centaur     Vitamin B1   Date Value Ref Range Status   08/29/2022 143.0 66.5 - 200.0 nmol/L Final     Comment:     (NOTE)  This test was developed and its performance characteristics  determined by Verito Huffman. It has not been cleared or approved  by the Food and Drug Administration. Performed At: [address] [id number]  Corrie Gomes MD [id number]     06/10/2015 92.6 66.5 - 200.0 nmol/L Final     Comment:     (NOTE)  Performed At: [address] [id number]  Bette Maxwell MD [id number]         Zinc, plasma/serum   Date Value Ref Range Status   08/29/2022 67 44 - 115 ug/dL Final     Comment:     (NOTE)  This test was developed and its performance characteristics  determined by Verito Huffman. It has not been cleared or approved  by the Food and Drug Administration.                                  Detection Limit = 5  Performed At: [address] [id number]  Corrie Gomes MD [id number]     06/15/2015 93 56 - 134 ug/dL Final     Comment:     (NOTE)                                 Detection Limit = 5  Performed At: [facility] & [address] [id number]  Bette Maxwell MD [id number]:[id number] Copper, serum   Date Value Ref Range Status   08/29/2022 106 69 - 132 ug/dL Final     Comment:     (NOTE)  This test was developed and its performance characteristics  determined by Adrián Ramírez. It has not been cleared or approved  by the Food and Drug Administration. Detection Limit = 5  Performed At: [facility] [address] [place] [id number]  Aziza Arita MD [id number]         Selenium  Selenium, serum/plasma   Date Value Ref Range Status   08/29/2022 77 (L) 93 - 198 ug/L Final     Comment:     (NOTE)  This test was developed and its performance characteristics  determined by Adrián Ramírez. It has not been cleared or approved  by the Food and Drug Administration. Performed At: [facility] [address], [place] [id number]  Aziza Arita MD [id number]           Vitamin A, serum   Date Value Ref Range Status   11/06/2017 17 (L) 24 - 85 ug/dL Final     Comment:     (NOTE)  Performed At: [facility] [address] [place] [id number]  Seb Wilson MD [id number]       Vitamin D 25-Hydroxy   Date Value Ref Range Status   08/29/2022 15.8 (L) 30 - 100 ng/mL Final     Comment:     (NOTE)  Deficiency               <20 ng/mL  Insufficiency          20-30 ng/mL  Sufficient             ng/mL  Possible toxicity       >100 ng/mL    The Method used is Siemens Advia Centaur currently standardized to a   Center of Disease Control and Prevention (CDC) certified reference   [address]. Samples containing fluorescein dye can produce falsely   elevated values when tested with the ADVIA Centaur Vitamin D Assay. It is recommended that results in the toxic range, >100 ng/mL, be   retested 72 hours post fluorescein exposure.      06/10/2015 12.0 (L) 30 - 100 ng/mL Final     Comment:     Method used is Vungle  which is now standardized to a Centers of Disease Control and Prevention (CDC) certified reference method.   (NOTE)  Deficiency               <20 ng/mL  Insufficiency          20-30 ng/mL  Sufficient             ng/mL  Possible toxicity       >100 ng/mL           Jaz Roberson RD  Available via [address] [place] [address]

## 2022-09-06 NOTE — PROGRESS NOTES
[address] [facility], MD, FACP, Debora Narendra [place], [place]      [place] Borja Covert, [facility]     Krista Hale [facility]   KATLYN Flores [facility]       [facility] [address]    at [address], [address] Mary Jane Shah  22.    [phone]    FAX: [address] [place] [address], [address]    [phone]    FAX: [phone]       HEPATOLOGY PROGRESS NOTE  The patient is a 65 y.o.  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 He has been treated with [employer] in the past.  He has 4-5 stool per day and an equal number at night that wake him up from sleep. There was no previous history of liver disease. He came to the ED because of feeling poorly with increased weakness over several days. In the ED Laboratory studies were significant for:    WBC 29.1, HB 12.9 gms, , Sna 127, Scr 3.27 mg, AST 25, ALT 38, , TBILI 10.2 mg, INR 1.1, Sammonia <10,     Imaging of the liver with Ultrasound CT scan demonstrated increased echogenicity and surface nodularity consistent with cirrhosis. No liver mass. No dilated bile ducts. No ascites. Hospital Course to date:  He has been treated with IV ABX. Metabolic acidosis was corrected. WBC is coming down. All cultures have been negative  MRI with MRCP does not show any evidence of bile duct stricturing suggestive of PSC. ALP has declined to normal and TBILI is now down from 15 to 3.4 mg  HB has dropped stepwise since admission to 6.0 gms today      Hepatology Plan:  TBILI continues to come down and getting near normal.    ALP remains elevated.   Now that Anemia corrected will proceed with liver biopsy on Thursday. He is already NPO on TPN. ASSESSMENT AND PLAN:  Cirrhosis  The diagnosis of cirrhosis is based upon imaging, laboratory studies  Cirrhosis is of unclear etiology. The CTP is 9. Child class B. The MELD score is down from 24 to 17. Elevated liver enzymes  AST is elevated. ALT is normal.  ALP was elevated but has declined to normal.  Total bilirubin is coming down from 15 to <5 gms. Serum albumin is depressed. INR is prolonged. The platelet count is normal.      The pattern of AST>ALT is consistent with cirrhosis    The elevation in ALP, TBILI and h/o Crohns disease suggests he may have [facility]. However, MRCP does not show PSC and labs have improved with IV ABX. Unlikely he has small duct PSC as this would not improve this much spontaneously     I suspect the elevation in ALP which has now come down to normal and TBILI which is coming down is due to sepsis and resolution with treatment. Serologic testing for causes of chronic liver disease was negative. ANCA, ASMA, IGG4 are pending    Low serum albumin  The low serum albumin is secondary to malabsorption from the patients short cut and does not reflect worsening liver disease. Coagulopathy  This is secondary to cirrhosis, and malnutrition form the patients short gut and sepsis and may not reflect more advanced liver disease. The INR has come down to normal with IV Vitamin K     Anemia   This is due to multifactorial causes including inability to absorb FE due to short gut and Crohns disease. Will obtain FE panel to assess for iron stores. Repeat FE studies show Ferritin 1886 and FE 16%. If we knew he had cirrhosis the FE saturation would be consistent with FE deficiency. FE sat is typically elevated in patiens with cirrhosis because of low transferrin. A normal FE sat this therefore FE deficient in cirrhosis    For now probably best just transfuse. FLex sig this was unremarkable.      PHYSICAL EXAMINATION:  VS: per nursing note  General:  No acute distress. Eyes:  Sclera icteric  ENT:  No oral lesions. Thyroid normal.  Nodes:  No adenopathy. Skin:  No spider angiomata. Jaundice. Respiratory:  Lungs clear to auscultation. Cardiovascular:  Regular heart rate. Abdomen:  Soft non-tender, Multiple scars. No obvious ascites. Extremities:  No lower extremity edema. Neurologic:  Alert and oriented. Cranial nerves grossly intact. No asterixis. LABORATORY:   Latest Reference Range & Units 9/5/22 12:06 9/6/22 05:34   WBC 4.1 - 11.1 K/uL  8.2   HGB 12.1 - 17.0 g/dL  7.8 (L)   PLATELET 935 - 084 K/uL  479 (H)   INR 0.9 - 1.1    1.0   Sodium 136 - 145 mmol/L 131 (L) 133 (L)   Potassium 3.5 - 5.1 mmol/L 6.0 (H) 4.9   Chloride 97 - 108 mmol/L 108 109 (H)   CO2 21 - 32 mmol/L 19 (L) 17 (L)   Glucose 65 - 100 mg/dL 110 (H) 111 (H)   BUN 6 - 20 MG/DL 44 (H) 45 (H)   Creatinine 0.70 - 1.30 MG/DL 1.46 (H) 1.47 (H)   Bilirubin, total 0.2 - 1.0 MG/DL 2.1 (H) 1.8 (H)   Albumin 3.5 - 5.0 g/dL 2.0 (L) 2.0 (L)   ALT 12 - 78 U/L 70 69   AST 15 - 37 U/L 70 (H) 57 (H)   Alk.  phosphatase 45 - 117 U/L 235 (H) 224 (H)   (L): Data is abnormally low  (H): Data is abnormally high      Manual MD Kirby Allen [address] [facility], [address] [place] [address] 22.  [address] [facility]

## 2022-09-06 NOTE — PROGRESS NOTES
[facility] Adult  Hospitalist Group                                                                                          Hospitalist Progress Note  Irina Howard MD  Answering service: [phone] from in house phone        Date of Service:  2022  NAME:  Dianne Diaz  :  1960  MRN:  [id number]      Admission Summary:     Patient presents with severe sepsis with SUHA, hyperbilirubinemia. History of Crohn's disease with multiple surgeries in the past.  Hepatology and GI following. Interval history / Subjective:     Patient's stool is more formed. Overall feeling better. Noted bilateral lower extremity rash with itching, couple of days ago. Assessment & Plan:     Severe sepsis  -Patient presents with fever, tachycardia, tachypnea, leukocytosis  -Sepsis is due to pneumonia, initial chest x-ray shows consolidation of the right upper lobe, patient with symptoms of cough and expectoration  -Blood cultures negative, sepsis resolved    Pneumonia  -Initial chest x-ray  shows new area of consolidation in the right upper lobe extending into the right apex, repeat chest x-ray on  shows increased consolidation  -Blood cultures so far negative  -Vancomycin discontinued per ID with concern for LE rash, changed to clindamycin on , continue cefepime and diflucan    Bilateral lower extremity skin rash  -Exam shows maculopapular rash on bilateral lower extremities associated with itching  -Likely antibiotic side effects, patient has been on the same antibiotics  -Other skin infection ?   -ID following    SUHA  -SUHA due to volume depletion and sepsis  -Renal function mildly bumped likely due to ongoing diarrhea past few days  -Resume IV fluid and monitor renal function    Metabolic acidosis  -This is due to SUHA, metabolic acidosis resolved    Diarrhea  -Stool for C. difficile and enteric bacterial panel negative  -Patient with multiple bowel resections in the past for Crohn's with short gut syndrome  -GI following, continue antidiarrheal agents as needed    Nephrolithiasis  -Nonobstructing right intrarenal calculi  -Follow-up as outpatient    Crohn's disease  -S/p multiple abdominal surgery with short gut syndrome and chronic pain  -Flex sig done 9/1, no apparent abnormalities on flex sig  -Continue bowel rest, continue TPN    Acute anemia  -His anemia is chronic and multifactorial including impaired iron absorption due to short gut and Crohn's disease  -S/p transfusion of 2 units PRBC so far  -For EGD today    Hyperbilirubinemia  -Conjugated hyperbilirubinemia which is likely due to sepsis, bilirubin level improving  -Nodular contour of the liver on the ultrasound  -MRCP negative for choledocholithiasis or bile duct obstruction  -Serologic testing for causes of chronic liver disease negative  -Hepatology evaluating the patient, liver biopsy is now deferred, plan for outpatient FibroScan    Coagulopathy  -Patient presents with elevated INR, this is likely due to advanced liver disease  -S/p vitamin K for 3 days    Chronic abdominal wound  -Chronic abdominal wound on anterior abdomen present for 3 years  -Negative fistula on CT  -Wound care following, on Santyl    PVCs  -Continue Lopressor    Tobacco use  -On nicotine patch    Nutrition  -Overall patient's p.o. intake has improved, plan for discontinuing TPN after today     Code status: Full  Prophylaxis: SCDs  Care Plan discussed with: Patient  Anticipated Disposition: 24 to 48 hours     Hospital Problems  Date Reviewed: 9/2/2022            Codes Class Noted POA    Acute cystitis ICD-10-CM: N30.00  ICD-9-CM: 595.0  8/27/2022 Unknown        Severe protein-calorie malnutrition ([facility] [address] (Chronic) ICD-10-CM: E23  ICD-9-CM: 262  6/10/2015 Yes             Review of Systems:   A comprehensive review of systems was negative except for that written in the HPI. Vital Signs:    Last 24hrs VS reviewed since prior progress note.  Most recent are:  Visit Vitals  BP (!) 168/46 (BP 1 Location: Left leg, BP Patient Position: Lying)   Pulse 83   Temp 98.2 °F (36.8 °C)   Resp 20   Ht 5' 9\" (1.753 m)   Wt 85.7 kg (188 lb 15 oz)   SpO2 99%   BMI 27.90 kg/m²       No intake or output data in the 24 hours ending 09/06/22 1231       Physical Examination:     I had a face to face encounter with this patient and independently examined them on 9/6/2022 as outlined below:          Constitutional:  No acute distress, cooperative, pleasant    ENT:  Oral mucosa moist, oropharynx benign. Resp:  CTA bilaterally. No wheezing/rhonchi/rales. No accessory muscle use. CV:  Regular rhythm, normal rate, no murmurs, gallops, rubs    GI:  Soft, non distended, non tender. normoactive bowel sounds, no hepatosplenomegaly     Musculoskeletal:  No edema, warm, 2+ pulses throughout    Neurologic:  Moves all extremities.   AAOx3, CN II-XII reviewed            Data Review:    Review and/or order of clinical lab test      Labs:     Recent Labs     09/06/22  0534 09/04/22  0745   WBC 8.2 9.9   HGB 7.8* 7.8*   HCT 24.5* 23.3*   * 440*     Recent Labs     09/06/22  0535 09/06/22  0534 09/05/22  1206 09/05/22  0410 09/05/22  0409 09/04/22  0745   *  132* 133* 131* 129* 130* 134*   K 4.9  4.8 4.9 6.0* 5.8* 5.8* 5.0   *  108 109* 108 105 106 107   CO2 16*  17* 17* 19* 20* 18* 22   BUN 44*  42* 45* 44* 38* 36* 34*   CREA 1.46*  1.44* 1.47* 1.46* 1.52* 1.44* 1.27   *  111* 111* 110* 93 92 110*   CA 8.9  8.9 9.0 8.7 8.9 9.1 8.4*   MG  --  1.7  --   --  1.8 1.6   PHOS 4.0  --   --  4.4  --   --      Recent Labs     09/06/22  0535 09/06/22  0534 09/05/22  1206   ALT 67 69 70   * 224* 235*   TBILI 1.7* 1.8* 2.1*   TP 6.6 6.7 6.8   ALB 2.0*  2.0* 2.0* 2.0*   GLOB 4.6* 4.7* 4.8*     Recent Labs     09/06/22  0534 09/05/22  0409 09/04/22  0745   INR 1.0 1.0 1.1   PTP 10.9 10.9 11.1   APTT 30.8 32.7* 30.9      No results for input(s): FE, TIBC, PSAT, FERR in the last 72 hours. Lab Results   Component Value Date/Time    Folate 18.2 08/29/2022 01:12 PM      No results for input(s): PH, PCO2, PO2 in the last 72 hours. No results for input(s): CPK, CKNDX, TROIQ in the last 72 hours.     No lab exists for component: CPKMB  Lab Results   Component Value Date/Time    Cholesterol, total 134 12/01/2014 04:33 AM    HDL Cholesterol 35 12/01/2014 04:33 AM    LDL, calculated 58.2 12/01/2014 04:33 AM    Triglyceride 204 (H) 12/01/2014 04:33 AM    CHOL/HDL Ratio 3.8 12/01/2014 04:33 AM     Lab Results   Component Value Date/Time    Glucose (POC) 114 09/06/2022 05:56 AM    Glucose (POC) 112 09/06/2022 01:09 AM    Glucose (POC) 106 09/05/2022 06:41 PM    Glucose (POC) 117 09/05/2022 12:17 PM    Glucose (POC) 110 09/05/2022 07:09 AM     Lab Results   Component Value Date/Time    Color DARK YELLOW 08/26/2022 07:27 PM    Appearance TURBID (A) 08/26/2022 07:27 PM    Specific gravity 1.025 08/26/2022 07:27 PM    Specific gravity 1.010 10/07/2019 05:48 AM    pH (UA) 5.0 08/26/2022 07:27 PM    Protein 100 (A) 08/26/2022 07:27 PM    Glucose Negative 08/26/2022 07:27 PM    Ketone TRACE (A) 08/26/2022 07:27 PM    Bilirubin NEGATIVE  10/07/2019 05:48 AM    Urobilinogen 0.2 08/26/2022 07:27 PM    Nitrites Positive (A) 08/26/2022 07:27 PM    Leukocyte Esterase SMALL (A) 08/26/2022 07:27 PM    Epithelial cells FEW 08/26/2022 07:27 PM    Bacteria 1+ (A) 08/26/2022 07:27 PM    WBC 5-10 08/26/2022 07:27 PM    RBC 0-5 08/26/2022 07:27 PM         Medications Reviewed:     Current Facility-Administered Medications   Medication Dose Route Frequency    TPN ADULT - CENTRAL   IntraVENous CONTINUOUS    TPN ADULT - CENTRAL   IntraVENous CONTINUOUS    0.9% sodium chloride infusion  75 mL/hr IntraVENous CONTINUOUS    cefepime (MAXIPIME) 2 g in 0.9% sodium chloride (MBP/ADV) 100 mL MBP  2 g IntraVENous Q24H    clindamycin (CLEOCIN) 600mg D5W 50mL IVPB (premix)  600 mg IntraVENous Q8H    HYDROmorphone (DILAUDID) injection 3 mg  3 mg IntraVENous Q3H PRN    diphenhydrAMINE (BENADRYL) capsule 25 mg  25 mg Oral Q6H PRN    0.9% sodium chloride infusion 250 mL  250 mL IntraVENous PRN    loperamide (IMODIUM) capsule 4 mg  4 mg Oral Q4H PRN    albuterol (PROVENTIL VENTOLIN) nebulizer solution 2.5 mg  2.5 mg Nebulization Q4H PRN    alteplase (CATHFLO) 1 mg in sterile water (preservative free) 1 mL injection  1 mg InterCATHeter PRN    sodium chloride (NS) flush 5-40 mL  5-40 mL IntraVENous Q8H    sodium chloride (NS) flush 5-40 mL  5-40 mL IntraVENous PRN    diphenoxylate-atropine (LOMOTIL) tablet 1 Tablet  1 Tablet Oral QID PRN    sodium chloride (OCEAN) 0.65 % nasal squeeze bottle 2 Spray  2 Spray Both Nostrils Q2H PRN    oxyCODONE IR (ROXICODONE) tablet 12.5 mg  12.5 mg Oral Q6H PRN    diphenoxylate-atropine (LOMOTIL) tablet 1 Tablet  1 Tablet Oral QID    fluconazole (DIFLUCAN) 400mg/200 mL IVPB (premix)  400 mg IntraVENous DAILY    ergocalciferol capsule 50,000 Units  50,000 Units Oral Q7D    collagenase (SANTYL) 250 unit/gram ointment   Topical DAILY    fat emulsion 20% soy-oliv-fish oil (SMOFlipid) infusion 250 mL  250 mL IntraVENous Q24H    nicotine (NICODERM CQ) 21 mg/24 hr patch 1 Patch  1 Patch TransDERmal DAILY    naloxone (NARCAN) injection 0.4 mg  0.4 mg IntraVENous EVERY 2 MINUTES AS NEEDED    metoprolol tartrate (LOPRESSOR) tablet 6.25 mg  6.25 mg Oral Q12H    metoprolol (LOPRESSOR) injection 2.5 mg  2.5 mg IntraVENous Q4H PRN    bisacodyL (DULCOLAX) suppository 10 mg  10 mg Rectal DAILY PRN    pantoprazole (PROTONIX) 40 mg in 0.9% sodium chloride 10 mL injection  40 mg IntraVENous Q12H    sodium chloride (NS) flush 5-40 mL  5-40 mL IntraVENous Q8H    sodium chloride (NS) flush 5-40 mL  5-40 mL IntraVENous PRN    acetaminophen (TYLENOL) tablet 650 mg  650 mg Oral Q6H PRN    Or    acetaminophen (TYLENOL) suppository 650 mg  650 mg Rectal Q6H PRN    ondansetron (ZOFRAN ODT) tablet 4 mg  4 mg Oral Q8H PRN    Or ondansetron (ZOFRAN) injection 4 mg  4 mg IntraVENous Q6H PRN    heparin (porcine) injection 5,000 Units  5,000 Units SubCUTAneous Q8H     ______________________________________________________________________  EXPECTED LENGTH OF STAY: 4d 19h  ACTUAL LENGTH OF STAY:          10                 Zhao Severino MD

## 2022-09-06 NOTE — PROGRESS NOTES
Bedside and Verbal shift change report given to RN Jose Tinoco (oncoming nurse) by Cipriano Melvin (offgoing nurse). Report included the following information SBAR, MAR, Cardiac Rhythm SR, and Alarm Parameters .

## 2022-09-06 NOTE — PROGRESS NOTES
[address] [facility].  [address] [place] [address] E Dung Tovar, [address]  [phone]                     GI PROGRESS NOTE    Patient Name: Yao Agrawal      : 1960      MRN: [id number]  Admit Date: 2022  Today's Date: 2022  CC: hyperbilirubinemia and severe Crohn's disease    Subjective:   Mr. Norah Streeter is sitting up in bed comfortably. He notes a decrease in stool output since he started Lomotil. He notes his stool is no longer liquid, described as a paste. We discussed using a bile salt binder ie: Questran or something similar. He stated that the powder mixes give him nausea and vomiting. He denies abdominal pain. Objective:     Blood pressure (!) 168/46, pulse 83, temperature 98.2 °F (36.8 °C), resp. rate 20, height 5' 9\" (1.753 m), weight 85.7 kg (188 lb 15 oz), SpO2 99 %. Physical Exam:  General appearance: cooperative,  male, NAD  Skin: no visible rashes  HEENT: Sclerae anicteric  Cardiovascular: Tachycardiac. No murmurs, gallops, or rubs. Respiratory: Coarse breath sounds  GI: Abdomen nondistended, soft, nontender. Normal active bowel sounds. + anterior wall hernia which bulges with breathing. Multiple abdominal scars, abd bandage over abdomen   Rectal: Deferred   Musculoskeletal: No pitting edema of the lower legs. Neurological:  Alert and oriented. Psychiatric:  No anxiety or agitation.       Data Review:    Recent Results (from the past 24 hour(s))   GLUCOSE, POC    Collection Time: 22  6:41 PM   Result Value Ref Range    Glucose (POC) 106 65 - 117 mg/dL    Performed by [address], POC    Collection Time: 22  1:09 AM   Result Value Ref Range    Glucose (POC) 112 65 - 117 mg/dL    Performed by Che hernandez RN    HEPATIC FUNCTION PANEL    Collection Time: 22  5:34 AM   Result Value Ref Range    Protein, total 6.7 6.4 - 8.2 g/dL    Albumin 2.0 (L) 3.5 - 5.0 g/dL    Globulin 4.7 (H) 2.0 - 4.0 g/dL    A-G Ratio 0.4 (L) 1.1 - 2.2      Bilirubin, total 1.8 (H) 0.2 - 1.0 MG/DL    Bilirubin, direct 1.6 (H) 0.0 - 0.2 MG/DL    Alk. phosphatase 224 (H) 45 - 117 U/L    AST (SGOT) 57 (H) 15 - 37 U/L    ALT (SGPT) 69 12 - 78 U/L   MAGNESIUM    Collection Time: 09/06/22  5:34 AM   Result Value Ref Range    Magnesium 1.7 1.6 - 2.4 mg/dL   PROTHROMBIN TIME + INR    Collection Time: 09/06/22  5:34 AM   Result Value Ref Range    INR 1.0 0.9 - 1.1      Prothrombin time 10.9 9.0 - 11.1 sec   PTT    Collection Time: 09/06/22  5:34 AM   Result Value Ref Range    aPTT 30.8 22.1 - 31.0 sec    aPTT, therapeutic range     58.0 - 22.0 SECS   METABOLIC PANEL, BASIC    Collection Time: 09/06/22  5:34 AM   Result Value Ref Range    Sodium 133 (L) 136 - 145 mmol/L    Potassium 4.9 3.5 - 5.1 mmol/L    Chloride 109 (H) 97 - 108 mmol/L    CO2 17 (L) 21 - 32 mmol/L    Anion gap 7 5 - 15 mmol/L    Glucose 111 (H) 65 - 100 mg/dL    BUN 45 (H) 6 - 20 MG/DL    Creatinine 1.47 (H) 0.70 - 1.30 MG/DL    BUN/Creatinine ratio 31 (H) 12 - 20      GFR est AA 59 (L) >60 ml/min/1.73m2    GFR est non-AA 49 (L) >60 ml/min/1.73m2    Calcium 9.0 8.5 - 10.1 MG/DL   CBC WITH AUTOMATED DIFF    Collection Time: 09/06/22  5:34 AM   Result Value Ref Range    WBC 8.2 4.1 - 11.1 K/uL    RBC 2.45 (L) 4.10 - 5.70 M/uL    HGB 7.8 (L) 12.1 - 17.0 g/dL    HCT 24.5 (L) 36.6 - 50.3 %    .0 (H) 80.0 - 99.0 FL    MCH 31.8 26.0 - 34.0 PG    MCHC 31.8 30.0 - 36.5 g/dL    RDW 16.4 (H) 11.5 - 14.5 %    PLATELET 271 (H) 112 - 400 K/uL    MPV 9.7 8.9 - 12.9 FL    NRBC 0.0 0  WBC    ABSOLUTE NRBC 0.00 0.00 - 0.01 K/uL    NEUTROPHILS 75 32 - 75 %    LYMPHOCYTES 14 12 - 49 %    MONOCYTES 7 5 - 13 %    EOSINOPHILS 3 0 - 7 %    BASOPHILS 0 0 - 1 %    IMMATURE GRANULOCYTES 1 (H) 0.0 - 0.5 %    ABS. NEUTROPHILS 6.2 1.8 - 8.0 K/UL    ABS. LYMPHOCYTES 1.1 0.8 - 3.5 K/UL    ABS. MONOCYTES 0.6 0.0 - 1.0 K/UL    ABS. EOSINOPHILS 0.2 0.0 - 0.4 K/UL    ABS. BASOPHILS 0.0 0.0 - 0.1 K/UL    ABS. IMMFernando Hansen. 0.1 (H) 0.00 - 0.04 K/UL    DF AUTOMATED     METABOLIC PANEL, COMPREHENSIVE    Collection Time: 09/06/22  5:35 AM   Result Value Ref Range    Sodium 132 (L) 136 - 145 mmol/L    Potassium 4.8 3.5 - 5.1 mmol/L    Chloride 108 97 - 108 mmol/L    CO2 17 (L) 21 - 32 mmol/L    Anion gap 7 5 - 15 mmol/L    Glucose 111 (H) 65 - 100 mg/dL    BUN 42 (H) 6 - 20 MG/DL    Creatinine 1.44 (H) 0.70 - 1.30 MG/DL    BUN/Creatinine ratio 29 (H) 12 - 20      GFR est AA >60 >60 ml/min/1.73m2    GFR est non-AA 50 (L) >60 ml/min/1.73m2    Calcium 8.9 8.5 - 10.1 MG/DL    Bilirubin, total 1.7 (H) 0.2 - 1.0 MG/DL    ALT (SGPT) 67 12 - 78 U/L    AST (SGOT) 56 (H) 15 - 37 U/L    Alk.  phosphatase 226 (H) 45 - 117 U/L    Protein, total 6.6 6.4 - 8.2 g/dL    Albumin 2.0 (L) 3.5 - 5.0 g/dL    Globulin 4.6 (H) 2.0 - 4.0 g/dL    A-G Ratio 0.4 (L) 1.1 - 2.2     BILIRUBIN, DIRECT    Collection Time: 09/06/22  5:35 AM   Result Value Ref Range    Bilirubin, direct 1.6 (H) 0.0 - 0.2 MG/DL   RENAL FUNCTION PANEL    Collection Time: 09/06/22  5:35 AM   Result Value Ref Range    Sodium 133 (L) 136 - 145 mmol/L    Potassium 4.9 3.5 - 5.1 mmol/L    Chloride 109 (H) 97 - 108 mmol/L    CO2 16 (L) 21 - 32 mmol/L    Anion gap 8 5 - 15 mmol/L    Glucose 112 (H) 65 - 100 mg/dL    BUN 44 (H) 6 - 20 MG/DL    Creatinine 1.46 (H) 0.70 - 1.30 MG/DL    BUN/Creatinine ratio 30 (H) 12 - 20      GFR est AA 59 (L) >60 ml/min/1.73m2    GFR est non-AA 49 (L) >60 ml/min/1.73m2    Calcium 8.9 8.5 - 10.1 MG/DL    Phosphorus 4.0 2.6 - 4.7 MG/DL    Albumin 2.0 (L) 3.5 - 5.0 g/dL   GLUCOSE, POC    Collection Time: 09/06/22  5:56 AM   Result Value Ref Range    Glucose (POC) 114 65 - 117 mg/dL    Performed by Heather hernandez RN          Assessment / Plan :     Mr. Jailyn Cheatham is a 62yo Pmhx/o anemia, depression, copd, incisional abd hernia w/ overlying skin wound, malnutrition and b12 deficiency, short bowel syndrome due to severe Crohn's disease (210cm small bowel remaining from lig treitz to ileosigmoid anastomosis) s/p >30 surgeries with ileosigmoid anastomosis currently not on treatment, and chronic pain syndrome and per report is on oxycodone 15mg 5x/d, gabapentin. There was no biliary dilation on ultrasound, so the hyperbilirubinemia is likely due to either sepsis, decompensated cirrhosis, or antibiotic-related rather than biliary obstruction. CTAP w/ Oral CON 8/30/22:  no definite enterocutaneous fistula    MRCP 8/30/22: no choledocholithiasis, focal narrowing vs defect/artifact in proximal CBD on preliminary read. Called MRI to request final read, spoke with Austin De La Torre     Flex-Sig 9/1/22  Findings:   Rectum: mild scarring in distal rectum, small internal hemorrhoids, small amount adherent tan stool   Sigmoid: normal mucosa, biopsied, small amount adherent tan stool precluding some views, healthy appearing anastomosis around 15 m from anal verge  Terminal Ileum: > 20 cm of ileum examined, overall healthy mucosa with small (1 cm) superficial scattered erosions, biopsied     EGD 9/2/22  Findings:  Esophagus:normal  Stomach:normal, antral biopsies done  Duodenum/jejunum: 5 mm non bleeding , benign appearing ulcer seen in bulb. Biopsies obtained from  descending duodenum. Hgb stable at 7.8. LFTS improving: T. Bili 1.7 ( 1.8), ALT 67 (69), AST 56 (57), Alk. Phos 226 (224)       - Biopsies Pending   - ID following  - Hepatology following- consideration for outpatient fibroscan  - Continue GI bland diet   - replete selenium and Vit B12   - Hepatitis panel negative, fecal calprotectin pending  - check TMPT and quantiferon in preparation to start biologic therapy      GI will sign off at this time.  He will need to follow up in outpatient setting to restart treatment of Crohn's disease       Patient Active Hospital Problem List:   Active Problems:    Severe protein-calorie malnutrition ([facility] [address] (6/10/2015)      Acute cystitis (8/27/2022)      Time Spent with Patient: 30mins  Eulogio Gomez, NP

## 2022-09-07 LAB
ALBUMIN SERPL-MCNC: 2.2 G/DL (ref 3.5–5)
ALBUMIN/GLOB SERPL: 0.5 {RATIO} (ref 1.1–2.2)
ALP SERPL-CCNC: 265 U/L (ref 45–117)
ALT SERPL-CCNC: 88 U/L (ref 12–78)
ANION GAP SERPL CALC-SCNC: 10 MMOL/L (ref 5–15)
APTT PPP: 29.1 SEC (ref 22.1–31)
AST SERPL-CCNC: 81 U/L (ref 15–37)
BILIRUB DIRECT SERPL-MCNC: 1.5 MG/DL (ref 0–0.2)
BILIRUB SERPL-MCNC: 1.7 MG/DL (ref 0.2–1)
BUN SERPL-MCNC: 45 MG/DL (ref 6–20)
BUN/CREAT SERPL: 30 (ref 12–20)
CALCIUM SERPL-MCNC: 8.8 MG/DL (ref 8.5–10.1)
CHLORIDE SERPL-SCNC: 109 MMOL/L (ref 97–108)
CO2 SERPL-SCNC: 16 MMOL/L (ref 21–32)
CREAT SERPL-MCNC: 1.51 MG/DL (ref 0.7–1.3)
GLOBULIN SER CALC-MCNC: 4.2 G/DL (ref 2–4)
GLUCOSE BLD STRIP.AUTO-MCNC: 100 MG/DL (ref 65–117)
GLUCOSE BLD STRIP.AUTO-MCNC: 105 MG/DL (ref 65–117)
GLUCOSE BLD STRIP.AUTO-MCNC: 96 MG/DL (ref 65–117)
GLUCOSE SERPL-MCNC: 83 MG/DL (ref 65–100)
INR PPP: 1 (ref 0.9–1.1)
MAGNESIUM SERPL-MCNC: 1.9 MG/DL (ref 1.6–2.4)
POTASSIUM SERPL-SCNC: 5.3 MMOL/L (ref 3.5–5.1)
PROT SERPL-MCNC: 6.4 G/DL (ref 6.4–8.2)
PROTHROMBIN TIME: 10.8 SEC (ref 9–11.1)
SERVICE CMNT-IMP: NORMAL
SODIUM SERPL-SCNC: 135 MMOL/L (ref 136–145)
THERAPEUTIC RANGE,PTTT: NORMAL SECS (ref 58–77)

## 2022-09-07 PROCEDURE — 74011250636 HC RX REV CODE- 250/636: Performed by: FAMILY MEDICINE

## 2022-09-07 PROCEDURE — 74011250636 HC RX REV CODE- 250/636: Performed by: PHYSICIAN ASSISTANT

## 2022-09-07 PROCEDURE — 74011250637 HC RX REV CODE- 250/637: Performed by: NURSE PRACTITIONER

## 2022-09-07 PROCEDURE — 80053 COMPREHEN METABOLIC PANEL: CPT

## 2022-09-07 PROCEDURE — 74011000258 HC RX REV CODE- 258: Performed by: FAMILY MEDICINE

## 2022-09-07 PROCEDURE — C9113 INJ PANTOPRAZOLE SODIUM, VIA: HCPCS | Performed by: INTERNAL MEDICINE

## 2022-09-07 PROCEDURE — 82962 GLUCOSE BLOOD TEST: CPT

## 2022-09-07 PROCEDURE — 82248 BILIRUBIN DIRECT: CPT

## 2022-09-07 PROCEDURE — 97116 GAIT TRAINING THERAPY: CPT | Performed by: PHYSICAL THERAPIST

## 2022-09-07 PROCEDURE — 74011250637 HC RX REV CODE- 250/637: Performed by: INTERNAL MEDICINE

## 2022-09-07 PROCEDURE — 83735 ASSAY OF MAGNESIUM: CPT

## 2022-09-07 PROCEDURE — 74011000250 HC RX REV CODE- 250: Performed by: INTERNAL MEDICINE

## 2022-09-07 PROCEDURE — 36415 COLL VENOUS BLD VENIPUNCTURE: CPT

## 2022-09-07 PROCEDURE — 74011000250 HC RX REV CODE- 250: Performed by: FAMILY MEDICINE

## 2022-09-07 PROCEDURE — 74011250636 HC RX REV CODE- 250/636: Performed by: INTERNAL MEDICINE

## 2022-09-07 PROCEDURE — 85610 PROTHROMBIN TIME: CPT

## 2022-09-07 PROCEDURE — 85730 THROMBOPLASTIN TIME PARTIAL: CPT

## 2022-09-07 PROCEDURE — 65270000046 HC RM TELEMETRY

## 2022-09-07 RX ORDER — HYDROMORPHONE HYDROCHLORIDE 1 MG/ML
1 INJECTION, SOLUTION INTRAMUSCULAR; INTRAVENOUS; SUBCUTANEOUS
Status: DISCONTINUED | OUTPATIENT
Start: 2022-09-07 | End: 2022-09-07 | Stop reason: SDUPTHER

## 2022-09-07 RX ADMIN — HYDROMORPHONE HYDROCHLORIDE 3 MG: 2 INJECTION, SOLUTION INTRAMUSCULAR; INTRAVENOUS; SUBCUTANEOUS at 16:27

## 2022-09-07 RX ADMIN — HYDROMORPHONE HYDROCHLORIDE 3 MG: 2 INJECTION, SOLUTION INTRAMUSCULAR; INTRAVENOUS; SUBCUTANEOUS at 02:42

## 2022-09-07 RX ADMIN — DIPHENOXYLATE HYDROCHLORIDE AND ATROPINE SULFATE 1 TABLET: 2.5; .025 TABLET ORAL at 13:19

## 2022-09-07 RX ADMIN — METOPROLOL TARTRATE 6.25 MG: 25 TABLET, FILM COATED ORAL at 22:12

## 2022-09-07 RX ADMIN — HYDROMORPHONE HYDROCHLORIDE 3 MG: 2 INJECTION, SOLUTION INTRAMUSCULAR; INTRAVENOUS; SUBCUTANEOUS at 05:43

## 2022-09-07 RX ADMIN — CEFEPIME 2 G: 2 INJECTION, POWDER, FOR SOLUTION INTRAVENOUS at 05:43

## 2022-09-07 RX ADMIN — HYDROMORPHONE HYDROCHLORIDE 3 MG: 2 INJECTION, SOLUTION INTRAMUSCULAR; INTRAVENOUS; SUBCUTANEOUS at 20:44

## 2022-09-07 RX ADMIN — METOPROLOL TARTRATE 6.25 MG: 25 TABLET, FILM COATED ORAL at 10:00

## 2022-09-07 RX ADMIN — COLLAGENASE SANTYL: 250 OINTMENT TOPICAL at 10:02

## 2022-09-07 RX ADMIN — DIPHENHYDRAMINE HCL 50 MG: 50 CAPSULE ORAL at 22:12

## 2022-09-07 RX ADMIN — CLINDAMYCIN PHOSPHATE 600 MG: 600 INJECTION, SOLUTION INTRAVENOUS at 07:29

## 2022-09-07 RX ADMIN — FLUCONAZOLE IN SODIUM CHLORIDE 400 MG: 2 INJECTION, SOLUTION INTRAVENOUS at 10:05

## 2022-09-07 RX ADMIN — HYDROMORPHONE HYDROCHLORIDE 3 MG: 2 INJECTION, SOLUTION INTRAMUSCULAR; INTRAVENOUS; SUBCUTANEOUS at 23:40

## 2022-09-07 RX ADMIN — DIPHENOXYLATE HYDROCHLORIDE AND ATROPINE SULFATE 1 TABLET: 2.5; .025 TABLET ORAL at 19:07

## 2022-09-07 RX ADMIN — DIPHENOXYLATE HYDROCHLORIDE AND ATROPINE SULFATE 1 TABLET: 2.5; .025 TABLET ORAL at 10:02

## 2022-09-07 RX ADMIN — CLINDAMYCIN PHOSPHATE 600 MG: 600 INJECTION, SOLUTION INTRAVENOUS at 16:32

## 2022-09-07 RX ADMIN — SODIUM CHLORIDE 75 ML/HR: 900 INJECTION, SOLUTION INTRAVENOUS at 12:18

## 2022-09-07 RX ADMIN — SODIUM CHLORIDE, PRESERVATIVE FREE 40 MG: 5 INJECTION INTRAVENOUS at 10:02

## 2022-09-07 RX ADMIN — SODIUM CHLORIDE, PRESERVATIVE FREE 40 MG: 5 INJECTION INTRAVENOUS at 22:13

## 2022-09-07 RX ADMIN — ERGOCALCIFEROL 50000 UNITS: 1.25 CAPSULE ORAL at 16:28

## 2022-09-07 RX ADMIN — SODIUM CHLORIDE, PRESERVATIVE FREE 10 ML: 5 INJECTION INTRAVENOUS at 13:20

## 2022-09-07 RX ADMIN — DIPHENHYDRAMINE HCL 50 MG: 50 CAPSULE ORAL at 04:15

## 2022-09-07 RX ADMIN — HEPARIN SODIUM 5000 UNITS: 5000 INJECTION INTRAVENOUS; SUBCUTANEOUS at 05:43

## 2022-09-07 RX ADMIN — HEPARIN SODIUM 5000 UNITS: 5000 INJECTION INTRAVENOUS; SUBCUTANEOUS at 22:13

## 2022-09-07 RX ADMIN — HYDROMORPHONE HYDROCHLORIDE 3 MG: 2 INJECTION, SOLUTION INTRAMUSCULAR; INTRAVENOUS; SUBCUTANEOUS at 10:00

## 2022-09-07 RX ADMIN — DIPHENHYDRAMINE HCL 50 MG: 50 CAPSULE ORAL at 10:02

## 2022-09-07 RX ADMIN — HYDROMORPHONE HYDROCHLORIDE 3 MG: 2 INJECTION, SOLUTION INTRAMUSCULAR; INTRAVENOUS; SUBCUTANEOUS at 13:17

## 2022-09-07 NOTE — PROGRESS NOTES
ID Progress Note  2022    Subjective: Itch not under control    Objective:     Antibiotics:  Clindamycin   Cefepime       Vitals: Visit Vitals  /73   Pulse 86   Temp 98.8 °F (37.1 °C)   Resp 21   Ht 5' 9\" (1.753 m)   Wt 85.7 kg (188 lb 15 oz)   SpO2 100%   BMI 27.90 kg/m²        Tmax:  Temp (24hrs), Av.9 °F (37.2 °C), Min:98.2 °F (36.8 °C), Max:99.5 °F (37.5 °C)      Exam:  rash is less intense, but has not retreated in size    Labs:      Recent Labs     22  0535 22  0534 22  1206 22  0410 22  0409 22  0745   WBC  --  8.2  --   --   --  9.9   HGB  --  7.8*  --   --   --  7.8*   PLT  --  479*  --   --   --  440*   BUN 44*  42* 45* 44* 38* 36* 34*   CREA 1.46*  1.44* 1.47* 1.46* 1.52* 1.44* 1.27   * 224* 235*  --  253* 186*   TBILI 1.7* 1.8* 2.1*  --  2.3* 2.5*       Cultures:     Lab Results   Component Value Date/Time    Specimen Description: URINE 11/15/2013 03:19 PM    Specimen Description: NARES 11/15/2013 02:30 PM     Lab Results   Component Value Date/Time    Culture result:  2022 01:12 PM     MRSA NOT PRESENT. Apparent Staphylococus aureus (not MRSA noted). Culture result:  2022 01:12 PM     Screening of patient nares for MRSA is for surveillance purposes and, if positive, to facilitate isolation considerations in high risk settings. It is not intended for automatic decolonization interventions per se as regimens are not sufficiently effective to warrant routine use.       Culture result: MIXED UROGENITAL LOTTIE ISOLATED 2022 07:27 PM       Radiology:     Line/Insert Date:           Assessment:     Pneumonia  Skin rash--uncertain cause--will increase Benadryl     Objective:     Continue current therapy  Increase Benadryl  May need skin biopsy    Liv Thomas MD

## 2022-09-07 NOTE — PROGRESS NOTES
0400-TRANSFER - IN REPORT:    Verbal report received from 2W RN on Prem Belt  being received from 4W for routine progression of care      Report consisted of patients Situation, Background, Assessment and   Recommendations(SBAR). Information from the following report(s) SBAR, Kardex, and Recent Results was reviewed with the receiving nurse. Opportunity for questions and clarification was provided. Assessment completed upon patients arrival to unit and care assumed.

## 2022-09-07 NOTE — PROGRESS NOTES
RUR: 15% Medium    JAMES: Home health PT recommended; will obtain [facility] preference from patient today. Patient's sister will likely provide transport home once medically stable. Follow-up with PCP/specialist.     Primary Contact: Vinay Eaton, [phone]    *Will need 2nd IM letter prior to discharge. 12:00PM - CM reviewed chart. Per review and ID rounds, patient is POD#5 for EGD. Wound care following. Patient is scheduled for a liver biopsy on Thursday, 9/8. Per attending note, TPN to be discontinued after finishing bag today. PT following and has recommended  PT. CM attempted to meet with patient to discuss [facility] PT recommendation and offer choices; patient asked for CM to return later to discuss. 3:15PM - CM attempted to meet with patient; however, patient was using the restroom. CM will attempt at later date.      Kathi Evans, RORY   [phone]

## 2022-09-07 NOTE — PROGRESS NOTES
Problem: Mobility Impaired (Adult and Pediatric)  Goal: *Acute Goals and Plan of Care (Insert Text)  Description: FUNCTIONAL STATUS PRIOR TO ADMISSION: Patient was independent and active without use of DME.    HOME SUPPORT PRIOR TO ADMISSION: The patient lived alone with no local support. Physical Therapy Goals  Initiated 9/6/2022  1. Patient will move from supine to sit and sit to supine  in bed with modified independence within 7 day(s). 2.  Patient will transfer from bed to chair and chair to bed with modified independence using the least restrictive device within 7 day(s). 3.  Patient will perform sit to stand with modified independence within 7 day(s). 4.  Patient will ambulate with modified independence for 150 feet with the least restrictive device within 7 day(s). 5.  Patient will ascend/descend 20 stairs with left handrail(s) with modified independence within 7 day(s). Outcome: Progressing Towards Goal   PHYSICAL THERAPY TREATMENT  Patient: Donaldo Elizondo (89 y.o. male)  Date: 9/7/2022  Diagnosis: Acute cystitis [N30.00] <principal problem not specified>  Procedure(s) (LRB):  ESOPHAGOGASTRODUODENOSCOPY (EGD) (N/A)  ESOPHAGOGASTRODUODENAL (EGD) BIOPSY (N/A) 5 Days Post-Op  Precautions: Fall  Chart, physical therapy assessment, plan of care and goals were reviewed. ASSESSMENT  Patient continues with skilled PT services and is progressing towards goals. Patient overall limited by pain but is otherwise moving well. Able to come to sit on EOB Abdirahman with no difficulty. Sit to stand with SBA and good immediate standing balance. Amb approx 250 feet with support of IV pole and SBA. No difficulty noted. Suggest stair training next session in prep to DC home as patient has numerous stairs to enter his apartment. Anticipate he will be safe [facility]. Will continue to follow but can ambulate in hallway with family and staff as well as his gait is steady.      Other factors to consider for discharge: has approx 18 stairs to enter apartment, no assistance at home         PLAN :  Patient continues to benefit from skilled intervention to address the above impairments. Continue treatment per established plan of care. to address goals. Recommendation for discharge: (in order for the patient to meet his/her long term goals)  Physical therapy at least 2 days/week in the home         IF patient discharges home will need the following DME: to be determined (TBD)       SUBJECTIVE:   Patient stated I am ready for you now.     OBJECTIVE DATA SUMMARY:   Critical Behavior:  Neurologic State: Alert  Orientation Level: Oriented X4  Cognition: Follows commands  Safety/Judgement: Fall prevention, Home safety  Functional Mobility Training:  Bed Mobility:  Rolling: Modified independent  Supine to Sit: Modified independent     Scooting: Modified independent        Transfers:  Sit to Stand: Stand-by assistance  Stand to Sit: Stand-by assistance                             Balance:  Sitting: Intact  Standing: Impaired  Standing - Static: Good  Standing - Dynamic : Good;Constant support  Ambulation/Gait Training:  Distance (ft): 250 Feet (ft)  Assistive Device:  (IV pole support)  Ambulation - Level of Assistance: Stand-by assistance        Gait Abnormalities: Decreased step clearance        Base of Support: Widened     Speed/Cata: Pace decreased (<100 feet/min); Slow             Pain Rating:  No c/o pain    Activity Tolerance:   Good    After treatment patient left in no apparent distress:   Supine in bed and Call bell within reach    COMMUNICATION/COLLABORATION:   The patients plan of care was discussed with: Physical therapist, Occupational therapist, and Registered nurse.      Ana Stark, PT, DPT   Time Calculation: 19 mins

## 2022-09-07 NOTE — PROGRESS NOTES
ID Progress Note  2022    Subjective:     Itching is improved, but not resolved    Objective:     Antibiotics:  Clindamycin   Cefepime       Vitals: Visit Vitals  BP (!) 145/54   Pulse 67   Temp 97.8 °F (36.6 °C)   Resp 18   Ht 5' 9\" (1.753 m)   Wt 85.7 kg (188 lb 15 oz)   SpO2 97%   BMI 27.90 kg/m²        Tmax:  Temp (24hrs), Av.6 °F (37 °C), Min:97.8 °F (36.6 °C), Max:99.8 °F (37.7 °C)      Exam:  Rash clearly diminished    Labs:      Recent Labs     22  0248 22  0535 22  0534 22  1206 22  0410 22  0409   WBC  --   --  8.2  --   --   --    HGB  --   --  7.8*  --   --   --    PLT  --   --  479*  --   --   --    BUN 45* 44*  42* 45* 44* 38* 36*   CREA 1.51* 1.46*  1.44* 1.47* 1.46* 1.52* 1.44*   * 226* 224* 235*  --  253*   TBILI 1.7* 1.7* 1.8* 2.1*  --  2.3*       Cultures:     Lab Results   Component Value Date/Time    Specimen Description: URINE 11/15/2013 03:19 PM    Specimen Description: NARES 11/15/2013 02:30 PM     Lab Results   Component Value Date/Time    Culture result:  2022 01:12 PM     MRSA NOT PRESENT. Apparent Staphylococus aureus (not MRSA noted). Culture result:  2022 01:12 PM     Screening of patient nares for MRSA is for surveillance purposes and, if positive, to facilitate isolation considerations in high risk settings. It is not intended for automatic decolonization interventions per se as regimens are not sufficiently effective to warrant routine use.       Culture result: MIXED UROGENITAL LOTTIE ISOLATED 2022 07:27 PM       Radiology:     Line/Insert Date:           Assessment:     Pneumonia  Skin rash--uncertain cause--will increase Benadryl     Objective:     Continue current therapy    Blake Henry MD

## 2022-09-07 NOTE — PROGRESS NOTES
[facility] Adult  Hospitalist Group                                                                                          Hospitalist Progress Note  Nohelia Suero MD  Answering service: [phone] from in house phone        Date of Service:  2022  NAME:  Keith Hardy  :  1960  MRN:  [id number]      Admission Summary:     Patient presents with severe sepsis with SUHA, hyperbilirubinemia. History of Crohn's disease with multiple surgeries in the past.  Hepatology and GI following. Interval history / Subjective:     Patient's stool is more formed. Overall feeling better. Assessment & Plan:     Severe sepsis  -Patient presents with fever, tachycardia, tachypnea, leukocytosis  -Sepsis is due to pneumonia, initial chest x-ray shows consolidation of the right upper lobe, patient with symptoms of cough and expectoration  -Blood cultures negative, sepsis resolved    Pneumonia  -Initial chest x-ray  shows new area of consolidation in the right upper lobe extending into the right apex, repeat chest x-ray on  shows increased consolidation  -Blood cultures so far negative  -Vancomycin discontinued per ID with concern for LE rash, changed to clindamycin on , continue cefepime and diflucan    Bilateral lower extremity skin rash  -Exam shows maculopapular rash on bilateral lower extremities associated with itching  -Likely antibiotic side effects, patient has been on the same antibiotics  -Other skin infection ?   -ID following    SUHA  -SUHA due to volume depletion and sepsis  -Initial improvement in SUHA and IV fluids were discontinued but noted bump in renal function on , IV fluid was resumed  -Continue IV fluid and monitor renal function    Metabolic acidosis  -This is due to SUHA, metabolic acidosis resolved    Diarrhea  -Stool for C. difficile and enteric bacterial panel negative  -Patient with multiple bowel resections in the past for Crohn's with short gut syndrome  -GI following, continue antidiarrheal agents as needed    Nephrolithiasis  -Nonobstructing right intrarenal calculi  -Follow-up as outpatient    Crohn's disease  -S/p multiple abdominal surgery with short gut syndrome and chronic pain  -Flex sig done 9/1, no apparent abnormalities on flex sig  -Continue bowel rest, continue TPN    Acute anemia  -His anemia is chronic and multifactorial including impaired iron absorption due to short gut and Crohn's disease  -S/p transfusion of 2 units PRBC so far  -For EGD today    Hyperbilirubinemia  -Conjugated hyperbilirubinemia which is likely due to sepsis, bilirubin level improving  -Nodular contour of the liver on the ultrasound  -MRCP negative for choledocholithiasis or bile duct obstruction  -Serologic testing for causes of chronic liver disease negative  -Hepatology evaluating the patient, plan for liver biopsy in a.m. Coagulopathy  -Patient presents with elevated INR, this is likely due to advanced liver disease  -S/p vitamin K for 3 days    Chronic abdominal wound  -Chronic abdominal wound on anterior abdomen present for 3 years  -Negative fistula on CT  -Wound care following, on Santyl    PVCs  -Continue Lopressor    Tobacco use  -On nicotine patch    Nutrition  -Overall patient's p.o. intake has improved, TPN to be discontinued after finishing the bag today    Code status: Full  Prophylaxis: SCDs  Care Plan discussed with: Patient  Anticipated Disposition: 24 to 48 hours     Hospital Problems  Date Reviewed: 9/2/2022            Codes Class Noted POA    Acute cystitis ICD-10-CM: N30.00  ICD-9-CM: 595.0  8/27/2022 Unknown        Severe protein-calorie malnutrition ([address] (Chronic) ICD-10-CM: B96  ICD-9-CM: 262  6/10/2015 Yes             Review of Systems:   A comprehensive review of systems was negative except for that written in the HPI. Vital Signs:    Last 24hrs VS reviewed since prior progress note.  Most recent are:  Visit Vitals  BP (!) 145/54 Pulse 75   Temp 97.8 °F (36.6 °C)   Resp 18   Ht 5' 9\" (1.753 m)   Wt 85.7 kg (188 lb 15 oz)   SpO2 97%   BMI 27.90 kg/m²       No intake or output data in the 24 hours ending 09/07/22 1200       Physical Examination:     I had a face to face encounter with this patient and independently examined them on 9/7/2022 as outlined below:          Constitutional:  No acute distress, cooperative, pleasant    ENT:  Oral mucosa moist, oropharynx benign. Resp:  CTA bilaterally. No wheezing/rhonchi/rales. No accessory muscle use. CV:  Regular rhythm, normal rate, no murmurs, gallops, rubs    GI:  Soft, non distended, non tender. normoactive bowel sounds, no hepatosplenomegaly     Musculoskeletal:  No edema, warm, 2+ pulses throughout    Neurologic:  Moves all extremities. AAOx3, CN II-XII reviewed            Data Review:    Review and/or order of clinical lab test      Labs:     Recent Labs     09/06/22  0534   WBC 8.2   HGB 7.8*   HCT 24.5*   *     Recent Labs     09/07/22 0248 09/06/22  0535 09/06/22  0534 09/05/22  1206 09/05/22  0410 09/05/22  0409   * 133*  132* 133*   < > 129* 130*   K 5.3* 4.9  4.8 4.9   < > 5.8* 5.8*   * 109*  108 109*   < > 105 106   CO2 16* 16*  17* 17*   < > 20* 18*   BUN 45* 44*  42* 45*   < > 38* 36*   CREA 1.51* 1.46*  1.44* 1.47*   < > 1.52* 1.44*   GLU 83 112*  111* 111*   < > 93 92   CA 8.8 8.9  8.9 9.0   < > 8.9 9.1   MG 1.9  --  1.7  --   --  1.8   PHOS  --  4.0  --   --  4.4  --     < > = values in this interval not displayed. Recent Labs     09/07/22 0248 09/06/22  0535 09/06/22  0534   ALT 88* 67 69   * 226* 224*   TBILI 1.7* 1.7* 1.8*   TP 6.4 6.6 6.7   ALB 2.2* 2.0*  2.0* 2.0*   GLOB 4.2* 4.6* 4.7*     Recent Labs     09/07/22  0248 09/06/22  0534 09/05/22  0409   INR 1.0 1.0 1.0   PTP 10.8 10.9 10.9   APTT 29.1 30.8 32.7*      No results for input(s): FE, TIBC, PSAT, FERR in the last 72 hours.      Lab Results   Component Value Date/Time Folate 18.2 08/29/2022 01:12 PM      No results for input(s): PH, PCO2, PO2 in the last 72 hours. No results for input(s): CPK, CKNDX, TROIQ in the last 72 hours.     No lab exists for component: CPKMB  Lab Results   Component Value Date/Time    Cholesterol, total 134 12/01/2014 04:33 AM    HDL Cholesterol 35 12/01/2014 04:33 AM    LDL, calculated 58.2 12/01/2014 04:33 AM    Triglyceride 204 (H) 12/01/2014 04:33 AM    CHOL/HDL Ratio 3.8 12/01/2014 04:33 AM     Lab Results   Component Value Date/Time    Glucose (POC) 105 09/07/2022 11:10 AM    Glucose (POC) 100 09/07/2022 05:52 AM    Glucose (POC) 107 09/06/2022 11:19 PM    Glucose (POC) 109 09/06/2022 06:16 PM    Glucose (POC) 114 09/06/2022 05:56 AM     Lab Results   Component Value Date/Time    Color DARK YELLOW 08/26/2022 07:27 PM    Appearance TURBID (A) 08/26/2022 07:27 PM    Specific gravity 1.025 08/26/2022 07:27 PM    Specific gravity 1.010 10/07/2019 05:48 AM    pH (UA) 5.0 08/26/2022 07:27 PM    Protein 100 (A) 08/26/2022 07:27 PM    Glucose Negative 08/26/2022 07:27 PM    Ketone TRACE (A) 08/26/2022 07:27 PM    Bilirubin NEGATIVE  10/07/2019 05:48 AM    Urobilinogen 0.2 08/26/2022 07:27 PM    Nitrites Positive (A) 08/26/2022 07:27 PM    Leukocyte Esterase SMALL (A) 08/26/2022 07:27 PM    Epithelial cells FEW 08/26/2022 07:27 PM    Bacteria 1+ (A) 08/26/2022 07:27 PM    WBC 5-10 08/26/2022 07:27 PM    RBC 0-5 08/26/2022 07:27 PM         Medications Reviewed:     Current Facility-Administered Medications   Medication Dose Route Frequency    TPN ADULT - CENTRAL   IntraVENous CONTINUOUS    HYDROmorphone (DILAUDID) injection 3 mg  3 mg IntraVENous Q3H PRN    diphenhydrAMINE (BENADRYL) capsule 50 mg  50 mg Oral Q6H PRN    0.9% sodium chloride infusion  75 mL/hr IntraVENous CONTINUOUS    cefepime (MAXIPIME) 2 g in 0.9% sodium chloride (MBP/ADV) 100 mL MBP  2 g IntraVENous Q24H    clindamycin (CLEOCIN) 600mg D5W 50mL IVPB (premix)  600 mg IntraVENous Q8H 0.9% sodium chloride infusion 250 mL  250 mL IntraVENous PRN    loperamide (IMODIUM) capsule 4 mg  4 mg Oral Q4H PRN    albuterol (PROVENTIL VENTOLIN) nebulizer solution 2.5 mg  2.5 mg Nebulization Q4H PRN    alteplase (CATHFLO) 1 mg in sterile water (preservative free) 1 mL injection  1 mg InterCATHeter PRN    sodium chloride (NS) flush 5-40 mL  5-40 mL IntraVENous Q8H    sodium chloride (NS) flush 5-40 mL  5-40 mL IntraVENous PRN    diphenoxylate-atropine (LOMOTIL) tablet 1 Tablet  1 Tablet Oral QID PRN    sodium chloride (OCEAN) 0.65 % nasal squeeze bottle 2 Spray  2 Spray Both Nostrils Q2H PRN    oxyCODONE IR (ROXICODONE) tablet 12.5 mg  12.5 mg Oral Q6H PRN    diphenoxylate-atropine (LOMOTIL) tablet 1 Tablet  1 Tablet Oral QID    fluconazole (DIFLUCAN) 400mg/200 mL IVPB (premix)  400 mg IntraVENous DAILY    ergocalciferol capsule 50,000 Units  50,000 Units Oral Q7D    collagenase (SANTYL) 250 unit/gram ointment   Topical DAILY    fat emulsion 20% soy-oliv-fish oil (SMOFlipid) infusion 250 mL  250 mL IntraVENous Q24H    nicotine (NICODERM CQ) 21 mg/24 hr patch 1 Patch  1 Patch TransDERmal DAILY    naloxone (NARCAN) injection 0.4 mg  0.4 mg IntraVENous EVERY 2 MINUTES AS NEEDED    metoprolol tartrate (LOPRESSOR) tablet 6.25 mg  6.25 mg Oral Q12H    metoprolol (LOPRESSOR) injection 2.5 mg  2.5 mg IntraVENous Q4H PRN    bisacodyL (DULCOLAX) suppository 10 mg  10 mg Rectal DAILY PRN    pantoprazole (PROTONIX) 40 mg in 0.9% sodium chloride 10 mL injection  40 mg IntraVENous Q12H    sodium chloride (NS) flush 5-40 mL  5-40 mL IntraVENous Q8H    sodium chloride (NS) flush 5-40 mL  5-40 mL IntraVENous PRN    acetaminophen (TYLENOL) tablet 650 mg  650 mg Oral Q6H PRN    Or    acetaminophen (TYLENOL) suppository 650 mg  650 mg Rectal Q6H PRN    ondansetron (ZOFRAN ODT) tablet 4 mg  4 mg Oral Q8H PRN    Or    ondansetron (ZOFRAN) injection 4 mg  4 mg IntraVENous Q6H PRN    heparin (porcine) injection 5,000 Units  5,000 Units SubCUTAneous Q8H     ______________________________________________________________________  EXPECTED LENGTH OF STAY: 4d 19h  ACTUAL LENGTH OF STAY:          11                 Zaina Howard MD

## 2022-09-07 NOTE — WOUND CARE
WOCN Note:      Follow up assessment assessment of chronic abdominal wound from previous surgery. Chart reviewed. Assessed in room 520. Admitted DX:  Acute cystitis       Past Medical History:   Diagnosis Date    Abdominal wall hernia 6/15/2012    Anal fissure      Anemia      Anemia      Anxiety and depression      Arthritis       Related to the Crohn's disease. Cancer ([address]       MELANOMA HEAD AND FACE    Chronic pain       GENERALIZED    COPD (chronic obstructive pulmonary disease) (HCC)      Crohn disease (HCC)       Diagnosed at age 17. Echocardiogram normal (EF: 55-60%) 07/2015    Esophageal ulcer      GERD (gastroesophageal reflux disease)      H/O blood transfusion 2013     [facility], [address]    Hypertension       denies    Migraine      Short bowel syndrome      Ventral hernia without obstruction or gangrene        Assessment:   Patient is A&O x 4, communicative, continent and mobile independently. Bed: foam mattress. Patient pre-medicated for pain by RN. 1. POA Abdomen, chronic non healing wound from prior surgery within area of healed incision: 2.5 x 4 x 0.3 cm  80% red 20% yellow; scant serous exudate; no odor. Wound, Pressure Prevention & Skin Care Recommendations:    Minimize layers of linen/pads under patient to optimize support surface. 2.  Turn/reposition approximately every 2 hours and offload heels. 3.  Manage moisture/ Keep skin folds clean and dry/minimize brief usage. 4.  Abdomen:  Daily cleanse with saline; apply carlos alberto depth amount of Santyl and saline moist gauze; cover with dry dressing.      Transition of Care:   Plan to follow as needed while admitted to hospital.     VANESSA SmithN RN [facility] Inpatient Wound Care  Available on [employer]  Office [phone]

## 2022-09-08 ENCOUNTER — APPOINTMENT (OUTPATIENT)
Dept: ULTRASOUND IMAGING | Age: 62
DRG: 871 | End: 2022-09-08
Attending: INTERNAL MEDICINE
Payer: MEDICARE

## 2022-09-08 LAB
ALBUMIN SERPL-MCNC: 2.1 G/DL (ref 3.5–5)
ALBUMIN/GLOB SERPL: 0.4 {RATIO} (ref 1.1–2.2)
ALP SERPL-CCNC: 214 U/L (ref 45–117)
ALT SERPL-CCNC: 81 U/L (ref 12–78)
ANION GAP SERPL CALC-SCNC: 9 MMOL/L (ref 5–15)
APTT PPP: 28.8 SEC (ref 22.1–31)
AST SERPL-CCNC: 70 U/L (ref 15–37)
BASOPHILS # BLD: 0 K/UL (ref 0–0.1)
BASOPHILS NFR BLD: 0 % (ref 0–1)
BILIRUB DIRECT SERPL-MCNC: 1.1 MG/DL (ref 0–0.2)
BILIRUB SERPL-MCNC: 1.4 MG/DL (ref 0.2–1)
BUN SERPL-MCNC: 45 MG/DL (ref 6–20)
BUN/CREAT SERPL: 28 (ref 12–20)
C-ANCA TITR SER IF: ABNORMAL TITER
CALCIUM SERPL-MCNC: 8.7 MG/DL (ref 8.5–10.1)
CHLORIDE SERPL-SCNC: 109 MMOL/L (ref 97–108)
CO2 SERPL-SCNC: 14 MMOL/L (ref 21–32)
CREAT SERPL-MCNC: 1.63 MG/DL (ref 0.7–1.3)
DIFFERENTIAL METHOD BLD: ABNORMAL
EOSINOPHIL # BLD: 0.3 K/UL (ref 0–0.4)
EOSINOPHIL NFR BLD: 4 % (ref 0–7)
ERYTHROCYTE [DISTWIDTH] IN BLOOD BY AUTOMATED COUNT: 16.1 % (ref 11.5–14.5)
FILARIA IGG4 SER IA-ACNC: 1.33 INDEX
GLOBULIN SER CALC-MCNC: 4.7 G/DL (ref 2–4)
GLUCOSE SERPL-MCNC: 82 MG/DL (ref 65–100)
HCT VFR BLD AUTO: 24.9 % (ref 36.6–50.3)
HGB BLD-MCNC: 7.9 G/DL (ref 12.1–17)
IMM GRANULOCYTES # BLD AUTO: 0 K/UL (ref 0–0.04)
IMM GRANULOCYTES NFR BLD AUTO: 1 % (ref 0–0.5)
INR PPP: 1.1 (ref 0.9–1.1)
LYMPHOCYTES # BLD: 1.5 K/UL (ref 0.8–3.5)
LYMPHOCYTES NFR BLD: 22 % (ref 12–49)
MAGNESIUM SERPL-MCNC: 1.8 MG/DL (ref 1.6–2.4)
MCH RBC QN AUTO: 31.9 PG (ref 26–34)
MCHC RBC AUTO-ENTMCNC: 31.7 G/DL (ref 30–36.5)
MCV RBC AUTO: 100.4 FL (ref 80–99)
MONOCYTES # BLD: 0.5 K/UL (ref 0–1)
MONOCYTES NFR BLD: 7 % (ref 5–13)
NEUTS SEG # BLD: 4.6 K/UL (ref 1.8–8)
NEUTS SEG NFR BLD: 66 % (ref 32–75)
NRBC # BLD: 0 K/UL (ref 0–0.01)
NRBC BLD-RTO: 0 PER 100 WBC
P-ANCA ATYPICAL TITR SER IF: ABNORMAL TITER
P-ANCA TITR SER IF: ABNORMAL TITER
PLATELET # BLD AUTO: 477 K/UL (ref 150–400)
PMV BLD AUTO: 9.4 FL (ref 8.9–12.9)
POTASSIUM SERPL-SCNC: 4.7 MMOL/L (ref 3.5–5.1)
PROT SERPL-MCNC: 6.8 G/DL (ref 6.4–8.2)
PROTHROMBIN TIME: 11.3 SEC (ref 9–11.1)
RBC # BLD AUTO: 2.48 M/UL (ref 4.1–5.7)
SODIUM SERPL-SCNC: 132 MMOL/L (ref 136–145)
THERAPEUTIC RANGE,PTTT: NORMAL SECS (ref 58–77)
WBC # BLD AUTO: 6.8 K/UL (ref 4.1–11.1)

## 2022-09-08 PROCEDURE — 74011250637 HC RX REV CODE- 250/637: Performed by: INTERNAL MEDICINE

## 2022-09-08 PROCEDURE — 74011250636 HC RX REV CODE- 250/636: Performed by: INTERNAL MEDICINE

## 2022-09-08 PROCEDURE — 80048 BASIC METABOLIC PNL TOTAL CA: CPT

## 2022-09-08 PROCEDURE — 83735 ASSAY OF MAGNESIUM: CPT

## 2022-09-08 PROCEDURE — 85610 PROTHROMBIN TIME: CPT

## 2022-09-08 PROCEDURE — 74011250637 HC RX REV CODE- 250/637: Performed by: FAMILY MEDICINE

## 2022-09-08 PROCEDURE — 74011000250 HC RX REV CODE- 250: Performed by: INTERNAL MEDICINE

## 2022-09-08 PROCEDURE — 65270000046 HC RM TELEMETRY

## 2022-09-08 PROCEDURE — 36415 COLL VENOUS BLD VENIPUNCTURE: CPT

## 2022-09-08 PROCEDURE — C9113 INJ PANTOPRAZOLE SODIUM, VIA: HCPCS | Performed by: INTERNAL MEDICINE

## 2022-09-08 PROCEDURE — 85730 THROMBOPLASTIN TIME PARTIAL: CPT

## 2022-09-08 PROCEDURE — 74011250636 HC RX REV CODE- 250/636: Performed by: PHYSICIAN ASSISTANT

## 2022-09-08 PROCEDURE — 74011250636 HC RX REV CODE- 250/636: Performed by: FAMILY MEDICINE

## 2022-09-08 PROCEDURE — 74011000258 HC RX REV CODE- 258: Performed by: FAMILY MEDICINE

## 2022-09-08 PROCEDURE — 85025 COMPLETE CBC W/AUTO DIFF WBC: CPT

## 2022-09-08 PROCEDURE — 99232 SBSQ HOSP IP/OBS MODERATE 35: CPT | Performed by: INTERNAL MEDICINE

## 2022-09-08 PROCEDURE — 80076 HEPATIC FUNCTION PANEL: CPT

## 2022-09-08 PROCEDURE — 74011250637 HC RX REV CODE- 250/637: Performed by: NURSE PRACTITIONER

## 2022-09-08 RX ORDER — SODIUM BICARBONATE 650 MG/1
650 TABLET ORAL 3 TIMES DAILY
Status: DISCONTINUED | OUTPATIENT
Start: 2022-09-08 | End: 2022-09-08

## 2022-09-08 RX ORDER — CYCLOBENZAPRINE HCL 10 MG
5 TABLET ORAL
Status: DISCONTINUED | OUTPATIENT
Start: 2022-09-08 | End: 2022-09-12 | Stop reason: HOSPADM

## 2022-09-08 RX ADMIN — HYDROMORPHONE HYDROCHLORIDE 3 MG: 2 INJECTION, SOLUTION INTRAMUSCULAR; INTRAVENOUS; SUBCUTANEOUS at 18:21

## 2022-09-08 RX ADMIN — HYDROMORPHONE HYDROCHLORIDE 3 MG: 2 INJECTION, SOLUTION INTRAMUSCULAR; INTRAVENOUS; SUBCUTANEOUS at 02:51

## 2022-09-08 RX ADMIN — HYDROMORPHONE HYDROCHLORIDE 3 MG: 2 INJECTION, SOLUTION INTRAMUSCULAR; INTRAVENOUS; SUBCUTANEOUS at 21:47

## 2022-09-08 RX ADMIN — DEXTROSE MONOHYDRATE: 5 INJECTION, SOLUTION INTRAVENOUS at 23:25

## 2022-09-08 RX ADMIN — COLLAGENASE SANTYL: 250 OINTMENT TOPICAL at 21:48

## 2022-09-08 RX ADMIN — CEFEPIME 2 G: 2 INJECTION, POWDER, FOR SOLUTION INTRAVENOUS at 06:18

## 2022-09-08 RX ADMIN — SODIUM BICARBONATE 650 MG: 650 TABLET ORAL at 11:11

## 2022-09-08 RX ADMIN — FLUCONAZOLE IN SODIUM CHLORIDE 400 MG: 2 INJECTION, SOLUTION INTRAVENOUS at 11:09

## 2022-09-08 RX ADMIN — SODIUM BICARBONATE 650 MG: 650 TABLET ORAL at 15:32

## 2022-09-08 RX ADMIN — CLINDAMYCIN PHOSPHATE 600 MG: 600 INJECTION, SOLUTION INTRAVENOUS at 02:03

## 2022-09-08 RX ADMIN — HYDROMORPHONE HYDROCHLORIDE 3 MG: 2 INJECTION, SOLUTION INTRAMUSCULAR; INTRAVENOUS; SUBCUTANEOUS at 09:19

## 2022-09-08 RX ADMIN — DIPHENOXYLATE HYDROCHLORIDE AND ATROPINE SULFATE 1 TABLET: 2.5; .025 TABLET ORAL at 21:48

## 2022-09-08 RX ADMIN — CLINDAMYCIN PHOSPHATE 600 MG: 600 INJECTION, SOLUTION INTRAVENOUS at 15:33

## 2022-09-08 RX ADMIN — SODIUM CHLORIDE, PRESERVATIVE FREE 40 MG: 5 INJECTION INTRAVENOUS at 09:26

## 2022-09-08 RX ADMIN — DIPHENHYDRAMINE HCL 50 MG: 50 CAPSULE ORAL at 21:47

## 2022-09-08 RX ADMIN — SODIUM CHLORIDE, PRESERVATIVE FREE 40 MG: 5 INJECTION INTRAVENOUS at 21:47

## 2022-09-08 RX ADMIN — ONDANSETRON 4 MG: 4 TABLET, ORALLY DISINTEGRATING ORAL at 16:19

## 2022-09-08 RX ADMIN — SODIUM CHLORIDE, PRESERVATIVE FREE 10 ML: 5 INJECTION INTRAVENOUS at 14:08

## 2022-09-08 RX ADMIN — HEPARIN SODIUM 5000 UNITS: 5000 INJECTION INTRAVENOUS; SUBCUTANEOUS at 06:19

## 2022-09-08 RX ADMIN — HYDROMORPHONE HYDROCHLORIDE 3 MG: 2 INJECTION, SOLUTION INTRAMUSCULAR; INTRAVENOUS; SUBCUTANEOUS at 14:01

## 2022-09-08 RX ADMIN — DIPHENOXYLATE HYDROCHLORIDE AND ATROPINE SULFATE 1 TABLET: 2.5; .025 TABLET ORAL at 18:24

## 2022-09-08 RX ADMIN — DIPHENOXYLATE HYDROCHLORIDE AND ATROPINE SULFATE 1 TABLET: 2.5; .025 TABLET ORAL at 11:11

## 2022-09-08 RX ADMIN — CLINDAMYCIN PHOSPHATE 600 MG: 600 INJECTION, SOLUTION INTRAVENOUS at 07:38

## 2022-09-08 RX ADMIN — CYCLOBENZAPRINE 5 MG: 10 TABLET, FILM COATED ORAL at 18:25

## 2022-09-08 RX ADMIN — METOPROLOL TARTRATE 6.25 MG: 25 TABLET, FILM COATED ORAL at 21:48

## 2022-09-08 RX ADMIN — HYDROMORPHONE HYDROCHLORIDE 3 MG: 2 INJECTION, SOLUTION INTRAMUSCULAR; INTRAVENOUS; SUBCUTANEOUS at 06:18

## 2022-09-08 NOTE — PROGRESS NOTES
TRANSFER - IN REPORT:    Verbal report received from [address] [place] [address] [address] RN(name) on Rosemary Karolina  being received from 520(unit) for ordered procedure      Report consisted of patients Situation, Background, Assessment and   Recommendations(SBAR). Information from the following report(s) SBAR was reviewed with the receiving nurse. Opportunity for questions and clarification was provided. Assessment completed upon patients arrival to unit and care assumed.

## 2022-09-08 NOTE — PROGRESS NOTES
ID Progress Note  2022    Subjective:     Itching is improved, but not resolved    Objective:     Antibiotics:  Clindamycin   Cefepime       Vitals: Visit Vitals  /69   Pulse 78   Temp 98.1 °F (36.7 °C)   Resp 16   Ht 5' 9\" (1.753 m)   Wt 89.1 kg (196 lb 6.9 oz)   SpO2 97%   BMI 29.01 kg/m²        Tmax:  Temp (24hrs), Av.1 °F (36.7 °C), Min:97.9 °F (36.6 °C), Max:98.5 °F (36.9 °C)      Exam:  Rash clearly diminished    Labs:      Recent Labs     22  0247 22  0248 22  0535 22  0534   WBC 6.8  --   --  8.2   HGB 7.9*  --   --  7.8*   *  --   --  479*   BUN 45* 45* 44*  42* 45*   CREA 1.63* 1.51* 1.46*  1.44* 1.47*   * 265* 226* 224*   TBILI 1.4* 1.7* 1.7* 1.8*       Cultures:     Lab Results   Component Value Date/Time    Specimen Description: URINE 11/15/2013 03:19 PM    Specimen Description: NARES 11/15/2013 02:30 PM     Lab Results   Component Value Date/Time    Culture result:  2022 01:12 PM     MRSA NOT PRESENT. Apparent Staphylococus aureus (not MRSA noted). Culture result:  2022 01:12 PM     Screening of patient nares for MRSA is for surveillance purposes and, if positive, to facilitate isolation considerations in high risk settings. It is not intended for automatic decolonization interventions per se as regimens are not sufficiently effective to warrant routine use.       Culture result: MIXED UROGENITAL LOTTIE ISOLATED 2022 07:27 PM       Radiology:     Line/Insert Date:           Assessment:     Pneumonia  Skin rash--uncertain cause--resolving  SUHA  Liver disease    Objective:     Continue current therapy for another couple of days  Discussed     Bushra Zaragoza MD

## 2022-09-08 NOTE — PROGRESS NOTES
RUR: 14% Low     JAMES: Home health SN & PT with [facility] (p: [phone]). Patient's sister will likely provide transport home once medically stable. Follow-up with PCP/specialist.      Primary Contact: Susan Gray, [phone]     *Will need 2nd IM letter prior to discharge. 8:30AM - CM met with patient to discuss [facility] recommendations. Patient expressed he does not have a [facility] preference and would like to use whichever [facility] agency participates with his insurance. Referral sent to [facility] via [employer]; awaiting responses. 10:45AM - [facility] declined referral. [address] [address] has accepted (p: [phone]). CM provided update to patient at bedside. AVS updated. Transition of Care Plan:     The Plan for Transition of Care is related to the following treatment goals: [facility] SN & PT - patient expressed he does not have a  preference and would like to use whichever [facility] agency participates with his insurance. Referral sent to [facility], [facility] and [facility]. -  [address] [address] accepted. The Patient was provided with a choice of provider and agrees  with the discharge plan. Yes [x] No []    A Freedom of choice list was provided with basic dialogue that supports the patient's individualized plan of care/goals and shares the quality data associated with the providers. Yes [] No []    CM will continue to follow as needed.     Guido Kaplan, MSISAURO   [phone]

## 2022-09-08 NOTE — PROGRESS NOTES
Pt returned to unit without having procedure completed as pt was given heparin aq by night shift nurse this am at 0619 am.

## 2022-09-08 NOTE — PROGRESS NOTES
[address] [facility], MD, FACP, [facility] [place]      GEORGIA Stephenson, [facility]     April S Alexia, [facility]   Jus Cano, KATLYN Matt, [facility]       Layo Everettuta Sai De Sinha 136    at [address], [facility] [place][note 2] Mary Jane Shah  22.    [phone]    FAX: [address] [place] [address], [address]    [phone]    FAX: [phone]       HEPATOLOGY PROGRESS NOTE  The patient is a 65 y.o.  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 He has been treated with [employer] in the past.  He has 4-5 stool per day and an equal number at night that wake him up from sleep. There was no previous history of liver disease. He came to the ED because of feeling poorly with increased weakness over several days. In the ED Laboratory studies were significant for:    WBC 29.1, HB 12.9 gms, , Sna 127, Scr 3.27 mg, AST 25, ALT 38, , TBILI 10.2 mg, INR 1.1, Sammonia <10,     Imaging of the liver with Ultrasound CT scan demonstrated increased echogenicity and surface nodularity consistent with cirrhosis. No liver mass. No dilated bile ducts. No ascites. Hospital Course to date:  He has been treated with IV ABX. Metabolic acidosis was corrected. WBC is coming down. All cultures have been negative  MRI with MRCP does not show any evidence of bile duct stricturing suggestive of PSC. All liver enzymes are back up. TBILI is now down from 15 to 1.4 mg      Hepatology Plan:  All liver enzymes back up. Now that Anemia corrected we tried to proceed with liver biopsy on Thursday.   However he received SQ heparin this AM and LBX deferred to AM.  I placed heparin on hold. Can resume on Sunday. Rescheduled LBX to Friday 7:30 AM       ASSESSMENT AND PLAN:  Cirrhosis  The diagnosis of cirrhosis is based upon imaging, laboratory studies  Cirrhosis is of unclear etiology. The CTP is 9. Child class B. The MELD score is down from 24 to 17. Elevated liver enzymes  AST is elevated. ALT is normal.  ALP was elevated but has declined to normal.  Total bilirubin is coming down from 15 to <5 gms. Serum albumin is depressed. INR is prolonged. The platelet count is normal.      The pattern of AST>ALT is consistent with cirrhosis    The elevation in ALP, TBILI and h/o Crohns disease suggests he may have [facility]. However, MRCP does not show PSC and labs have improved with IV ABX. Unlikely he has small duct PSC as this would not improve this much spontaneously     I suspect the elevation in ALP which has now come down to normal and TBILI which is coming down is due to sepsis and resolution with treatment. Serologic testing for causes of chronic liver disease was negative. ANCA, ASMA, IGG4 are pending    Low serum albumin  The low serum albumin is secondary to malabsorption from the patients short cut and does not reflect worsening liver disease. Coagulopathy  This is secondary to cirrhosis, and malnutrition form the patients short gut and sepsis and may not reflect more advanced liver disease. The INR has come down to normal with IV Vitamin K     Anemia   This is due to multifactorial causes including inability to absorb FE due to short gut and Crohns disease. Will obtain FE panel to assess for iron stores. Repeat FE studies show Ferritin 1886 and FE 16%. If we knew he had cirrhosis the FE saturation would be consistent with FE deficiency. FE sat is typically elevated in patiens with cirrhosis because of low transferrin. A normal FE sat this therefore FE deficient in cirrhosis    For now probably best just transfuse. FLex sig was unremarkable. PHYSICAL EXAMINATION:  VS: per nursing note  General:  No acute distress. Eyes:  Sclera icteric  ENT:  No oral lesions. Thyroid normal.  Nodes:  No adenopathy. Skin:  No spider angiomata. Jaundice. Respiratory:  Lungs clear to auscultation. Cardiovascular:  Regular heart rate. Abdomen:  Soft non-tender, Multiple scars. No obvious ascites. Extremities:  No lower extremity edema. Neurologic:  Alert and oriented. Cranial nerves grossly intact. No asterixis. LABORATORY:   Latest Reference Range & Units 9/6/22 05:34 9/7/22 02:48 9/8/22 02:47   WBC 4.1 - 11.1 K/uL 8.2  6.8   HGB 12.1 - 17.0 g/dL 7.8 (L)  7.9 (L)   PLATELET 720 - 051 K/uL 479 (H)  477 (H)   Sodium 136 - 145 mmol/L 133 (L) 135 (L) 132 (L)   Potassium 3.5 - 5.1 mmol/L 4.9 5.3 (H) 4.7   Chloride 97 - 108 mmol/L 109 (H) 109 (H) 109 (H)   CO2 21 - 32 mmol/L 17 (L) 16 (L) 14 (LL)   Glucose 65 - 100 mg/dL 111 (H) 83 82   BUN 6 - 20 MG/DL 45 (H) 45 (H) 45 (H)   Creatinine 0.70 - 1.30 MG/DL 1.47 (H) 1.51 (H) 1.63 (H)   Bilirubin, total 0.2 - 1.0 MG/DL 1.8 (H) 1.7 (H) 1.4 (H)   Albumin 3.5 - 5.0 g/dL 2.0 (L) 2.2 (L) 2.1 (L)   ALT 12 - 78 U/L 69 88 (H) 81 (H)   AST 15 - 37 U/L 57 (H) 81 (H) 70 (H)   Alk.  phosphatase 45 - 117 U/L 224 (H) 265 (H) 214 (H)   (LL): Data is critically low  (L): Data is abnormally low  (H): Data is abnormally high      Julián Gallagher MD  [facility] [address], [address] [facility] 22  201 [facility]

## 2022-09-08 NOTE — PROGRESS NOTES
TRANSFER - OUT REPORT:    Verbal report given to NIKHIL Person (name) on Dianne Clarity  being transferred to ENDOSCOPy  (unit) for ordered procedure       Report consisted of patients Situation, Background, Assessment and   Recommendations(SBAR). Information from the following report(s) SBAR and Kardex was reviewed with the receiving nurse. Lines:   Triple Lumen 08/28/22 Left (Active)   Central Line Being Utilized Yes 09/07/22 2046   Criteria for Appropriate Use Long term IV/antibiotic administration 09/07/22 2046   Site Assessment Clean, dry, & intact 09/07/22 2046   Infiltration Assessment 0 09/07/22 2046   Affected Extremity/Extremities Color distal to insertion site pink (or appropriate for race) 09/07/22 2046   Date of Last Dressing Change 09/06/22 09/07/22 0800   Dressing Status Clean, dry, & intact 09/07/22 2046   Dressing Type Disk with Chlorhexadine gluconate (CHG) 09/07/22 2046   Action Taken Open ports on tubing capped 09/07/22 2046   Proximal Hub Color/Line Status White; Infusing 09/07/22 2046   Positive Blood Return (Medial Site) Yes 09/07/22 2046   Medial Hub Color/Line Status Brown;Flushed;Capped 09/07/22 2046   Positive Blood Return (Lateral Site) Yes 09/07/22 2046   Distal Hub Color/Line Status Blue;Flushed;Capped 09/07/22 2046   Positive Blood Return (Site #3) Yes 09/07/22 2046   Alcohol Cap Used Yes 09/07/22 2046        Opportunity for questions and clarification was provided.       Patient transported with:

## 2022-09-08 NOTE — PROGRESS NOTES
[facility]                                                                                          Hospitalist Progress Note  Kath Cespedes MD  Answering service: [phone] from in house phone        Date of Service:  2022  NAME:  Fan Bryant  :  1960  MRN:  [id number]      Admission Summary:     Patient presents with severe sepsis with SUHA, hyperbilirubinemia. History of Crohn's disease with multiple surgeries in the past.  Hepatology and GI following. Interval history / Subjective:     Patient is seen and examined at bedside this AM. No overnight events. Plan for liver bx today. C/w pain on right side of chest due to muscle strain - added flexeril. Discussed with nursing   Bx cancelled today as pt received heparin this AM      Assessment & Plan:     Severe sepsis on poa   -Patient presents with fever, tachycardia, tachypnea, leukocytosis  -Sepsis is due to pneumonia  - initial chest x-ray shows consolidation of the right upper lobe  -Blood cultures negative  -Vancomycin discontinued per ID with concern for LE rash  -  changed to clindamycin on , continue cefepime    Bilateral lower extremity skin rash  -Exam shows maculopapular rash on bilateral lower extremities associated with itching  -Likely antibiotic side effects? Other skin infection ?   -ID following    SUHA - cr worsening   -due to volume depletion and sepsis  -Initial improvement in SUHA and IV fluids were discontinued but noted bump in renal function on , IV fluid was resumed  -Continue IV fluid   - nephrology consult placed   -  monitor renal function    Metabolic acidosis - worsening   - due to SUHA  - started on po Sod bicarb  - will monitor     Diarrhea  -Stool for C. difficile and enteric bacterial panel negative  -Patient with multiple bowel resections in the past for Crohn's with short gut syndrome  -GI following, continue antidiarrheal agents as needed    Nephrolithiasis  -Nonobstructing right intrarenal calculi  -Follow-up as outpatient    Crohn's disease  -S/p multiple abdominal surgery with short gut syndrome and chronic pain  -Flex sig done 9/1, no apparent abnormalities on flex sig  -regular diet . Off TPN     Acute on chronic anemia  -multifactorial including impaired iron absorption due to short gut and Crohn's disease  -S/p transfusion of 2 units PRBC  - EGD:  5 mm non bleeding , benign appearing ulcer seen in bulb. Biopsies obtained from  descending duodenum.   - hb low stable. Transfuse <7    Hyperbilirubinemia - improving   -Conjugated hyperbilirubinemia which is likely due to sepsis  -Nodular contour of the liver on the ultrasound  -MRCP negative for choledocholithiasis or bile duct obstruction  -Serologic testing for causes of chronic liver disease negative  -appreciate hepatology input  - plan for liver bx by Dr. Kylah Turcios     Coagulopathy  -Patient presents with elevated INR, this is likely due to advanced liver disease  -S/p vitamin K for 3 days    Chronic abdominal wound  -Chronic abdominal wound on anterior abdomen present for 3 years  -Negative fistula on CT  -Wound care following, on Santyl    PVCs  -Continue Lopressor    Tobacco use  -On nicotine patch    Code status: Full  Prophylaxis: Heparin   Care Plan discussed with: Patient  Anticipated Disposition: 24 to 48 hours     Hospital Problems  Date Reviewed: 9/2/2022            Codes Class Noted POA    Acute cystitis ICD-10-CM: N30.00  ICD-9-CM: 595.0  8/27/2022 Unknown        Severe protein-calorie malnutrition ([facility] [address] (Chronic) ICD-10-CM: Z34  ICD-9-CM: 262  6/10/2015 Yes           Review of Systems:   A comprehensive review of systems was negative except for that written in the HPI. Vital Signs:    Last 24hrs VS reviewed since prior progress note.  Most recent are:  Visit Vitals  /65   Pulse 77   Temp 97.9 °F (36.6 °C)   Resp 16   Ht 5' 9\" (1.753 m)   Wt 89.1 kg (196 lb 6.9 oz)   SpO2 97%   BMI 29.01 kg/m²       No intake or output data in the 24 hours ending 09/08/22 1421       Physical Examination:     I had a face to face encounter with this patient and independently examined them on 9/8/2022 as outlined below:          Constitutional:  No acute distress, cooperative, pleasant    ENT:  Oral mucosa moist, oropharynx benign. Resp:  CTA bilaterally. No wheezing/rhonchi/rales. No accessory muscle use. CV:  Regular rhythm, normal rate, no murmurs, gallops, rubs    GI:  Soft, non distended, non tender. normoactive bowel sounds, no hepatosplenomegaly     Musculoskeletal:  No edema, warm, 2+ pulses throughout    Neurologic:  Moves all extremities. AAOx3, CN II-XII reviewed            Data Review:    Review and/or order of clinical lab test      Labs:     Recent Labs     09/08/22 0247 09/06/22  0534   WBC 6.8 8.2   HGB 7.9* 7.8*   HCT 24.9* 24.5*   * 479*       Recent Labs     09/08/22 0247 09/07/22 0248 09/06/22  0535 09/06/22  0534   * 135* 133*  132* 133*   K 4.7 5.3* 4.9  4.8 4.9   * 109* 109*  108 109*   CO2 14* 16* 16*  17* 17*   BUN 45* 45* 44*  42* 45*   CREA 1.63* 1.51* 1.46*  1.44* 1.47*   GLU 82 83 112*  111* 111*   CA 8.7 8.8 8.9  8.9 9.0   MG 1.8 1.9  --  1.7   PHOS  --   --  4.0  --        Recent Labs     09/08/22 0247 09/07/22 0248 09/06/22  0535   ALT 81* 88* 67   * 265* 226*   TBILI 1.4* 1.7* 1.7*   TP 6.8 6.4 6.6   ALB 2.1* 2.2* 2.0*  2.0*   GLOB 4.7* 4.2* 4.6*       Recent Labs     09/08/22 0247 09/07/22 0248 09/06/22  0534   INR 1.1 1.0 1.0   PTP 11.3* 10.8 10.9   APTT 28.8 29.1 30.8        No results for input(s): FE, TIBC, PSAT, FERR in the last 72 hours. Lab Results   Component Value Date/Time    Folate 18.2 08/29/2022 01:12 PM        No results for input(s): PH, PCO2, PO2 in the last 72 hours. No results for input(s): CPK, CKNDX, TROIQ in the last 72 hours.     No lab exists for component: CPKMB  Lab Results Component Value Date/Time    Cholesterol, total 134 12/01/2014 04:33 AM    HDL Cholesterol 35 12/01/2014 04:33 AM    LDL, calculated 58.2 12/01/2014 04:33 AM    Triglyceride 204 (H) 12/01/2014 04:33 AM    CHOL/HDL Ratio 3.8 12/01/2014 04:33 AM     Lab Results   Component Value Date/Time    Glucose (POC) 96 09/07/2022 04:44 PM    Glucose (POC) 105 09/07/2022 11:10 AM    Glucose (POC) 100 09/07/2022 05:52 AM    Glucose (POC) 107 09/06/2022 11:19 PM    Glucose (POC) 109 09/06/2022 06:16 PM     Lab Results   Component Value Date/Time    Color DARK YELLOW 08/26/2022 07:27 PM    Appearance TURBID (A) 08/26/2022 07:27 PM    Specific gravity 1.025 08/26/2022 07:27 PM    Specific gravity 1.010 10/07/2019 05:48 AM    pH (UA) 5.0 08/26/2022 07:27 PM    Protein 100 (A) 08/26/2022 07:27 PM    Glucose Negative 08/26/2022 07:27 PM    Ketone TRACE (A) 08/26/2022 07:27 PM    Bilirubin NEGATIVE  10/07/2019 05:48 AM    Urobilinogen 0.2 08/26/2022 07:27 PM    Nitrites Positive (A) 08/26/2022 07:27 PM    Leukocyte Esterase SMALL (A) 08/26/2022 07:27 PM    Epithelial cells FEW 08/26/2022 07:27 PM    Bacteria 1+ (A) 08/26/2022 07:27 PM    WBC 5-10 08/26/2022 07:27 PM    RBC 0-5 08/26/2022 07:27 PM         Medications Reviewed:     Current Facility-Administered Medications   Medication Dose Route Frequency    sodium bicarbonate tablet 650 mg  650 mg Oral TID    HYDROmorphone (DILAUDID) injection 3 mg  3 mg IntraVENous Q3H PRN    diphenhydrAMINE (BENADRYL) capsule 50 mg  50 mg Oral Q6H PRN    0.9% sodium chloride infusion  75 mL/hr IntraVENous CONTINUOUS    cefepime (MAXIPIME) 2 g in 0.9% sodium chloride (MBP/ADV) 100 mL MBP  2 g IntraVENous Q24H    clindamycin (CLEOCIN) 600mg D5W 50mL IVPB (premix)  600 mg IntraVENous Q8H    0.9% sodium chloride infusion 250 mL  250 mL IntraVENous PRN    loperamide (IMODIUM) capsule 4 mg  4 mg Oral Q4H PRN    albuterol (PROVENTIL VENTOLIN) nebulizer solution 2.5 mg  2.5 mg Nebulization Q4H PRN alteplase (CATHFLO) 1 mg in sterile water (preservative free) 1 mL injection  1 mg InterCATHeter PRN    sodium chloride (NS) flush 5-40 mL  5-40 mL IntraVENous Q8H    sodium chloride (NS) flush 5-40 mL  5-40 mL IntraVENous PRN    diphenoxylate-atropine (LOMOTIL) tablet 1 Tablet  1 Tablet Oral QID PRN    sodium chloride (OCEAN) 0.65 % nasal squeeze bottle 2 Spray  2 Spray Both Nostrils Q2H PRN    oxyCODONE IR (ROXICODONE) tablet 12.5 mg  12.5 mg Oral Q6H PRN    diphenoxylate-atropine (LOMOTIL) tablet 1 Tablet  1 Tablet Oral QID    fluconazole (DIFLUCAN) 400mg/200 mL IVPB (premix)  400 mg IntraVENous DAILY    ergocalciferol capsule 50,000 Units  50,000 Units Oral Q7D    collagenase (SANTYL) 250 unit/gram ointment   Topical DAILY    fat emulsion 20% soy-oliv-fish oil (SMOFlipid) infusion 250 mL  250 mL IntraVENous Q24H    nicotine (NICODERM CQ) 21 mg/24 hr patch 1 Patch  1 Patch TransDERmal DAILY    naloxone (NARCAN) injection 0.4 mg  0.4 mg IntraVENous EVERY 2 MINUTES AS NEEDED    metoprolol tartrate (LOPRESSOR) tablet 6.25 mg  6.25 mg Oral Q12H    metoprolol (LOPRESSOR) injection 2.5 mg  2.5 mg IntraVENous Q4H PRN    bisacodyL (DULCOLAX) suppository 10 mg  10 mg Rectal DAILY PRN    pantoprazole (PROTONIX) 40 mg in 0.9% sodium chloride 10 mL injection  40 mg IntraVENous Q12H    sodium chloride (NS) flush 5-40 mL  5-40 mL IntraVENous Q8H    sodium chloride (NS) flush 5-40 mL  5-40 mL IntraVENous PRN    acetaminophen (TYLENOL) tablet 650 mg  650 mg Oral Q6H PRN    Or    acetaminophen (TYLENOL) suppository 650 mg  650 mg Rectal Q6H PRN    ondansetron (ZOFRAN ODT) tablet 4 mg  4 mg Oral Q8H PRN    Or    ondansetron (ZOFRAN) injection 4 mg  4 mg IntraVENous Q6H PRN    heparin (porcine) injection 5,000 Units  5,000 Units SubCUTAneous Q8H     ______________________________________________________________________  EXPECTED LENGTH OF STAY: 4d 19h  ACTUAL LENGTH OF STAY:          12                 Sherwin Amin MD

## 2022-09-08 NOTE — PROGRESS NOTES
TRANSFER - OUT REPORT:    Verbal report given to [address] [place] [address] [address] RN(name) on Ioana Delaney  being transferred to [facility](unit) for ordered procedure       Report consisted of patients Situation, Background, Assessment and   Recommendations(SBAR). Information from the following report(s) SBAR was reviewed with the receiving nurse. Lines:   Triple Lumen 08/28/22 Left (Active)   Central Line Being Utilized Yes 09/07/22 2046   Criteria for Appropriate Use Long term IV/antibiotic administration 09/07/22 2046   Site Assessment Clean, dry, & intact 09/07/22 2046   Infiltration Assessment 0 09/07/22 2046   Affected Extremity/Extremities Color distal to insertion site pink (or appropriate for race) 09/07/22 2046   Date of Last Dressing Change 09/06/22 09/07/22 0800   Dressing Status Clean, dry, & intact 09/07/22 2046   Dressing Type Disk with Chlorhexadine gluconate (CHG) 09/07/22 2046   Action Taken Open ports on tubing capped 09/07/22 2046   Proximal Hub Color/Line Status White; Infusing 09/07/22 2046   Positive Blood Return (Medial Site) Yes 09/07/22 2046   Medial Hub Color/Line Status Brown;Flushed;Capped 09/07/22 2046   Positive Blood Return (Lateral Site) Yes 09/07/22 2046   Distal Hub Color/Line Status Blue;Flushed;Capped 09/07/22 2046   Positive Blood Return (Site #3) Yes 09/07/22 2046   Alcohol Cap Used Yes 09/07/22 2046        Opportunity for questions and clarification was provided.       Patient transported with:   [employer]

## 2022-09-09 ENCOUNTER — APPOINTMENT (OUTPATIENT)
Dept: ULTRASOUND IMAGING | Age: 62
DRG: 871 | End: 2022-09-09
Attending: INTERNAL MEDICINE
Payer: MEDICARE

## 2022-09-09 LAB
ALBUMIN SERPL-MCNC: 2.1 G/DL (ref 3.5–5)
ALBUMIN/GLOB SERPL: 0.4 {RATIO} (ref 1.1–2.2)
ALP SERPL-CCNC: 184 U/L (ref 45–117)
ALT SERPL-CCNC: 80 U/L (ref 12–78)
ANION GAP SERPL CALC-SCNC: 6 MMOL/L (ref 5–15)
APPEARANCE UR: CLEAR
APTT PPP: 32.5 SEC (ref 22.1–31)
AST SERPL-CCNC: 63 U/L (ref 15–37)
BACTERIA URNS QL MICRO: NEGATIVE /HPF
BILIRUB DIRECT SERPL-MCNC: 1.1 MG/DL (ref 0–0.2)
BILIRUB SERPL-MCNC: 1.3 MG/DL (ref 0.2–1)
BILIRUB UR QL: NEGATIVE
BUN SERPL-MCNC: 44 MG/DL (ref 6–20)
BUN/CREAT SERPL: 25 (ref 12–20)
CALCIUM SERPL-MCNC: 8.6 MG/DL (ref 8.5–10.1)
CHLORIDE SERPL-SCNC: 112 MMOL/L (ref 97–108)
CO2 SERPL-SCNC: 18 MMOL/L (ref 21–32)
COLOR UR: ABNORMAL
CREAT SERPL-MCNC: 1.77 MG/DL (ref 0.7–1.3)
CREAT UR-MCNC: 45.8 MG/DL
EOSINOPHIL #/AREA URNS HPF: NEGATIVE /[HPF]
EPITH CASTS URNS QL MICRO: ABNORMAL /LPF
ERYTHROCYTE [DISTWIDTH] IN BLOOD BY AUTOMATED COUNT: 15.9 % (ref 11.5–14.5)
GLOBULIN SER CALC-MCNC: 4.8 G/DL (ref 2–4)
GLUCOSE SERPL-MCNC: 78 MG/DL (ref 65–100)
GLUCOSE UR STRIP.AUTO-MCNC: NEGATIVE MG/DL
HCT VFR BLD AUTO: 23.7 % (ref 36.6–50.3)
HGB BLD-MCNC: 7.5 G/DL (ref 12.1–17)
HGB UR QL STRIP: ABNORMAL
HYALINE CASTS URNS QL MICRO: ABNORMAL /LPF (ref 0–5)
INR PPP: 1.1 (ref 0.9–1.1)
KETONES UR QL STRIP.AUTO: NEGATIVE MG/DL
LEUKOCYTE ESTERASE UR QL STRIP.AUTO: ABNORMAL
MAGNESIUM SERPL-MCNC: 1.8 MG/DL (ref 1.6–2.4)
MCH RBC QN AUTO: 31.9 PG (ref 26–34)
MCHC RBC AUTO-ENTMCNC: 31.6 G/DL (ref 30–36.5)
MCV RBC AUTO: 100.9 FL (ref 80–99)
NITRITE UR QL STRIP.AUTO: NEGATIVE
NRBC # BLD: 0 K/UL (ref 0–0.01)
NRBC BLD-RTO: 0 PER 100 WBC
PH UR STRIP: 5 [PH] (ref 5–8)
PLATELET # BLD AUTO: 470 K/UL (ref 150–400)
PMV BLD AUTO: 9 FL (ref 8.9–12.9)
POTASSIUM SERPL-SCNC: 4.5 MMOL/L (ref 3.5–5.1)
PROT SERPL-MCNC: 6.9 G/DL (ref 6.4–8.2)
PROT UR STRIP-MCNC: 30 MG/DL
PROTHROMBIN TIME: 11.4 SEC (ref 9–11.1)
RBC # BLD AUTO: 2.35 M/UL (ref 4.1–5.7)
RBC #/AREA URNS HPF: ABNORMAL /HPF (ref 0–5)
SODIUM SERPL-SCNC: 136 MMOL/L (ref 136–145)
SODIUM UR-SCNC: 51 MMOL/L
SP GR UR REFRACTOMETRY: 1.01 (ref 1–1.03)
THERAPEUTIC RANGE,PTTT: ABNORMAL SECS (ref 58–77)
UROBILINOGEN UR QL STRIP.AUTO: 0.2 EU/DL (ref 0.2–1)
WBC # BLD AUTO: 5 K/UL (ref 4.1–11.1)
WBC URNS QL MICRO: ABNORMAL /HPF (ref 0–4)

## 2022-09-09 PROCEDURE — 76040000019: Performed by: INTERNAL MEDICINE

## 2022-09-09 PROCEDURE — 74011250636 HC RX REV CODE- 250/636: Performed by: INTERNAL MEDICINE

## 2022-09-09 PROCEDURE — 82248 BILIRUBIN DIRECT: CPT

## 2022-09-09 PROCEDURE — 82570 ASSAY OF URINE CREATININE: CPT

## 2022-09-09 PROCEDURE — 76942 ECHO GUIDE FOR BIOPSY: CPT | Performed by: INTERNAL MEDICINE

## 2022-09-09 PROCEDURE — 74011000258 HC RX REV CODE- 258: Performed by: FAMILY MEDICINE

## 2022-09-09 PROCEDURE — 87205 SMEAR GRAM STAIN: CPT

## 2022-09-09 PROCEDURE — 85730 THROMBOPLASTIN TIME PARTIAL: CPT

## 2022-09-09 PROCEDURE — 74011250637 HC RX REV CODE- 250/637: Performed by: NURSE PRACTITIONER

## 2022-09-09 PROCEDURE — 74011000250 HC RX REV CODE- 250: Performed by: INTERNAL MEDICINE

## 2022-09-09 PROCEDURE — 65270000046 HC RM TELEMETRY

## 2022-09-09 PROCEDURE — 85027 COMPLETE CBC AUTOMATED: CPT

## 2022-09-09 PROCEDURE — 74011250636 HC RX REV CODE- 250/636: Performed by: FAMILY MEDICINE

## 2022-09-09 PROCEDURE — 47000 NEEDLE BIOPSY OF LIVER PERQ: CPT | Performed by: INTERNAL MEDICINE

## 2022-09-09 PROCEDURE — 74011250637 HC RX REV CODE- 250/637: Performed by: INTERNAL MEDICINE

## 2022-09-09 PROCEDURE — 83735 ASSAY OF MAGNESIUM: CPT

## 2022-09-09 PROCEDURE — 77030014115: Performed by: INTERNAL MEDICINE

## 2022-09-09 PROCEDURE — C9113 INJ PANTOPRAZOLE SODIUM, VIA: HCPCS | Performed by: INTERNAL MEDICINE

## 2022-09-09 PROCEDURE — 88307 TISSUE EXAM BY PATHOLOGIST: CPT

## 2022-09-09 PROCEDURE — 80053 COMPREHEN METABOLIC PANEL: CPT

## 2022-09-09 PROCEDURE — 81001 URINALYSIS AUTO W/SCOPE: CPT

## 2022-09-09 PROCEDURE — 77030013826 HC NDL BIOP MAXCOR BARD -B: Performed by: INTERNAL MEDICINE

## 2022-09-09 PROCEDURE — 85610 PROTHROMBIN TIME: CPT

## 2022-09-09 PROCEDURE — 88313 SPECIAL STAINS GROUP 2: CPT

## 2022-09-09 PROCEDURE — 99232 SBSQ HOSP IP/OBS MODERATE 35: CPT | Performed by: INTERNAL MEDICINE

## 2022-09-09 PROCEDURE — 36415 COLL VENOUS BLD VENIPUNCTURE: CPT

## 2022-09-09 PROCEDURE — 84300 ASSAY OF URINE SODIUM: CPT

## 2022-09-09 PROCEDURE — 76942 ECHO GUIDE FOR BIOPSY: CPT

## 2022-09-09 RX ORDER — SODIUM CHLORIDE 0.9 % (FLUSH) 0.9 %
5-40 SYRINGE (ML) INJECTION EVERY 8 HOURS
Status: DISCONTINUED | OUTPATIENT
Start: 2022-09-09 | End: 2022-09-12 | Stop reason: HOSPADM

## 2022-09-09 RX ORDER — LIDOCAINE HYDROCHLORIDE 10 MG/ML
10 INJECTION INFILTRATION; PERINEURAL ONCE
Status: DISCONTINUED | OUTPATIENT
Start: 2022-09-09 | End: 2022-09-09 | Stop reason: HOSPADM

## 2022-09-09 RX ORDER — MIDAZOLAM HYDROCHLORIDE 1 MG/ML
5 INJECTION, SOLUTION INTRAMUSCULAR; INTRAVENOUS ONCE
Status: COMPLETED | OUTPATIENT
Start: 2022-09-09 | End: 2022-09-09

## 2022-09-09 RX ORDER — FENTANYL CITRATE 50 UG/ML
25 INJECTION, SOLUTION INTRAMUSCULAR; INTRAVENOUS
Status: DISCONTINUED | OUTPATIENT
Start: 2022-09-09 | End: 2022-09-09 | Stop reason: HOSPADM

## 2022-09-09 RX ORDER — ONDANSETRON 2 MG/ML
4 INJECTION INTRAMUSCULAR; INTRAVENOUS
Status: DISCONTINUED | OUTPATIENT
Start: 2022-09-09 | End: 2022-09-12 | Stop reason: HOSPADM

## 2022-09-09 RX ORDER — SODIUM CHLORIDE 0.9 % (FLUSH) 0.9 %
5-40 SYRINGE (ML) INJECTION AS NEEDED
Status: DISCONTINUED | OUTPATIENT
Start: 2022-09-09 | End: 2022-09-12 | Stop reason: HOSPADM

## 2022-09-09 RX ORDER — LIDOCAINE HYDROCHLORIDE 10 MG/ML
INJECTION, SOLUTION EPIDURAL; INFILTRATION; INTRACAUDAL; PERINEURAL
Status: DISPENSED
Start: 2022-09-09 | End: 2022-09-09

## 2022-09-09 RX ADMIN — COLLAGENASE SANTYL: 250 OINTMENT TOPICAL at 16:47

## 2022-09-09 RX ADMIN — DIPHENOXYLATE HYDROCHLORIDE AND ATROPINE SULFATE 1 TABLET: 2.5; .025 TABLET ORAL at 09:44

## 2022-09-09 RX ADMIN — HYDROMORPHONE HYDROCHLORIDE 3 MG: 2 INJECTION, SOLUTION INTRAMUSCULAR; INTRAVENOUS; SUBCUTANEOUS at 05:27

## 2022-09-09 RX ADMIN — HYDROMORPHONE HYDROCHLORIDE 3 MG: 2 INJECTION, SOLUTION INTRAMUSCULAR; INTRAVENOUS; SUBCUTANEOUS at 10:04

## 2022-09-09 RX ADMIN — DIPHENOXYLATE HYDROCHLORIDE AND ATROPINE SULFATE 1 TABLET: 2.5; .025 TABLET ORAL at 13:09

## 2022-09-09 RX ADMIN — SODIUM CHLORIDE, PRESERVATIVE FREE 10 ML: 5 INJECTION INTRAVENOUS at 13:15

## 2022-09-09 RX ADMIN — MIDAZOLAM HYDROCHLORIDE 2.5 MG: 1 INJECTION, SOLUTION INTRAMUSCULAR; INTRAVENOUS at 08:02

## 2022-09-09 RX ADMIN — CEFEPIME 2 G: 2 INJECTION, POWDER, FOR SOLUTION INTRAVENOUS at 05:27

## 2022-09-09 RX ADMIN — METOPROLOL TARTRATE 6.25 MG: 25 TABLET, FILM COATED ORAL at 22:08

## 2022-09-09 RX ADMIN — CLINDAMYCIN PHOSPHATE 600 MG: 600 INJECTION, SOLUTION INTRAVENOUS at 00:24

## 2022-09-09 RX ADMIN — SODIUM CHLORIDE, PRESERVATIVE FREE 40 MG: 5 INJECTION INTRAVENOUS at 22:08

## 2022-09-09 RX ADMIN — HYDROMORPHONE HYDROCHLORIDE 3 MG: 2 INJECTION, SOLUTION INTRAMUSCULAR; INTRAVENOUS; SUBCUTANEOUS at 00:44

## 2022-09-09 RX ADMIN — HYDROMORPHONE HYDROCHLORIDE 3 MG: 2 INJECTION, SOLUTION INTRAMUSCULAR; INTRAVENOUS; SUBCUTANEOUS at 22:18

## 2022-09-09 RX ADMIN — SODIUM CHLORIDE, PRESERVATIVE FREE 40 MG: 5 INJECTION INTRAVENOUS at 09:45

## 2022-09-09 RX ADMIN — CLINDAMYCIN PHOSPHATE 600 MG: 600 INJECTION, SOLUTION INTRAVENOUS at 16:47

## 2022-09-09 RX ADMIN — DIPHENHYDRAMINE HCL 50 MG: 50 CAPSULE ORAL at 22:19

## 2022-09-09 RX ADMIN — SODIUM CHLORIDE, PRESERVATIVE FREE 10 ML: 5 INJECTION INTRAVENOUS at 13:13

## 2022-09-09 RX ADMIN — METOPROLOL TARTRATE 6.25 MG: 25 TABLET, FILM COATED ORAL at 09:44

## 2022-09-09 RX ADMIN — DEXTROSE MONOHYDRATE: 5 INJECTION, SOLUTION INTRAVENOUS at 13:34

## 2022-09-09 RX ADMIN — DIPHENOXYLATE HYDROCHLORIDE AND ATROPINE SULFATE 1 TABLET: 2.5; .025 TABLET ORAL at 22:08

## 2022-09-09 RX ADMIN — HYDROMORPHONE HYDROCHLORIDE 3 MG: 2 INJECTION, SOLUTION INTRAMUSCULAR; INTRAVENOUS; SUBCUTANEOUS at 16:37

## 2022-09-09 RX ADMIN — DIPHENOXYLATE HYDROCHLORIDE AND ATROPINE SULFATE 1 TABLET: 2.5; .025 TABLET ORAL at 16:46

## 2022-09-09 RX ADMIN — HYDROMORPHONE HYDROCHLORIDE 3 MG: 2 INJECTION, SOLUTION INTRAMUSCULAR; INTRAVENOUS; SUBCUTANEOUS at 19:38

## 2022-09-09 RX ADMIN — HYDROMORPHONE HYDROCHLORIDE 3 MG: 2 INJECTION, SOLUTION INTRAMUSCULAR; INTRAVENOUS; SUBCUTANEOUS at 13:09

## 2022-09-09 NOTE — PROGRESS NOTES
[address] Progress Note        NAME: Normajean Apley       :  1960       MRN:  [id number]     Date/Time: 2022    Re-consulted for SUHA/Acidosis       Assessment:    Plan: SUHA   Acidosis  Hyokalemi  Volume depletion/diarrhea  Hyperbilirubinemia  Hx of crohns/bowel resection/short gut  Leukocytosis/uti=coag (-)  Nonobstructing renal stones  (R) M L infiltrate       Serum Cr rising again to 1.6mg/dl  Check UA, Urine Na, Urine Cr, Urine Eos    Worsening acidosis. Change IV NS to D5W +150meq Sodium Bicarbonate  Hold oral sodium bicarbonate    Serum Na 132    Awaiting liver biopsy    IV Abx       Subjective:     Chief Complaint:  Diarrhea improving. +Abd pain. LE rash improving    Review of Systems: currently no n/v/cp/sob    Objective:     VITALS:   Last 24hrs VS reviewed since prior progress note. Most recent are:  Visit Vitals  /64   Pulse 81   Temp 98.6 °F (37 °C)   Resp 18   Ht 5' 9\" (1.753 m)   Wt 89.1 kg (196 lb 6.9 oz)   SpO2 93%   BMI 29.01 kg/m²     SpO2 Readings from Last 6 Encounters:   22 93%   20 96%   20 99%   10/09/19 99%   19 98%   18 98%    O2 Flow Rate (L/min): 3 l/min     Intake/Output Summary (Last 24 hours) at 2022  Last data filed at 2022 1830  Gross per 24 hour   Intake 360 ml   Output --   Net 360 ml          PHYSICAL EXAM:    General   well developed, well nourished, appears stated age, in no acute distress  EENT  Dry mm  Extremities  No edema.         Lab Data Reviewed: (see below)    Medications Reviewed: (see below)    PMH/SH reviewed - no change compared to H&P  ________________________________\  ___________________________________________________    Attending Physician: Con Irizarry MD     ____________________________________________________  MEDICATIONS:  Current Facility-Administered Medications   Medication Dose Route Frequency    sodium bicarbonate tablet 650 mg  650 mg Oral TID    cyclobenzaprine (FLEXERIL) tablet 5 mg  5 mg Oral TID PRN    HYDROmorphone (DILAUDID) injection 3 mg  3 mg IntraVENous Q3H PRN    diphenhydrAMINE (BENADRYL) capsule 50 mg  50 mg Oral Q6H PRN    0.9% sodium chloride infusion  75 mL/hr IntraVENous CONTINUOUS    cefepime (MAXIPIME) 2 g in 0.9% sodium chloride (MBP/ADV) 100 mL MBP  2 g IntraVENous Q24H    clindamycin (CLEOCIN) 600mg D5W 50mL IVPB (premix)  600 mg IntraVENous Q8H    0.9% sodium chloride infusion 250 mL  250 mL IntraVENous PRN    loperamide (IMODIUM) capsule 4 mg  4 mg Oral Q4H PRN    albuterol (PROVENTIL VENTOLIN) nebulizer solution 2.5 mg  2.5 mg Nebulization Q4H PRN    alteplase (CATHFLO) 1 mg in sterile water (preservative free) 1 mL injection  1 mg InterCATHeter PRN    sodium chloride (NS) flush 5-40 mL  5-40 mL IntraVENous Q8H    sodium chloride (NS) flush 5-40 mL  5-40 mL IntraVENous PRN    diphenoxylate-atropine (LOMOTIL) tablet 1 Tablet  1 Tablet Oral QID PRN    sodium chloride (OCEAN) 0.65 % nasal squeeze bottle 2 Spray  2 Spray Both Nostrils Q2H PRN    oxyCODONE IR (ROXICODONE) tablet 12.5 mg  12.5 mg Oral Q6H PRN    diphenoxylate-atropine (LOMOTIL) tablet 1 Tablet  1 Tablet Oral QID    ergocalciferol capsule 50,000 Units  50,000 Units Oral Q7D    collagenase (SANTYL) 250 unit/gram ointment   Topical DAILY    fat emulsion 20% soy-oliv-fish oil (SMOFlipid) infusion 250 mL  250 mL IntraVENous Q24H    nicotine (NICODERM CQ) 21 mg/24 hr patch 1 Patch  1 Patch TransDERmal DAILY    naloxone (NARCAN) injection 0.4 mg  0.4 mg IntraVENous EVERY 2 MINUTES AS NEEDED    metoprolol tartrate (LOPRESSOR) tablet 6.25 mg  6.25 mg Oral Q12H    metoprolol (LOPRESSOR) injection 2.5 mg  2.5 mg IntraVENous Q4H PRN    bisacodyL (DULCOLAX) suppository 10 mg  10 mg Rectal DAILY PRN    pantoprazole (PROTONIX) 40 mg in 0.9% sodium chloride 10 mL injection  40 mg IntraVENous Q12H    sodium chloride (NS) flush 5-40 mL  5-40 mL IntraVENous Q8H    sodium chloride (NS) flush 5-40 mL  5-40 mL IntraVENous PRN    acetaminophen (TYLENOL) tablet 650 mg  650 mg Oral Q6H PRN    Or    acetaminophen (TYLENOL) suppository 650 mg  650 mg Rectal Q6H PRN    ondansetron (ZOFRAN ODT) tablet 4 mg  4 mg Oral Q8H PRN    Or    ondansetron (ZOFRAN) injection 4 mg  4 mg IntraVENous Q6H PRN    [Held by provider] heparin (porcine) injection 5,000 Units  5,000 Units SubCUTAneous Q8H        LABS:  Recent Labs     09/08/22 0247 09/06/22  0534   WBC 6.8 8.2   HGB 7.9* 7.8*   HCT 24.9* 24.5*   * 479*       Recent Labs     09/08/22 0247 09/07/22 0248 09/06/22  0535 09/06/22  0534   * 135* 133*  132* 133*   K 4.7 5.3* 4.9  4.8 4.9   * 109* 109*  108 109*   CO2 14* 16* 16*  17* 17*   BUN 45* 45* 44*  42* 45*   CREA 1.63* 1.51* 1.46*  1.44* 1.47*   GLU 82 83 112*  111* 111*   CA 8.7 8.8 8.9  8.9 9.0   MG 1.8 1.9  --  1.7   PHOS  --   --  4.0  --        Recent Labs     09/08/22 0247 09/07/22 0248 09/06/22  0535   ALT 81* 88* 67   * 265* 226*   TBILI 1.4* 1.7* 1.7*   TP 6.8 6.4 6.6   ALB 2.1* 2.2* 2.0*  2.0*   GLOB 4.7* 4.2* 4.6*       Recent Labs     09/08/22 0247 09/07/22 0248 09/06/22  0534   INR 1.1 1.0 1.0   PTP 11.3* 10.8 10.9   APTT 28.8 29.1 30.8

## 2022-09-09 NOTE — PROGRESS NOTES
ID Progress Note  2022    Subjective:     Itching is improved, but not resolved    Objective:     Antibiotics:  Clindamycin   Cefepime       Vitals: Visit Vitals  /69   Pulse 84   Temp 98.4 °F (36.9 °C)   Resp 18   Ht 5' 9\" (1.753 m)   Wt 89.1 kg (196 lb 6.9 oz)   SpO2 97%   BMI 29.01 kg/m²        Tmax:  Temp (24hrs), Av.5 °F (36.9 °C), Min:98 °F (36.7 °C), Max:99.3 °F (37.4 °C)      Exam:  Rash clearly diminished    Labs:      Recent Labs     22  0257 22  0247 22  0248   WBC 5.0 6.8  --    HGB 7.5* 7.9*  --    * 477*  --    BUN 44* 45* 45*   CREA 1.77* 1.63* 1.51*   * 214* 265*   TBILI 1.3* 1.4* 1.7*       Cultures:     Lab Results   Component Value Date/Time    Specimen Description: URINE 11/15/2013 03:19 PM    Specimen Description: NARES 11/15/2013 02:30 PM     Lab Results   Component Value Date/Time    Culture result:  2022 01:12 PM     MRSA NOT PRESENT. Apparent Staphylococus aureus (not MRSA noted). Culture result:  2022 01:12 PM     Screening of patient nares for MRSA is for surveillance purposes and, if positive, to facilitate isolation considerations in high risk settings. It is not intended for automatic decolonization interventions per se as regimens are not sufficiently effective to warrant routine use.       Culture result: MIXED UROGENITAL LOTTIE ISOLATED 2022 07:27 PM       Radiology:     Line/Insert Date:           Assessment:     Pneumonia  Skin rash--uncertain cause--resolved  SUHA  Liver disease    Objective:     Continue current therapy for another couple of days  Discussed     Rosemarie Sawyer MD

## 2022-09-09 NOTE — PROGRESS NOTES
[address] [facility], MD, FACP, [place], [place]      GEORGIA Gordon, [facility]     Krista Hale, [facility]   KATLYN Ibarra, [facility]       Layo Corrales [facility] De [address] 136    at [address], [facility] Mary Jane Shah  22.    [phone]    FAX: [address] [place] [address] Drive, [address], [address]    [phone]    FAX: [phone]       HEPATOLOGY PROGRESS NOTE  The patient is a 65 y.o.  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 He has been treated with [employer] in the past.  He has 4-5 stool per day and an equal number at night that wake him up from sleep. There was no previous history of liver disease. He came to the ED because of feeling poorly with increased weakness over several days. In the ED Laboratory studies were significant for:    WBC 29.1, HB 12.9 gms, , Sna 127, Scr 3.27 mg, AST 25, ALT 38, , TBILI 10.2 mg, INR 1.1, Sammonia <10,     Imaging of the liver with Ultrasound CT scan demonstrated increased echogenicity and surface nodularity consistent with cirrhosis. No liver mass. No dilated bile ducts. No ascites. Hospital Course to date:  He has been treated with IV ABX. Metabolic acidosis was corrected. WBC is coming down. All cultures have been negative  MRI with MRCP does not show any evidence of bile duct stricturing suggestive of PSC. All liver enzymes are back up. TBILI is now down from 15 to 1.4 mg        Hepatology Plan:  Had liver biopsy today without incident. He can be discharged whenever rest of team agree it is time. Results will probably be out Monday. I will call him with results.   If not cirrhosis and no specific liver disease which needs to be monitored and treated is present as I suspect, he will not need follow-up with me. ASSESSMENT AND PLAN:  Cirrhosis  The diagnosis of cirrhosis is based upon imaging, laboratory studies  Cirrhosis is of unclear etiology. The CTP is 9. Child class B. The MELD score is down from 24 to 17. Elevated liver enzymes  AST is elevated. ALT is normal.  ALP was elevated but has declined to normal.  Total bilirubin is coming down from 15 to <5 gms. Serum albumin is depressed. INR is prolonged. The platelet count is normal.      The pattern of AST>ALT is consistent with cirrhosis    The elevation in ALP, TBILI and h/o Crohns disease suggests he may have [facility]. However, MRCP does not show PSC and labs have improved with IV ABX. Unlikely he has small duct PSC as this would not improve this much spontaneously     I suspect the elevation in ALP which has now come down to normal and TBILI which is coming down is due to sepsis and resolution with treatment. Serologic testing for causes of chronic liver disease was negative. ANCA, ASMA, IGG4 are pending    Low serum albumin  The low serum albumin is secondary to malabsorption from the patients short cut and does not reflect worsening liver disease. Coagulopathy  This is secondary to cirrhosis, and malnutrition form the patients short gut and sepsis and may not reflect more advanced liver disease. The INR has come down to normal with IV Vitamin K     Anemia   This is due to multifactorial causes including inability to absorb FE due to short gut and Crohns disease. Will obtain FE panel to assess for iron stores. Repeat FE studies show Ferritin 1886 and FE 16%. If we knew he had cirrhosis the FE saturation would be consistent with FE deficiency. FE sat is typically elevated in patiens with cirrhosis because of low transferrin.   A normal FE sat this therefore FE deficient in cirrhosis    For now probably best just transfuse. FLex sig was unremarkable. PHYSICAL EXAMINATION:  VS: per nursing note  General:  No acute distress. Eyes:  Sclera icteric  ENT:  No oral lesions. Thyroid normal.  Nodes:  No adenopathy. Skin:  No spider angiomata. Jaundice. Respiratory:  Lungs clear to auscultation. Cardiovascular:  Regular heart rate. Abdomen:  Soft non-tender, Multiple scars. No obvious ascites. Extremities:  No lower extremity edema. Neurologic:  Alert and oriented. Cranial nerves grossly intact. No asterixis. LABORATORY:   Latest Reference Range & Units 9/6/22 05:34 9/7/22 02:48 9/8/22 02:47   WBC 4.1 - 11.1 K/uL 8.2  6.8   HGB 12.1 - 17.0 g/dL 7.8 (L)  7.9 (L)   PLATELET 009 - 020 K/uL 479 (H)  477 (H)   Sodium 136 - 145 mmol/L 133 (L) 135 (L) 132 (L)   Potassium 3.5 - 5.1 mmol/L 4.9 5.3 (H) 4.7   Chloride 97 - 108 mmol/L 109 (H) 109 (H) 109 (H)   CO2 21 - 32 mmol/L 17 (L) 16 (L) 14 (LL)   Glucose 65 - 100 mg/dL 111 (H) 83 82   BUN 6 - 20 MG/DL 45 (H) 45 (H) 45 (H)   Creatinine 0.70 - 1.30 MG/DL 1.47 (H) 1.51 (H) 1.63 (H)   Bilirubin, total 0.2 - 1.0 MG/DL 1.8 (H) 1.7 (H) 1.4 (H)   Albumin 3.5 - 5.0 g/dL 2.0 (L) 2.2 (L) 2.1 (L)   ALT 12 - 78 U/L 69 88 (H) 81 (H)   AST 15 - 37 U/L 57 (H) 81 (H) 70 (H)   Alk.  phosphatase 45 - 117 U/L 224 (H) 265 (H) 214 (H)   (LL): Data is critically low  (L): Data is abnormally low  (H): Data is abnormally high      Genet Merrill MD  [facility] of [address], [address] [facility], [place] [address].  [address] [facility]

## 2022-09-09 NOTE — PROGRESS NOTES
RUR: 14% Low     JAMES: Home health SN & PT with [facility] (p: [phone]). Patient's sister will likely provide transport home once medically stable. Follow-up with PCP/specialist.      Primary Contact: Bindu Hurleygilbert Weeks, [phone]     *Will need 2nd IM letter prior to discharge. 8:50AM - CM reviewed chart. Liver biopsy was not able to be completed yesterday as planned due to heparin not being held; biopsy re-scheduled. Patient currently off the floor for liver biopsy. Home health SN & PT arranged with [facility] (p: [phone]). CM will continue to follow as needed.      RORY Harvey   [phone]

## 2022-09-09 NOTE — PROGRESS NOTES
Physical Therapy  9/9/2022    Chart reviewed. Noted pt currently KIRAN (endoscopy). Will defer and follow up at later time as able and appropriate as time allows.        Angeline Means, PTA

## 2022-09-09 NOTE — PROGRESS NOTES
TRANSFER - IN REPORT:    Verbal report received from [address], [facility] [address] (name) on Dav Zazueta  being received from ENDO(unit) for routine post - op      Report consisted of patients Situation, Background, Assessment and   Recommendations(SBAR). Information from the following report(s) Procedure Summary was reviewed with the receiving nurse. Opportunity for questions and clarification was provided. Assessment completed upon patients arrival to unit and care assumed.

## 2022-09-09 NOTE — PROGRESS NOTES
[address] [employer] Progress Note        NAME: Donaldo Elizondo       :  1960       MRN:  [id number]     Date/Time: 2022    Re-consulted for SUHA/Acidosis on 22       Assessment:    Plan:  SUHA   Metabolic Acidosis  Hypokalemia-> resolved  Volume depletion/diarrhea  Hyperbilirubinemia: improving  Hx of crohns/bowel resection/short gut  Leukocytosis/uti=coag (-)  Nonobstructing renal stones  (R) M L infiltrate       Serum Cr rising again to 1.6->1.7mg/dl today  FeNa >1. Etiology unclear-> suspected intravascular depletion 2 to diarrhea/n/v. AIN possible but usually dx of exclusion (some pyuria/negative urine eos). CO2 improving from 14 to 18  Ct IV NS to D5W +150meq Sodium Bicarbonate-> lower rate to 50cc/hr  Holding oral sodium bicarbonate    Serum Na 132-> 136    S/p liver biopsy this morning    IV Abx       Subjective:     Chief Complaint:  S/p liver biopsy this morning. Now eating lunch. No diarrhea. Still some RUQ Abd pain. LE rash \" almost gone\" per patient    Review of Systems: currently no n/v/cp/sob    Objective:     VITALS:   Last 24hrs VS reviewed since prior progress note. Most recent are:  Visit Vitals  /85   Pulse 79   Temp 98.5 °F (36.9 °C)   Resp 17   Ht 5' 9\" (1.753 m)   Wt 89.1 kg (196 lb 6.9 oz)   SpO2 96%   BMI 29.01 kg/m²     SpO2 Readings from Last 6 Encounters:   22 96%   20 96%   20 99%   10/09/19 99%   19 98%   18 98%    O2 Flow Rate (L/min): 2 l/min     Intake/Output Summary (Last 24 hours) at 2022 1302  Last data filed at 2022 1830  Gross per 24 hour   Intake 360 ml   Output --   Net 360 ml          PHYSICAL EXAM:    General   well developed, well nourished, appears stated age, in no acute distress  EENT  Dry mm  Extremities  No edema.         Lab Data Reviewed: (see below)    Medications Reviewed: (see below)    PMH/SH reviewed - no change compared to H&P  ________________________________\  ___________________________________________________    Attending Physician: Giovanni Lott MD     ____________________________________________________  MEDICATIONS:  Current Facility-Administered Medications   Medication Dose Route Frequency    sodium chloride (NS) flush 5-40 mL  5-40 mL IntraVENous Q8H    sodium chloride (NS) flush 5-40 mL  5-40 mL IntraVENous PRN    fentaNYL citrate (PF) injection 25 mcg  25 mcg IntraVENous Q30MIN PRN    ondansetron (ZOFRAN) injection 4 mg  4 mg IntraVENous Q4H PRN    lidocaine (PF) (XYLOCAINE) 10 mg/mL (1 %) injection        cyclobenzaprine (FLEXERIL) tablet 5 mg  5 mg Oral TID PRN    dextrose 5% 1,000 mL with sodium bicarbonate (8.4%) 150 mEq infusion   IntraVENous CONTINUOUS    HYDROmorphone (DILAUDID) injection 3 mg  3 mg IntraVENous Q3H PRN    diphenhydrAMINE (BENADRYL) capsule 50 mg  50 mg Oral Q6H PRN    cefepime (MAXIPIME) 2 g in 0.9% sodium chloride (MBP/ADV) 100 mL MBP  2 g IntraVENous Q24H    clindamycin (CLEOCIN) 600mg D5W 50mL IVPB (premix)  600 mg IntraVENous Q8H    0.9% sodium chloride infusion 250 mL  250 mL IntraVENous PRN    loperamide (IMODIUM) capsule 4 mg  4 mg Oral Q4H PRN    albuterol (PROVENTIL VENTOLIN) nebulizer solution 2.5 mg  2.5 mg Nebulization Q4H PRN    alteplase (CATHFLO) 1 mg in sterile water (preservative free) 1 mL injection  1 mg InterCATHeter PRN    sodium chloride (NS) flush 5-40 mL  5-40 mL IntraVENous Q8H    sodium chloride (NS) flush 5-40 mL  5-40 mL IntraVENous PRN    diphenoxylate-atropine (LOMOTIL) tablet 1 Tablet  1 Tablet Oral QID PRN    sodium chloride (OCEAN) 0.65 % nasal squeeze bottle 2 Spray  2 Spray Both Nostrils Q2H PRN    oxyCODONE IR (ROXICODONE) tablet 12.5 mg  12.5 mg Oral Q6H PRN    diphenoxylate-atropine (LOMOTIL) tablet 1 Tablet  1 Tablet Oral QID    ergocalciferol capsule 50,000 Units  50,000 Units Oral Q7D    collagenase (SANTYL) 250 unit/gram ointment   Topical DAILY    fat emulsion 20% soy-oliv-fish oil (SMOFlipid) infusion 250 mL  250 mL IntraVENous Q24H    nicotine (NICODERM CQ) 21 mg/24 hr patch 1 Patch  1 Patch TransDERmal DAILY    naloxone (NARCAN) injection 0.4 mg  0.4 mg IntraVENous EVERY 2 MINUTES AS NEEDED    metoprolol tartrate (LOPRESSOR) tablet 6.25 mg  6.25 mg Oral Q12H    metoprolol (LOPRESSOR) injection 2.5 mg  2.5 mg IntraVENous Q4H PRN    bisacodyL (DULCOLAX) suppository 10 mg  10 mg Rectal DAILY PRN    pantoprazole (PROTONIX) 40 mg in 0.9% sodium chloride 10 mL injection  40 mg IntraVENous Q12H    sodium chloride (NS) flush 5-40 mL  5-40 mL IntraVENous Q8H    sodium chloride (NS) flush 5-40 mL  5-40 mL IntraVENous PRN    acetaminophen (TYLENOL) tablet 650 mg  650 mg Oral Q6H PRN    Or    acetaminophen (TYLENOL) suppository 650 mg  650 mg Rectal Q6H PRN    ondansetron (ZOFRAN ODT) tablet 4 mg  4 mg Oral Q8H PRN    Or    ondansetron (ZOFRAN) injection 4 mg  4 mg IntraVENous Q6H PRN    [Held by provider] heparin (porcine) injection 5,000 Units  5,000 Units SubCUTAneous Q8H        LABS:  Recent Labs     09/09/22 0257 09/08/22 0247   WBC 5.0 6.8   HGB 7.5* 7.9*   HCT 23.7* 24.9*   * 477*       Recent Labs     09/09/22 0257 09/08/22 0247 09/07/22  0248    132* 135*   K 4.5 4.7 5.3*   * 109* 109*   CO2 18* 14* 16*   BUN 44* 45* 45*   CREA 1.77* 1.63* 1.51*   GLU 78 82 83   CA 8.6 8.7 8.8   MG 1.8 1.8 1.9       Recent Labs     09/09/22 0257 09/08/22 0247 09/07/22  0248   ALT 80* 81* 88*   * 214* 265*   TBILI 1.3* 1.4* 1.7*   TP 6.9 6.8 6.4   ALB 2.1* 2.1* 2.2*   GLOB 4.8* 4.7* 4.2*       Recent Labs     09/09/22  0257 09/08/22  0247 09/07/22  0248   INR 1.1 1.1 1.0   PTP 11.4* 11.3* 10.8   APTT 32.5* 28.8 29.1

## 2022-09-09 NOTE — PROCEDURES
[facility], MD, [address], [place], [place]      GEORGIA Marcum, [facility]     Krista PAIGE Alexia, [facility]   Ruthanna Opitz, FNP-MANUELA Key, [facility]       Layo [facility] On license of [facility] 136    at [address], [address] Mary Jane Shah  22.    [phone]    FAX: [address] [place] [address], [address] [facility] [address]    [phone]    FAX: [phone]         LIVER BIOPSY PROCEDURE NOTE    Mckenna Cook  1960    INDICATIONS/PRE-OPERATIVE  DIAGNOSIS:  Elevated liver enzymes    : Henry Nogueira MD    SURGICAL ASSISTANT:  None    PROSTHETIC DEVICES, TISSUE GRAFTS, TRANSPLANTED ORGANS:  Not applicable    SEDATION: 1% Lidocaine injection 10 ml    PROCEDURE:  Informed consent to perform the procedure was obtained from the patient. The patient was positioned on the edge of the stretcher lying flat in the supine position. Ultrasound was utilized to image the liver. The diaphragm and any major mass lesion or vascular structures within the liver were identified. An appropriate site for liver biopsy was identified. The distance from the surface of the skin to the liver capsule was 3 cm. This area was prepped with betadine and draped in sterile fashion. The skin was infiltrated with 1% lidocaine. The deeper subcutanous tissues and liver capsule overlying the biopsy site were then infiltrated with 1% lidocaine until appropriate anesthesia was obtained. A small incision was made in the skin so the biopsy devise could be easily inserted. A total of 2 passes with the 16 gauge Bard biopsy devise was then made into the liver. Core(s) of liver tissue totaling 4 cm in length were obtained and placed into tissue fixative.   A band aid was placed over the biopsy site. The patient was then repositioned on the right side and transported to the recovery area on the stretcher for routine monitoring until discharge. The specimen was sent to pathology for processing via the normal transport mechanism. SPECIMEN COLLECTED: Liver    INTERVENTIONS:  None    ESTIMATED BLOOD LOSS: Negligible.      COMPLICATIONS:  None    POST-OPERATIVE DIAGNOSIS: Same as Pre-operative Diagnosis      Chi James MD  [address] [facility] [address] [facility] 38.  Barbara Elmore 7  [facility], [facility] 22.  [phone]  FAX:  [address]

## 2022-09-09 NOTE — PROGRESS NOTES
[address] [facility]  Hospitalist Group                                                                                          Hospitalist Progress Note  Georgia Ashraf MD  Answering service: [phone] from in house phone        Date of Service:  2022  NAME:  Nicole Hammer  :  1960  MRN:  [id number]      Admission Summary:     Patient presents with severe sepsis with SUHA, hyperbilirubinemia. History of Crohn's disease with multiple surgeries in the past.  Hepatology and GI following. Interval history / Subjective:     Patient is seen and examined at bedside this AM. He just underwent Liver bx this morning, tolerated well. C/o soreness at bx site. Feels tired. Explained worsening cr - nephro following   Discussed with nursing        Assessment & Plan:     Severe sepsis on poa   -Patient presents with fever, tachycardia, tachypnea, leukocytosis  -Sepsis is due to pneumonia  - initial chest x-ray shows consolidation of the right upper lobe  -Blood cultures negative  -Vancomycin discontinued per ID with concern for LE rash  -  changed to clindamycin on , continue cefepime    Bilateral lower extremity skin rash-improved   -Exam shows maculopapular rash on bilateral lower extremities associated with itching  -Likely antibiotic side effects? Other skin infection ?   -ID following    SUHA - cr worsening   -due to volume depletion and sepsis  -Initial improvement in SUHA and IV fluids were discontinued but noted bump in renal function on , IV fluid was resumed  -Continue IV fluid   - nephrology on board   -  monitor renal function    Metabolic acidosis - improving    - due to SUHA  - c/w Sod bicarb gtt   - will monitor     Diarrhea - improved   -Stool for C. difficile and enteric bacterial panel negative  -Patient with multiple bowel resections in the past for Crohn's with short gut syndrome  -GI following, continue antidiarrheal agents as needed    Nephrolithiasis  -Nonobstructing right intrarenal calculi  -Follow-up as outpatient    Crohn's disease  -S/p multiple abdominal surgery with short gut syndrome and chronic pain  -Flex sig done 9/1, no apparent abnormalities on flex sig  -regular diet . Off TPN     Acute on chronic anemia  -multifactorial including impaired iron absorption due to short gut and Crohn's disease  -S/p transfusion of 2 units PRBC  - EGD:  5 mm non bleeding , benign appearing ulcer seen in bulb. Biopsies obtained from  descending duodenum.   - hb low stable. Transfuse <7    Hyperbilirubinemia - improving   -Conjugated hyperbilirubinemia which is likely due to sepsis  -Nodular contour of the liver on the ultrasound  -MRCP negative for choledocholithiasis or bile duct obstruction  -Serologic testing for causes of chronic liver disease negative  -appreciate hepatology input  - liver bx done on 9/9    Coagulopathy  -Patient presents with elevated INR, this is likely due to advanced liver disease  -S/p vitamin K for 3 days    Chronic abdominal wound  -Chronic abdominal wound on anterior abdomen present for 3 years  -Negative fistula on CT  -Wound care following, on Santyl    PVCs  -Continue Lopressor    Tobacco use  -On nicotine patch    Code status: Full  Prophylaxis: Heparin   Care Plan discussed with: Patient  Anticipated Disposition: home with [facility]. possible dc Monday      Hospital Problems  Date Reviewed: 9/2/2022            Codes Class Noted POA    Acute cystitis ICD-10-CM: N30.00  ICD-9-CM: 595.0  8/27/2022 Unknown        Severe protein-calorie malnutrition ([facility] [address] (Chronic) ICD-10-CM: Q21  ICD-9-CM: 262  6/10/2015 Yes         Review of Systems:   A comprehensive review of systems was negative except for that written in the HPI. Vital Signs:    Last 24hrs VS reviewed since prior progress note.  Most recent are:  Visit Vitals  /85   Pulse 79   Temp 98.5 °F (36.9 °C)   Resp 17   Ht 5' 9\" (1.753 m)   Wt 89.1 kg (196 lb 6.9 oz)   SpO2 96%   BMI 29.01 kg/m²         Intake/Output Summary (Last 24 hours) at 9/9/2022 1359  Last data filed at 9/8/2022 1830  Gross per 24 hour   Intake 360 ml   Output --   Net 360 ml          Physical Examination:     I had a face to face encounter with this patient and independently examined them on 9/9/2022 as outlined below:          Constitutional:  No acute distress, cooperative, pleasant    ENT:  Oral mucosa moist, oropharynx benign. Resp:  CTA bilaterally. No wheezing/rhonchi/rales. No accessory muscle use. CV:  Regular rhythm, normal rate, no murmurs, gallops, rubs    GI:  Soft, non distended, non tender. normoactive bowel sounds, no hepatosplenomegaly     Musculoskeletal:  No edema, warm, 2+ pulses throughout    Neurologic:  Moves all extremities. AAOx3, CN II-XII reviewed            Data Review:    Review and/or order of clinical lab test      Labs:     Recent Labs     09/09/22 0257 09/08/22 0247   WBC 5.0 6.8   HGB 7.5* 7.9*   HCT 23.7* 24.9*   * 477*       Recent Labs     09/09/22 0257 09/08/22 0247 09/07/22 0248    132* 135*   K 4.5 4.7 5.3*   * 109* 109*   CO2 18* 14* 16*   BUN 44* 45* 45*   CREA 1.77* 1.63* 1.51*   GLU 78 82 83   CA 8.6 8.7 8.8   MG 1.8 1.8 1.9       Recent Labs     09/09/22 0257 09/08/22 0247 09/07/22 0248   ALT 80* 81* 88*   * 214* 265*   TBILI 1.3* 1.4* 1.7*   TP 6.9 6.8 6.4   ALB 2.1* 2.1* 2.2*   GLOB 4.8* 4.7* 4.2*       Recent Labs     09/09/22 0257 09/08/22 0247 09/07/22 0248   INR 1.1 1.1 1.0   PTP 11.4* 11.3* 10.8   APTT 32.5* 28.8 29.1        No results for input(s): FE, TIBC, PSAT, FERR in the last 72 hours. Lab Results   Component Value Date/Time    Folate 18.2 08/29/2022 01:12 PM        No results for input(s): PH, PCO2, PO2 in the last 72 hours. No results for input(s): CPK, CKNDX, TROIQ in the last 72 hours.     No lab exists for component: CPKMB  Lab Results   Component Value Date/Time    Cholesterol, total 134 12/01/2014 04:33 AM    HDL Cholesterol 35 12/01/2014 04:33 AM    LDL, calculated 58.2 12/01/2014 04:33 AM    Triglyceride 204 (H) 12/01/2014 04:33 AM    CHOL/HDL Ratio 3.8 12/01/2014 04:33 AM     Lab Results   Component Value Date/Time    Glucose (POC) 96 09/07/2022 04:44 PM    Glucose (POC) 105 09/07/2022 11:10 AM    Glucose (POC) 100 09/07/2022 05:52 AM    Glucose (POC) 107 09/06/2022 11:19 PM    Glucose (POC) 109 09/06/2022 06:16 PM     Lab Results   Component Value Date/Time    Color YELLOW/STRAW 09/09/2022 12:45 AM    Appearance CLEAR 09/09/2022 12:45 AM    Specific gravity 1.009 09/09/2022 12:45 AM    Specific gravity 1.010 10/07/2019 05:48 AM    pH (UA) 5.0 09/09/2022 12:45 AM    Protein 30 (A) 09/09/2022 12:45 AM    Glucose Negative 09/09/2022 12:45 AM    Ketone Negative 09/09/2022 12:45 AM    Bilirubin Negative 09/09/2022 12:45 AM    Urobilinogen 0.2 09/09/2022 12:45 AM    Nitrites Negative 09/09/2022 12:45 AM    Leukocyte Esterase TRACE (A) 09/09/2022 12:45 AM    Epithelial cells FEW 09/09/2022 12:45 AM    Bacteria Negative 09/09/2022 12:45 AM    WBC 20-50 09/09/2022 12:45 AM    RBC 0-5 09/09/2022 12:45 AM         Medications Reviewed:     Current Facility-Administered Medications   Medication Dose Route Frequency    sodium chloride (NS) flush 5-40 mL  5-40 mL IntraVENous Q8H    sodium chloride (NS) flush 5-40 mL  5-40 mL IntraVENous PRN    fentaNYL citrate (PF) injection 25 mcg  25 mcg IntraVENous Q30MIN PRN    ondansetron (ZOFRAN) injection 4 mg  4 mg IntraVENous Q4H PRN    lidocaine (PF) (XYLOCAINE) 10 mg/mL (1 %) injection        cyclobenzaprine (FLEXERIL) tablet 5 mg  5 mg Oral TID PRN    dextrose 5% 1,000 mL with sodium bicarbonate (8.4%) 150 mEq infusion   IntraVENous CONTINUOUS    HYDROmorphone (DILAUDID) injection 3 mg  3 mg IntraVENous Q3H PRN    diphenhydrAMINE (BENADRYL) capsule 50 mg  50 mg Oral Q6H PRN    cefepime (MAXIPIME) 2 g in 0.9% sodium chloride (MBP/ADV) 100 mL MBP  2 g IntraVENous Q24H    clindamycin (CLEOCIN) 600mg D5W 50mL IVPB (premix)  600 mg IntraVENous Q8H    0.9% sodium chloride infusion 250 mL  250 mL IntraVENous PRN    loperamide (IMODIUM) capsule 4 mg  4 mg Oral Q4H PRN    albuterol (PROVENTIL VENTOLIN) nebulizer solution 2.5 mg  2.5 mg Nebulization Q4H PRN    alteplase (CATHFLO) 1 mg in sterile water (preservative free) 1 mL injection  1 mg InterCATHeter PRN    sodium chloride (NS) flush 5-40 mL  5-40 mL IntraVENous Q8H    sodium chloride (NS) flush 5-40 mL  5-40 mL IntraVENous PRN    diphenoxylate-atropine (LOMOTIL) tablet 1 Tablet  1 Tablet Oral QID PRN    sodium chloride (OCEAN) 0.65 % nasal squeeze bottle 2 Spray  2 Spray Both Nostrils Q2H PRN    oxyCODONE IR (ROXICODONE) tablet 12.5 mg  12.5 mg Oral Q6H PRN    diphenoxylate-atropine (LOMOTIL) tablet 1 Tablet  1 Tablet Oral QID    ergocalciferol capsule 50,000 Units  50,000 Units Oral Q7D    collagenase (SANTYL) 250 unit/gram ointment   Topical DAILY    fat emulsion 20% soy-oliv-fish oil (SMOFlipid) infusion 250 mL  250 mL IntraVENous Q24H    nicotine (NICODERM CQ) 21 mg/24 hr patch 1 Patch  1 Patch TransDERmal DAILY    naloxone (NARCAN) injection 0.4 mg  0.4 mg IntraVENous EVERY 2 MINUTES AS NEEDED    metoprolol tartrate (LOPRESSOR) tablet 6.25 mg  6.25 mg Oral Q12H    metoprolol (LOPRESSOR) injection 2.5 mg  2.5 mg IntraVENous Q4H PRN    bisacodyL (DULCOLAX) suppository 10 mg  10 mg Rectal DAILY PRN    pantoprazole (PROTONIX) 40 mg in 0.9% sodium chloride 10 mL injection  40 mg IntraVENous Q12H    sodium chloride (NS) flush 5-40 mL  5-40 mL IntraVENous Q8H    sodium chloride (NS) flush 5-40 mL  5-40 mL IntraVENous PRN    acetaminophen (TYLENOL) tablet 650 mg  650 mg Oral Q6H PRN    Or    acetaminophen (TYLENOL) suppository 650 mg  650 mg Rectal Q6H PRN    ondansetron (ZOFRAN ODT) tablet 4 mg  4 mg Oral Q8H PRN    Or    ondansetron (ZOFRAN) injection 4 mg  4 mg IntraVENous Q6H PRN    [Held by provider] heparin (porcine) injection 5,000 Units  5,000 Units SubCUTAneous Q8H     ______________________________________________________________________  EXPECTED LENGTH OF STAY: 4d 19h  ACTUAL LENGTH OF STAY:          José Luis Latif MD

## 2022-09-10 LAB
ALBUMIN SERPL-MCNC: 2 G/DL (ref 3.5–5)
ALBUMIN/GLOB SERPL: 0.4 {RATIO} (ref 1.1–2.2)
ALP SERPL-CCNC: 167 U/L (ref 45–117)
ALT SERPL-CCNC: 74 U/L (ref 12–78)
ANION GAP SERPL CALC-SCNC: 7 MMOL/L (ref 5–15)
APTT PPP: 26.1 SEC (ref 22.1–31)
AST SERPL-CCNC: 63 U/L (ref 15–37)
BILIRUB SERPL-MCNC: 1.1 MG/DL (ref 0.2–1)
BUN SERPL-MCNC: 32 MG/DL (ref 6–20)
BUN/CREAT SERPL: 19 (ref 12–20)
CALCIUM SERPL-MCNC: 8.2 MG/DL (ref 8.5–10.1)
CHLORIDE SERPL-SCNC: 106 MMOL/L (ref 97–108)
CO2 SERPL-SCNC: 24 MMOL/L (ref 21–32)
COMMENT, HOLDF: NORMAL
CREAT SERPL-MCNC: 1.65 MG/DL (ref 0.7–1.3)
GLOBULIN SER CALC-MCNC: 4.5 G/DL (ref 2–4)
GLUCOSE SERPL-MCNC: 94 MG/DL (ref 65–100)
INR PPP: 1.1 (ref 0.9–1.1)
M TB IFN-G BLD-IMP: NEGATIVE
MAGNESIUM SERPL-MCNC: 1.7 MG/DL (ref 1.6–2.4)
POTASSIUM SERPL-SCNC: 3.8 MMOL/L (ref 3.5–5.1)
PROT SERPL-MCNC: 6.5 G/DL (ref 6.4–8.2)
PROTHROMBIN TIME: 11.4 SEC (ref 9–11.1)
QUANTIFERON CRITERIA, QFI1T: NORMAL
QUANTIFERON MITOGEN VALUE: 0.88 IU/ML
QUANTIFERON NIL VALUE: 0.08 IU/ML
QUANTIFERON TB1 AG: 0.07 IU/ML
QUANTIFERON TB2 AG: 0.06 IU/ML
SAMPLES BEING HELD,HOLD: NORMAL
SODIUM SERPL-SCNC: 137 MMOL/L (ref 136–145)
THERAPEUTIC RANGE,PTTT: NORMAL SECS (ref 58–77)

## 2022-09-10 PROCEDURE — 83735 ASSAY OF MAGNESIUM: CPT

## 2022-09-10 PROCEDURE — 74011250637 HC RX REV CODE- 250/637: Performed by: INTERNAL MEDICINE

## 2022-09-10 PROCEDURE — 74011250637 HC RX REV CODE- 250/637: Performed by: NURSE PRACTITIONER

## 2022-09-10 PROCEDURE — 74011250636 HC RX REV CODE- 250/636: Performed by: INTERNAL MEDICINE

## 2022-09-10 PROCEDURE — 80053 COMPREHEN METABOLIC PANEL: CPT

## 2022-09-10 PROCEDURE — 74011250636 HC RX REV CODE- 250/636: Performed by: FAMILY MEDICINE

## 2022-09-10 PROCEDURE — 74011000250 HC RX REV CODE- 250: Performed by: INTERNAL MEDICINE

## 2022-09-10 PROCEDURE — 85610 PROTHROMBIN TIME: CPT

## 2022-09-10 PROCEDURE — 65270000046 HC RM TELEMETRY

## 2022-09-10 PROCEDURE — C9113 INJ PANTOPRAZOLE SODIUM, VIA: HCPCS | Performed by: INTERNAL MEDICINE

## 2022-09-10 PROCEDURE — 85730 THROMBOPLASTIN TIME PARTIAL: CPT

## 2022-09-10 PROCEDURE — 36415 COLL VENOUS BLD VENIPUNCTURE: CPT

## 2022-09-10 PROCEDURE — 74011000258 HC RX REV CODE- 258: Performed by: FAMILY MEDICINE

## 2022-09-10 RX ORDER — PANTOPRAZOLE SODIUM 40 MG/1
40 TABLET, DELAYED RELEASE ORAL
Status: DISCONTINUED | OUTPATIENT
Start: 2022-09-10 | End: 2022-09-12 | Stop reason: HOSPADM

## 2022-09-10 RX ORDER — OXYCODONE HYDROCHLORIDE 5 MG/1
10 TABLET ORAL
Status: DISCONTINUED | OUTPATIENT
Start: 2022-09-10 | End: 2022-09-12 | Stop reason: HOSPADM

## 2022-09-10 RX ORDER — HYDROMORPHONE HYDROCHLORIDE 2 MG/ML
2 INJECTION, SOLUTION INTRAMUSCULAR; INTRAVENOUS; SUBCUTANEOUS
Status: DISCONTINUED | OUTPATIENT
Start: 2022-09-10 | End: 2022-09-11

## 2022-09-10 RX ORDER — SODIUM CHLORIDE 9 MG/ML
50 INJECTION, SOLUTION INTRAVENOUS CONTINUOUS
Status: DISCONTINUED | OUTPATIENT
Start: 2022-09-10 | End: 2022-09-12

## 2022-09-10 RX ADMIN — CEFEPIME 2 G: 2 INJECTION, POWDER, FOR SOLUTION INTRAVENOUS at 06:09

## 2022-09-10 RX ADMIN — HYDROMORPHONE HYDROCHLORIDE 2 MG: 2 INJECTION, SOLUTION INTRAMUSCULAR; INTRAVENOUS; SUBCUTANEOUS at 20:00

## 2022-09-10 RX ADMIN — SODIUM CHLORIDE 50 ML/HR: 900 INJECTION, SOLUTION INTRAVENOUS at 14:14

## 2022-09-10 RX ADMIN — DIPHENOXYLATE HYDROCHLORIDE AND ATROPINE SULFATE 1 TABLET: 2.5; .025 TABLET ORAL at 22:00

## 2022-09-10 RX ADMIN — HYDROMORPHONE HYDROCHLORIDE 3 MG: 2 INJECTION, SOLUTION INTRAMUSCULAR; INTRAVENOUS; SUBCUTANEOUS at 07:47

## 2022-09-10 RX ADMIN — HYDROMORPHONE HYDROCHLORIDE 3 MG: 2 INJECTION, SOLUTION INTRAMUSCULAR; INTRAVENOUS; SUBCUTANEOUS at 03:22

## 2022-09-10 RX ADMIN — DEXTROSE MONOHYDRATE: 5 INJECTION, SOLUTION INTRAVENOUS at 03:22

## 2022-09-10 RX ADMIN — PANTOPRAZOLE SODIUM 40 MG: 40 TABLET, DELAYED RELEASE ORAL at 16:29

## 2022-09-10 RX ADMIN — CLINDAMYCIN PHOSPHATE 600 MG: 600 INJECTION, SOLUTION INTRAVENOUS at 00:29

## 2022-09-10 RX ADMIN — SODIUM CHLORIDE 50 ML/HR: 900 INJECTION, SOLUTION INTRAVENOUS at 15:59

## 2022-09-10 RX ADMIN — SODIUM CHLORIDE, PRESERVATIVE FREE 40 MG: 5 INJECTION INTRAVENOUS at 10:28

## 2022-09-10 RX ADMIN — DIPHENOXYLATE HYDROCHLORIDE AND ATROPINE SULFATE 1 TABLET: 2.5; .025 TABLET ORAL at 11:48

## 2022-09-10 RX ADMIN — METOPROLOL TARTRATE 6.25 MG: 25 TABLET, FILM COATED ORAL at 22:01

## 2022-09-10 RX ADMIN — CLINDAMYCIN PHOSPHATE 600 MG: 600 INJECTION, SOLUTION INTRAVENOUS at 16:29

## 2022-09-10 RX ADMIN — HYDROMORPHONE HYDROCHLORIDE 2 MG: 2 INJECTION, SOLUTION INTRAMUSCULAR; INTRAVENOUS; SUBCUTANEOUS at 11:46

## 2022-09-10 RX ADMIN — CLINDAMYCIN PHOSPHATE 600 MG: 600 INJECTION, SOLUTION INTRAVENOUS at 07:30

## 2022-09-10 RX ADMIN — DIPHENOXYLATE HYDROCHLORIDE AND ATROPINE SULFATE 1 TABLET: 2.5; .025 TABLET ORAL at 17:47

## 2022-09-10 RX ADMIN — METOPROLOL TARTRATE 6.25 MG: 25 TABLET, FILM COATED ORAL at 10:29

## 2022-09-10 RX ADMIN — SODIUM CHLORIDE, PRESERVATIVE FREE 30 ML: 5 INJECTION INTRAVENOUS at 14:15

## 2022-09-10 RX ADMIN — OXYCODONE 10 MG: 5 TABLET ORAL at 10:29

## 2022-09-10 RX ADMIN — COLLAGENASE SANTYL: 250 OINTMENT TOPICAL at 10:24

## 2022-09-10 RX ADMIN — DIPHENHYDRAMINE HCL 50 MG: 50 CAPSULE ORAL at 22:10

## 2022-09-10 RX ADMIN — HYDROMORPHONE HYDROCHLORIDE 2 MG: 2 INJECTION, SOLUTION INTRAMUSCULAR; INTRAVENOUS; SUBCUTANEOUS at 15:59

## 2022-09-10 NOTE — PROGRESS NOTES
[facility] Progress Note        NAME: Keith Hardy       :  1960       MRN:  [id number]     Date/Time: 9/10/2022    Re-consulted for SUHA/Acidosis on 22       Assessment:    Plan:  SUHA   Metabolic Acidosis  Hypokalemia-> resolved  Volume depletion/diarrhea  Hyperbilirubinemia: improving  Hx of crohns/bowel resection/short gut  Leukocytosis/uti=coag (-)  Nonobstructing renal stones  (R) M L infiltrate       Serum Cr fairly stable. FeNa >1. Etiology unclear-> suspected intravascular depletion 2 to diarrhea/n/v. AIN possible but usually dx of exclusion (some pyuria/negative urine eos). CO2 better. Switch to NS. Serum better    S/p liver biopsy     IV Abx       Subjective:     Chief Complaint:  \"I'm a little sore at the biopsy site. \"  No N/V. Doesn't like lunch. No dyspnea. Review of Systems    Objective:     VITALS:   Last 24hrs VS reviewed since prior progress note. Most recent are:  Visit Vitals  /60   Pulse 77   Temp 97.6 °F (36.4 °C)   Resp 14   Ht 5' 9\" (1.753 m)   Wt 89.1 kg (196 lb 6.9 oz)   SpO2 91%   BMI 29.01 kg/m²     SpO2 Readings from Last 6 Encounters:   09/10/22 91%   20 96%   20 99%   10/09/19 99%   19 98%   18 98%    O2 Flow Rate (L/min): 2 l/min   No intake or output data in the 24 hours ending 09/10/22 1327       PHYSICAL EXAM:    General   well developed, well nourished, appears stated age, in no acute distress  EENT  Dry mm  Extremities  No edema.         Lab Data Reviewed: (see below)    Medications Reviewed: (see below)    PMH/SH reviewed - no change compared to H&P  ________________________________\  ___________________________________________________    Attending Physician: Yessi Joshi MD     ____________________________________________________  MEDICATIONS:  Current Facility-Administered Medications   Medication Dose Route Frequency    HYDROmorphone (DILAUDID) injection 2 mg  2 mg IntraVENous Q4H PRN    oxyCODONE IR (ROXICODONE) tablet 10 mg  10 mg Oral Q4H PRN    0.9% sodium chloride infusion  50 mL/hr IntraVENous CONTINUOUS    sodium chloride (NS) flush 5-40 mL  5-40 mL IntraVENous Q8H    sodium chloride (NS) flush 5-40 mL  5-40 mL IntraVENous PRN    ondansetron (ZOFRAN) injection 4 mg  4 mg IntraVENous Q4H PRN    cyclobenzaprine (FLEXERIL) tablet 5 mg  5 mg Oral TID PRN    diphenhydrAMINE (BENADRYL) capsule 50 mg  50 mg Oral Q6H PRN    cefepime (MAXIPIME) 2 g in 0.9% sodium chloride (MBP/ADV) 100 mL MBP  2 g IntraVENous Q24H    clindamycin (CLEOCIN) 600mg D5W 50mL IVPB (premix)  600 mg IntraVENous Q8H    0.9% sodium chloride infusion 250 mL  250 mL IntraVENous PRN    loperamide (IMODIUM) capsule 4 mg  4 mg Oral Q4H PRN    albuterol (PROVENTIL VENTOLIN) nebulizer solution 2.5 mg  2.5 mg Nebulization Q4H PRN    alteplase (CATHFLO) 1 mg in sterile water (preservative free) 1 mL injection  1 mg InterCATHeter PRN    sodium chloride (NS) flush 5-40 mL  5-40 mL IntraVENous Q8H    sodium chloride (NS) flush 5-40 mL  5-40 mL IntraVENous PRN    diphenoxylate-atropine (LOMOTIL) tablet 1 Tablet  1 Tablet Oral QID PRN    sodium chloride (OCEAN) 0.65 % nasal squeeze bottle 2 Spray  2 Spray Both Nostrils Q2H PRN    diphenoxylate-atropine (LOMOTIL) tablet 1 Tablet  1 Tablet Oral QID    ergocalciferol capsule 50,000 Units  50,000 Units Oral Q7D    collagenase (SANTYL) 250 unit/gram ointment   Topical DAILY    fat emulsion 20% soy-oliv-fish oil (SMOFlipid) infusion 250 mL  250 mL IntraVENous Q24H    nicotine (NICODERM CQ) 21 mg/24 hr patch 1 Patch  1 Patch TransDERmal DAILY    naloxone (NARCAN) injection 0.4 mg  0.4 mg IntraVENous EVERY 2 MINUTES AS NEEDED    metoprolol tartrate (LOPRESSOR) tablet 6.25 mg  6.25 mg Oral Q12H    metoprolol (LOPRESSOR) injection 2.5 mg  2.5 mg IntraVENous Q4H PRN    bisacodyL (DULCOLAX) suppository 10 mg  10 mg Rectal DAILY PRN    pantoprazole (PROTONIX) 40 mg in 0.9% sodium chloride 10 mL injection  40 mg IntraVENous Q12H    sodium chloride (NS) flush 5-40 mL  5-40 mL IntraVENous Q8H    sodium chloride (NS) flush 5-40 mL  5-40 mL IntraVENous PRN    acetaminophen (TYLENOL) tablet 650 mg  650 mg Oral Q6H PRN    Or    acetaminophen (TYLENOL) suppository 650 mg  650 mg Rectal Q6H PRN    ondansetron (ZOFRAN ODT) tablet 4 mg  4 mg Oral Q8H PRN    Or    ondansetron (ZOFRAN) injection 4 mg  4 mg IntraVENous Q6H PRN    [Held by provider] heparin (porcine) injection 5,000 Units  5,000 Units SubCUTAneous Q8H        LABS:  Recent Labs     09/09/22 0257 09/08/22 0247   WBC 5.0 6.8   HGB 7.5* 7.9*   HCT 23.7* 24.9*   * 477*       Recent Labs     09/10/22  0028 09/09/22  0257 09/08/22  0247    136 132*   K 3.8 4.5 4.7    112* 109*   CO2 24 18* 14*   BUN 32* 44* 45*   CREA 1.65* 1.77* 1.63*   GLU 94 78 82   CA 8.2* 8.6 8.7   MG 1.7 1.8 1.8       Recent Labs     09/10/22  0028 09/09/22  0257 09/08/22  0247   ALT 74 80* 81*   * 184* 214*   TBILI 1.1* 1.3* 1.4*   TP 6.5 6.9 6.8   ALB 2.0* 2.1* 2.1*   GLOB 4.5* 4.8* 4.7*       Recent Labs     09/10/22  0028 09/09/22  0257 09/08/22  0247   INR 1.1 1.1 1.1   PTP 11.4* 11.4* 11.3*   APTT 26.1 32.5* 28.8

## 2022-09-10 NOTE — PROGRESS NOTES
[address] [facility]  Hospitalist Group                                                                                          Hospitalist Progress Note  Sharon Peraza MD  Answering service: [phone] from in house phone        Date of Service:  9/10/2022  NAME:  Prem Soriano  :  1960  MRN:  [id number]      Admission Summary:     Patient presents with severe sepsis with SUHA, hyperbilirubinemia. History of Crohn's disease with multiple surgeries in the past.  Hepatology and GI following. Interval history / Subjective:     Patient is seen and examined at bedside this AM. He feels better. Cr improving. Encouraged him to ambulate in hallways. Will wean off Iv dilaudid   Discussed with nursing        Assessment & Plan:     Severe sepsis on poa   -Patient presents with fever, tachycardia, tachypnea, leukocytosis  -Sepsis is due to pneumonia  - initial chest x-ray shows consolidation of the right upper lobe  -Blood cultures negative  -Vancomycin discontinued per ID with concern for LE rash  -  changed to clindamycin on , continue cefepime    Bilateral lower extremity skin rash-improved   -Exam shows maculopapular rash on bilateral lower extremities associated with itching  -Likely antibiotic side effects? Other skin infection ?   -ID following    SUHA - cr mild improvement    -due to volume depletion and sepsis  -Initial improvement in SUHA and IV fluids were discontinued but noted bump in renal function on , IV fluid was resumed  -Continue IV fluid   - nephrology on board   -  monitor renal function    Metabolic acidosis - improved  - due to SUHA  - will monitor     Diarrhea - improved   -Stool for C. difficile and enteric bacterial panel negative  -Patient with multiple bowel resections in the past for Crohn's with short gut syndrome  -GI following, continue antidiarrheal agents as needed    Nephrolithiasis  -Nonobstructing right intrarenal calculi  -Follow-up as outpatient    Crohn's disease  -S/p multiple abdominal surgery with short gut syndrome and chronic pain  -Flex sig done 9/1, no apparent abnormalities on flex sig  -regular diet . Off TPN     Acute on chronic anemia  -multifactorial including impaired iron absorption due to short gut and Crohn's disease  -S/p transfusion of 2 units PRBC  - EGD:  5 mm non bleeding , benign appearing ulcer seen in bulb. Biopsies obtained from  descending duodenum.   - hb low stable. Transfuse <7    Hyperbilirubinemia - improving   -Conjugated hyperbilirubinemia which is likely due to sepsis  -Nodular contour of the liver on the ultrasound  -MRCP negative for choledocholithiasis or bile duct obstruction  -Serologic testing for causes of chronic liver disease negative  -appreciate hepatology input  - liver bx done on 9/9    Coagulopathy  -Patient presents with elevated INR, this is likely due to advanced liver disease  -S/p vitamin K for 3 days    Chronic abdominal wound  -Chronic abdominal wound on anterior abdomen present for 3 years  -Negative fistula on CT  -Wound care following, on Santyl    PVCs  -Continue Lopressor    Tobacco use  -On nicotine patch    Code status: Full  Prophylaxis: Heparin   Care Plan discussed with: Patient  Anticipated Disposition: home with Located within [facility]. possible dc Monday      Hospital Problems  Date Reviewed: 9/2/2022            Codes Class Noted POA    Acute cystitis ICD-10-CM: N30.00  ICD-9-CM: 595.0  8/27/2022 Unknown        Severe protein-calorie malnutrition ([facility] [address] (Chronic) ICD-10-CM: U55  ICD-9-CM: 262  6/10/2015 Yes         Review of Systems:   A comprehensive review of systems was negative except for that written in the HPI. Vital Signs:    Last 24hrs VS reviewed since prior progress note.  Most recent are:  Visit Vitals  /60   Pulse 77   Temp 97.6 °F (36.4 °C)   Resp 14   Ht 5' 9\" (1.753 m)   Wt 89.1 kg (196 lb 6.9 oz)   SpO2 91%   BMI 29.01 kg/m²       No intake or output data in the 24 hours ending 09/10/22 1349         Physical Examination:     I had a face to face encounter with this patient and independently examined them on 9/10/2022 as outlined below:          Constitutional:  No acute distress, cooperative, pleasant    ENT:  Oral mucosa moist, oropharynx benign. Resp:  CTA bilaterally. No wheezing/rhonchi/rales. No accessory muscle use. CV:  Regular rhythm, normal rate, no murmurs, gallops, rubs    GI:  Soft, non distended, non tender. normoactive bowel sounds, no hepatosplenomegaly     Musculoskeletal:  No edema, warm, 2+ pulses throughout    Neurologic:  Moves all extremities. AAOx3, CN II-XII reviewed            Data Review:    Review and/or order of clinical lab test      Labs:     Recent Labs     09/09/22 0257 09/08/22 0247   WBC 5.0 6.8   HGB 7.5* 7.9*   HCT 23.7* 24.9*   * 477*       Recent Labs     09/10/22  0028 09/09/22  0257 09/08/22  0247    136 132*   K 3.8 4.5 4.7    112* 109*   CO2 24 18* 14*   BUN 32* 44* 45*   CREA 1.65* 1.77* 1.63*   GLU 94 78 82   CA 8.2* 8.6 8.7   MG 1.7 1.8 1.8       Recent Labs     09/10/22  0028 09/09/22  0257 09/08/22  0247   ALT 74 80* 81*   * 184* 214*   TBILI 1.1* 1.3* 1.4*   TP 6.5 6.9 6.8   ALB 2.0* 2.1* 2.1*   GLOB 4.5* 4.8* 4.7*       Recent Labs     09/10/22  0028 09/09/22  0257 09/08/22  0247   INR 1.1 1.1 1.1   PTP 11.4* 11.4* 11.3*   APTT 26.1 32.5* 28.8        No results for input(s): FE, TIBC, PSAT, FERR in the last 72 hours. Lab Results   Component Value Date/Time    Folate 18.2 08/29/2022 01:12 PM        No results for input(s): PH, PCO2, PO2 in the last 72 hours. No results for input(s): CPK, CKNDX, TROIQ in the last 72 hours.     No lab exists for component: CPKMB  Lab Results   Component Value Date/Time    Cholesterol, total 134 12/01/2014 04:33 AM    HDL Cholesterol 35 12/01/2014 04:33 AM    LDL, calculated 58.2 12/01/2014 04:33 AM    Triglyceride 204 (H) 12/01/2014 04:33 AM    CHOL/HDL Ratio 3.8 12/01/2014 04:33 AM     Lab Results   Component Value Date/Time    Glucose (POC) 96 09/07/2022 04:44 PM    Glucose (POC) 105 09/07/2022 11:10 AM    Glucose (POC) 100 09/07/2022 05:52 AM    Glucose (POC) 107 09/06/2022 11:19 PM    Glucose (POC) 109 09/06/2022 06:16 PM     Lab Results   Component Value Date/Time    Color YELLOW/STRAW 09/09/2022 12:45 AM    Appearance CLEAR 09/09/2022 12:45 AM    Specific gravity 1.009 09/09/2022 12:45 AM    Specific gravity 1.010 10/07/2019 05:48 AM    pH (UA) 5.0 09/09/2022 12:45 AM    Protein 30 (A) 09/09/2022 12:45 AM    Glucose Negative 09/09/2022 12:45 AM    Ketone Negative 09/09/2022 12:45 AM    Bilirubin Negative 09/09/2022 12:45 AM    Urobilinogen 0.2 09/09/2022 12:45 AM    Nitrites Negative 09/09/2022 12:45 AM    Leukocyte Esterase TRACE (A) 09/09/2022 12:45 AM    Epithelial cells FEW 09/09/2022 12:45 AM    Bacteria Negative 09/09/2022 12:45 AM    WBC 20-50 09/09/2022 12:45 AM    RBC 0-5 09/09/2022 12:45 AM         Medications Reviewed:     Current Facility-Administered Medications   Medication Dose Route Frequency    HYDROmorphone (DILAUDID) injection 2 mg  2 mg IntraVENous Q4H PRN    oxyCODONE IR (ROXICODONE) tablet 10 mg  10 mg Oral Q4H PRN    0.9% sodium chloride infusion  50 mL/hr IntraVENous CONTINUOUS    sodium chloride (NS) flush 5-40 mL  5-40 mL IntraVENous Q8H    sodium chloride (NS) flush 5-40 mL  5-40 mL IntraVENous PRN    ondansetron (ZOFRAN) injection 4 mg  4 mg IntraVENous Q4H PRN    cyclobenzaprine (FLEXERIL) tablet 5 mg  5 mg Oral TID PRN    diphenhydrAMINE (BENADRYL) capsule 50 mg  50 mg Oral Q6H PRN    cefepime (MAXIPIME) 2 g in 0.9% sodium chloride (MBP/ADV) 100 mL MBP  2 g IntraVENous Q24H    clindamycin (CLEOCIN) 600mg D5W 50mL IVPB (premix)  600 mg IntraVENous Q8H    0.9% sodium chloride infusion 250 mL  250 mL IntraVENous PRN    loperamide (IMODIUM) capsule 4 mg  4 mg Oral Q4H PRN    albuterol (PROVENTIL VENTOLIN) nebulizer solution 2.5 mg  2.5 mg Nebulization Q4H PRN    alteplase (CATHFLO) 1 mg in sterile water (preservative free) 1 mL injection  1 mg InterCATHeter PRN    sodium chloride (NS) flush 5-40 mL  5-40 mL IntraVENous Q8H    sodium chloride (NS) flush 5-40 mL  5-40 mL IntraVENous PRN    diphenoxylate-atropine (LOMOTIL) tablet 1 Tablet  1 Tablet Oral QID PRN    sodium chloride (OCEAN) 0.65 % nasal squeeze bottle 2 Spray  2 Spray Both Nostrils Q2H PRN    diphenoxylate-atropine (LOMOTIL) tablet 1 Tablet  1 Tablet Oral QID    ergocalciferol capsule 50,000 Units  50,000 Units Oral Q7D    collagenase (SANTYL) 250 unit/gram ointment   Topical DAILY    fat emulsion 20% soy-oliv-fish oil (SMOFlipid) infusion 250 mL  250 mL IntraVENous Q24H    nicotine (NICODERM CQ) 21 mg/24 hr patch 1 Patch  1 Patch TransDERmal DAILY    naloxone (NARCAN) injection 0.4 mg  0.4 mg IntraVENous EVERY 2 MINUTES AS NEEDED    metoprolol tartrate (LOPRESSOR) tablet 6.25 mg  6.25 mg Oral Q12H    metoprolol (LOPRESSOR) injection 2.5 mg  2.5 mg IntraVENous Q4H PRN    bisacodyL (DULCOLAX) suppository 10 mg  10 mg Rectal DAILY PRN    pantoprazole (PROTONIX) 40 mg in 0.9% sodium chloride 10 mL injection  40 mg IntraVENous Q12H    sodium chloride (NS) flush 5-40 mL  5-40 mL IntraVENous Q8H    sodium chloride (NS) flush 5-40 mL  5-40 mL IntraVENous PRN    acetaminophen (TYLENOL) tablet 650 mg  650 mg Oral Q6H PRN    Or    acetaminophen (TYLENOL) suppository 650 mg  650 mg Rectal Q6H PRN    ondansetron (ZOFRAN ODT) tablet 4 mg  4 mg Oral Q8H PRN    Or    ondansetron (ZOFRAN) injection 4 mg  4 mg IntraVENous Q6H PRN    [Held by provider] heparin (porcine) injection 5,000 Units  5,000 Units SubCUTAneous Q8H     ______________________________________________________________________  EXPECTED LENGTH OF STAY: 4d 19h  ACTUAL LENGTH OF STAY:          Hardik Dooley MD

## 2022-09-11 LAB
ALBUMIN SERPL-MCNC: 2.1 G/DL (ref 3.5–5)
ALBUMIN/GLOB SERPL: 0.5 {RATIO} (ref 1.1–2.2)
ALP SERPL-CCNC: 152 U/L (ref 45–117)
ALT SERPL-CCNC: 70 U/L (ref 12–78)
ANION GAP SERPL CALC-SCNC: 7 MMOL/L (ref 5–15)
APTT PPP: 29.4 SEC (ref 22.1–31)
AST SERPL-CCNC: 52 U/L (ref 15–37)
BILIRUB DIRECT SERPL-MCNC: 1 MG/DL (ref 0–0.2)
BILIRUB SERPL-MCNC: 1.2 MG/DL (ref 0.2–1)
BUN SERPL-MCNC: 18 MG/DL (ref 6–20)
BUN/CREAT SERPL: 14 (ref 12–20)
CALCIUM SERPL-MCNC: 8.4 MG/DL (ref 8.5–10.1)
CHLORIDE SERPL-SCNC: 104 MMOL/L (ref 97–108)
CO2 SERPL-SCNC: 27 MMOL/L (ref 21–32)
CREAT SERPL-MCNC: 1.33 MG/DL (ref 0.7–1.3)
ERYTHROCYTE [DISTWIDTH] IN BLOOD BY AUTOMATED COUNT: 15.3 % (ref 11.5–14.5)
GLOBULIN SER CALC-MCNC: 4.5 G/DL (ref 2–4)
GLUCOSE SERPL-MCNC: 85 MG/DL (ref 65–100)
HCT VFR BLD AUTO: 23.3 % (ref 36.6–50.3)
HGB BLD-MCNC: 7.6 G/DL (ref 12.1–17)
INR PPP: 1.1 (ref 0.9–1.1)
MAGNESIUM SERPL-MCNC: 1.4 MG/DL (ref 1.6–2.4)
MCH RBC QN AUTO: 31 PG (ref 26–34)
MCHC RBC AUTO-ENTMCNC: 32.6 G/DL (ref 30–36.5)
MCV RBC AUTO: 95.1 FL (ref 80–99)
NRBC # BLD: 0 K/UL (ref 0–0.01)
NRBC BLD-RTO: 0 PER 100 WBC
PLATELET # BLD AUTO: 432 K/UL (ref 150–400)
PMV BLD AUTO: 9.2 FL (ref 8.9–12.9)
POTASSIUM SERPL-SCNC: 3.3 MMOL/L (ref 3.5–5.1)
PROT SERPL-MCNC: 6.6 G/DL (ref 6.4–8.2)
PROTHROMBIN TIME: 11.5 SEC (ref 9–11.1)
RBC # BLD AUTO: 2.45 M/UL (ref 4.1–5.7)
SODIUM SERPL-SCNC: 138 MMOL/L (ref 136–145)
THERAPEUTIC RANGE,PTTT: NORMAL SECS (ref 58–77)
WBC # BLD AUTO: 5.3 K/UL (ref 4.1–11.1)

## 2022-09-11 PROCEDURE — 74011250636 HC RX REV CODE- 250/636: Performed by: FAMILY MEDICINE

## 2022-09-11 PROCEDURE — 74011000250 HC RX REV CODE- 250: Performed by: INTERNAL MEDICINE

## 2022-09-11 PROCEDURE — 85730 THROMBOPLASTIN TIME PARTIAL: CPT

## 2022-09-11 PROCEDURE — 83735 ASSAY OF MAGNESIUM: CPT

## 2022-09-11 PROCEDURE — 36415 COLL VENOUS BLD VENIPUNCTURE: CPT

## 2022-09-11 PROCEDURE — 85027 COMPLETE CBC AUTOMATED: CPT

## 2022-09-11 PROCEDURE — 74011250636 HC RX REV CODE- 250/636: Performed by: INTERNAL MEDICINE

## 2022-09-11 PROCEDURE — 82248 BILIRUBIN DIRECT: CPT

## 2022-09-11 PROCEDURE — 74011250637 HC RX REV CODE- 250/637: Performed by: INTERNAL MEDICINE

## 2022-09-11 PROCEDURE — 74011000258 HC RX REV CODE- 258: Performed by: FAMILY MEDICINE

## 2022-09-11 PROCEDURE — 65270000046 HC RM TELEMETRY

## 2022-09-11 PROCEDURE — 85610 PROTHROMBIN TIME: CPT

## 2022-09-11 PROCEDURE — 74011000250 HC RX REV CODE- 250: Performed by: FAMILY MEDICINE

## 2022-09-11 PROCEDURE — 74011250637 HC RX REV CODE- 250/637: Performed by: NURSE PRACTITIONER

## 2022-09-11 PROCEDURE — 80053 COMPREHEN METABOLIC PANEL: CPT

## 2022-09-11 RX ORDER — POTASSIUM CHLORIDE 750 MG/1
40 TABLET, FILM COATED, EXTENDED RELEASE ORAL
Status: COMPLETED | OUTPATIENT
Start: 2022-09-11 | End: 2022-09-11

## 2022-09-11 RX ORDER — HYDROMORPHONE HYDROCHLORIDE 1 MG/ML
1 INJECTION, SOLUTION INTRAMUSCULAR; INTRAVENOUS; SUBCUTANEOUS
Status: DISCONTINUED | OUTPATIENT
Start: 2022-09-11 | End: 2022-09-12

## 2022-09-11 RX ADMIN — OXYCODONE 10 MG: 5 TABLET ORAL at 21:38

## 2022-09-11 RX ADMIN — COLLAGENASE SANTYL: 250 OINTMENT TOPICAL at 09:00

## 2022-09-11 RX ADMIN — HYDROMORPHONE HYDROCHLORIDE 1 MG: 1 INJECTION, SOLUTION INTRAMUSCULAR; INTRAVENOUS; SUBCUTANEOUS at 09:03

## 2022-09-11 RX ADMIN — METOPROLOL TARTRATE 6.25 MG: 25 TABLET, FILM COATED ORAL at 09:00

## 2022-09-11 RX ADMIN — SODIUM CHLORIDE 50 ML/HR: 900 INJECTION, SOLUTION INTRAVENOUS at 12:31

## 2022-09-11 RX ADMIN — CLINDAMYCIN PHOSPHATE 600 MG: 600 INJECTION, SOLUTION INTRAVENOUS at 23:58

## 2022-09-11 RX ADMIN — DIPHENOXYLATE HYDROCHLORIDE AND ATROPINE SULFATE 1 TABLET: 2.5; .025 TABLET ORAL at 21:38

## 2022-09-11 RX ADMIN — POTASSIUM CHLORIDE 40 MEQ: 750 TABLET, FILM COATED, EXTENDED RELEASE ORAL at 08:59

## 2022-09-11 RX ADMIN — SODIUM CHLORIDE, PRESERVATIVE FREE 10 ML: 5 INJECTION INTRAVENOUS at 00:05

## 2022-09-11 RX ADMIN — CLINDAMYCIN PHOSPHATE 600 MG: 600 INJECTION, SOLUTION INTRAVENOUS at 15:41

## 2022-09-11 RX ADMIN — CLINDAMYCIN PHOSPHATE 600 MG: 600 INJECTION, SOLUTION INTRAVENOUS at 00:01

## 2022-09-11 RX ADMIN — SODIUM CHLORIDE, PRESERVATIVE FREE 10 ML: 5 INJECTION INTRAVENOUS at 21:43

## 2022-09-11 RX ADMIN — CEFEPIME 2 G: 2 INJECTION, POWDER, FOR SOLUTION INTRAVENOUS at 04:27

## 2022-09-11 RX ADMIN — OXYCODONE 10 MG: 5 TABLET ORAL at 16:39

## 2022-09-11 RX ADMIN — HYDROMORPHONE HYDROCHLORIDE 2 MG: 2 INJECTION, SOLUTION INTRAMUSCULAR; INTRAVENOUS; SUBCUTANEOUS at 00:02

## 2022-09-11 RX ADMIN — PANTOPRAZOLE SODIUM 40 MG: 40 TABLET, DELAYED RELEASE ORAL at 07:16

## 2022-09-11 RX ADMIN — CEFEPIME 2 G: 2 INJECTION, POWDER, FOR SOLUTION INTRAVENOUS at 17:33

## 2022-09-11 RX ADMIN — DIPHENOXYLATE HYDROCHLORIDE AND ATROPINE SULFATE 1 TABLET: 2.5; .025 TABLET ORAL at 12:31

## 2022-09-11 RX ADMIN — SODIUM CHLORIDE, PRESERVATIVE FREE 10 ML: 5 INJECTION INTRAVENOUS at 21:44

## 2022-09-11 RX ADMIN — DIPHENHYDRAMINE HCL 50 MG: 50 CAPSULE ORAL at 21:38

## 2022-09-11 RX ADMIN — METOPROLOL TARTRATE 6.25 MG: 25 TABLET, FILM COATED ORAL at 21:37

## 2022-09-11 RX ADMIN — DIPHENOXYLATE HYDROCHLORIDE AND ATROPINE SULFATE 1 TABLET: 2.5; .025 TABLET ORAL at 08:59

## 2022-09-11 RX ADMIN — HYDROMORPHONE HYDROCHLORIDE 2 MG: 2 INJECTION, SOLUTION INTRAMUSCULAR; INTRAVENOUS; SUBCUTANEOUS at 04:37

## 2022-09-11 RX ADMIN — DIPHENOXYLATE HYDROCHLORIDE AND ATROPINE SULFATE 1 TABLET: 2.5; .025 TABLET ORAL at 17:32

## 2022-09-11 RX ADMIN — CLINDAMYCIN PHOSPHATE 600 MG: 600 INJECTION, SOLUTION INTRAVENOUS at 07:17

## 2022-09-11 RX ADMIN — SODIUM CHLORIDE, PRESERVATIVE FREE 10 ML: 5 INJECTION INTRAVENOUS at 21:39

## 2022-09-11 RX ADMIN — PANTOPRAZOLE SODIUM 40 MG: 40 TABLET, DELAYED RELEASE ORAL at 15:41

## 2022-09-11 NOTE — PROGRESS NOTES
[facility] Progress Note        NAME: Gina Bellamy       :  1960       MRN:  [id number]     Date/Time: 2022    Re-consulted for SUHA/Acidosis on 22       Assessment:    Plan:  SUHA   Metabolic Acidosis  Hypokalemia-> resolved  Volume depletion/diarrhea  Hyperbilirubinemia: improving  Hx of crohns/bowel resection/short gut  Leukocytosis/uti=coag (-)  Nonobstructing renal stones  (R) M L infiltrate       Serum Cr better. CO2 better. Switched to NS. S/p liver biopsy     IV Abx    I will sign off. Please call if any questions. Subjective:     Chief Complaint:  No complaints. Review of Systems    Objective:     VITALS:   Last 24hrs VS reviewed since prior progress note. Most recent are:  Visit Vitals  /68   Pulse 81   Temp 98.3 °F (36.8 °C)   Resp 18   Ht 5' 9\" (1.753 m)   Wt 89.1 kg (196 lb 6.9 oz)   SpO2 93%   BMI 29.01 kg/m²     SpO2 Readings from Last 6 Encounters:   22 93%   20 96%   20 99%   10/09/19 99%   19 98%   18 98%    O2 Flow Rate (L/min): 2 l/min   No intake or output data in the 24 hours ending 22 1201       PHYSICAL EXAM:    General   well developed, well nourished, appears stated age, in no acute distress  EENT  Dry mm  Extremities  No edema.         Lab Data Reviewed: (see below)    Medications Reviewed: (see below)    PMH/SH reviewed - no change compared to H&P  ________________________________\  ___________________________________________________    Attending Physician: Radha Hernandez MD     ____________________________________________________  MEDICATIONS:  Current Facility-Administered Medications   Medication Dose Route Frequency    HYDROmorphone (DILAUDID) injection 1 mg  1 mg IntraVENous Q6H PRN    oxyCODONE IR (ROXICODONE) tablet 10 mg  10 mg Oral Q4H PRN    0.9% sodium chloride infusion  50 mL/hr IntraVENous CONTINUOUS    pantoprazole (PROTONIX) tablet 40 mg  40 mg Oral ACB&D    fat emulsion 20% (LIPOSYN, INTRAlipid) infusion 250 mL  250 mL IntraVENous Q24H    sodium chloride (NS) flush 5-40 mL  5-40 mL IntraVENous Q8H    sodium chloride (NS) flush 5-40 mL  5-40 mL IntraVENous PRN    ondansetron (ZOFRAN) injection 4 mg  4 mg IntraVENous Q4H PRN    cyclobenzaprine (FLEXERIL) tablet 5 mg  5 mg Oral TID PRN    diphenhydrAMINE (BENADRYL) capsule 50 mg  50 mg Oral Q6H PRN    cefepime (MAXIPIME) 2 g in 0.9% sodium chloride (MBP/ADV) 100 mL MBP  2 g IntraVENous Q24H    clindamycin (CLEOCIN) 600mg D5W 50mL IVPB (premix)  600 mg IntraVENous Q8H    0.9% sodium chloride infusion 250 mL  250 mL IntraVENous PRN    loperamide (IMODIUM) capsule 4 mg  4 mg Oral Q4H PRN    albuterol (PROVENTIL VENTOLIN) nebulizer solution 2.5 mg  2.5 mg Nebulization Q4H PRN    alteplase (CATHFLO) 1 mg in sterile water (preservative free) 1 mL injection  1 mg InterCATHeter PRN    sodium chloride (NS) flush 5-40 mL  5-40 mL IntraVENous Q8H    sodium chloride (NS) flush 5-40 mL  5-40 mL IntraVENous PRN    diphenoxylate-atropine (LOMOTIL) tablet 1 Tablet  1 Tablet Oral QID PRN    sodium chloride (OCEAN) 0.65 % nasal squeeze bottle 2 Spray  2 Spray Both Nostrils Q2H PRN    diphenoxylate-atropine (LOMOTIL) tablet 1 Tablet  1 Tablet Oral QID    ergocalciferol capsule 50,000 Units  50,000 Units Oral Q7D    collagenase (SANTYL) 250 unit/gram ointment   Topical DAILY    nicotine (NICODERM CQ) 21 mg/24 hr patch 1 Patch  1 Patch TransDERmal DAILY    naloxone (NARCAN) injection 0.4 mg  0.4 mg IntraVENous EVERY 2 MINUTES AS NEEDED    metoprolol tartrate (LOPRESSOR) tablet 6.25 mg  6.25 mg Oral Q12H    metoprolol (LOPRESSOR) injection 2.5 mg  2.5 mg IntraVENous Q4H PRN    bisacodyL (DULCOLAX) suppository 10 mg  10 mg Rectal DAILY PRN    sodium chloride (NS) flush 5-40 mL  5-40 mL IntraVENous Q8H    sodium chloride (NS) flush 5-40 mL  5-40 mL IntraVENous PRN    acetaminophen (TYLENOL) tablet 650 mg  650 mg Oral Q6H PRN    Or    acetaminophen (TYLENOL) suppository 650 mg  650 mg Rectal Q6H PRN    ondansetron (ZOFRAN ODT) tablet 4 mg  4 mg Oral Q8H PRN    Or    ondansetron (ZOFRAN) injection 4 mg  4 mg IntraVENous Q6H PRN    [Held by provider] heparin (porcine) injection 5,000 Units  5,000 Units SubCUTAneous Q8H        LABS:  Recent Labs     09/11/22 0435 09/09/22 0257   WBC 5.3 5.0   HGB 7.6* 7.5*   HCT 23.3* 23.7*   * 470*       Recent Labs     09/11/22  0435 09/10/22  0028 09/09/22  0257    137 136   K 3.3* 3.8 4.5    106 112*   CO2 27 24 18*   BUN 18 32* 44*   CREA 1.33* 1.65* 1.77*   GLU 85 94 78   CA 8.4* 8.2* 8.6   MG 1.4* 1.7 1.8       Recent Labs     09/11/22  0435 09/10/22  0028 09/09/22  0257   ALT 70 74 80*   * 167* 184*   TBILI 1.2* 1.1* 1.3*   TP 6.6 6.5 6.9   ALB 2.1* 2.0* 2.1*   GLOB 4.5* 4.5* 4.8*       Recent Labs     09/11/22  0435 09/10/22  0028 09/09/22  0257   INR 1.1 1.1 1.1   PTP 11.5* 11.4* 11.4*   APTT 29.4 26.1 32.5*

## 2022-09-11 NOTE — PROGRESS NOTES
[address] [facility]  Hospitalist Group                                                                                          Hospitalist Progress Note  Kalli Dowd MD  Answering service: [phone] from in house phone        Date of Service:  2022  NAME:  Jesus Alberto Pineda  :  1960  MRN:  [id number]      Admission Summary:     Patient presents with severe sepsis with SUHA, hyperbilirubinemia. History of Crohn's disease with multiple surgeries in the past.  Hepatology and GI following. Interval history / Subjective:     Patient is seen and examined at bedside this AM.   He is upset regarding decreasing dilaudid dose and frequency - explained again that his pain is chronic - does not need IV, plan to dc him tomorrow and weaning off IV dilaudid   Discussed with nursing        Assessment & Plan:     Severe sepsis on poa   -Patient presents with fever, tachycardia, tachypnea, leukocytosis  -Sepsis is due to pneumonia  - initial chest x-ray shows consolidation of the right upper lobe  -Blood cultures negative  -Vancomycin discontinued per ID with concern for LE rash  -  changed to clindamycin on , continue cefepime    Bilateral lower extremity skin rash-improved   -Exam shows maculopapular rash on bilateral lower extremities associated with itching  -Likely antibiotic side effects? Other skin infection ?   -ID following    SUHA - improving   -due to volume depletion and sepsis  -Initial improvement in SUHA and IV fluids were discontinued but noted bump in renal function on , IV fluid was resumed  -Continue IV fluid   - appreciate nephrology input   -  monitor renal function    Metabolic acidosis - resolved   - due to SUHA  - will monitor     Diarrhea - improved   -Stool for C. difficile and enteric bacterial panel negative  -Patient with multiple bowel resections in the past for Crohn's with short gut syndrome  -GI following, continue antidiarrheal agents as needed    Nephrolithiasis  -Nonobstructing right intrarenal calculi  -Follow-up as outpatient    Crohn's disease  -S/p multiple abdominal surgery with short gut syndrome and chronic pain  -Flex sig done 9/1, no apparent abnormalities on flex sig  -regular diet . Off TPN     Acute on chronic anemia  -multifactorial including impaired iron absorption due to short gut and Crohn's disease  -S/p transfusion of 2 units PRBC  - EGD:  5 mm non bleeding , benign appearing ulcer seen in bulb. Biopsies obtained from  descending duodenum.   - hb low stable. Transfuse <7    Hyperbilirubinemia - improving   -Conjugated hyperbilirubinemia which is likely due to sepsis  -Nodular contour of the liver on the ultrasound  -MRCP negative for choledocholithiasis or bile duct obstruction  -Serologic testing for causes of chronic liver disease negative  -appreciate hepatology input  - liver bx done on 9/9    Coagulopathy  -Patient presents with elevated INR, this is likely due to advanced liver disease  -S/p vitamin K for 3 days    Chronic abdominal wound  -Chronic abdominal wound on anterior abdomen present for 3 years  -Negative fistula on CT  -Wound care following, on Santyl    PVCs  -Continue Lopressor    Tobacco use  -On nicotine patch    Code status: Full  Prophylaxis: Heparin   Care Plan discussed with: Patient  Anticipated Disposition: home with [facility]. possible dc Monday      Hospital Problems  Date Reviewed: 9/2/2022            Codes Class Noted POA    Acute cystitis ICD-10-CM: N30.00  ICD-9-CM: 595.0  8/27/2022 Unknown        Severe protein-calorie malnutrition ([facility] [address] (Chronic) ICD-10-CM: H87  ICD-9-CM: 262  6/10/2015 Yes       Review of Systems:   A comprehensive review of systems was negative except for that written in the HPI. Vital Signs:    Last 24hrs VS reviewed since prior progress note.  Most recent are:  Visit Vitals  /68   Pulse 81   Temp 98.3 °F (36.8 °C)   Resp 18   Ht 5' 9\" (1.753 m)   Wt 89.1 kg (196 lb 6.9 oz)   SpO2 93%   BMI 29.01 kg/m²       No intake or output data in the 24 hours ending 09/11/22 1128         Physical Examination:     I had a face to face encounter with this patient and independently examined them on 9/11/2022 as outlined below:          Constitutional:  No acute distress, cooperative, pleasant    ENT:  Oral mucosa moist, oropharynx benign. Resp:  CTA bilaterally. No wheezing/rhonchi/rales. No accessory muscle use. CV:  Regular rhythm, normal rate, no murmurs, gallops, rubs    GI:  Soft, non distended, non tender. normoactive bowel sounds, no hepatosplenomegaly     Musculoskeletal:  No edema, warm, 2+ pulses throughout    Neurologic:  Moves all extremities. AAOx3, CN II-XII reviewed            Data Review:    Review and/or order of clinical lab test      Labs:     Recent Labs     09/11/22 0435 09/09/22 0257   WBC 5.3 5.0   HGB 7.6* 7.5*   HCT 23.3* 23.7*   * 470*       Recent Labs     09/11/22  0435 09/10/22  0028 09/09/22  0257    137 136   K 3.3* 3.8 4.5    106 112*   CO2 27 24 18*   BUN 18 32* 44*   CREA 1.33* 1.65* 1.77*   GLU 85 94 78   CA 8.4* 8.2* 8.6   MG 1.4* 1.7 1.8       Recent Labs     09/11/22  0435 09/10/22  0028 09/09/22  0257   ALT 70 74 80*   * 167* 184*   TBILI 1.2* 1.1* 1.3*   TP 6.6 6.5 6.9   ALB 2.1* 2.0* 2.1*   GLOB 4.5* 4.5* 4.8*       Recent Labs     09/11/22  0435 09/10/22  0028 09/09/22  0257   INR 1.1 1.1 1.1   PTP 11.5* 11.4* 11.4*   APTT 29.4 26.1 32.5*        No results for input(s): FE, TIBC, PSAT, FERR in the last 72 hours. Lab Results   Component Value Date/Time    Folate 18.2 08/29/2022 01:12 PM        No results for input(s): PH, PCO2, PO2 in the last 72 hours. No results for input(s): CPK, CKNDX, TROIQ in the last 72 hours.     No lab exists for component: CPKMB  Lab Results   Component Value Date/Time    Cholesterol, total 134 12/01/2014 04:33 AM    HDL Cholesterol 35 12/01/2014 04:33 AM    LDL, calculated 58.2 12/01/2014 04:33 AM    Triglyceride 204 (H) 12/01/2014 04:33 AM    CHOL/HDL Ratio 3.8 12/01/2014 04:33 AM     Lab Results   Component Value Date/Time    Glucose (POC) 96 09/07/2022 04:44 PM    Glucose (POC) 105 09/07/2022 11:10 AM    Glucose (POC) 100 09/07/2022 05:52 AM    Glucose (POC) 107 09/06/2022 11:19 PM    Glucose (POC) 109 09/06/2022 06:16 PM     Lab Results   Component Value Date/Time    Color YELLOW/STRAW 09/09/2022 12:45 AM    Appearance CLEAR 09/09/2022 12:45 AM    Specific gravity 1.009 09/09/2022 12:45 AM    Specific gravity 1.010 10/07/2019 05:48 AM    pH (UA) 5.0 09/09/2022 12:45 AM    Protein 30 (A) 09/09/2022 12:45 AM    Glucose Negative 09/09/2022 12:45 AM    Ketone Negative 09/09/2022 12:45 AM    Bilirubin Negative 09/09/2022 12:45 AM    Urobilinogen 0.2 09/09/2022 12:45 AM    Nitrites Negative 09/09/2022 12:45 AM    Leukocyte Esterase TRACE (A) 09/09/2022 12:45 AM    Epithelial cells FEW 09/09/2022 12:45 AM    Bacteria Negative 09/09/2022 12:45 AM    WBC 20-50 09/09/2022 12:45 AM    RBC 0-5 09/09/2022 12:45 AM         Medications Reviewed:     Current Facility-Administered Medications   Medication Dose Route Frequency    HYDROmorphone (DILAUDID) injection 1 mg  1 mg IntraVENous Q6H PRN    oxyCODONE IR (ROXICODONE) tablet 10 mg  10 mg Oral Q4H PRN    0.9% sodium chloride infusion  50 mL/hr IntraVENous CONTINUOUS    pantoprazole (PROTONIX) tablet 40 mg  40 mg Oral ACB&D    fat emulsion 20% (LIPOSYN, INTRAlipid) infusion 250 mL  250 mL IntraVENous Q24H    sodium chloride (NS) flush 5-40 mL  5-40 mL IntraVENous Q8H    sodium chloride (NS) flush 5-40 mL  5-40 mL IntraVENous PRN    ondansetron (ZOFRAN) injection 4 mg  4 mg IntraVENous Q4H PRN    cyclobenzaprine (FLEXERIL) tablet 5 mg  5 mg Oral TID PRN    diphenhydrAMINE (BENADRYL) capsule 50 mg  50 mg Oral Q6H PRN    cefepime (MAXIPIME) 2 g in 0.9% sodium chloride (MBP/ADV) 100 mL MBP  2 g IntraVENous Q24H    clindamycin (CLEOCIN) 600mg D5W 50mL IVPB (premix) 600 mg IntraVENous Q8H    0.9% sodium chloride infusion 250 mL  250 mL IntraVENous PRN    loperamide (IMODIUM) capsule 4 mg  4 mg Oral Q4H PRN    albuterol (PROVENTIL VENTOLIN) nebulizer solution 2.5 mg  2.5 mg Nebulization Q4H PRN    alteplase (CATHFLO) 1 mg in sterile water (preservative free) 1 mL injection  1 mg InterCATHeter PRN    sodium chloride (NS) flush 5-40 mL  5-40 mL IntraVENous Q8H    sodium chloride (NS) flush 5-40 mL  5-40 mL IntraVENous PRN    diphenoxylate-atropine (LOMOTIL) tablet 1 Tablet  1 Tablet Oral QID PRN    sodium chloride (OCEAN) 0.65 % nasal squeeze bottle 2 Spray  2 Spray Both Nostrils Q2H PRN    diphenoxylate-atropine (LOMOTIL) tablet 1 Tablet  1 Tablet Oral QID    ergocalciferol capsule 50,000 Units  50,000 Units Oral Q7D    collagenase (SANTYL) 250 unit/gram ointment   Topical DAILY    nicotine (NICODERM CQ) 21 mg/24 hr patch 1 Patch  1 Patch TransDERmal DAILY    naloxone (NARCAN) injection 0.4 mg  0.4 mg IntraVENous EVERY 2 MINUTES AS NEEDED    metoprolol tartrate (LOPRESSOR) tablet 6.25 mg  6.25 mg Oral Q12H    metoprolol (LOPRESSOR) injection 2.5 mg  2.5 mg IntraVENous Q4H PRN    bisacodyL (DULCOLAX) suppository 10 mg  10 mg Rectal DAILY PRN    sodium chloride (NS) flush 5-40 mL  5-40 mL IntraVENous Q8H    sodium chloride (NS) flush 5-40 mL  5-40 mL IntraVENous PRN    acetaminophen (TYLENOL) tablet 650 mg  650 mg Oral Q6H PRN    Or    acetaminophen (TYLENOL) suppository 650 mg  650 mg Rectal Q6H PRN    ondansetron (ZOFRAN ODT) tablet 4 mg  4 mg Oral Q8H PRN    Or    ondansetron (ZOFRAN) injection 4 mg  4 mg IntraVENous Q6H PRN    [Held by provider] heparin (porcine) injection 5,000 Units  5,000 Units SubCUTAneous Q8H     ______________________________________________________________________  EXPECTED LENGTH OF STAY: 4d 19h  ACTUAL LENGTH OF STAY:          15                 Parish Mancera MD

## 2022-09-11 NOTE — PROGRESS NOTES
Renal Monitoring of cefepime  Indication:  UTI/intraabdominal infection  Current regimen:  cefepime 2g q24h    Recent Labs     22  0435 09/10/22  0028 22  0257   WBC 5.3  --  5.0   CREA 1.33* 1.65* 1.77*   BUN 18 32* 44*     Est CrCl: 64.4 ml/min;    Temp (24hrs), Av.3 °F (36.8 °C), Min:98 °F (36.7 °C), Max:98.9 °F (37.2 °C)      Plan: Change to cefepime 2g q12h per protocol for estimated renal function > 60 ml/min    Selena Ceja, PharmD  Clinical Pharmacist  [facility] ()

## 2022-09-12 VITALS
OXYGEN SATURATION: 92 % | BODY MASS INDEX: 29.09 KG/M2 | HEART RATE: 79 BPM | WEIGHT: 196.43 LBS | DIASTOLIC BLOOD PRESSURE: 77 MMHG | SYSTOLIC BLOOD PRESSURE: 154 MMHG | TEMPERATURE: 98.2 F | RESPIRATION RATE: 18 BRPM | HEIGHT: 69 IN

## 2022-09-12 LAB
ALBUMIN SERPL-MCNC: 2.3 G/DL (ref 3.5–5)
ALBUMIN/GLOB SERPL: 0.5 {RATIO} (ref 1.1–2.2)
ALP SERPL-CCNC: 145 U/L (ref 45–117)
ALT SERPL-CCNC: 70 U/L (ref 12–78)
ANION GAP SERPL CALC-SCNC: 7 MMOL/L (ref 5–15)
APTT PPP: 30.5 SEC (ref 22.1–31)
AST SERPL-CCNC: 55 U/L (ref 15–37)
BILIRUB DIRECT SERPL-MCNC: 0.8 MG/DL (ref 0–0.2)
BILIRUB SERPL-MCNC: 1.2 MG/DL (ref 0.2–1)
BUN SERPL-MCNC: 14 MG/DL (ref 6–20)
BUN/CREAT SERPL: 11 (ref 12–20)
CALCIUM SERPL-MCNC: 8.4 MG/DL (ref 8.5–10.1)
CHLORIDE SERPL-SCNC: 105 MMOL/L (ref 97–108)
CO2 SERPL-SCNC: 25 MMOL/L (ref 21–32)
CREAT SERPL-MCNC: 1.29 MG/DL (ref 0.7–1.3)
GLOBULIN SER CALC-MCNC: 4.8 G/DL (ref 2–4)
GLUCOSE SERPL-MCNC: 78 MG/DL (ref 65–100)
INR PPP: 1.1 (ref 0.9–1.1)
MAGNESIUM SERPL-MCNC: 1.4 MG/DL (ref 1.6–2.4)
POTASSIUM SERPL-SCNC: 3.2 MMOL/L (ref 3.5–5.1)
PROT SERPL-MCNC: 7.1 G/DL (ref 6.4–8.2)
PROTHROMBIN TIME: 11.9 SEC (ref 9–11.1)
SODIUM SERPL-SCNC: 137 MMOL/L (ref 136–145)
THERAPEUTIC RANGE,PTTT: NORMAL SECS (ref 58–77)

## 2022-09-12 PROCEDURE — 74011250636 HC RX REV CODE- 250/636: Performed by: FAMILY MEDICINE

## 2022-09-12 PROCEDURE — 36415 COLL VENOUS BLD VENIPUNCTURE: CPT

## 2022-09-12 PROCEDURE — 74011250636 HC RX REV CODE- 250/636: Performed by: INTERNAL MEDICINE

## 2022-09-12 PROCEDURE — 74011250637 HC RX REV CODE- 250/637: Performed by: INTERNAL MEDICINE

## 2022-09-12 PROCEDURE — 80053 COMPREHEN METABOLIC PANEL: CPT

## 2022-09-12 PROCEDURE — 74011250637 HC RX REV CODE- 250/637: Performed by: NURSE PRACTITIONER

## 2022-09-12 PROCEDURE — 74011000250 HC RX REV CODE- 250: Performed by: FAMILY MEDICINE

## 2022-09-12 PROCEDURE — 74011000258 HC RX REV CODE- 258: Performed by: FAMILY MEDICINE

## 2022-09-12 PROCEDURE — 74011000250 HC RX REV CODE- 250: Performed by: INTERNAL MEDICINE

## 2022-09-12 PROCEDURE — 82248 BILIRUBIN DIRECT: CPT

## 2022-09-12 PROCEDURE — 85730 THROMBOPLASTIN TIME PARTIAL: CPT

## 2022-09-12 PROCEDURE — 85610 PROTHROMBIN TIME: CPT

## 2022-09-12 PROCEDURE — 83735 ASSAY OF MAGNESIUM: CPT

## 2022-09-12 RX ORDER — ERGOCALCIFEROL 1.25 MG/1
50000 CAPSULE ORAL
Qty: 4 CAPSULE | Refills: 1 | Status: SHIPPED | OUTPATIENT
Start: 2022-09-14 | End: 2022-09-12 | Stop reason: SDUPTHER

## 2022-09-12 RX ORDER — ERGOCALCIFEROL 1.25 MG/1
50000 CAPSULE ORAL
Qty: 4 CAPSULE | Refills: 1 | Status: SHIPPED | OUTPATIENT
Start: 2022-09-14 | End: 2022-11-03

## 2022-09-12 RX ORDER — METOPROLOL TARTRATE 25 MG/1
12.5 TABLET, FILM COATED ORAL EVERY 12 HOURS
Qty: 60 TABLET | Refills: 0 | Status: SHIPPED | OUTPATIENT
Start: 2022-09-12 | End: 2022-09-12 | Stop reason: SDUPTHER

## 2022-09-12 RX ORDER — HYDROCORTISONE 1 %
CREAM (GRAM) TOPICAL 2 TIMES DAILY
Qty: 30 G | Refills: 0 | Status: SHIPPED | OUTPATIENT
Start: 2022-09-12 | End: 2022-09-12 | Stop reason: SDUPTHER

## 2022-09-12 RX ORDER — POTASSIUM CHLORIDE 750 MG/1
40 TABLET, FILM COATED, EXTENDED RELEASE ORAL
Status: COMPLETED | OUTPATIENT
Start: 2022-09-12 | End: 2022-09-12

## 2022-09-12 RX ORDER — IBUPROFEN 200 MG
1 TABLET ORAL DAILY
Qty: 30 PATCH | Refills: 0 | Status: SHIPPED | OUTPATIENT
Start: 2022-09-13 | End: 2022-10-13

## 2022-09-12 RX ORDER — HYDROCORTISONE 1 %
CREAM (GRAM) TOPICAL 2 TIMES DAILY
Qty: 30 G | Refills: 0 | Status: SHIPPED | OUTPATIENT
Start: 2022-09-12

## 2022-09-12 RX ORDER — LEVOFLOXACIN 500 MG/1
500 TABLET, FILM COATED ORAL DAILY
Qty: 5 TABLET | Refills: 0 | Status: SHIPPED | OUTPATIENT
Start: 2022-09-12 | End: 2022-09-12 | Stop reason: SDUPTHER

## 2022-09-12 RX ORDER — MAGNESIUM SULFATE HEPTAHYDRATE 40 MG/ML
2 INJECTION, SOLUTION INTRAVENOUS ONCE
Status: COMPLETED | OUTPATIENT
Start: 2022-09-12 | End: 2022-09-12

## 2022-09-12 RX ORDER — LEVOFLOXACIN 500 MG/1
500 TABLET, FILM COATED ORAL DAILY
Qty: 5 TABLET | Refills: 0 | Status: SHIPPED | OUTPATIENT
Start: 2022-09-12

## 2022-09-12 RX ORDER — METOPROLOL TARTRATE 25 MG/1
12.5 TABLET, FILM COATED ORAL EVERY 12 HOURS
Qty: 60 TABLET | Refills: 0 | Status: SHIPPED | OUTPATIENT
Start: 2022-09-12

## 2022-09-12 RX ORDER — IBUPROFEN 200 MG
1 TABLET ORAL DAILY
Qty: 30 PATCH | Refills: 0 | Status: SHIPPED | OUTPATIENT
Start: 2022-09-13 | End: 2022-09-12 | Stop reason: SDUPTHER

## 2022-09-12 RX ORDER — BACITRACIN 500 UNIT/G
PACKET (EA) TOPICAL
Status: DISCONTINUED
Start: 2022-09-12 | End: 2022-09-12 | Stop reason: HOSPADM

## 2022-09-12 RX ADMIN — COLLAGENASE SANTYL: 250 OINTMENT TOPICAL at 08:59

## 2022-09-12 RX ADMIN — SODIUM CHLORIDE, PRESERVATIVE FREE 10 ML: 5 INJECTION INTRAVENOUS at 07:14

## 2022-09-12 RX ADMIN — DIPHENOXYLATE HYDROCHLORIDE AND ATROPINE SULFATE 1 TABLET: 2.5; .025 TABLET ORAL at 08:55

## 2022-09-12 RX ADMIN — PANTOPRAZOLE SODIUM 40 MG: 40 TABLET, DELAYED RELEASE ORAL at 07:13

## 2022-09-12 RX ADMIN — POTASSIUM CHLORIDE 40 MEQ: 750 TABLET, FILM COATED, EXTENDED RELEASE ORAL at 10:26

## 2022-09-12 RX ADMIN — SODIUM CHLORIDE 50 ML/HR: 900 INJECTION, SOLUTION INTRAVENOUS at 07:13

## 2022-09-12 RX ADMIN — CEFEPIME 2 G: 2 INJECTION, POWDER, FOR SOLUTION INTRAVENOUS at 04:25

## 2022-09-12 RX ADMIN — METOPROLOL TARTRATE 6.25 MG: 25 TABLET, FILM COATED ORAL at 08:55

## 2022-09-12 RX ADMIN — CLINDAMYCIN PHOSPHATE 600 MG: 600 INJECTION, SOLUTION INTRAVENOUS at 08:55

## 2022-09-12 RX ADMIN — MAGNESIUM SULFATE HEPTAHYDRATE 2 G: 40 INJECTION, SOLUTION INTRAVENOUS at 09:06

## 2022-09-12 NOTE — PROGRESS NOTES
RUR: 11% Low     JAMES: Home health SN & PT with [facility] (p: [phone]). Patient's sister will likely provide transport home once medically stable. Follow-up with PCP/specialist.      Primary Contact: Chaitanya Orellana, [phone]     Medicare pt has received, reviewed, and signed 2nd IM letter informing them of their right to appeal the discharge. Signed copied has been placed on pt bedside chart. 10:55AM - CM reviewed chart and noted discharge order. Home health SN & PT arranged with [facility] (p: [phone]). No further CM needs.     Ramya Ramirez, MSW   [phone]

## 2022-09-12 NOTE — DISCHARGE SUMMARY
Discharge Summary     Patient:  Jesus Alberto Pineda       MRN: [id number]       YOB: 1960       Age: 65 y.o. Date of admission:  8/26/2022    Date of discharge:  9/12/2022    Primary care provider: Dr. Carmen Nicole MD    Admitting provider:  Rick Sehn MD    Discharging provider:  Mary Anne Williamson [address] [phone]. If unavailable, call [phone] and ask the  to page the triage hospitalist.    Consultations  IP CONSULT TO HOSPITALIST  IP CONSULT TO GASTROENTEROLOGY  IP CONSULT TO NEPHROLOGY  IP CONSULT TO INTENSIVIST  IP CONSULT TO HEPATOLOGY  IP CONSULT TO INFECTIOUS DISEASES  IP CONSULT TO COLORECTAL SURGERY    Procedures  Procedure(s):  LIVER BIOPSY    Discharge destination: Home with [facility]. The patient is stable for discharge. Admission diagnosis  Acute cystitis [N30.00]    Current Discharge Medication List        START taking these medications    Details   ergocalciferol (ERGOCALCIFEROL) 1,250 mcg (50,000 unit) capsule Take 1 Capsule by mouth every seven (7) days for 8 doses. Qty: 4 Capsule, Refills: 1  Start date: 9/14/2022, End date: 11/3/2022      metoprolol tartrate (LOPRESSOR) 25 mg tablet Take 0.5 Tablets by mouth every twelve (12) hours. Qty: 60 Tablet, Refills: 0  Start date: 9/12/2022      nicotine (NICODERM CQ) 21 mg/24 hr 1 Patch by TransDERmal route daily for 30 days. Qty: 30 Patch, Refills: 0  Start date: 9/13/2022, End date: 10/13/2022      levoFLOXacin (Levaquin) 500 mg tablet Take 1 Tablet by mouth daily. Qty: 5 Tablet, Refills: 0  Start date: 9/12/2022      hydrocortisone (CORTAID) 1 % topical cream Apply  to affected area two (2) times a day.  use thin layer  Qty: 30 g, Refills: 0  Start date: 9/12/2022           CONTINUE these medications which have NOT CHANGED    Details   gabapentin (NEURONTIN) 300 mg capsule Take 300 mg by mouth three (3) times daily.      oxyCODONE (ROXICODONE) 5 mg/5 mL solution Take 10 mg by mouth every six to eight (6-8) hours as needed for Pain. omeprazole (PRILOSEC) 40 mg capsule take 1 capsule by mouth once daily  Refills: 0      chlorzoxazone (PARAFON FORTE) 500 mg tablet Take 500-1,000 mg by mouth daily as needed for Muscle Spasm(s). diphenoxylate-atropine (LOMOTIL) 2.5-0.025 mg per tablet Take 2 Tabs by mouth Before breakfast, lunch, dinner and at bedtime. loperamide (IMODIUM) 2 mg capsule Take 2 mg by mouth as needed for Diarrhea. Patient takes 14-16 capsules a day. ondansetron hcl (ZOFRAN, AS HYDROCHLORIDE,) 8 mg tablet Take 8 mg by mouth every eight (8) hours as needed for Nausea. Follow-up Information       Follow up With Specialties Details Why [facility] Follow up  [address] [place] [address], [facility] [address], 303 [facility]  [address] 310 [facility]      Cyndie Luciano MD Gastroenterology Follow up in 1 month(s)  [address] [facility]  [address] [place] [place] Kanumair Owens MD Hepatology Follow up in 2 week(s)  200 [facility]  200 [facility]  [phone]              Final discharge diagnoses and brief hospital course  Patient presents with severe sepsis with SUHA, hyperbilirubinemia.   History of Crohn's disease with multiple surgeries in the past.     Severe sepsis on poa - improved   -Patient presents with fever, tachycardia, tachypnea, leukocytosis  -Sepsis is due to pneumonia  - initial chest x-ray shows consolidation of the right upper lobe  -Blood cultures negative  -Vancomycin discontinued per ID with concern for LE rash  -  changed to clindamycin on 9/5, continue cefepime  - discussed with ID Dr. Lexus Bee - po levaquin x 5 more days on dc      Bilateral lower extremity skin rash-resolved    -Exam shows maculopapular rash on bilateral lower extremities associated with itching  -Likely antibiotic side effects? Other skin infection ? SUHA - improved  -due to volume depletion and sepsis  -Initial improvement in SUHA and IV fluids were discontinued but noted bump in renal function on 9/5, IV fluid was resumed  - appreciate nephrology input   -  monitor renal function     Metabolic acidosis - resolved   - due to SUHA  - will monitor      Diarrhea - improved   -Stool for C. difficile and enteric bacterial panel negative  -Patient with multiple bowel resections in the past for Crohn's with short gut syndrome  -GI following, continue antidiarrheal agents as needed     Nephrolithiasis  -Nonobstructing right intrarenal calculi  -Follow-up as outpatient     Crohn's disease  -S/p multiple abdominal surgery with short gut syndrome and chronic pain  -Flex sig done 9/1, no apparent abnormalities on flex sig  -regular diet . Off TPN      Acute on chronic anemia  -multifactorial including impaired iron absorption due to short gut and Crohn's disease  -S/p transfusion of 2 units PRBC  - EGD:  5 mm non bleeding , benign appearing ulcer seen in bulb. Biopsies obtained from  descending duodenum.   - hb low stable.  Transfuse <7     Hyperbilirubinemia - improved  -Conjugated hyperbilirubinemia which is likely due to sepsis  -Nodular contour of the liver on the ultrasound  -MRCP negative for choledocholithiasis or bile duct obstruction  -Serologic testing for causes of chronic liver disease negative  -appreciate hepatology input  - liver bx done on 9/9, path pending      Coagulopathy  -Patient presents with elevated INR, this is likely due to advanced liver disease  -S/p vitamin K for 3 days     Chronic abdominal wound  -Chronic abdominal wound on anterior abdomen present for 3 years  -Negative fistula on CT  -Wound care following, on Santyl     PVCs  -Continue Lopressor     Tobacco use  -On nicotine patch    Discharge recommendations:  PCP in 1 week   GI in 1-2 months   Hepatology in 1-2 weeks for liver bx report  Po Abx   Cbc, bmp in 1 week   Pain management as needed     Discharge plan discussed in detail with patient. He understood and agreed     High risk for readmission     Physical examination at discharge  Visit Vitals  BP (!) 147/74   Pulse 72   Temp 98 °F (36.7 °C)   Resp 18   Ht 5' 9\" (1.753 m)   Wt 89.1 kg (196 lb 6.9 oz)   SpO2 94%   BMI 29.01 kg/m²     Constitutional:  No acute distress, cooperative, pleasant    ENT:  Oral mucosa moist, oropharynx benign. Resp:  CTA bilaterally. No wheezing/rhonchi/rales. No accessory muscle use. CV:  Regular rhythm, normal rate, no murmurs, gallops, rubs    GI:  Soft, non distended, non tender. normoactive bowel sounds, no hepatosplenomegaly     Musculoskeletal:  No edema, warm, 2+ pulses throughout    Neurologic:  Moves all extremities. AAOx3, CN II-XII reviewed                             Pertinent imaging studies:    Per EMR     Recent Labs     09/11/22 0435   WBC 5.3   HGB 7.6*   HCT 23.3*   *     Recent Labs     09/12/22  0427 09/11/22  0435 09/10/22  0028    138 137   K 3.2* 3.3* 3.8    104 106   CO2 25 27 24   BUN 14 18 32*   CREA 1.29 1.33* 1.65*   GLU 78 85 94   CA 8.4* 8.4* 8.2*   MG 1.4* 1.4* 1.7     Recent Labs     09/12/22  0427 09/11/22  0435 09/10/22  0028   * 152* 167*   TP 7.1 6.6 6.5   ALB 2.3* 2.1* 2.0*   GLOB 4.8* 4.5* 4.5*     Recent Labs     09/12/22  0427 09/11/22  0435 09/10/22  0028   INR 1.1 1.1 1.1   PTP 11.9* 11.5* 11.4*   APTT 30.5 29.4 26.1      No results for input(s): FE, TIBC, PSAT, FERR in the last 72 hours. No results for input(s): PH, PCO2, PO2 in the last 72 hours. No results for input(s): CPK, CKMB in the last 72 hours.     No lab exists for component: TROPONINI  No components found for: [place]    Chronic Diagnoses:    Problem List as of 9/12/2022 Date Reviewed: 9/2/2022            Codes Class Noted - Resolved Acute cystitis ICD-10-CM: N30.00  ICD-9-CM: 595.0  8/27/2022 - Present        Croup ICD-10-CM: J05.0  ICD-9-CM: 464.4  6/29/2019 - Present        Crohn disease ([place] [address] ICD-10-CM: K50.90  ICD-9-CM: 555.9  6/29/2019 - Present        HTN (hypertension) ICD-10-CM: I10  ICD-9-CM: 401.9  6/29/2019 - Present        Abdominal pain ICD-10-CM: R10.9  ICD-9-CM: 789.00  6/29/2019 - Present        Abdominal wound dehiscence ICD-10-CM: T81.30XA  ICD-9-CM: 998.30  12/4/2017 - Present        Intussusception of intestine ([place] [address] ICD-10-CM: K56.1  ICD-9-CM: 560.0  11/3/2017 - Present        Bowel obstruction ([place] [address] ICD-10-CM: O95.554  ICD-9-CM: 560.9  11/3/2017 - Present        Incisional hernia, without obstruction or gangrene (Chronic) ICD-10-CM: K43.2  ICD-9-CM: 553.21  1/31/2017 - Present        Back pain, chronic ICD-10-CM: M54.9, G89.29  ICD-9-CM: 724.5, 338.29  9/30/2015 - Present        Short bowel syndrome (Chronic) ICD-10-CM: K91.2  ICD-9-CM: 579.3  9/28/2015 - Present        Severe protein-calorie malnutrition (HCC) (Chronic) ICD-10-CM: E43  ICD-9-CM: 262  6/10/2015 - Present        Chronic pain (Chronic) ICD-10-CM: C96.92  ICD-9-CM: 338.29  3/17/2015 - Present        Crohn's disease ([facility] (Chronic) ICD-10-CM: K50.90  ICD-9-CM: 555.9  6/15/2012 - Present        GERD (gastroesophageal reflux disease) (Chronic) ICD-10-CM: K21.9  ICD-9-CM: 530.81  6/15/2012 - Present        Hiatal hernia (Chronic) ICD-10-CM: K44.9  ICD-9-CM: 553.3  6/15/2012 - Present        B12 deficiency ICD-10-CM: E53.8  ICD-9-CM: 266.2  6/15/2012 - Present        RESOLVED: Abscess ICD-10-CM: L02.91  ICD-9-CM: 682.9  8/9/2016 - 8/12/2016        RESOLVED: Abdominal pain, acute, right lower quadrant ICD-10-CM: R10.31  ICD-9-CM: 789.03, 338.19  9/30/2015 - 12/1/2015        RESOLVED: Diarrhea ICD-10-CM: R19.7  ICD-9-CM: 787.91  9/30/2015 - 12/1/2015        RESOLVED: Abdominal abscess ICD-10-CM: [id number]  ICD-9-CM: ANA1599  9/28/2015 - 12/1/2015        RESOLVED: Venous stasis of both lower extremities ICD-10-CM: I87.8  ICD-9-CM: 459.81  7/29/2015 - 9/28/2015        RESOLVED: Cellulitis of both lower extremities ICD-10-CM: L03.115, L03.116  ICD-9-CM: 682.6  7/29/2015 - 9/28/2015        RESOLVED: Postoperative wound dehiscence ICD-10-CM: T81.31XA  ICD-9-CM: 998.32  7/26/2015 - 9/28/2015        RESOLVED: Syncope and collapse ICD-10-CM: R55  ICD-9-CM: 780.2  6/21/2015 - 7/14/2015        RESOLVED: Metabolic alkalosis TZB-01-YW: E87.3  ICD-9-CM: 276.3  6/10/2015 - 7/14/2015        RESOLVED: Acute kidney injury ([facility] [address] ICD-10-CM: N17.9  ICD-9-CM: 584.9  6/10/2015 - 9/28/2015        RESOLVED: Hypomagnesemia ICD-10-CM: E83.42  ICD-9-CM: 275.2  6/10/2015 - 7/14/2015        RESOLVED: Hyponatremia ICD-10-CM: E87.1  ICD-9-CM: 276.1  6/8/2015 - 9/28/2015        RESOLVED: Hypokalemia ICD-10-CM: E87.6  ICD-9-CM: 276.8  6/8/2015 - 7/14/2015        RESOLVED: Hyponatremia ICD-10-CM: E87.1  ICD-9-CM: 276.1  5/4/2015 - 5/10/2015        RESOLVED: Elevated serum creatinine (Chronic) ICD-10-CM: R79.89  ICD-9-CM: 790.99  4/20/2015 - 9/28/2015        RESOLVED: High output ileostomy (HCC) (Chronic) ICD-10-CM: R19.8, Z93.2  ICD-9-CM: 787.99, V44.2  4/20/2015 - 9/28/2015        RESOLVED: Ileocolic anastomotic leak ICD-10-CM: K91.89  ICD-9-CM: 997.49  3/23/2015 - 6/8/2015        RESOLVED: S/P ileostomy ([facility] [address] (Chronic) ICD-10-CM: Z93.2  ICD-9-CM: V44.2  3/17/2015 - 9/28/2015        RESOLVED: Abscess of right shoulder ICD-10-CM: L02.413  ICD-9-CM: 682.3  11/30/2014 - 12/3/2014        RESOLVED: Dehydration ICD-10-CM: E86.0  ICD-9-CM: 276.51  10/16/2014 - 3/17/2015        RESOLVED: Blood loss anemia ICD-10-CM: D50.0  ICD-9-CM: 280.0  8/6/2014 - 3/17/2015        RESOLVED: Free intraperitoneal air ICD-10-CM: K66.8  ICD-9-CM: 568.89  7/28/2014 - 9/23/2014        RESOLVED: Abdominal pain (Chronic) ICD-10-CM: R10.9  ICD-9-CM: 789.00  7/8/2014 - 9/28/2015           Time spent on discharge related activities today greater than 30 minutes.       Signed:  Tayler Gibson MD                 Hospitalist, Internal Medicine      Cc: Jamel Segura MD

## 2022-09-12 NOTE — PROGRESS NOTES
Physical Therapy Note    Patient dressed and seated EOB awaiting D/C. He declines stair training and reports a desire to be discharged. RN is aware.

## 2022-09-12 NOTE — DISCHARGE INSTRUCTIONS
Please bring this form with you to show your primary care provider at your follow-up appointment. Primary care provider:   @[email or web address]@    Discharging provider:  Staci Mckeon MD    You have been admitted to the hospital with the following diagnoses:  Acute cystitis [N30.00]    FOLLOW-UP CARE RECOMMENDATIONS:    APPOINTMENTS:  Follow-up with primary care provider,  @M@  -  Please call [unfilled] shortly after discharge to set up an appointment to be seen in  1 week    GI in 4-6 weeks   Hepatology in 1-2 weeks   Pain management     FOLLOW-UP TESTS recommended: bmp, cbc in 1 week     PENDING TEST RESULTS:  At the time of your discharge the following test results are still pending: Liver biopsy   Please make sure you review these results with your outpatient follow-up provider(s). SYMPTOMS to watch for: chest pain, shortness of breath, fever, chills, nausea, vomiting, diarrhea, change in mentation, falling, weakness, bleeding. DIET/what to eat:  Regular Diet    ACTIVITY:  Activity as tolerated    What to do if new or unexpected symptoms occur? If you experience any of the above symptoms (or should other concerns or questions arise after discharge) please call your primary care physician. Return to the emergency room if you cannot get hold of your doctor. It is very important that you keep your follow-up appointment(s). Please bring discharge papers, medication list (and/or medication bottles) to your follow-up appointments for review by your outpatient provider(s). Please check the list of medications and be sure it includes every medication (even non-prescription medications) that your provider wants you to take. It is important that you take the medication exactly as they are prescribed. Keep your medication in the bottles provided by the pharmacist and keep a list of the medication names, dosages, and times to be taken in your wallet. Do not take other medications without consulting your doctor. If you have any questions about your medications or other instructions, please talk to your nurse or care provider before you leave the hospital.    I understand that if any problems occur once I am at home I am to contact my physician. These instructions were explained to me and I had the opportunity to ask questions.

## 2022-09-12 NOTE — PROGRESS NOTES
Pt refused discharge instruction review. Discussed importance of reviewing d/c instructions with nurse to ensure clear understanding of care, including medication instructions, and follow-up appointments. Pt verbalized understanding. This RN verbalized to patient that instructions have been highlighted for pt and encouraged pt to read through the instructions as soon as he is able to and to call his doctor's office with any concerns or questions. Pt verbalized understanding and signed AVS hard copy which was copied and placed on patient's chart. Pt taken downstairs via wheelchair by this RN, in no apparent distress and with all belongings.

## 2022-09-15 ENCOUNTER — TELEPHONE (OUTPATIENT)
Dept: HEMATOLOGY | Age: 62
End: 2022-09-15

## 2022-09-15 ENCOUNTER — DOCUMENTATION ONLY (OUTPATIENT)
Dept: HEMATOLOGY | Age: 62
End: 2022-09-15

## 2022-09-15 NOTE — PROGRESS NOTES
[address] hospitals, Kevin RIDER, Debora Silvano Enrrique, [place]      GEORGIA Gordon, [facility]     Krista Hale, [facility]   Rosalinda Scott LETTY-MANUELA Rico, [facility]       Layo Corrales [facility] [address]    at [address], [address] [facility]    [address] [phone]    FAX: [address], [address]    [phone]    FAX: [phone]     HEPATOLOGY NOTE    The patient had a liver biopsy during the recent hospitalization and was DC before results were available. I have reviewed the liver biopsy slides. The biopsy shows non-specific changes related to sepsis and medications. The patient does not have cirrhosis as suggested by imaging. No out patient follow-up with [facility] [place] is needed. He should follow-up with his GI MD, Dr Liane Nolasco for management of Crohns disease.     Jen Cano MD  [facility]  [address] [facility]  [address] [phone]  FAX:  [address] [place] [address]

## 2022-09-15 NOTE — TELEPHONE ENCOUNTER
----- Message from [address] sent at 9/15/2022  9:20 AM EDT -----  Please advise regarding follow up needed. There are openings on September 29 and 30  ----- Message -----  From: Josh Saleh RN  Sent: 9/13/2022   2:41 PM EDT  To: Ana Laura Enrique MD, [address] [place]    This patient doesn't have a follow up appt??          [email or web address]  has written a report to  concern next steps. Patient does not need appt in our office but follow up with . I have faxed over Hamida note to [address] [place] [address] office. (KF)

## 2023-02-07 ENCOUNTER — APPOINTMENT (OUTPATIENT)
Dept: GENERAL RADIOLOGY | Age: 63
DRG: 178 | End: 2023-02-07
Attending: EMERGENCY MEDICINE
Payer: MEDICARE

## 2023-02-07 ENCOUNTER — HOSPITAL ENCOUNTER (INPATIENT)
Age: 63
LOS: 3 days | Discharge: HOME | DRG: 178 | End: 2023-02-10
Attending: EMERGENCY MEDICINE | Admitting: INTERNAL MEDICINE
Payer: MEDICARE

## 2023-02-07 ENCOUNTER — APPOINTMENT (OUTPATIENT)
Dept: CT IMAGING | Age: 63
DRG: 178 | End: 2023-02-07
Attending: EMERGENCY MEDICINE
Payer: MEDICARE

## 2023-02-07 ENCOUNTER — APPOINTMENT (OUTPATIENT)
Dept: NON INVASIVE DIAGNOSTICS | Age: 63
DRG: 178 | End: 2023-02-07
Attending: INTERNAL MEDICINE
Payer: MEDICARE

## 2023-02-07 DIAGNOSIS — J98.4 CAVITARY LESION OF LUNG: ICD-10-CM

## 2023-02-07 DIAGNOSIS — R07.9 ACUTE CHEST PAIN: Primary | ICD-10-CM

## 2023-02-07 LAB
ALBUMIN SERPL-MCNC: 3.8 G/DL (ref 3.5–5)
ALBUMIN/GLOB SERPL: 0.9 (ref 1.1–2.2)
ALP SERPL-CCNC: 95 U/L (ref 45–117)
ALT SERPL-CCNC: 22 U/L (ref 12–78)
ANION GAP SERPL CALC-SCNC: 8 MMOL/L (ref 5–15)
APPEARANCE UR: CLEAR
AST SERPL-CCNC: 15 U/L (ref 15–37)
ATRIAL RATE: 107 BPM
BACTERIA URNS QL MICRO: ABNORMAL /HPF
BASOPHILS # BLD: 0 K/UL (ref 0–0.1)
BASOPHILS NFR BLD: 0 % (ref 0–1)
BILIRUB SERPL-MCNC: 0.6 MG/DL (ref 0.2–1)
BILIRUB UR QL: NEGATIVE
BNP SERPL-MCNC: 89 PG/ML
BUN SERPL-MCNC: 15 MG/DL (ref 6–20)
BUN/CREAT SERPL: 14 (ref 12–20)
CALCIUM SERPL-MCNC: 9.5 MG/DL (ref 8.5–10.1)
CALCULATED P AXIS, ECG09: 53 DEGREES
CALCULATED R AXIS, ECG10: 54 DEGREES
CALCULATED T AXIS, ECG11: 57 DEGREES
CHLORIDE SERPL-SCNC: 105 MMOL/L (ref 97–108)
CO2 SERPL-SCNC: 23 MMOL/L (ref 21–32)
COLOR UR: ABNORMAL
COMMENT, HOLDF: NORMAL
COMMENT, HOLDF: NORMAL
CREAT SERPL-MCNC: 1.09 MG/DL (ref 0.7–1.3)
DIAGNOSIS, 93000: NORMAL
DIFFERENTIAL METHOD BLD: ABNORMAL
ECHO AO ROOT DIAM: 3.7 CM
ECHO AO ROOT INDEX: 1.92 CM/M2
ECHO AV PEAK GRADIENT: 6 MMHG
ECHO AV PEAK VELOCITY: 1.2 M/S
ECHO AV VELOCITY RATIO: 0.75
ECHO EST RA PRESSURE: 8 MMHG
ECHO LA DIAMETER INDEX: 1.66 CM/M2
ECHO LA DIAMETER: 3.2 CM
ECHO LA TO AORTIC ROOT RATIO: 0.86
ECHO LA VOL 2C: 40 ML (ref 18–58)
ECHO LA VOL 4C: 37 ML (ref 18–58)
ECHO LA VOLUME AREA LENGTH: 41 ML
ECHO LA VOLUME INDEX A2C: 21 ML/M2 (ref 16–34)
ECHO LA VOLUME INDEX A4C: 19 ML/M2 (ref 16–34)
ECHO LA VOLUME INDEX AREA LENGTH: 21 ML/M2 (ref 16–34)
ECHO LV E' LATERAL VELOCITY: 10 CM/S
ECHO LV E' SEPTAL VELOCITY: 8 CM/S
ECHO LV FRACTIONAL SHORTENING: 29 % (ref 28–44)
ECHO LV INTERNAL DIMENSION DIASTOLE INDEX: 2.49 CM/M2
ECHO LV INTERNAL DIMENSION DIASTOLIC: 4.8 CM (ref 4.2–5.9)
ECHO LV INTERNAL DIMENSION SYSTOLIC INDEX: 1.76 CM/M2
ECHO LV INTERNAL DIMENSION SYSTOLIC: 3.4 CM
ECHO LV IVSD: 0.9 CM (ref 0.6–1)
ECHO LV MASS 2D: 158.8 G (ref 88–224)
ECHO LV MASS INDEX 2D: 82.3 G/M2 (ref 49–115)
ECHO LV POSTERIOR WALL DIASTOLIC: 1 CM (ref 0.6–1)
ECHO LV RELATIVE WALL THICKNESS RATIO: 0.42
ECHO LVOT PEAK GRADIENT: 4 MMHG
ECHO LVOT PEAK VELOCITY: 0.9 M/S
ECHO MV A VELOCITY: 1.1 M/S
ECHO MV AREA PHT: 3 CM2
ECHO MV E DECELERATION TIME (DT): 250 MS
ECHO MV E VELOCITY: 0.78 M/S
ECHO MV E/A RATIO: 0.71
ECHO MV E/E' LATERAL: 7.8
ECHO MV E/E' RATIO (AVERAGED): 8.78
ECHO MV E/E' SEPTAL: 9.75
ECHO MV PRESSURE HALF TIME (PHT): 72.5 MS
ECHO PV MAX VELOCITY: 1 M/S
ECHO PV PEAK GRADIENT: 4 MMHG
ECHO RIGHT VENTRICULAR SYSTOLIC PRESSURE (RVSP): 43 MMHG
ECHO RV INTERNAL DIMENSION: 2.2 CM
ECHO RV TAPSE: 1.9 CM (ref 1.7–?)
ECHO TV REGURGITANT MAX VELOCITY: 2.96 M/S
ECHO TV REGURGITANT PEAK GRADIENT: 35 MMHG
EOSINOPHIL # BLD: 0.1 K/UL (ref 0–0.4)
EOSINOPHIL NFR BLD: 1 % (ref 0–7)
EPITH CASTS URNS QL MICRO: ABNORMAL /LPF
ERYTHROCYTE [DISTWIDTH] IN BLOOD BY AUTOMATED COUNT: 14 % (ref 11.5–14.5)
FLUAV AG NPH QL IA: NEGATIVE
FLUBV AG NOSE QL IA: NEGATIVE
GLOBULIN SER CALC-MCNC: 4.2 G/DL (ref 2–4)
GLUCOSE SERPL-MCNC: 100 MG/DL (ref 65–100)
GLUCOSE UR STRIP.AUTO-MCNC: NEGATIVE MG/DL
HCT VFR BLD AUTO: 48 % (ref 36.6–50.3)
HGB BLD-MCNC: 15.1 G/DL (ref 12.1–17)
HGB UR QL STRIP: ABNORMAL
IMM GRANULOCYTES # BLD AUTO: 0.1 K/UL (ref 0–0.04)
IMM GRANULOCYTES NFR BLD AUTO: 0 % (ref 0–0.5)
KETONES UR QL STRIP.AUTO: NEGATIVE MG/DL
LACTATE SERPL-SCNC: 1 MMOL/L (ref 0.4–2)
LEUKOCYTE ESTERASE UR QL STRIP.AUTO: ABNORMAL
LYMPHOCYTES # BLD: 2.4 K/UL (ref 0.8–3.5)
LYMPHOCYTES NFR BLD: 21 % (ref 12–49)
MCH RBC QN AUTO: 29.8 PG (ref 26–34)
MCHC RBC AUTO-ENTMCNC: 31.5 G/DL (ref 30–36.5)
MCV RBC AUTO: 94.7 FL (ref 80–99)
MONOCYTES # BLD: 0.9 K/UL (ref 0–1)
MONOCYTES NFR BLD: 8 % (ref 5–13)
NEUTS SEG # BLD: 7.9 K/UL (ref 1.8–8)
NEUTS SEG NFR BLD: 70 % (ref 32–75)
NITRITE UR QL STRIP.AUTO: POSITIVE
NRBC # BLD: 0 K/UL (ref 0–0.01)
NRBC BLD-RTO: 0 PER 100 WBC
P-R INTERVAL, ECG05: 176 MS
PH UR STRIP: 5 (ref 5–8)
PLATELET # BLD AUTO: 280 K/UL (ref 150–400)
PMV BLD AUTO: 10.2 FL (ref 8.9–12.9)
POTASSIUM SERPL-SCNC: 3.8 MMOL/L (ref 3.5–5.1)
PROT SERPL-MCNC: 8 G/DL (ref 6.4–8.2)
PROT UR STRIP-MCNC: 30 MG/DL
Q-T INTERVAL, ECG07: 316 MS
QRS DURATION, ECG06: 82 MS
QTC CALCULATION (BEZET), ECG08: 421 MS
RBC # BLD AUTO: 5.07 M/UL (ref 4.1–5.7)
RBC #/AREA URNS HPF: ABNORMAL /HPF (ref 0–5)
SAMPLES BEING HELD,HOLD: NORMAL
SAMPLES BEING HELD,HOLD: NORMAL
SARS-COV-2 RDRP RESP QL NAA+PROBE: NOT DETECTED
SARS-COV-2 RNA RESP QL NAA+PROBE: NOT DETECTED
SODIUM SERPL-SCNC: 136 MMOL/L (ref 136–145)
SOURCE, COVRS: NORMAL
SOURCE, COVRS: NORMAL
SP GR UR REFRACTOMETRY: >1.03
TROPONIN I SERPL HS-MCNC: 4 NG/L (ref 0–76)
UA: UC IF INDICATED,UAUC: ABNORMAL
UROBILINOGEN UR QL STRIP.AUTO: 0.2 EU/DL (ref 0.2–1)
VENTRICULAR RATE, ECG03: 107 BPM
WBC # BLD AUTO: 11.4 K/UL (ref 4.1–11.1)
WBC URNS QL MICRO: ABNORMAL /HPF (ref 0–4)

## 2023-02-07 PROCEDURE — 93306 TTE W/DOPPLER COMPLETE: CPT

## 2023-02-07 PROCEDURE — 74011250636 HC RX REV CODE- 250/636: Performed by: INTERNAL MEDICINE

## 2023-02-07 PROCEDURE — 74011000258 HC RX REV CODE- 258: Performed by: INTERNAL MEDICINE

## 2023-02-07 PROCEDURE — 74011250636 HC RX REV CODE- 250/636: Performed by: EMERGENCY MEDICINE

## 2023-02-07 PROCEDURE — 87040 BLOOD CULTURE FOR BACTERIA: CPT

## 2023-02-07 PROCEDURE — 87635 SARS-COV-2 COVID-19 AMP PRB: CPT

## 2023-02-07 PROCEDURE — 87077 CULTURE AEROBIC IDENTIFY: CPT

## 2023-02-07 PROCEDURE — 86480 TB TEST CELL IMMUN MEASURE: CPT

## 2023-02-07 PROCEDURE — 87186 SC STD MICRODIL/AGAR DIL: CPT

## 2023-02-07 PROCEDURE — 83880 ASSAY OF NATRIURETIC PEPTIDE: CPT

## 2023-02-07 PROCEDURE — 93005 ELECTROCARDIOGRAM TRACING: CPT

## 2023-02-07 PROCEDURE — 65270000046 HC RM TELEMETRY

## 2023-02-07 PROCEDURE — 80053 COMPREHEN METABOLIC PANEL: CPT

## 2023-02-07 PROCEDURE — 94640 AIRWAY INHALATION TREATMENT: CPT

## 2023-02-07 PROCEDURE — 71045 X-RAY EXAM CHEST 1 VIEW: CPT

## 2023-02-07 PROCEDURE — 87804 INFLUENZA ASSAY W/OPTIC: CPT

## 2023-02-07 PROCEDURE — 87070 CULTURE OTHR SPECIMN AEROBIC: CPT

## 2023-02-07 PROCEDURE — 74011000250 HC RX REV CODE- 250: Performed by: INTERNAL MEDICINE

## 2023-02-07 PROCEDURE — 87449 NOS EACH ORGANISM AG IA: CPT

## 2023-02-07 PROCEDURE — 71275 CT ANGIOGRAPHY CHEST: CPT

## 2023-02-07 PROCEDURE — 74011000250 HC RX REV CODE- 250: Performed by: EMERGENCY MEDICINE

## 2023-02-07 PROCEDURE — 96374 THER/PROPH/DIAG INJ IV PUSH: CPT

## 2023-02-07 PROCEDURE — 74011250637 HC RX REV CODE- 250/637: Performed by: INTERNAL MEDICINE

## 2023-02-07 PROCEDURE — 36415 COLL VENOUS BLD VENIPUNCTURE: CPT

## 2023-02-07 PROCEDURE — 85025 COMPLETE CBC W/AUTO DIFF WBC: CPT

## 2023-02-07 PROCEDURE — 84484 ASSAY OF TROPONIN QUANT: CPT

## 2023-02-07 PROCEDURE — 81001 URINALYSIS AUTO W/SCOPE: CPT

## 2023-02-07 PROCEDURE — 99285 EMERGENCY DEPT VISIT HI MDM: CPT

## 2023-02-07 PROCEDURE — 74011000636 HC RX REV CODE- 636: Performed by: RADIOLOGY

## 2023-02-07 PROCEDURE — 83605 ASSAY OF LACTIC ACID: CPT

## 2023-02-07 PROCEDURE — 74011250637 HC RX REV CODE- 250/637: Performed by: NURSE PRACTITIONER

## 2023-02-07 PROCEDURE — 96376 TX/PRO/DX INJ SAME DRUG ADON: CPT

## 2023-02-07 PROCEDURE — 87086 URINE CULTURE/COLONY COUNT: CPT

## 2023-02-07 PROCEDURE — U0005 INFEC AGEN DETEC AMPLI PROBE: HCPCS

## 2023-02-07 RX ORDER — ACETAMINOPHEN 325 MG/1
650 TABLET ORAL
Status: DISCONTINUED | OUTPATIENT
Start: 2023-02-07 | End: 2023-02-10 | Stop reason: HOSPADM

## 2023-02-07 RX ORDER — IBUPROFEN 200 MG
1 TABLET ORAL DAILY
Status: DISCONTINUED | OUTPATIENT
Start: 2023-02-07 | End: 2023-02-10 | Stop reason: HOSPADM

## 2023-02-07 RX ORDER — SODIUM CHLORIDE 0.9 % (FLUSH) 0.9 %
5-40 SYRINGE (ML) INJECTION EVERY 8 HOURS
Status: DISCONTINUED | OUTPATIENT
Start: 2023-02-07 | End: 2023-02-10 | Stop reason: HOSPADM

## 2023-02-07 RX ORDER — METOPROLOL TARTRATE 25 MG/1
12.5 TABLET, FILM COATED ORAL EVERY 12 HOURS
Status: DISCONTINUED | OUTPATIENT
Start: 2023-02-07 | End: 2023-02-10 | Stop reason: HOSPADM

## 2023-02-07 RX ORDER — ENOXAPARIN SODIUM 100 MG/ML
40 INJECTION SUBCUTANEOUS EVERY 24 HOURS
Status: DISCONTINUED | OUTPATIENT
Start: 2023-02-07 | End: 2023-02-10 | Stop reason: HOSPADM

## 2023-02-07 RX ORDER — MORPHINE SULFATE 4 MG/ML
4 INJECTION INTRAVENOUS ONCE
Status: COMPLETED | OUTPATIENT
Start: 2023-02-07 | End: 2023-02-07

## 2023-02-07 RX ORDER — NALOXONE HYDROCHLORIDE 0.4 MG/ML
0.4 INJECTION, SOLUTION INTRAMUSCULAR; INTRAVENOUS; SUBCUTANEOUS AS NEEDED
Status: DISCONTINUED | OUTPATIENT
Start: 2023-02-07 | End: 2023-02-10 | Stop reason: HOSPADM

## 2023-02-07 RX ORDER — OXYCODONE HYDROCHLORIDE 5 MG/1
5 TABLET ORAL
Status: DISCONTINUED | OUTPATIENT
Start: 2023-02-07 | End: 2023-02-10 | Stop reason: HOSPADM

## 2023-02-07 RX ORDER — HYDROMORPHONE HYDROCHLORIDE 1 MG/ML
1 INJECTION, SOLUTION INTRAMUSCULAR; INTRAVENOUS; SUBCUTANEOUS
Status: DISCONTINUED | OUTPATIENT
Start: 2023-02-07 | End: 2023-02-10 | Stop reason: HOSPADM

## 2023-02-07 RX ORDER — IPRATROPIUM BROMIDE AND ALBUTEROL SULFATE 2.5; .5 MG/3ML; MG/3ML
3 SOLUTION RESPIRATORY (INHALATION)
Status: DISCONTINUED | OUTPATIENT
Start: 2023-02-07 | End: 2023-02-10 | Stop reason: HOSPADM

## 2023-02-07 RX ORDER — SODIUM CHLORIDE 0.9 % (FLUSH) 0.9 %
5-40 SYRINGE (ML) INJECTION AS NEEDED
Status: DISCONTINUED | OUTPATIENT
Start: 2023-02-07 | End: 2023-02-10 | Stop reason: HOSPADM

## 2023-02-07 RX ADMIN — ACETAMINOPHEN 650 MG: 325 TABLET ORAL at 22:25

## 2023-02-07 RX ADMIN — IPRATROPIUM BROMIDE AND ALBUTEROL SULFATE 3 ML: .5; 3 SOLUTION RESPIRATORY (INHALATION) at 20:23

## 2023-02-07 RX ADMIN — OXYCODONE HYDROCHLORIDE 5 MG: 5 TABLET ORAL at 14:16

## 2023-02-07 RX ADMIN — HYDROMORPHONE HYDROCHLORIDE 1 MG: 1 INJECTION, SOLUTION INTRAMUSCULAR; INTRAVENOUS; SUBCUTANEOUS at 19:57

## 2023-02-07 RX ADMIN — HYDROMORPHONE HYDROCHLORIDE 1 MG: 1 INJECTION, SOLUTION INTRAMUSCULAR; INTRAVENOUS; SUBCUTANEOUS at 15:50

## 2023-02-07 RX ADMIN — OXYCODONE HYDROCHLORIDE 5 MG: 5 TABLET ORAL at 22:25

## 2023-02-07 RX ADMIN — CEFTRIAXONE 2 G: 2 INJECTION, POWDER, FOR SOLUTION INTRAMUSCULAR; INTRAVENOUS at 09:16

## 2023-02-07 RX ADMIN — MORPHINE SULFATE 4 MG: 4 INJECTION, SOLUTION INTRAMUSCULAR; INTRAVENOUS at 07:07

## 2023-02-07 RX ADMIN — IOPAMIDOL 70 ML: 755 INJECTION, SOLUTION INTRAVENOUS at 07:38

## 2023-02-07 RX ADMIN — SODIUM CHLORIDE, PRESERVATIVE FREE 10 ML: 5 INJECTION INTRAVENOUS at 23:00

## 2023-02-07 RX ADMIN — PIPERACILLIN AND TAZOBACTAM 3.38 G: 3; .375 INJECTION, POWDER, FOR SOLUTION INTRAVENOUS at 17:54

## 2023-02-07 RX ADMIN — MORPHINE SULFATE 4 MG: 4 INJECTION, SOLUTION INTRAMUSCULAR; INTRAVENOUS at 06:13

## 2023-02-07 RX ADMIN — METOPROLOL TARTRATE 12.5 MG: 25 TABLET, FILM COATED ORAL at 22:54

## 2023-02-07 RX ADMIN — SODIUM CHLORIDE 1000 ML: 9 INJECTION, SOLUTION INTRAVENOUS at 09:02

## 2023-02-07 RX ADMIN — AZITHROMYCIN MONOHYDRATE 500 MG: 500 INJECTION, POWDER, LYOPHILIZED, FOR SOLUTION INTRAVENOUS at 09:54

## 2023-02-07 RX ADMIN — ENOXAPARIN SODIUM 40 MG: 100 INJECTION SUBCUTANEOUS at 17:54

## 2023-02-07 RX ADMIN — HYDROMORPHONE HYDROCHLORIDE 1 MG: 1 INJECTION, SOLUTION INTRAMUSCULAR; INTRAVENOUS; SUBCUTANEOUS at 10:49

## 2023-02-07 RX ADMIN — PIPERACILLIN AND TAZOBACTAM 4.5 G: 4; .5 INJECTION, POWDER, FOR SOLUTION INTRAVENOUS at 11:49

## 2023-02-07 NOTE — PROGRESS NOTES
Day #1 of Zosyn  Indication:  Necrotizing pna   Current regimen:  3.375 gm q6h  Abx regimen: Monotherapy  Recent Labs     23  0555   WBC 11.4*   CREA 1.09   BUN 15     Est CrCl: >20 ml/min;    Temp (24hrs), Av.7 °F (37.1 °C), Min:98.7 °F (37.1 °C), Max:98.7 °F (37.1 °C)    Cultures: None    Plan: Change to 4.5 gm x1 then 3.375 gm q8h per extended infusion protocol     Madeleine Martinez, PharmD

## 2023-02-07 NOTE — H&P
[address] [facility] Adult  Hospitalist Group  History and Physical    Date of Service:  2/7/2023  Primary Care Provider: Letty Diaz MD  Source of information: The patient and Chart review    Chief Complaint: Chest Pain      History of Presenting Illness:   65 y.o. male with anemia, anxiety, depression, Crohn's disease, history of head and face melanoma, chronic pain, COPD, GERD, history of esophageal ulcer, chronic abdominal wall hernia with wound presented to the ED with constant chest pain over the mid and left chest with radiation to the left neck shoulder and arm and shortness of breath. Chest pain and shortness of breath started last night while he was watching TV. Chest pain is worse with deep breathing. He reports chills, weight loss due to decreased p.o. intake. He reports a chronic abdominal wound from previous abdominal surgeries for Crohn's disease and states every time the scab comes off, it starts to drain again. Patient also complained of chronic diarrhea due to his Crohn's and states he is unable to take oral antibiotics because of this. He denies fevers chills nausea vomiting hemoptysis constipation dysuria. CXR in the ED showed right upper lobe cavitary lesion and patient was placed in airborne isolation room due to concern for possible tuberculosis. Patient denies recent exposures to [place] or tuberculosis. ED patient was given ceftriaxone 1 g IV x1 and azithromycin 500 mg IV x1 after blood cultures and QuantiFERON TB Gold were sent off. Patient states that his GI Dr. Nikhil Padilla recently checked QuantiFERON-TB gold as an outpatient in preparation for starting Humira for his Crohn's and this was negative. REVIEW OF SYSTEMS:  A comprehensive review of systems was negative except for that written in the History of Present Illness.      Past Medical History:   Diagnosis Date    Abdominal wall hernia 6/15/2012    Anal fissure     Anemia     Anemia     Anxiety and depression Arthritis     Related to the Crohn's disease. Cancer ([address]     MELANOMA HEAD AND FACE    Chronic pain     GENERALIZED    COPD (chronic obstructive pulmonary disease) (HCC)     Crohn disease (HCC)     Diagnosed at age 17. Echocardiogram normal (EF: 55-60%) 07/2015    Esophageal ulcer     GERD (gastroesophageal reflux disease)     H/O blood transfusion 2013    [facility] [place], [address]    Hypertension     denies    Migraine     Short bowel syndrome     Ventral hernia without obstruction or gangrene       Past Surgical History:   Procedure Laterality Date    COLONOSCOPY N/A 10/8/2019    COLONOSCOPY performed by Colten Knapp MD at [place] [address] N/A 9/1/2022    SIGMOIDOSCOPY [address] performed by Valeri Newby MD at [facility] ENDOSCOPY    [address] needle, takes 1 inch needle    HX APPENDECTOMY      HX CHOLECYSTECTOMY      HX GI      colectomy x2, one ileostomy    HX GI  7/28/14    exp lap,partial colectomy with end ileostomy by Dr Aleta Lefort      neck fusion    HX ORTHOPAEDIC  1980s    wrist right,     HX ORTHOPAEDIC  2006, 11/2013    neck, L4 L5 L6    HX ORTHOPAEDIC  1990s    right knee scope    HX OTHER SURGICAL      surgical repair from spider bite    HX OTHER SURGICAL  12/1/14    Incision and drainage of posterior right subcutaneous shoulder abscess    HX OTHER SURGICAL  2014    LEFT INDEX FINGER SPIDER BITE    HX OTHER SURGICAL  2014    RECTAL FISTULA    HX OTHER SURGICAL  3/17/2015    Ileostomy takedown with extensive lysis of adhesions (greater than three hours), mobilization of the splenic flexure, left colon resection, ileocolic anastomosis, and excision of scar; Dr. Oni Billy. HX OTHER SURGICAL  3/22/2015    Exploratory laparotomy with washout of abdomen, suture repair of small bowel/anastomosis, and diverting protective loop ileostomy;  Fam Dumont MD.    HX OTHER SURGICAL  4/9/2015    Laparotomy, extensive lysis of adhesions, abdominal lavage, and resection of ileocolic anastomosis (including the efferent limb of the loop ileostomy) with creation of Demetrius's pouch; Dr. Danielle Franco.  OTHER SURGICAL  7/14/2015    Cystoscopy and placement of bilateral ureteral catheters; [place] MD.     OTHER SURGICAL  7/14/2015    Ileostomy takedown with extensive lysis of adhesions, small bowel resection, and enterocolostomy; Dr. Danielle Franco.  OTHER SURGICAL  9/29/2015    CT-guided percutaneous drainage of intraabdominal abscess; Dr. Zaki Kebede.  OTHER SURGICAL  11/16/2015    CT-guided percutaneous aspiration of abdominal wall cavity; Dr. Elisha Lomeli.  OTHER SURGICAL  12/1/2015    Incision and drainage of abdominal wall abscess; Dr. Danielle Franco.  OTHER SURGICAL  2/11/2016    Incision and drainage of abdominal wall abscess; Dr. Danielle Franco.  OTHER SURGICAL  2/23/2016    Cystoscopy and placement of bilateral temporary ureteral catheters; Dr. Naseem Martinez.  OTHER SURGICAL  2/23/2016    Exploratory laparotomy, extensive lysis of adhesions, removal of mesh, and repair of enterocutaneous fistula; Dr. Danielle Franco.  OTHER SURGICAL  11/03/2017    Exploratory laparotomy, extensive lysis of adhesions, and reduction of intussusception; Dr. Danielle Franco. VT UNLISTED PROCEDURE ABDOMEN PERITONEUM & OMENTUM      33 abdominal operations for treatment of Crohn's disease and its complications. Prior to Admission medications    Medication Sig Start Date End Date Taking? Authorizing Provider   metoprolol tartrate (LOPRESSOR) 25 mg tablet Take 0.5 Tablets by mouth every twelve (12) hours. 9/12/22   Jah Frazier MD   hydrocortisone (CORTAID) 1 % topical cream Apply  to affected area two (2) times a day. use thin layer 9/12/22   Jah Frazier MD   levoFLOXacin (Levaquin) 500 mg tablet Take 1 Tablet by mouth daily.  9/12/22   Jah Frazier MD   gabapentin (NEURONTIN) 300 mg capsule Take 300 mg by mouth three (3) times daily.    Provider, Historical   oxyCODONE (ROXICODONE) 5 mg/5 mL solution Take 10 mg by mouth every six to eight (6-8) hours as needed for Pain. Provider, Historical   omeprazole (PRILOSEC) 40 mg capsule take 1 capsule by mouth once daily 11/15/17   Provider, Historical   chlorzoxazone (PARAFON FORTE) 500 mg tablet Take 500-1,000 mg by mouth daily as needed for Muscle Spasm(s). Provider, Historical   diphenoxylate-atropine (LOMOTIL) 2.5-0.025 mg per tablet Take 2 Tabs by mouth Before breakfast, lunch, dinner and at bedtime. Provider, Historical   loperamide (IMODIUM) 2 mg capsule Take 2 mg by mouth as needed for Diarrhea. Patient takes 14-16 capsules a day. Provider, Historical   ondansetron hcl (ZOFRAN, AS HYDROCHLORIDE,) 8 mg tablet Take 8 mg by mouth every eight (8) hours as needed for Nausea. Provider, Historical     Allergies   Allergen Reactions    Honey Anaphylaxis    Remicade [Infliximab] Anaphylaxis    Crestor [Rosuvastatin] Myalgia    Other Plant, Animal, Environmental Other (comments)     Developed \"boils\" on right arm that required I &D after receiving FENTANYL patch. Is able to take FENTANYL orally; states is allergic to fentanyl patch GLUE    Imuran [Azathioprine] Diarrhea and Nausea Only    Mercaptopurine Diarrhea    Sulfa (Sulfonamide Antibiotics) Other (comments)     Causes diarrhea      Family History   Problem Relation Age of Onset    Stroke Mother     Heart Disease Mother     Kidney Disease Mother     Anesth Problems Mother         TAKES A LONG TIME TO WAKE UP    Heart Disease Father     Thyroid Disease Sister       Social History:  reports that he has been smoking. He has a 44.00 pack-year smoking history. He uses smokeless tobacco. He reports that he does not drink alcohol and does not use drugs.    Social Determinants of Health     Tobacco Use: High Risk    Smoking Tobacco Use: Every Day    Smokeless Tobacco Use: Current    Passive Exposure: Not on file   Alcohol Use: Not on file   Financial Resource Strain: Not on file   Food Insecurity: Not on file   Transportation Needs: Not on file   Physical Activity: Not on file   Stress: Not on file   Social Connections: Not on file   Intimate Partner Violence: Not on file   Depression: Not on file   Housing Stability: Not on file        Medications were reconciled to the best of my ability given all available resources at the time of admission. Route is PO if not otherwise noted. Family and social history were personally reviewed, all pertinent and relevant details are outlined as above. Objective:   Visit Vitals  BP (!) 146/87   Pulse 94   Temp 98.7 °F (37.1 °C)   Resp 25   Ht 5' 11\" (1.803 m)   Wt 74.8 kg (165 lb)   SpO2 94%   BMI 23.01 kg/m²      O2 Device: None (Room air)    PHYSICAL EXAM:   General: awake, NAD, pleasant  HEENT: NCAT, PERRL, EOMI, MMM, OP benign  Neck: supple, no masses  Lungs: CTAB, no w/r/r   CVS: regular rhythm, normal rate, no m/r/g appreciated, no peripheral edema, +pulses  GI: +BS, soft, mildly TTP, distended, umbilical scab and multiple old scars  MSK: LANDA  Neuro/Psych: AOx3, responds appropriately to questions and commands, pleasant mood and affect  Skin: Warm, dry, umbilical scab    Data Review:   I have independently reviewed and interpreted patient's lab and all other diagnostic data    Abnormal Labs Reviewed   CBC WITH AUTOMATED DIFF - Abnormal; Notable for the following components:       Result Value    WBC 11.4 (*)     ABS. IMM.  GRANS. 0.1 (*)     All other components within normal limits   METABOLIC PANEL, COMPREHENSIVE - Abnormal; Notable for the following components:    Globulin 4.2 (*)     A-G Ratio 0.9 (*)     All other components within normal limits       All Micro Results       Procedure Component Value Units Date/Time    CULTURE, RESPIRATORY/SPUTUM/BRONCH Negrita Cam [[id number]]     Order Status: Sent Specimen: Sputum     AFB CULTURE + SMEAR W/RFLX ID FROM CULTURE [[id number]]     Order Status: Sent     LEGIONELLA AG, URINE [[id number]]     Order Status: Sent Specimen: Urine     CULTURE, BLOOD, PAIRED [[id number]] Collected: 02/07/23 0905    Order Status: Sent Specimen: Blood Updated: 02/07/23 0932            IMAGING:   CTA CHEST W OR W WO CONT   Final Result   1. Negative for pulmonary embolism. 2.  Right upper lobe cavitary consolidation, may represent infection. Malignancy   not entirely excluded. 3.  9 mm left lower lobe nodule. 4.  Recommend short-term interval follow-up in 6-8 weeks. XR CHEST PORT   Final Result      Unchanged chronic right upper lobe airspace disease. ECG/ECHO:    Results for orders placed or performed during the hospital encounter of 02/07/23   EKG, 12 LEAD, INITIAL   Result Value Ref Range    Ventricular Rate 107 BPM    Atrial Rate 107 BPM    P-R Interval 176 ms    QRS Duration 82 ms    Q-T Interval 316 ms    QTC Calculation (Bezet) 421 ms    Calculated P Axis 53 degrees    Calculated R Axis 54 degrees    Calculated T Axis 57 degrees    Diagnosis       Sinus tachycardia  Anteroseptal infarct , age undetermined  Abnormal ECG  When compared with ECG of 07-FEB-2023 05:42,  MANUAL COMPARISON REQUIRED, DATA IS UNCONFIRMED            Notes reviewed from all clinical/nonclinical/nursing services involved in patient's clinical care. Care coordination discussions were held with appropriate clinical/nonclinical/ nursing providers based on care coordination needs. Assessment/Plan:   Given the patient's current clinical presentation, there is a high level of concern for decompensation if discharged from the emergency department. Complex decision making was performed, which includes reviewing the patient's available past medical records, laboratory results, and imaging studies.     Cavitary lesion of right upper lung (POA)  Patient had right upper lobe infiltrate on previous chest x-ray in January 2023  Suspect this is necrotizing pneumonia due to untreated infection  Pulmonary consulted for possible inpatient bronchoscopy  Blood cultures 2/7 pending  QuantiFERON-TB gold pending  Check sputum cultures  Check respiratory viral panel including COVID, flu, RSV, AFB  To cover for anaerobes, start empiric Zosyn x7 days and continue azithromycin for 5 days; de-escalate as indicated    9 mm left lower lobe nodule  Noted on CT chest today  Patient needs follow-up CT chest in 6 months  Follow-up with pulmonary    Emphysema  Likely due to chronic cigarette smoking    Small pericardial effusion  Noted on CT chest   TTE    Nicotine dependence  Start nicotine 21 mg patch and patient strongly encouraged to stop smoking    Crohn's disease now complicated by persistent diarrhea  Check stool studies including C. difficile      DIET: ADULT DIET Regular   ISOLATION PRECAUTIONS: Airborne, Enteric Contact  CODE STATUS: Full Code   DVT PROPHYLAXIS: SCDs  FUNCTIONAL STATUS PRIOR TO HOSPITALIZATION: Fully active and ambulatory; able to carry on all self-care without restriction. Ambulatory status/function: By self   EARLY MOBILITY ASSESSMENT: Recommend an assessment from physical therapy and/or occupational therapy  ANTICIPATED DISCHARGE: Greater than 48 hours. ANTICIPATED DISPOSITION: Home  EMERGENCY CONTACT/SURROGATE DECISION MAKER: [facility]     CRITICAL CARE WAS PERFORMED FOR THIS ENCOUNTER: YES. I had a face to face encounter with the patient, reviewed and interpreted patient data including clinical events, labs, images, vital signs, I/O's, and examined patient. I have discussed the case and the plan and management of the patient's care with the consulting services, the bedside nurses and necessary ancillary providers. This patient has a high probability of imminent, clinically significant deterioration, which requires the highest level of preparedness to intervene urgently.  I participated in the decision-making and personally managed or directed the management of the following life and organ supporting interventions that required my frequent assessment to treat or prevent imminent deterioration. I personally spent 35 minutes of critical care time. This is time spent at this critically ill patient's bedside actively involved in patient care as well as the coordination of care and discussions with the patient's family. This does not include any procedural time which has been billed separately.       Signed By: Cherelle Cortez MD     February 7, 2023

## 2023-02-07 NOTE — ED PROVIDER NOTES
Mr. Maria M Candelario was signed out to me by Dr. Lalita Rodas. CT shows a cavitary lesion in the lung. Concern for tuberculosis, other infectious etiology, or malignancy. He continues to be in pain in the ER. I have requested that he be placed in negative pressure room. He was to be evaluated for admission to the hospitalist.      [address] [place] [address] for Admission  8:32 AM    ED Room Number: ER16/16  Patient Name and age:  Talia Barnett 65 y.o.  male  Working Diagnosis:   1. Acute chest pain    2.  Cavitary lesion of lung        COVID-19 Suspicion:  no  Sepsis present:  yes  Reassessment needed: yes  Code Status:  Full Code  Readmission: no  Isolation Requirements:  yes, likely needs negative pressure room  Recommended Level of Care:  med/surg  Department: [facility] Adult ED - [phone]

## 2023-02-07 NOTE — WOUND CARE
WOCN Note:     New consult for non-healing umbilical wound    Mr. Huyen Stuart is a 66 yo male with PMHx including Crohn's disease status post multiple surgeries and short-bowel syndrome, COPD, abdominal wall hernia, anemia, and skin cancer who presented to the ED with constant chest pain. Seen in ED room 16. Assessment:   Alert and appropriately communicative. Denies pain unless he lays flat  Mobile and repositions independently for visual assessment of sacrum. Buttocks and sacrum intact without erythema. Patient reports irritation from frequent bowel movements, left Z-guard cream with him and instructed to use as needed for comfort   Continent. Surface: ED stretcher   Bilateral heels intact and without erythema. 1. POA Chronic Wound to Mid-Abdomen     2 x 3 cm, dry crust firmly attached. Patient reports this wound has been present since at least September of 2022 and that it varies in size. Every so often, the crust will get wet enough from the shower to come off and he reports he has some drainage until the crust develops again. No drainage noted at this time. No gross S&S of infection  Periwound with pink re-epithelized tissue & without erythema    Tx: Covered with foam dressing for protection as it is close to his pants waistline      Wound Recommendations:    Mid-abdomen wound: cover with foam dressing, replace weekly or as needed if dressing becomes soiled or with spontaneous separation    PI Prevention:  Turn/reposition approximately every 2 hours  Offload heels with heels hanging off end of pillow at all times while in bed.   Z-guard cream to buttocks and sacrum as needed for irritation    Discussed assessment and plan with Dr. Teena Disla and RN    Transition of Care: Plan to follow weekly and as needed while admitted to hospital.      Evert Shepard MSN, RN  [facility] Inpatient Wound Care  office [phone]

## 2023-02-07 NOTE — ED NOTES
Bedside and Verbal shift change report given to Valentine Solis (oncoming nurse) by Ramila Osman (offgoing nurse). Report included the following information SBAR, ED Summary and MAR.

## 2023-02-07 NOTE — ED TRIAGE NOTES
Patient transferred to negative pressure room per MD orders. Report given to [address] [place] [address],[address].

## 2023-02-07 NOTE — ED TRIAGE NOTES
Pt arrives ambulatory from home for chest pain that began yesterday. He states it has been getting steadily worse. The pain is worse with movement. He is A&OX4.

## 2023-02-07 NOTE — CONSULTS
[facility]  Pulmonary, Critical Care, and Sleep Medicine    Initial Patient Consult    Name: Akanksha Hoang MRN: [id number]   : 1960 [facility]   Date: 2023        IMPRESSION:   Rul cavitary  infiltrates - appears  to have long standing airspace dx on  cxr since 2022 - infectious , cancer? Chest pain =-   interestingly on the left  to include anterior chest and down left arm - echo done and results pd . / troponin nl ? :  gi   Emphysema    Covid in 2022 - admitted   Crohns dz with hx of multiple surgeries and multiple complications- was to start  humira - not currently on chornic pred nor immunosuppressives   Copd without exacerbation   Short bowel syndrome -- s/p multiple operations  Says he was told her had cirrhosis - not alcoholic   He has a small pericardial effusion called on echo - ? Pericardial pain. RECOMMENDATIONS:   Sputum for routine and tb stains and culture    Agree that he needs bronch - have arranged for 9 am on thurs am but  timing may change as tb is  in differential. - will discuss with endo again in am.    Agree with ab    Nebs for secretion clearance -  not sure rt will do if in tb isolation which is ok if they do not . Agree with repeat quantiferon and need to check covid and influenza per procedure. O2 prn       Subjective: This patient has been seen and evaluated at the request of Dr. Rama Vazquez for abnl chest ct imaging with caviitary asdz in the  rt apex. . Patient is a 65 y.o. male with hx of crohns dz with multiple past abd surgeries and complications, emphysema from tobacco use. The attending tells me that he is  not able to take po ab due ti increased exacerbation of diarrhea. [place] Hidden He is suppose to start humira for his crohns. He says he has not felt well for a couple days and came in to er today with left cp , anterior with radiation to the left shoulder and down left arm .   He reports some chest congestion and sob . He still smokes. He has not seen a pulm dx. He reports he has neck and spine imaging at an outpt [facility] facility and was recently told her had abnl in chest and needed to see his primary. He has covid and was admitted in August 2022 . Reviewed of routine chest imaging at the time did show rt apical asdz / opacity . He was not aware . Chest ct this admit showed no pte but did show cavitary asdz in rul/ apex and evidence of emphysema. NO f/c but does  have cp and pleurisy on the LEFT and it hurts to breathe. Hx  ix pertinent for skin cancer --  per chart melanoma . Past Medical History:   Diagnosis Date    Abdominal wall hernia 6/15/2012    Anal fissure     Anemia     Anemia     Anxiety and depression     Arthritis     Related to the Crohn's disease. Cancer ([address]     MELANOMA HEAD AND FACE    Chronic pain     GENERALIZED    COPD (chronic obstructive pulmonary disease) (HCC)     Crohn disease (HCC)     Diagnosed at age 17.     Echocardiogram normal (EF: 55-60%) 07/2015    Esophageal ulcer     GERD (gastroesophageal reflux disease)     H/O blood transfusion 2013    [facility], [address]    Hypertension     denies    Migraine     Short bowel syndrome     Ventral hernia without obstruction or gangrene       Past Surgical History:   Procedure Laterality Date    COLONOSCOPY N/A 10/8/2019    COLONOSCOPY performed by Alesha Silva MD at [place] [address] N/A 9/1/2022    SIGMOIDOSCOPY FLEXIBLE performed by Lina Minor MD at [facility] ENDOSCOPY    [address] needle, takes 1 inch needle    HX APPENDECTOMY      HX CHOLECYSTECTOMY      HX GI      colectomy x2, one ileostomy    HX GI  7/28/14    exp lap,partial colectomy with end ileostomy by Dr Krishnan [place]    HX HEENT      neck fusion    HX ORTHOPAEDIC  1980s    wrist right,     HX ORTHOPAEDIC  2006, 11/2013    neck, L4 L5 L6    HX ORTHOPAEDIC  1990s    right knee scope    HX OTHER SURGICAL surgical repair from spider bite    HX OTHER SURGICAL  12/1/14    Incision and drainage of posterior right subcutaneous shoulder abscess    HX OTHER SURGICAL  2014    LEFT INDEX FINGER SPIDER BITE    HX OTHER SURGICAL  2014    RECTAL FISTULA    HX OTHER SURGICAL  3/17/2015    Ileostomy takedown with extensive lysis of adhesions (greater than three hours), mobilization of the splenic flexure, left colon resection, ileocolic anastomosis, and excision of scar; Dr. Orlando Ryan. HX OTHER SURGICAL  3/22/2015    Exploratory laparotomy with washout of abdomen, suture repair of small bowel/anastomosis, and diverting protective loop ileostomy; Maranda Mas MD.    HX OTHER SURGICAL  4/9/2015    Laparotomy, extensive lysis of adhesions, abdominal lavage, and resection of ileocolic anastomosis (including the efferent limb of the loop ileostomy) with creation of Demetrius's pouch; Dr. Orlando Ryan. HX OTHER SURGICAL  7/14/2015    Cystoscopy and placement of bilateral ureteral catheters; Mally Valenzeula MD.    HX OTHER SURGICAL  7/14/2015    Ileostomy takedown with extensive lysis of adhesions, small bowel resection, and enterocolostomy; Dr. Orlando Ryan. HX OTHER SURGICAL  9/29/2015    CT-guided percutaneous drainage of intraabdominal abscess; Dr. Alex Hoyos. HX OTHER SURGICAL  11/16/2015    CT-guided percutaneous aspiration of abdominal wall cavity; Dr. Brett Rodriguez. HX OTHER SURGICAL  12/1/2015    Incision and drainage of abdominal wall abscess; Dr. Orlando Ryan. HX OTHER SURGICAL  2/11/2016    Incision and drainage of abdominal wall abscess; Dr. Orlando Ryan. HX OTHER SURGICAL  2/23/2016    Cystoscopy and placement of bilateral temporary ureteral catheters; Dr. Rosaline Johnston. HX OTHER SURGICAL  2/23/2016    Exploratory laparotomy, extensive lysis of adhesions, removal of mesh, and repair of enterocutaneous fistula; Dr. Orlando Ryan.     HX OTHER SURGICAL  11/03/2017    Exploratory laparotomy, extensive lysis of adhesions, and reduction of intussusception; Dr. Angela Peraza. [place] UNLISTED PROCEDURE ABDOMEN PERITONEUM & OMENTUM      33 abdominal operations for treatment of Crohn's disease and its complications. Prior to Admission medications    Medication Sig Start Date End Date Taking? Authorizing Provider   metoprolol tartrate (LOPRESSOR) 25 mg tablet Take 0.5 Tablets by mouth every twelve (12) hours. 9/12/22   Waldo Aschoff, MD   hydrocortisone (CORTAID) 1 % topical cream Apply  to affected area two (2) times a day. use thin layer 9/12/22   Waldo Aschoff, MD   levoFLOXacin (Levaquin) 500 mg tablet Take 1 Tablet by mouth daily. 9/12/22   Waldo Aschoff, MD   gabapentin (NEURONTIN) 300 mg capsule Take 300 mg by mouth three (3) times daily. Provider, Historical   oxyCODONE (ROXICODONE) 5 mg/5 mL solution Take 10 mg by mouth every six to eight (6-8) hours as needed for Pain. Provider, Historical   omeprazole (PRILOSEC) 40 mg capsule take 1 capsule by mouth once daily 11/15/17   Provider, Historical   chlorzoxazone (PARAFON FORTE) 500 mg tablet Take 500-1,000 mg by mouth daily as needed for Muscle Spasm(s). Provider, Historical   diphenoxylate-atropine (LOMOTIL) 2.5-0.025 mg per tablet Take 2 Tabs by mouth Before breakfast, lunch, dinner and at bedtime. Provider, Historical   loperamide (IMODIUM) 2 mg capsule Take 2 mg by mouth as needed for Diarrhea. Patient takes 14-16 capsules a day. Provider, Historical   ondansetron hcl (ZOFRAN, AS HYDROCHLORIDE,) 8 mg tablet Take 8 mg by mouth every eight (8) hours as needed for Nausea. Provider, Historical     Allergies   Allergen Reactions    Honey Anaphylaxis    Remicade [Infliximab] Anaphylaxis    Crestor [Rosuvastatin] Myalgia    Other Plant, Animal, Environmental Other (comments)     Developed \"boils\" on right arm that required I &D after receiving FENTANYL patch.   Is able to take FENTANYL orally; states is allergic to fentanyl patch GLUE    Imuran [Azathioprine] Diarrhea and Nausea Only    Mercaptopurine Diarrhea    Sulfa (Sulfonamide Antibiotics) Other (comments)     Causes diarrhea      Social History     Tobacco Use    Smoking status: Every Day     Packs/day: 1.00     Years: 44.00     Pack years: 44.00     Types: Cigarettes    Smokeless tobacco: Current    Tobacco comments:     CURRENT E CIGS   Substance Use Topics    Alcohol use: No     Comment: RARELY      Family History   Problem Relation Age of Onset    Stroke Mother     Heart Disease Mother     Kidney Disease Mother     Anesth Problems Mother         TAKES A LONG TIME TO WAKE UP    Heart Disease Father     Thyroid Disease Sister         Current Facility-Administered Medications   Medication Dose Route Frequency    sodium chloride (NS) flush 5-40 mL  5-40 mL IntraVENous Q8H    [START ON 2023] azithromycin (ZITHROMAX) 500 mg in 0.9% sodium chloride 250 mL ([id number])  500 mg IntraVENous Q24H    nicotine (NICODERM CQ) 21 mg/24 hr patch 1 Patch  1 Patch TransDERmal DAILY    piperacillin-tazobactam (ZOSYN) 3.375 g in 0.9% sodium chloride (MBP/ADV) 100 mL MBP  3.375 g IntraVENous Q8H       Review of Systems:  A comprehensive review of systems was negative except for that written in the HPI. H e reports intermittent palpatations. Objective:   Vital Signs:    Visit Vitals  /75   Pulse 98   Temp 98.7 °F (37.1 °C)   Resp 23   Ht 5' 11\" (1.803 m)   Wt 74 kg (163 lb 2.3 oz)   SpO2 91%   BMI 22.75 kg/m²       O2 Device: None (Room air)       Temp (24hrs), Av.7 °F (37.1 °C), Min:98.7 °F (37.1 °C), Max:98.7 °F (37.1 °C)       Intake/Output:   Last shift:      No intake/output data recorded. Last 3 shifts: No intake/output data recorded. No intake or output data in the 24 hours ending 23 1637   Physical Exam:   General:  Alert, cooperative, appears stated age. . splinting noted    Head:  Normocephalic, without obvious abnormality, atraumatic. Eyes:  Conjunctivae/corneas clear.  PERRL, EOMs intact. Nose: Mask on    Throat: Mask on    Neck: Supple,non - tender and no nodes    Back:   Symmetric,   Lungs:    Scattered coarse rales/ rhonchi on the rt    Chest wall:  No tenderness or deformity. Heart:  Regular rate and rhythm, S1, S2 normal, no murmur,   sinus tachy 109    Abdomen:   Soft, non-tender. Extremities: Extremities normal, atraumatic, no cyanosis or edema. Pulses:  Radial present    Skin: Skin color, texture, turgor normal. No rashes or lesions   Lymph nodes: Cervical, supraclavicular nodes normal.   Neurologic: Grossly nonfocal     Data review:     Recent Results (from the past 24 hour(s))   EKG, 12 LEAD, INITIAL    Collection Time: 02/07/23  5:28 AM   Result Value Ref Range    Ventricular Rate 107 BPM    Atrial Rate 107 BPM    P-R Interval 176 ms    QRS Duration 82 ms    Q-T Interval 316 ms    QTC Calculation (Bezet) 421 ms    Calculated P Axis 53 degrees    Calculated R Axis 54 degrees    Calculated T Axis 57 degrees    Diagnosis       Sinus tachycardia  Anteroseptal infarct , age undetermined  Abnormal ECG  When compared with ECG of 07-FEB-2023 05:42,  MANUAL COMPARISON REQUIRED, DATA IS UNCONFIRMED  Confirmed by Dain Boast ([id number]) on 2/7/2023 2:46:28 PM     CBC WITH AUTOMATED DIFF    Collection Time: 02/07/23  5:55 AM   Result Value Ref Range    WBC 11.4 (H) 4.1 - 11.1 K/uL    RBC 5.07 4. 10 - 5.70 M/uL    HGB 15.1 12.1 - 17.0 g/dL    HCT 48.0 36.6 - 50.3 %    MCV 94.7 80.0 - 99.0 FL    MCH 29.8 26.0 - 34.0 PG    MCHC 31.5 30.0 - 36.5 g/dL    RDW 14.0 11.5 - 14.5 %    PLATELET 466 325 - 649 K/uL    MPV 10.2 8.9 - 12.9 FL    NRBC 0.0 0  WBC    ABSOLUTE NRBC 0.00 0.00 - 0.01 K/uL    NEUTROPHILS 70 32 - 75 %    LYMPHOCYTES 21 12 - 49 %    MONOCYTES 8 5 - 13 %    EOSINOPHILS 1 0 - 7 %    BASOPHILS 0 0 - 1 %    IMMATURE GRANULOCYTES 0 0.0 - 0.5 %    ABS. NEUTROPHILS 7.9 1.8 - 8.0 K/UL    ABS. LYMPHOCYTES 2.4 0.8 - 3.5 K/UL    ABS. MONOCYTES 0.9 0.0 - 1.0 K/UL    ABS.  EOSINOPHILS 0.1 0.0 - 0.4 K/UL    ABS. BASOPHILS 0.0 0.0 - 0.1 K/UL    ABS. IMM. GRANS. 0.1 (H) 0.00 - 0.04 K/UL    DF AUTOMATED     METABOLIC PANEL, COMPREHENSIVE    Collection Time: 02/07/23  5:55 AM   Result Value Ref Range    Sodium 136 136 - 145 mmol/L    Potassium 3.8 3.5 - 5.1 mmol/L    Chloride 105 97 - 108 mmol/L    CO2 23 21 - 32 mmol/L    Anion gap 8 5 - 15 mmol/L    Glucose 100 65 - 100 mg/dL    BUN 15 6 - 20 MG/DL    Creatinine 1.09 0.70 - 1.30 MG/DL    BUN/Creatinine ratio 14 12 - 20      eGFR >60 >60 ml/min/1.73m2    Calcium 9.5 8.5 - 10.1 MG/DL    Bilirubin, total 0.6 0.2 - 1.0 MG/DL    ALT (SGPT) 22 12 - 78 U/L    AST (SGOT) 15 15 - 37 U/L    Alk. phosphatase 95 45 - 117 U/L    Protein, total 8.0 6.4 - 8.2 g/dL    Albumin 3.8 3.5 - 5.0 g/dL    Globulin 4.2 (H) 2.0 - 4.0 g/dL    A-G Ratio 0.9 (L) 1.1 - 2.2     TROPONIN-HIGH SENSITIVITY    Collection Time: 02/07/23  5:55 AM   Result Value Ref Range    Troponin-High Sensitivity 4 0 - 76 ng/L   SAMPLES BEING HELD    Collection Time: 02/07/23  5:55 AM   Result Value Ref Range    SAMPLES BEING HELD 1RED 1BLUE     COMMENT        Add-on orders for these samples will be processed based on acceptable specimen integrity and analyte stability, which may vary by analyte. NT-PRO BNP    Collection Time: 02/07/23  9:05 AM   Result Value Ref Range    NT pro-BNP 89 <125 PG/ML   SAMPLES BEING HELD    Collection Time: 02/07/23  9:06 AM   Result Value Ref Range    SAMPLES BEING HELD 1LAV 1BLU 1RED     COMMENT        Add-on orders for these samples will be processed based on acceptable specimen integrity and analyte stability, which may vary by analyte.    LACTIC ACID    Collection Time: 02/07/23  9:17 AM   Result Value Ref Range    Lactic acid 1.0 0.4 - 2.0 MMOL/L   URINALYSIS W/ REFLEX CULTURE    Collection Time: 02/07/23 11:12 AM    Specimen: Urine   Result Value Ref Range    Color YELLOW/STRAW      Appearance CLEAR CLEAR      Specific gravity >1.030     pH (UA) 5.0 5.0 - 8.0 Protein 30 (A) NEG mg/dL    Glucose Negative NEG mg/dL    Ketone Negative NEG mg/dL    Bilirubin Negative NEG      Blood LARGE (A) NEG      Urobilinogen 0.2 0.2 - 1.0 EU/dL    Nitrites Positive (A) NEG      Leukocyte Esterase TRACE (A) NEG      WBC 10-20 0 - 4 /hpf    RBC 0-5 0 - 5 /hpf    Epithelial cells FEW FEW /lpf    Bacteria 2+ (A) NEG /hpf    UA:UC IF INDICATED URINE CULTURE ORDERED (A) CNI     ECHO ADULT COMPLETE    Collection Time: 02/07/23  4:13 PM   Result Value Ref Range    IVSd 0.9 0.6 - 1.0 cm    LVIDd 4.8 4.2 - 5.9 cm    LVIDs 3.4 cm    LVPWd 1.0 0.6 - 1.0 cm    LVOT Peak Gradient 4 mmHg    LVOT Peak Velocity 0.9 m/s    RVIDd 2.2 cm    RVSP 43 mmHg    LA Diameter 3.2 cm    LA Volume A/L 41 mL    LA Volume 2C 40 18 - 58 mL    LA Volume 4C 37 18 - 58 mL    Est. RA Pressure 8 mmHg    AV Peak Gradient 6 mmHg    AV Peak Velocity 1.2 m/s    MV A Velocity 1.10 m/s    MV E Wave Deceleration Time 250.0 ms    MV E Velocity 0.78 m/s    LV E' Lateral Velocity 10 cm/s    LV E' Septal Velocity 8 cm/s    MV PHT 72.5 ms    MV Area by PHT 3.0 cm2    PV Peak Gradient 4 mmHg    PV Max Velocity 1.0 m/s    TAPSE 1.9 1.7 cm    TR Peak Gradient 35 mmHg    TR Max Velocity 2.96 m/s    Aortic Root 3.7 cm    Fractional Shortening 2D 29 28 - 44 %    LVIDd Index 2.49 cm/m2    LVIDs Index 1.76 cm/m2    LV RWT Ratio 0.42     LV Mass 2D 158.8 88 - 224 g    LV Mass 2D Index 82.3 49 - 115 g/m2    MV E/A 0.71     E/E' Ratio (Averaged) 8.78     E/E' Lateral 7.80     E/E' Septal 9.75     LA Volume Index A/L 21 16 - 34 mL/m2    LA Volume Index 2C 21 16 - 34 mL/m2    LA Volume Index 4C 19 16 - 34 mL/m2    LA Size Index 1.66 cm/m2    LA/AO Root Ratio 0.86     Ao Root Index 1.92 cm/m2    AV Velocity Ratio 0.75        Imaging:  I have personally reviewed the patients radiographs and have reviewed the reports:  Past cxr's viewed . Last nl  in 2019 . Ct's viewed . Results as above .          Gokul Penn MD

## 2023-02-07 NOTE — ED PROVIDER NOTES
History of Crohn's disease status post multiple surgeries, COPD, abdominal wall hernia, anemia, anxiety/depression, arthritis, skin cancer, GERD, migraines, short-bowel syndrome. He reports a previous history of hypertension and hyperlipidemia but not now. He presents with an 11-hour history of constant chest pain. He points to the left side of his chest.  He states that it radiates to his neck, shoulder, and upper back. It has been mostly dull but becomes sharp and stabbing at times. It is worse with movement and inspiration. He feels short of breath. Past Medical History:   Diagnosis Date    Abdominal wall hernia 6/15/2012    Anal fissure     Anemia     Anemia     Anxiety and depression     Arthritis     Related to the Crohn's disease. Cancer ([address]     MELANOMA HEAD AND FACE    Chronic pain     GENERALIZED    COPD (chronic obstructive pulmonary disease) (HCC)     Crohn disease (HCC)     Diagnosed at age 17.     Echocardiogram normal (EF: 55-60%) 07/2015    Esophageal ulcer     GERD (gastroesophageal reflux disease)     H/O blood transfusion 2013    [facility], [address]    Hypertension     denies    Migraine     Short bowel syndrome     Ventral hernia without obstruction or gangrene        Past Surgical History:   Procedure Laterality Date    COLONOSCOPY N/A 10/8/2019    COLONOSCOPY performed by Willian Welsh MD at [place] [address] N/A 9/1/2022    SIGMOIDOSCOPY FLEXIBLE performed by Gamaliel Bray MD at [facility] ENDOSCOPY    [address] needle, takes 1 inch needle    HX APPENDECTOMY      HX CHOLECYSTECTOMY      HX GI      colectomy x2, one ileostomy    HX GI  7/28/14    exp lap,partial colectomy with end ileostomy by Dr Tri Zhao      neck fusion    HX ORTHOPAEDIC  1980s    wrist right,     HX ORTHOPAEDIC  2006, 11/2013    neck, L4 L5 L6    HX ORTHOPAEDIC  1990s    right knee scope    HX OTHER SURGICAL      surgical repair from spider bite HX OTHER SURGICAL  12/1/14    Incision and drainage of posterior right subcutaneous shoulder abscess    HX OTHER SURGICAL  2014    LEFT INDEX FINGER SPIDER BITE    HX OTHER SURGICAL  2014    RECTAL FISTULA    HX OTHER SURGICAL  3/17/2015    Ileostomy takedown with extensive lysis of adhesions (greater than three hours), mobilization of the splenic flexure, left colon resection, ileocolic anastomosis, and excision of scar; Dr. Sania Jewell. HX OTHER SURGICAL  3/22/2015    Exploratory laparotomy with washout of abdomen, suture repair of small bowel/anastomosis, and diverting protective loop ileostomy; Lucho Esteban MD.    HX OTHER SURGICAL  4/9/2015    Laparotomy, extensive lysis of adhesions, abdominal lavage, and resection of ileocolic anastomosis (including the efferent limb of the loop ileostomy) with creation of Demetrius's pouch; Dr. Sania Jewell. HX OTHER SURGICAL  7/14/2015    Cystoscopy and placement of bilateral ureteral catheters; Colten Tejada MD.    HX OTHER SURGICAL  7/14/2015    Ileostomy takedown with extensive lysis of adhesions, small bowel resection, and enterocolostomy; Dr. Sania Jewell. HX OTHER SURGICAL  9/29/2015    CT-guided percutaneous drainage of intraabdominal abscess; Dr. Darci Miguel. HX OTHER SURGICAL  11/16/2015    CT-guided percutaneous aspiration of abdominal wall cavity; Dr. Ashely Winchester.  OTHER SURGICAL  12/1/2015    Incision and drainage of abdominal wall abscess; Dr. Sania Jewell.  OTHER SURGICAL  2/11/2016    Incision and drainage of abdominal wall abscess; Dr. Sania Jewell.  OTHER SURGICAL  2/23/2016    Cystoscopy and placement of bilateral temporary ureteral catheters; Dr. Hayden Cummings. HX OTHER SURGICAL  2/23/2016    Exploratory laparotomy, extensive lysis of adhesions, removal of mesh, and repair of enterocutaneous fistula; Dr. Sania Jewell.      OTHER SURGICAL  11/03/2017    Exploratory laparotomy, extensive lysis of adhesions, and reduction of intussusception; Dr. Fatmata Knott. [place] ABDOMEN SURGERY PROC UNLISTED      33 abdominal operations for treatment of Crohn's disease and its complications. Family History:   Problem Relation Age of Onset    Stroke Mother     Heart Disease Mother     Kidney Disease Mother     Anesth Problems Mother         TAKES A LONG TIME TO WAKE UP    Heart Disease Father     Thyroid Disease Sister        Social History     Socioeconomic History    Marital status: LEGALLY      Spouse name: Not on file    Number of children: Not on file    Years of education: Not on file    Highest education level: Not on file   Occupational History    Not on file   Tobacco Use    Smoking status: Every Day     Packs/day: 1.00     Years: 44.00     Pack years: 44.00     Types: Cigarettes    Smokeless tobacco: Current    Tobacco comments:     CURRENT E CIGS   Substance and Sexual Activity    Alcohol use: No     Comment: RARELY    Drug use: No    Sexual activity: Not on file   Other Topics Concern    Not on file   Social History Narrative    Not on file     Social Determinants of Health     Financial Resource Strain: Not on file   Food Insecurity: Not on file   Transportation Needs: Not on file   Physical Activity: Not on file   Stress: Not on file   Social Connections: Not on file   Intimate Partner Violence: Not on file   Housing Stability: Not on file         ALLERGIES: Honey; Remicade [infliximab]; Crestor [rosuvastatin]; Other plant, animal, environmental; Imuran [azathioprine]; Mercaptopurine; and Sulfa (sulfonamide antibiotics)    Review of Systems   All other systems reviewed and are negative. There were no vitals filed for this visit. Physical Exam  Vitals and nursing note reviewed. Constitutional:       Appearance: He is well-developed. Comments: Appears uncomfortable. HENT:      Head: Normocephalic and atraumatic.    Eyes:      Conjunctiva/sclera: Conjunctivae normal.   Neck:      Trachea: No tracheal deviation. Cardiovascular:      Rate and Rhythm: Normal rate and regular rhythm. Heart sounds: Normal heart sounds. No murmur heard. No friction rub. No gallop. Pulmonary:      Effort: Pulmonary effort is normal.      Breath sounds: Normal breath sounds. Abdominal:      Palpations: Abdomen is soft. Tenderness: There is no abdominal tenderness. Musculoskeletal:         General: No deformity. Cervical back: Neck supple. Skin:     General: Skin is warm and dry. Neurological:      Mental Status: He is alert. Comments: oriented        Medical Decision Making  Amount and/or Complexity of Data Reviewed  Labs: ordered. Radiology: ordered. ECG/medicine tests: ordered. Risk  Prescription drug management. Procedures    EKG #1: Sinus tachycardia; rate of 109; septal Q waves; nonspecific ST, T wave abnormalities. Martha Madrid MD  5:30 AM    EKG #2: Sinus tachycardia; rate of 112; septal Q waves; nonspecific ST and T, T wave abnormalities. Martha Madrid MD  5:30 AM    EKG #3: Sinus tachycardia; rate of 106; septal Q waves; nonspecific ST, T wave abnormalities. Martha Madrid MD  5:45 AM    EKG #4: Sinus tachycardia; rate of 107; septal Q waves; nonspecific ST, T wave abnormalities. Martha Madrid MD  6:00 AM    Assessment/plan: 11 hour history of constant chest pain. Differential diagnosis includes ACS, PE, aortic dissection, pneumonia, pneumothorax, musculoskeletal etiology, GERD, and others. Reassuring appearance/exam with stable vital signs (heart rate in the low 100s noted). CBC, CMP, troponin okay. Chest x-ray shows chronic right upper lobe airspace disease. CT pending at the time of change of shift. I anticipate discharge. Martha Madrid MD  6:47 AM       7:03 AM  Change of shift. Care of patient signed over to Dr. Concetta Hope. Handoff complete.  Awaiting CT chest.  Martha Madrid MD

## 2023-02-08 LAB
ALBUMIN SERPL-MCNC: 3 G/DL (ref 3.5–5)
ALBUMIN/GLOB SERPL: 0.9 (ref 1.1–2.2)
ALP SERPL-CCNC: 116 U/L (ref 45–117)
ALT SERPL-CCNC: 50 U/L (ref 12–78)
ANION GAP SERPL CALC-SCNC: 6 MMOL/L (ref 5–15)
AST SERPL-CCNC: 34 U/L (ref 15–37)
BACTERIA SPEC CULT: NORMAL
BASOPHILS # BLD: 0 K/UL (ref 0–0.1)
BASOPHILS NFR BLD: 1 % (ref 0–1)
BILIRUB SERPL-MCNC: 0.6 MG/DL (ref 0.2–1)
BUN SERPL-MCNC: 13 MG/DL (ref 6–20)
BUN/CREAT SERPL: 12 (ref 12–20)
C DIFF GDH STL QL: NEGATIVE
C DIFF TOX A+B STL QL IA: NEGATIVE
CALCIUM SERPL-MCNC: 8.7 MG/DL (ref 8.5–10.1)
CHLORIDE SERPL-SCNC: 107 MMOL/L (ref 97–108)
CO2 SERPL-SCNC: 21 MMOL/L (ref 21–32)
CREAT SERPL-MCNC: 1.08 MG/DL (ref 0.7–1.3)
DIFFERENTIAL METHOD BLD: NORMAL
EOSINOPHIL # BLD: 0.2 K/UL (ref 0–0.4)
EOSINOPHIL NFR BLD: 2 % (ref 0–7)
ERYTHROCYTE [DISTWIDTH] IN BLOOD BY AUTOMATED COUNT: 14.2 % (ref 11.5–14.5)
GLOBULIN SER CALC-MCNC: 3.2 G/DL (ref 2–4)
GLUCOSE SERPL-MCNC: 94 MG/DL (ref 65–100)
HCT VFR BLD AUTO: 40.1 % (ref 36.6–50.3)
HGB BLD-MCNC: 12.7 G/DL (ref 12.1–17)
IMM GRANULOCYTES # BLD AUTO: 0 K/UL (ref 0–0.04)
IMM GRANULOCYTES NFR BLD AUTO: 0 % (ref 0–0.5)
INR PPP: 1 (ref 0.9–1.1)
INTERPRETATION: NORMAL
L PNEUMO1 AG UR QL IA: NEGATIVE
LYMPHOCYTES # BLD: 2.4 K/UL (ref 0.8–3.5)
LYMPHOCYTES NFR BLD: 28 % (ref 12–49)
MAGNESIUM SERPL-MCNC: 2.1 MG/DL (ref 1.6–2.4)
MCH RBC QN AUTO: 30.2 PG (ref 26–34)
MCHC RBC AUTO-ENTMCNC: 31.7 G/DL (ref 30–36.5)
MCV RBC AUTO: 95.5 FL (ref 80–99)
MONOCYTES # BLD: 0.9 K/UL (ref 0–1)
MONOCYTES NFR BLD: 10 % (ref 5–13)
NEUTS SEG # BLD: 5.1 K/UL (ref 1.8–8)
NEUTS SEG NFR BLD: 59 % (ref 32–75)
NRBC # BLD: 0 K/UL (ref 0–0.01)
NRBC BLD-RTO: 0 PER 100 WBC
PLATELET # BLD AUTO: 220 K/UL (ref 150–400)
PMV BLD AUTO: 10.5 FL (ref 8.9–12.9)
POTASSIUM SERPL-SCNC: 3.9 MMOL/L (ref 3.5–5.1)
PROT SERPL-MCNC: 6.2 G/DL (ref 6.4–8.2)
PROTHROMBIN TIME: 10.4 SEC (ref 9–11.1)
RBC # BLD AUTO: 4.2 M/UL (ref 4.1–5.7)
SERVICE CMNT-IMP: NORMAL
SODIUM SERPL-SCNC: 134 MMOL/L (ref 136–145)
SPECIMEN SOURCE: NORMAL
TROPONIN I SERPL HS-MCNC: 6 NG/L (ref 0–76)
WBC # BLD AUTO: 8.7 K/UL (ref 4.1–11.1)
WBC #/AREA STL HPF: NORMAL /HPF (ref 0–4)

## 2023-02-08 PROCEDURE — 85025 COMPLETE CBC W/AUTO DIFF WBC: CPT

## 2023-02-08 PROCEDURE — 74011250637 HC RX REV CODE- 250/637: Performed by: INTERNAL MEDICINE

## 2023-02-08 PROCEDURE — 85610 PROTHROMBIN TIME: CPT

## 2023-02-08 PROCEDURE — 94640 AIRWAY INHALATION TREATMENT: CPT

## 2023-02-08 PROCEDURE — 74011250636 HC RX REV CODE- 250/636: Performed by: INTERNAL MEDICINE

## 2023-02-08 PROCEDURE — 74011250637 HC RX REV CODE- 250/637: Performed by: NURSE PRACTITIONER

## 2023-02-08 PROCEDURE — 74011000250 HC RX REV CODE- 250: Performed by: INTERNAL MEDICINE

## 2023-02-08 PROCEDURE — 87506 IADNA-DNA/RNA PROBE TQ 6-11: CPT

## 2023-02-08 PROCEDURE — 87177 OVA AND PARASITES SMEARS: CPT

## 2023-02-08 PROCEDURE — 83735 ASSAY OF MAGNESIUM: CPT

## 2023-02-08 PROCEDURE — 87324 CLOSTRIDIUM AG IA: CPT

## 2023-02-08 PROCEDURE — 74011250637 HC RX REV CODE- 250/637: Performed by: HOSPITALIST

## 2023-02-08 PROCEDURE — 74011000258 HC RX REV CODE- 258: Performed by: INTERNAL MEDICINE

## 2023-02-08 PROCEDURE — 84484 ASSAY OF TROPONIN QUANT: CPT

## 2023-02-08 PROCEDURE — 80053 COMPREHEN METABOLIC PANEL: CPT

## 2023-02-08 PROCEDURE — 89055 LEUKOCYTE ASSESSMENT FECAL: CPT

## 2023-02-08 PROCEDURE — 36573 INSJ PICC RS&I 5 YR+: CPT | Performed by: HOSPITALIST

## 2023-02-08 PROCEDURE — 65270000046 HC RM TELEMETRY

## 2023-02-08 PROCEDURE — 36415 COLL VENOUS BLD VENIPUNCTURE: CPT

## 2023-02-08 RX ORDER — DIPHENOXYLATE HYDROCHLORIDE AND ATROPINE SULFATE 2.5; .025 MG/1; MG/1
2 TABLET ORAL
Status: DISCONTINUED | OUTPATIENT
Start: 2023-02-08 | End: 2023-02-10 | Stop reason: HOSPADM

## 2023-02-08 RX ORDER — GABAPENTIN 300 MG/1
300 CAPSULE ORAL
Status: DISCONTINUED | OUTPATIENT
Start: 2023-02-08 | End: 2023-02-09

## 2023-02-08 RX ORDER — CHLORZOXAZONE 500 MG/1
500 TABLET ORAL 3 TIMES DAILY
Status: DISCONTINUED | OUTPATIENT
Start: 2023-02-08 | End: 2023-02-09

## 2023-02-08 RX ORDER — MEMANTINE HYDROCHLORIDE 10 MG/1
5 TABLET ORAL
Status: DISCONTINUED | OUTPATIENT
Start: 2023-02-08 | End: 2023-02-10 | Stop reason: HOSPADM

## 2023-02-08 RX ORDER — GABAPENTIN 300 MG/1
300 CAPSULE ORAL 3 TIMES DAILY
Status: DISCONTINUED | OUTPATIENT
Start: 2023-02-08 | End: 2023-02-09

## 2023-02-08 RX ADMIN — PIPERACILLIN AND TAZOBACTAM 3.38 G: 3; .375 INJECTION, POWDER, FOR SOLUTION INTRAVENOUS at 09:03

## 2023-02-08 RX ADMIN — SODIUM CHLORIDE, PRESERVATIVE FREE 10 ML: 5 INJECTION INTRAVENOUS at 04:12

## 2023-02-08 RX ADMIN — ENOXAPARIN SODIUM 40 MG: 100 INJECTION SUBCUTANEOUS at 17:26

## 2023-02-08 RX ADMIN — METOPROLOL TARTRATE 12.5 MG: 25 TABLET, FILM COATED ORAL at 18:07

## 2023-02-08 RX ADMIN — AZITHROMYCIN MONOHYDRATE 500 MG: 500 INJECTION, POWDER, LYOPHILIZED, FOR SOLUTION INTRAVENOUS at 09:01

## 2023-02-08 RX ADMIN — HYDROMORPHONE HYDROCHLORIDE 1 MG: 1 INJECTION, SOLUTION INTRAMUSCULAR; INTRAVENOUS; SUBCUTANEOUS at 08:58

## 2023-02-08 RX ADMIN — DIPHENOXYLATE HYDROCHLORIDE AND ATROPINE SULFATE 2 TABLET: 2.5; .025 TABLET ORAL at 13:05

## 2023-02-08 RX ADMIN — DIPHENOXYLATE HYDROCHLORIDE AND ATROPINE SULFATE 2 TABLET: 2.5; .025 TABLET ORAL at 23:31

## 2023-02-08 RX ADMIN — HYDROMORPHONE HYDROCHLORIDE 1 MG: 1 INJECTION, SOLUTION INTRAMUSCULAR; INTRAVENOUS; SUBCUTANEOUS at 00:08

## 2023-02-08 RX ADMIN — IPRATROPIUM BROMIDE AND ALBUTEROL SULFATE 3 ML: .5; 3 SOLUTION RESPIRATORY (INHALATION) at 13:05

## 2023-02-08 RX ADMIN — SODIUM CHLORIDE, PRESERVATIVE FREE 10 ML: 5 INJECTION INTRAVENOUS at 13:06

## 2023-02-08 RX ADMIN — HYDROMORPHONE HYDROCHLORIDE 1 MG: 1 INJECTION, SOLUTION INTRAMUSCULAR; INTRAVENOUS; SUBCUTANEOUS at 13:05

## 2023-02-08 RX ADMIN — GABAPENTIN 300 MG: 300 CAPSULE ORAL at 21:11

## 2023-02-08 RX ADMIN — METOPROLOL TARTRATE 12.5 MG: 25 TABLET, FILM COATED ORAL at 09:01

## 2023-02-08 RX ADMIN — CHLORZOXAZONE 500 MG: 500 TABLET ORAL at 17:38

## 2023-02-08 RX ADMIN — PIPERACILLIN AND TAZOBACTAM 3.38 G: 3; .375 INJECTION, POWDER, FOR SOLUTION INTRAVENOUS at 03:00

## 2023-02-08 RX ADMIN — GABAPENTIN 300 MG: 300 CAPSULE ORAL at 17:38

## 2023-02-08 RX ADMIN — HYDROMORPHONE HYDROCHLORIDE 1 MG: 1 INJECTION, SOLUTION INTRAMUSCULAR; INTRAVENOUS; SUBCUTANEOUS at 21:11

## 2023-02-08 RX ADMIN — SODIUM CHLORIDE, PRESERVATIVE FREE 10 ML: 5 INJECTION INTRAVENOUS at 21:11

## 2023-02-08 RX ADMIN — IPRATROPIUM BROMIDE AND ALBUTEROL SULFATE 3 ML: .5; 3 SOLUTION RESPIRATORY (INHALATION) at 20:55

## 2023-02-08 RX ADMIN — MEMANTINE 5 MG: 10 TABLET ORAL at 17:42

## 2023-02-08 RX ADMIN — IPRATROPIUM BROMIDE AND ALBUTEROL SULFATE 3 ML: .5; 3 SOLUTION RESPIRATORY (INHALATION) at 17:26

## 2023-02-08 RX ADMIN — DIPHENOXYLATE HYDROCHLORIDE AND ATROPINE SULFATE 2 TABLET: 2.5; .025 TABLET ORAL at 17:38

## 2023-02-08 RX ADMIN — HYDROMORPHONE HYDROCHLORIDE 1 MG: 1 INJECTION, SOLUTION INTRAMUSCULAR; INTRAVENOUS; SUBCUTANEOUS at 04:12

## 2023-02-08 RX ADMIN — HYDROMORPHONE HYDROCHLORIDE 1 MG: 1 INJECTION, SOLUTION INTRAMUSCULAR; INTRAVENOUS; SUBCUTANEOUS at 17:26

## 2023-02-08 RX ADMIN — PIPERACILLIN AND TAZOBACTAM 3.38 G: 3; .375 INJECTION, POWDER, FOR SOLUTION INTRAVENOUS at 17:27

## 2023-02-08 RX ADMIN — IPRATROPIUM BROMIDE AND ALBUTEROL SULFATE 3 ML: .5; 3 SOLUTION RESPIRATORY (INHALATION) at 09:11

## 2023-02-08 RX ADMIN — CHLORZOXAZONE 500 MG: 500 TABLET ORAL at 21:17

## 2023-02-08 NOTE — ED NOTES
Bedside and Verbal shift change report given to Domenico Martinez (oncoming nurse) by Abdiel Woodard (offgoing nurse). Report included the following information SBAR, ED Summary, MAR and Recent Results.

## 2023-02-08 NOTE — PROGRESS NOTES
[facility]  Pulmonary, Critical Care, and Sleep Medicine    Progress note    Name: Ana Alex MRN: [id number]   : 1960 Hospital: [facility]    Date: 2023        IMPRESSION:   Rul cavitary  infiltrates - appears  to have long standing airspace dx on  cxr since 2022 - infectious (patulous esophagus and infection is in the posterior aspect of RUL) , cancer? Chest pain =-   interestingly on the left  to include anterior chest and down left arm - echo done and results pd . / troponin nl ? :  gi   Emphysema    Covid in 2022 - admitted   Crohns dz with hx of multiple surgeries and multiple complications- was to start  humira - not currently on chornic pred nor immunosuppressives   Copd without exacerbation   Short bowel syndrome -- s/p multiple operations  Says he was told her had cirrhosis - not alcoholic   He has a small pericardial effusion called on echo - ? Pericardial pain. RECOMMENDATIONS:   Discussed with attending and hospitalist    Afbs pending    On abx; will likely need IV at discharge    Nebs for secretion clearance -  not sure rt will do if in tb isolation which is ok if they do not . Agree with repeat quantiferon and need to check covid and influenza per procedure. NPO after midnight; bronch scheduled for am        Subjective:       Feeling ok today     This patient has been seen and evaluated at the request of Dr. Yuridia Kaba for abnl chest ct imaging with caviitary asdz in the  rt apex. . Patient is a 65 y.o. male with hx of crohns dz with multiple past abd surgeries and complications, emphysema from tobacco use. The attending tells me that he is  not able to take po ab due ti increased exacerbation of diarrhea. Phillip Patella He is suppose to start humira for his crohns. He says he has not felt well for a couple days and came in to er today with left cp , anterior with radiation to the left shoulder and down left arm .   He reports some chest congestion and sob . He still smokes. He has not seen a pulm dx. He reports he has neck and spine imaging at an outpt [facility] facility and was recently told her had abnl in chest and needed to see his primary. He has covid and was admitted in August 2022 . Reviewed of routine chest imaging at the time did show rt apical asdz / opacity . He was not aware . Chest ct this admit showed no pte but did show cavitary asdz in rul/ apex and evidence of emphysema. NO f/c but does  have cp and pleurisy on the LEFT and it hurts to breathe. Hx  ix pertinent for skin cancer --  per chart melanoma . Past Medical History:   Diagnosis Date    Abdominal wall hernia 6/15/2012    Anal fissure     Anemia     Anemia     Anxiety and depression     Arthritis     Related to the Crohn's disease. Cancer ([address]     MELANOMA HEAD AND FACE    Chronic pain     GENERALIZED    COPD (chronic obstructive pulmonary disease) (HCC)     Crohn disease (HCC)     Diagnosed at age 17.     Echocardiogram normal (EF: 55-60%) 07/2015    Esophageal ulcer     GERD (gastroesophageal reflux disease)     H/O blood transfusion 2013    [facility], [address]    Hypertension     denies    Migraine     Short bowel syndrome     Ventral hernia without obstruction or gangrene       Past Surgical History:   Procedure Laterality Date    COLONOSCOPY N/A 10/8/2019    COLONOSCOPY performed by Nigel Adkins MD at [place] [id number] N/A 9/1/2022    SIGMOIDOSCOPY FLEXIBLE performed by Pierce Jones MD at [facility] ENDOSCOPY    [address] needle, takes 1 inch needle    HX APPENDECTOMY      HX CHOLECYSTECTOMY      HX GI      colectomy x2, one ileostomy    HX GI  7/28/14    exp lap,partial colectomy with end ileostomy by Dr Shelly Diggs    HX HEENT      neck fusion    HX ORTHOPAEDIC  1980s    wrist right,     HX ORTHOPAEDIC  2006, 11/2013    neck, L4 L5 L6    HX ORTHOPAEDIC  1990s    right knee scope    HX OTHER SURGICAL surgical repair from spider bite    HX OTHER SURGICAL  12/1/14    Incision and drainage of posterior right subcutaneous shoulder abscess    HX OTHER SURGICAL  2014    LEFT INDEX FINGER SPIDER BITE    HX OTHER SURGICAL  2014    RECTAL FISTULA    HX OTHER SURGICAL  3/17/2015    Ileostomy takedown with extensive lysis of adhesions (greater than three hours), mobilization of the splenic flexure, left colon resection, ileocolic anastomosis, and excision of scar; Dr. Ryann Sarabia. HX OTHER SURGICAL  3/22/2015    Exploratory laparotomy with washout of abdomen, suture repair of small bowel/anastomosis, and diverting protective loop ileostomy; La Rivas MD.    HX OTHER SURGICAL  4/9/2015    Laparotomy, extensive lysis of adhesions, abdominal lavage, and resection of ileocolic anastomosis (including the efferent limb of the loop ileostomy) with creation of Demetrius's pouch; Dr. Ryann Sarabia. HX OTHER SURGICAL  7/14/2015    Cystoscopy and placement of bilateral ureteral catheters; Chio Barba MD.    HX OTHER SURGICAL  7/14/2015    Ileostomy takedown with extensive lysis of adhesions, small bowel resection, and enterocolostomy; Dr. Ryann Sarabia. HX OTHER SURGICAL  9/29/2015    CT-guided percutaneous drainage of intraabdominal abscess; Dr. Carlos Lawler. HX OTHER SURGICAL  11/16/2015    CT-guided percutaneous aspiration of abdominal wall cavity; Dr. Sindy Quijano. HX OTHER SURGICAL  12/1/2015    Incision and drainage of abdominal wall abscess; Dr. Ryann Sarabia. HX OTHER SURGICAL  2/11/2016    Incision and drainage of abdominal wall abscess; Dr. Ryann Sarabia.  OTHER SURGICAL  2/23/2016    Cystoscopy and placement of bilateral temporary ureteral catheters; Dr. Linda Maradiaga. HX OTHER SURGICAL  2/23/2016    Exploratory laparotomy, extensive lysis of adhesions, removal of mesh, and repair of enterocutaneous fistula; Dr. Ryann Sarabia.     HX OTHER SURGICAL  11/03/2017    Exploratory laparotomy, extensive lysis of adhesions, and reduction of intussusception; Dr. Oni Billy. RI UNLISTED PROCEDURE ABDOMEN PERITONEUM & OMENTUM      33 abdominal operations for treatment of Crohn's disease and its complications. Prior to Admission medications    Medication Sig Start Date End Date Taking? Authorizing Provider   metoprolol tartrate (LOPRESSOR) 25 mg tablet Take 0.5 Tablets by mouth every twelve (12) hours. 9/12/22  Yes Juani Thomas MD   gabapentin (NEURONTIN) 300 mg capsule Take 300 mg by mouth three (3) times daily. Yes Provider, Historical   chlorzoxazone (PARAFON FORTE) 500 mg tablet Take 500-1,000 mg by mouth daily as needed for Muscle Spasm(s). Yes Provider, Historical   diphenoxylate-atropine (LOMOTIL) 2.5-0.025 mg per tablet Take 2 Tablets by mouth Before breakfast, lunch, dinner and at bedtime. Yes Provider, Historical   hydrocortisone (CORTAID) 1 % topical cream Apply  to affected area two (2) times a day. use thin layer  Patient not taking: Reported on 2/7/2023 9/12/22   Juani Thomas MD   levoFLOXacin (Levaquin) 500 mg tablet Take 1 Tablet by mouth daily. Patient not taking: Reported on 2/7/2023 9/12/22   Juani Thomas MD   oxyCODONE (ROXICODONE) 5 mg/5 mL solution Take 10 mg by mouth every six to eight (6-8) hours as needed for Pain. Patient not taking: Reported on 2/7/2023    Provider, Historical   omeprazole (PRILOSEC) 40 mg capsule take 1 capsule by mouth once daily  Patient not taking: Reported on 2/7/2023 11/15/17   Provider, Historical   loperamide (IMODIUM) 2 mg capsule Take 2 mg by mouth as needed for Diarrhea. Patient takes 14-16 capsules a day. Patient not taking: Reported on 2/7/2023    Provider, Historical   ondansetron hcl (ZOFRAN) 8 mg tablet Take 8 mg by mouth every eight (8) hours as needed for Nausea.     Provider, Historical     Allergies   Allergen Reactions    Honey Anaphylaxis    Remicade [Infliximab] Anaphylaxis    Crestor [Rosuvastatin] Myalgia    Other Plant, Animal, Environmental Other (comments)     Developed \"boils\" on right arm that required I &D after receiving FENTANYL patch. Is able to take FENTANYL orally; states is allergic to fentanyl patch GLUE    Imuran [Azathioprine] Diarrhea and Nausea Only    Mercaptopurine Diarrhea    Sulfa (Sulfonamide Antibiotics) Other (comments)     Causes diarrhea      Social History     Tobacco Use    Smoking status: Every Day     Packs/day: 1.00     Years: 44.00     Pack years: 44.00     Types: Cigarettes    Smokeless tobacco: Current    Tobacco comments:     CURRENT E CIGS   Substance Use Topics    Alcohol use: No     Comment: RARELY      Family History   Problem Relation Age of Onset    Stroke Mother     Heart Disease Mother     Kidney Disease Mother     Anesth Problems Mother         TAKES A LONG TIME TO WAKE UP    Heart Disease Father     Thyroid Disease Sister         Current Facility-Administered Medications   Medication Dose Route Frequency    diphenoxylate-atropine (LOMOTIL) tablet 2 Tablet  2 Tablet Oral AC&HS    gabapentin (NEURONTIN) capsule 300 mg  300 mg Oral TID    gabapentin (NEURONTIN) capsule 300 mg  300 mg Oral QHS    memantine (NAMENDA) tablet 5 mg  5 mg Oral QHS    chlorzoxazone (PARAFON FORTE) tablet 500 mg  500 mg Oral TID    sodium chloride (NS) flush 5-40 mL  5-40 mL IntraVENous Q8H    azithromycin (ZITHROMAX) 500 mg in 0.9% sodium chloride 250 mL (Vujw0Ogr)  500 mg IntraVENous Q24H    nicotine (NICODERM CQ) 21 mg/24 hr patch 1 Patch  1 Patch TransDERmal DAILY    piperacillin-tazobactam (ZOSYN) 3.375 g in 0.9% sodium chloride (MBP/ADV) 100 mL MBP  3.375 g IntraVENous Q8H    albuterol-ipratropium (DUO-NEB) 2.5 MG-0.5 MG/3 ML  3 mL Nebulization QID RT    enoxaparin (LOVENOX) injection 40 mg  40 mg SubCUTAneous Q24H    metoprolol tartrate (LOPRESSOR) tablet 12.5 mg  12.5 mg Oral Q12H       Review of Systems:  A comprehensive review of systems was negative except for that written in the HPI.   H e reports intermittent palpatations. Objective:   Vital Signs:    Visit Vitals  /70 (BP 1 Location: Left upper arm, BP Patient Position: At rest;Lying)   Pulse 98   Temp 98.8 °F (37.1 °C)   Resp 28   Ht 5' 11\" (1.803 m)   Wt 72.1 kg (159 lb)   SpO2 94%   BMI 22.18 kg/m²       O2 Device: None (Room air)       Temp (24hrs), Av.5 °F (36.9 °C), Min:98.1 °F (36.7 °C), Max:99.2 °F (37.3 °C)       Intake/Output:   Last shift:      No intake/output data recorded. Last 3 shifts:  1901 -  0700  In: -   Out: 175 [Urine:175]    Intake/Output Summary (Last 24 hours) at 2023 1337  Last data filed at 2023 0200  Gross per 24 hour   Intake --   Output 175 ml   Net -175 ml        Physical Exam:   General:  Alert, cooperative, appears stated age. . splinting noted    Head:  Normocephalic, without obvious abnormality, atraumatic. Eyes:  Conjunctivae/corneas clear. PERRL, EOMs intact. Nose: Mask on    Throat: Mask on    Neck: Supple,non - tender and no nodes    Back:   Symmetric,   Lungs:    Scattered coarse rales/ rhonchi on the rt    Chest wall:  No tenderness or deformity. Heart:  Regular rate and rhythm, S1, S2 normal, no murmur,   sinus tachy 109    Abdomen:   Soft, non-tender. Extremities: Extremities normal, atraumatic, no cyanosis or edema.    Pulses:  Radial present    Skin: Skin color, texture, turgor normal. No rashes or lesions   Lymph nodes: Cervical, supraclavicular nodes normal.   Neurologic: Grossly nonfocal     Data review:     Recent Results (from the past 24 hour(s))   ECHO ADULT COMPLETE    Collection Time: 23  4:13 PM   Result Value Ref Range    IVSd 0.9 0.6 - 1.0 cm    LVIDd 4.8 4.2 - 5.9 cm    LVIDs 3.4 cm    LVPWd 1.0 0.6 - 1.0 cm    LVOT Peak Gradient 4 mmHg    LVOT Peak Velocity 0.9 m/s    RVIDd 2.2 cm    RVSP 43 mmHg    LA Diameter 3.2 cm    LA Volume A/L 41 mL    LA Volume 2C 40 18 - 58 mL    LA Volume 4C 37 18 - 58 mL    Est. RA Pressure 8 mmHg    AV Peak Gradient 6 mmHg    AV Peak Velocity 1.2 m/s    MV A Velocity 1.10 m/s    MV E Wave Deceleration Time 250.0 ms    MV E Velocity 0.78 m/s    LV E' Lateral Velocity 10 cm/s    LV E' Septal Velocity 8 cm/s    MV PHT 72.5 ms    MV Area by PHT 3.0 cm2    PV Peak Gradient 4 mmHg    PV Max Velocity 1.0 m/s    TAPSE 1.9 1.7 cm    TR Peak Gradient 35 mmHg    TR Max Velocity 2.96 m/s    Aortic Root 3.7 cm    Fractional Shortening 2D 29 28 - 44 %    LVIDd Index 2.49 cm/m2    LVIDs Index 1.76 cm/m2    LV RWT Ratio 0.42     LV Mass 2D 158.8 88 - 224 g    LV Mass 2D Index 82.3 49 - 115 g/m2    MV E/A 0.71     E/E' Ratio (Averaged) 8.78     E/E' Lateral 7.80     E/E' Septal 9.75     LA Volume Index A/L 21 16 - 34 mL/m2    LA Volume Index 2C 21 16 - 34 mL/m2    LA Volume Index 4C 19 16 - 34 mL/m2    LA Size Index 1.66 cm/m2    LA/AO Root Ratio 0.86     Ao Root Index 1.92 cm/m2    AV Velocity Ratio 0.75    COVID-19 RAPID TEST    Collection Time: 02/07/23  5:58 PM   Result Value Ref Range    Specimen source Nasopharyngeal      COVID-19 rapid test Not detected NOTD     INFLUENZA A+B VIRAL AGS    Collection Time: 02/07/23  5:58 PM   Result Value Ref Range    Influenza A Antigen Negative NEG      Influenza B Antigen Negative NEG     METABOLIC PANEL, COMPREHENSIVE    Collection Time: 02/08/23  2:51 AM   Result Value Ref Range    Sodium 134 (L) 136 - 145 mmol/L    Potassium 3.9 3.5 - 5.1 mmol/L    Chloride 107 97 - 108 mmol/L    CO2 21 21 - 32 mmol/L    Anion gap 6 5 - 15 mmol/L    Glucose 94 65 - 100 mg/dL    BUN 13 6 - 20 MG/DL    Creatinine 1.08 0.70 - 1.30 MG/DL    BUN/Creatinine ratio 12 12 - 20      eGFR >60 >60 ml/min/1.73m2    Calcium 8.7 8.5 - 10.1 MG/DL    Bilirubin, total 0.6 0.2 - 1.0 MG/DL    ALT (SGPT) 50 12 - 78 U/L    AST (SGOT) 34 15 - 37 U/L    Alk.  phosphatase 116 45 - 117 U/L    Protein, total 6.2 (L) 6.4 - 8.2 g/dL    Albumin 3.0 (L) 3.5 - 5.0 g/dL    Globulin 3.2 2.0 - 4.0 g/dL    A-G Ratio 0.9 (L) 1.1 - 2.2     MAGNESIUM    Collection Time: 02/08/23  2:51 AM   Result Value Ref Range    Magnesium 2.1 1.6 - 2.4 mg/dL   CBC WITH AUTOMATED DIFF    Collection Time: 02/08/23  2:51 AM   Result Value Ref Range    WBC 8.7 4.1 - 11.1 K/uL    RBC 4.20 4. 10 - 5.70 M/uL    HGB 12.7 12.1 - 17.0 g/dL    HCT 40.1 36.6 - 50.3 %    MCV 95.5 80.0 - 99.0 FL    MCH 30.2 26.0 - 34.0 PG    MCHC 31.7 30.0 - 36.5 g/dL    RDW 14.2 11.5 - 14.5 %    PLATELET 892 169 - 760 K/uL    MPV 10.5 8.9 - 12.9 FL    NRBC 0.0 0  WBC    ABSOLUTE NRBC 0.00 0.00 - 0.01 K/uL    NEUTROPHILS 59 32 - 75 %    LYMPHOCYTES 28 12 - 49 %    MONOCYTES 10 5 - 13 %    EOSINOPHILS 2 0 - 7 %    BASOPHILS 1 0 - 1 %    IMMATURE GRANULOCYTES 0 0.0 - 0.5 %    ABS. NEUTROPHILS 5.1 1.8 - 8.0 K/UL    ABS. LYMPHOCYTES 2.4 0.8 - 3.5 K/UL    ABS. MONOCYTES 0.9 0.0 - 1.0 K/UL    ABS. EOSINOPHILS 0.2 0.0 - 0.4 K/UL    ABS. BASOPHILS 0.0 0.0 - 0.1 K/UL    ABS. IMM. GRANS. 0.0 0.00 - 0.04 K/UL    DF AUTOMATED     PROTHROMBIN TIME + INR    Collection Time: 02/08/23  2:51 AM   Result Value Ref Range    INR 1.0 0.9 - 1.1      Prothrombin time 10.4 9.0 - 11.1 sec   TROPONIN-HIGH SENSITIVITY    Collection Time: 02/08/23  2:51 AM   Result Value Ref Range    Troponin-High Sensitivity 6 0 - 76 ng/L       Imaging:  I have personally reviewed the patients radiographs and have reviewed the reports:  Past cxr's viewed . Last nl  in 2019 . Ct's viewed . Results as above .          Mu Mcmillan PA-C

## 2023-02-08 NOTE — PROGRESS NOTES
Problem: Risk for Spread of Infection  Goal: Prevent transmission of infectious organism to others  Description: Prevent the transmission of infectious organisms to other patients, staff members, and visitors. Outcome: Progressing Towards Goal     Problem: Patient Education:  Go to Education Activity  Goal: Patient/Family Education  Outcome: Progressing Towards Goal     Problem: Falls - Risk of  Goal: *Absence of Falls  Description: Document Driscoll Fall Risk and appropriate interventions in the flowsheet.   Outcome: Progressing Towards Goal  Note: Fall Risk Interventions:

## 2023-02-08 NOTE — PROGRESS NOTES
I agree with all of Jen Waldron RN charting. She was orientating with me for the last 12 hours. Bedside and Verbal shift change report given to Toni, [facility] (oncoming nurse) by Debra Nielson RN (offgoing nurse).  Report included the following information SBAR, Kardex, ED Summary, Intake/Output, MAR, Recent Results, and Cardiac Rhythm NSR-ST .

## 2023-02-08 NOTE — PROGRESS NOTES
Blood cultures will be finalized tomorrow. We will reassess in am or PICC placement. Please call us if vascular access needs changes.

## 2023-02-08 NOTE — PROGRESS NOTES
Hospitalist Progress Note  Moshe Tristan MD  Answering service: [phone] OR 36 from in house phone        Date of Service:  2023  NAME:  Teodoro Armas  :  1960  MRN:  [id number]      Admission Summary:   65 y.o. male with anemia, anxiety, depression, Crohn's disease, history of head and face melanoma, chronic pain, COPD, GERD, history of esophageal ulcer, chronic abdominal wall hernia with wound presented to the ED with constant chest pain over the mid and left chest with radiation to the left neck shoulder and arm and shortness of breath. Chest pain and shortness of breath started last night while he was watching TV. Chest pain is worse with deep breathing. He reports chills, weight loss due to decreased p.o. intake. He reports a chronic abdominal wound from previous abdominal surgeries for Crohn's disease and states every time the scab comes off, it starts to drain again. Patient also complained of chronic diarrhea due to his Crohn's and states he is unable to take oral antibiotics because of this. He denies fevers chills nausea vomiting hemoptysis constipation dysuria. CXR in the ED showed right upper lobe cavitary lesion and patient was placed in airborne isolation room due to concern for possible tuberculosis. Patient denies recent exposures to [place] or tuberculosis. ED patient was given ceftriaxone 1 g IV x1 and azithromycin 500 mg IV x1 after blood cultures and QuantiFERON TB Gold were sent off.        Interval history / Subjective:   Bilat chest pain ,breathing better,      Assessment & Plan:     Rul cavitary  infiltrates -   Pulm consulted and plan discussed   -appears  to have long standing airspace dx on  cxr since 2022 - infectious (patulous esophagus and infection is in the posterior aspect of RUL) , cancer  -remaining airborne isolation until rule out TB  -broncho in am -continue iv abx, zosyn, azitrho,   -pt can't tolerate po abx due to his short gut/crohn     Crohns dz with hx of multiple surgeries and multiple complications- was to start  humira - not currently on chornic pred nor immunosuppressives   -continue chronic lomotil     Emphysema /COPD no ex   -nebs     Short bowel syndrome -- s/p multiple operations    Chest pain  May be pleuritic pain  Pain control     9 mm left lower lobe nodule  Noted on CT chest today  Patient needs follow-up CT chest in 6 months  Follow-up with pulmonary    Small pericardial effusion in CT  Echo pending           Code status: full   Prophylaxis: [address] discussed with: pt, rn, cm, pulmonoloigst   Anticipated Disposition: TBD     Hospital Problems  Date Reviewed: 9/2/2022            Codes Class Noted POA    Cavitary lesion of lung ICD-10-CM: J98.4  ICD-9-CM: 518.89  2/7/2023 Unknown               Review of Systems:   A comprehensive review of systems was negative except for that written in the HPI. Vital Signs:    Last 24hrs VS reviewed since prior progress note. Most recent are:  Visit Vitals  /76 (BP 1 Location: Left upper arm, BP Patient Position: Sitting)   Pulse (!) 109   Temp 99.2 °F (37.3 °C)   Resp 21   Ht 5' 11\" (1.803 m)   Wt 72.1 kg (159 lb)   SpO2 95%   BMI 22.18 kg/m²         Intake/Output Summary (Last 24 hours) at 2/8/2023 1526  Last data filed at 2/8/2023 0200  Gross per 24 hour   Intake --   Output 175 ml   Net -175 ml        Physical Examination:     I had a face to face encounter with this patient and independently examined them on 2/8/2023 as outlined below:          General : alert x 3, awake, no acute distress,   HEENT: PEERL, EOMI, moist mucus membrane, TM clear  Neck: supple, no JVD, no meningeal signs  Chest: coarse bs, no wheezing.    CVS: S1 S2 heard, Capillary refill less than 2 seconds  Abd: soft/ non tender, non distended, BS physiological,   Ext: no clubbing, no cyanosis, no edema, brisk 2+ DP pulses  Neuro/Psych: pleasant mood and affect, CN 2-12 grossly intact, sensory grossly within normal limit, Strength 5/5 in all extremities, DTR 1+ x 4  Skin: warm            Data Review:    Review and/or order of clinical lab test    I have independently reviewed and interpreted patient's lab and all other diagnostic data    Notes reviewed from all clinical/nonclinical/nursing services involved in patient's clinical care. Care coordination discussions were held with appropriate clinical/nonclinical/ nursing providers based on care coordination needs. Labs:     Recent Labs     02/08/23  0251 02/07/23  0555   WBC 8.7 11.4*   HGB 12.7 15.1   HCT 40.1 48.0    280     Recent Labs     02/08/23  0251 02/07/23  0555   * 136   K 3.9 3.8    105   CO2 21 23   BUN 13 15   CREA 1.08 1.09   GLU 94 100   CA 8.7 9.5   MG 2.1  --      Recent Labs     02/08/23  0251 02/07/23  0555   ALT 50 22    95   TBILI 0.6 0.6   TP 6.2* 8.0   ALB 3.0* 3.8   GLOB 3.2 4.2*     Recent Labs     02/08/23  0251   INR 1.0   PTP 10.4      No results for input(s): FE, TIBC, PSAT, FERR in the last 72 hours. Lab Results   Component Value Date/Time    Folate 18.2 08/29/2022 01:12 PM      No results for input(s): PH, PCO2, PO2 in the last 72 hours. No results for input(s): CPK, CKNDX, TROIQ in the last 72 hours.     No lab exists for component: CPKMB  Lab Results   Component Value Date/Time    Cholesterol, total 134 12/01/2014 04:33 AM    HDL Cholesterol 35 12/01/2014 04:33 AM    LDL, calculated 58.2 12/01/2014 04:33 AM    Triglyceride 204 (H) 12/01/2014 04:33 AM    CHOL/HDL Ratio 3.8 12/01/2014 04:33 AM     Lab Results   Component Value Date/Time    Glucose (POC) 96 09/07/2022 04:44 PM    Glucose (POC) 105 09/07/2022 11:10 AM    Glucose (POC) 100 09/07/2022 05:52 AM    Glucose (POC) 107 09/06/2022 11:19 PM    Glucose (POC) 109 09/06/2022 06:16 PM     Lab Results   Component Value Date/Time    Color YELLOW/STRAW 02/07/2023 11:12 AM    Appearance CLEAR 02/07/2023 11:12 AM    Specific gravity >1.030 02/07/2023 11:12 AM    Specific gravity 1.009 09/09/2022 12:45 AM    pH (UA) 5.0 02/07/2023 11:12 AM    Protein 30 (A) 02/07/2023 11:12 AM    Glucose Negative 02/07/2023 11:12 AM    Ketone Negative 02/07/2023 11:12 AM    Bilirubin Negative 02/07/2023 11:12 AM    Urobilinogen 0.2 02/07/2023 11:12 AM    Nitrites Positive (A) 02/07/2023 11:12 AM    Leukocyte Esterase TRACE (A) 02/07/2023 11:12 AM    Epithelial cells FEW 02/07/2023 11:12 AM    Bacteria 2+ (A) 02/07/2023 11:12 AM    WBC 10-20 02/07/2023 11:12 AM    RBC 0-5 02/07/2023 11:12 AM         Medications Reviewed:     Current Facility-Administered Medications   Medication Dose Route Frequency    diphenoxylate-atropine (LOMOTIL) tablet 2 Tablet  2 Tablet Oral AC&HS    gabapentin (NEURONTIN) capsule 300 mg  300 mg Oral TID    gabapentin (NEURONTIN) capsule 300 mg  300 mg Oral QHS    memantine (NAMENDA) tablet 5 mg  5 mg Oral QHS    chlorzoxazone (PARAFON FORTE) tablet 500 mg  500 mg Oral TID    sodium chloride (NS) flush 5-40 mL  5-40 mL IntraVENous Q8H    sodium chloride (NS) flush 5-40 mL  5-40 mL IntraVENous PRN    azithromycin (ZITHROMAX) 500 mg in 0.9% sodium chloride 250 mL ([id number])  500 mg IntraVENous Q24H    acetaminophen (TYLENOL) tablet 650 mg  650 mg Oral Q4H PRN    oxyCODONE IR (ROXICODONE) tablet 5 mg  5 mg Oral Q4H PRN    HYDROmorphone (DILAUDID) injection 1 mg  1 mg IntraVENous Q4H PRN    naloxone (NARCAN) injection 0.4 mg  0.4 mg IntraVENous PRN    nicotine (NICODERM CQ) 21 mg/24 hr patch 1 Patch  1 Patch TransDERmal DAILY    piperacillin-tazobactam (ZOSYN) 3.375 g in 0.9% sodium chloride (MBP/ADV) 100 mL MBP  3.375 g IntraVENous Q8H    albuterol-ipratropium (DUO-NEB) 2.5 MG-0.5 MG/3 ML  3 mL Nebulization QID RT    enoxaparin (LOVENOX) injection 40 mg  40 mg SubCUTAneous Q24H    metoprolol tartrate (LOPRESSOR) tablet 12.5 mg  12.5 mg Oral Q12H ______________________________________________________________________  EXPECTED LENGTH OF STAY: 3d 14h  ACTUAL LENGTH OF STAY:          1                 Grady Cooks, MD

## 2023-02-08 NOTE — PROGRESS NOTES
TRANSFER - IN REPORT:    Verbal report received from [facility] on Holland Killer  being received from ED for routine progression of care      Report consisted of patients Situation, Background, Assessment and   Recommendations(SBAR). Information from the following report(s) SBAR and ED Summary was reviewed with the receiving nurse. Opportunity for questions and clarification was provided. Assessment completed upon patients arrival to unit and care assumed.

## 2023-02-08 NOTE — ED NOTES
TRANSFER - OUT REPORT:    Verbal report given to [facility] rn (name) on Teodoro Armas  being transferred to [facility](unit) for routine progression of care       Report consisted of patients Situation, Background, Assessment and   Recommendations(SBAR). Information from the following report(s) SBAR, ED Summary, MAR, and Recent Results was reviewed with the receiving nurse. Lines:   Peripheral IV 02/07/23 Right Arm (Active)   Site Assessment Clean, dry, & intact 02/07/23 0601   Phlebitis Assessment 0 02/07/23 0601   Infiltration Assessment 0 02/07/23 0601   Dressing Status Clean, dry, & intact 02/07/23 0601   Hub Color/Line Status Pink 02/07/23 0601        Opportunity for questions and clarification was provided.       Patient transported with:   Registered Nurse

## 2023-02-09 ENCOUNTER — ANESTHESIA EVENT (OUTPATIENT)
Dept: ENDOSCOPY | Age: 63
DRG: 178 | End: 2023-02-09
Payer: MEDICARE

## 2023-02-09 ENCOUNTER — ANESTHESIA (OUTPATIENT)
Dept: ENDOSCOPY | Age: 63
DRG: 178 | End: 2023-02-09
Payer: MEDICARE

## 2023-02-09 LAB
ALBUMIN SERPL-MCNC: 2.4 G/DL (ref 3.5–5)
ALBUMIN/GLOB SERPL: 0.6 (ref 1.1–2.2)
ALP SERPL-CCNC: 99 U/L (ref 45–117)
ALT SERPL-CCNC: 31 U/L (ref 12–78)
ANION GAP SERPL CALC-SCNC: 11 MMOL/L (ref 5–15)
AST SERPL-CCNC: 16 U/L (ref 15–37)
BASOPHILS # BLD: 0 K/UL (ref 0–0.1)
BASOPHILS NFR BLD: 0 % (ref 0–1)
BILIRUB SERPL-MCNC: 0.4 MG/DL (ref 0.2–1)
BUN SERPL-MCNC: 11 MG/DL (ref 6–20)
BUN/CREAT SERPL: 11 (ref 12–20)
C COLI+JEJUNI TUF STL QL NAA+PROBE: NEGATIVE
CALCIUM SERPL-MCNC: 7.7 MG/DL (ref 8.5–10.1)
CHLORIDE SERPL-SCNC: 110 MMOL/L (ref 97–108)
CO2 SERPL-SCNC: 19 MMOL/L (ref 21–32)
CREAT SERPL-MCNC: 1.03 MG/DL (ref 0.7–1.3)
DIFFERENTIAL METHOD BLD: ABNORMAL
EC STX1+STX2 GENES STL QL NAA+PROBE: NEGATIVE
EOSINOPHIL # BLD: 0.2 K/UL (ref 0–0.4)
EOSINOPHIL NFR BLD: 3 % (ref 0–7)
ERYTHROCYTE [DISTWIDTH] IN BLOOD BY AUTOMATED COUNT: 14.4 % (ref 11.5–14.5)
ETEC ELTA+ESTB GENES STL QL NAA+PROBE: NEGATIVE
GLOBULIN SER CALC-MCNC: 3.8 G/DL (ref 2–4)
GLUCOSE SERPL-MCNC: 121 MG/DL (ref 65–100)
HCT VFR BLD AUTO: 36.6 % (ref 36.6–50.3)
HGB BLD-MCNC: 11.6 G/DL (ref 12.1–17)
IMM GRANULOCYTES # BLD AUTO: 0.1 K/UL (ref 0–0.04)
IMM GRANULOCYTES NFR BLD AUTO: 1 % (ref 0–0.5)
LYMPHOCYTES # BLD: 2 K/UL (ref 0.8–3.5)
LYMPHOCYTES NFR BLD: 29 % (ref 12–49)
MCH RBC QN AUTO: 30.8 PG (ref 26–34)
MCHC RBC AUTO-ENTMCNC: 31.7 G/DL (ref 30–36.5)
MCV RBC AUTO: 97.1 FL (ref 80–99)
MONOCYTES # BLD: 0.7 K/UL (ref 0–1)
MONOCYTES NFR BLD: 9 % (ref 5–13)
NEUTS SEG # BLD: 4.1 K/UL (ref 1.8–8)
NEUTS SEG NFR BLD: 58 % (ref 32–75)
NRBC # BLD: 0 K/UL (ref 0–0.01)
NRBC BLD-RTO: 0 PER 100 WBC
P SHIGELLOIDES DNA STL QL NAA+PROBE: NEGATIVE
PLATELET # BLD AUTO: 205 K/UL (ref 150–400)
PMV BLD AUTO: 10.9 FL (ref 8.9–12.9)
POTASSIUM SERPL-SCNC: 3.9 MMOL/L (ref 3.5–5.1)
PROT SERPL-MCNC: 6.2 G/DL (ref 6.4–8.2)
RBC # BLD AUTO: 3.77 M/UL (ref 4.1–5.7)
SALMONELLA SP SPAO STL QL NAA+PROBE: NEGATIVE
SHIGELLA SP+EIEC IPAH STL QL NAA+PROBE: NEGATIVE
SODIUM SERPL-SCNC: 140 MMOL/L (ref 136–145)
V CHOL+PARA+VUL DNA STL QL NAA+NON-PROBE: NEGATIVE
WBC # BLD AUTO: 7.1 K/UL (ref 4.1–11.1)
Y ENTEROCOL DNA STL QL NAA+NON-PROBE: NEGATIVE

## 2023-02-09 PROCEDURE — 74011250637 HC RX REV CODE- 250/637: Performed by: HOSPITALIST

## 2023-02-09 PROCEDURE — 76937 US GUIDE VASCULAR ACCESS: CPT

## 2023-02-09 PROCEDURE — 74011000250 HC RX REV CODE- 250: Performed by: ANESTHESIOLOGY

## 2023-02-09 PROCEDURE — 87186 SC STD MICRODIL/AGAR DIL: CPT

## 2023-02-09 PROCEDURE — 77010033678 HC OXYGEN DAILY

## 2023-02-09 PROCEDURE — 88312 SPECIAL STAINS GROUP 1: CPT

## 2023-02-09 PROCEDURE — 87106 FUNGI IDENTIFICATION YEAST: CPT

## 2023-02-09 PROCEDURE — 2709999900 HC NON-CHARGEABLE SUPPLY: Performed by: INTERNAL MEDICINE

## 2023-02-09 PROCEDURE — 36415 COLL VENOUS BLD VENIPUNCTURE: CPT

## 2023-02-09 PROCEDURE — 76040000007: Performed by: INTERNAL MEDICINE

## 2023-02-09 PROCEDURE — 74011000250 HC RX REV CODE- 250: Performed by: INTERNAL MEDICINE

## 2023-02-09 PROCEDURE — 74011250636 HC RX REV CODE- 250/636: Performed by: INTERNAL MEDICINE

## 2023-02-09 PROCEDURE — C1751 CATH, INF, PER/CENT/MIDLINE: HCPCS

## 2023-02-09 PROCEDURE — 87102 FUNGUS ISOLATION CULTURE: CPT

## 2023-02-09 PROCEDURE — 74011250637 HC RX REV CODE- 250/637: Performed by: NURSE PRACTITIONER

## 2023-02-09 PROCEDURE — 74011250637 HC RX REV CODE- 250/637: Performed by: INTERNAL MEDICINE

## 2023-02-09 PROCEDURE — 74011250636 HC RX REV CODE- 250/636: Performed by: ANESTHESIOLOGY

## 2023-02-09 PROCEDURE — 77030020365 HC SOL INJ SOD CL 0.9% 50ML

## 2023-02-09 PROCEDURE — 88112 CYTOPATH CELL ENHANCE TECH: CPT

## 2023-02-09 PROCEDURE — 87077 CULTURE AEROBIC IDENTIFY: CPT

## 2023-02-09 PROCEDURE — 76060000032 HC ANESTHESIA 0.5 TO 1 HR: Performed by: INTERNAL MEDICINE

## 2023-02-09 PROCEDURE — 87070 CULTURE OTHR SPECIMN AEROBIC: CPT

## 2023-02-09 PROCEDURE — C1769 GUIDE WIRE: HCPCS

## 2023-02-09 PROCEDURE — 87116 MYCOBACTERIA CULTURE: CPT

## 2023-02-09 PROCEDURE — 80053 COMPREHEN METABOLIC PANEL: CPT

## 2023-02-09 PROCEDURE — 94664 DEMO&/EVAL PT USE INHALER: CPT

## 2023-02-09 PROCEDURE — 0B9C8ZX DRAINAGE OF RIGHT UPPER LUNG LOBE, VIA NATURAL OR ARTIFICIAL OPENING ENDOSCOPIC, DIAGNOSTIC: ICD-10-PCS | Performed by: INTERNAL MEDICINE

## 2023-02-09 PROCEDURE — 85025 COMPLETE CBC W/AUTO DIFF WBC: CPT

## 2023-02-09 PROCEDURE — 65270000046 HC RM TELEMETRY

## 2023-02-09 PROCEDURE — 74011250636 HC RX REV CODE- 250/636: Performed by: HOSPITALIST

## 2023-02-09 PROCEDURE — 74011000258 HC RX REV CODE- 258: Performed by: INTERNAL MEDICINE

## 2023-02-09 PROCEDURE — 94640 AIRWAY INHALATION TREATMENT: CPT

## 2023-02-09 RX ORDER — CHLORZOXAZONE 500 MG/1
500 TABLET ORAL
Status: DISCONTINUED | OUTPATIENT
Start: 2023-02-09 | End: 2023-02-10 | Stop reason: HOSPADM

## 2023-02-09 RX ORDER — FLUCONAZOLE 2 MG/ML
200 INJECTION, SOLUTION INTRAVENOUS EVERY 24 HOURS
Status: DISCONTINUED | OUTPATIENT
Start: 2023-02-09 | End: 2023-02-10 | Stop reason: HOSPADM

## 2023-02-09 RX ORDER — PANTOPRAZOLE SODIUM 40 MG/1
40 TABLET, DELAYED RELEASE ORAL
Status: DISCONTINUED | OUTPATIENT
Start: 2023-02-09 | End: 2023-02-10 | Stop reason: HOSPADM

## 2023-02-09 RX ORDER — SODIUM CHLORIDE 0.9 % (FLUSH) 0.9 %
5-40 SYRINGE (ML) INJECTION EVERY 8 HOURS
Status: DISCONTINUED | OUTPATIENT
Start: 2023-02-09 | End: 2023-02-10 | Stop reason: HOSPADM

## 2023-02-09 RX ORDER — SODIUM CHLORIDE 0.9 % (FLUSH) 0.9 %
5-40 SYRINGE (ML) INJECTION AS NEEDED
Status: DISCONTINUED | OUTPATIENT
Start: 2023-02-09 | End: 2023-02-10 | Stop reason: HOSPADM

## 2023-02-09 RX ORDER — FLUCONAZOLE 100 MG/1
200 TABLET ORAL DAILY
Status: DISCONTINUED | OUTPATIENT
Start: 2023-02-09 | End: 2023-02-09

## 2023-02-09 RX ORDER — LIDOCAINE HYDROCHLORIDE 20 MG/ML
INJECTION, SOLUTION EPIDURAL; INFILTRATION; INTRACAUDAL; PERINEURAL AS NEEDED
Status: DISCONTINUED | OUTPATIENT
Start: 2023-02-09 | End: 2023-02-09 | Stop reason: HOSPADM

## 2023-02-09 RX ORDER — LIDOCAINE HYDROCHLORIDE 20 MG/ML
INJECTION, SOLUTION INFILTRATION; PERINEURAL
Status: DISPENSED
Start: 2023-02-09 | End: 2023-02-09

## 2023-02-09 RX ORDER — GABAPENTIN 300 MG/1
600 CAPSULE ORAL EVERY EVENING
Status: DISCONTINUED | OUTPATIENT
Start: 2023-02-09 | End: 2023-02-10 | Stop reason: HOSPADM

## 2023-02-09 RX ORDER — PROPOFOL 10 MG/ML
INJECTION, EMULSION INTRAVENOUS AS NEEDED
Status: DISCONTINUED | OUTPATIENT
Start: 2023-02-09 | End: 2023-02-09 | Stop reason: HOSPADM

## 2023-02-09 RX ORDER — SODIUM CHLORIDE 9 MG/ML
INJECTION, SOLUTION INTRAVENOUS
Status: DISCONTINUED | OUTPATIENT
Start: 2023-02-09 | End: 2023-02-09 | Stop reason: HOSPADM

## 2023-02-09 RX ORDER — LIDOCAINE HYDROCHLORIDE 10 MG/ML
INJECTION INFILTRATION; PERINEURAL
Status: DISPENSED
Start: 2023-02-09 | End: 2023-02-09

## 2023-02-09 RX ORDER — GABAPENTIN 300 MG/1
300 CAPSULE ORAL 2 TIMES DAILY
Status: DISCONTINUED | OUTPATIENT
Start: 2023-02-10 | End: 2023-02-10 | Stop reason: HOSPADM

## 2023-02-09 RX ADMIN — IPRATROPIUM BROMIDE AND ALBUTEROL SULFATE 3 ML: .5; 3 SOLUTION RESPIRATORY (INHALATION) at 22:02

## 2023-02-09 RX ADMIN — SODIUM CHLORIDE, PRESERVATIVE FREE 10 ML: 5 INJECTION INTRAVENOUS at 21:34

## 2023-02-09 RX ADMIN — SODIUM CHLORIDE, PRESERVATIVE FREE 10 ML: 5 INJECTION INTRAVENOUS at 13:00

## 2023-02-09 RX ADMIN — PANTOPRAZOLE SODIUM 40 MG: 40 TABLET, DELAYED RELEASE ORAL at 11:17

## 2023-02-09 RX ADMIN — PIPERACILLIN AND TAZOBACTAM 3.38 G: 3; .375 INJECTION, POWDER, FOR SOLUTION INTRAVENOUS at 01:03

## 2023-02-09 RX ADMIN — PROPOFOL 80 MG: 10 INJECTION, EMULSION INTRAVENOUS at 08:39

## 2023-02-09 RX ADMIN — OXYCODONE HYDROCHLORIDE 5 MG: 5 TABLET ORAL at 19:03

## 2023-02-09 RX ADMIN — DIPHENOXYLATE HYDROCHLORIDE AND ATROPINE SULFATE 2 TABLET: 2.5; .025 TABLET ORAL at 15:45

## 2023-02-09 RX ADMIN — FLUCONAZOLE IN SODIUM CHLORIDE 200 MG: 2 INJECTION, SOLUTION INTRAVENOUS at 15:45

## 2023-02-09 RX ADMIN — PROPOFOL 40 MG: 10 INJECTION, EMULSION INTRAVENOUS at 08:43

## 2023-02-09 RX ADMIN — GABAPENTIN 300 MG: 300 CAPSULE ORAL at 13:42

## 2023-02-09 RX ADMIN — PROPOFOL 40 MG: 10 INJECTION, EMULSION INTRAVENOUS at 08:46

## 2023-02-09 RX ADMIN — LIDOCAINE HYDROCHLORIDE 100 MG: 20 INJECTION, SOLUTION EPIDURAL; INFILTRATION; INTRACAUDAL; PERINEURAL at 08:39

## 2023-02-09 RX ADMIN — PIPERACILLIN AND TAZOBACTAM 3.38 G: 3; .375 INJECTION, POWDER, FOR SOLUTION INTRAVENOUS at 19:03

## 2023-02-09 RX ADMIN — HYDROMORPHONE HYDROCHLORIDE 1 MG: 1 INJECTION, SOLUTION INTRAMUSCULAR; INTRAVENOUS; SUBCUTANEOUS at 15:46

## 2023-02-09 RX ADMIN — HYDROMORPHONE HYDROCHLORIDE 1 MG: 1 INJECTION, SOLUTION INTRAMUSCULAR; INTRAVENOUS; SUBCUTANEOUS at 11:17

## 2023-02-09 RX ADMIN — SODIUM CHLORIDE, PRESERVATIVE FREE 10 ML: 5 INJECTION INTRAVENOUS at 13:01

## 2023-02-09 RX ADMIN — IPRATROPIUM BROMIDE AND ALBUTEROL SULFATE 3 ML: .5; 3 SOLUTION RESPIRATORY (INHALATION) at 15:55

## 2023-02-09 RX ADMIN — SODIUM CHLORIDE: 900 INJECTION, SOLUTION INTRAVENOUS at 08:13

## 2023-02-09 RX ADMIN — DIPHENOXYLATE HYDROCHLORIDE AND ATROPINE SULFATE 2 TABLET: 2.5; .025 TABLET ORAL at 13:03

## 2023-02-09 RX ADMIN — PANTOPRAZOLE SODIUM 40 MG: 40 TABLET, DELAYED RELEASE ORAL at 15:45

## 2023-02-09 RX ADMIN — PROPOFOL 40 MG: 10 INJECTION, EMULSION INTRAVENOUS at 08:49

## 2023-02-09 RX ADMIN — PROPOFOL 20 MG: 10 INJECTION, EMULSION INTRAVENOUS at 08:51

## 2023-02-09 RX ADMIN — HYDROMORPHONE HYDROCHLORIDE 1 MG: 1 INJECTION, SOLUTION INTRAMUSCULAR; INTRAVENOUS; SUBCUTANEOUS at 05:13

## 2023-02-09 RX ADMIN — CHLORZOXAZONE 500 MG: 500 TABLET ORAL at 19:00

## 2023-02-09 RX ADMIN — HYDROMORPHONE HYDROCHLORIDE 1 MG: 1 INJECTION, SOLUTION INTRAMUSCULAR; INTRAVENOUS; SUBCUTANEOUS at 20:24

## 2023-02-09 RX ADMIN — GABAPENTIN 600 MG: 300 CAPSULE ORAL at 19:02

## 2023-02-09 RX ADMIN — ENOXAPARIN SODIUM 40 MG: 100 INJECTION SUBCUTANEOUS at 19:01

## 2023-02-09 RX ADMIN — DIPHENOXYLATE HYDROCHLORIDE AND ATROPINE SULFATE 2 TABLET: 2.5; .025 TABLET ORAL at 21:34

## 2023-02-09 RX ADMIN — HYDROMORPHONE HYDROCHLORIDE 1 MG: 1 INJECTION, SOLUTION INTRAMUSCULAR; INTRAVENOUS; SUBCUTANEOUS at 01:03

## 2023-02-09 RX ADMIN — OXYCODONE HYDROCHLORIDE 5 MG: 5 TABLET ORAL at 13:21

## 2023-02-09 RX ADMIN — METOPROLOL TARTRATE 12.5 MG: 25 TABLET, FILM COATED ORAL at 19:03

## 2023-02-09 RX ADMIN — MEMANTINE 5 MG: 10 TABLET ORAL at 19:02

## 2023-02-09 RX ADMIN — PIPERACILLIN AND TAZOBACTAM 3.38 G: 3; .375 INJECTION, POWDER, FOR SOLUTION INTRAVENOUS at 13:00

## 2023-02-09 NOTE — PROCEDURES
Procedure - fob with bronchial wash and rul bal   Lyudmila Tang MD   Reason- eval rul cavitary asdz   Complications - none apparent immediately post procedure    Premed - hurricane spray    Anesthesia - by crna - mac / propofol     Procedure was done in endo with full cp monitoring . Plans / procedure explained to pt and he agreed and consented. O2 provided by poa. Scope passed thru mouth . There was extensive thrush in the post pharynx and he paralaryngeal area. Vc's were thin but mobiile    Scope advanced into the trachea . Mucosa of the trachea and the bilateral bronchial tree was very inflammed and friable . All orfices were patent and there was not endobronchial lesions . Bilateral bronchial  wash was done and bal of the  rul was done. Specimens sent . Pt tolerated the procedure well.

## 2023-02-09 NOTE — PROGRESS NOTES
[facility]  Pulmonary, Critical Care, and Sleep Medicine    Progress note    Name: Lucy Carnes MRN: [id number]   : 1960 Hospital: [facility]    Date: 2023        IMPRESSION:   Rul cavitary  infiltrates - appears  to have long standing airspace dx on  cxr since 2022 - infectious (patulous esophagus and infection is in the posterior aspect of RUL) , cancer? Chest pain =-   interestingly on the left  to include anterior chest and down left arm - the rul abnl  does not explain the chest pain - ? Gi or pericardial?   Emphysema    Covid in 2022 - admitted   Crohns dz with hx of multiple surgeries and multiple complications- was to start  humira - not currently on chornic pred nor immunosuppressives   Copd without exacerbation   Short bowel syndrome -- s/p multiple operations  Says he was told her had cirrhosis - not alcoholic   He has a small pericardial effusion called on echo - ? Pericardial pain. Bad oral/ pharyneal thrush as seen on bronch   Airways were so inflammed that I asked him about gerd -- he does have it - will start protonix. RECOMMENDATIONS:   Fob today - bw and rul bal    Afbs pending    On abx- complete course and adjust pd bronch results    Diflucan    Nebs for secretion clearance -  He says they help    Agree with repeat quantiferon and need to check covid and influenza per procedure. - covid was neg / cannot locate the repeat quantiferon    May resume diet at 1 pm.        Subjective:       Feeling ok today   - reports some persistent left chest pain . This patient has been seen and evaluated at the request of Dr. Jean-Pierre Rosenberg for abnl chest ct imaging with caviitary asdz in the  rt apex. . Patient is a 65 y.o. male with hx of crohns dz with multiple past abd surgeries and complications, emphysema from tobacco use. The attending tells me that he is  not able to take po ab due ti increased exacerbation of diarrhea. Robert Mcnulty  He is suppose to start humira for his crohns. He says he has not felt well for a couple days and came in to er today with left cp , anterior with radiation to the left shoulder and down left arm . He reports some chest congestion and sob . He still smokes. He has not seen a pulm dx. He reports he has neck and spine imaging at an outpt [facility] facility and was recently told her had abnl in chest and needed to see his primary. He has covid and was admitted in August 2022 . Reviewed of routine chest imaging at the time did show rt apical asdz / opacity . He was not aware . Chest ct this admit showed no pte but did show cavitary asdz in rul/ apex and evidence of emphysema. NO f/c but does  have cp and pleurisy on the LEFT and it hurts to breathe. Hx  ix pertinent for skin cancer --  per chart melanoma . Past Medical History:   Diagnosis Date    Abdominal wall hernia 6/15/2012    Anal fissure     Anemia     Anemia     Anxiety and depression     Arthritis     Related to the Crohn's disease. Cancer ([address]     MELANOMA HEAD AND FACE    Chronic pain     GENERALIZED    COPD (chronic obstructive pulmonary disease) (HCC)     Crohn disease (HCC)     Diagnosed at age 17.     Echocardiogram normal (EF: 55-60%) 07/2015    Esophageal ulcer     GERD (gastroesophageal reflux disease)     H/O blood transfusion 2013    [facility], [address]    Hypertension     denies    Migraine     Short bowel syndrome     Ventral hernia without obstruction or gangrene       Past Surgical History:   Procedure Laterality Date    COLONOSCOPY N/A 10/8/2019    COLONOSCOPY performed by Keny Hines MD at [place] [address] N/A 9/1/2022    SIGMOIDOSCOPY FLEXIBLE performed by Angie Valerio MD at [facility] ENDOSCOPY    [address] needle, takes 1 inch needle    HX APPENDECTOMY      HX CHOLECYSTECTOMY      HX GI      colectomy x2, one ileostomy    HX GI  7/28/14    exp lap,partial colectomy with end ileostomy by  Beatrice Salvador    HX HEENT      neck fusion    HX ORTHOPAEDIC  1980s    wrist right,     HX ORTHOPAEDIC  2006, 11/2013    neck, L4 L5 L6    HX ORTHOPAEDIC  1990s    right knee scope    HX OTHER SURGICAL      surgical repair from spider bite    HX OTHER SURGICAL  12/1/14    Incision and drainage of posterior right subcutaneous shoulder abscess    HX OTHER SURGICAL  2014    LEFT INDEX FINGER SPIDER BITE    HX OTHER SURGICAL  2014    RECTAL FISTULA    HX OTHER SURGICAL  3/17/2015    Ileostomy takedown with extensive lysis of adhesions (greater than three hours), mobilization of the splenic flexure, left colon resection, ileocolic anastomosis, and excision of scar; Dr. Thiago Fink. HX OTHER SURGICAL  3/22/2015    Exploratory laparotomy with washout of abdomen, suture repair of small bowel/anastomosis, and diverting protective loop ileostomy; Claudette Dad, MD.    HX OTHER SURGICAL  4/9/2015    Laparotomy, extensive lysis of adhesions, abdominal lavage, and resection of ileocolic anastomosis (including the efferent limb of the loop ileostomy) with creation of Demetrius's pouch; Dr. Thiago Fink. HX OTHER SURGICAL  7/14/2015    Cystoscopy and placement of bilateral ureteral catheters; Mansoor Hernandez MD.    HX OTHER SURGICAL  7/14/2015    Ileostomy takedown with extensive lysis of adhesions, small bowel resection, and enterocolostomy; Dr. Thiago Fink. HX OTHER SURGICAL  9/29/2015    CT-guided percutaneous drainage of intraabdominal abscess; Dr. Nicki Plascencia. HX OTHER SURGICAL  11/16/2015    CT-guided percutaneous aspiration of abdominal wall cavity; Dr. Tonya Solis. HX OTHER SURGICAL  12/1/2015    Incision and drainage of abdominal wall abscess; Dr. Thiago Fink. HX OTHER SURGICAL  2/11/2016    Incision and drainage of abdominal wall abscess; Dr. Thiago Fink. HX OTHER SURGICAL  2/23/2016    Cystoscopy and placement of bilateral temporary ureteral catheters; Dr. Holly Daniel.     HX OTHER SURGICAL  2/23/2016 Exploratory laparotomy, extensive lysis of adhesions, removal of mesh, and repair of enterocutaneous fistula; Dr. Sania Jewell. HX OTHER SURGICAL  11/03/2017    Exploratory laparotomy, extensive lysis of adhesions, and reduction of intussusception; Dr. Sania Jewell. OR UNLISTED PROCEDURE ABDOMEN PERITONEUM & OMENTUM      33 abdominal operations for treatment of Crohn's disease and its complications. Prior to Admission medications    Medication Sig Start Date End Date Taking? Authorizing Provider   metoprolol tartrate (LOPRESSOR) 25 mg tablet Take 0.5 Tablets by mouth every twelve (12) hours. 9/12/22  Yes Tashia Gloria MD   gabapentin (NEURONTIN) 300 mg capsule Take 300 mg by mouth three (3) times daily. Yes Provider, Historical   chlorzoxazone (PARAFON FORTE) 500 mg tablet Take 500-1,000 mg by mouth daily as needed for Muscle Spasm(s). Yes Provider, Historical   diphenoxylate-atropine (LOMOTIL) 2.5-0.025 mg per tablet Take 2 Tablets by mouth Before breakfast, lunch, dinner and at bedtime. Yes Provider, Historical   hydrocortisone (CORTAID) 1 % topical cream Apply  to affected area two (2) times a day. use thin layer  Patient not taking: Reported on 2/7/2023 9/12/22   Tashia Gloria MD   levoFLOXacin (Levaquin) 500 mg tablet Take 1 Tablet by mouth daily. Patient not taking: Reported on 2/7/2023 9/12/22   Tashia Gloria MD   oxyCODONE (ROXICODONE) 5 mg/5 mL solution Take 10 mg by mouth every six to eight (6-8) hours as needed for Pain. Patient not taking: Reported on 2/7/2023    Provider, Historical   omeprazole (PRILOSEC) 40 mg capsule take 1 capsule by mouth once daily  Patient not taking: Reported on 2/7/2023 11/15/17   Provider, Historical   loperamide (IMODIUM) 2 mg capsule Take 2 mg by mouth as needed for Diarrhea. Patient takes 14-16 capsules a day.   Patient not taking: Reported on 2/7/2023    Provider, Historical   ondansetron hcl (ZOFRAN) 8 mg tablet Take 8 mg by mouth every eight (8) hours as needed for Nausea. Provider, Historical     Allergies   Allergen Reactions    Honey Anaphylaxis    Remicade [Infliximab] Anaphylaxis    Crestor [Rosuvastatin] Myalgia    Other Plant, Animal, Environmental Other (comments)     Developed \"boils\" on right arm that required I &D after receiving FENTANYL patch.   Is able to take FENTANYL orally; states is allergic to fentanyl patch GLUE    Imuran [Azathioprine] Diarrhea and Nausea Only    Mercaptopurine Diarrhea    Sulfa (Sulfonamide Antibiotics) Other (comments)     Causes diarrhea      Social History     Tobacco Use    Smoking status: Every Day     Packs/day: 1.00     Years: 44.00     Pack years: 44.00     Types: Cigarettes    Smokeless tobacco: Current    Tobacco comments:     CURRENT E CIGS   Substance Use Topics    Alcohol use: No     Comment: RARELY      Family History   Problem Relation Age of Onset    Stroke Mother     Heart Disease Mother     Kidney Disease Mother     Anesth Problems Mother         TAKES A LONG TIME TO WAKE UP    Heart Disease Father     Thyroid Disease Sister         Current Facility-Administered Medications   Medication Dose Route Frequency    lidocaine (XYLOCAINE) 10 mg/mL (1 %) injection        lidocaine (XYLOCAINE) 20 mg/mL (2 %) injection        sodium chloride (NS) flush 5-40 mL  5-40 mL IntraVENous Q8H    sodium chloride (NS) flush 5-40 mL  5-40 mL IntraVENous Q8H    diphenoxylate-atropine (LOMOTIL) tablet 2 Tablet  2 Tablet Oral AC&HS    gabapentin (NEURONTIN) capsule 300 mg  300 mg Oral TID    gabapentin (NEURONTIN) capsule 300 mg  300 mg Oral QHS    memantine (NAMENDA) tablet 5 mg  5 mg Oral QHS    chlorzoxazone (PARAFON FORTE) tablet 500 mg  500 mg Oral TID    sodium chloride (NS) flush 5-40 mL  5-40 mL IntraVENous Q8H    azithromycin (ZITHROMAX) 500 mg in 0.9% sodium chloride 250 mL ([id number])  500 mg IntraVENous Q24H    nicotine (NICODERM CQ) 21 mg/24 hr patch 1 Patch  1 Patch TransDERmal DAILY    piperacillin-tazobactam (ZOSYN) 3.375 g in 0.9% sodium chloride (MBP/ADV) 100 mL MBP  3.375 g IntraVENous Q8H    albuterol-ipratropium (DUO-NEB) 2.5 MG-0.5 MG/3 ML  3 mL Nebulization QID RT    enoxaparin (LOVENOX) injection 40 mg  40 mg SubCUTAneous Q24H    metoprolol tartrate (LOPRESSOR) tablet 12.5 mg  12.5 mg Oral Q12H       Review of Systems:  A comprehensive review of systems was negative except for that written in the HPI. H e reports intermittent palpatations. Objective:   Vital Signs:    Visit Vitals  /72   Pulse 91   Temp 98.6 °F (37 °C)   Resp 17   Ht 5' 11\" (1.803 m)   Wt 78.9 kg (173 lb 15.1 oz)   SpO2 94%   BMI 24.26 kg/m²       O2 Device: None (Room air)       Temp (24hrs), Av °F (37.2 °C), Min:98.6 °F (37 °C), Max:99.2 °F (37.3 °C)       Intake/Output:   Last shift:      No intake/output data recorded. Last 3 shifts:  1901 -  0700  In: -   Out: 725 [Urine:725]    Intake/Output Summary (Last 24 hours) at 2023 0854  Last data filed at 2023 1758  Gross per 24 hour   Intake --   Output 350 ml   Net -350 ml        Physical Exam:   General:  Alert, cooperative, appears stated age. .less splinting   Head:  Normocephalic, without obvious abnormality, atraumatic. Eyes:  Conjunctivae/corneas clear. PERRL, EOMs intact. Nose: Mask on    Throat: Mask on    Neck: Supple,non - tender and no nodes    Back:   Symmetric,   Lungs:   Limited to ant/lat this am - clear    Chest wall:  No tenderness or deformity. Heart:  Regular rate and rhythm, S1, S2 normal, no murmur   Abdomen:   Soft, non-tender. Extremities: Extremities normal, atraumatic, no cyanosis or edema.    Pulses:  Radial present    Skin: Skin color, texture, turgor normal. No rashes or lesions   Lymph nodes: Cervical, supraclavicular nodes normal.   Neurologic: Grossly nonfocal     Data review:     Recent Results (from the past 24 hour(s))   CBC WITH AUTOMATED DIFF    Collection Time: 23  1:57 AM   Result Value Ref Range    WBC 7.1 4.1 - 11.1 K/uL    RBC 3.77 (L) 4.10 - 5.70 M/uL    HGB 11.6 (L) 12.1 - 17.0 g/dL    HCT 36.6 36.6 - 50.3 %    MCV 97.1 80.0 - 99.0 FL    MCH 30.8 26.0 - 34.0 PG    MCHC 31.7 30.0 - 36.5 g/dL    RDW 14.4 11.5 - 14.5 %    PLATELET 646 773 - 827 K/uL    MPV 10.9 8.9 - 12.9 FL    NRBC 0.0 0  WBC    ABSOLUTE NRBC 0.00 0.00 - 0.01 K/uL    NEUTROPHILS 58 32 - 75 %    LYMPHOCYTES 29 12 - 49 %    MONOCYTES 9 5 - 13 %    EOSINOPHILS 3 0 - 7 %    BASOPHILS 0 0 - 1 %    IMMATURE GRANULOCYTES 1 (H) 0.0 - 0.5 %    ABS. NEUTROPHILS 4.1 1.8 - 8.0 K/UL    ABS. LYMPHOCYTES 2.0 0.8 - 3.5 K/UL    ABS. MONOCYTES 0.7 0.0 - 1.0 K/UL    ABS. EOSINOPHILS 0.2 0.0 - 0.4 K/UL    ABS. BASOPHILS 0.0 0.0 - 0.1 K/UL    ABS. IMM. GRANS. 0.1 (H) 0.00 - 0.04 K/UL    DF AUTOMATED     METABOLIC PANEL, COMPREHENSIVE    Collection Time: 02/09/23  1:57 AM   Result Value Ref Range    Sodium 140 136 - 145 mmol/L    Potassium 3.9 3.5 - 5.1 mmol/L    Chloride 110 (H) 97 - 108 mmol/L    CO2 19 (L) 21 - 32 mmol/L    Anion gap 11 5 - 15 mmol/L    Glucose 121 (H) 65 - 100 mg/dL    BUN 11 6 - 20 MG/DL    Creatinine 1.03 0.70 - 1.30 MG/DL    BUN/Creatinine ratio 11 (L) 12 - 20      eGFR >60 >60 ml/min/1.73m2    Calcium 7.7 (L) 8.5 - 10.1 MG/DL    Bilirubin, total 0.4 0.2 - 1.0 MG/DL    ALT (SGPT) 31 12 - 78 U/L    AST (SGOT) 16 15 - 37 U/L    Alk. phosphatase 99 45 - 117 U/L    Protein, total 6.2 (L) 6.4 - 8.2 g/dL    Albumin 2.4 (L) 3.5 - 5.0 g/dL    Globulin 3.8 2.0 - 4.0 g/dL    A-G Ratio 0.6 (L) 1.1 - 2.2         Imaging:  I have personally reviewed the patients radiographs and have reviewed the reports:  Past cxr's viewed . Last nl  in 2019 . Ct's viewed . Results as above .          Eulogio Garcia MD

## 2023-02-09 NOTE — PROGRESS NOTES
Bedside shift change report given to Arely 65 (oncoming nurse) by Harriett Jeffery RN (offgoing nurse). Report included the following information SBAR, Kardex, Intake/Output, MAR, and Cardiac Rhythm NSR/ST . Problem: Risk for Spread of Infection  Goal: Prevent transmission of infectious organism to others  Description: Prevent the transmission of infectious organisms to other patients, staff members, and visitors. Outcome: Progressing Towards Goal     Problem: Patient Education:  Go to Education Activity  Goal: Patient/Family Education  Outcome: Progressing Towards Goal     Problem: Falls - Risk of  Goal: *Absence of Falls  Description: Document Bertrum Mast Fall Risk and appropriate interventions in the flowsheet.   Outcome: Progressing Towards Goal  Note: Fall Risk Interventions:                                Problem: Patient Education: Go to Patient Education Activity  Goal: Patient/Family Education  Outcome: Progressing Towards Goal

## 2023-02-09 NOTE — ANESTHESIA POSTPROCEDURE EVALUATION
Procedure(s):  BRONCHOSCOPY. MAC    Anesthesia Post Evaluation        Patient location during evaluation: PACU  Patient participation: complete - patient participated  Level of consciousness: awake  Pain management: adequate  Airway patency: patent  Anesthetic complications: no  Cardiovascular status: acceptable  Respiratory status: acceptable  Hydration status: acceptable  Post anesthesia nausea and vomiting:  none  Final Post Anesthesia Temperature Assessment:  Normothermia (36.0-37.5 degrees C)      INITIAL Post-op Vital signs:   Vitals Value Taken Time   /67 02/09/23 0930   Temp 37.2 °C (98.9 °F) 02/09/23 0901   Pulse 102 02/09/23 0938   Resp 23 02/09/23 0938   SpO2 95 % 02/09/23 0938   Vitals shown include unvalidated device data.

## 2023-02-09 NOTE — PERIOP NOTES

## 2023-02-09 NOTE — PROGRESS NOTES
TRANSFER - IN REPORT:    Verbal report received from [facility], on Caryn Meckel  being received from [address], 4[facility] for ordered procedure      Report consisted of patients Situation, Background, Assessment and   Recommendations(SBAR). Information from the following report(s) SBAR, Cardiac Rhythm Sinus Rhythm, Pre Procedure Checklist, and Procedure Verification was reviewed with the receiving nurse. Opportunity for questions and clarification was provided. Assessment completed upon patients arrival to unit and care assumed.

## 2023-02-09 NOTE — ANESTHESIA PREPROCEDURE EVALUATION
Relevant Problems   CARDIOVASCULAR   (+) HTN (hypertension)      GASTROINTESTINAL   (+) GERD (gastroesophageal reflux disease)   (+) Hiatal hernia       Anesthetic History               Review of Systems / Medical History  Patient summary reviewed, nursing notes reviewed and pertinent labs reviewed    Pulmonary    COPD               Neuro/Psych         Psychiatric history     Cardiovascular    Hypertension              Exercise tolerance: <4 METS     GI/Hepatic/Renal     GERD      PUD    Comments: [facility]/Other        Arthritis     Other Findings            Physical Exam    Airway  Mallampati: II  TM Distance: 4 - 6 cm  Neck ROM: normal range of motion   Mouth opening: Normal     Cardiovascular  Regular rate and rhythm,  S1 and S2 normal,  no murmur, click, rub, or gallop             Dental    Dentition: Full upper dentures     Pulmonary  Breath sounds clear to auscultation               Abdominal         Other Findings            Anesthetic Plan    ASA: 3  Anesthesia type: MAC            Anesthetic plan and risks discussed with: Patient

## 2023-02-09 NOTE — PROGRESS NOTES
TRANSFER - OUT REPORT:    Verbal report given to [facility] JEN OCONNOR RN on Lucy Couch  being transferred to , [facility] for routine post - op       Report consisted of patients Situation, Background, Assessment and   Recommendations(SBAR). Information from the following report(s) SBAR, Procedure Summary, and Recent Results was reviewed with the receiving nurse. Lines:   Peripheral IV 02/07/23 Right Arm (Active)   Site Assessment Clean, dry, & intact 02/09/23 0436   Phlebitis Assessment 0 02/09/23 0436   Infiltration Assessment 0 02/09/23 0436   Dressing Status Clean, dry, & intact 02/09/23 0436   Dressing Type Transparent 02/09/23 0436   Hub Color/Line Status Pink; Infusing 02/09/23 0436   Action Taken Open ports on tubing capped 02/09/23 0436   Alcohol Cap Used Yes 02/09/23 0436        Opportunity for questions and clarification was provided.       Patient transported with:   [employer]

## 2023-02-09 NOTE — PROGRESS NOTES
Bedside and Verbal shift change report given to Virginia Odom RN (oncoming nurse) by Edith Carl RN (offgoing nurse). Report included the following information SBAR, Kardex, ED Summary, Procedure Summary, Intake/Output, MAR, Recent Results, and Cardiac Rhythm NSR .

## 2023-02-09 NOTE — PROGRESS NOTES
Hospitalist Progress Note  Argenis Rios MD  Answering service: [phone] OR [phone] from in house phone        Date of Service:  2023  NAME:  Ifeanyi Mullen  :  1960  MRN:  [id number]      Admission Summary:   65 y.o. male with anemia, anxiety, depression, Crohn's disease, history of head and face melanoma, chronic pain, COPD, GERD, history of esophageal ulcer, chronic abdominal wall hernia with wound presented to the ED with constant chest pain over the mid and left chest with radiation to the left neck shoulder and arm and shortness of breath. Chest pain and shortness of breath started last night while he was watching TV. Chest pain is worse with deep breathing. He reports chills, weight loss due to decreased p.o. intake. He reports a chronic abdominal wound from previous abdominal surgeries for Crohn's disease and states every time the scab comes off, it starts to drain again. Patient also complained of chronic diarrhea due to his Crohn's and states he is unable to take oral antibiotics because of this. He denies fevers chills nausea vomiting hemoptysis constipation dysuria. CXR in the ED showed right upper lobe cavitary lesion and patient was placed in airborne isolation room due to concern for possible tuberculosis. Patient denies recent exposures to [place] or tuberculosis. ED patient was given ceftriaxone 1 g IV x1 and azithromycin 500 mg IV x1 after blood cultures and QuantiFERON TB Gold were sent off.        Interval history / Subjective:     Patient doing better status post bronchoscopy discussed with pulmonary attending started on Protonix thrush seen started on fluconazole      Assessment & Plan:     Rul cavitary  infiltrates -   Pulm consulted and plan discussed   -appears  to have long standing airspace dx on  cxr since 2022 - infectious (patulous esophagus and infection is in the posterior aspect of RUL) , cancer  -remaining airborne isolation until rule out TB AFB pending  -Status post bronchoscopy--thrush seen placed on Diflucan  -continue iv abx, zosyn DC azithromycin per pulmonary as on Diflucan for QT prolongation  -pt can't tolerate po abx due to his short gut/crohn     Crohns dz with hx of multiple surgeries and multiple complications- was to start  humira - not currently on chornic pred nor immunosuppressives   -continue chronic lomotil     Emphysema /COPD no ex   -nebs     Short bowel syndrome -- s/p multiple operations    9 mm left lower lobe nodule  Noted on CT chest today  Patient needs follow-up CT chest in 6 months  Follow-up with pulmonary    Small pericardial effusion in CT  Echo   Left Ventricle: Normal left ventricular systolic function with a visually estimated EF of 60 - 65%. Left ventricle size is normal. Mildly increased wall thickness. Normal wall motion. Normal diastolic function. Pericardium: Small (<1 cm) circumferential pericardial effusion present. No indication of cardiac tamponade     Code status: full   Prophylaxis: scd  Care Plan discussed with: pt, rn, cm, pulmonoloigst   Anticipated Disposition: TBD     Hospital Problems  Date Reviewed: 9/2/2022            Codes Class Noted POA    Cavitary lesion of lung ICD-10-CM: J98.4  ICD-9-CM: 518.89  2/7/2023 Unknown             Review of Systems:   A comprehensive review of systems was negative except for that written in the HPI. Vital Signs:    Last 24hrs VS reviewed since prior progress note.  Most recent are:  Visit Vitals  /71 (BP 1 Location: Left upper arm, BP Patient Position: Sitting)   Pulse (!) 105   Temp 98.7 °F (37.1 °C)   Resp 11   Ht 5' 11\" (1.803 m)   Wt 78.9 kg (173 lb 15.1 oz)   SpO2 96%   BMI 24.26 kg/m²         Intake/Output Summary (Last 24 hours) at 2/9/2023 1436  Last data filed at 2/8/2023 1758  Gross per 24 hour   Intake --   Output 150 ml   Net -150 ml          Physical Examination:     I had a face to face encounter with this patient and independently examined them on 2/9/2023 as outlined below:          General : alert x 3, awake, no acute distress,   HEENT: PEERL, EOMI, moist mucus membrane, TM clear  Neck: supple, no JVD, no meningeal signs  Chest: coarse bs, no wheezing. CVS: S1 S2 heard, Capillary refill less than 2 seconds  Abd: soft/ non tender, non distended, BS physiological,   Ext: no clubbing, no cyanosis, no edema, brisk 2+ DP pulses  Neuro/Psych: pleasant mood and affect, CN 2-12 grossly intact, sensory grossly within normal limit, Strength 5/5 in all extremities, DTR 1+ x 4  Skin: warm            Data Review:    Review and/or order of clinical lab test    I have independently reviewed and interpreted patient's lab and all other diagnostic data    Notes reviewed from all clinical/nonclinical/nursing services involved in patient's clinical care. Care coordination discussions were held with appropriate clinical/nonclinical/ nursing providers based on care coordination needs. Labs:     Recent Labs     02/09/23 0157 02/08/23 0251   WBC 7.1 8.7   HGB 11.6* 12.7   HCT 36.6 40.1    220       Recent Labs     02/09/23 0157 02/08/23  0251 02/07/23  0555    134* 136   K 3.9 3.9 3.8   * 107 105   CO2 19* 21 23   BUN 11 13 15   CREA 1.03 1.08 1.09   * 94 100   CA 7.7* 8.7 9.5   MG  --  2.1  --        Recent Labs     02/09/23 0157 02/08/23  0251 02/07/23  0555   ALT 31 50 22   AP 99 116 95   TBILI 0.4 0.6 0.6   TP 6.2* 6.2* 8.0   ALB 2.4* 3.0* 3.8   GLOB 3.8 3.2 4.2*       Recent Labs     02/08/23  0251   INR 1.0   PTP 10.4        No results for input(s): FE, TIBC, PSAT, FERR in the last 72 hours. Lab Results   Component Value Date/Time    Folate 18.2 08/29/2022 01:12 PM        No results for input(s): PH, PCO2, PO2 in the last 72 hours. No results for input(s): CPK, CKNDX, TROIQ in the last 72 hours.     No lab exists for component: CPKMB  Lab Results   Component Value Date/Time    Cholesterol, total 134 12/01/2014 04:33 AM    HDL Cholesterol 35 12/01/2014 04:33 AM    LDL, calculated 58.2 12/01/2014 04:33 AM    Triglyceride 204 (H) 12/01/2014 04:33 AM    CHOL/HDL Ratio 3.8 12/01/2014 04:33 AM     Lab Results   Component Value Date/Time    Glucose (POC) 96 09/07/2022 04:44 PM    Glucose (POC) 105 09/07/2022 11:10 AM    Glucose (POC) 100 09/07/2022 05:52 AM    Glucose (POC) 107 09/06/2022 11:19 PM    Glucose (POC) 109 09/06/2022 06:16 PM     Lab Results   Component Value Date/Time    Color YELLOW/STRAW 02/07/2023 11:12 AM    Appearance CLEAR 02/07/2023 11:12 AM    Specific gravity >1.030 02/07/2023 11:12 AM    Specific gravity 1.009 09/09/2022 12:45 AM    pH (UA) 5.0 02/07/2023 11:12 AM    Protein 30 (A) 02/07/2023 11:12 AM    Glucose Negative 02/07/2023 11:12 AM    Ketone Negative 02/07/2023 11:12 AM    Bilirubin Negative 02/07/2023 11:12 AM    Urobilinogen 0.2 02/07/2023 11:12 AM    Nitrites Positive (A) 02/07/2023 11:12 AM    Leukocyte Esterase TRACE (A) 02/07/2023 11:12 AM    Epithelial cells FEW 02/07/2023 11:12 AM    Bacteria 2+ (A) 02/07/2023 11:12 AM    WBC 10-20 02/07/2023 11:12 AM    RBC 0-5 02/07/2023 11:12 AM         Medications Reviewed:     Current Facility-Administered Medications   Medication Dose Route Frequency    lidocaine (XYLOCAINE) 10 mg/mL (1 %) injection        lidocaine (XYLOCAINE) 20 mg/mL (2 %) injection        sodium chloride (NS) flush 5-40 mL  5-40 mL IntraVENous Q8H    sodium chloride (NS) flush 5-40 mL  5-40 mL IntraVENous PRN    sodium chloride (NS) flush 5-40 mL  5-40 mL IntraVENous Q8H    sodium chloride (NS) flush 5-40 mL  5-40 mL IntraVENous PRN    pantoprazole (PROTONIX) tablet 40 mg  40 mg Oral ACB&D    fluconazole (DIFLUCAN) 200mg/100 mL IVPB (premix)  200 mg IntraVENous Q24H    diphenoxylate-atropine (LOMOTIL) tablet 2 Tablet  2 Tablet Oral AC&HS    gabapentin (NEURONTIN) capsule 300 mg  300 mg Oral TID    gabapentin (NEURONTIN) capsule 300 mg  300 mg Oral QHS    memantine (NAMENDA) tablet 5 mg  5 mg Oral QHS    chlorzoxazone (PARAFON FORTE) tablet 500 mg  500 mg Oral TID    sodium chloride (NS) flush 5-40 mL  5-40 mL IntraVENous Q8H    sodium chloride (NS) flush 5-40 mL  5-40 mL IntraVENous PRN    acetaminophen (TYLENOL) tablet 650 mg  650 mg Oral Q4H PRN    oxyCODONE IR (ROXICODONE) tablet 5 mg  5 mg Oral Q4H PRN    HYDROmorphone (DILAUDID) injection 1 mg  1 mg IntraVENous Q4H PRN    naloxone (NARCAN) injection 0.4 mg  0.4 mg IntraVENous PRN    nicotine (NICODERM CQ) 21 mg/24 hr patch 1 Patch  1 Patch TransDERmal DAILY    piperacillin-tazobactam (ZOSYN) 3.375 g in 0.9% sodium chloride (MBP/ADV) 100 mL MBP  3.375 g IntraVENous Q8H    albuterol-ipratropium (DUO-NEB) 2.5 MG-0.5 MG/3 ML  3 mL Nebulization QID RT    enoxaparin (LOVENOX) injection 40 mg  40 mg SubCUTAneous Q24H    metoprolol tartrate (LOPRESSOR) tablet 12.5 mg  12.5 mg Oral Q12H     ______________________________________________________________________  EXPECTED LENGTH OF STAY: 3d 14h  ACTUAL LENGTH OF STAY:          2                 Kelley Payne MD

## 2023-02-10 VITALS
BODY MASS INDEX: 24.16 KG/M2 | RESPIRATION RATE: 20 BRPM | DIASTOLIC BLOOD PRESSURE: 73 MMHG | WEIGHT: 172.6 LBS | SYSTOLIC BLOOD PRESSURE: 107 MMHG | HEIGHT: 71 IN | HEART RATE: 99 BPM | OXYGEN SATURATION: 94 % | TEMPERATURE: 98.7 F

## 2023-02-10 LAB
ANION GAP SERPL CALC-SCNC: 8 MMOL/L (ref 5–15)
BACTERIA SPEC CULT: ABNORMAL
BACTERIA SPEC CULT: ABNORMAL
BUN SERPL-MCNC: 10 MG/DL (ref 6–20)
BUN/CREAT SERPL: 9 (ref 12–20)
CALCIUM SERPL-MCNC: 8.8 MG/DL (ref 8.5–10.1)
CHLORIDE SERPL-SCNC: 107 MMOL/L (ref 97–108)
CO2 SERPL-SCNC: 23 MMOL/L (ref 21–32)
CREAT SERPL-MCNC: 1.16 MG/DL (ref 0.7–1.3)
GLUCOSE SERPL-MCNC: 109 MG/DL (ref 65–100)
GRAM STN SPEC: ABNORMAL
POTASSIUM SERPL-SCNC: 3.7 MMOL/L (ref 3.5–5.1)
SERVICE CMNT-IMP: ABNORMAL
SODIUM SERPL-SCNC: 138 MMOL/L (ref 136–145)

## 2023-02-10 PROCEDURE — 74011250637 HC RX REV CODE- 250/637: Performed by: HOSPITALIST

## 2023-02-10 PROCEDURE — 36415 COLL VENOUS BLD VENIPUNCTURE: CPT

## 2023-02-10 PROCEDURE — 74011250636 HC RX REV CODE- 250/636: Performed by: INTERNAL MEDICINE

## 2023-02-10 PROCEDURE — 74011000250 HC RX REV CODE- 250: Performed by: INTERNAL MEDICINE

## 2023-02-10 PROCEDURE — 74011250637 HC RX REV CODE- 250/637: Performed by: NURSE PRACTITIONER

## 2023-02-10 PROCEDURE — 74011250637 HC RX REV CODE- 250/637: Performed by: INTERNAL MEDICINE

## 2023-02-10 PROCEDURE — 80048 BASIC METABOLIC PNL TOTAL CA: CPT

## 2023-02-10 PROCEDURE — 94760 N-INVAS EAR/PLS OXIMETRY 1: CPT

## 2023-02-10 PROCEDURE — 94640 AIRWAY INHALATION TREATMENT: CPT

## 2023-02-10 PROCEDURE — 74011000258 HC RX REV CODE- 258: Performed by: INTERNAL MEDICINE

## 2023-02-10 RX ORDER — SODIUM CHLORIDE FOR INHALATION 3 %
5 VIAL, NEBULIZER (ML) INHALATION
Status: DISCONTINUED | OUTPATIENT
Start: 2023-02-10 | End: 2023-02-10 | Stop reason: HOSPADM

## 2023-02-10 RX ORDER — FLUCONAZOLE 200 MG/1
200 TABLET ORAL DAILY
Qty: 7 TABLET | Refills: 0 | Status: SHIPPED | OUTPATIENT
Start: 2023-02-10 | End: 2023-02-17

## 2023-02-10 RX ADMIN — SODIUM CHLORIDE, PRESERVATIVE FREE 10 ML: 5 INJECTION INTRAVENOUS at 06:21

## 2023-02-10 RX ADMIN — GABAPENTIN 300 MG: 300 CAPSULE ORAL at 07:01

## 2023-02-10 RX ADMIN — SODIUM CHLORIDE, PRESERVATIVE FREE 10 ML: 5 INJECTION INTRAVENOUS at 13:15

## 2023-02-10 RX ADMIN — IPRATROPIUM BROMIDE AND ALBUTEROL SULFATE 3 ML: .5; 3 SOLUTION RESPIRATORY (INHALATION) at 07:01

## 2023-02-10 RX ADMIN — HYDROMORPHONE HYDROCHLORIDE 1 MG: 1 INJECTION, SOLUTION INTRAMUSCULAR; INTRAVENOUS; SUBCUTANEOUS at 09:00

## 2023-02-10 RX ADMIN — HYDROMORPHONE HYDROCHLORIDE 1 MG: 1 INJECTION, SOLUTION INTRAMUSCULAR; INTRAVENOUS; SUBCUTANEOUS at 00:31

## 2023-02-10 RX ADMIN — GABAPENTIN 300 MG: 300 CAPSULE ORAL at 12:24

## 2023-02-10 RX ADMIN — PANTOPRAZOLE SODIUM 40 MG: 40 TABLET, DELAYED RELEASE ORAL at 06:19

## 2023-02-10 RX ADMIN — CHLORZOXAZONE 500 MG: 500 TABLET ORAL at 12:23

## 2023-02-10 RX ADMIN — HYDROMORPHONE HYDROCHLORIDE 1 MG: 1 INJECTION, SOLUTION INTRAMUSCULAR; INTRAVENOUS; SUBCUTANEOUS at 04:38

## 2023-02-10 RX ADMIN — DIPHENOXYLATE HYDROCHLORIDE AND ATROPINE SULFATE 2 TABLET: 2.5; .025 TABLET ORAL at 06:19

## 2023-02-10 RX ADMIN — HYDROMORPHONE HYDROCHLORIDE 1 MG: 1 INJECTION, SOLUTION INTRAMUSCULAR; INTRAVENOUS; SUBCUTANEOUS at 13:14

## 2023-02-10 RX ADMIN — PIPERACILLIN AND TAZOBACTAM 3.38 G: 3; .375 INJECTION, POWDER, FOR SOLUTION INTRAVENOUS at 02:00

## 2023-02-10 RX ADMIN — OXYCODONE HYDROCHLORIDE 5 MG: 5 TABLET ORAL at 11:32

## 2023-02-10 RX ADMIN — IPRATROPIUM BROMIDE AND ALBUTEROL SULFATE 3 ML: .5; 3 SOLUTION RESPIRATORY (INHALATION) at 11:32

## 2023-02-10 RX ADMIN — DIPHENOXYLATE HYDROCHLORIDE AND ATROPINE SULFATE 2 TABLET: 2.5; .025 TABLET ORAL at 11:32

## 2023-02-10 RX ADMIN — CHLORZOXAZONE 500 MG: 500 TABLET ORAL at 07:01

## 2023-02-10 RX ADMIN — IPRATROPIUM BROMIDE AND ALBUTEROL SULFATE 3 ML: .5; 3 SOLUTION RESPIRATORY (INHALATION) at 15:24

## 2023-02-10 RX ADMIN — PIPERACILLIN AND TAZOBACTAM 3.38 G: 3; .375 INJECTION, POWDER, FOR SOLUTION INTRAVENOUS at 10:39

## 2023-02-10 RX ADMIN — OXYCODONE HYDROCHLORIDE 5 MG: 5 TABLET ORAL at 15:20

## 2023-02-10 RX ADMIN — OXYCODONE HYDROCHLORIDE 5 MG: 5 TABLET ORAL at 03:03

## 2023-02-10 RX ADMIN — OXYCODONE HYDROCHLORIDE 5 MG: 5 TABLET ORAL at 07:01

## 2023-02-10 RX ADMIN — METOPROLOL TARTRATE 12.5 MG: 25 TABLET, FILM COATED ORAL at 06:19

## 2023-02-10 NOTE — ROUTINE PROCESS
End of Shift Report    Shift worked   day   Shift summary and significant patient       changes         Patient did very well after coming back up from procedure. Patient very frustrated that his medication were not ordered the way he liked them. I worked with the hospitalist and the pharmacy to get his meds ordered the way he wanted them.     Concerns for physician to address                      Neuro:  Oriented X-4      Cardiac:  HR-88 NSR  BP-113/79      Repiratory:  02 Sat 98%  02 device-RA       L        %  Cough-      GI:  Bowel sounds-Active  Last BM-2/9/23  Stool characteristics- Loose      :  Voiding?-yes  Urine characteristics-[place]/clear      Skin:  Intact?-grossly  Wounds-sm abd hernia wound      Access:DAYNA PICC, 20g LFA

## 2023-02-10 NOTE — DISCHARGE INSTRUCTIONS
Discharge Instructions       PATIENT ID: Rafat Pierce  MRN: [id number]   YOB: 1960    DATE OF ADMISSION: [unfilled]    DATE OF DISCHARGE: 2/10/2023    PRIMARY CARE PROVIDER: @PCP@     ATTENDING PHYSICIAN: [unfilled]  DISCHARGING PROVIDER: Ap Iqbal MD    To contact this individual call [phone] and ask the  to page. If unavailable ask to be transferred the Adult Hospitalist Department. DISCHARGE DIAGNOSES s/p Bronch for air space disease    CONSULTATIONS: @IZA@    PROCEDURES/SURGERIES: Procedure(s):  BRONCHOSCOPY    PENDING TEST RESULTS:   At the time of discharge the following test results are still pending:     FOLLOW UP APPOINTMENTS:   [unfilled]     ADDITIONAL CARE RECOMMENDATIONS:     DIET: Regular Diet    ACTIVITY: Activity as tolerated    WOUND CARE:     EQUIPMENT needed:       Radiology      DISCHARGE MEDICATIONS:   See Medication Reconciliation Form    It is important that you take the medication exactly as they are prescribed. Keep your medication in the bottles provided by the pharmacist and keep a list of the medication names, dosages, and times to be taken in your wallet. Do not take other medications without consulting your doctor. NOTIFY YOUR PHYSICIAN FOR ANY OF THE FOLLOWING:   Fever over 101 degrees for 24 hours. Chest pain, shortness of breath, fever, chills, nausea, vomiting, diarrhea, change in mentation, falling, weakness, bleeding. Severe pain or pain not relieved by medications. Or, any other signs or symptoms that you may have questions about.       DISPOSITION:  x  Home With:   OT  PT  HH  RN       SNF/Inpatient Rehab/LTAC    Independent/assisted living    Hospice    Other:     CDMP Checked:   Yes x     PROBLEM LIST Updated:  Yes x       Signed:   Ap Iqbal MD  2/10/2023  10:03 AM

## 2023-02-10 NOTE — PROGRESS NOTES
End of Shift Note    Bedside shift change report given to Gin Fletcher RN (oncoming nurse) by Georgia Soria RN (offgoing nurse). Report included the following information SBAR, Kardex, and MAR    Shift worked:  [id number]     Shift summary and any significant changes:    Patient medicated for pain PRN-see MAR.      Concerns for physician to address: none     Zone phone for oncoming shift:  NA       Activity:  Activity Level: Up ad maria elena  Number times ambulated in hallways past shift: 0  Number of times OOB to chair past shift: 0    Cardiac:   Cardiac Monitoring: Yes      Cardiac Rhythm: Sinus Rhythm, PAC    Access:  Current line(s): PICC     Genitourinary:   Urinary status: voiding    Respiratory:   O2 Device: None (Room air)  Chronic home O2 use?: NO  Incentive spirometer at bedside: NO       GI:  Last Bowel Movement Date: 02/09/23  Current diet:  ADULT DIET Regular  Passing flatus: YES  Tolerating current diet: YES       Pain Management:   Patient states pain is manageable on current regimen: YES    Skin:  Leonardo Score: 22  Interventions: increase time out of bed and nutritional support     Patient Safety:  Fall Score:    Interventions: gripper socks       Length of Stay:  Expected LOS: 3d 14h  Actual LOS: 2      Georgia Sorai, RN

## 2023-02-10 NOTE — DISCHARGE SUMMARY
Discharge Summary       PATIENT ID: Rosamaria Phan  MRN: [id number]   YOB: 1960    DATE OF ADMISSION: 2/7/2023  5:31 AM    DATE OF DISCHARGE: 2/10/23   PRIMARY CARE PROVIDER: Aamir Tomlin MD     ATTENDING PHYSICIAN: Doreen Coulter  DISCHARGING PROVIDER: Tom Up MD    To contact this individual call [phone] and ask the  to page. If unavailable ask to be transferred the Adult Hospitalist Department. CONSULTATIONS: IP CONSULT TO PULMONOLOGY    PROCEDURES/SURGERIES: Procedure(s):  BRONCHOSCOPY    DISCHARGE DIAGNOSES: Status post [facility] for cavitary airspace disease    65 y.o. male with anemia, anxiety, depression, Crohn's disease, history of head and face melanoma, chronic pain, COPD, GERD, history of esophageal ulcer, chronic abdominal wall hernia with wound presented to the ED with constant chest pain over the mid and left chest with radiation to the left neck shoulder and arm and shortness of breath. Chest pain and shortness of breath started last night while he was watching TV. Chest pain is worse with deep breathing. He reports chills, weight loss due to decreased p.o. intake. He reports a chronic abdominal wound from previous abdominal surgeries for Crohn's disease and states every time the scab comes off, it starts to drain again. Patient also complained of chronic diarrhea due to his Crohn's and states he is unable to take oral antibiotics because of this. He denies fevers chills nausea vomiting hemoptysis constipation dysuria. CXR in the ED showed right upper lobe cavitary lesion and patient was placed in airborne isolation room due to concern for possible tuberculosis. Patient denies recent exposures to Forrestine Chessman or tuberculosis. ED patient was given ceftriaxone 1 g IV x1 and azithromycin 500 mg IV x1 after blood cultures and QuantiFERON TB Gold were sent off.       [address] [facility],[address] COURSE:   Patient was admitted for above and pulmonary consult was placed status post bronchoscopy patient was low suspicion for any TB AFB was unable to do as patient was not producing any sputum bronchoscopy showed thrush but no real cavity or other lesions seen bronchial wash out was sent for pathology patient was given Diflucan on discharge patient was treated with Zosyn while in the hospital.  Patient also history of emphysema and COPD and was not in exacerbation patient has a 9 mm left lower lobe nodule follow-up with CT chest in 3 months follow-up with pulmonary as an outpatient EF 55 to 65% left ventricular size is normal and had a small pericardial effusion without any tamponade    Patient is adamant to keep  his PICC line because previously several times after getting antibiotics or antifungal he develops so much of diarrhea that he end up in the hospital and he also thinks after taking few days of Diflucan he will again develop the same problem and have to come back to the hospital and get a line again for getting treatment with antibiotics. I told them by hospital policy I cannot let you go with the PICC line which may get infected and you will be ending up in the hospital for antibiotics anyway which will worsen your situation patient understands and he said he will come back to the ED on Wednesday after finishing his Diflucan and he will get it removed discussed with pulmonary critical care attending Dr. Papo Pillai as well    DISCHARGE DIAGNOSES / PLAN:      D/c home    BMI: Body mass index is 24.07 kg/m². . This patient: Has a BMI within normal limits. PENDING TEST RESULTS:   At the time of discharge the following test results are still pending:      ADDITIONAL CARE RECOMMENDATIONS:        NOTIFY YOUR PHYSICIAN FOR ANY OF THE FOLLOWING:   Fever over 101 degrees for 24 hours. Chest pain, shortness of breath, fever, chills, nausea, vomiting, diarrhea, change in mentation, falling, weakness, bleeding.  Severe pain or pain not relieved by medications, as well as any other signs or symptoms that you may have questions about. FOLLOW UP APPOINTMENTS:    Follow-up Information       Follow up With Specialties Details Why Contact Info    Lonnie Patiño MD Family Medicine Schedule an appointment as soon as possible for a visit in 1 week(s)  [address]      Nathanael Alexander  Carlita Beatty [facility] Vascular Surgery, Interventional Cardiology Physician, Cardiovascular Disease Physician Schedule an appointment as soon as possible for a visit   [address] [place] [address] [address]      Yeimi Norton MD Pulmonary Disease Follow up in 1 week(s)  [address]  [address] [facility] [phone]                 DIET: Cardiac Diet    ACTIVITY: Activity as tolerated    EQUIPMENT needed:     DISCHARGE MEDICATIONS:  Current Discharge Medication List        START taking these medications    Details   fluconazole (Diflucan) 200 mg tablet Take 1 Tablet by mouth daily for 7 days. FDA advises cautious prescribing of oral fluconazole in pregnancy. Qty: 7 Tablet, Refills: 0  Start date: 2/10/2023, End date: 2/17/2023           CONTINUE these medications which have NOT CHANGED    Details   metoprolol tartrate (LOPRESSOR) 25 mg tablet Take 0.5 Tablets by mouth every twelve (12) hours. Qty: 60 Tablet, Refills: 0      gabapentin (NEURONTIN) 300 mg capsule Take 300 mg by mouth three (3) times daily. chlorzoxazone (PARAFON FORTE) 500 mg tablet Take 500-1,000 mg by mouth daily as needed for Muscle Spasm(s). diphenoxylate-atropine (LOMOTIL) 2.5-0.025 mg per tablet Take 2 Tablets by mouth Before breakfast, lunch, dinner and at bedtime. hydrocortisone (CORTAID) 1 % topical cream Apply  to affected area two (2) times a day. use thin layer  Qty: 30 g, Refills: 0      ondansetron hcl (ZOFRAN) 8 mg tablet Take 8 mg by mouth every eight (8) hours as needed for Nausea.            STOP taking these medications levoFLOXacin (Levaquin) 500 mg tablet Comments:   Reason for Stopping:         oxyCODONE (ROXICODONE) 5 mg/5 mL solution Comments:   Reason for Stopping:         omeprazole (PRILOSEC) 40 mg capsule Comments:   Reason for Stopping:         loperamide (IMODIUM) 2 mg capsule Comments:   Reason for Stopping:               DISPOSITION:  x  Home With:   OT  PT  HH  RN       Long term SNF/Inpatient Rehab    Independent/assisted living    Hospice    Other:       PATIENT CONDITION AT DISCHARGE:     Functional status    Poor     Deconditioned    x Independent      Cognition   x  Lucid     Forgetful     Dementia      Catheters/lines (plus indication)    Trejo     PICC     PEG    x None      Code status   x  Full code     DNR      PHYSICAL EXAMINATION AT DISCHARGE:  General:          Alert, cooperative, no distress, appears stated age. HEENT:           Atraumatic, anicteric sclerae, pink conjunctivae                          No oral ulcers, mucosa moist, throat clear, dentition fair  Neck:               Supple, symmetrical  Lungs:             Clear to auscultation bilaterally. No Wheezing or Rhonchi. No rales. Chest wall:      No tenderness  No Accessory muscle use. Heart:              Regular  rhythm,  No  murmur   No edema  Abdomen:        Soft, non-tender. Not distended. Bowel sounds normal  Extremities:     No cyanosis. No clubbing,                            Skin turgor normal, Capillary refill normal  Skin:                Not pale. Not Jaundiced  No rashes   Psych:             Not anxious or agitated.   Neurologic:      Alert, moves all extremities, answers questions appropriately and responds to commands       CHRONIC MEDICAL DIAGNOSES:  Problem List as of 2/10/2023 Date Reviewed: 9/2/2022            Codes Class Noted - Resolved    Cavitary lesion of lung ICD-10-CM: J98.4  ICD-9-CM: 518.89  2/7/2023 - Present        Acute cystitis ICD-10-CM: N30.00  ICD-9-CM: 595.0  8/27/2022 - Present        Croup ICD-10-CM: J05.0  ICD-9-CM: 464.4  6/29/2019 - Present        Crohn disease ([facility] ICD-10-CM: K50.90  ICD-9-CM: 555.9  6/29/2019 - Present        HTN (hypertension) ICD-10-CM: I10  ICD-9-CM: 401.9  6/29/2019 - Present        Abdominal pain ICD-10-CM: R10.9  ICD-9-CM: 789.00  6/29/2019 - Present        Abdominal wound dehiscence ICD-10-CM: T81.30XA  ICD-9-CM: 998.30  12/4/2017 - Present        Intussusception of intestine ([facility] ICD-10-CM: K56.1  ICD-9-CM: 560.0  11/3/2017 - Present        Bowel obstruction ([facility] ICD-10-CM: Q47.902  ICD-9-CM: 560.9  11/3/2017 - Present        Incisional hernia, without obstruction or gangrene (Chronic) ICD-10-CM: K43.2  ICD-9-CM: 553.21  1/31/2017 - Present        Back pain, chronic ICD-10-CM: M54.9, G89.29  ICD-9-CM: 724.5, 338.29  9/30/2015 - Present        Short bowel syndrome (Chronic) ICD-10-CM: K91.2  ICD-9-CM: 579.3  9/28/2015 - Present        Severe protein-calorie malnutrition (HCC) (Chronic) ICD-10-CM: E43  ICD-9-CM: 262  6/10/2015 - Present        Chronic pain (Chronic) ICD-10-CM: U90.90  ICD-9-CM: 338.29  3/17/2015 - Present        Crohn's disease ([facility] (Chronic) ICD-10-CM: K50.90  ICD-9-CM: 555.9  6/15/2012 - Present        GERD (gastroesophageal reflux disease) (Chronic) ICD-10-CM: K21.9  ICD-9-CM: 530.81  6/15/2012 - Present        Hiatal hernia (Chronic) ICD-10-CM: K44.9  ICD-9-CM: 553.3  6/15/2012 - Present        B12 deficiency ICD-10-CM: E53.8  ICD-9-CM: 266.2  6/15/2012 - Present        RESOLVED: Abscess ICD-10-CM: L02.91  ICD-9-CM: 682.9  8/9/2016 - 8/12/2016        RESOLVED: Abdominal pain, acute, right lower quadrant ICD-10-CM: R10.31  ICD-9-CM: 789.03, 338.19  9/30/2015 - 12/1/2015        RESOLVED: Diarrhea ICD-10-CM: R19.7  ICD-9-CM: 787.91  9/30/2015 - 12/1/2015        RESOLVED: Abdominal abscess ICD-10-CM: GZE6119  ICD-9-CM: SJU2670  9/28/2015 - 12/1/2015        RESOLVED: Venous stasis of both lower extremities ICD-10-CM: I87.8  ICD-9-CM: 459.81  7/29/2015 - 9/28/2015 RESOLVED: Cellulitis of both lower extremities ICD-10-CM: L03.115, L03.116  ICD-9-CM: 682.6  7/29/2015 - 9/28/2015        RESOLVED: Postoperative wound dehiscence ICD-10-CM: T81.31XA  ICD-9-CM: 998.32  7/26/2015 - 9/28/2015        RESOLVED: Syncope and collapse ICD-10-CM: R55  ICD-9-CM: 780.2  6/21/2015 - 7/14/2015        RESOLVED: Metabolic alkalosis P-86-MB: E87.3  ICD-9-CM: 276.3  6/10/2015 - 7/14/2015        RESOLVED: Acute kidney injury ([facility] ICD-10-CM: N17.9  ICD-9-CM: 584.9  6/10/2015 - 9/28/2015        RESOLVED: Hypomagnesemia ICD-10-CM: E83.42  ICD-9-CM: 275.2  6/10/2015 - 7/14/2015        RESOLVED: Hyponatremia ICD-10-CM: E87.1  ICD-9-CM: 276.1  6/8/2015 - 9/28/2015        RESOLVED: Hypokalemia ICD-10-CM: E87.6  ICD-9-CM: 276.8  6/8/2015 - 7/14/2015        RESOLVED: Hyponatremia ICD-10-CM: E87.1  ICD-9-CM: 276.1  5/4/2015 - 5/10/2015        RESOLVED: Elevated serum creatinine (Chronic) ICD-10-CM: R79.89  ICD-9-CM: 790.99  4/20/2015 - 9/28/2015        RESOLVED: High output ileostomy (HCC) (Chronic) ICD-10-CM: R19.8, Z93.2  ICD-9-CM: 787.99, V44.2  4/20/2015 - 9/28/2015        RESOLVED: Ileocolic anastomotic leak ICD-10-CM: K91.89  ICD-9-CM: 997.49  3/23/2015 - 6/8/2015        RESOLVED: S/P ileostomy ([facility] (Chronic) ICD-10-CM: Z93.2  ICD-9-CM: V44.2  3/17/2015 - 9/28/2015        RESOLVED: Abscess of right shoulder ICD-10-CM: L02.413  ICD-9-CM: 682.3  11/30/2014 - 12/3/2014        RESOLVED: Dehydration ICD-10-CM: E86.0  ICD-9-CM: 276.51  10/16/2014 - 3/17/2015        RESOLVED: Blood loss anemia ICD-10-CM: D50.0  ICD-9-CM: 280.0  8/6/2014 - 3/17/2015        RESOLVED: Free intraperitoneal air ICD-10-CM: K66.8  ICD-9-CM: 568.89  7/28/2014 - 9/23/2014        RESOLVED: Abdominal pain (Chronic) ICD-10-CM: R10.9  ICD-9-CM: 789.00  7/8/2014 - 9/28/2015           Greater than 30  minutes were spent with the patient on counseling and coordination of care    Signed:   Angy Huerta MD  2/10/2023  10:47 AM

## 2023-02-10 NOTE — PROGRESS NOTES
Problem: Risk for Spread of Infection  Goal: Prevent transmission of infectious organism to others  Description: Prevent the transmission of infectious organisms to other patients, staff members, and visitors. Outcome: Resolved/Met     Problem: Patient Education:  Go to Education Activity  Goal: Patient/Family Education  Outcome: Resolved/Met     Problem: Falls - Risk of  Goal: *Absence of Falls  Description: Document Grayson Fall Risk and appropriate interventions in the flowsheet.   Outcome: Resolved/Met  Note: Fall Risk Interventions:                                Problem: Patient Education: Go to Patient Education Activity  Goal: Patient/Family Education  Outcome: Resolved/Met

## 2023-02-10 NOTE — PROGRESS NOTES
Problem: Risk for Spread of Infection  Goal: Prevent transmission of infectious organism to others  Description: Prevent the transmission of infectious organisms to other patients, staff members, and visitors. Outcome: Progressing Towards Goal     Problem: Falls - Risk of  Goal: *Absence of Falls  Description: Document Liz Cobian Fall Risk and appropriate interventions in the flowsheet.   Outcome: Progressing Towards Goal  Note: Fall Risk Interventions:

## 2023-02-10 NOTE — PROGRESS NOTES
Problem: Risk for Spread of Infection  Goal: Prevent transmission of infectious organism to others  Description: Prevent the transmission of infectious organisms to other patients, staff members, and visitors. Outcome: Progressing Towards Goal     Problem: Falls - Risk of  Goal: *Absence of Falls  Description: Document Divide Fall Risk and appropriate interventions in the flowsheet.   Outcome: Progressing Towards Goal  Note: Fall Risk Interventions:

## 2023-02-10 NOTE — PROGRESS NOTES
Per Dr. Maribel Talbot pt to be discharged with PICC. Pt educated on importance of keeping CDI and plans to follow-up with PCP in 3 days. Pt has no complaints at this time and acknowledges his understanding.

## 2023-02-10 NOTE — PROGRESS NOTES
[facility]  Pulmonary, Critical Care, and Sleep Medicine    Progress note    Name: Jose Elias Montoya MRN: [id number]   : 1960 Hospital: [facility]    Date: 2/10/2023        IMPRESSION:   Rul cavitary  infiltrates - appears  to have long standing airspace dx on  cxr since 2022 - infectious (patulous esophagus and infection is in the posterior aspect of RUL) , cancer? Chest pain =-   interestingly on the left  to include anterior chest and down left arm - the rul abnl  does not explain the chest pain - ? Gi or pericardial?   Emphysema    Covid in 2022 - admitted   Crohns dz with hx of multiple surgeries and multiple complications- was to start  humira - not currently on chornic pred nor immunosuppressives   Copd without exacerbation   Short bowel syndrome -- s/p multiple operations  Says he was told her had cirrhosis - not alcoholic   He has a small pericardial effusion called on echo - ? Pericardial pain. Bad oral/ pharyneal thrush as seen on bronch   Airways were so inflammed that I asked him about gerd -- he does have it - will start protonix. RECOMMENDATIONS:    Afbs pending, not suspicious of TB    On abx- agree with zosyn. With short bowel syndrome we might need IV abx for a while    Diflucan    Nebs for secretion clearance -  He says they help   Will need repeat CT scan in 4-6 wks   Noted discharge      Subjective:     2/10  Feeling ok  Tolerated bronch well     2/8  Feeling ok today   - reports some persistent left chest pain . This patient has been seen and evaluated at the request of Dr. Luis Felipe Hansen for abnl chest ct imaging with caviitary asdz in the  rt apex. . Patient is a 65 y.o. male with hx of crohns dz with multiple past abd surgeries and complications, emphysema from tobacco use. The attending tells me that he is  not able to take po ab due ti increased exacerbation of diarrhea. Peggy Moore He is suppose to start humira for his crohns.   He says he has not felt well for a couple days and came in to er today with left cp , anterior with radiation to the left shoulder and down left arm . He reports some chest congestion and sob . He still smokes. He has not seen a pulm dx. He reports he has neck and spine imaging at an outpt [facility] facility and was recently told her had abnl in chest and needed to see his primary. He has covid and was admitted in August 2022 . Reviewed of routine chest imaging at the time did show rt apical asdz / opacity . He was not aware . Chest ct this admit showed no pte but did show cavitary asdz in rul/ apex and evidence of emphysema. NO f/c but does  have cp and pleurisy on the LEFT and it hurts to breathe. Hx  ix pertinent for skin cancer --  per chart melanoma . Past Medical History:   Diagnosis Date    Abdominal wall hernia 6/15/2012    Anal fissure     Anemia     Anemia     Anxiety and depression     Arthritis     Related to the Crohn's disease. Cancer ([address]     MELANOMA HEAD AND FACE    Chronic pain     GENERALIZED    COPD (chronic obstructive pulmonary disease) (HCC)     Crohn disease (HCC)     Diagnosed at age 17.     Echocardiogram normal (EF: 55-60%) 07/2015    Esophageal ulcer     GERD (gastroesophageal reflux disease)     H/O blood transfusion 2013    [facility] [place], [address]    Hypertension     denies    Migraine     Short bowel syndrome     Ventral hernia without obstruction or gangrene       Past Surgical History:   Procedure Laterality Date    COLONOSCOPY N/A 10/8/2019    COLONOSCOPY performed by Maddy Whitney MD at [place] [address] N/A 9/1/2022    SIGMOIDOSCOPY FLEXIBLE performed by Walter Bryant MD at [facility] ENDOSCOPY    [address] needle, takes 1 inch needle    HX APPENDECTOMY      HX CHOLECYSTECTOMY      HX GI      colectomy x2, one ileostomy    HX GI  7/28/14    exp lap,partial colectomy with end ileostomy by Dr John Pavon    HX HEENT      neck fusion HX ORTHOPAEDIC  1980s    wrist right,     HX ORTHOPAEDIC  2006, 11/2013    neck, L4 L5 L6    HX ORTHOPAEDIC  1990s    right knee scope    HX OTHER SURGICAL      surgical repair from spider bite    HX OTHER SURGICAL  12/1/14    Incision and drainage of posterior right subcutaneous shoulder abscess    HX OTHER SURGICAL  2014    LEFT INDEX FINGER SPIDER BITE    HX OTHER SURGICAL  2014    RECTAL FISTULA    HX OTHER SURGICAL  3/17/2015    Ileostomy takedown with extensive lysis of adhesions (greater than three hours), mobilization of the splenic flexure, left colon resection, ileocolic anastomosis, and excision of scar; Dr. Wes Paez. HX OTHER SURGICAL  3/22/2015    Exploratory laparotomy with washout of abdomen, suture repair of small bowel/anastomosis, and diverting protective loop ileostomy; Hiral Stacy MD.    HX OTHER SURGICAL  4/9/2015    Laparotomy, extensive lysis of adhesions, abdominal lavage, and resection of ileocolic anastomosis (including the efferent limb of the loop ileostomy) with creation of Demetrius's pouch; Dr. Wes Paez. HX OTHER SURGICAL  7/14/2015    Cystoscopy and placement of bilateral ureteral catheters; Michael Vickers MD.    HX OTHER SURGICAL  7/14/2015    Ileostomy takedown with extensive lysis of adhesions, small bowel resection, and enterocolostomy; Dr. Wes Paez.  OTHER SURGICAL  9/29/2015    CT-guided percutaneous drainage of intraabdominal abscess; Dr. Virgilio Wilks.  OTHER SURGICAL  11/16/2015    CT-guided percutaneous aspiration of abdominal wall cavity; Dr. Dona Siegel. HX OTHER SURGICAL  12/1/2015    Incision and drainage of abdominal wall abscess; Dr. Wes Paez.  OTHER SURGICAL  2/11/2016    Incision and drainage of abdominal wall abscess; Dr. Wes Paez.  OTHER SURGICAL  2/23/2016    Cystoscopy and placement of bilateral temporary ureteral catheters; Dr. Mya Lott.     HX OTHER SURGICAL  2/23/2016    Exploratory laparotomy, extensive lysis of adhesions, removal of mesh, and repair of enterocutaneous fistula; Dr. Millicent Samaniego. HX OTHER SURGICAL  11/03/2017    Exploratory laparotomy, extensive lysis of adhesions, and reduction of intussusception; Dr. Millicent Samaniego. [place] UNLISTED PROCEDURE ABDOMEN PERITONEUM & OMENTUM      33 abdominal operations for treatment of Crohn's disease and its complications. Prior to Admission medications    Medication Sig Start Date End Date Taking? Authorizing Provider   fluconazole (Diflucan) 200 mg tablet Take 1 Tablet by mouth daily for 7 days. FDA advises cautious prescribing of oral fluconazole in pregnancy. 2/10/23 2/17/23 Yes Linda Hernandez MD   metoprolol tartrate (LOPRESSOR) 25 mg tablet Take 0.5 Tablets by mouth every twelve (12) hours. 9/12/22  Yes Zoë Quintana MD   gabapentin (NEURONTIN) 300 mg capsule Take 300 mg by mouth three (3) times daily. Yes Provider, Historical   chlorzoxazone (PARAFON FORTE) 500 mg tablet Take 500-1,000 mg by mouth daily as needed for Muscle Spasm(s). Yes Provider, Historical   diphenoxylate-atropine (LOMOTIL) 2.5-0.025 mg per tablet Take 2 Tablets by mouth Before breakfast, lunch, dinner and at bedtime. Yes Provider, Historical   hydrocortisone (CORTAID) 1 % topical cream Apply  to affected area two (2) times a day. use thin layer  Patient not taking: Reported on 2/7/2023 9/12/22   Zoë Quintana MD   levoFLOXacin (Levaquin) 500 mg tablet Take 1 Tablet by mouth daily. Patient not taking: Reported on 2/7/2023 9/12/22   Zoë Quintana MD   oxyCODONE (ROXICODONE) 5 mg/5 mL solution Take 10 mg by mouth every six to eight (6-8) hours as needed for Pain. Patient not taking: Reported on 2/7/2023    Provider, Historical   omeprazole (PRILOSEC) 40 mg capsule take 1 capsule by mouth once daily  Patient not taking: Reported on 2/7/2023 11/15/17   Provider, Historical   loperamide (IMODIUM) 2 mg capsule Take 2 mg by mouth as needed for Diarrhea.  Patient takes 14-16 capsules a day.  Patient not taking: Reported on 2/7/2023    Provider, Historical   ondansetron hcl (ZOFRAN) 8 mg tablet Take 8 mg by mouth every eight (8) hours as needed for Nausea. Provider, Historical     Allergies   Allergen Reactions    Honey Anaphylaxis    Remicade [Infliximab] Anaphylaxis    Crestor [Rosuvastatin] Myalgia    Other Plant, Animal, Environmental Other (comments)     Developed \"boils\" on right arm that required I &D after receiving FENTANYL patch.   Is able to take FENTANYL orally; states is allergic to fentanyl patch GLUE    Imuran [Azathioprine] Diarrhea and Nausea Only    Mercaptopurine Diarrhea    Sulfa (Sulfonamide Antibiotics) Other (comments)     Causes diarrhea      Social History     Tobacco Use    Smoking status: Every Day     Packs/day: 1.00     Years: 44.00     Pack years: 44.00     Types: Cigarettes    Smokeless tobacco: Current    Tobacco comments:     CURRENT E CIGS   Substance Use Topics    Alcohol use: No     Comment: RARELY      Family History   Problem Relation Age of Onset    Stroke Mother     Heart Disease Mother     Kidney Disease Mother     Anesth Problems Mother         TAKES A LONG TIME TO WAKE UP    Heart Disease Father     Thyroid Disease Sister         Current Facility-Administered Medications   Medication Dose Route Frequency    sodium chloride (NS) flush 5-40 mL  5-40 mL IntraVENous Q8H    sodium chloride (NS) flush 5-40 mL  5-40 mL IntraVENous Q8H    pantoprazole (PROTONIX) tablet 40 mg  40 mg Oral ACB&D    fluconazole (DIFLUCAN) 200mg/100 mL IVPB (premix)  200 mg IntraVENous Q24H    chlorzoxazone (PARAFON FORTE) tablet 500 mg  500 mg Oral TID WITH MEALS    gabapentin (NEURONTIN) capsule 600 mg  600 mg Oral QPM    gabapentin (NEURONTIN) capsule 300 mg  300 mg Oral BID    diphenoxylate-atropine (LOMOTIL) tablet 2 Tablet  2 Tablet Oral AC&HS    memantine (NAMENDA) tablet 5 mg  5 mg Oral QHS    sodium chloride (NS) flush 5-40 mL  5-40 mL IntraVENous Q8H    nicotine (NICODERM CQ) 21 mg/24 hr patch 1 Patch  1 Patch TransDERmal DAILY    piperacillin-tazobactam (ZOSYN) 3.375 g in 0.9% sodium chloride (MBP/ADV) 100 mL MBP  3.375 g IntraVENous Q8H    albuterol-ipratropium (DUO-NEB) 2.5 MG-0.5 MG/3 ML  3 mL Nebulization QID RT    enoxaparin (LOVENOX) injection 40 mg  40 mg SubCUTAneous Q24H    metoprolol tartrate (LOPRESSOR) tablet 12.5 mg  12.5 mg Oral Q12H       Review of Systems:  A comprehensive review of systems was negative except for that written in the HPI. H e reports intermittent palpatations. Objective:   Vital Signs:    Visit Vitals  /62 (BP 1 Location: Left upper arm, BP Patient Position: Sitting)   Pulse 97   Temp 98.9 °F (37.2 °C)   Resp 19   Ht 5' 11\" (1.803 m)   Wt 78.3 kg (172 lb 9.6 oz)   SpO2 95%   BMI 24.07 kg/m²       O2 Device: None (Room air)   O2 Flow Rate (L/min): 2 l/min   Temp (24hrs), Av °F (37.2 °C), Min:98.7 °F (37.1 °C), Max:99.3 °F (37.4 °C)       Intake/Output:   Last shift:      02/10 0701 - 02/10 1900  In: -   Out: 275 [Urine:275]  Last 3 shifts: 1901 - 02/10 07  In: -   Out: 375 [Urine:375]    Intake/Output Summary (Last 24 hours) at 2/10/2023 1056  Last data filed at 2/10/2023 0705  Gross per 24 hour   Intake --   Output 650 ml   Net -650 ml        Physical Exam:   General:  Alert, cooperative, appears stated age. .less splinting   Head:  Normocephalic, without obvious abnormality, atraumatic. Eyes:  Conjunctivae/corneas clear. PERRL, EOMs intact. Nose: Mask on    Throat: Mask on    Neck: Supple,non - tender and no nodes    Back:   Symmetric,   Lungs:   Limited to ant/lat this am - clear    Chest wall:  No tenderness or deformity. Heart:  Regular rate and rhythm, S1, S2 normal, no murmur   Abdomen:   Soft, non-tender. Extremities: Extremities normal, atraumatic, no cyanosis or edema.    Pulses:  Radial present    Skin: Skin color, texture, turgor normal. No rashes or lesions   Lymph nodes: Cervical, supraclavicular nodes normal. Neurologic: Grossly nonfocal     Data review:     Recent Results (from the past 24 hour(s))   METABOLIC PANEL, BASIC    Collection Time: 02/10/23 12:31 AM   Result Value Ref Range    Sodium 138 136 - 145 mmol/L    Potassium 3.7 3.5 - 5.1 mmol/L    Chloride 107 97 - 108 mmol/L    CO2 23 21 - 32 mmol/L    Anion gap 8 5 - 15 mmol/L    Glucose 109 (H) 65 - 100 mg/dL    BUN 10 6 - 20 MG/DL    Creatinine 1.16 0.70 - 1.30 MG/DL    BUN/Creatinine ratio 9 (L) 12 - 20      eGFR >60 >60 ml/min/1.73m2    Calcium 8.8 8.5 - 10.1 MG/DL       Imaging:  I have personally reviewed the patients radiographs and have reviewed the reports:  Past cxr's viewed . Last nl  in 2019 . Ct's viewed . Results as above .          Dariusz Ramirez PA-C

## 2023-02-11 LAB
ACID FAST STN SPEC: NEGATIVE
BACTERIA SPEC CULT: ABNORMAL
GRAM STN SPEC: ABNORMAL
MYCOBACTERIUM SPEC QL CULT: NORMAL
SERVICE CMNT-IMP: ABNORMAL
SPECIMEN PREPARATION: NORMAL
SPECIMEN SOURCE: NORMAL

## 2023-02-12 LAB
ACID FAST STN SPEC: NEGATIVE
BACTERIA SPEC CULT: ABNORMAL
BACTERIA SPEC CULT: NORMAL
GRAM STN SPEC: ABNORMAL
GRAM STN SPEC: ABNORMAL
MYCOBACTERIUM SPEC QL CULT: NORMAL
SERVICE CMNT-IMP: ABNORMAL
SERVICE CMNT-IMP: NORMAL
SPECIMEN PREPARATION: NORMAL
SPECIMEN SOURCE: NORMAL

## 2023-02-13 ENCOUNTER — HOSPITAL ENCOUNTER (EMERGENCY)
Age: 63
Discharge: HOME | End: 2023-02-13
Attending: EMERGENCY MEDICINE | Admitting: EMERGENCY MEDICINE
Payer: MEDICARE

## 2023-02-13 VITALS
BODY MASS INDEX: 25.4 KG/M2 | DIASTOLIC BLOOD PRESSURE: 78 MMHG | SYSTOLIC BLOOD PRESSURE: 143 MMHG | OXYGEN SATURATION: 99 % | RESPIRATION RATE: 18 BRPM | TEMPERATURE: 98.9 F | HEART RATE: 90 BPM | HEIGHT: 71 IN | WEIGHT: 181.44 LBS

## 2023-02-13 DIAGNOSIS — Z45.2 PICC (PERIPHERALLY INSERTED CENTRAL CATHETER) REMOVAL: Primary | ICD-10-CM

## 2023-02-13 LAB
O+P SPEC MICRO: NORMAL
O+P STL CONC: NORMAL
SPECIMEN SOURCE: NORMAL

## 2023-02-13 PROCEDURE — 99282 EMERGENCY DEPT VISIT SF MDM: CPT

## 2023-02-13 NOTE — DISCHARGE INSTRUCTIONS
You were seen in the emergency department today for PICC line removal.  Please keep the dressing in place for 24 hours. See the attached information for the results of your testing. You should follow-up with your primary care provider in the next couple of days. You can take over the over-the-counter medications that we discussed for your symptoms. Return if you develop any difficulty breathing, chest pain, or any other new or concerning symptoms.

## 2023-02-13 NOTE — PROCEDURES
Order to remove left upper PICC verified. Left PICC dressing intact. Dressing removed. PICC catheter pulled w/o difficulty. Tip intact at 42 cm. No bleeding noted. Bedrest x 30 mins advised. Pt tolerated procedure. Verbalized understanding.

## 2023-02-13 NOTE — ED PROVIDER NOTES
Vascular Access Problem      Patient is a 65 y.o. M with past medical history of anemia, anxiety depression, arthritis, COPD, head neck cancer, hypertension, migraines and GERD who presents today asking for his PICC line to be removed. Patient stated she was discharged from the hospital with the understanding that he would return to the emergency department for his PICC line to be removed. He reports that he requested to be left in place as he thought he might have a severe reaction to Diflucan requiring him to be rehospitalized. Patient reports that this is happened multiple times with antibiotics and he was nervous that this would happen again. He denies any current GI symptoms or reaction to the Diflucan. He states the dressing was starting to come loose and he does not want to get an infection so he is here to have it removed. Denies fevers, chills, or systemic and symptoms of infection. Denies drainage from the PICC line site. Denies receiving any medications currently through the PICC line. There are no other complaints, changes or physical findings at this time. ALLERGIES: Honey; Remicade [infliximab]; Crestor [rosuvastatin]; Other plant, animal, environmental; Imuran [azathioprine]; Mercaptopurine; and Sulfa (sulfonamide antibiotics)    Past Medical History:   Diagnosis Date    Abdominal wall hernia 6/15/2012    Anal fissure     Anemia     Anemia     Anxiety and depression     Arthritis     Related to the Crohn's disease. Cancer ([address]     MELANOMA HEAD AND FACE    Chronic pain     GENERALIZED    COPD (chronic obstructive pulmonary disease) (HCC)     Crohn disease (HCC)     Diagnosed at age 17.     Echocardiogram normal (EF: 55-60%) 07/2015    Esophageal ulcer     GERD (gastroesophageal reflux disease)     H/O blood transfusion 2013    [facility], [address]    Hypertension     denies    Migraine     Short bowel syndrome     Ventral hernia without obstruction or gangrene Past Surgical History:   Procedure Laterality Date    COLONOSCOPY N/A 10/8/2019    COLONOSCOPY performed by Jayleen Olsen MD at [place] [address] N/A 9/1/2022    SIGMOIDOSCOPY FLEXIBLE performed by Carolyn Ramsey MD at [facility] ENDOSCOPY    [address] needle, takes 1 inch needle    HX APPENDECTOMY      HX CHOLECYSTECTOMY      HX GI      colectomy x2, one ileostomy    HX GI  7/28/14    exp lap,partial colectomy with end ileostomy by Dr James Moss      neck fusion    HX ORTHOPAEDIC  1980s    wrist right,     HX ORTHOPAEDIC  2006, 11/2013    neck, L4 L5 L6    HX ORTHOPAEDIC  1990s    right knee scope    HX OTHER SURGICAL      surgical repair from spider bite    HX OTHER SURGICAL  12/1/14    Incision and drainage of posterior right subcutaneous shoulder abscess    HX OTHER SURGICAL  2014    LEFT INDEX FINGER SPIDER BITE    HX OTHER SURGICAL  2014    RECTAL FISTULA    HX OTHER SURGICAL  3/17/2015    Ileostomy takedown with extensive lysis of adhesions (greater than three hours), mobilization of the splenic flexure, left colon resection, ileocolic anastomosis, and excision of scar; Dr. Magalis Ko. HX OTHER SURGICAL  3/22/2015    Exploratory laparotomy with washout of abdomen, suture repair of small bowel/anastomosis, and diverting protective loop ileostomy; Wilbur Stephens MD.    HX OTHER SURGICAL  4/9/2015    Laparotomy, extensive lysis of adhesions, abdominal lavage, and resection of ileocolic anastomosis (including the efferent limb of the loop ileostomy) with creation of Demetrius's pouch; Dr. Magalis Ko.  OTHER SURGICAL  7/14/2015    Cystoscopy and placement of bilateral ureteral catheters; Nataly Adorno MD.    HX OTHER SURGICAL  7/14/2015    Ileostomy takedown with extensive lysis of adhesions, small bowel resection, and enterocolostomy; Dr. Magalis Ko.      OTHER SURGICAL  9/29/2015    CT-guided percutaneous drainage of intraabdominal abscess;  Imer.    HX OTHER SURGICAL  11/16/2015    CT-guided percutaneous aspiration of abdominal wall cavity; Dr. Shayy Ge. HX OTHER SURGICAL  12/1/2015    Incision and drainage of abdominal wall abscess; Dr. Katheryn Prado. HX OTHER SURGICAL  2/11/2016    Incision and drainage of abdominal wall abscess; Dr. Katheryn Prado. HX OTHER SURGICAL  2/23/2016    Cystoscopy and placement of bilateral temporary ureteral catheters; Dr. Mayela Downs. HX OTHER SURGICAL  2/23/2016    Exploratory laparotomy, extensive lysis of adhesions, removal of mesh, and repair of enterocutaneous fistula; Dr. Katheryn Prado. HX OTHER SURGICAL  11/03/2017    Exploratory laparotomy, extensive lysis of adhesions, and reduction of intussusception; Dr. Katheryn Prado. IN UNLISTED PROCEDURE ABDOMEN PERITONEUM & OMENTUM      33 abdominal operations for treatment of Crohn's disease and its complications.      Family History:   Problem Relation Age of Onset    Stroke Mother     Heart Disease Mother     Kidney Disease Mother     Anesth Problems Mother         TAKES A LONG TIME TO WAKE UP    Heart Disease Father     Thyroid Disease Sister      Social History     Socioeconomic History    Marital status: LEGALLY      Spouse name: Not on file    Number of children: Not on file    Years of education: Not on file    Highest education level: Not on file   Occupational History    Not on file   Tobacco Use    Smoking status: Every Day     Packs/day: 1.00     Years: 44.00     Pack years: 44.00     Types: Cigarettes    Smokeless tobacco: Current    Tobacco comments:     CURRENT E CIGS   Substance and Sexual Activity    Alcohol use: No     Comment: RARELY    Drug use: No    Sexual activity: Not on file   Other Topics Concern    Not on file   Social History Narrative    Not on file     Social Determinants of Health     Financial Resource Strain: Not on file   Food Insecurity: Not on file   Transportation Needs: Not on file   Physical Activity: Not on file Stress: Not on file   Social Connections: Not on file   Intimate Partner Violence: Not on file   Housing Stability: Not on file           Review of Systems    Vitals:    02/13/23 1635   BP: (!) 143/78   Pulse: 90   Resp: 18   Temp: 98.9 °F (37.2 °C)   SpO2: 99%   Weight: 82.3 kg (181 lb 7 oz)   Height: 5' 11\" (1.803 m)            Physical Exam  Constitutional:       Appearance: Normal appearance. HENT:      Head: Normocephalic and atraumatic. Eyes:      Pupils: Pupils are equal, round, and reactive to light. Cardiovascular:      Rate and Rhythm: Normal rate and regular rhythm. Pulmonary:      Effort: Pulmonary effort is normal.   Musculoskeletal:      Cervical back: Normal range of motion. Skin:     General: Skin is dry. Comments: PICC dressing clean and dry, coming loose at upper border. No evidence of drainage or erythema to site. Neurological:      General: No focal deficit present. Mental Status: He is alert and oriented to person, place, and time. Psychiatric:         Mood and Affect: Mood normal.         Behavior: Behavior normal.            LABORATORY RESULTS:  No results found for this or any previous visit (from the past 24 hour(s)). IMAGING RESULTS:  No results found. MEDICATIONS GIVEN:  Medications - No data to display       Medical Decision Making  I reviewed patient's discharge summary from his most recent hospitalization. Confirmed that PICC line was only left in as patient was adamant regarding this. Patient was instructed to follow-up in the emergency room for PICC line removal by inpatient team.  Reviewed patient's medications, no evidence of IV antibiotics or infusions. PICC line team paged. PICC line removed by PICC RN. Patient was then monitored here in the emergency room for over 30 minutes after the PICC line was removed. He denies chest pain or shortness of breath. Discharged with instructions to follow-up with PCP.         Amount and/or Complexity of Data Reviewed  External Data Reviewed: notes. Presentation, management, and disposition were discussed with the attending physician, Dr. Britt Alexander, who is in agreement with plan of care. Discussed results and work-up with patient and answered all questions, the patient expresses understanding and agrees with the care plan and disposition. The patient was given an opportunity to ask questions and all concerns raised were addressed prior to discharge. Recommended patient follow-up with provider as listed below. Counseled patient on standard home and self-care measures. Specifically explained the emergent conditions that could arise and clearly instructed the patient to return to the emergency department for those and any other new, worsening, or concerning symptoms. Patient stable and ready for discharge. IMPRESSION:  1. PICC (peripherally inserted central catheter) removal        DISPOSITION:  Discharge    PLAN:  Follow-up Information       Follow up With Specialties Details Why Contact Info    Negrita Sanchez MD Family Medicine Schedule an appointment as soon as possible for a visit   [address] [place] [address]  [address] [facility]      [address] [facility] [address] [facility] Emergency Medicine  As needed, If symptoms worsen [address] [place] [address]  [phone]          Current Discharge Medication List          Please note that this dictation was completed with [employer], the [employer] voice recognition software. Quite often unanticipated grammatical, syntax, homophones, and other interpretive errors are inadvertently transcribed by the computer software. Please disregard these errors. Please excuse any errors that have escaped final proofreading.

## 2023-02-13 NOTE — ED TRIAGE NOTES
Pt arrives with CC of picc line dressing coming loose. Pt states that the line was supposed to be removed tomorrow as per Dr. Casandra Schwab, and would like to see if it can removed today.

## 2023-02-14 LAB
BACTERIA SPEC CULT: ABNORMAL
SERVICE CMNT-IMP: ABNORMAL
SERVICE CMNT-IMP: ABNORMAL

## 2023-03-26 LAB
ACID FAST STN SPEC: NEGATIVE
MYCOBACTERIUM SPEC QL CULT: NEGATIVE
SPECIMEN PREPARATION: NORMAL
SPECIMEN SOURCE: NORMAL

## 2023-03-30 ENCOUNTER — TRANSCRIBE ORDER (OUTPATIENT)
Dept: SCHEDULING | Age: 63
End: 2023-03-30

## 2023-03-30 DIAGNOSIS — J85.0 NECROTIZING PNEUMONIA (HCC): Primary | ICD-10-CM

## 2023-04-06 ENCOUNTER — ANESTHESIA EVENT (OUTPATIENT)
Dept: SURGERY | Age: 63
DRG: 392 | End: 2023-04-06
Payer: MEDICARE

## 2023-04-06 ENCOUNTER — ANESTHESIA (OUTPATIENT)
Dept: SURGERY | Age: 63
DRG: 392 | End: 2023-04-06
Payer: MEDICARE

## 2023-04-06 ENCOUNTER — APPOINTMENT (OUTPATIENT)
Dept: CT IMAGING | Age: 63
DRG: 392 | End: 2023-04-06
Attending: STUDENT IN AN ORGANIZED HEALTH CARE EDUCATION/TRAINING PROGRAM
Payer: MEDICARE

## 2023-04-06 ENCOUNTER — HOSPITAL ENCOUNTER (INPATIENT)
Age: 63
LOS: 1 days | Discharge: HOME | DRG: 392 | End: 2023-04-09
Attending: STUDENT IN AN ORGANIZED HEALTH CARE EDUCATION/TRAINING PROGRAM | Admitting: COLON & RECTAL SURGERY
Payer: MEDICARE

## 2023-04-06 DIAGNOSIS — K50.919 CROHN'S DISEASE WITH COMPLICATION, UNSPECIFIED GASTROINTESTINAL TRACT LOCATION (HCC): ICD-10-CM

## 2023-04-06 DIAGNOSIS — K62.89 RECTAL PAIN: Primary | ICD-10-CM

## 2023-04-06 DIAGNOSIS — R10.2 PERINEAL PAIN: ICD-10-CM

## 2023-04-06 PROBLEM — R19.7 DIARRHEA: Status: ACTIVE | Noted: 2023-04-06

## 2023-04-06 LAB
ANION GAP SERPL CALC-SCNC: 5 MMOL/L (ref 5–15)
BASOPHILS # BLD: 0 K/UL (ref 0–0.1)
BASOPHILS NFR BLD: 0 % (ref 0–1)
BUN SERPL-MCNC: 13 MG/DL (ref 6–20)
BUN/CREAT SERPL: 11 (ref 12–20)
CALCIUM SERPL-MCNC: 9.4 MG/DL (ref 8.5–10.1)
CHLORIDE SERPL-SCNC: 108 MMOL/L (ref 97–108)
CO2 SERPL-SCNC: 24 MMOL/L (ref 21–32)
COMMENT, HOLDF: NORMAL
COMMENT, HOLDF: NORMAL
CREAT SERPL-MCNC: 1.2 MG/DL (ref 0.7–1.3)
CRP SERPL-MCNC: <0.29 MG/DL (ref 0–0.6)
DIFFERENTIAL METHOD BLD: ABNORMAL
EOSINOPHIL # BLD: 0.2 K/UL (ref 0–0.4)
EOSINOPHIL NFR BLD: 2 % (ref 0–7)
ERYTHROCYTE [DISTWIDTH] IN BLOOD BY AUTOMATED COUNT: 14.7 % (ref 11.5–14.5)
ERYTHROCYTE [SEDIMENTATION RATE] IN BLOOD: 5 MM/HR (ref 0–20)
GLUCOSE SERPL-MCNC: 87 MG/DL (ref 65–100)
HCT VFR BLD AUTO: 49.1 % (ref 36.6–50.3)
HGB BLD-MCNC: 15.6 G/DL (ref 12.1–17)
IMM GRANULOCYTES # BLD AUTO: 0 K/UL
IMM GRANULOCYTES NFR BLD AUTO: 0 %
LACTATE SERPL-SCNC: 1.5 MMOL/L (ref 0.4–2)
LYMPHOCYTES # BLD: 3.4 K/UL (ref 0.8–3.5)
LYMPHOCYTES NFR BLD: 38 % (ref 12–49)
MCH RBC QN AUTO: 31 PG (ref 26–34)
MCHC RBC AUTO-ENTMCNC: 31.8 G/DL (ref 30–36.5)
MCV RBC AUTO: 97.4 FL (ref 80–99)
MONOCYTES # BLD: 0.6 K/UL (ref 0–1)
MONOCYTES NFR BLD: 7 % (ref 5–13)
NEUTS BAND NFR BLD MANUAL: 1 % (ref 0–6)
NEUTS SEG # BLD: 4.8 K/UL (ref 1.8–8)
NEUTS SEG NFR BLD: 52 % (ref 32–75)
NRBC # BLD: 0 K/UL (ref 0–0.01)
NRBC BLD-RTO: 0 PER 100 WBC
PLATELET # BLD AUTO: 269 K/UL (ref 150–400)
PMV BLD AUTO: 10.9 FL (ref 8.9–12.9)
POTASSIUM SERPL-SCNC: 3.8 MMOL/L (ref 3.5–5.1)
RBC # BLD AUTO: 5.04 M/UL (ref 4.1–5.7)
RBC MORPH BLD: ABNORMAL
SAMPLES BEING HELD,HOLD: NORMAL
SAMPLES BEING HELD,HOLD: NORMAL
SODIUM SERPL-SCNC: 137 MMOL/L (ref 136–145)
WBC # BLD AUTO: 9 K/UL (ref 4.1–11.1)

## 2023-04-06 PROCEDURE — 80048 BASIC METABOLIC PNL TOTAL CA: CPT

## 2023-04-06 PROCEDURE — 99285 EMERGENCY DEPT VISIT HI MDM: CPT

## 2023-04-06 PROCEDURE — 74011000636 HC RX REV CODE- 636: Performed by: STUDENT IN AN ORGANIZED HEALTH CARE EDUCATION/TRAINING PROGRAM

## 2023-04-06 PROCEDURE — 74011250636 HC RX REV CODE- 250/636: Performed by: COLON & RECTAL SURGERY

## 2023-04-06 PROCEDURE — 36415 COLL VENOUS BLD VENIPUNCTURE: CPT

## 2023-04-06 PROCEDURE — 74011250637 HC RX REV CODE- 250/637: Performed by: COLON & RECTAL SURGERY

## 2023-04-06 PROCEDURE — 74011000258 HC RX REV CODE- 258: Performed by: COLON & RECTAL SURGERY

## 2023-04-06 PROCEDURE — 96376 TX/PRO/DX INJ SAME DRUG ADON: CPT

## 2023-04-06 PROCEDURE — 87324 CLOSTRIDIUM AG IA: CPT

## 2023-04-06 PROCEDURE — 83605 ASSAY OF LACTIC ACID: CPT

## 2023-04-06 PROCEDURE — 76210000006 HC OR PH I REC 0.5 TO 1 HR: Performed by: COLON & RECTAL SURGERY

## 2023-04-06 PROCEDURE — 77030040361 HC SLV COMPR DVT MDII -B: Performed by: COLON & RECTAL SURGERY

## 2023-04-06 PROCEDURE — 87506 IADNA-DNA/RNA PROBE TQ 6-11: CPT

## 2023-04-06 PROCEDURE — 74177 CT ABD & PELVIS W/CONTRAST: CPT

## 2023-04-06 PROCEDURE — 76010000138 HC OR TIME 0.5 TO 1 HR: Performed by: COLON & RECTAL SURGERY

## 2023-04-06 PROCEDURE — 85025 COMPLETE CBC W/AUTO DIFF WBC: CPT

## 2023-04-06 PROCEDURE — 74011250636 HC RX REV CODE- 250/636: Performed by: ANESTHESIOLOGY

## 2023-04-06 PROCEDURE — 0DJD3ZZ INSPECTION OF LOWER INTESTINAL TRACT, PERCUTANEOUS APPROACH: ICD-10-PCS | Performed by: COLON & RECTAL SURGERY

## 2023-04-06 PROCEDURE — 2709999900 HC NON-CHARGEABLE SUPPLY: Performed by: COLON & RECTAL SURGERY

## 2023-04-06 PROCEDURE — G0378 HOSPITAL OBSERVATION PER HR: HCPCS

## 2023-04-06 PROCEDURE — 77030040922 HC BLNKT HYPOTHRM STRY -A

## 2023-04-06 PROCEDURE — 76060000032 HC ANESTHESIA 0.5 TO 1 HR: Performed by: COLON & RECTAL SURGERY

## 2023-04-06 PROCEDURE — 74011250636 HC RX REV CODE- 250/636: Performed by: STUDENT IN AN ORGANIZED HEALTH CARE EDUCATION/TRAINING PROGRAM

## 2023-04-06 PROCEDURE — 85652 RBC SED RATE AUTOMATED: CPT

## 2023-04-06 PROCEDURE — 74011000250 HC RX REV CODE- 250: Performed by: COLON & RECTAL SURGERY

## 2023-04-06 PROCEDURE — 86140 C-REACTIVE PROTEIN: CPT

## 2023-04-06 PROCEDURE — 96374 THER/PROPH/DIAG INJ IV PUSH: CPT

## 2023-04-06 PROCEDURE — 77030042556 HC PNCL CAUT -B: Performed by: COLON & RECTAL SURGERY

## 2023-04-06 RX ORDER — NORETHINDRONE AND ETHINYL ESTRADIOL 0.5-0.035
KIT ORAL AS NEEDED
Status: DISCONTINUED | OUTPATIENT
Start: 2023-04-06 | End: 2023-04-06 | Stop reason: HOSPADM

## 2023-04-06 RX ORDER — LIDOCAINE HYDROCHLORIDE 10 MG/ML
0.1 INJECTION, SOLUTION EPIDURAL; INFILTRATION; INTRACAUDAL; PERINEURAL AS NEEDED
Status: DISCONTINUED | OUTPATIENT
Start: 2023-04-06 | End: 2023-04-06 | Stop reason: HOSPADM

## 2023-04-06 RX ORDER — METOPROLOL TARTRATE 25 MG/1
12.5 TABLET, FILM COATED ORAL EVERY 12 HOURS
Status: DISCONTINUED | OUTPATIENT
Start: 2023-04-06 | End: 2023-04-09 | Stop reason: HOSPADM

## 2023-04-06 RX ORDER — GABAPENTIN 300 MG/1
300 CAPSULE ORAL 3 TIMES DAILY
Status: DISCONTINUED | OUTPATIENT
Start: 2023-04-06 | End: 2023-04-09 | Stop reason: HOSPADM

## 2023-04-06 RX ORDER — SODIUM CHLORIDE 0.9 % (FLUSH) 0.9 %
5-40 SYRINGE (ML) INJECTION EVERY 8 HOURS
Status: DISCONTINUED | OUTPATIENT
Start: 2023-04-06 | End: 2023-04-06 | Stop reason: HOSPADM

## 2023-04-06 RX ORDER — MIDAZOLAM HYDROCHLORIDE 1 MG/ML
INJECTION, SOLUTION INTRAMUSCULAR; INTRAVENOUS AS NEEDED
Status: DISCONTINUED | OUTPATIENT
Start: 2023-04-06 | End: 2023-04-06 | Stop reason: HOSPADM

## 2023-04-06 RX ORDER — SODIUM CHLORIDE 9 MG/ML
150 INJECTION, SOLUTION INTRAVENOUS CONTINUOUS
Status: DISCONTINUED | OUTPATIENT
Start: 2023-04-06 | End: 2023-04-06

## 2023-04-06 RX ORDER — HYDROMORPHONE HYDROCHLORIDE 1 MG/ML
0.2 INJECTION, SOLUTION INTRAMUSCULAR; INTRAVENOUS; SUBCUTANEOUS
Status: DISCONTINUED | OUTPATIENT
Start: 2023-04-06 | End: 2023-04-06 | Stop reason: HOSPADM

## 2023-04-06 RX ORDER — ONDANSETRON 2 MG/ML
4 INJECTION INTRAMUSCULAR; INTRAVENOUS AS NEEDED
Status: DISCONTINUED | OUTPATIENT
Start: 2023-04-06 | End: 2023-04-06 | Stop reason: HOSPADM

## 2023-04-06 RX ORDER — ONDANSETRON 4 MG/1
8 TABLET, ORALLY DISINTEGRATING ORAL
Status: DISCONTINUED | OUTPATIENT
Start: 2023-04-06 | End: 2023-04-09 | Stop reason: HOSPADM

## 2023-04-06 RX ORDER — SODIUM CHLORIDE 9 MG/ML
50 INJECTION, SOLUTION INTRAVENOUS CONTINUOUS
Status: DISCONTINUED | OUTPATIENT
Start: 2023-04-06 | End: 2023-04-06 | Stop reason: HOSPADM

## 2023-04-06 RX ORDER — ROCURONIUM BROMIDE 10 MG/ML
INJECTION, SOLUTION INTRAVENOUS AS NEEDED
Status: DISCONTINUED | OUTPATIENT
Start: 2023-04-06 | End: 2023-04-06 | Stop reason: HOSPADM

## 2023-04-06 RX ORDER — CHLORZOXAZONE 500 MG/1
500-1000 TABLET ORAL
Status: DISCONTINUED | OUTPATIENT
Start: 2023-04-06 | End: 2023-04-09 | Stop reason: HOSPADM

## 2023-04-06 RX ORDER — OXYCODONE AND ACETAMINOPHEN 5; 325 MG/1; MG/1
1 TABLET ORAL AS NEEDED
Status: DISCONTINUED | OUTPATIENT
Start: 2023-04-06 | End: 2023-04-06 | Stop reason: HOSPADM

## 2023-04-06 RX ORDER — HYDROMORPHONE HYDROCHLORIDE 1 MG/ML
1 INJECTION, SOLUTION INTRAMUSCULAR; INTRAVENOUS; SUBCUTANEOUS ONCE
Status: COMPLETED | OUTPATIENT
Start: 2023-04-06 | End: 2023-04-06

## 2023-04-06 RX ORDER — PROPOFOL 10 MG/ML
INJECTION, EMULSION INTRAVENOUS AS NEEDED
Status: DISCONTINUED | OUTPATIENT
Start: 2023-04-06 | End: 2023-04-06 | Stop reason: HOSPADM

## 2023-04-06 RX ORDER — HYDROMORPHONE HYDROCHLORIDE 2 MG/1
2 TABLET ORAL
Status: DISCONTINUED | OUTPATIENT
Start: 2023-04-06 | End: 2023-04-07

## 2023-04-06 RX ORDER — SODIUM CHLORIDE 0.9 % (FLUSH) 0.9 %
5-40 SYRINGE (ML) INJECTION AS NEEDED
Status: DISCONTINUED | OUTPATIENT
Start: 2023-04-06 | End: 2023-04-06 | Stop reason: HOSPADM

## 2023-04-06 RX ORDER — SODIUM CHLORIDE, SODIUM LACTATE, POTASSIUM CHLORIDE, CALCIUM CHLORIDE 600; 310; 30; 20 MG/100ML; MG/100ML; MG/100ML; MG/100ML
75 INJECTION, SOLUTION INTRAVENOUS CONTINUOUS
Status: DISCONTINUED | OUTPATIENT
Start: 2023-04-06 | End: 2023-04-06 | Stop reason: HOSPADM

## 2023-04-06 RX ORDER — MIDAZOLAM HYDROCHLORIDE 1 MG/ML
1 INJECTION, SOLUTION INTRAMUSCULAR; INTRAVENOUS AS NEEDED
Status: DISCONTINUED | OUTPATIENT
Start: 2023-04-06 | End: 2023-04-06 | Stop reason: HOSPADM

## 2023-04-06 RX ORDER — HYDROMORPHONE HYDROCHLORIDE 1 MG/ML
0.5 INJECTION, SOLUTION INTRAMUSCULAR; INTRAVENOUS; SUBCUTANEOUS ONCE
Status: COMPLETED | OUTPATIENT
Start: 2023-04-06 | End: 2023-04-06

## 2023-04-06 RX ORDER — ONDANSETRON 2 MG/ML
INJECTION INTRAMUSCULAR; INTRAVENOUS AS NEEDED
Status: DISCONTINUED | OUTPATIENT
Start: 2023-04-06 | End: 2023-04-06 | Stop reason: HOSPADM

## 2023-04-06 RX ORDER — DIPHENOXYLATE HYDROCHLORIDE AND ATROPINE SULFATE 2.5; .025 MG/1; MG/1
2 TABLET ORAL
Status: DISCONTINUED | OUTPATIENT
Start: 2023-04-07 | End: 2023-04-07

## 2023-04-06 RX ORDER — SODIUM CHLORIDE 0.9 % (FLUSH) 0.9 %
5-40 SYRINGE (ML) INJECTION AS NEEDED
Status: DISCONTINUED | OUTPATIENT
Start: 2023-04-06 | End: 2023-04-09 | Stop reason: HOSPADM

## 2023-04-06 RX ORDER — BUPIVACAINE HYDROCHLORIDE AND EPINEPHRINE 2.5; 5 MG/ML; UG/ML
30 INJECTION, SOLUTION EPIDURAL; INFILTRATION; INTRACAUDAL; PERINEURAL ONCE
Status: COMPLETED | OUTPATIENT
Start: 2023-04-06 | End: 2023-04-06

## 2023-04-06 RX ORDER — MIDAZOLAM HYDROCHLORIDE 1 MG/ML
0.5 INJECTION, SOLUTION INTRAMUSCULAR; INTRAVENOUS
Status: DISCONTINUED | OUTPATIENT
Start: 2023-04-06 | End: 2023-04-06 | Stop reason: HOSPADM

## 2023-04-06 RX ORDER — SUCCINYLCHOLINE CHLORIDE 20 MG/ML
INJECTION INTRAMUSCULAR; INTRAVENOUS AS NEEDED
Status: DISCONTINUED | OUTPATIENT
Start: 2023-04-06 | End: 2023-04-06 | Stop reason: HOSPADM

## 2023-04-06 RX ORDER — LIDOCAINE HYDROCHLORIDE 20 MG/ML
INJECTION, SOLUTION EPIDURAL; INFILTRATION; INTRACAUDAL; PERINEURAL AS NEEDED
Status: DISCONTINUED | OUTPATIENT
Start: 2023-04-06 | End: 2023-04-06 | Stop reason: HOSPADM

## 2023-04-06 RX ORDER — FENTANYL CITRATE 50 UG/ML
50 INJECTION, SOLUTION INTRAMUSCULAR; INTRAVENOUS AS NEEDED
Status: DISCONTINUED | OUTPATIENT
Start: 2023-04-06 | End: 2023-04-06 | Stop reason: HOSPADM

## 2023-04-06 RX ORDER — NALOXONE HYDROCHLORIDE 0.4 MG/ML
0.4 INJECTION, SOLUTION INTRAMUSCULAR; INTRAVENOUS; SUBCUTANEOUS AS NEEDED
Status: DISCONTINUED | OUTPATIENT
Start: 2023-04-06 | End: 2023-04-09 | Stop reason: HOSPADM

## 2023-04-06 RX ORDER — DIPHENHYDRAMINE HYDROCHLORIDE 50 MG/ML
12.5 INJECTION, SOLUTION INTRAMUSCULAR; INTRAVENOUS AS NEEDED
Status: DISCONTINUED | OUTPATIENT
Start: 2023-04-06 | End: 2023-04-06 | Stop reason: HOSPADM

## 2023-04-06 RX ORDER — MEMANTINE HYDROCHLORIDE 5 MG/1
5 TABLET ORAL DAILY
COMMUNITY

## 2023-04-06 RX ORDER — FENTANYL CITRATE 50 UG/ML
INJECTION, SOLUTION INTRAMUSCULAR; INTRAVENOUS AS NEEDED
Status: DISCONTINUED | OUTPATIENT
Start: 2023-04-06 | End: 2023-04-06 | Stop reason: HOSPADM

## 2023-04-06 RX ORDER — MORPHINE SULFATE 2 MG/ML
2 INJECTION, SOLUTION INTRAMUSCULAR; INTRAVENOUS
Status: DISCONTINUED | OUTPATIENT
Start: 2023-04-06 | End: 2023-04-06 | Stop reason: HOSPADM

## 2023-04-06 RX ORDER — SODIUM CHLORIDE 0.9 % (FLUSH) 0.9 %
5-40 SYRINGE (ML) INJECTION EVERY 8 HOURS
Status: DISCONTINUED | OUTPATIENT
Start: 2023-04-06 | End: 2023-04-09 | Stop reason: HOSPADM

## 2023-04-06 RX ORDER — DEXAMETHASONE SODIUM PHOSPHATE 4 MG/ML
INJECTION, SOLUTION INTRA-ARTICULAR; INTRALESIONAL; INTRAMUSCULAR; INTRAVENOUS; SOFT TISSUE AS NEEDED
Status: DISCONTINUED | OUTPATIENT
Start: 2023-04-06 | End: 2023-04-06 | Stop reason: HOSPADM

## 2023-04-06 RX ORDER — SODIUM CHLORIDE 9 MG/ML
1000 INJECTION, SOLUTION INTRAVENOUS ONCE
Status: COMPLETED | OUTPATIENT
Start: 2023-04-06 | End: 2023-04-06

## 2023-04-06 RX ORDER — FENTANYL CITRATE 50 UG/ML
25 INJECTION, SOLUTION INTRAMUSCULAR; INTRAVENOUS
Status: DISCONTINUED | OUTPATIENT
Start: 2023-04-06 | End: 2023-04-06 | Stop reason: HOSPADM

## 2023-04-06 RX ADMIN — ONDANSETRON 4 MG: 2 INJECTION INTRAMUSCULAR; INTRAVENOUS at 19:21

## 2023-04-06 RX ADMIN — IOPAMIDOL 100 ML: 755 INJECTION, SOLUTION INTRAVENOUS at 10:59

## 2023-04-06 RX ADMIN — LIDOCAINE HYDROCHLORIDE 60 MG: 20 INJECTION, SOLUTION EPIDURAL; INFILTRATION; INTRACAUDAL; PERINEURAL at 19:12

## 2023-04-06 RX ADMIN — FENTANYL CITRATE 25 MCG: 50 INJECTION INTRAMUSCULAR; INTRAVENOUS at 20:38

## 2023-04-06 RX ADMIN — SODIUM CHLORIDE 50 ML/HR: 9 INJECTION, SOLUTION INTRAVENOUS at 20:26

## 2023-04-06 RX ADMIN — MIDAZOLAM HYDROCHLORIDE 2 MG: 1 INJECTION, SOLUTION INTRAMUSCULAR; INTRAVENOUS at 19:12

## 2023-04-06 RX ADMIN — HYDROMORPHONE HYDROCHLORIDE 2 MG: 2 TABLET ORAL at 23:22

## 2023-04-06 RX ADMIN — SODIUM CHLORIDE, POTASSIUM CHLORIDE, SODIUM LACTATE AND CALCIUM CHLORIDE 75 ML/HR: 600; 310; 30; 20 INJECTION, SOLUTION INTRAVENOUS at 18:43

## 2023-04-06 RX ADMIN — METOPROLOL TARTRATE 12.5 MG: 25 TABLET, FILM COATED ORAL at 23:21

## 2023-04-06 RX ADMIN — DEXAMETHASONE SODIUM PHOSPHATE 8 MG: 4 INJECTION, SOLUTION INTRA-ARTICULAR; INTRALESIONAL; INTRAMUSCULAR; INTRAVENOUS; SOFT TISSUE at 19:21

## 2023-04-06 RX ADMIN — HYDROMORPHONE HYDROCHLORIDE 1 MG: 1 INJECTION, SOLUTION INTRAMUSCULAR; INTRAVENOUS; SUBCUTANEOUS at 16:53

## 2023-04-06 RX ADMIN — HYDROMORPHONE HYDROCHLORIDE 0.5 MG: 1 INJECTION, SOLUTION INTRAMUSCULAR; INTRAVENOUS; SUBCUTANEOUS at 12:14

## 2023-04-06 RX ADMIN — FENTANYL CITRATE 25 MCG: 50 INJECTION INTRAMUSCULAR; INTRAVENOUS at 20:59

## 2023-04-06 RX ADMIN — FENTANYL CITRATE 25 MCG: 50 INJECTION, SOLUTION INTRAMUSCULAR; INTRAVENOUS at 19:12

## 2023-04-06 RX ADMIN — ROCURONIUM BROMIDE 10 MG: 10 INJECTION, SOLUTION INTRAVENOUS at 19:12

## 2023-04-06 RX ADMIN — NORETHINDRONE AND ETHINYL ESTRADIOL 10 MG: KIT ORAL at 19:58

## 2023-04-06 RX ADMIN — SUCCINYLCHOLINE CHLORIDE 120 MG: 20 INJECTION INTRAMUSCULAR; INTRAVENOUS at 19:12

## 2023-04-06 RX ADMIN — FENTANYL CITRATE 75 MCG: 50 INJECTION, SOLUTION INTRAMUSCULAR; INTRAVENOUS at 19:29

## 2023-04-06 RX ADMIN — HYDROMORPHONE HYDROCHLORIDE 1 MG: 1 INJECTION, SOLUTION INTRAMUSCULAR; INTRAVENOUS; SUBCUTANEOUS at 11:00

## 2023-04-06 RX ADMIN — PROPOFOL 150 MG: 10 INJECTION, EMULSION INTRAVENOUS at 19:12

## 2023-04-06 RX ADMIN — PIPERACILLIN AND TAZOBACTAM 3.38 G: 3; .375 INJECTION, POWDER, LYOPHILIZED, FOR SOLUTION INTRAVENOUS at 19:12

## 2023-04-06 RX ADMIN — SODIUM CHLORIDE 1000 ML: 9 INJECTION, SOLUTION INTRAVENOUS at 11:01

## 2023-04-06 RX ADMIN — SODIUM CHLORIDE, PRESERVATIVE FREE 10 ML: 5 INJECTION INTRAVENOUS at 23:00

## 2023-04-06 RX ADMIN — SODIUM CHLORIDE 150 ML/HR: 9 INJECTION, SOLUTION INTRAVENOUS at 18:02

## 2023-04-07 ENCOUNTER — APPOINTMENT (OUTPATIENT)
Dept: CT IMAGING | Age: 63
DRG: 392 | End: 2023-04-07
Attending: COLON & RECTAL SURGERY
Payer: MEDICARE

## 2023-04-07 ENCOUNTER — HOSPITAL ENCOUNTER (OUTPATIENT)
Dept: CT IMAGING | Age: 63
End: 2023-04-07
Attending: INTERNAL MEDICINE
Payer: MEDICARE

## 2023-04-07 LAB
BASOPHILS # BLD: 0 K/UL (ref 0–0.1)
BASOPHILS NFR BLD: 0 % (ref 0–1)
C COLI+JEJUNI TUF STL QL NAA+PROBE: NEGATIVE
C DIFF GDH STL QL: NEGATIVE
C DIFF TOX A+B STL QL IA: NEGATIVE
COMMENT, HOLDF: NORMAL
DIFFERENTIAL METHOD BLD: ABNORMAL
EC STX1+STX2 GENES STL QL NAA+PROBE: NEGATIVE
EOSINOPHIL # BLD: 0 K/UL (ref 0–0.4)
EOSINOPHIL NFR BLD: 0 % (ref 0–7)
ERYTHROCYTE [DISTWIDTH] IN BLOOD BY AUTOMATED COUNT: 14.5 % (ref 11.5–14.5)
ETEC ELTA+ESTB GENES STL QL NAA+PROBE: NEGATIVE
HCT VFR BLD AUTO: 42.4 % (ref 36.6–50.3)
HGB BLD-MCNC: 13.5 G/DL (ref 12.1–17)
IMM GRANULOCYTES # BLD AUTO: 0 K/UL (ref 0–0.04)
IMM GRANULOCYTES NFR BLD AUTO: 1 % (ref 0–0.5)
INTERPRETATION: NORMAL
LYMPHOCYTES # BLD: 1.1 K/UL (ref 0.8–3.5)
LYMPHOCYTES NFR BLD: 13 % (ref 12–49)
MCH RBC QN AUTO: 30.5 PG (ref 26–34)
MCHC RBC AUTO-ENTMCNC: 31.8 G/DL (ref 30–36.5)
MCV RBC AUTO: 95.9 FL (ref 80–99)
MONOCYTES # BLD: 0.1 K/UL (ref 0–1)
MONOCYTES NFR BLD: 2 % (ref 5–13)
NEUTS SEG # BLD: 7.2 K/UL (ref 1.8–8)
NEUTS SEG NFR BLD: 84 % (ref 32–75)
NRBC # BLD: 0 K/UL (ref 0–0.01)
NRBC BLD-RTO: 0 PER 100 WBC
P SHIGELLOIDES DNA STL QL NAA+PROBE: NEGATIVE
PLATELET # BLD AUTO: 233 K/UL (ref 150–400)
PMV BLD AUTO: 10.8 FL (ref 8.9–12.9)
RBC # BLD AUTO: 4.42 M/UL (ref 4.1–5.7)
SALMONELLA SP SPAO STL QL NAA+PROBE: NEGATIVE
SAMPLES BEING HELD,HOLD: NORMAL
SHIGELLA SP+EIEC IPAH STL QL NAA+PROBE: NEGATIVE
V CHOL+PARA+VUL DNA STL QL NAA+NON-PROBE: NEGATIVE
WBC # BLD AUTO: 8.6 K/UL (ref 4.1–11.1)
Y ENTEROCOL DNA STL QL NAA+NON-PROBE: NEGATIVE

## 2023-04-07 PROCEDURE — 74011250637 HC RX REV CODE- 250/637: Performed by: COLON & RECTAL SURGERY

## 2023-04-07 PROCEDURE — 36415 COLL VENOUS BLD VENIPUNCTURE: CPT

## 2023-04-07 PROCEDURE — 72193 CT PELVIS W/DYE: CPT

## 2023-04-07 PROCEDURE — 74011000258 HC RX REV CODE- 258: Performed by: COLON & RECTAL SURGERY

## 2023-04-07 PROCEDURE — G0378 HOSPITAL OBSERVATION PER HR: HCPCS

## 2023-04-07 PROCEDURE — 74011250636 HC RX REV CODE- 250/636: Performed by: COLON & RECTAL SURGERY

## 2023-04-07 PROCEDURE — 85025 COMPLETE CBC W/AUTO DIFF WBC: CPT

## 2023-04-07 PROCEDURE — 74011000636 HC RX REV CODE- 636: Performed by: RADIOLOGY

## 2023-04-07 PROCEDURE — 74011000250 HC RX REV CODE- 250: Performed by: COLON & RECTAL SURGERY

## 2023-04-07 PROCEDURE — 96375 TX/PRO/DX INJ NEW DRUG ADDON: CPT

## 2023-04-07 RX ORDER — HYDROMORPHONE HYDROCHLORIDE 2 MG/1
2-4 TABLET ORAL
Status: DISCONTINUED | OUTPATIENT
Start: 2023-04-07 | End: 2023-04-09 | Stop reason: HOSPADM

## 2023-04-07 RX ORDER — LOPERAMIDE HYDROCHLORIDE 2 MG/1
2 CAPSULE ORAL
Status: DISCONTINUED | OUTPATIENT
Start: 2023-04-07 | End: 2023-04-09 | Stop reason: HOSPADM

## 2023-04-07 RX ORDER — MEMANTINE HYDROCHLORIDE 10 MG/1
5 TABLET ORAL
Status: DISCONTINUED | OUTPATIENT
Start: 2023-04-07 | End: 2023-04-09 | Stop reason: HOSPADM

## 2023-04-07 RX ORDER — HYDROMORPHONE HYDROCHLORIDE 1 MG/ML
1 INJECTION, SOLUTION INTRAMUSCULAR; INTRAVENOUS; SUBCUTANEOUS
Status: DISPENSED | OUTPATIENT
Start: 2023-04-07 | End: 2023-04-08

## 2023-04-07 RX ORDER — IBUPROFEN 200 MG
1 TABLET ORAL DAILY
Status: DISCONTINUED | OUTPATIENT
Start: 2023-04-07 | End: 2023-04-09 | Stop reason: HOSPADM

## 2023-04-07 RX ORDER — HYDROMORPHONE HYDROCHLORIDE 1 MG/ML
1 INJECTION, SOLUTION INTRAMUSCULAR; INTRAVENOUS; SUBCUTANEOUS ONCE
Status: COMPLETED | OUTPATIENT
Start: 2023-04-07 | End: 2023-04-07

## 2023-04-07 RX ORDER — DIPHENOXYLATE HYDROCHLORIDE AND ATROPINE SULFATE 2.5; .025 MG/1; MG/1
2 TABLET ORAL
Status: DISCONTINUED | OUTPATIENT
Start: 2023-04-08 | End: 2023-04-09 | Stop reason: HOSPADM

## 2023-04-07 RX ADMIN — HYDROMORPHONE HYDROCHLORIDE 2 MG: 2 TABLET ORAL at 04:42

## 2023-04-07 RX ADMIN — METOPROLOL TARTRATE 12.5 MG: 25 TABLET, FILM COATED ORAL at 09:34

## 2023-04-07 RX ADMIN — HYDROMORPHONE HYDROCHLORIDE 2 MG: 2 TABLET ORAL at 09:34

## 2023-04-07 RX ADMIN — IOPAMIDOL 100 ML: 612 INJECTION, SOLUTION INTRAVENOUS at 17:55

## 2023-04-07 RX ADMIN — GABAPENTIN 300 MG: 300 CAPSULE ORAL at 00:18

## 2023-04-07 RX ADMIN — MEMANTINE 5 MG: 10 TABLET ORAL at 00:48

## 2023-04-07 RX ADMIN — GABAPENTIN 300 MG: 300 CAPSULE ORAL at 21:39

## 2023-04-07 RX ADMIN — HYDROMORPHONE HYDROCHLORIDE 2 MG: 2 TABLET ORAL at 18:27

## 2023-04-07 RX ADMIN — HYDROMORPHONE HYDROCHLORIDE 2 MG: 2 TABLET ORAL at 14:19

## 2023-04-07 RX ADMIN — GABAPENTIN 300 MG: 300 CAPSULE ORAL at 09:34

## 2023-04-07 RX ADMIN — GABAPENTIN 300 MG: 300 CAPSULE ORAL at 16:32

## 2023-04-07 RX ADMIN — MEMANTINE 5 MG: 10 TABLET ORAL at 21:40

## 2023-04-07 RX ADMIN — SODIUM CHLORIDE, PRESERVATIVE FREE 10 ML: 5 INJECTION INTRAVENOUS at 06:00

## 2023-04-07 RX ADMIN — HYDROMORPHONE HYDROCHLORIDE 1 MG: 1 INJECTION, SOLUTION INTRAMUSCULAR; INTRAVENOUS; SUBCUTANEOUS at 16:32

## 2023-04-07 RX ADMIN — HYDROMORPHONE HYDROCHLORIDE 4 MG: 2 TABLET ORAL at 22:52

## 2023-04-07 RX ADMIN — PIPERACILLIN AND TAZOBACTAM 3.38 G: 3; .375 INJECTION, POWDER, LYOPHILIZED, FOR SOLUTION INTRAVENOUS at 12:11

## 2023-04-07 RX ADMIN — PIPERACILLIN AND TAZOBACTAM 3.38 G: 3; .375 INJECTION, POWDER, LYOPHILIZED, FOR SOLUTION INTRAVENOUS at 21:41

## 2023-04-07 RX ADMIN — METOPROLOL TARTRATE 12.5 MG: 25 TABLET, FILM COATED ORAL at 21:39

## 2023-04-07 RX ADMIN — CHLORZOXAZONE 500 MG: 500 TABLET ORAL at 00:19

## 2023-04-08 LAB
BASOPHILS # BLD: 0 K/UL (ref 0–0.1)
BASOPHILS NFR BLD: 0 % (ref 0–1)
DIFFERENTIAL METHOD BLD: ABNORMAL
EOSINOPHIL # BLD: 0.1 K/UL (ref 0–0.4)
EOSINOPHIL NFR BLD: 1 % (ref 0–7)
ERYTHROCYTE [DISTWIDTH] IN BLOOD BY AUTOMATED COUNT: 14.6 % (ref 11.5–14.5)
HCT VFR BLD AUTO: 42.7 % (ref 36.6–50.3)
HGB BLD-MCNC: 13.6 G/DL (ref 12.1–17)
IMM GRANULOCYTES # BLD AUTO: 0 K/UL
IMM GRANULOCYTES NFR BLD AUTO: 0 %
LYMPHOCYTES # BLD: 3.3 K/UL (ref 0.8–3.5)
LYMPHOCYTES NFR BLD: 35 % (ref 12–49)
MCH RBC QN AUTO: 31.1 PG (ref 26–34)
MCHC RBC AUTO-ENTMCNC: 31.9 G/DL (ref 30–36.5)
MCV RBC AUTO: 97.5 FL (ref 80–99)
MONOCYTES # BLD: 0.5 K/UL (ref 0–1)
MONOCYTES NFR BLD: 5 % (ref 5–13)
NEUTS BAND NFR BLD MANUAL: 3 % (ref 0–6)
NEUTS SEG # BLD: 5.5 K/UL (ref 1.8–8)
NEUTS SEG NFR BLD: 56 % (ref 32–75)
NRBC # BLD: 0 K/UL (ref 0–0.01)
NRBC BLD-RTO: 0 PER 100 WBC
PLATELET # BLD AUTO: 222 K/UL (ref 150–400)
PMV BLD AUTO: 10.4 FL (ref 8.9–12.9)
RBC # BLD AUTO: 4.38 M/UL (ref 4.1–5.7)
RBC MORPH BLD: ABNORMAL
RBC MORPH BLD: ABNORMAL
WBC # BLD AUTO: 9.4 K/UL (ref 4.1–11.1)

## 2023-04-08 PROCEDURE — 74011000250 HC RX REV CODE- 250: Performed by: COLON & RECTAL SURGERY

## 2023-04-08 PROCEDURE — G0378 HOSPITAL OBSERVATION PER HR: HCPCS

## 2023-04-08 PROCEDURE — 74011000258 HC RX REV CODE- 258: Performed by: COLON & RECTAL SURGERY

## 2023-04-08 PROCEDURE — 94761 N-INVAS EAR/PLS OXIMETRY MLT: CPT

## 2023-04-08 PROCEDURE — 94760 N-INVAS EAR/PLS OXIMETRY 1: CPT

## 2023-04-08 PROCEDURE — 96376 TX/PRO/DX INJ SAME DRUG ADON: CPT

## 2023-04-08 PROCEDURE — 74011250637 HC RX REV CODE- 250/637: Performed by: COLON & RECTAL SURGERY

## 2023-04-08 PROCEDURE — 74011250636 HC RX REV CODE- 250/636: Performed by: COLON & RECTAL SURGERY

## 2023-04-08 PROCEDURE — 65270000032 HC RM SEMIPRIVATE

## 2023-04-08 PROCEDURE — 36415 COLL VENOUS BLD VENIPUNCTURE: CPT

## 2023-04-08 PROCEDURE — 85025 COMPLETE CBC W/AUTO DIFF WBC: CPT

## 2023-04-08 RX ORDER — PANTOPRAZOLE SODIUM 40 MG/1
40 TABLET, DELAYED RELEASE ORAL
Status: DISCONTINUED | OUTPATIENT
Start: 2023-04-09 | End: 2023-04-09 | Stop reason: HOSPADM

## 2023-04-08 RX ADMIN — SODIUM CHLORIDE, PRESERVATIVE FREE 10 ML: 5 INJECTION INTRAVENOUS at 14:00

## 2023-04-08 RX ADMIN — HYDROMORPHONE HYDROCHLORIDE 4 MG: 2 TABLET ORAL at 08:21

## 2023-04-08 RX ADMIN — MEMANTINE 5 MG: 10 TABLET ORAL at 22:55

## 2023-04-08 RX ADMIN — HYDROMORPHONE HYDROCHLORIDE 1 MG: 1 INJECTION, SOLUTION INTRAMUSCULAR; INTRAVENOUS; SUBCUTANEOUS at 00:00

## 2023-04-08 RX ADMIN — GABAPENTIN 300 MG: 300 CAPSULE ORAL at 08:38

## 2023-04-08 RX ADMIN — GABAPENTIN 300 MG: 300 CAPSULE ORAL at 22:54

## 2023-04-08 RX ADMIN — DIPHENOXYLATE HYDROCHLORIDE AND ATROPINE SULFATE 2 TABLET: 2.5; .025 TABLET ORAL at 00:00

## 2023-04-08 RX ADMIN — METOPROLOL TARTRATE 12.5 MG: 25 TABLET, FILM COATED ORAL at 22:55

## 2023-04-08 RX ADMIN — HYDROMORPHONE HYDROCHLORIDE 1 MG: 1 INJECTION, SOLUTION INTRAMUSCULAR; INTRAVENOUS; SUBCUTANEOUS at 04:18

## 2023-04-08 RX ADMIN — LOPERAMIDE HYDROCHLORIDE 2 MG: 2 CAPSULE ORAL at 07:16

## 2023-04-08 RX ADMIN — HYDROMORPHONE HYDROCHLORIDE 4 MG: 2 TABLET ORAL at 23:25

## 2023-04-08 RX ADMIN — PIPERACILLIN AND TAZOBACTAM 3.38 G: 3; .375 INJECTION, POWDER, LYOPHILIZED, FOR SOLUTION INTRAVENOUS at 12:10

## 2023-04-08 RX ADMIN — CHLORZOXAZONE 500 MG: 500 TABLET ORAL at 23:25

## 2023-04-08 RX ADMIN — DIPHENOXYLATE HYDROCHLORIDE AND ATROPINE SULFATE 2 TABLET: 2.5; .025 TABLET ORAL at 12:21

## 2023-04-08 RX ADMIN — LOPERAMIDE HYDROCHLORIDE 2 MG: 2 CAPSULE ORAL at 12:22

## 2023-04-08 RX ADMIN — LOPERAMIDE HYDROCHLORIDE 2 MG: 2 CAPSULE ORAL at 00:00

## 2023-04-08 RX ADMIN — HYDROMORPHONE HYDROCHLORIDE 4 MG: 2 TABLET ORAL at 16:55

## 2023-04-08 RX ADMIN — DIPHENOXYLATE HYDROCHLORIDE AND ATROPINE SULFATE 2 TABLET: 2.5; .025 TABLET ORAL at 07:16

## 2023-04-08 RX ADMIN — PIPERACILLIN AND TAZOBACTAM 3.38 G: 3; .375 INJECTION, POWDER, LYOPHILIZED, FOR SOLUTION INTRAVENOUS at 04:18

## 2023-04-08 RX ADMIN — PIPERACILLIN AND TAZOBACTAM 3.38 G: 3; .375 INJECTION, POWDER, LYOPHILIZED, FOR SOLUTION INTRAVENOUS at 20:08

## 2023-04-08 RX ADMIN — HYDROMORPHONE HYDROCHLORIDE 1 MG: 1 INJECTION, SOLUTION INTRAMUSCULAR; INTRAVENOUS; SUBCUTANEOUS at 01:58

## 2023-04-08 RX ADMIN — METOPROLOL TARTRATE 12.5 MG: 25 TABLET, FILM COATED ORAL at 08:38

## 2023-04-08 RX ADMIN — GABAPENTIN 300 MG: 300 CAPSULE ORAL at 16:50

## 2023-04-08 RX ADMIN — HYDROMORPHONE HYDROCHLORIDE 4 MG: 2 TABLET ORAL at 12:22

## 2023-04-08 RX ADMIN — HYDROMORPHONE HYDROCHLORIDE 1 MG: 1 INJECTION, SOLUTION INTRAMUSCULAR; INTRAVENOUS; SUBCUTANEOUS at 06:22

## 2023-04-09 VITALS
HEART RATE: 65 BPM | DIASTOLIC BLOOD PRESSURE: 66 MMHG | SYSTOLIC BLOOD PRESSURE: 132 MMHG | OXYGEN SATURATION: 98 % | WEIGHT: 181.22 LBS | HEIGHT: 70 IN | TEMPERATURE: 97.5 F | BODY MASS INDEX: 25.94 KG/M2 | RESPIRATION RATE: 18 BRPM

## 2023-04-09 LAB
BASOPHILS # BLD: 0.1 K/UL (ref 0–0.1)
BASOPHILS NFR BLD: 1 % (ref 0–1)
DIFFERENTIAL METHOD BLD: ABNORMAL
EOSINOPHIL # BLD: 0.3 K/UL (ref 0–0.4)
EOSINOPHIL NFR BLD: 3 % (ref 0–7)
ERYTHROCYTE [DISTWIDTH] IN BLOOD BY AUTOMATED COUNT: 15.1 % (ref 11.5–14.5)
HCT VFR BLD AUTO: 49.2 % (ref 36.6–50.3)
HGB BLD-MCNC: 14.5 G/DL (ref 12.1–17)
IMM GRANULOCYTES # BLD AUTO: 0 K/UL
IMM GRANULOCYTES NFR BLD AUTO: 0 %
LYMPHOCYTES # BLD: 3.2 K/UL (ref 0.8–3.5)
LYMPHOCYTES NFR BLD: 39 % (ref 12–49)
MCH RBC QN AUTO: 31 PG (ref 26–34)
MCHC RBC AUTO-ENTMCNC: 29.5 G/DL (ref 30–36.5)
MCV RBC AUTO: 105.1 FL (ref 80–99)
MONOCYTES # BLD: 0.9 K/UL (ref 0–1)
MONOCYTES NFR BLD: 11 % (ref 5–13)
NEUTS BAND NFR BLD MANUAL: 1 % (ref 0–6)
NEUTS SEG # BLD: 3.8 K/UL (ref 1.8–8)
NEUTS SEG NFR BLD: 45 % (ref 32–75)
NRBC # BLD: 0 K/UL (ref 0–0.01)
NRBC BLD-RTO: 0 PER 100 WBC
PLATELET # BLD AUTO: 213 K/UL (ref 150–400)
PMV BLD AUTO: 10.6 FL (ref 8.9–12.9)
RBC # BLD AUTO: 4.68 M/UL (ref 4.1–5.7)
RBC MORPH BLD: ABNORMAL
RBC MORPH BLD: ABNORMAL
WBC # BLD AUTO: 8.3 K/UL (ref 4.1–11.1)

## 2023-04-09 PROCEDURE — 74011250636 HC RX REV CODE- 250/636: Performed by: COLON & RECTAL SURGERY

## 2023-04-09 PROCEDURE — 74011250637 HC RX REV CODE- 250/637: Performed by: INTERNAL MEDICINE

## 2023-04-09 PROCEDURE — 85025 COMPLETE CBC W/AUTO DIFF WBC: CPT

## 2023-04-09 PROCEDURE — 36415 COLL VENOUS BLD VENIPUNCTURE: CPT

## 2023-04-09 PROCEDURE — 74011000258 HC RX REV CODE- 258: Performed by: COLON & RECTAL SURGERY

## 2023-04-09 PROCEDURE — 74011250637 HC RX REV CODE- 250/637: Performed by: COLON & RECTAL SURGERY

## 2023-04-09 RX ORDER — HYDROMORPHONE HYDROCHLORIDE 2 MG/1
2-4 TABLET ORAL
Qty: 20 TABLET | Refills: 0 | Status: SHIPPED | OUTPATIENT
Start: 2023-04-09 | End: 2023-04-09 | Stop reason: SDUPTHER

## 2023-04-09 RX ORDER — PANTOPRAZOLE SODIUM 40 MG/1
40 TABLET, DELAYED RELEASE ORAL
Qty: 30 TABLET | Refills: 0 | Status: SHIPPED | OUTPATIENT
Start: 2023-04-10 | End: 2023-04-09 | Stop reason: SDUPTHER

## 2023-04-09 RX ORDER — IBUPROFEN 200 MG
1 TABLET ORAL DAILY
Qty: 30 PATCH | Refills: 0 | Status: SHIPPED | OUTPATIENT
Start: 2023-04-09 | End: 2023-04-09 | Stop reason: SDUPTHER

## 2023-04-09 RX ORDER — IBUPROFEN 200 MG
1 TABLET ORAL DAILY
Qty: 30 PATCH | Refills: 0 | Status: SHIPPED | OUTPATIENT
Start: 2023-04-09 | End: 2023-05-09

## 2023-04-09 RX ORDER — PANTOPRAZOLE SODIUM 40 MG/1
40 TABLET, DELAYED RELEASE ORAL
Qty: 30 TABLET | Refills: 0 | Status: SHIPPED | OUTPATIENT
Start: 2023-04-10

## 2023-04-09 RX ORDER — HYDROMORPHONE HYDROCHLORIDE 1 MG/ML
1 INJECTION, SOLUTION INTRAMUSCULAR; INTRAVENOUS; SUBCUTANEOUS
Status: DISCONTINUED | OUTPATIENT
Start: 2023-04-09 | End: 2023-04-09 | Stop reason: HOSPADM

## 2023-04-09 RX ORDER — HYDROMORPHONE HYDROCHLORIDE 2 MG/1
2-4 TABLET ORAL
Qty: 20 TABLET | Refills: 0 | Status: SHIPPED | OUTPATIENT
Start: 2023-04-09 | End: 2023-04-12

## 2023-04-09 RX ADMIN — HYDROMORPHONE HYDROCHLORIDE 4 MG: 2 TABLET ORAL at 03:37

## 2023-04-09 RX ADMIN — HYDROMORPHONE HYDROCHLORIDE 4 MG: 2 TABLET ORAL at 08:42

## 2023-04-09 RX ADMIN — PIPERACILLIN AND TAZOBACTAM 3.38 G: 3; .375 INJECTION, POWDER, LYOPHILIZED, FOR SOLUTION INTRAVENOUS at 03:38

## 2023-04-09 RX ADMIN — PANTOPRAZOLE SODIUM 40 MG: 40 TABLET, DELAYED RELEASE ORAL at 08:42

## 2023-04-09 RX ADMIN — DIPHENOXYLATE HYDROCHLORIDE AND ATROPINE SULFATE 2 TABLET: 2.5; .025 TABLET ORAL at 08:48

## 2023-04-09 RX ADMIN — HYDROMORPHONE HYDROCHLORIDE 1 MG: 1 INJECTION, SOLUTION INTRAMUSCULAR; INTRAVENOUS; SUBCUTANEOUS at 01:49

## 2023-04-09 RX ADMIN — GABAPENTIN 300 MG: 300 CAPSULE ORAL at 11:28

## 2023-04-09 RX ADMIN — METOPROLOL TARTRATE 12.5 MG: 25 TABLET, FILM COATED ORAL at 11:36

## 2023-04-09 RX ADMIN — LOPERAMIDE HYDROCHLORIDE 2 MG: 2 CAPSULE ORAL at 08:42

## 2023-04-09 RX ADMIN — HYDROMORPHONE HYDROCHLORIDE 1 MG: 1 INJECTION, SOLUTION INTRAMUSCULAR; INTRAVENOUS; SUBCUTANEOUS at 05:21

## 2023-04-23 DIAGNOSIS — J85.0 NECROTIZING PNEUMONIA (HCC): Primary | ICD-10-CM

## 2024-01-01 NOTE — ED PROVIDER NOTES
63 y.o. male with past medical history significant for Crohn's disease, arthritis, migraines, abdominal wall hernia, generalized chronic pain, anemia, HTN, COPD, melanoma, GERD, esophageal ulcer, anal fissure, and short bowel syndrome who presents from home with chief complaint of abdominal pain. Pt reports he was getting out of bed yesterday morning when \"something popped like something tore\" w/ immediate 10/10 LLQ abdominal pain which has been constant since then. He states he awakened this morning w/ \"a boil\" in his LLQ abdomen draining what appeared and smelled to be stool. Pt also c/o subjective fever, chills, decreased appetite, and a new rash above his rectum. Pt reports he has extensive prior hx of abdominal surgeries; his GI doctor is Dr. Hailey Chan. There are no other acute medical concerns at this time. PCP: Ti Tenorio MD    Note written by Elvis Duckworth, as dictated by Jeff Gonzales MD 5:06 PM           Past Medical History:   Diagnosis Date    Abdominal wall hernia 6/15/2012    Anal fissure     Anemia     Anemia     Anxiety and depression     Arthritis     Related to the Crohn's disease.  Cancer ([facility] [address]     MELANOMA HEAD AND FACE    Chronic pain     GENERALIZED    COPD (chronic obstructive pulmonary disease) (HCC)     Crohn disease (HCC)     Diagnosed at age 17.  Echocardiogram normal (EF: 55-60%) 07/2015    Esophageal ulcer     GERD (gastroesophageal reflux disease)     H/O blood transfusion 2013    [address] [facility]    Hypertension     denies    Migraine     Short bowel syndrome     Ventral hernia without obstruction or gangrene        Past Surgical History:   Procedure Laterality Date    ABDOMEN SURGERY PROC UNLISTED      33 abdominal operations for treatment of Crohn's disease and its complications.     HC NDL CEJA      ceja needle, takes 1 inch needle    HX APPENDECTOMY      HX CHOLECYSTECTOMY      HX GI      colectomy x2, one ileostomy    HX GI  7/28/14    exp lap,partial colectomy with end ileostomy by Dr Elly Villegas      neck fusion    HX ORTHOPAEDIC  1980s    wrist right,     HX ORTHOPAEDIC  2006, 11/2013    neck, L4 L5 L6    HX ORTHOPAEDIC  1990s    right knee scope    HX OTHER SURGICAL      surgical repair from spider bite    HX OTHER SURGICAL  12/1/14    Incision and drainage of posterior right subcutaneous shoulder abscess    HX OTHER SURGICAL  2014    LEFT INDEX FINGER SPIDER BITE    HX OTHER SURGICAL  2014    RECTAL FISTULA    HX OTHER SURGICAL  3/17/2015    Ileostomy takedown with extensive lysis of adhesions (greater than three hours), mobilization of the splenic flexure, left colon resection, ileocolic anastomosis, and excision of scar; Dr. Mike Avilez.  HX OTHER SURGICAL  3/22/2015    Exploratory laparotomy with washout of abdomen, suture repair of small bowel/anastomosis, and diverting protective loop ileostomy; Olayinka Perez MD.    HX OTHER SURGICAL  4/9/2015    Laparotomy, extensive lysis of adhesions, abdominal lavage, and resection of ileocolic anastomosis (including the efferent limb of the loop ileostomy) with creation of Demetrius's pouch; Dr. Mike Avilez.  HX OTHER SURGICAL  7/14/2015    Cystoscopy and placement of bilateral ureteral catheters; Cheryl Stark MD.    HX OTHER SURGICAL  7/14/2015    Ileostomy takedown with extensive lysis of adhesions, small bowel resection, and enterocolostomy; Dr. Mike Avilez.  HX OTHER SURGICAL  9/29/2015    CT-guided percutaneous drainage of intraabdominal abscess; Dr. Aquilino Gates.  HX OTHER SURGICAL  11/16/2015    CT-guided percutaneous aspiration of abdominal wall cavity; Dr. Elias Sabillon.  HX OTHER SURGICAL  12/1/2015    Incision and drainage of abdominal wall abscess; Dr. Mike Avilez.   OTHER SURGICAL  2/11/2016    Incision and drainage of abdominal wall abscess; Dr. Mike Avilez.      OTHER SURGICAL  2/23/2016    Cystoscopy and placement of bilateral temporary ureteral catheters; Dr. Alex Kuhn.  HX OTHER SURGICAL  2/23/2016    Exploratory laparotomy, extensive lysis of adhesions, removal of mesh, and repair of enterocutaneous fistula; Dr. Jada Glover.  HX OTHER SURGICAL  11/03/2017    Exploratory laparotomy, extensive lysis of adhesions, and reduction of intussusception; Dr. Jada Glover.          Family History:   Problem Relation Age of Onset    Stroke Mother     Heart Disease Mother     Kidney Disease Mother     Anesth Problems Mother         TAKES A LONG TIME TO WAKE UP    Heart Disease Father     Thyroid Disease Sister        Social History     Socioeconomic History    Marital status: LEGALLY      Spouse name: Not on file    Number of children: Not on file    Years of education: Not on file    Highest education level: Not on file   Occupational History    Not on file   Social Needs    Financial resource strain: Not on file    Food insecurity:     Worry: Not on file     Inability: Not on file    Transportation needs:     Medical: Not on file     Non-medical: Not on file   Tobacco Use    Smoking status: Current Every Day Smoker     Packs/day: 1.00     Years: 44.00     Pack years: 44.00    Smokeless tobacco: Current User    Tobacco comment: CURRENT E CIG[employer]   Substance and Sexual Activity    Alcohol use: No     Comment: RARELY    Drug use: No    Sexual activity: Not on file   Lifestyle    Physical activity:     Days per week: Not on file     Minutes per session: Not on file    Stress: Not on file   Relationships    Social connections:     Talks on phone: Not on file     Gets together: Not on file     Attends Church service: Not on file     Active member of club or organization: Not on file     Attends meetings of clubs or organizations: Not on file     Relationship status: Not on file    Intimate partner violence:     Fear of current or ex partner: Not on file     Emotionally abused: Not on file Physically abused: Not on file     Forced sexual activity: Not on file   Other Topics Concern    Not on file   Social History Narrative    Not on file         ALLERGIES: Honey; Remicade [infliximab]; Crestor [rosuvastatin]; Other plant, animal, environmental; Imuran [azathioprine]; Mercaptopurine; and Sulfa (sulfonamide antibiotics)    Review of Systems   Constitutional: Positive for appetite change, chills and fever. HENT: Negative for congestion, rhinorrhea, sinus pressure, sore throat, trouble swallowing and voice change. Eyes: Negative for photophobia and visual disturbance. Respiratory: Negative for cough, chest tightness and shortness of breath. Cardiovascular: Negative for chest pain, palpitations and leg swelling. Gastrointestinal: Positive for abdominal pain. Negative for blood in stool, constipation, diarrhea, nausea and vomiting. Musculoskeletal: Negative for arthralgias, myalgias and neck pain. Skin: Positive for wound. Neurological: Negative for dizziness, weakness, light-headedness, numbness and headaches. All other systems reviewed and are negative. Vitals:    06/29/19 1700 06/29/19 1730 06/29/19 1800 06/29/19 1830   BP: 131/75 135/74 134/76 134/75   Pulse:       Resp:       Temp:       SpO2: 96% 96% 97% 100%   Weight:       Height:                Physical Exam   Constitutional: He is oriented to person, place, and time. He appears well-developed and well-nourished. No distress. HENT:   Head: Normocephalic and atraumatic. Nose: Nose normal.   Mouth/Throat: Oropharynx is clear and moist. No oropharyngeal exudate. Eyes: Conjunctivae and EOM are normal. Right eye exhibits no discharge. Left eye exhibits no discharge. No scleral icterus. Neck: Normal range of motion. Neck supple. No JVD present. No tracheal deviation present. No thyromegaly present. Cardiovascular: Normal rate, regular rhythm, normal heart sounds and intact distal pulses.  Exam reveals no gallop and no friction rub. No murmur heard. Pulmonary/Chest: Effort normal and breath sounds normal. No stridor. No respiratory distress. He has no wheezes. He has no rales. He exhibits no tenderness. Abdominal: He exhibits no distension and no mass. There is tenderness. There is no rebound. Multiple surgical scars over abdomen. (+) area of extreme tenderness over LLQ, appears open, draining. Musculoskeletal: Normal range of motion. He exhibits no edema or tenderness. Lymphadenopathy:     He has no cervical adenopathy. Neurological: He is alert and oriented to person, place, and time. No cranial nerve deficit. Coordination normal.   Skin: Skin is warm and dry. No rash noted. He is not diaphoretic. No erythema. No pallor. Psychiatric: He has a normal mood and affect. His behavior is normal. Judgment and thought content normal.   Nursing note and vitals reviewed. Note written by Elvis Corey, as dictated by Sheila Feng MD 5:06 PM    MDM  Number of Diagnoses or Management Options  Abdominal pain, generalized: established and worsening  Crohn's disease of colon with other complication [facility]): established and worsening     Amount and/or Complexity of Data Reviewed  Clinical lab tests: ordered and reviewed  Tests in the radiology section of CPT®: reviewed and ordered  Review and summarize past medical records: yes  Discuss the patient with other providers: yes  Independent visualization of images, tracings, or specimens: yes    Risk of Complications, Morbidity, and/or Mortality  Presenting problems: moderate  Diagnostic procedures: moderate  Management options: moderate    Patient Progress  Patient progress: stable         Procedures      CONSULT NOTE:  9:18 PM Sheila Feng MD spoke with Dr. Pina Jackson, Consult for Colorectal Surgery. Discussed available diagnostic tests and clinical findings.   Dr. Pina Jackson states Pt is non-surgical at this time, he prefers that GI gets involved in his care, and recommends admission to hospitalist service. CONSULT NOTE:  10:11 PM Francesca Knowles MD communicated with Dr. Lio Vaughn for Hospitalist via [facility] Text. Discussed available diagnostic tests and clinical findings. Dr. Mayers Ours will evaluate Pt for admission. Statement Selected

## 2024-08-05 ENCOUNTER — HOSPITAL ENCOUNTER (OUTPATIENT)
Facility: HOSPITAL | Age: 64
Discharge: HOME | End: 2024-08-08
Attending: COLON & RECTAL SURGERY
Payer: MEDICARE

## 2024-08-05 DIAGNOSIS — K50.914 CROHN'S DISEASE WITH ABSCESS, UNSPECIFIED GASTROINTESTINAL TRACT LOCATION (HCC): ICD-10-CM

## 2024-08-05 DIAGNOSIS — K61.1 PERIRECTAL ABSCESS: ICD-10-CM

## 2024-08-05 PROCEDURE — 6360000004 HC RX CONTRAST MEDICATION: Performed by: COLON & RECTAL SURGERY

## 2024-08-05 PROCEDURE — 72193 CT PELVIS W/DYE: CPT

## 2024-08-05 RX ADMIN — IOPAMIDOL 100 ML: 612 INJECTION, SOLUTION INTRAVENOUS at 17:11

## 2024-09-06 ENCOUNTER — HOSPITAL ENCOUNTER (INPATIENT)
Facility: HOSPITAL | Age: 64
LOS: 4 days | Discharge: HOME HEALTH | DRG: 348 | End: 2024-09-10
Attending: EMERGENCY MEDICINE | Admitting: INTERNAL MEDICINE
Payer: MEDICARE

## 2024-09-06 ENCOUNTER — APPOINTMENT (OUTPATIENT)
Facility: HOSPITAL | Age: 64
DRG: 348 | End: 2024-09-06
Payer: MEDICARE

## 2024-09-06 DIAGNOSIS — K50.118 CROHN'S DISEASE OF COLON WITH OTHER COMPLICATION (HCC): Primary | ICD-10-CM

## 2024-09-06 DIAGNOSIS — R19.7 DIARRHEA, UNSPECIFIED TYPE: ICD-10-CM

## 2024-09-06 PROBLEM — K50.10 EXACERBATION OF CROHN'S DISEASE OF LARGE INTESTINE (HCC): Status: ACTIVE | Noted: 2024-09-06

## 2024-09-06 LAB
ALBUMIN SERPL-MCNC: 4 G/DL (ref 3.5–5)
ALBUMIN/GLOB SERPL: 0.9 (ref 1.1–2.2)
ALP SERPL-CCNC: 140 U/L (ref 45–117)
ALT SERPL-CCNC: 26 U/L (ref 12–78)
ANION GAP SERPL CALC-SCNC: 7 MMOL/L (ref 2–12)
AST SERPL-CCNC: 18 U/L (ref 15–37)
BILIRUB SERPL-MCNC: 0.5 MG/DL (ref 0.2–1)
BUN SERPL-MCNC: 8 MG/DL (ref 6–20)
BUN/CREAT SERPL: 5 (ref 12–20)
CALCIUM SERPL-MCNC: 10.1 MG/DL (ref 8.5–10.1)
CHLORIDE SERPL-SCNC: 102 MMOL/L (ref 97–108)
CO2 SERPL-SCNC: 27 MMOL/L (ref 21–32)
COMMENT:: NORMAL
CREAT SERPL-MCNC: 1.75 MG/DL (ref 0.7–1.3)
ERYTHROCYTE [DISTWIDTH] IN BLOOD BY AUTOMATED COUNT: 15.7 % (ref 11.5–14.5)
GLOBULIN SER CALC-MCNC: 4.7 G/DL (ref 2–4)
GLUCOSE SERPL-MCNC: 102 MG/DL (ref 65–100)
HCT VFR BLD AUTO: 51.3 % (ref 36.6–50.3)
HGB BLD-MCNC: 17.5 G/DL (ref 12.1–17)
MCH RBC QN AUTO: 32.7 PG (ref 26–34)
MCHC RBC AUTO-ENTMCNC: 34.1 G/DL (ref 30–36.5)
MCV RBC AUTO: 95.9 FL (ref 80–99)
NRBC # BLD: 0 K/UL (ref 0–0.01)
NRBC BLD-RTO: 0 PER 100 WBC
PLATELET # BLD AUTO: 305 K/UL (ref 150–400)
PMV BLD AUTO: 9.9 FL (ref 8.9–12.9)
POTASSIUM SERPL-SCNC: 3.4 MMOL/L (ref 3.5–5.1)
PROT SERPL-MCNC: 8.7 G/DL (ref 6.4–8.2)
RBC # BLD AUTO: 5.35 M/UL (ref 4.1–5.7)
SODIUM SERPL-SCNC: 136 MMOL/L (ref 136–145)
SPECIMEN HOLD: NORMAL
WBC # BLD AUTO: 12.1 K/UL (ref 4.1–11.1)

## 2024-09-06 PROCEDURE — 99285 EMERGENCY DEPT VISIT HI MDM: CPT

## 2024-09-06 PROCEDURE — 1100000000 HC RM PRIVATE

## 2024-09-06 PROCEDURE — 6360000002 HC RX W HCPCS: Performed by: EMERGENCY MEDICINE

## 2024-09-06 PROCEDURE — 2580000003 HC RX 258: Performed by: EMERGENCY MEDICINE

## 2024-09-06 PROCEDURE — 85027 COMPLETE CBC AUTOMATED: CPT

## 2024-09-06 PROCEDURE — 80053 COMPREHEN METABOLIC PANEL: CPT

## 2024-09-06 PROCEDURE — 6370000000 HC RX 637 (ALT 250 FOR IP): Performed by: EMERGENCY MEDICINE

## 2024-09-06 PROCEDURE — 6360000002 HC RX W HCPCS: Performed by: INTERNAL MEDICINE

## 2024-09-06 PROCEDURE — 6360000004 HC RX CONTRAST MEDICATION: Performed by: RADIOLOGY

## 2024-09-06 PROCEDURE — 96375 TX/PRO/DX INJ NEW DRUG ADDON: CPT

## 2024-09-06 PROCEDURE — 36415 COLL VENOUS BLD VENIPUNCTURE: CPT

## 2024-09-06 PROCEDURE — 96374 THER/PROPH/DIAG INJ IV PUSH: CPT

## 2024-09-06 PROCEDURE — 74177 CT ABD & PELVIS W/CONTRAST: CPT

## 2024-09-06 RX ORDER — SODIUM CHLORIDE, SODIUM LACTATE, POTASSIUM CHLORIDE, AND CALCIUM CHLORIDE .6; .31; .03; .02 G/100ML; G/100ML; G/100ML; G/100ML
1000 INJECTION, SOLUTION INTRAVENOUS ONCE
Status: COMPLETED | OUTPATIENT
Start: 2024-09-06 | End: 2024-09-06

## 2024-09-06 RX ORDER — LIDOCAINE 40 MG/G
CREAM TOPICAL
Status: DISCONTINUED | OUTPATIENT
Start: 2024-09-06 | End: 2024-09-08 | Stop reason: SDUPTHER

## 2024-09-06 RX ORDER — MORPHINE SULFATE 4 MG/ML
4 INJECTION, SOLUTION INTRAMUSCULAR; INTRAVENOUS
Status: COMPLETED | OUTPATIENT
Start: 2024-09-06 | End: 2024-09-06

## 2024-09-06 RX ORDER — OXYCODONE HYDROCHLORIDE 5 MG/1
5 TABLET ORAL EVERY 4 HOURS PRN
Status: DISCONTINUED | OUTPATIENT
Start: 2024-09-06 | End: 2024-09-10 | Stop reason: HOSPADM

## 2024-09-06 RX ORDER — IOPAMIDOL 755 MG/ML
100 INJECTION, SOLUTION INTRAVASCULAR
Status: COMPLETED | OUTPATIENT
Start: 2024-09-06 | End: 2024-09-06

## 2024-09-06 RX ADMIN — MORPHINE SULFATE 4 MG: 4 INJECTION INTRAVENOUS at 20:48

## 2024-09-06 RX ADMIN — PIPERACILLIN AND TAZOBACTAM 4500 MG: 4; .5 INJECTION, POWDER, LYOPHILIZED, FOR SOLUTION INTRAVENOUS at 21:04

## 2024-09-06 RX ADMIN — SODIUM CHLORIDE, POTASSIUM CHLORIDE, SODIUM LACTATE AND CALCIUM CHLORIDE 1000 ML: 600; 310; 30; 20 INJECTION, SOLUTION INTRAVENOUS at 21:55

## 2024-09-06 RX ADMIN — HYDROMORPHONE HYDROCHLORIDE 1 MG: 1 INJECTION, SOLUTION INTRAMUSCULAR; INTRAVENOUS; SUBCUTANEOUS at 23:10

## 2024-09-06 RX ADMIN — IOPAMIDOL 100 ML: 755 INJECTION, SOLUTION INTRAVENOUS at 18:05

## 2024-09-06 RX ADMIN — LIDOCAINE 4%: 4 CREAM TOPICAL at 22:43

## 2024-09-06 RX ADMIN — WATER 60 MG: 1 INJECTION INTRAMUSCULAR; INTRAVENOUS; SUBCUTANEOUS at 20:54

## 2024-09-06 ASSESSMENT — PAIN SCALES - GENERAL
PAINLEVEL_OUTOF10: 8
PAINLEVEL_OUTOF10: 10
PAINLEVEL_OUTOF10: 7
PAINLEVEL_OUTOF10: 9
PAINLEVEL_OUTOF10: 9

## 2024-09-06 ASSESSMENT — PAIN - FUNCTIONAL ASSESSMENT
PAIN_FUNCTIONAL_ASSESSMENT: ACTIVITIES ARE NOT PREVENTED
PAIN_FUNCTIONAL_ASSESSMENT: 0-10
PAIN_FUNCTIONAL_ASSESSMENT: ACTIVITIES ARE NOT PREVENTED

## 2024-09-06 ASSESSMENT — PAIN DESCRIPTION - DESCRIPTORS
DESCRIPTORS: SHARP

## 2024-09-06 ASSESSMENT — PAIN DESCRIPTION - LOCATION
LOCATION: RECTUM

## 2024-09-07 ENCOUNTER — APPOINTMENT (OUTPATIENT)
Facility: HOSPITAL | Age: 64
DRG: 348 | End: 2024-09-07
Payer: MEDICARE

## 2024-09-07 LAB
ALBUMIN SERPL-MCNC: 3.7 G/DL (ref 3.5–5)
ALBUMIN/GLOB SERPL: 0.9 (ref 1.1–2.2)
ALP SERPL-CCNC: 126 U/L (ref 45–117)
ALT SERPL-CCNC: 26 U/L (ref 12–78)
AMPHET UR QL SCN: NEGATIVE
ANION GAP SERPL CALC-SCNC: 6 MMOL/L (ref 2–12)
APPEARANCE UR: ABNORMAL
AST SERPL-CCNC: 23 U/L (ref 15–37)
BACTERIA URNS QL MICRO: NEGATIVE /HPF
BARBITURATES UR QL SCN: NEGATIVE
BASOPHILS # BLD: 0 K/UL (ref 0–0.1)
BASOPHILS NFR BLD: 0 % (ref 0–1)
BENZODIAZ UR QL: NEGATIVE
BILIRUB SERPL-MCNC: 0.7 MG/DL (ref 0.2–1)
BILIRUB UR QL: NEGATIVE
BUN SERPL-MCNC: 11 MG/DL (ref 6–20)
BUN/CREAT SERPL: 6 (ref 12–20)
C COLI+JEJUNI TUF STL QL NAA+PROBE: NEGATIVE
CALCIUM SERPL-MCNC: 9.4 MG/DL (ref 8.5–10.1)
CANNABINOIDS UR QL SCN: NEGATIVE
CHLORIDE SERPL-SCNC: 101 MMOL/L (ref 97–108)
CO2 SERPL-SCNC: 26 MMOL/L (ref 21–32)
COCAINE UR QL SCN: NEGATIVE
COLOR UR: ABNORMAL
COMMENT:: NORMAL
CREAT SERPL-MCNC: 1.86 MG/DL (ref 0.7–1.3)
CRP SERPL-MCNC: 0.56 MG/DL (ref 0–0.3)
DIFFERENTIAL METHOD BLD: ABNORMAL
EC STX1+STX2 GENES STL QL NAA+PROBE: NEGATIVE
EOSINOPHIL # BLD: 0 K/UL (ref 0–0.4)
EOSINOPHIL NFR BLD: 0 % (ref 0–7)
EPITH CASTS URNS QL MICRO: ABNORMAL /LPF
ERYTHROCYTE [DISTWIDTH] IN BLOOD BY AUTOMATED COUNT: 15.9 % (ref 11.5–14.5)
ETEC ELTA+ESTB GENES STL QL NAA+PROBE: NEGATIVE
GLOBULIN SER CALC-MCNC: 4.3 G/DL (ref 2–4)
GLUCOSE SERPL-MCNC: 128 MG/DL (ref 65–100)
GLUCOSE UR STRIP.AUTO-MCNC: NEGATIVE MG/DL
HCT VFR BLD AUTO: 48.2 % (ref 36.6–50.3)
HGB BLD-MCNC: 15.9 G/DL (ref 12.1–17)
HGB UR QL STRIP: ABNORMAL
HYALINE CASTS URNS QL MICRO: ABNORMAL /LPF (ref 0–5)
IMM GRANULOCYTES # BLD AUTO: 0.1 K/UL (ref 0–0.04)
IMM GRANULOCYTES NFR BLD AUTO: 1 % (ref 0–0.5)
KETONES UR QL STRIP.AUTO: NEGATIVE MG/DL
LEUKOCYTE ESTERASE UR QL STRIP.AUTO: NEGATIVE
LYMPHOCYTES # BLD: 1.7 K/UL (ref 0.8–3.5)
LYMPHOCYTES NFR BLD: 18 % (ref 12–49)
Lab: ABNORMAL
MAGNESIUM SERPL-MCNC: 1.8 MG/DL (ref 1.6–2.4)
MCH RBC QN AUTO: 32.4 PG (ref 26–34)
MCHC RBC AUTO-ENTMCNC: 33 G/DL (ref 30–36.5)
MCV RBC AUTO: 98.2 FL (ref 80–99)
METHADONE UR QL: NEGATIVE
MONOCYTES # BLD: 0.1 K/UL (ref 0–1)
MONOCYTES NFR BLD: 1 % (ref 5–13)
NEUTS SEG # BLD: 7.9 K/UL (ref 1.8–8)
NEUTS SEG NFR BLD: 80 % (ref 32–75)
NITRITE UR QL STRIP.AUTO: NEGATIVE
NRBC # BLD: 0 K/UL (ref 0–0.01)
NRBC BLD-RTO: 0 PER 100 WBC
OPIATES UR QL: POSITIVE
P SHIGELLOIDES DNA STL QL NAA+PROBE: NEGATIVE
PCP UR QL: NEGATIVE
PH UR STRIP: 5.5 (ref 5–8)
PHOSPHATE SERPL-MCNC: 5.6 MG/DL (ref 2.6–4.7)
PLATELET # BLD AUTO: 270 K/UL (ref 150–400)
PMV BLD AUTO: 10 FL (ref 8.9–12.9)
POTASSIUM SERPL-SCNC: 3.9 MMOL/L (ref 3.5–5.1)
PROT SERPL-MCNC: 8 G/DL (ref 6.4–8.2)
PROT UR STRIP-MCNC: 30 MG/DL
RBC # BLD AUTO: 4.91 M/UL (ref 4.1–5.7)
RBC #/AREA URNS HPF: ABNORMAL /HPF (ref 0–5)
SALMONELLA SP SPAO STL QL NAA+PROBE: NEGATIVE
SHIGELLA SP+EIEC IPAH STL QL NAA+PROBE: NEGATIVE
SODIUM SERPL-SCNC: 133 MMOL/L (ref 136–145)
SP GR UR REFRACTOMETRY: >1.03
SPECIMEN HOLD: NORMAL
TSH SERPL DL<=0.05 MIU/L-ACNC: 0.85 UIU/ML (ref 0.36–3.74)
URINE CULTURE IF INDICATED: ABNORMAL
UROBILINOGEN UR QL STRIP.AUTO: 0.2 EU/DL (ref 0.2–1)
V CHOL+PARA+VUL DNA STL QL NAA+NON-PROBE: NEGATIVE
WBC # BLD AUTO: 9.8 K/UL (ref 4.1–11.1)
WBC URNS QL MICRO: ABNORMAL /HPF (ref 0–4)
Y ENTEROCOL DNA STL QL NAA+NON-PROBE: NEGATIVE

## 2024-09-07 PROCEDURE — 36415 COLL VENOUS BLD VENIPUNCTURE: CPT

## 2024-09-07 PROCEDURE — 99223 1ST HOSP IP/OBS HIGH 75: CPT | Performed by: INTERNAL MEDICINE

## 2024-09-07 PROCEDURE — 6370000000 HC RX 637 (ALT 250 FOR IP): Performed by: NURSE PRACTITIONER

## 2024-09-07 PROCEDURE — 6370000000 HC RX 637 (ALT 250 FOR IP): Performed by: INTERNAL MEDICINE

## 2024-09-07 PROCEDURE — 84100 ASSAY OF PHOSPHORUS: CPT

## 2024-09-07 PROCEDURE — 2580000003 HC RX 258: Performed by: INTERNAL MEDICINE

## 2024-09-07 PROCEDURE — 6360000002 HC RX W HCPCS: Performed by: NURSE PRACTITIONER

## 2024-09-07 PROCEDURE — 84443 ASSAY THYROID STIM HORMONE: CPT

## 2024-09-07 PROCEDURE — 83735 ASSAY OF MAGNESIUM: CPT

## 2024-09-07 PROCEDURE — 6360000002 HC RX W HCPCS: Performed by: INTERNAL MEDICINE

## 2024-09-07 PROCEDURE — 86140 C-REACTIVE PROTEIN: CPT

## 2024-09-07 PROCEDURE — 80307 DRUG TEST PRSMV CHEM ANLYZR: CPT

## 2024-09-07 PROCEDURE — 80053 COMPREHEN METABOLIC PANEL: CPT

## 2024-09-07 PROCEDURE — 1200000000 HC SEMI PRIVATE

## 2024-09-07 PROCEDURE — 85025 COMPLETE CBC W/AUTO DIFF WBC: CPT

## 2024-09-07 PROCEDURE — 81001 URINALYSIS AUTO W/SCOPE: CPT

## 2024-09-07 PROCEDURE — 87506 IADNA-DNA/RNA PROBE TQ 6-11: CPT

## 2024-09-07 PROCEDURE — 71045 X-RAY EXAM CHEST 1 VIEW: CPT

## 2024-09-07 PROCEDURE — 83993 ASSAY FOR CALPROTECTIN FECAL: CPT

## 2024-09-07 RX ORDER — ONDANSETRON 2 MG/ML
4 INJECTION INTRAMUSCULAR; INTRAVENOUS EVERY 6 HOURS PRN
Status: DISCONTINUED | OUTPATIENT
Start: 2024-09-07 | End: 2024-09-10 | Stop reason: HOSPADM

## 2024-09-07 RX ORDER — POTASSIUM CHLORIDE 750 MG/1
40 TABLET, EXTENDED RELEASE ORAL PRN
Status: DISCONTINUED | OUTPATIENT
Start: 2024-09-07 | End: 2024-09-09

## 2024-09-07 RX ORDER — POLYETHYLENE GLYCOL 3350 17 G/17G
17 POWDER, FOR SOLUTION ORAL DAILY PRN
Status: DISCONTINUED | OUTPATIENT
Start: 2024-09-07 | End: 2024-09-09

## 2024-09-07 RX ORDER — OCTREOTIDE ACETATE 50 UG/ML
50 INJECTION, SOLUTION INTRAVENOUS; SUBCUTANEOUS EVERY 12 HOURS
Status: COMPLETED | OUTPATIENT
Start: 2024-09-07 | End: 2024-09-09

## 2024-09-07 RX ORDER — ONDANSETRON 4 MG/1
4 TABLET, ORALLY DISINTEGRATING ORAL EVERY 8 HOURS PRN
Status: DISCONTINUED | OUTPATIENT
Start: 2024-09-07 | End: 2024-09-10 | Stop reason: HOSPADM

## 2024-09-07 RX ORDER — MAGNESIUM SULFATE IN WATER 40 MG/ML
2000 INJECTION, SOLUTION INTRAVENOUS PRN
Status: DISCONTINUED | OUTPATIENT
Start: 2024-09-07 | End: 2024-09-09

## 2024-09-07 RX ORDER — NICOTINE 21 MG/24HR
1 PATCH, TRANSDERMAL 24 HOURS TRANSDERMAL DAILY
Status: DISCONTINUED | OUTPATIENT
Start: 2024-09-07 | End: 2024-09-10 | Stop reason: HOSPADM

## 2024-09-07 RX ORDER — SODIUM CHLORIDE, SODIUM LACTATE, POTASSIUM CHLORIDE, CALCIUM CHLORIDE 600; 310; 30; 20 MG/100ML; MG/100ML; MG/100ML; MG/100ML
INJECTION, SOLUTION INTRAVENOUS CONTINUOUS
Status: DISCONTINUED | OUTPATIENT
Start: 2024-09-07 | End: 2024-09-10 | Stop reason: HOSPADM

## 2024-09-07 RX ORDER — LORAZEPAM 1 MG/1
0.5 TABLET ORAL ONCE
Status: DISCONTINUED | OUTPATIENT
Start: 2024-09-07 | End: 2024-09-09

## 2024-09-07 RX ORDER — NICOTINE 21 MG/24HR
1 PATCH, TRANSDERMAL 24 HOURS TRANSDERMAL DAILY
Status: DISCONTINUED | OUTPATIENT
Start: 2024-09-07 | End: 2024-09-07

## 2024-09-07 RX ORDER — METOPROLOL TARTRATE 25 MG/1
12.5 TABLET, FILM COATED ORAL EVERY 12 HOURS
Status: DISCONTINUED | OUTPATIENT
Start: 2024-09-07 | End: 2024-09-10 | Stop reason: HOSPADM

## 2024-09-07 RX ORDER — DICYCLOMINE HCL 20 MG
20 TABLET ORAL EVERY 6 HOURS
COMMUNITY
Start: 2024-07-26

## 2024-09-07 RX ORDER — GABAPENTIN 300 MG/1
300 CAPSULE ORAL 3 TIMES DAILY
Status: DISCONTINUED | OUTPATIENT
Start: 2024-09-07 | End: 2024-09-10 | Stop reason: HOSPADM

## 2024-09-07 RX ORDER — ACETAMINOPHEN 325 MG/1
650 TABLET ORAL EVERY 6 HOURS PRN
Status: DISCONTINUED | OUTPATIENT
Start: 2024-09-07 | End: 2024-09-09

## 2024-09-07 RX ORDER — ALBUTEROL SULFATE 90 UG/1
1 AEROSOL, METERED RESPIRATORY (INHALATION) EVERY 6 HOURS PRN
COMMUNITY

## 2024-09-07 RX ORDER — POTASSIUM CHLORIDE 7.45 MG/ML
10 INJECTION INTRAVENOUS PRN
Status: DISCONTINUED | OUTPATIENT
Start: 2024-09-07 | End: 2024-09-09

## 2024-09-07 RX ORDER — ACETAMINOPHEN 650 MG/1
650 SUPPOSITORY RECTAL EVERY 6 HOURS PRN
Status: DISCONTINUED | OUTPATIENT
Start: 2024-09-07 | End: 2024-09-09

## 2024-09-07 RX ORDER — HYDROCODONE BITARTRATE AND ACETAMINOPHEN 7.5; 325 MG/1; MG/1
1 TABLET ORAL EVERY 6 HOURS PRN
COMMUNITY

## 2024-09-07 RX ORDER — ENOXAPARIN SODIUM 100 MG/ML
40 INJECTION SUBCUTANEOUS DAILY
Status: DISCONTINUED | OUTPATIENT
Start: 2024-09-07 | End: 2024-09-10 | Stop reason: HOSPADM

## 2024-09-07 RX ORDER — POTASSIUM CHLORIDE 7.45 MG/ML
10 INJECTION INTRAVENOUS
Status: DISCONTINUED | OUTPATIENT
Start: 2024-09-07 | End: 2024-09-07

## 2024-09-07 RX ORDER — SODIUM CHLORIDE 0.9 % (FLUSH) 0.9 %
5-40 SYRINGE (ML) INJECTION PRN
Status: DISCONTINUED | OUTPATIENT
Start: 2024-09-07 | End: 2024-09-10 | Stop reason: HOSPADM

## 2024-09-07 RX ORDER — SODIUM CHLORIDE 0.9 % (FLUSH) 0.9 %
5-40 SYRINGE (ML) INJECTION EVERY 12 HOURS SCHEDULED
Status: DISCONTINUED | OUTPATIENT
Start: 2024-09-07 | End: 2024-09-10 | Stop reason: HOSPADM

## 2024-09-07 RX ORDER — DIPHENOXYLATE HCL/ATROPINE 2.5-.025MG
2 TABLET ORAL 4 TIMES DAILY PRN
Status: DISCONTINUED | OUTPATIENT
Start: 2024-09-07 | End: 2024-09-10 | Stop reason: HOSPADM

## 2024-09-07 RX ORDER — MEMANTINE HYDROCHLORIDE 5 MG/1
5 TABLET ORAL DAILY
COMMUNITY

## 2024-09-07 RX ORDER — DIPHENOXYLATE HCL/ATROPINE 2.5-.025MG
1 TABLET ORAL 4 TIMES DAILY PRN
Status: DISCONTINUED | OUTPATIENT
Start: 2024-09-07 | End: 2024-09-07

## 2024-09-07 RX ORDER — PANTOPRAZOLE SODIUM 40 MG/1
40 TABLET, DELAYED RELEASE ORAL
COMMUNITY
Start: 2023-04-10

## 2024-09-07 RX ORDER — LORAZEPAM 2 MG/ML
1 INJECTION INTRAMUSCULAR ONCE
Status: COMPLETED | OUTPATIENT
Start: 2024-09-08 | End: 2024-09-08

## 2024-09-07 RX ORDER — DIPHENHYDRAMINE HCL 25 MG
25 CAPSULE ORAL 2 TIMES DAILY
Status: DISCONTINUED | OUTPATIENT
Start: 2024-09-07 | End: 2024-09-10 | Stop reason: HOSPADM

## 2024-09-07 RX ORDER — HYDROMORPHONE HYDROCHLORIDE 2 MG/ML
2 INJECTION, SOLUTION INTRAMUSCULAR; INTRAVENOUS; SUBCUTANEOUS EVERY 4 HOURS PRN
Status: DISCONTINUED | OUTPATIENT
Start: 2024-09-07 | End: 2024-09-08

## 2024-09-07 RX ORDER — SODIUM CHLORIDE 9 MG/ML
INJECTION, SOLUTION INTRAVENOUS PRN
Status: DISCONTINUED | OUTPATIENT
Start: 2024-09-07 | End: 2024-09-09

## 2024-09-07 RX ADMIN — HYDROMORPHONE HYDROCHLORIDE 1 MG: 1 INJECTION, SOLUTION INTRAMUSCULAR; INTRAVENOUS; SUBCUTANEOUS at 08:44

## 2024-09-07 RX ADMIN — METOPROLOL TARTRATE 12.5 MG: 25 TABLET, FILM COATED ORAL at 03:38

## 2024-09-07 RX ADMIN — OCTREOTIDE ACETATE 50 MCG: 50 INJECTION, SOLUTION INTRAVENOUS; SUBCUTANEOUS at 15:07

## 2024-09-07 RX ADMIN — WATER 40 MG: 1 INJECTION INTRAMUSCULAR; INTRAVENOUS; SUBCUTANEOUS at 05:10

## 2024-09-07 RX ADMIN — METOPROLOL TARTRATE 12.5 MG: 25 TABLET, FILM COATED ORAL at 15:08

## 2024-09-07 RX ADMIN — WATER 40 MG: 1 INJECTION INTRAMUSCULAR; INTRAVENOUS; SUBCUTANEOUS at 13:13

## 2024-09-07 RX ADMIN — HYDROMORPHONE HYDROCHLORIDE 2 MG: 2 INJECTION, SOLUTION INTRAMUSCULAR; INTRAVENOUS; SUBCUTANEOUS at 17:46

## 2024-09-07 RX ADMIN — SODIUM CHLORIDE, POTASSIUM CHLORIDE, SODIUM LACTATE AND CALCIUM CHLORIDE: 600; 310; 30; 20 INJECTION, SOLUTION INTRAVENOUS at 03:38

## 2024-09-07 RX ADMIN — GABAPENTIN 300 MG: 300 CAPSULE ORAL at 13:45

## 2024-09-07 RX ADMIN — ENOXAPARIN SODIUM 40 MG: 100 INJECTION SUBCUTANEOUS at 08:44

## 2024-09-07 RX ADMIN — PIPERACILLIN AND TAZOBACTAM 3375 MG: 3; .375 INJECTION, POWDER, LYOPHILIZED, FOR SOLUTION INTRAVENOUS at 13:46

## 2024-09-07 RX ADMIN — DIPHENOXYLATE HYDROCHLORIDE AND ATROPINE SULFATE 2 TABLET: 2.5; .025 TABLET ORAL at 21:45

## 2024-09-07 RX ADMIN — PIPERACILLIN AND TAZOBACTAM 3375 MG: 3; .375 INJECTION, POWDER, LYOPHILIZED, FOR SOLUTION INTRAVENOUS at 06:11

## 2024-09-07 RX ADMIN — HYDROMORPHONE HYDROCHLORIDE 2 MG: 2 INJECTION, SOLUTION INTRAMUSCULAR; INTRAVENOUS; SUBCUTANEOUS at 21:42

## 2024-09-07 RX ADMIN — HYDROMORPHONE HYDROCHLORIDE 2 MG: 2 INJECTION, SOLUTION INTRAMUSCULAR; INTRAVENOUS; SUBCUTANEOUS at 13:14

## 2024-09-07 RX ADMIN — DIPHENOXYLATE HYDROCHLORIDE AND ATROPINE SULFATE 2 TABLET: 2.5; .025 TABLET ORAL at 15:15

## 2024-09-07 RX ADMIN — SODIUM CHLORIDE, PRESERVATIVE FREE 10 ML: 5 INJECTION INTRAVENOUS at 21:19

## 2024-09-07 RX ADMIN — SODIUM CHLORIDE, POTASSIUM CHLORIDE, SODIUM LACTATE AND CALCIUM CHLORIDE: 600; 310; 30; 20 INJECTION, SOLUTION INTRAVENOUS at 15:13

## 2024-09-07 RX ADMIN — DIPHENHYDRAMINE HYDROCHLORIDE 25 MG: 25 CAPSULE ORAL at 17:45

## 2024-09-07 RX ADMIN — WATER 40 MG: 1 INJECTION INTRAMUSCULAR; INTRAVENOUS; SUBCUTANEOUS at 21:16

## 2024-09-07 RX ADMIN — GABAPENTIN 300 MG: 300 CAPSULE ORAL at 21:16

## 2024-09-07 RX ADMIN — GABAPENTIN 300 MG: 300 CAPSULE ORAL at 08:43

## 2024-09-07 RX ADMIN — LIDOCAINE 4%: 4 CREAM TOPICAL at 13:20

## 2024-09-07 RX ADMIN — PIPERACILLIN AND TAZOBACTAM 3375 MG: 3; .375 INJECTION, POWDER, LYOPHILIZED, FOR SOLUTION INTRAVENOUS at 21:20

## 2024-09-07 RX ADMIN — HYDROMORPHONE HYDROCHLORIDE 1 MG: 1 INJECTION, SOLUTION INTRAMUSCULAR; INTRAVENOUS; SUBCUTANEOUS at 03:38

## 2024-09-07 ASSESSMENT — PAIN DESCRIPTION - DESCRIPTORS
DESCRIPTORS: ACHING

## 2024-09-07 ASSESSMENT — PAIN DESCRIPTION - ORIENTATION
ORIENTATION: ANTERIOR;POSTERIOR
ORIENTATION: ANTERIOR
ORIENTATION: ANTERIOR

## 2024-09-07 ASSESSMENT — PAIN DESCRIPTION - LOCATION
LOCATION: ABDOMEN;RECTUM
LOCATION: ABDOMEN
LOCATION: ABDOMEN
LOCATION: ABDOMEN;RECTUM

## 2024-09-07 ASSESSMENT — PAIN SCALES - GENERAL
PAINLEVEL_OUTOF10: 9
PAINLEVEL_OUTOF10: 8
PAINLEVEL_OUTOF10: 9
PAINLEVEL_OUTOF10: 8

## 2024-09-07 ASSESSMENT — PAIN - FUNCTIONAL ASSESSMENT: PAIN_FUNCTIONAL_ASSESSMENT: ACTIVITIES ARE NOT PREVENTED

## 2024-09-08 ENCOUNTER — APPOINTMENT (OUTPATIENT)
Facility: HOSPITAL | Age: 64
DRG: 348 | End: 2024-09-08
Payer: MEDICARE

## 2024-09-08 LAB
ALBUMIN SERPL-MCNC: 3.3 G/DL (ref 3.5–5)
ALBUMIN/GLOB SERPL: 1 (ref 1.1–2.2)
ALP SERPL-CCNC: 93 U/L (ref 45–117)
ALT SERPL-CCNC: 25 U/L (ref 12–78)
ANION GAP SERPL CALC-SCNC: 6 MMOL/L (ref 2–12)
AST SERPL-CCNC: 55 U/L (ref 15–37)
BASOPHILS # BLD: 0 K/UL (ref 0–0.1)
BASOPHILS NFR BLD: 0 % (ref 0–1)
BILIRUB SERPL-MCNC: 0.6 MG/DL (ref 0.2–1)
BUN SERPL-MCNC: 14 MG/DL (ref 6–20)
BUN/CREAT SERPL: 11 (ref 12–20)
CALCIUM SERPL-MCNC: 8.6 MG/DL (ref 8.5–10.1)
CHLORIDE SERPL-SCNC: 101 MMOL/L (ref 97–108)
CO2 SERPL-SCNC: 26 MMOL/L (ref 21–32)
CREAT SERPL-MCNC: 1.33 MG/DL (ref 0.7–1.3)
DIFFERENTIAL METHOD BLD: ABNORMAL
EOSINOPHIL # BLD: 0 K/UL (ref 0–0.4)
EOSINOPHIL NFR BLD: 0 % (ref 0–7)
ERYTHROCYTE [DISTWIDTH] IN BLOOD BY AUTOMATED COUNT: 15.6 % (ref 11.5–14.5)
GLOBULIN SER CALC-MCNC: 3.3 G/DL (ref 2–4)
GLUCOSE SERPL-MCNC: 111 MG/DL (ref 65–100)
HCT VFR BLD AUTO: 38.4 % (ref 36.6–50.3)
HGB BLD-MCNC: 12.7 G/DL (ref 12.1–17)
IMM GRANULOCYTES # BLD AUTO: 0.1 K/UL (ref 0–0.04)
IMM GRANULOCYTES NFR BLD AUTO: 1 % (ref 0–0.5)
LYMPHOCYTES # BLD: 1.4 K/UL (ref 0.8–3.5)
LYMPHOCYTES NFR BLD: 11 % (ref 12–49)
MCH RBC QN AUTO: 32.7 PG (ref 26–34)
MCHC RBC AUTO-ENTMCNC: 33.1 G/DL (ref 30–36.5)
MCV RBC AUTO: 99 FL (ref 80–99)
MONOCYTES # BLD: 0.5 K/UL (ref 0–1)
MONOCYTES NFR BLD: 3 % (ref 5–13)
NEUTS SEG # BLD: 11.3 K/UL (ref 1.8–8)
NEUTS SEG NFR BLD: 85 % (ref 32–75)
NRBC # BLD: 0 K/UL (ref 0–0.01)
NRBC BLD-RTO: 0 PER 100 WBC
PLATELET # BLD AUTO: 258 K/UL (ref 150–400)
PMV BLD AUTO: 10.3 FL (ref 8.9–12.9)
POTASSIUM SERPL-SCNC: 3.8 MMOL/L (ref 3.5–5.1)
PROT SERPL-MCNC: 6.6 G/DL (ref 6.4–8.2)
RBC # BLD AUTO: 3.88 M/UL (ref 4.1–5.7)
SODIUM SERPL-SCNC: 133 MMOL/L (ref 136–145)
WBC # BLD AUTO: 13.3 K/UL (ref 4.1–11.1)

## 2024-09-08 PROCEDURE — 6360000002 HC RX W HCPCS: Performed by: INTERNAL MEDICINE

## 2024-09-08 PROCEDURE — 85025 COMPLETE CBC W/AUTO DIFF WBC: CPT

## 2024-09-08 PROCEDURE — 2580000003 HC RX 258: Performed by: INTERNAL MEDICINE

## 2024-09-08 PROCEDURE — 76937 US GUIDE VASCULAR ACCESS: CPT

## 2024-09-08 PROCEDURE — 6370000000 HC RX 637 (ALT 250 FOR IP): Performed by: INTERNAL MEDICINE

## 2024-09-08 PROCEDURE — 6360000002 HC RX W HCPCS: Performed by: NURSE PRACTITIONER

## 2024-09-08 PROCEDURE — 80053 COMPREHEN METABOLIC PANEL: CPT

## 2024-09-08 PROCEDURE — 2709999900 HC NON-CHARGEABLE SUPPLY

## 2024-09-08 PROCEDURE — 6370000000 HC RX 637 (ALT 250 FOR IP): Performed by: NURSE PRACTITIONER

## 2024-09-08 PROCEDURE — 72195 MRI PELVIS W/O DYE: CPT

## 2024-09-08 PROCEDURE — 36415 COLL VENOUS BLD VENIPUNCTURE: CPT

## 2024-09-08 PROCEDURE — 1200000000 HC SEMI PRIVATE

## 2024-09-08 RX ORDER — LIDOCAINE 40 MG/G
CREAM TOPICAL
Status: DISCONTINUED | OUTPATIENT
Start: 2024-09-08 | End: 2024-09-10 | Stop reason: HOSPADM

## 2024-09-08 RX ORDER — TIZANIDINE 2 MG/1
2 TABLET ORAL 3 TIMES DAILY
Status: DISCONTINUED | OUTPATIENT
Start: 2024-09-08 | End: 2024-09-10 | Stop reason: HOSPADM

## 2024-09-08 RX ORDER — HYDROMORPHONE HYDROCHLORIDE 2 MG/ML
2 INJECTION, SOLUTION INTRAMUSCULAR; INTRAVENOUS; SUBCUTANEOUS
Status: DISCONTINUED | OUTPATIENT
Start: 2024-09-08 | End: 2024-09-10 | Stop reason: HOSPADM

## 2024-09-08 RX ORDER — DICYCLOMINE HCL 20 MG
20 TABLET ORAL EVERY 6 HOURS
Status: DISCONTINUED | OUTPATIENT
Start: 2024-09-08 | End: 2024-09-10 | Stop reason: HOSPADM

## 2024-09-08 RX ADMIN — WATER 40 MG: 1 INJECTION INTRAMUSCULAR; INTRAVENOUS; SUBCUTANEOUS at 20:36

## 2024-09-08 RX ADMIN — TIZANIDINE 2 MG: 2 TABLET ORAL at 16:07

## 2024-09-08 RX ADMIN — WATER 40 MG: 1 INJECTION INTRAMUSCULAR; INTRAVENOUS; SUBCUTANEOUS at 04:27

## 2024-09-08 RX ADMIN — GABAPENTIN 300 MG: 300 CAPSULE ORAL at 08:41

## 2024-09-08 RX ADMIN — ENOXAPARIN SODIUM 40 MG: 100 INJECTION SUBCUTANEOUS at 08:41

## 2024-09-08 RX ADMIN — HYDROMORPHONE HYDROCHLORIDE 2 MG: 2 INJECTION, SOLUTION INTRAMUSCULAR; INTRAVENOUS; SUBCUTANEOUS at 01:53

## 2024-09-08 RX ADMIN — HYDROMORPHONE HYDROCHLORIDE 2 MG: 2 INJECTION, SOLUTION INTRAMUSCULAR; INTRAVENOUS; SUBCUTANEOUS at 20:35

## 2024-09-08 RX ADMIN — TIZANIDINE 2 MG: 2 TABLET ORAL at 20:35

## 2024-09-08 RX ADMIN — DIPHENOXYLATE HYDROCHLORIDE AND ATROPINE SULFATE 2 TABLET: 2.5; .025 TABLET ORAL at 06:17

## 2024-09-08 RX ADMIN — PIPERACILLIN AND TAZOBACTAM 3375 MG: 3; .375 INJECTION, POWDER, LYOPHILIZED, FOR SOLUTION INTRAVENOUS at 22:41

## 2024-09-08 RX ADMIN — OCTREOTIDE ACETATE 50 MCG: 50 INJECTION, SOLUTION INTRAVENOUS; SUBCUTANEOUS at 15:17

## 2024-09-08 RX ADMIN — PIPERACILLIN AND TAZOBACTAM 3375 MG: 3; .375 INJECTION, POWDER, LYOPHILIZED, FOR SOLUTION INTRAVENOUS at 12:54

## 2024-09-08 RX ADMIN — METOPROLOL TARTRATE 12.5 MG: 25 TABLET, FILM COATED ORAL at 01:54

## 2024-09-08 RX ADMIN — LORAZEPAM 1 MG: 2 INJECTION INTRAMUSCULAR; INTRAVENOUS at 09:26

## 2024-09-08 RX ADMIN — OCTREOTIDE ACETATE 50 MCG: 50 INJECTION, SOLUTION INTRAVENOUS; SUBCUTANEOUS at 01:52

## 2024-09-08 RX ADMIN — DICYCLOMINE HYDROCHLORIDE 20 MG: 20 TABLET ORAL at 17:05

## 2024-09-08 RX ADMIN — WATER 40 MG: 1 INJECTION INTRAMUSCULAR; INTRAVENOUS; SUBCUTANEOUS at 12:49

## 2024-09-08 RX ADMIN — GABAPENTIN 300 MG: 300 CAPSULE ORAL at 15:17

## 2024-09-08 RX ADMIN — HYDROMORPHONE HYDROCHLORIDE 2 MG: 2 INJECTION, SOLUTION INTRAMUSCULAR; INTRAVENOUS; SUBCUTANEOUS at 12:50

## 2024-09-08 RX ADMIN — OXYCODONE HYDROCHLORIDE 5 MG: 5 TABLET ORAL at 09:28

## 2024-09-08 RX ADMIN — GABAPENTIN 300 MG: 300 CAPSULE ORAL at 20:35

## 2024-09-08 RX ADMIN — SODIUM CHLORIDE, POTASSIUM CHLORIDE, SODIUM LACTATE AND CALCIUM CHLORIDE: 600; 310; 30; 20 INJECTION, SOLUTION INTRAVENOUS at 15:08

## 2024-09-08 RX ADMIN — DIPHENOXYLATE HYDROCHLORIDE AND ATROPINE SULFATE 2 TABLET: 2.5; .025 TABLET ORAL at 20:35

## 2024-09-08 RX ADMIN — HYDROMORPHONE HYDROCHLORIDE 2 MG: 2 INJECTION, SOLUTION INTRAMUSCULAR; INTRAVENOUS; SUBCUTANEOUS at 17:04

## 2024-09-08 RX ADMIN — DIPHENHYDRAMINE HYDROCHLORIDE 25 MG: 25 CAPSULE ORAL at 08:41

## 2024-09-08 RX ADMIN — HYDROMORPHONE HYDROCHLORIDE 2 MG: 2 INJECTION, SOLUTION INTRAMUSCULAR; INTRAVENOUS; SUBCUTANEOUS at 06:09

## 2024-09-08 RX ADMIN — DICYCLOMINE HYDROCHLORIDE 20 MG: 20 TABLET ORAL at 12:50

## 2024-09-08 RX ADMIN — PIPERACILLIN AND TAZOBACTAM 3375 MG: 3; .375 INJECTION, POWDER, LYOPHILIZED, FOR SOLUTION INTRAVENOUS at 06:11

## 2024-09-08 RX ADMIN — SODIUM CHLORIDE, POTASSIUM CHLORIDE, SODIUM LACTATE AND CALCIUM CHLORIDE: 600; 310; 30; 20 INJECTION, SOLUTION INTRAVENOUS at 00:36

## 2024-09-08 RX ADMIN — DIPHENHYDRAMINE HYDROCHLORIDE 25 MG: 25 CAPSULE ORAL at 20:35

## 2024-09-08 ASSESSMENT — PAIN SCALES - GENERAL
PAINLEVEL_OUTOF10: 9
PAINLEVEL_OUTOF10: 9
PAINLEVEL_OUTOF10: 8
PAINLEVEL_OUTOF10: 8
PAINLEVEL_OUTOF10: 9
PAINLEVEL_OUTOF10: 8
PAINLEVEL_OUTOF10: 7

## 2024-09-08 ASSESSMENT — PAIN DESCRIPTION - LOCATION
LOCATION: RECTUM
LOCATION: ABDOMEN;RECTUM
LOCATION: ABDOMEN
LOCATION: ABDOMEN;BUTTOCKS
LOCATION: ABDOMEN
LOCATION: ABDOMEN
LOCATION: RECTUM

## 2024-09-08 ASSESSMENT — PAIN DESCRIPTION - DESCRIPTORS
DESCRIPTORS: ACHING
DESCRIPTORS: ACHING;BURNING
DESCRIPTORS: ACHING
DESCRIPTORS: PRESSURE;SQUEEZING
DESCRIPTORS: ACHING;SORE

## 2024-09-08 ASSESSMENT — PAIN DESCRIPTION - ORIENTATION
ORIENTATION: LEFT;POSTERIOR
ORIENTATION: ANTERIOR

## 2024-09-09 ENCOUNTER — ANESTHESIA EVENT (OUTPATIENT)
Facility: HOSPITAL | Age: 64
DRG: 348 | End: 2024-09-09
Payer: MEDICARE

## 2024-09-09 ENCOUNTER — ANESTHESIA (OUTPATIENT)
Facility: HOSPITAL | Age: 64
DRG: 348 | End: 2024-09-09
Payer: MEDICARE

## 2024-09-09 LAB
ALBUMIN SERPL-MCNC: 3.3 G/DL (ref 3.5–5)
ALBUMIN/GLOB SERPL: 0.9 (ref 1.1–2.2)
ALP SERPL-CCNC: 86 U/L (ref 45–117)
ALT SERPL-CCNC: 48 U/L (ref 12–78)
ANION GAP SERPL CALC-SCNC: 5 MMOL/L (ref 2–12)
AST SERPL-CCNC: 89 U/L (ref 15–37)
BASOPHILS # BLD: 0 K/UL (ref 0–0.1)
BASOPHILS NFR BLD: 0 % (ref 0–1)
BILIRUB SERPL-MCNC: 0.4 MG/DL (ref 0.2–1)
BUN SERPL-MCNC: 15 MG/DL (ref 6–20)
BUN/CREAT SERPL: 13 (ref 12–20)
CALCIUM SERPL-MCNC: 8.1 MG/DL (ref 8.5–10.1)
CHLORIDE SERPL-SCNC: 106 MMOL/L (ref 97–108)
CO2 SERPL-SCNC: 21 MMOL/L (ref 21–32)
CREAT SERPL-MCNC: 1.14 MG/DL (ref 0.7–1.3)
DIFFERENTIAL METHOD BLD: ABNORMAL
EOSINOPHIL # BLD: 0 K/UL (ref 0–0.4)
EOSINOPHIL NFR BLD: 0 % (ref 0–7)
ERYTHROCYTE [DISTWIDTH] IN BLOOD BY AUTOMATED COUNT: 15.7 % (ref 11.5–14.5)
GLOBULIN SER CALC-MCNC: 3.5 G/DL (ref 2–4)
GLUCOSE SERPL-MCNC: 83 MG/DL (ref 65–100)
HCT VFR BLD AUTO: 41.9 % (ref 36.6–50.3)
HGB BLD-MCNC: 13.7 G/DL (ref 12.1–17)
IMM GRANULOCYTES # BLD AUTO: 0.1 K/UL (ref 0–0.04)
IMM GRANULOCYTES NFR BLD AUTO: 1 % (ref 0–0.5)
LYMPHOCYTES # BLD: 1.6 K/UL (ref 0.8–3.5)
LYMPHOCYTES NFR BLD: 13 % (ref 12–49)
MCH RBC QN AUTO: 32.6 PG (ref 26–34)
MCHC RBC AUTO-ENTMCNC: 32.7 G/DL (ref 30–36.5)
MCV RBC AUTO: 99.8 FL (ref 80–99)
MONOCYTES # BLD: 0.7 K/UL (ref 0–1)
MONOCYTES NFR BLD: 6 % (ref 5–13)
NEUTS SEG # BLD: 9.4 K/UL (ref 1.8–8)
NEUTS SEG NFR BLD: 80 % (ref 32–75)
NRBC # BLD: 0 K/UL (ref 0–0.01)
NRBC BLD-RTO: 0 PER 100 WBC
PLATELET # BLD AUTO: 258 K/UL (ref 150–400)
PMV BLD AUTO: 9.8 FL (ref 8.9–12.9)
POTASSIUM SERPL-SCNC: 4 MMOL/L (ref 3.5–5.1)
PROT SERPL-MCNC: 6.8 G/DL (ref 6.4–8.2)
RBC # BLD AUTO: 4.2 M/UL (ref 4.1–5.7)
SODIUM SERPL-SCNC: 132 MMOL/L (ref 136–145)
WBC # BLD AUTO: 11.8 K/UL (ref 4.1–11.1)

## 2024-09-09 PROCEDURE — 3600000002 HC SURGERY LEVEL 2 BASE: Performed by: COLON & RECTAL SURGERY

## 2024-09-09 PROCEDURE — 7100000000 HC PACU RECOVERY - FIRST 15 MIN: Performed by: COLON & RECTAL SURGERY

## 2024-09-09 PROCEDURE — 3700000000 HC ANESTHESIA ATTENDED CARE: Performed by: COLON & RECTAL SURGERY

## 2024-09-09 PROCEDURE — 6360000002 HC RX W HCPCS: Performed by: NURSE ANESTHETIST, CERTIFIED REGISTERED

## 2024-09-09 PROCEDURE — 6370000000 HC RX 637 (ALT 250 FOR IP): Performed by: ANESTHESIOLOGY

## 2024-09-09 PROCEDURE — 6360000002 HC RX W HCPCS: Performed by: NURSE PRACTITIONER

## 2024-09-09 PROCEDURE — 36415 COLL VENOUS BLD VENIPUNCTURE: CPT

## 2024-09-09 PROCEDURE — 6370000000 HC RX 637 (ALT 250 FOR IP): Performed by: COLON & RECTAL SURGERY

## 2024-09-09 PROCEDURE — 2500000003 HC RX 250 WO HCPCS: Performed by: COLON & RECTAL SURGERY

## 2024-09-09 PROCEDURE — 6360000002 HC RX W HCPCS: Performed by: COLON & RECTAL SURGERY

## 2024-09-09 PROCEDURE — 82397 CHEMILUMINESCENT ASSAY: CPT

## 2024-09-09 PROCEDURE — 80145 DRUG ASSAY ADALIMUMAB: CPT

## 2024-09-09 PROCEDURE — 3600000012 HC SURGERY LEVEL 2 ADDTL 15MIN: Performed by: COLON & RECTAL SURGERY

## 2024-09-09 PROCEDURE — 2709999900 HC NON-CHARGEABLE SUPPLY: Performed by: COLON & RECTAL SURGERY

## 2024-09-09 PROCEDURE — 6370000000 HC RX 637 (ALT 250 FOR IP): Performed by: NURSE PRACTITIONER

## 2024-09-09 PROCEDURE — 6360000002 HC RX W HCPCS: Performed by: INTERNAL MEDICINE

## 2024-09-09 PROCEDURE — 88305 TISSUE EXAM BY PATHOLOGIST: CPT

## 2024-09-09 PROCEDURE — 1200000000 HC SEMI PRIVATE

## 2024-09-09 PROCEDURE — 2580000003 HC RX 258: Performed by: NURSE ANESTHETIST, CERTIFIED REGISTERED

## 2024-09-09 PROCEDURE — 7100000001 HC PACU RECOVERY - ADDTL 15 MIN: Performed by: COLON & RECTAL SURGERY

## 2024-09-09 PROCEDURE — 85025 COMPLETE CBC W/AUTO DIFF WBC: CPT

## 2024-09-09 PROCEDURE — 3700000001 HC ADD 15 MINUTES (ANESTHESIA): Performed by: COLON & RECTAL SURGERY

## 2024-09-09 PROCEDURE — 2580000003 HC RX 258: Performed by: INTERNAL MEDICINE

## 2024-09-09 PROCEDURE — 2580000003 HC RX 258: Performed by: COLON & RECTAL SURGERY

## 2024-09-09 PROCEDURE — 2500000003 HC RX 250 WO HCPCS: Performed by: NURSE ANESTHETIST, CERTIFIED REGISTERED

## 2024-09-09 PROCEDURE — 80053 COMPREHEN METABOLIC PANEL: CPT

## 2024-09-09 PROCEDURE — 6370000000 HC RX 637 (ALT 250 FOR IP): Performed by: INTERNAL MEDICINE

## 2024-09-09 RX ORDER — PROCHLORPERAZINE EDISYLATE 5 MG/ML
5 INJECTION INTRAMUSCULAR; INTRAVENOUS
Status: DISCONTINUED | OUTPATIENT
Start: 2024-09-09 | End: 2024-09-09 | Stop reason: HOSPADM

## 2024-09-09 RX ORDER — EPHEDRINE SULFATE 50 MG/ML
INJECTION INTRAVENOUS PRN
Status: DISCONTINUED | OUTPATIENT
Start: 2024-09-09 | End: 2024-09-09 | Stop reason: SDUPTHER

## 2024-09-09 RX ORDER — MIDAZOLAM HYDROCHLORIDE 1 MG/ML
INJECTION INTRAMUSCULAR; INTRAVENOUS PRN
Status: DISCONTINUED | OUTPATIENT
Start: 2024-09-09 | End: 2024-09-09 | Stop reason: SDUPTHER

## 2024-09-09 RX ORDER — SODIUM CHLORIDE 9 MG/ML
INJECTION, SOLUTION INTRAVENOUS PRN
Status: DISCONTINUED | OUTPATIENT
Start: 2024-09-09 | End: 2024-09-09 | Stop reason: HOSPADM

## 2024-09-09 RX ORDER — SODIUM CHLORIDE 0.9 % (FLUSH) 0.9 %
5-40 SYRINGE (ML) INJECTION EVERY 12 HOURS SCHEDULED
Status: DISCONTINUED | OUTPATIENT
Start: 2024-09-09 | End: 2024-09-09 | Stop reason: HOSPADM

## 2024-09-09 RX ORDER — NALOXONE HYDROCHLORIDE 0.4 MG/ML
INJECTION, SOLUTION INTRAMUSCULAR; INTRAVENOUS; SUBCUTANEOUS PRN
Status: DISCONTINUED | OUTPATIENT
Start: 2024-09-09 | End: 2024-09-09 | Stop reason: HOSPADM

## 2024-09-09 RX ORDER — GLYCOPYRROLATE 0.2 MG/ML
INJECTION INTRAMUSCULAR; INTRAVENOUS PRN
Status: DISCONTINUED | OUTPATIENT
Start: 2024-09-09 | End: 2024-09-09 | Stop reason: SDUPTHER

## 2024-09-09 RX ORDER — LIDOCAINE HYDROCHLORIDE 20 MG/ML
INJECTION, SOLUTION EPIDURAL; INFILTRATION; INTRACAUDAL; PERINEURAL PRN
Status: DISCONTINUED | OUTPATIENT
Start: 2024-09-09 | End: 2024-09-09 | Stop reason: SDUPTHER

## 2024-09-09 RX ORDER — SODIUM CHLORIDE, SODIUM LACTATE, POTASSIUM CHLORIDE, CALCIUM CHLORIDE 600; 310; 30; 20 MG/100ML; MG/100ML; MG/100ML; MG/100ML
INJECTION, SOLUTION INTRAVENOUS CONTINUOUS
Status: DISCONTINUED | OUTPATIENT
Start: 2024-09-09 | End: 2024-09-09 | Stop reason: HOSPADM

## 2024-09-09 RX ORDER — LIDOCAINE HYDROCHLORIDE 10 MG/ML
1 INJECTION, SOLUTION EPIDURAL; INFILTRATION; INTRACAUDAL; PERINEURAL
Status: DISCONTINUED | OUTPATIENT
Start: 2024-09-09 | End: 2024-09-09 | Stop reason: HOSPADM

## 2024-09-09 RX ORDER — OXYCODONE HYDROCHLORIDE 5 MG/1
5 TABLET ORAL
Status: DISCONTINUED | OUTPATIENT
Start: 2024-09-09 | End: 2024-09-09 | Stop reason: HOSPADM

## 2024-09-09 RX ORDER — MIDAZOLAM HYDROCHLORIDE 2 MG/2ML
2 INJECTION, SOLUTION INTRAMUSCULAR; INTRAVENOUS
Status: DISCONTINUED | OUTPATIENT
Start: 2024-09-09 | End: 2024-09-09 | Stop reason: HOSPADM

## 2024-09-09 RX ORDER — DIPHENHYDRAMINE HYDROCHLORIDE 50 MG/ML
12.5 INJECTION INTRAMUSCULAR; INTRAVENOUS
Status: DISCONTINUED | OUTPATIENT
Start: 2024-09-09 | End: 2024-09-09 | Stop reason: HOSPADM

## 2024-09-09 RX ORDER — SODIUM CHLORIDE 0.9 % (FLUSH) 0.9 %
5-40 SYRINGE (ML) INJECTION PRN
Status: DISCONTINUED | OUTPATIENT
Start: 2024-09-09 | End: 2024-09-09 | Stop reason: HOSPADM

## 2024-09-09 RX ORDER — OCTREOTIDE ACETATE 50 UG/ML
50 INJECTION, SOLUTION INTRAVENOUS; SUBCUTANEOUS EVERY 8 HOURS
Status: DISCONTINUED | OUTPATIENT
Start: 2024-09-09 | End: 2024-09-10

## 2024-09-09 RX ORDER — SUCCINYLCHOLINE/SOD CL,ISO/PF 200MG/10ML
SYRINGE (ML) INTRAVENOUS PRN
Status: DISCONTINUED | OUTPATIENT
Start: 2024-09-09 | End: 2024-09-09 | Stop reason: SDUPTHER

## 2024-09-09 RX ORDER — FENTANYL CITRATE 50 UG/ML
INJECTION, SOLUTION INTRAMUSCULAR; INTRAVENOUS PRN
Status: DISCONTINUED | OUTPATIENT
Start: 2024-09-09 | End: 2024-09-09 | Stop reason: SDUPTHER

## 2024-09-09 RX ORDER — BUPIVACAINE HYDROCHLORIDE AND EPINEPHRINE 2.5; 5 MG/ML; UG/ML
20 INJECTION, SOLUTION EPIDURAL; INFILTRATION; INTRACAUDAL; PERINEURAL ONCE
Status: COMPLETED | OUTPATIENT
Start: 2024-09-09 | End: 2024-09-09

## 2024-09-09 RX ORDER — HYDRALAZINE HYDROCHLORIDE 20 MG/ML
10 INJECTION INTRAMUSCULAR; INTRAVENOUS
Status: DISCONTINUED | OUTPATIENT
Start: 2024-09-09 | End: 2024-09-09 | Stop reason: HOSPADM

## 2024-09-09 RX ORDER — ACETAMINOPHEN 500 MG
1000 TABLET ORAL ONCE
Status: COMPLETED | OUTPATIENT
Start: 2024-09-09 | End: 2024-09-09

## 2024-09-09 RX ORDER — IPRATROPIUM BROMIDE AND ALBUTEROL SULFATE 2.5; .5 MG/3ML; MG/3ML
1 SOLUTION RESPIRATORY (INHALATION)
Status: DISCONTINUED | OUTPATIENT
Start: 2024-09-09 | End: 2024-09-09 | Stop reason: HOSPADM

## 2024-09-09 RX ORDER — ACETAMINOPHEN 325 MG/1
650 TABLET ORAL EVERY 6 HOURS SCHEDULED
Status: DISCONTINUED | OUTPATIENT
Start: 2024-09-10 | End: 2024-09-10 | Stop reason: HOSPADM

## 2024-09-09 RX ORDER — FENTANYL CITRATE 50 UG/ML
100 INJECTION, SOLUTION INTRAMUSCULAR; INTRAVENOUS
Status: DISCONTINUED | OUTPATIENT
Start: 2024-09-09 | End: 2024-09-09 | Stop reason: HOSPADM

## 2024-09-09 RX ORDER — LORAZEPAM 2 MG/ML
0.5 INJECTION INTRAMUSCULAR
Status: DISCONTINUED | OUTPATIENT
Start: 2024-09-09 | End: 2024-09-09 | Stop reason: HOSPADM

## 2024-09-09 RX ORDER — SODIUM CHLORIDE, SODIUM LACTATE, POTASSIUM CHLORIDE, CALCIUM CHLORIDE 600; 310; 30; 20 MG/100ML; MG/100ML; MG/100ML; MG/100ML
INJECTION, SOLUTION INTRAVENOUS CONTINUOUS PRN
Status: DISCONTINUED | OUTPATIENT
Start: 2024-09-09 | End: 2024-09-09 | Stop reason: SDUPTHER

## 2024-09-09 RX ORDER — FENTANYL CITRATE 50 UG/ML
25 INJECTION, SOLUTION INTRAMUSCULAR; INTRAVENOUS EVERY 5 MIN PRN
Status: DISCONTINUED | OUTPATIENT
Start: 2024-09-09 | End: 2024-09-09 | Stop reason: HOSPADM

## 2024-09-09 RX ORDER — ONDANSETRON 2 MG/ML
INJECTION INTRAMUSCULAR; INTRAVENOUS PRN
Status: DISCONTINUED | OUTPATIENT
Start: 2024-09-09 | End: 2024-09-09 | Stop reason: SDUPTHER

## 2024-09-09 RX ORDER — ROCURONIUM BROMIDE 10 MG/ML
INJECTION, SOLUTION INTRAVENOUS PRN
Status: DISCONTINUED | OUTPATIENT
Start: 2024-09-09 | End: 2024-09-09 | Stop reason: SDUPTHER

## 2024-09-09 RX ORDER — ONDANSETRON 2 MG/ML
4 INJECTION INTRAMUSCULAR; INTRAVENOUS
Status: DISCONTINUED | OUTPATIENT
Start: 2024-09-09 | End: 2024-09-09 | Stop reason: HOSPADM

## 2024-09-09 RX ORDER — DEXAMETHASONE SODIUM PHOSPHATE 4 MG/ML
INJECTION, SOLUTION INTRA-ARTICULAR; INTRALESIONAL; INTRAMUSCULAR; INTRAVENOUS; SOFT TISSUE PRN
Status: DISCONTINUED | OUTPATIENT
Start: 2024-09-09 | End: 2024-09-09 | Stop reason: SDUPTHER

## 2024-09-09 RX ADMIN — PIPERACILLIN AND TAZOBACTAM 3375 MG: 3; .375 INJECTION, POWDER, LYOPHILIZED, FOR SOLUTION INTRAVENOUS at 06:17

## 2024-09-09 RX ADMIN — WATER 40 MG: 1 INJECTION INTRAMUSCULAR; INTRAVENOUS; SUBCUTANEOUS at 12:04

## 2024-09-09 RX ADMIN — ACETAMINOPHEN 1000 MG: 500 TABLET ORAL at 20:03

## 2024-09-09 RX ADMIN — LIDOCAINE HYDROCHLORIDE 60 MG: 20 INJECTION, SOLUTION EPIDURAL; INFILTRATION; INTRACAUDAL; PERINEURAL at 20:32

## 2024-09-09 RX ADMIN — DIPHENHYDRAMINE HYDROCHLORIDE 25 MG: 25 CAPSULE ORAL at 22:45

## 2024-09-09 RX ADMIN — TIZANIDINE 2 MG: 2 TABLET ORAL at 22:45

## 2024-09-09 RX ADMIN — FENTANYL CITRATE 100 MCG: 50 INJECTION, SOLUTION INTRAMUSCULAR; INTRAVENOUS at 20:32

## 2024-09-09 RX ADMIN — PIPERACILLIN AND TAZOBACTAM 3375 MG: 3; .375 INJECTION, POWDER, LYOPHILIZED, FOR SOLUTION INTRAVENOUS at 22:46

## 2024-09-09 RX ADMIN — PROPOFOL 200 MG: 10 INJECTION, EMULSION INTRAVENOUS at 20:32

## 2024-09-09 RX ADMIN — DIPHENHYDRAMINE HYDROCHLORIDE 25 MG: 25 CAPSULE ORAL at 09:31

## 2024-09-09 RX ADMIN — HYDROMORPHONE HYDROCHLORIDE 2 MG: 2 INJECTION, SOLUTION INTRAMUSCULAR; INTRAVENOUS; SUBCUTANEOUS at 16:41

## 2024-09-09 RX ADMIN — DIPHENOXYLATE HYDROCHLORIDE AND ATROPINE SULFATE 2 TABLET: 2.5; .025 TABLET ORAL at 06:13

## 2024-09-09 RX ADMIN — HYDROMORPHONE HYDROCHLORIDE 2 MG: 2 INJECTION, SOLUTION INTRAMUSCULAR; INTRAVENOUS; SUBCUTANEOUS at 00:47

## 2024-09-09 RX ADMIN — GABAPENTIN 300 MG: 300 CAPSULE ORAL at 22:44

## 2024-09-09 RX ADMIN — DICYCLOMINE HYDROCHLORIDE 20 MG: 20 TABLET ORAL at 22:45

## 2024-09-09 RX ADMIN — ACETAMINOPHEN 650 MG: 325 TABLET ORAL at 22:45

## 2024-09-09 RX ADMIN — ONDANSETRON 4 MG: 2 INJECTION INTRAMUSCULAR; INTRAVENOUS at 20:47

## 2024-09-09 RX ADMIN — TIZANIDINE 2 MG: 2 TABLET ORAL at 09:31

## 2024-09-09 RX ADMIN — OCTREOTIDE ACETATE 50 MCG: 50 INJECTION, SOLUTION INTRAVENOUS; SUBCUTANEOUS at 13:19

## 2024-09-09 RX ADMIN — Medication 160 MG: at 20:32

## 2024-09-09 RX ADMIN — GABAPENTIN 300 MG: 300 CAPSULE ORAL at 13:17

## 2024-09-09 RX ADMIN — OCTREOTIDE ACETATE 50 MCG: 50 INJECTION, SOLUTION INTRAVENOUS; SUBCUTANEOUS at 22:44

## 2024-09-09 RX ADMIN — DEXAMETHASONE SODIUM PHOSPHATE 8 MG: 4 INJECTION INTRA-ARTICULAR; INTRALESIONAL; INTRAMUSCULAR; INTRAVENOUS; SOFT TISSUE at 20:47

## 2024-09-09 RX ADMIN — WATER 40 MG: 1 INJECTION INTRAMUSCULAR; INTRAVENOUS; SUBCUTANEOUS at 22:44

## 2024-09-09 RX ADMIN — DICYCLOMINE HYDROCHLORIDE 20 MG: 20 TABLET ORAL at 12:03

## 2024-09-09 RX ADMIN — HYDROMORPHONE HYDROCHLORIDE 2 MG: 2 INJECTION, SOLUTION INTRAMUSCULAR; INTRAVENOUS; SUBCUTANEOUS at 12:06

## 2024-09-09 RX ADMIN — EPHEDRINE SULFATE 10 MG: 50 INJECTION INTRAVENOUS at 20:56

## 2024-09-09 RX ADMIN — HYDROMORPHONE HYDROCHLORIDE 2 MG: 2 INJECTION, SOLUTION INTRAMUSCULAR; INTRAVENOUS; SUBCUTANEOUS at 06:25

## 2024-09-09 RX ADMIN — HYDROMORPHONE HYDROCHLORIDE 2 MG: 2 INJECTION, SOLUTION INTRAMUSCULAR; INTRAVENOUS; SUBCUTANEOUS at 03:30

## 2024-09-09 RX ADMIN — METOPROLOL TARTRATE 12.5 MG: 25 TABLET, FILM COATED ORAL at 16:36

## 2024-09-09 RX ADMIN — SODIUM CHLORIDE, POTASSIUM CHLORIDE, SODIUM LACTATE AND CALCIUM CHLORIDE: 600; 310; 30; 20 INJECTION, SOLUTION INTRAVENOUS at 22:16

## 2024-09-09 RX ADMIN — HYDROMORPHONE HYDROCHLORIDE 0.5 MG: 1 INJECTION, SOLUTION INTRAMUSCULAR; INTRAVENOUS; SUBCUTANEOUS at 20:44

## 2024-09-09 RX ADMIN — PIPERACILLIN AND TAZOBACTAM 3375 MG: 3; .375 INJECTION, POWDER, LYOPHILIZED, FOR SOLUTION INTRAVENOUS at 13:18

## 2024-09-09 RX ADMIN — SODIUM CHLORIDE, POTASSIUM CHLORIDE, SODIUM LACTATE AND CALCIUM CHLORIDE: 600; 310; 30; 20 INJECTION, SOLUTION INTRAVENOUS at 20:20

## 2024-09-09 RX ADMIN — ROCURONIUM BROMIDE 5 MG: 10 SOLUTION INTRAVENOUS at 20:32

## 2024-09-09 RX ADMIN — GABAPENTIN 300 MG: 300 CAPSULE ORAL at 09:31

## 2024-09-09 RX ADMIN — HYDROMORPHONE HYDROCHLORIDE 0.5 MG: 1 INJECTION, SOLUTION INTRAMUSCULAR; INTRAVENOUS; SUBCUTANEOUS at 21:18

## 2024-09-09 RX ADMIN — OCTREOTIDE ACETATE 50 MCG: 50 INJECTION, SOLUTION INTRAVENOUS; SUBCUTANEOUS at 03:20

## 2024-09-09 RX ADMIN — TIZANIDINE 2 MG: 2 TABLET ORAL at 16:40

## 2024-09-09 RX ADMIN — HYDROMORPHONE HYDROCHLORIDE 2 MG: 2 INJECTION, SOLUTION INTRAMUSCULAR; INTRAVENOUS; SUBCUTANEOUS at 22:07

## 2024-09-09 RX ADMIN — DICYCLOMINE HYDROCHLORIDE 20 MG: 20 TABLET ORAL at 16:40

## 2024-09-09 RX ADMIN — SODIUM CHLORIDE, POTASSIUM CHLORIDE, SODIUM LACTATE AND CALCIUM CHLORIDE: 600; 310; 30; 20 INJECTION, SOLUTION INTRAVENOUS at 09:32

## 2024-09-09 RX ADMIN — SODIUM CHLORIDE, POTASSIUM CHLORIDE, SODIUM LACTATE AND CALCIUM CHLORIDE: 600; 310; 30; 20 INJECTION, SOLUTION INTRAVENOUS at 00:51

## 2024-09-09 RX ADMIN — GLYCOPYRROLATE 0.2 MG: 0.2 INJECTION INTRAMUSCULAR; INTRAVENOUS at 21:01

## 2024-09-09 RX ADMIN — DICYCLOMINE HYDROCHLORIDE 20 MG: 20 TABLET ORAL at 00:47

## 2024-09-09 RX ADMIN — SODIUM CHLORIDE, PRESERVATIVE FREE 10 ML: 5 INJECTION INTRAVENOUS at 09:31

## 2024-09-09 RX ADMIN — WATER 40 MG: 1 INJECTION INTRAMUSCULAR; INTRAVENOUS; SUBCUTANEOUS at 06:09

## 2024-09-09 RX ADMIN — MIDAZOLAM 2 MG: 1 INJECTION INTRAMUSCULAR; INTRAVENOUS at 20:23

## 2024-09-09 RX ADMIN — DICYCLOMINE HYDROCHLORIDE 20 MG: 20 TABLET ORAL at 06:12

## 2024-09-09 ASSESSMENT — PAIN SCALES - GENERAL
PAINLEVEL_OUTOF10: 6
PAINLEVEL_OUTOF10: 8
PAINLEVEL_OUTOF10: 10
PAINLEVEL_OUTOF10: 6
PAINLEVEL_OUTOF10: 5
PAINLEVEL_OUTOF10: 9
PAINLEVEL_OUTOF10: 7

## 2024-09-09 ASSESSMENT — PAIN DESCRIPTION - LOCATION
LOCATION: ABDOMEN
LOCATION: SACRUM
LOCATION: RECTUM
LOCATION: BUTTOCKS
LOCATION: SACRUM
LOCATION: BUTTOCKS
LOCATION: BUTTOCKS

## 2024-09-09 ASSESSMENT — PAIN DESCRIPTION - DESCRIPTORS
DESCRIPTORS: BURNING
DESCRIPTORS: ACHING
DESCRIPTORS: ACHING
DESCRIPTORS: SORE;BURNING
DESCRIPTORS: ACHING
DESCRIPTORS: ACHING;SORE
DESCRIPTORS: SORE

## 2024-09-09 ASSESSMENT — PAIN DESCRIPTION - ORIENTATION
ORIENTATION: POSTERIOR
ORIENTATION: RIGHT;LEFT

## 2024-09-09 ASSESSMENT — PAIN - FUNCTIONAL ASSESSMENT: PAIN_FUNCTIONAL_ASSESSMENT: 0-10

## 2024-09-10 VITALS
HEIGHT: 70 IN | OXYGEN SATURATION: 91 % | BODY MASS INDEX: 29.39 KG/M2 | HEART RATE: 55 BPM | DIASTOLIC BLOOD PRESSURE: 76 MMHG | SYSTOLIC BLOOD PRESSURE: 124 MMHG | TEMPERATURE: 97.3 F | WEIGHT: 205.3 LBS | RESPIRATION RATE: 18 BRPM

## 2024-09-10 LAB
ALBUMIN SERPL-MCNC: 3.7 G/DL (ref 3.5–5)
ALBUMIN/GLOB SERPL: 1.1 (ref 1.1–2.2)
ALP SERPL-CCNC: 83 U/L (ref 45–117)
ALT SERPL-CCNC: 76 U/L (ref 12–78)
ANION GAP SERPL CALC-SCNC: 6 MMOL/L (ref 2–12)
AST SERPL-CCNC: 65 U/L (ref 15–37)
BASOPHILS # BLD: 0 K/UL (ref 0–0.1)
BASOPHILS NFR BLD: 0 % (ref 0–1)
BILIRUB SERPL-MCNC: 0.6 MG/DL (ref 0.2–1)
BUN SERPL-MCNC: 20 MG/DL (ref 6–20)
BUN/CREAT SERPL: 14 (ref 12–20)
CALCIUM SERPL-MCNC: 8.8 MG/DL (ref 8.5–10.1)
CALPROTECTIN STL-MCNT: <5 UG/G (ref 0–120)
CHLORIDE SERPL-SCNC: 102 MMOL/L (ref 97–108)
CO2 SERPL-SCNC: 27 MMOL/L (ref 21–32)
CREAT SERPL-MCNC: 1.4 MG/DL (ref 0.7–1.3)
DIFFERENTIAL METHOD BLD: ABNORMAL
EOSINOPHIL # BLD: 0 K/UL (ref 0–0.4)
EOSINOPHIL NFR BLD: 0 % (ref 0–7)
ERYTHROCYTE [DISTWIDTH] IN BLOOD BY AUTOMATED COUNT: 15.9 % (ref 11.5–14.5)
GLOBULIN SER CALC-MCNC: 3.3 G/DL (ref 2–4)
GLUCOSE SERPL-MCNC: 116 MG/DL (ref 65–100)
HCT VFR BLD AUTO: 40.9 % (ref 36.6–50.3)
HGB BLD-MCNC: 13.1 G/DL (ref 12.1–17)
IMM GRANULOCYTES # BLD AUTO: 0 K/UL (ref 0–0.04)
IMM GRANULOCYTES NFR BLD AUTO: 1 % (ref 0–0.5)
LYMPHOCYTES # BLD: 1.1 K/UL (ref 0.8–3.5)
LYMPHOCYTES NFR BLD: 14 % (ref 12–49)
MCH RBC QN AUTO: 32.6 PG (ref 26–34)
MCHC RBC AUTO-ENTMCNC: 32 G/DL (ref 30–36.5)
MCV RBC AUTO: 101.7 FL (ref 80–99)
MONOCYTES # BLD: 0.4 K/UL (ref 0–1)
MONOCYTES NFR BLD: 5 % (ref 5–13)
NEUTS SEG # BLD: 6.4 K/UL (ref 1.8–8)
NEUTS SEG NFR BLD: 80 % (ref 32–75)
NRBC # BLD: 0.03 K/UL (ref 0–0.01)
NRBC BLD-RTO: 0.4 PER 100 WBC
PLATELET # BLD AUTO: 302 K/UL (ref 150–400)
PMV BLD AUTO: 10.4 FL (ref 8.9–12.9)
POTASSIUM SERPL-SCNC: 4.4 MMOL/L (ref 3.5–5.1)
PROT SERPL-MCNC: 7 G/DL (ref 6.4–8.2)
RBC # BLD AUTO: 4.02 M/UL (ref 4.1–5.7)
SODIUM SERPL-SCNC: 135 MMOL/L (ref 136–145)
WBC # BLD AUTO: 8 K/UL (ref 4.1–11.1)

## 2024-09-10 PROCEDURE — 85025 COMPLETE CBC W/AUTO DIFF WBC: CPT

## 2024-09-10 PROCEDURE — 6360000002 HC RX W HCPCS: Performed by: COLON & RECTAL SURGERY

## 2024-09-10 PROCEDURE — 2580000003 HC RX 258: Performed by: COLON & RECTAL SURGERY

## 2024-09-10 PROCEDURE — 6370000000 HC RX 637 (ALT 250 FOR IP): Performed by: COLON & RECTAL SURGERY

## 2024-09-10 PROCEDURE — 0DBQ0ZZ EXCISION OF ANUS, OPEN APPROACH: ICD-10-PCS | Performed by: COLON & RECTAL SURGERY

## 2024-09-10 PROCEDURE — 80053 COMPREHEN METABOLIC PANEL: CPT

## 2024-09-10 RX ORDER — PREDNISONE 20 MG/1
40 TABLET ORAL DAILY
Qty: 28 TABLET | Refills: 0 | Status: SHIPPED | OUTPATIENT
Start: 2024-09-10 | End: 2024-09-24

## 2024-09-10 RX ORDER — DIPHENOXYLATE HCL/ATROPINE 2.5-.025MG
2 TABLET ORAL 4 TIMES DAILY PRN
Qty: 40 TABLET | Refills: 0 | Status: SHIPPED | OUTPATIENT
Start: 2024-09-10 | End: 2024-09-20

## 2024-09-10 RX ORDER — PREDNISONE 20 MG/1
40 TABLET ORAL DAILY
Status: DISCONTINUED | OUTPATIENT
Start: 2024-09-11 | End: 2024-09-10 | Stop reason: HOSPADM

## 2024-09-10 RX ADMIN — OCTREOTIDE ACETATE 50 MCG: 50 INJECTION, SOLUTION INTRAVENOUS; SUBCUTANEOUS at 05:25

## 2024-09-10 RX ADMIN — HYDROMORPHONE HYDROCHLORIDE 2 MG: 2 INJECTION, SOLUTION INTRAMUSCULAR; INTRAVENOUS; SUBCUTANEOUS at 08:38

## 2024-09-10 RX ADMIN — ACETAMINOPHEN 650 MG: 325 TABLET ORAL at 05:20

## 2024-09-10 RX ADMIN — TIZANIDINE 2 MG: 2 TABLET ORAL at 08:37

## 2024-09-10 RX ADMIN — HYDROMORPHONE HYDROCHLORIDE 2 MG: 2 INJECTION, SOLUTION INTRAMUSCULAR; INTRAVENOUS; SUBCUTANEOUS at 13:13

## 2024-09-10 RX ADMIN — WATER 40 MG: 1 INJECTION INTRAMUSCULAR; INTRAVENOUS; SUBCUTANEOUS at 05:25

## 2024-09-10 RX ADMIN — SODIUM CHLORIDE, PRESERVATIVE FREE 10 ML: 5 INJECTION INTRAVENOUS at 08:39

## 2024-09-10 RX ADMIN — HYDROMORPHONE HYDROCHLORIDE 2 MG: 2 INJECTION, SOLUTION INTRAMUSCULAR; INTRAVENOUS; SUBCUTANEOUS at 05:21

## 2024-09-10 RX ADMIN — ACETAMINOPHEN 650 MG: 325 TABLET ORAL at 13:02

## 2024-09-10 RX ADMIN — GABAPENTIN 300 MG: 300 CAPSULE ORAL at 08:37

## 2024-09-10 RX ADMIN — TIZANIDINE 2 MG: 2 TABLET ORAL at 13:12

## 2024-09-10 RX ADMIN — DICYCLOMINE HYDROCHLORIDE 20 MG: 20 TABLET ORAL at 13:02

## 2024-09-10 RX ADMIN — METOPROLOL TARTRATE 12.5 MG: 25 TABLET, FILM COATED ORAL at 02:13

## 2024-09-10 RX ADMIN — HYDROMORPHONE HYDROCHLORIDE 2 MG: 2 INJECTION, SOLUTION INTRAMUSCULAR; INTRAVENOUS; SUBCUTANEOUS at 02:02

## 2024-09-10 RX ADMIN — DICYCLOMINE HYDROCHLORIDE 20 MG: 20 TABLET ORAL at 05:20

## 2024-09-10 RX ADMIN — GABAPENTIN 300 MG: 300 CAPSULE ORAL at 13:12

## 2024-09-10 RX ADMIN — PIPERACILLIN AND TAZOBACTAM 3375 MG: 3; .375 INJECTION, POWDER, LYOPHILIZED, FOR SOLUTION INTRAVENOUS at 05:25

## 2024-09-10 RX ADMIN — DIPHENHYDRAMINE HYDROCHLORIDE 25 MG: 25 CAPSULE ORAL at 08:37

## 2024-09-10 ASSESSMENT — PAIN SCALES - GENERAL
PAINLEVEL_OUTOF10: 8
PAINLEVEL_OUTOF10: 9
PAINLEVEL_OUTOF10: 8
PAINLEVEL_OUTOF10: 9

## 2024-09-10 ASSESSMENT — PAIN DESCRIPTION - LOCATION
LOCATION: RECTUM
LOCATION: SACRUM
LOCATION: SACRUM
LOCATION: RECTUM

## 2024-09-10 ASSESSMENT — PAIN DESCRIPTION - DESCRIPTORS
DESCRIPTORS: ACHING
DESCRIPTORS: ACHING

## 2024-09-20 LAB
ADALIMUMAB AB SERPL-MCNC: <25 NG/ML
ADALIMUMAB SERPL-MCNC: 5.8 UG/ML

## 2024-12-22 ENCOUNTER — APPOINTMENT (OUTPATIENT)
Facility: HOSPITAL | Age: 64
DRG: 435 | End: 2024-12-22
Payer: MEDICARE

## 2024-12-22 ENCOUNTER — HOSPITAL ENCOUNTER (INPATIENT)
Facility: HOSPITAL | Age: 64
LOS: 10 days | Discharge: HOSPICE | DRG: 435 | End: 2025-01-01
Attending: EMERGENCY MEDICINE | Admitting: INTERNAL MEDICINE
Payer: MEDICARE

## 2024-12-22 DIAGNOSIS — R06.02 SHORTNESS OF BREATH: ICD-10-CM

## 2024-12-22 DIAGNOSIS — R79.89 ELEVATED TROPONIN LEVEL: ICD-10-CM

## 2024-12-22 DIAGNOSIS — R16.0 LIVER MASSES: Primary | ICD-10-CM

## 2024-12-22 DIAGNOSIS — M25.551 RIGHT HIP PAIN: ICD-10-CM

## 2024-12-22 DIAGNOSIS — N28.89 RIGHT KIDNEY MASS: ICD-10-CM

## 2024-12-22 LAB
ALBUMIN SERPL-MCNC: 3.7 G/DL (ref 3.5–5)
ALBUMIN/GLOB SERPL: 0.8 (ref 1.1–2.2)
ALP SERPL-CCNC: 296 U/L (ref 45–117)
ALT SERPL-CCNC: 16 U/L (ref 12–78)
ANION GAP SERPL CALC-SCNC: 5 MMOL/L (ref 2–12)
AST SERPL-CCNC: 62 U/L (ref 15–37)
BASOPHILS # BLD: 0.1 K/UL (ref 0–0.1)
BASOPHILS NFR BLD: 1 % (ref 0–1)
BILIRUB SERPL-MCNC: 0.2 MG/DL (ref 0.2–1)
BUN SERPL-MCNC: 14 MG/DL (ref 6–20)
BUN/CREAT SERPL: 11 (ref 12–20)
CALCIUM SERPL-MCNC: 9.6 MG/DL (ref 8.5–10.1)
CHLORIDE SERPL-SCNC: 106 MMOL/L (ref 97–108)
CO2 SERPL-SCNC: 24 MMOL/L (ref 21–32)
COMMENT:: NORMAL
CREAT SERPL-MCNC: 1.3 MG/DL (ref 0.7–1.3)
DIFFERENTIAL METHOD BLD: ABNORMAL
EOSINOPHIL # BLD: 0.3 K/UL (ref 0–0.4)
EOSINOPHIL NFR BLD: 3 % (ref 0–7)
ERYTHROCYTE [DISTWIDTH] IN BLOOD BY AUTOMATED COUNT: 15 % (ref 11.5–14.5)
GLOBULIN SER CALC-MCNC: 4.9 G/DL (ref 2–4)
GLUCOSE SERPL-MCNC: 79 MG/DL (ref 65–100)
HCT VFR BLD AUTO: 46.6 % (ref 36.6–50.3)
HGB BLD-MCNC: 14.9 G/DL (ref 12.1–17)
IMM GRANULOCYTES # BLD AUTO: 0.2 K/UL (ref 0–0.04)
IMM GRANULOCYTES NFR BLD AUTO: 2 % (ref 0–0.5)
LIPASE SERPL-CCNC: 52 U/L (ref 13–75)
LYMPHOCYTES # BLD: 2 K/UL (ref 0.8–3.5)
LYMPHOCYTES NFR BLD: 16 % (ref 12–49)
MCH RBC QN AUTO: 32.3 PG (ref 26–34)
MCHC RBC AUTO-ENTMCNC: 32 G/DL (ref 30–36.5)
MCV RBC AUTO: 101.1 FL (ref 80–99)
MONOCYTES # BLD: 0.5 K/UL (ref 0–1)
MONOCYTES NFR BLD: 4 % (ref 5–13)
NEUTS SEG # BLD: 9.2 K/UL (ref 1.8–8)
NEUTS SEG NFR BLD: 74 % (ref 32–75)
NRBC # BLD: 0.02 K/UL (ref 0–0.01)
NRBC BLD-RTO: 0.2 PER 100 WBC
NT PRO BNP: 384 PG/ML
PLATELET # BLD AUTO: 174 K/UL (ref 150–400)
PMV BLD AUTO: 10.3 FL (ref 8.9–12.9)
POTASSIUM SERPL-SCNC: 3.4 MMOL/L (ref 3.5–5.1)
PROT SERPL-MCNC: 8.6 G/DL (ref 6.4–8.2)
RBC # BLD AUTO: 4.61 M/UL (ref 4.1–5.7)
SODIUM SERPL-SCNC: 135 MMOL/L (ref 136–145)
SPECIMEN HOLD: NORMAL
TROPONIN I SERPL HS-MCNC: 151 NG/L (ref 0–76)
TROPONIN I SERPL HS-MCNC: 178 NG/L (ref 0–76)
WBC # BLD AUTO: 12.2 K/UL (ref 4.1–11.1)

## 2024-12-22 PROCEDURE — 2580000003 HC RX 258

## 2024-12-22 PROCEDURE — 2060000000 HC ICU INTERMEDIATE R&B

## 2024-12-22 PROCEDURE — 85025 COMPLETE CBC W/AUTO DIFF WBC: CPT

## 2024-12-22 PROCEDURE — 83690 ASSAY OF LIPASE: CPT

## 2024-12-22 PROCEDURE — 83880 ASSAY OF NATRIURETIC PEPTIDE: CPT

## 2024-12-22 PROCEDURE — 6370000000 HC RX 637 (ALT 250 FOR IP): Performed by: INTERNAL MEDICINE

## 2024-12-22 PROCEDURE — 99285 EMERGENCY DEPT VISIT HI MDM: CPT

## 2024-12-22 PROCEDURE — 93005 ELECTROCARDIOGRAM TRACING: CPT

## 2024-12-22 PROCEDURE — 96365 THER/PROPH/DIAG IV INF INIT: CPT

## 2024-12-22 PROCEDURE — 6360000004 HC RX CONTRAST MEDICATION: Performed by: EMERGENCY MEDICINE

## 2024-12-22 PROCEDURE — 84484 ASSAY OF TROPONIN QUANT: CPT

## 2024-12-22 PROCEDURE — 6370000000 HC RX 637 (ALT 250 FOR IP)

## 2024-12-22 PROCEDURE — 96368 THER/DIAG CONCURRENT INF: CPT

## 2024-12-22 PROCEDURE — 96375 TX/PRO/DX INJ NEW DRUG ADDON: CPT

## 2024-12-22 PROCEDURE — 0FB23ZX EXCISION OF LEFT LOBE LIVER, PERCUTANEOUS APPROACH, DIAGNOSTIC: ICD-10-PCS | Performed by: RADIOLOGY

## 2024-12-22 PROCEDURE — 96367 TX/PROPH/DG ADDL SEQ IV INF: CPT

## 2024-12-22 PROCEDURE — 71046 X-RAY EXAM CHEST 2 VIEWS: CPT

## 2024-12-22 PROCEDURE — 80053 COMPREHEN METABOLIC PANEL: CPT

## 2024-12-22 PROCEDURE — 74177 CT ABD & PELVIS W/CONTRAST: CPT

## 2024-12-22 PROCEDURE — 36415 COLL VENOUS BLD VENIPUNCTURE: CPT

## 2024-12-22 PROCEDURE — 6360000002 HC RX W HCPCS

## 2024-12-22 PROCEDURE — 87040 BLOOD CULTURE FOR BACTERIA: CPT

## 2024-12-22 RX ORDER — LORAZEPAM 2 MG/ML
1 INJECTION INTRAMUSCULAR PRN
Status: DISCONTINUED | OUTPATIENT
Start: 2024-12-22 | End: 2024-12-22

## 2024-12-22 RX ORDER — IOPAMIDOL 755 MG/ML
100 INJECTION, SOLUTION INTRAVASCULAR
Status: COMPLETED | OUTPATIENT
Start: 2024-12-22 | End: 2024-12-22

## 2024-12-22 RX ORDER — ACETAMINOPHEN 325 MG/1
650 TABLET ORAL
Status: COMPLETED | OUTPATIENT
Start: 2024-12-22 | End: 2024-12-22

## 2024-12-22 RX ORDER — VANCOMYCIN 1.25 G/250ML
1750 INJECTION, SOLUTION INTRAVENOUS ONCE
Status: DISCONTINUED | OUTPATIENT
Start: 2024-12-22 | End: 2024-12-22 | Stop reason: DRUGHIGH

## 2024-12-22 RX ORDER — HYDROMORPHONE HYDROCHLORIDE 1 MG/ML
0.5 INJECTION, SOLUTION INTRAMUSCULAR; INTRAVENOUS; SUBCUTANEOUS
Status: COMPLETED | OUTPATIENT
Start: 2024-12-22 | End: 2024-12-22

## 2024-12-22 RX ORDER — OXYCODONE HYDROCHLORIDE 5 MG/1
5 TABLET ORAL EVERY 4 HOURS PRN
Status: DISCONTINUED | OUTPATIENT
Start: 2024-12-22 | End: 2024-12-24

## 2024-12-22 RX ORDER — LORAZEPAM 2 MG/ML
1 INJECTION INTRAMUSCULAR ONCE
Status: COMPLETED | OUTPATIENT
Start: 2024-12-22 | End: 2024-12-23

## 2024-12-22 RX ORDER — HYDROMORPHONE HYDROCHLORIDE 1 MG/ML
1 INJECTION, SOLUTION INTRAMUSCULAR; INTRAVENOUS; SUBCUTANEOUS EVERY 4 HOURS PRN
Status: DISCONTINUED | OUTPATIENT
Start: 2024-12-22 | End: 2024-12-24

## 2024-12-22 RX ORDER — MORPHINE SULFATE 4 MG/ML
4 INJECTION, SOLUTION INTRAMUSCULAR; INTRAVENOUS
Status: COMPLETED | OUTPATIENT
Start: 2024-12-22 | End: 2024-12-22

## 2024-12-22 RX ORDER — POTASSIUM CHLORIDE 750 MG/1
40 TABLET, EXTENDED RELEASE ORAL ONCE
Status: COMPLETED | OUTPATIENT
Start: 2024-12-22 | End: 2024-12-22

## 2024-12-22 RX ORDER — NICOTINE 21 MG/24HR
1 PATCH, TRANSDERMAL 24 HOURS TRANSDERMAL DAILY
Status: DISCONTINUED | OUTPATIENT
Start: 2024-12-22 | End: 2025-01-01 | Stop reason: HOSPADM

## 2024-12-22 RX ADMIN — POTASSIUM CHLORIDE 40 MEQ: 750 TABLET, FILM COATED, EXTENDED RELEASE ORAL at 23:51

## 2024-12-22 RX ADMIN — IOPAMIDOL 100 ML: 755 INJECTION, SOLUTION INTRAVENOUS at 19:26

## 2024-12-22 RX ADMIN — PIPERACILLIN AND TAZOBACTAM 4500 MG: 4; .5 INJECTION, POWDER, FOR SOLUTION INTRAVENOUS at 21:52

## 2024-12-22 RX ADMIN — HYDROMORPHONE HYDROCHLORIDE 0.5 MG: 1 INJECTION, SOLUTION INTRAMUSCULAR; INTRAVENOUS; SUBCUTANEOUS at 21:52

## 2024-12-22 RX ADMIN — ACETAMINOPHEN 650 MG: 325 TABLET ORAL at 20:05

## 2024-12-22 RX ADMIN — MORPHINE SULFATE 4 MG: 4 INJECTION, SOLUTION INTRAMUSCULAR; INTRAVENOUS at 20:52

## 2024-12-22 RX ADMIN — SODIUM CHLORIDE 1250 MG: 9 INJECTION, SOLUTION INTRAVENOUS at 22:34

## 2024-12-22 ASSESSMENT — PAIN DESCRIPTION - LOCATION
LOCATION: ABDOMEN
LOCATION: LEG;HIP
LOCATION: ABDOMEN;HIP
LOCATION: ABDOMEN;HIP;KNEE

## 2024-12-22 ASSESSMENT — PAIN SCALES - GENERAL
PAINLEVEL_OUTOF10: 9
PAINLEVEL_OUTOF10: 10

## 2024-12-22 ASSESSMENT — PAIN DESCRIPTION - ONSET: ONSET: ON-GOING

## 2024-12-22 ASSESSMENT — PAIN DESCRIPTION - DESCRIPTORS
DESCRIPTORS: DISCOMFORT;CRUSHING;CRAMPING
DESCRIPTORS: ACHING;STABBING;THROBBING
DESCRIPTORS: ACHING;SHARP
DESCRIPTORS: CRAMPING

## 2024-12-22 ASSESSMENT — PAIN - FUNCTIONAL ASSESSMENT
PAIN_FUNCTIONAL_ASSESSMENT: PREVENTS OR INTERFERES SOME ACTIVE ACTIVITIES AND ADLS
PAIN_FUNCTIONAL_ASSESSMENT: 0-10
PAIN_FUNCTIONAL_ASSESSMENT: ACTIVITIES ARE NOT PREVENTED

## 2024-12-22 ASSESSMENT — PAIN DESCRIPTION - ORIENTATION
ORIENTATION: RIGHT;LEFT;LOWER
ORIENTATION: RIGHT
ORIENTATION: LEFT;RIGHT;MID
ORIENTATION: LEFT;RIGHT;LOWER

## 2024-12-22 ASSESSMENT — PAIN DESCRIPTION - PAIN TYPE: TYPE: ACUTE PAIN

## 2024-12-22 ASSESSMENT — PAIN DESCRIPTION - FREQUENCY: FREQUENCY: CONTINUOUS

## 2024-12-22 NOTE — ED TRIAGE NOTES
Pt comes in with from with CC of 10 recent falls and right sided hip pain. Pt states when he stands up his leg gives out. Pt also has abd pain. States he has Chron's disease. States he recently has had SOB.    Unable to get weight in triage due pt saying he cannot stand. Pt does not know his weight. Will need a weight on a stretcher.

## 2024-12-22 NOTE — ED NOTES
H/o Crohn's, burning abd pain over past few days, worse after eating with increasing shortness of breath.  R hip pain after fall 3 weeks ago with increasing severity as well.      5:40 PM  I have evaluated the patient as the Provider in Rapid Medical Evaluation (RME). I have reviewed his vital signs and the triage nurse assessment. I have talked with the patient and any available family and advised that I am the provider in triage and have ordered the appropriate study to initiate their work up based on the clinical presentation during my assessment. I have advised that the patient will be accommodated in the Main ED as soon as possible. I have also requested to contact the triage nurse or myself immediately if the patient experiences any changes in their condition during this brief waiting period.  CAMILLA Pearson, Jeyson HARRIS PA-C  12/23/24 0239

## 2024-12-23 ENCOUNTER — APPOINTMENT (OUTPATIENT)
Facility: HOSPITAL | Age: 64
DRG: 435 | End: 2024-12-23
Payer: MEDICARE

## 2024-12-23 ENCOUNTER — APPOINTMENT (OUTPATIENT)
Facility: HOSPITAL | Age: 64
DRG: 435 | End: 2024-12-23
Attending: INTERNAL MEDICINE
Payer: MEDICARE

## 2024-12-23 PROBLEM — A41.9 SEPTICEMIA (HCC): Status: ACTIVE | Noted: 2024-12-23

## 2024-12-23 PROBLEM — D72.829 LEUKOCYTOSIS: Status: ACTIVE | Noted: 2024-12-23

## 2024-12-23 LAB
ALBUMIN SERPL-MCNC: 2.7 G/DL (ref 3.5–5)
ALBUMIN/GLOB SERPL: 0.8 (ref 1.1–2.2)
ALP SERPL-CCNC: 232 U/L (ref 45–117)
ALT SERPL-CCNC: 14 U/L (ref 12–78)
AMPHET UR QL SCN: NEGATIVE
ANION GAP BLD CALC-SCNC: 16 (ref 10–20)
ANION GAP BLD CALC-SCNC: 7 (ref 10–20)
ANION GAP SERPL CALC-SCNC: 4 MMOL/L (ref 2–12)
APPEARANCE UR: CLEAR
AST SERPL-CCNC: 61 U/L (ref 15–37)
BACTERIA URNS QL MICRO: NEGATIVE /HPF
BARBITURATES UR QL SCN: NEGATIVE
BASE DEFICIT BLD-SCNC: 17.5 MMOL/L
BASE EXCESS BLD CALC-SCNC: 0 MMOL/L
BASOPHILS # BLD: 0 K/UL (ref 0–0.1)
BASOPHILS NFR BLD: 0 % (ref 0–1)
BENZODIAZ UR QL: NEGATIVE
BILIRUB SERPL-MCNC: 0.2 MG/DL (ref 0.2–1)
BILIRUB UR QL: NEGATIVE
BUN SERPL-MCNC: 12 MG/DL (ref 6–20)
BUN/CREAT SERPL: 11 (ref 12–20)
CA-I BLD-MCNC: 0.52 MMOL/L (ref 1.15–1.33)
CA-I BLD-MCNC: 1.14 MMOL/L (ref 1.15–1.33)
CALCIUM SERPL-MCNC: 8.6 MG/DL (ref 8.5–10.1)
CANNABINOIDS UR QL SCN: NEGATIVE
CHLORIDE BLD-SCNC: 110 MMOL/L (ref 100–111)
CHLORIDE BLD-SCNC: 131 MMOL/L (ref 100–111)
CHLORIDE SERPL-SCNC: 109 MMOL/L (ref 97–108)
CO2 BLD-SCNC: 25 MMOL/L (ref 22–29)
CO2 BLD-SCNC: 8 MMOL/L (ref 22–29)
CO2 SERPL-SCNC: 24 MMOL/L (ref 21–32)
COCAINE UR QL SCN: NEGATIVE
COLOR UR: ABNORMAL
COMMENT:: NORMAL
CREAT SERPL-MCNC: 1.12 MG/DL (ref 0.7–1.3)
CREAT UR-MCNC: 0.5 MG/DL (ref 0.6–1.3)
CREAT UR-MCNC: 1.1 MG/DL (ref 0.6–1.3)
CRP SERPL-MCNC: 6.31 MG/DL (ref 0–0.3)
DIFFERENTIAL METHOD BLD: ABNORMAL
ECHO AV AREA PEAK VELOCITY: 3.2 CM2
ECHO AV PEAK GRADIENT: 4 MMHG
ECHO AV PEAK VELOCITY: 0.9 M/S
ECHO AV VELOCITY RATIO: 1
ECHO LV E' LATERAL VELOCITY: 10.76 CM/S
ECHO LV E' SEPTAL VELOCITY: 8.72 CM/S
ECHO LV EF PHYSICIAN: 55 %
ECHO LV FRACTIONAL SHORTENING: 42 % (ref 28–44)
ECHO LV INTERNAL DIMENSION DIASTOLIC: 4.5 CM (ref 4.2–5.9)
ECHO LV INTERNAL DIMENSION SYSTOLIC: 2.6 CM
ECHO LV IVSD: 0.7 CM (ref 0.6–1)
ECHO LV MASS 2D: 123.1 G (ref 88–224)
ECHO LV POSTERIOR WALL DIASTOLIC: 1 CM (ref 0.6–1)
ECHO LV RELATIVE WALL THICKNESS RATIO: 0.44
ECHO LVOT AREA: 3.5 CM2
ECHO LVOT DIAM: 2.1 CM
ECHO LVOT PEAK GRADIENT: 3 MMHG
ECHO LVOT PEAK VELOCITY: 0.9 M/S
ECHO MV E VELOCITY: 0.67 M/S
ECHO MV E/E' LATERAL: 6.23
ECHO MV E/E' RATIO (AVERAGED): 6.96
ECHO MV E/E' SEPTAL: 7.68
ECHO RV TAPSE: 1.9 CM (ref 1.7–?)
EKG ATRIAL RATE: 98 BPM
EKG DIAGNOSIS: NORMAL
EKG P AXIS: 54 DEGREES
EKG P-R INTERVAL: 156 MS
EKG Q-T INTERVAL: 334 MS
EKG QRS DURATION: 72 MS
EKG QTC CALCULATION (BAZETT): 426 MS
EKG R AXIS: 59 DEGREES
EKG T AXIS: 26 DEGREES
EKG VENTRICULAR RATE: 98 BPM
EOSINOPHIL # BLD: 0.4 K/UL (ref 0–0.4)
EOSINOPHIL NFR BLD: 4 % (ref 0–7)
EPITH CASTS URNS QL MICRO: ABNORMAL /LPF
ERYTHROCYTE [DISTWIDTH] IN BLOOD BY AUTOMATED COUNT: 15.5 % (ref 11.5–14.5)
GLOBULIN SER CALC-MCNC: 3.5 G/DL (ref 2–4)
GLUCOSE BLD STRIP.AUTO-MCNC: 30 MG/DL (ref 74–99)
GLUCOSE BLD STRIP.AUTO-MCNC: 70 MG/DL (ref 74–99)
GLUCOSE BLD STRIP.AUTO-MCNC: 77 MG/DL (ref 65–117)
GLUCOSE SERPL-MCNC: 77 MG/DL (ref 65–100)
GLUCOSE UR STRIP.AUTO-MCNC: NEGATIVE MG/DL
HCO3 BLDA-SCNC: 26 MMOL/L
HCO3 BLDA-SCNC: 9 MMOL/L
HCT VFR BLD AUTO: 37.7 % (ref 36.6–50.3)
HGB BLD-MCNC: 12.2 G/DL (ref 12.1–17)
HGB UR QL STRIP: NEGATIVE
IMM GRANULOCYTES # BLD AUTO: 0.1 K/UL (ref 0–0.04)
IMM GRANULOCYTES NFR BLD AUTO: 1 % (ref 0–0.5)
KETONES UR QL STRIP.AUTO: NEGATIVE MG/DL
LACTATE BLD-SCNC: 0.46 MMOL/L (ref 0.4–2)
LACTATE BLD-SCNC: 1.15 MMOL/L (ref 0.4–2)
LEUKOCYTE ESTERASE UR QL STRIP.AUTO: NEGATIVE
LIPASE SERPL-CCNC: 36 U/L (ref 13–75)
LYMPHOCYTES # BLD: 1.4 K/UL (ref 0.8–3.5)
LYMPHOCYTES NFR BLD: 15 % (ref 12–49)
Lab: ABNORMAL
MAGNESIUM SERPL-MCNC: 2 MG/DL (ref 1.6–2.4)
MCH RBC QN AUTO: 32.8 PG (ref 26–34)
MCHC RBC AUTO-ENTMCNC: 32.4 G/DL (ref 30–36.5)
MCV RBC AUTO: 101.3 FL (ref 80–99)
METHADONE UR QL: NEGATIVE
MONOCYTES # BLD: 0.4 K/UL (ref 0–1)
MONOCYTES NFR BLD: 5 % (ref 5–13)
NEUTS SEG # BLD: 6.9 K/UL (ref 1.8–8)
NEUTS SEG NFR BLD: 75 % (ref 32–75)
NITRITE UR QL STRIP.AUTO: NEGATIVE
NRBC # BLD: 0.02 K/UL (ref 0–0.01)
NRBC BLD-RTO: 0.2 PER 100 WBC
OPIATES UR QL: POSITIVE
PCO2 BLDV: 21.5 MMHG (ref 41–51)
PCO2 BLDV: 44.5 MMHG (ref 41–51)
PCP UR QL: NEGATIVE
PH BLDV: 7.22 (ref 7.32–7.42)
PH BLDV: 7.37 (ref 7.32–7.42)
PH UR STRIP: 5 (ref 5–8)
PHOSPHATE SERPL-MCNC: 3.3 MG/DL (ref 2.6–4.7)
PLATELET # BLD AUTO: 179 K/UL (ref 150–400)
PMV BLD AUTO: 11 FL (ref 8.9–12.9)
PO2 BLDV: 27 MMHG (ref 25–40)
PO2 BLDV: 70 MMHG (ref 25–40)
POTASSIUM BLD-SCNC: 4.7 MMOL/L (ref 3.5–5.5)
POTASSIUM BLD-SCNC: <1.5 MMOL/L (ref 3.5–5.5)
POTASSIUM SERPL-SCNC: 4.6 MMOL/L (ref 3.5–5.1)
PROT SERPL-MCNC: 6.2 G/DL (ref 6.4–8.2)
PROT UR STRIP-MCNC: ABNORMAL MG/DL
RBC # BLD AUTO: 3.72 M/UL (ref 4.1–5.7)
RBC #/AREA URNS HPF: ABNORMAL /HPF (ref 0–5)
SAO2 % BLD: 49 % (ref 94–98)
SAO2 % BLD: 91 % (ref 94–98)
SERVICE CMNT-IMP: NORMAL
SODIUM BLD-SCNC: 142 MMOL/L (ref 136–145)
SODIUM BLD-SCNC: 155 MMOL/L (ref 136–145)
SODIUM SERPL-SCNC: 137 MMOL/L (ref 136–145)
SP GR UR REFRACTOMETRY: 1.01 (ref 1–1.03)
SPECIMEN HOLD: NORMAL
SPECIMEN HOLD: NORMAL
SPECIMEN SITE: ABNORMAL
SPECIMEN SITE: ABNORMAL
TROPONIN I SERPL HS-MCNC: 144 NG/L (ref 0–76)
TSH SERPL DL<=0.05 MIU/L-ACNC: 0.95 UIU/ML (ref 0.36–3.74)
UROBILINOGEN UR QL STRIP.AUTO: 0.2 EU/DL (ref 0.2–1)
WBC # BLD AUTO: 9.3 K/UL (ref 4.1–11.1)
WBC URNS QL MICRO: ABNORMAL /HPF (ref 0–4)

## 2024-12-23 PROCEDURE — 6360000004 HC RX CONTRAST MEDICATION: Performed by: RADIOLOGY

## 2024-12-23 PROCEDURE — 82947 ASSAY GLUCOSE BLOOD QUANT: CPT

## 2024-12-23 PROCEDURE — 2060000000 HC ICU INTERMEDIATE R&B

## 2024-12-23 PROCEDURE — 86140 C-REACTIVE PROTEIN: CPT

## 2024-12-23 PROCEDURE — A9579 GAD-BASE MR CONTRAST NOS,1ML: HCPCS | Performed by: INTERNAL MEDICINE

## 2024-12-23 PROCEDURE — 86301 IMMUNOASSAY TUMOR CA 19-9: CPT

## 2024-12-23 PROCEDURE — 6360000002 HC RX W HCPCS: Performed by: NURSE PRACTITIONER

## 2024-12-23 PROCEDURE — 6360000004 HC RX CONTRAST MEDICATION: Performed by: INTERNAL MEDICINE

## 2024-12-23 PROCEDURE — 87070 CULTURE OTHR SPECIMN AEROBIC: CPT

## 2024-12-23 PROCEDURE — 99223 1ST HOSP IP/OBS HIGH 75: CPT | Performed by: INTERNAL MEDICINE

## 2024-12-23 PROCEDURE — 6370000000 HC RX 637 (ALT 250 FOR IP): Performed by: INTERNAL MEDICINE

## 2024-12-23 PROCEDURE — 85025 COMPLETE CBC W/AUTO DIFF WBC: CPT

## 2024-12-23 PROCEDURE — 2709999900 MRI ABDOMEN W WO CONTRAST

## 2024-12-23 PROCEDURE — 84132 ASSAY OF SERUM POTASSIUM: CPT

## 2024-12-23 PROCEDURE — C8929 TTE W OR WO FOL WCON,DOPPLER: HCPCS

## 2024-12-23 PROCEDURE — 71275 CT ANGIOGRAPHY CHEST: CPT

## 2024-12-23 PROCEDURE — 2500000003 HC RX 250 WO HCPCS: Performed by: INTERNAL MEDICINE

## 2024-12-23 PROCEDURE — 82330 ASSAY OF CALCIUM: CPT

## 2024-12-23 PROCEDURE — 93010 ELECTROCARDIOGRAM REPORT: CPT | Performed by: SPECIALIST

## 2024-12-23 PROCEDURE — 2580000003 HC RX 258: Performed by: INTERNAL MEDICINE

## 2024-12-23 PROCEDURE — 84443 ASSAY THYROID STIM HORMONE: CPT

## 2024-12-23 PROCEDURE — 6360000002 HC RX W HCPCS: Performed by: INTERNAL MEDICINE

## 2024-12-23 PROCEDURE — 84100 ASSAY OF PHOSPHORUS: CPT

## 2024-12-23 PROCEDURE — 76937 US GUIDE VASCULAR ACCESS: CPT

## 2024-12-23 PROCEDURE — 36415 COLL VENOUS BLD VENIPUNCTURE: CPT

## 2024-12-23 PROCEDURE — 6370000000 HC RX 637 (ALT 250 FOR IP): Performed by: NURSE PRACTITIONER

## 2024-12-23 PROCEDURE — 73502 X-RAY EXAM HIP UNI 2-3 VIEWS: CPT

## 2024-12-23 PROCEDURE — 84484 ASSAY OF TROPONIN QUANT: CPT

## 2024-12-23 PROCEDURE — 81001 URINALYSIS AUTO W/SCOPE: CPT

## 2024-12-23 PROCEDURE — 80307 DRUG TEST PRSMV CHEM ANLYZR: CPT

## 2024-12-23 PROCEDURE — 82803 BLOOD GASES ANY COMBINATION: CPT

## 2024-12-23 PROCEDURE — 83735 ASSAY OF MAGNESIUM: CPT

## 2024-12-23 PROCEDURE — 87205 SMEAR GRAM STAIN: CPT

## 2024-12-23 PROCEDURE — 82962 GLUCOSE BLOOD TEST: CPT

## 2024-12-23 PROCEDURE — 83690 ASSAY OF LIPASE: CPT

## 2024-12-23 PROCEDURE — 87176 TISSUE HOMOGENIZATION CULTR: CPT

## 2024-12-23 PROCEDURE — 80053 COMPREHEN METABOLIC PANEL: CPT

## 2024-12-23 PROCEDURE — 87102 FUNGUS ISOLATION CULTURE: CPT

## 2024-12-23 PROCEDURE — 84295 ASSAY OF SERUM SODIUM: CPT

## 2024-12-23 PROCEDURE — 70450 CT HEAD/BRAIN W/O DYE: CPT

## 2024-12-23 RX ORDER — PANTOPRAZOLE SODIUM 40 MG/1
40 TABLET, DELAYED RELEASE ORAL
Status: DISCONTINUED | OUTPATIENT
Start: 2024-12-23 | End: 2025-01-01 | Stop reason: HOSPADM

## 2024-12-23 RX ORDER — OCTREOTIDE ACETATE 50 UG/ML
50 INJECTION, SOLUTION INTRAVENOUS; SUBCUTANEOUS EVERY 12 HOURS
Status: DISCONTINUED | OUTPATIENT
Start: 2024-12-23 | End: 2025-01-01 | Stop reason: HOSPADM

## 2024-12-23 RX ORDER — IOPAMIDOL 755 MG/ML
100 INJECTION, SOLUTION INTRAVASCULAR
Status: COMPLETED | OUTPATIENT
Start: 2024-12-23 | End: 2024-12-23

## 2024-12-23 RX ORDER — ACETAMINOPHEN 650 MG/1
650 SUPPOSITORY RECTAL EVERY 6 HOURS PRN
Status: DISCONTINUED | OUTPATIENT
Start: 2024-12-23 | End: 2024-12-26

## 2024-12-23 RX ORDER — MAGNESIUM SULFATE IN WATER 40 MG/ML
2000 INJECTION, SOLUTION INTRAVENOUS PRN
Status: DISCONTINUED | OUTPATIENT
Start: 2024-12-23 | End: 2025-01-01 | Stop reason: HOSPADM

## 2024-12-23 RX ORDER — POTASSIUM CHLORIDE 7.45 MG/ML
10 INJECTION INTRAVENOUS PRN
Status: DISCONTINUED | OUTPATIENT
Start: 2024-12-23 | End: 2025-01-01 | Stop reason: HOSPADM

## 2024-12-23 RX ORDER — POTASSIUM CHLORIDE 750 MG/1
40 TABLET, EXTENDED RELEASE ORAL ONCE
Status: COMPLETED | OUTPATIENT
Start: 2024-12-23 | End: 2024-12-23

## 2024-12-23 RX ORDER — DIPHENOXYLATE HYDROCHLORIDE AND ATROPINE SULFATE 2.5; .025 MG/1; MG/1
1 TABLET ORAL 4 TIMES DAILY PRN
Status: DISCONTINUED | OUTPATIENT
Start: 2024-12-23 | End: 2025-01-01 | Stop reason: HOSPADM

## 2024-12-23 RX ORDER — ONDANSETRON 2 MG/ML
4 INJECTION INTRAMUSCULAR; INTRAVENOUS EVERY 6 HOURS PRN
Status: DISCONTINUED | OUTPATIENT
Start: 2024-12-23 | End: 2025-01-01 | Stop reason: HOSPADM

## 2024-12-23 RX ORDER — ENOXAPARIN SODIUM 100 MG/ML
30 INJECTION SUBCUTANEOUS 2 TIMES DAILY
Status: DISCONTINUED | OUTPATIENT
Start: 2024-12-23 | End: 2024-12-26 | Stop reason: DRUGHIGH

## 2024-12-23 RX ORDER — SODIUM CHLORIDE 0.9 % (FLUSH) 0.9 %
5-40 SYRINGE (ML) INJECTION PRN
Status: DISCONTINUED | OUTPATIENT
Start: 2024-12-23 | End: 2025-01-01 | Stop reason: HOSPADM

## 2024-12-23 RX ORDER — LORAZEPAM 0.5 MG/1
0.5 TABLET ORAL EVERY 6 HOURS PRN
Status: DISCONTINUED | OUTPATIENT
Start: 2024-12-23 | End: 2025-01-01 | Stop reason: HOSPADM

## 2024-12-23 RX ORDER — MEMANTINE HYDROCHLORIDE 10 MG/1
5 TABLET ORAL DAILY
Status: DISCONTINUED | OUTPATIENT
Start: 2024-12-23 | End: 2025-01-01 | Stop reason: HOSPADM

## 2024-12-23 RX ORDER — METRONIDAZOLE 500 MG/100ML
500 INJECTION, SOLUTION INTRAVENOUS EVERY 8 HOURS
Status: DISCONTINUED | OUTPATIENT
Start: 2024-12-23 | End: 2024-12-23

## 2024-12-23 RX ORDER — HYDROMORPHONE HYDROCHLORIDE 1 MG/ML
1 INJECTION, SOLUTION INTRAMUSCULAR; INTRAVENOUS; SUBCUTANEOUS ONCE
Status: COMPLETED | OUTPATIENT
Start: 2024-12-23 | End: 2024-12-23

## 2024-12-23 RX ORDER — ACETAMINOPHEN 325 MG/1
650 TABLET ORAL EVERY 6 HOURS PRN
Status: DISCONTINUED | OUTPATIENT
Start: 2024-12-23 | End: 2024-12-26

## 2024-12-23 RX ORDER — LEVOFLOXACIN 5 MG/ML
750 INJECTION, SOLUTION INTRAVENOUS EVERY 24 HOURS
Status: DISCONTINUED | OUTPATIENT
Start: 2024-12-23 | End: 2024-12-23

## 2024-12-23 RX ORDER — TIZANIDINE 2 MG/1
2 TABLET ORAL EVERY 8 HOURS PRN
Status: DISCONTINUED | OUTPATIENT
Start: 2024-12-23 | End: 2025-01-01 | Stop reason: HOSPADM

## 2024-12-23 RX ORDER — DIPHENOXYLATE HYDROCHLORIDE AND ATROPINE SULFATE 2.5; .025 MG/1; MG/1
TABLET ORAL
COMMUNITY

## 2024-12-23 RX ORDER — IPRATROPIUM BROMIDE AND ALBUTEROL SULFATE 2.5; .5 MG/3ML; MG/3ML
1 SOLUTION RESPIRATORY (INHALATION) EVERY 4 HOURS PRN
Status: DISCONTINUED | OUTPATIENT
Start: 2024-12-23 | End: 2025-01-01 | Stop reason: HOSPADM

## 2024-12-23 RX ORDER — DICYCLOMINE HCL 20 MG
20 TABLET ORAL EVERY 6 HOURS
Status: DISCONTINUED | OUTPATIENT
Start: 2024-12-23 | End: 2025-01-01 | Stop reason: HOSPADM

## 2024-12-23 RX ORDER — SODIUM CHLORIDE 9 MG/ML
INJECTION, SOLUTION INTRAVENOUS PRN
Status: DISCONTINUED | OUTPATIENT
Start: 2024-12-23 | End: 2025-01-01 | Stop reason: HOSPADM

## 2024-12-23 RX ORDER — ONDANSETRON 4 MG/1
4 TABLET, ORALLY DISINTEGRATING ORAL EVERY 8 HOURS PRN
Status: DISCONTINUED | OUTPATIENT
Start: 2024-12-23 | End: 2025-01-01 | Stop reason: HOSPADM

## 2024-12-23 RX ORDER — OCTREOTIDE ACETATE 50 UG/ML
30 INJECTION, SOLUTION INTRAVENOUS; SUBCUTANEOUS
COMMUNITY
Start: 2024-09-30

## 2024-12-23 RX ORDER — METOPROLOL TARTRATE 25 MG/1
12.5 TABLET, FILM COATED ORAL EVERY 12 HOURS
Status: DISCONTINUED | OUTPATIENT
Start: 2024-12-23 | End: 2025-01-01 | Stop reason: HOSPADM

## 2024-12-23 RX ORDER — GABAPENTIN 300 MG/1
300 CAPSULE ORAL 3 TIMES DAILY
Status: DISCONTINUED | OUTPATIENT
Start: 2024-12-23 | End: 2025-01-01 | Stop reason: HOSPADM

## 2024-12-23 RX ORDER — POTASSIUM CHLORIDE 750 MG/1
40 TABLET, EXTENDED RELEASE ORAL PRN
Status: DISCONTINUED | OUTPATIENT
Start: 2024-12-23 | End: 2025-01-01 | Stop reason: HOSPADM

## 2024-12-23 RX ORDER — SODIUM CHLORIDE 0.9 % (FLUSH) 0.9 %
5-40 SYRINGE (ML) INJECTION EVERY 12 HOURS SCHEDULED
Status: DISCONTINUED | OUTPATIENT
Start: 2024-12-23 | End: 2025-01-01 | Stop reason: HOSPADM

## 2024-12-23 RX ORDER — POLYETHYLENE GLYCOL 3350 17 G/17G
17 POWDER, FOR SOLUTION ORAL DAILY PRN
Status: DISCONTINUED | OUTPATIENT
Start: 2024-12-23 | End: 2025-01-01 | Stop reason: HOSPADM

## 2024-12-23 RX ADMIN — DICYCLOMINE HYDROCHLORIDE 20 MG: 20 TABLET ORAL at 20:41

## 2024-12-23 RX ADMIN — OCTREOTIDE ACETATE 50 MCG: 50 INJECTION, SOLUTION INTRAVENOUS; SUBCUTANEOUS at 09:37

## 2024-12-23 RX ADMIN — HYDROMORPHONE HYDROCHLORIDE 1 MG: 1 INJECTION, SOLUTION INTRAMUSCULAR; INTRAVENOUS; SUBCUTANEOUS at 09:33

## 2024-12-23 RX ADMIN — SODIUM CHLORIDE, PRESERVATIVE FREE 10 ML: 5 INJECTION INTRAVENOUS at 09:36

## 2024-12-23 RX ADMIN — IOPAMIDOL 80 ML: 755 INJECTION, SOLUTION INTRAVENOUS at 14:11

## 2024-12-23 RX ADMIN — METOPROLOL TARTRATE 12.5 MG: 25 TABLET, FILM COATED ORAL at 14:53

## 2024-12-23 RX ADMIN — POTASSIUM CHLORIDE 40 MEQ: 750 TABLET, FILM COATED, EXTENDED RELEASE ORAL at 04:36

## 2024-12-23 RX ADMIN — GADOTERIDOL 20 ML: 279.3 INJECTION, SOLUTION INTRAVENOUS at 03:34

## 2024-12-23 RX ADMIN — HYDROMORPHONE HYDROCHLORIDE 1 MG: 1 INJECTION, SOLUTION INTRAMUSCULAR; INTRAVENOUS; SUBCUTANEOUS at 17:41

## 2024-12-23 RX ADMIN — ACETAMINOPHEN 650 MG: 325 TABLET ORAL at 22:33

## 2024-12-23 RX ADMIN — DICYCLOMINE HYDROCHLORIDE 20 MG: 20 TABLET ORAL at 14:54

## 2024-12-23 RX ADMIN — PANTOPRAZOLE SODIUM 40 MG: 40 TABLET, DELAYED RELEASE ORAL at 07:04

## 2024-12-23 RX ADMIN — LEVOFLOXACIN 750 MG: 5 INJECTION, SOLUTION INTRAVENOUS at 04:45

## 2024-12-23 RX ADMIN — ENOXAPARIN SODIUM 30 MG: 100 INJECTION SUBCUTANEOUS at 20:41

## 2024-12-23 RX ADMIN — DICYCLOMINE HYDROCHLORIDE 20 MG: 20 TABLET ORAL at 04:37

## 2024-12-23 RX ADMIN — HYDROMORPHONE HYDROCHLORIDE 1 MG: 1 INJECTION, SOLUTION INTRAMUSCULAR; INTRAVENOUS; SUBCUTANEOUS at 11:35

## 2024-12-23 RX ADMIN — GABAPENTIN 300 MG: 100 CAPSULE ORAL at 20:40

## 2024-12-23 RX ADMIN — METRONIDAZOLE 500 MG: 500 INJECTION, SOLUTION INTRAVENOUS at 11:42

## 2024-12-23 RX ADMIN — HYDROMORPHONE HYDROCHLORIDE 1 MG: 1 INJECTION, SOLUTION INTRAMUSCULAR; INTRAVENOUS; SUBCUTANEOUS at 01:47

## 2024-12-23 RX ADMIN — LORAZEPAM 1 MG: 2 INJECTION INTRAMUSCULAR; INTRAVENOUS at 01:55

## 2024-12-23 RX ADMIN — GABAPENTIN 300 MG: 100 CAPSULE ORAL at 14:50

## 2024-12-23 RX ADMIN — OXYCODONE HYDROCHLORIDE 5 MG: 5 TABLET ORAL at 04:36

## 2024-12-23 RX ADMIN — ENOXAPARIN SODIUM 30 MG: 100 INJECTION SUBCUTANEOUS at 09:36

## 2024-12-23 RX ADMIN — LORAZEPAM 0.5 MG: 1 TABLET ORAL at 11:35

## 2024-12-23 RX ADMIN — OCTREOTIDE ACETATE 50 MCG: 50 INJECTION, SOLUTION INTRAVENOUS; SUBCUTANEOUS at 20:41

## 2024-12-23 RX ADMIN — METRONIDAZOLE 500 MG: 500 INJECTION, SOLUTION INTRAVENOUS at 04:42

## 2024-12-23 RX ADMIN — DICYCLOMINE HYDROCHLORIDE 20 MG: 20 TABLET ORAL at 09:34

## 2024-12-23 RX ADMIN — PIPERACILLIN AND TAZOBACTAM 3375 MG: 3; .375 INJECTION, POWDER, LYOPHILIZED, FOR SOLUTION INTRAVENOUS at 20:58

## 2024-12-23 RX ADMIN — OXYCODONE HYDROCHLORIDE 5 MG: 5 TABLET ORAL at 22:31

## 2024-12-23 RX ADMIN — GABAPENTIN 300 MG: 100 CAPSULE ORAL at 09:34

## 2024-12-23 RX ADMIN — SODIUM CHLORIDE, PRESERVATIVE FREE 10 ML: 5 INJECTION INTRAVENOUS at 20:42

## 2024-12-23 RX ADMIN — HYDROMORPHONE HYDROCHLORIDE 1 MG: 1 INJECTION, SOLUTION INTRAMUSCULAR; INTRAVENOUS; SUBCUTANEOUS at 20:41

## 2024-12-23 RX ADMIN — HYDROMORPHONE HYDROCHLORIDE 1 MG: 1 INJECTION, SOLUTION INTRAMUSCULAR; INTRAVENOUS; SUBCUTANEOUS at 05:46

## 2024-12-23 RX ADMIN — MEMANTINE 5 MG: 5 TABLET ORAL at 09:34

## 2024-12-23 RX ADMIN — HYDROMORPHONE HYDROCHLORIDE 1 MG: 1 INJECTION, SOLUTION INTRAMUSCULAR; INTRAVENOUS; SUBCUTANEOUS at 16:46

## 2024-12-23 RX ADMIN — OXYCODONE HYDROCHLORIDE 5 MG: 5 TABLET ORAL at 00:44

## 2024-12-23 RX ADMIN — OXYCODONE HYDROCHLORIDE 5 MG: 5 TABLET ORAL at 15:03

## 2024-12-23 RX ADMIN — HYDROMORPHONE HYDROCHLORIDE 1 MG: 1 INJECTION, SOLUTION INTRAMUSCULAR; INTRAVENOUS; SUBCUTANEOUS at 13:22

## 2024-12-23 RX ADMIN — PIPERACILLIN AND TAZOBACTAM 3375 MG: 3; .375 INJECTION, POWDER, LYOPHILIZED, FOR SOLUTION INTRAVENOUS at 15:01

## 2024-12-23 RX ADMIN — METOPROLOL TARTRATE 12.5 MG: 25 TABLET, FILM COATED ORAL at 04:38

## 2024-12-23 RX ADMIN — PERFLUTREN 1.5 ML: 6.52 INJECTION, SUSPENSION INTRAVENOUS at 12:29

## 2024-12-23 RX ADMIN — ACETAMINOPHEN 650 MG: 325 TABLET ORAL at 07:16

## 2024-12-23 ASSESSMENT — PAIN SCALES - GENERAL
PAINLEVEL_OUTOF10: 10
PAINLEVEL_OUTOF10: 10
PAINLEVEL_OUTOF10: 9
PAINLEVEL_OUTOF10: 9
PAINLEVEL_OUTOF10: 8
PAINLEVEL_OUTOF10: 10
PAINLEVEL_OUTOF10: 10
PAINLEVEL_OUTOF10: 9
PAINLEVEL_OUTOF10: 8
PAINLEVEL_OUTOF10: 10
PAINLEVEL_OUTOF10: 10

## 2024-12-23 ASSESSMENT — PAIN DESCRIPTION - LOCATION
LOCATION: ABDOMEN;HIP
LOCATION: ABDOMEN;HIP
LOCATION: ABDOMEN
LOCATION: ABDOMEN;HIP
LOCATION: ABDOMEN
LOCATION: HIP;LEG

## 2024-12-23 ASSESSMENT — PAIN - FUNCTIONAL ASSESSMENT
PAIN_FUNCTIONAL_ASSESSMENT: PREVENTS OR INTERFERES SOME ACTIVE ACTIVITIES AND ADLS
PAIN_FUNCTIONAL_ASSESSMENT: ACTIVITIES ARE NOT PREVENTED
PAIN_FUNCTIONAL_ASSESSMENT: PREVENTS OR INTERFERES SOME ACTIVE ACTIVITIES AND ADLS
PAIN_FUNCTIONAL_ASSESSMENT: ACTIVITIES ARE NOT PREVENTED
PAIN_FUNCTIONAL_ASSESSMENT: PREVENTS OR INTERFERES SOME ACTIVE ACTIVITIES AND ADLS

## 2024-12-23 ASSESSMENT — PAIN DESCRIPTION - DESCRIPTORS
DESCRIPTORS: STABBING
DESCRIPTORS: ACHING;STABBING;THROBBING
DESCRIPTORS: THROBBING;STABBING;ACHING
DESCRIPTORS: THROBBING;SHARP;NAGGING
DESCRIPTORS: CRAMPING;PRESSURE
DESCRIPTORS: CRAMPING
DESCRIPTORS: ACHING
DESCRIPTORS: SQUEEZING;ACHING

## 2024-12-23 ASSESSMENT — PAIN SCALES - WONG BAKER
WONGBAKER_NUMERICALRESPONSE: HURTS A LITTLE BIT

## 2024-12-23 ASSESSMENT — PAIN DESCRIPTION - ORIENTATION
ORIENTATION: MID;RIGHT;LEFT
ORIENTATION: MID;RIGHT;LEFT
ORIENTATION: RIGHT;MID;LEFT
ORIENTATION: RIGHT;LEFT;MID
ORIENTATION: MID
ORIENTATION: RIGHT;LEFT
ORIENTATION: RIGHT;LEFT;MID

## 2024-12-23 NOTE — ED PROVIDER NOTES
[facility] EMERGENCY DEP  EMERGENCY DEPARTMENT ENCOUNTER      Pt Name: Vahe Shaver  MRN: [id number]  Birthdate 1960  Date of evaluation: 12/22/2024  Provider: Jeyson Couch PA-C    CHIEF COMPLAINT       Chief Complaint   Patient presents with    Fall    Abdominal Pain         HISTORY OF PRESENT ILLNESS   (Location/Symptom, Timing/Onset, Context/Setting, Quality, Duration, Modifying Factors, Severity)  Note limiting factors.   64-year-old male with history of Crohn's, bowel obstruction, short-bowel syndrome, GERD, hypertension, cavitary lesion of the lung, reports to ED with right hip pain after ground-level fall approximately 3 weeks ago as well as \"burning abdominal pain\" and minor increase in shortness of breath over past few days.  Endorses occasional right hip swelling, though seems to come and go with right hip pain as well as occasional experiences of \"leg giving out.\"  Denies fever, chest pain, urinary complaints, N/V/D, or distal numbness/weakness of right leg.  Denies hitting his head, neck, or experiencing LOC during his ground-level fall.                         Review of External Medical Records:     Nursing Notes were reviewed.    REVIEW OF SYSTEMS    (2-9 systems for level 4, 10 or more for level 5)     Review of Systems   All other systems reviewed and are negative.      Except as noted above the remainder of the review of systems was reviewed and negative.       PAST MEDICAL HISTORY     Past Medical History:   Diagnosis Date    Abdominal wall hernia 6/15/2012    Anal fissure     Anemia     Anemia     Anxiety and depression     Arthritis     Related to the Crohn's disease.    Cancer (HCC)     MELANOMA HEAD AND FACE    Chronic pain     GENERALIZED    COPD (chronic obstructive pulmonary disease) (HCC)     Crohn disease (HCC)     Diagnosed at age 18.    Echocardiogram abnormal 07/2015    Esophageal ulcer     GERD (gastroesophageal reflux disease)     H/O blood transfusion reaction 2013

## 2024-12-23 NOTE — ED NOTES
Initial POCT Venous Blood Gas & Electrolyte drawn at 0044 by PCT deemed to be inaccurate confirmed by glucometer. POCT re-drawn at 0100 by this RN. Charge RN and MD made aware.

## 2024-12-23 NOTE — ED NOTES
Bedside and Verbal shift change report given to VANDANA Harris (oncoming nurse) by VANDANA Garcia (offgoing nurse). Report included the following information Nurse Handoff Report, Index, ED Encounter Summary, ED SBAR, Adult Overview, MAR, Recent Results, Neuro Assessment, and Event Log.

## 2024-12-23 NOTE — ED NOTES
Bedside and Verbal shift change report given to VANDANA Urena (oncoming nurse) by VANDANA Harris (offgoing nurse). Report included the following information Nurse Handoff Report, Index, ED Encounter Summary, ED SBAR, and Adult Overview.

## 2024-12-23 NOTE — ED NOTES
0900- patient taken to CT, he states he does not want to get his CT done until he has pain medication.     0945- patient medicated for pain, CT made aware.    1015- patient taken to CT a second time, he states he is refusing to have the CT done until he receives medication for anxiety and claustrophobia. MD made aware     1300- Patient refusing CT again without more pain medication, MD aware

## 2024-12-23 NOTE — H&P
History and Physical    Date of Service:  12/23/2024  Primary Care Provider: Ryan Gunn MD  Source of information: The patient and review of EMR    Chief Complaint: Fall and Abdominal Pain      History of Presenting Illness:   Vahe Shaver is a 64 y.o. male with past medical history significant for Crohn's disease, chronic pain syndrome, hypertension, COPD presented at the emergency room with abdominal pain.  Patient reported multiple falls at home due to lower extremity weakness.  Following the most recent fall patient developed right hip pain which is constant with radiation to the abdomen, sharp, 8/10 in severity, no known aggravating or relieving factors.  Patient denies  fever, rigors and chills.  Patient was last admitted to this hospital from 9/6/2024 to 9/10/2024 patient was admitted and treated for Crohn's disease exacerbation.  CT scan of the abdomen and pelvis done on arrival at the emergency room showed new masslike hepatic hypodensities suspicious for metastatic disease/hepatic abscess.  Patient was started on vancomycin and Zosyn and was referred to the hospitalist service for admission.         REVIEW OF SYSTEMS:  REVIEW OF SYSTEMS:  HEAD, EYES, EARS, NOSE AND THROAT:  No headache.  No dizziness.  No blurring of vision.  No photophobia.  RESPIRATORY SYSTEM: Positive for shortness of breath .  No cough.  No hemoptysis.  CARDIOVASCULAR SYSTEM: No chest pain.  No orthopnea.  No palpitations.  GASTROINTESTINAL SYSTEM: Positive for abdominal pain no nausea or vomiting.  No diarrhea.  No constipation.  GENITOURINARY SYSTEM:  No dysuria, no urgency, and no frequency.     All other systems are reviewed and they are negative.        Past Medical History:   Diagnosis Date    Abdominal wall hernia 6/15/2012    Anal fissure     Anemia     Anemia     Anxiety and depression     Arthritis     Related to the Crohn's disease.    Cancer (HCC)     MELANOMA HEAD AND FACE    Chronic pain

## 2024-12-23 NOTE — PROCEDURES
PROCEDURE NOTE  Date: 12/23/2024   Name: Vahe Shaver  YOB: 1960    Procedures  Ultrasound guided peripheral IV placed on right mid forearm. RN made aware. See LDA.    Mamie Bowen RN  VAT

## 2024-12-23 NOTE — ED NOTES
ED TO INPATIENT SBAR HANDOFF    Patient Name: Vahe Shaver   :  1960  64 y.o.   MRN:  [id number]  ED Room #:  ER24/24     Situation  Code Status: Full Code   Allergies: Honey, Infliximab, Rosuvastatin, Azathioprine, Mercaptopurine, and Sulfa antibiotics  Weight: Patient Vitals for the past 96 hrs (Last 3 readings):   Weight   24 2106 102 kg (224 lb 13.9 oz)       Arrived from: home    Chief Complaint:   Chief Complaint   Patient presents with    Fall    Abdominal Pain       Hospital Problem/Diagnosis:  Principal Problem:    Hypodense mass of liver  Resolved Problems:    * No resolved hospital problems. *      Mobility: limited bed mobility   ED Fall Risk: Presents to emergency department  because of falls (Syncope, seizure, or loss of consciousness): Yes, Age > 70: No, Altered Mental Status, Intoxication with alcohol or substance confusion (Disorientation, impaired judgment, poor safety awaremess, or inability to follow instructions): No, Impaired Mobility: Ambulates or transfers with assistive devices or assistance; Unable to ambulate or transer.: Yes, Nursing Judgement: Yes   Fell in ED or prior to admission: no   Restraints: no     Sitter: no   Family/Caregiver Present: yes    Neet to know social/safety information: na    Background  History:   Past Medical History:   Diagnosis Date    Abdominal wall hernia 6/15/2012    Anal fissure     Anemia     Anemia     Anxiety and depression     Arthritis     Related to the Crohn's disease.    Cancer (HCC)     MELANOMA HEAD AND FACE    Chronic pain     GENERALIZED    COPD (chronic obstructive pulmonary disease) (HCC)     Crohn disease (HCC)     Diagnosed at age 18.    Echocardiogram abnormal 2015    Esophageal ulcer     GERD (gastroesophageal reflux disease)     H/O blood transfusion reaction     [facility]    Hypertension     denies    Migraine     Short bowel syndrome     Ventral hernia without obstruction or gangrene

## 2024-12-24 ENCOUNTER — APPOINTMENT (OUTPATIENT)
Facility: HOSPITAL | Age: 64
DRG: 435 | End: 2024-12-24
Payer: MEDICARE

## 2024-12-24 LAB
ALBUMIN SERPL-MCNC: 2.7 G/DL (ref 3.5–5)
ALBUMIN/GLOB SERPL: 0.8 (ref 1.1–2.2)
ALP SERPL-CCNC: 189 U/L (ref 45–117)
ALT SERPL-CCNC: 13 U/L (ref 12–78)
ANION GAP SERPL CALC-SCNC: 5 MMOL/L (ref 2–12)
AST SERPL-CCNC: 48 U/L (ref 15–37)
BACTERIA SPEC CULT: ABNORMAL
BACTERIA SPEC CULT: ABNORMAL
BILIRUB SERPL-MCNC: 0.4 MG/DL (ref 0.2–1)
BUN SERPL-MCNC: 9 MG/DL (ref 6–20)
BUN/CREAT SERPL: 8 (ref 12–20)
CALCIUM SERPL-MCNC: 8.7 MG/DL (ref 8.5–10.1)
CANCER AG19-9 SERPL-ACNC: 5068 U/ML (ref 0–35)
CHLORIDE SERPL-SCNC: 104 MMOL/L (ref 97–108)
CO2 SERPL-SCNC: 25 MMOL/L (ref 21–32)
CREAT SERPL-MCNC: 1.09 MG/DL (ref 0.7–1.3)
ERYTHROCYTE [DISTWIDTH] IN BLOOD BY AUTOMATED COUNT: 14.9 % (ref 11.5–14.5)
GLOBULIN SER CALC-MCNC: 3.4 G/DL (ref 2–4)
GLUCOSE SERPL-MCNC: 106 MG/DL (ref 65–100)
HCT VFR BLD AUTO: 35.7 % (ref 36.6–50.3)
HGB BLD-MCNC: 11.7 G/DL (ref 12.1–17)
INR PPP: 1.2 (ref 0.9–1.1)
MCH RBC QN AUTO: 32.9 PG (ref 26–34)
MCHC RBC AUTO-ENTMCNC: 32.8 G/DL (ref 30–36.5)
MCV RBC AUTO: 100.3 FL (ref 80–99)
NRBC # BLD: 0.02 K/UL (ref 0–0.01)
NRBC BLD-RTO: 0.2 PER 100 WBC
PLATELET # BLD AUTO: 149 K/UL (ref 150–400)
PMV BLD AUTO: 10.7 FL (ref 8.9–12.9)
POTASSIUM SERPL-SCNC: 4 MMOL/L (ref 3.5–5.1)
PROT SERPL-MCNC: 6.1 G/DL (ref 6.4–8.2)
PROTHROMBIN TIME: 12.1 SEC (ref 9–11.1)
PSA SERPL-MCNC: 0.1 NG/ML (ref 0.01–4)
RBC # BLD AUTO: 3.56 M/UL (ref 4.1–5.7)
SERVICE CMNT-IMP: ABNORMAL
SODIUM SERPL-SCNC: 134 MMOL/L (ref 136–145)
WBC # BLD AUTO: 8.6 K/UL (ref 4.1–11.1)

## 2024-12-24 PROCEDURE — 84153 ASSAY OF PSA TOTAL: CPT

## 2024-12-24 PROCEDURE — 88341 IMHCHEM/IMCYTCHM EA ADD ANTB: CPT

## 2024-12-24 PROCEDURE — 99232 SBSQ HOSP IP/OBS MODERATE 35: CPT | Performed by: INTERNAL MEDICINE

## 2024-12-24 PROCEDURE — 2580000003 HC RX 258: Performed by: INTERNAL MEDICINE

## 2024-12-24 PROCEDURE — 6360000002 HC RX W HCPCS: Performed by: NURSE PRACTITIONER

## 2024-12-24 PROCEDURE — 6360000002 HC RX W HCPCS: Performed by: INTERNAL MEDICINE

## 2024-12-24 PROCEDURE — 6370000000 HC RX 637 (ALT 250 FOR IP): Performed by: INTERNAL MEDICINE

## 2024-12-24 PROCEDURE — 88334 PATH CONSLTJ SURG CYTO XM EA: CPT

## 2024-12-24 PROCEDURE — 2060000000 HC ICU INTERMEDIATE R&B

## 2024-12-24 PROCEDURE — 85027 COMPLETE CBC AUTOMATED: CPT

## 2024-12-24 PROCEDURE — 2500000003 HC RX 250 WO HCPCS: Performed by: INTERNAL MEDICINE

## 2024-12-24 PROCEDURE — 6370000000 HC RX 637 (ALT 250 FOR IP): Performed by: NURSE PRACTITIONER

## 2024-12-24 PROCEDURE — 85610 PROTHROMBIN TIME: CPT

## 2024-12-24 PROCEDURE — 99223 1ST HOSP IP/OBS HIGH 75: CPT | Performed by: NURSE PRACTITIONER

## 2024-12-24 PROCEDURE — 47000 NEEDLE BIOPSY OF LIVER PERQ: CPT

## 2024-12-24 PROCEDURE — 36415 COLL VENOUS BLD VENIPUNCTURE: CPT

## 2024-12-24 PROCEDURE — 6360000002 HC RX W HCPCS

## 2024-12-24 PROCEDURE — 88342 IMHCHEM/IMCYTCHM 1ST ANTB: CPT

## 2024-12-24 PROCEDURE — 88307 TISSUE EXAM BY PATHOLOGIST: CPT

## 2024-12-24 PROCEDURE — 80053 COMPREHEN METABOLIC PANEL: CPT

## 2024-12-24 PROCEDURE — 88333 PATH CONSLTJ SURG CYTO XM 1: CPT

## 2024-12-24 RX ORDER — LIDOCAINE HYDROCHLORIDE 10 MG/ML
INJECTION, SOLUTION EPIDURAL; INFILTRATION; INTRACAUDAL; PERINEURAL PRN
Status: COMPLETED | OUTPATIENT
Start: 2024-12-24 | End: 2024-12-24

## 2024-12-24 RX ORDER — HYDROMORPHONE HYDROCHLORIDE 1 MG/ML
2 INJECTION, SOLUTION INTRAMUSCULAR; INTRAVENOUS; SUBCUTANEOUS EVERY 4 HOURS PRN
Status: DISCONTINUED | OUTPATIENT
Start: 2024-12-24 | End: 2024-12-25

## 2024-12-24 RX ORDER — HYDROMORPHONE HYDROCHLORIDE 1 MG/ML
1 INJECTION, SOLUTION INTRAMUSCULAR; INTRAVENOUS; SUBCUTANEOUS
Status: DISCONTINUED | OUTPATIENT
Start: 2024-12-24 | End: 2024-12-24

## 2024-12-24 RX ORDER — HYDROMORPHONE HYDROCHLORIDE 1 MG/ML
1 INJECTION, SOLUTION INTRAMUSCULAR; INTRAVENOUS; SUBCUTANEOUS ONCE
Status: COMPLETED | OUTPATIENT
Start: 2024-12-24 | End: 2024-12-24

## 2024-12-24 RX ORDER — FENTANYL CITRATE 50 UG/ML
INJECTION, SOLUTION INTRAMUSCULAR; INTRAVENOUS PRN
Status: COMPLETED | OUTPATIENT
Start: 2024-12-24 | End: 2024-12-24

## 2024-12-24 RX ORDER — OXYCODONE HYDROCHLORIDE 5 MG/1
10 TABLET ORAL EVERY 4 HOURS PRN
Status: DISCONTINUED | OUTPATIENT
Start: 2024-12-24 | End: 2024-12-24

## 2024-12-24 RX ORDER — HYDROCODONE BITARTRATE AND ACETAMINOPHEN 7.5; 325 MG/15ML; MG/15ML
15 SOLUTION ORAL EVERY 4 HOURS PRN
Status: DISCONTINUED | OUTPATIENT
Start: 2024-12-24 | End: 2024-12-26

## 2024-12-24 RX ORDER — MIDAZOLAM HYDROCHLORIDE 2 MG/2ML
INJECTION, SOLUTION INTRAMUSCULAR; INTRAVENOUS PRN
Status: COMPLETED | OUTPATIENT
Start: 2024-12-24 | End: 2024-12-24

## 2024-12-24 RX ORDER — LIDOCAINE 4 G/G
3 PATCH TOPICAL DAILY
Status: DISCONTINUED | OUTPATIENT
Start: 2024-12-24 | End: 2025-01-01 | Stop reason: HOSPADM

## 2024-12-24 RX ADMIN — GABAPENTIN 300 MG: 100 CAPSULE ORAL at 20:12

## 2024-12-24 RX ADMIN — OCTREOTIDE ACETATE 50 MCG: 50 INJECTION, SOLUTION INTRAVENOUS; SUBCUTANEOUS at 07:17

## 2024-12-24 RX ADMIN — PANTOPRAZOLE SODIUM 40 MG: 40 TABLET, DELAYED RELEASE ORAL at 06:11

## 2024-12-24 RX ADMIN — GABAPENTIN 300 MG: 100 CAPSULE ORAL at 08:24

## 2024-12-24 RX ADMIN — HYDROMORPHONE HYDROCHLORIDE 1 MG: 1 INJECTION, SOLUTION INTRAMUSCULAR; INTRAVENOUS; SUBCUTANEOUS at 12:17

## 2024-12-24 RX ADMIN — HYDROMORPHONE HYDROCHLORIDE 2 MG: 1 INJECTION, SOLUTION INTRAMUSCULAR; INTRAVENOUS; SUBCUTANEOUS at 20:20

## 2024-12-24 RX ADMIN — SODIUM CHLORIDE, PRESERVATIVE FREE 10 ML: 5 INJECTION INTRAVENOUS at 21:35

## 2024-12-24 RX ADMIN — SODIUM CHLORIDE, PRESERVATIVE FREE 10 ML: 5 INJECTION INTRAVENOUS at 08:24

## 2024-12-24 RX ADMIN — HYDROMORPHONE HYDROCHLORIDE 1 MG: 1 INJECTION, SOLUTION INTRAMUSCULAR; INTRAVENOUS; SUBCUTANEOUS at 07:46

## 2024-12-24 RX ADMIN — HYDROMORPHONE HYDROCHLORIDE 2 MG: 1 INJECTION, SOLUTION INTRAMUSCULAR; INTRAVENOUS; SUBCUTANEOUS at 15:56

## 2024-12-24 RX ADMIN — OXYCODONE HYDROCHLORIDE 5 MG: 5 TABLET ORAL at 09:22

## 2024-12-24 RX ADMIN — PIPERACILLIN AND TAZOBACTAM 3375 MG: 3; .375 INJECTION, POWDER, LYOPHILIZED, FOR SOLUTION INTRAVENOUS at 13:07

## 2024-12-24 RX ADMIN — OCTREOTIDE ACETATE 50 MCG: 50 INJECTION, SOLUTION INTRAVENOUS; SUBCUTANEOUS at 19:07

## 2024-12-24 RX ADMIN — HYDROMORPHONE HYDROCHLORIDE 1 MG: 1 INJECTION, SOLUTION INTRAMUSCULAR; INTRAVENOUS; SUBCUTANEOUS at 03:45

## 2024-12-24 RX ADMIN — FENTANYL CITRATE 25 MCG: 50 INJECTION INTRAMUSCULAR; INTRAVENOUS at 10:40

## 2024-12-24 RX ADMIN — DICYCLOMINE HYDROCHLORIDE 20 MG: 20 TABLET ORAL at 21:34

## 2024-12-24 RX ADMIN — GABAPENTIN 300 MG: 100 CAPSULE ORAL at 13:52

## 2024-12-24 RX ADMIN — LIDOCAINE HYDROCHLORIDE 10 ML: 10 INJECTION, SOLUTION EPIDURAL; INFILTRATION; INTRACAUDAL; PERINEURAL at 10:42

## 2024-12-24 RX ADMIN — MEMANTINE 5 MG: 5 TABLET ORAL at 08:24

## 2024-12-24 RX ADMIN — HYDROCODONE BITARTRATE AND ACETAMINOPHEN 15 ML: 7.5; 325 SOLUTION ORAL at 18:05

## 2024-12-24 RX ADMIN — DICYCLOMINE HYDROCHLORIDE 20 MG: 20 TABLET ORAL at 03:41

## 2024-12-24 RX ADMIN — DIPHENOXYLATE HYDROCHLORIDE AND ATROPINE SULFATE 1 TABLET: 2.5; .025 TABLET ORAL at 08:24

## 2024-12-24 RX ADMIN — DIPHENOXYLATE HYDROCHLORIDE AND ATROPINE SULFATE 1 TABLET: 2.5; .025 TABLET ORAL at 13:52

## 2024-12-24 RX ADMIN — HYDROMORPHONE HYDROCHLORIDE 1 MG: 1 INJECTION, SOLUTION INTRAMUSCULAR; INTRAVENOUS; SUBCUTANEOUS at 00:06

## 2024-12-24 RX ADMIN — MIDAZOLAM HYDROCHLORIDE 0.5 MG: 1 INJECTION, SOLUTION INTRAMUSCULAR; INTRAVENOUS at 10:44

## 2024-12-24 RX ADMIN — FENTANYL CITRATE 25 MCG: 50 INJECTION INTRAMUSCULAR; INTRAVENOUS at 10:45

## 2024-12-24 RX ADMIN — HYDROMORPHONE HYDROCHLORIDE 1 MG: 1 INJECTION, SOLUTION INTRAMUSCULAR; INTRAVENOUS; SUBCUTANEOUS at 13:04

## 2024-12-24 RX ADMIN — PIPERACILLIN AND TAZOBACTAM 3375 MG: 3; .375 INJECTION, POWDER, LYOPHILIZED, FOR SOLUTION INTRAVENOUS at 05:27

## 2024-12-24 RX ADMIN — METOPROLOL TARTRATE 12.5 MG: 25 TABLET, FILM COATED ORAL at 03:41

## 2024-12-24 RX ADMIN — PIPERACILLIN AND TAZOBACTAM 3375 MG: 3; .375 INJECTION, POWDER, LYOPHILIZED, FOR SOLUTION INTRAVENOUS at 20:20

## 2024-12-24 RX ADMIN — DICYCLOMINE HYDROCHLORIDE 20 MG: 20 TABLET ORAL at 15:56

## 2024-12-24 RX ADMIN — METOPROLOL TARTRATE 12.5 MG: 25 TABLET, FILM COATED ORAL at 15:56

## 2024-12-24 RX ADMIN — DICYCLOMINE HYDROCHLORIDE 20 MG: 20 TABLET ORAL at 08:24

## 2024-12-24 RX ADMIN — OXYCODONE HYDROCHLORIDE 5 MG: 5 TABLET ORAL at 06:11

## 2024-12-24 RX ADMIN — ENOXAPARIN SODIUM 30 MG: 100 INJECTION SUBCUTANEOUS at 20:24

## 2024-12-24 RX ADMIN — TIZANIDINE 2 MG: 2 TABLET ORAL at 13:52

## 2024-12-24 RX ADMIN — MIDAZOLAM HYDROCHLORIDE 1 MG: 1 INJECTION, SOLUTION INTRAMUSCULAR; INTRAVENOUS at 10:40

## 2024-12-24 ASSESSMENT — PAIN DESCRIPTION - ONSET
ONSET: ON-GOING

## 2024-12-24 ASSESSMENT — PAIN DESCRIPTION - DESCRIPTORS
DESCRIPTORS: ACHING;STABBING
DESCRIPTORS: ACHING
DESCRIPTORS: ACHING;DISCOMFORT
DESCRIPTORS: ACHING

## 2024-12-24 ASSESSMENT — PAIN DESCRIPTION - ORIENTATION
ORIENTATION: RIGHT
ORIENTATION: ANTERIOR;RIGHT;LEFT
ORIENTATION: RIGHT

## 2024-12-24 ASSESSMENT — PAIN DESCRIPTION - LOCATION
LOCATION: LEG
LOCATION: HIP
LOCATION: HIP
LOCATION: LEG;KNEE
LOCATION: BACK;HIP;ABDOMEN
LOCATION: HIP
LOCATION: BACK;HIP;ABDOMEN
LOCATION: LEG;KNEE;HIP
LOCATION: LEG
LOCATION: BACK;HIP;ABDOMEN

## 2024-12-24 ASSESSMENT — PAIN SCALES - GENERAL
PAINLEVEL_OUTOF10: 8
PAINLEVEL_OUTOF10: 9
PAINLEVEL_OUTOF10: 10
PAINLEVEL_OUTOF10: 9
PAINLEVEL_OUTOF10: 8
PAINLEVEL_OUTOF10: 10
PAINLEVEL_OUTOF10: 8
PAINLEVEL_OUTOF10: 9
PAINLEVEL_OUTOF10: 9
PAINLEVEL_OUTOF10: 10
PAINLEVEL_OUTOF10: 10

## 2024-12-24 ASSESSMENT — PAIN DESCRIPTION - PAIN TYPE
TYPE: ACUTE PAIN

## 2024-12-24 ASSESSMENT — PAIN SCALES - WONG BAKER
WONGBAKER_NUMERICALRESPONSE: HURTS A LITTLE BIT
WONGBAKER_NUMERICALRESPONSE: HURTS A LITTLE BIT
WONGBAKER_NUMERICALRESPONSE: HURTS WHOLE LOT
WONGBAKER_NUMERICALRESPONSE: HURTS WHOLE LOT
WONGBAKER_NUMERICALRESPONSE: HURTS LITTLE MORE

## 2024-12-24 ASSESSMENT — PAIN - FUNCTIONAL ASSESSMENT
PAIN_FUNCTIONAL_ASSESSMENT: PREVENTS OR INTERFERES SOME ACTIVE ACTIVITIES AND ADLS
PAIN_FUNCTIONAL_ASSESSMENT: PREVENTS OR INTERFERES SOME ACTIVE ACTIVITIES AND ADLS

## 2024-12-24 ASSESSMENT — PAIN DESCRIPTION - FREQUENCY
FREQUENCY: CONTINUOUS

## 2024-12-24 ASSESSMENT — PAIN DESCRIPTION - DIRECTION: RADIATING_TOWARDS: LEG

## 2024-12-24 NOTE — CONSULTS
S Cardiology Consult Note    Date of consult:  12/23/24  Date of admission: 12/22/2024  Primary Cardiologist: none  Physician Requesting consult: Dr Blackman    CC / Reason for consult:   Chief Complaint   Patient presents with    Fall    Abdominal Pain        History of the presenting illness:  Vahe Shaver is a 64 y.o. M who presented to the [facility] ER with falls and abdominal pain.  He has not had any chest pain.   He is having significant pain in the abdomen and in the right hip.    Has h/o Crohn's.    Past Medical History:   Diagnosis Date    Abdominal wall hernia 6/15/2012    Anal fissure     Anemia     Anemia     Anxiety and depression     Arthritis     Related to the Crohn's disease.    Cancer (HCC)     MELANOMA HEAD AND FACE    Chronic pain     GENERALIZED    COPD (chronic obstructive pulmonary disease) (HCC)     Crohn disease (HCC)     Diagnosed at age 18.    Echocardiogram abnormal 07/2015    Esophageal ulcer     GERD (gastroesophageal reflux disease)     H/O blood transfusion reaction 2013    [facility]    Hypertension     denies    Migraine     Short bowel syndrome     Ventral hernia without obstruction or gangrene        Prior to Admission medications    Medication Sig Start Date End Date Taking? Authorizing Provider   octreotide (SANDOSTATIN) 50 MCG/ML SOLN 30 each Once in the AM and once in the PM 9/30/24  Yes Armando Neumann MD   diphenoxylate-atropine (LOMOTIL) 2.5-0.025 MG per tablet Take 2 tablet by mouth four times a day    Armando Neumann MD   memantine (NAMENDA) 5 MG tablet Take 1 tablet by mouth daily    Armando Neumann MD   dicyclomine (BENTYL) 20 MG tablet Take 1 tablet by mouth every 6 hours 7/26/24   Armando Neumann MD   HYDROcodone-acetaminophen (NORCO) 7.5-325 MG per tablet Take 1 tablet by mouth every 6 hours as needed for Pain.    Armando Neumann MD   adalimumab (HUMIRA, 2 PEN,) 40 MG/0.8ML injection Inject 40 mg into the 
probably reflecting early or mild bowel  inflammation. No obstruction.  3. New focus of hypoenhancement in the right renal cortex of uncertain  significance. Differential includes pyelonephritis and neoplasm. Recommend  correlation with urinalysis, also attention on abdominal MRI.       Imaging personally reviewed by me    Assessment:   1) Multiple liver lesions    MRI reviewed  Metastasis vs abscesses  Discussed with ID  Work up as below      2) Pain  R flank  Due to liver lesions?  Management per primary team for now     3) Bone lesions  Concerning for metastasis  PSA    4) Crohn's Disease  -Last seen by Dr Burris 9/22/2024 for perirectal abscess   -Hospitalized 9/6/2024-9/10/2024 from [facility]         Plan:     PSA and CEA ordered  CA 19-9 pending   Appreciate IR for liver biopsy   Discussed with ID    I appreciate the opportunity to participate in Mr. Vahe CA Shaver's care.     Signed By: Grace Maldonado MD         I personally saw and evaluated the patient and performed the key components of medical decision making.  The history, physical exam, and documentation were performed by  Radha Hays NP.  I reviewed and verified the above documentation and modified it as needed.'  
Well appearing  [] Intubated  []Ill appearing  []Other:  Mental Status: [x] Normal mental status exam  [] Drowsy  [] Confused  [] Alert and NAD []Other:  Cardiovascular: [] Regular rate/rhythm  [] Arrhythmia  [] Other:   [x] deferred  Chest: [x] Effort normal  []Lungs clear  [] Respiratory distress  []Tachypnea  [] Other:  Abdomen: [] Soft/non-tender  [] Normal appearance  [] Distended  [] Ascites  [x] Multiple healed incisions, no umbilicus, visible peristalsis, cauliflower appearing abdomen  Neurological: [x] Normal speech  [] Normal sensation  []Deficits present: [] Unable to assess  Extremity: [] Normal skin color/temp  [] Clubbing/cyanosis  [x] No edema  [] Other:  Back: intact  Knees: without erythema   Hips: no visible abnormalities    Wt Readings from Last 15 Encounters:   12/22/24 102 kg (224 lb 13.9 oz)   09/07/24 93.1 kg (205 lb 4.8 oz)   02/10/23 78.3 kg (172 lb 9.6 oz)   09/08/22 89.1 kg (196 lb 6.9 oz)        Current Diet: Diet NPO       PSYCHOSOCIAL/SPIRITUAL SCREENING:   Palliative IDT has assessed this patient for cultural preferences / practices and a referral made as appropriate to needs (Cultural Services, Patient Advocacy, Ethics, etc.)    Spiritual Affiliation: Baptism    Any spiritual / Druze concerns:  [] Yes /  [x] No  [] Unable to obtain this information  If \"Yes\" to discuss with pastoral care during IDT     Does caregiver feel burdened by caring for their loved one:   [] Yes /  [] No /  [x] No Caregiver Present/Available [] No Caregiver [] Pt Lives at Facility  If \"Yes\" to discuss with social work during IDT    Anticipatory grief assessment:   [] Normal  / [] Maladaptive   [x] Unable to obtain this information  If \"Maladaptive\" to discuss with social work during IDT    ESAS Anxiety: Anxiety Score: Not anxious    ESAS Depression:          LAB AND IMAGING FINDINGS:   Objective data reviewed:  labs, images, records, medication use, vitals, and chart     FINAL COMMENTS   Thank you for 
10.76 cm/s    LV E' Septal Velocity 8.72 cm/s    TAPSE 1.9 1.7 cm    Fractional Shortening 2D 42 28 - 44 %    LV RWT Ratio 0.44     LV Mass 2D 123.1 88 - 224 g    E/E' Ratio (Averaged) 6.96     E/E' Lateral 6.23     E/E' Septal 7.68     LVOT Area 3.5 cm2    AV Velocity Ratio 1.00     EF Physician 55 %       Microbiology Data:       Blood: Gram variable rods 1/4 bottles from 12/22/24 blood cultures    Imaging: Liver masses and spinal masses on MRI although possible abscess    Thank you Dr. Cox for the opportunity to participate in the care of this patient. Please contact with questions or concerns.     A total time of 80 minutes was spent on today's encounter.  Greater than 50% of the time was spent on the following:  Preparing for visit and chart review.  Obtaining and/or reviewing separately obtained history  Performing a medically appropriate exam and/or evaluation  Counseling and educating a patient/family/caregiver as noted above  Placing relevant orders  Referring and communicating with other professionals (not separately reported)  Independently interpreting results (not separately reported) and communicating results to the patient/family/caregiver  Care coordination (not separately reported) as noted above  Documenting clinical information in the electronic health records (e.g. problem list, visit note) on the day of the encounter

## 2024-12-24 NOTE — ACP (ADVANCE CARE PLANNING)
Advance Care Planning     Advance Care Planning (ACP) Physician/NP/PA Conversation    Date of Conversation: 12/22/2024  Conducted with: Patient with Decision Making Capacity  Other persons present: None    Healthcare Decision Maker:   Primary Decision Maker: Desiree Stover - Friend - [phone]     Today we documented Decision Maker(s) consistent with Legal Next of Kin hierarchy.    Care Preferences:    Hospitalization:  \"If your health worsens and it becomes clear that your chance of recovery is unlikely, what would be your preference regarding hospitalization?\"  The patient would prefer hospitalization.    Ventilation:  \"If you were unable to breath on your own and your chance of recovery was unlikely, what would be your preference about the use of a ventilator (breathing machine) if it was available to you?\"  The patient would desire the use of a ventilator.    Resuscitation:  \"In the event your heart stopped as a result of an underlying serious health condition, would you want attempts made to restart your heart, or would you prefer a natural death?\"  Yes, attempt to resuscitate.    treatment goals, benefit/burden of treatment options, ventilation preferences, hospitalization preferences, resuscitation preferences, end of life care preferences (vegetative state/imminent death), and feeding tubes.  Patient asked many questions and for now would like to remain full code until he can have discussion with his friend.  Realistic about low survival rate of cardiac arrest patient with ROSC.     Conversation Outcomes / Follow-Up Plan:  ACP in process - information provided, considering goals and options  Reviewed DNR/DNI and patient elects Full Code (Attempt Resuscitation)    Length of Voluntary ACP Conversation in minutes:  16 minutes    Génesis Diehl, APRN - CNP

## 2024-12-24 NOTE — PALLIATIVE CARE
PALLIATIVE MEDICINE        Appreciate consult.      Arrived to see pt but he is off of the unit.     Another attempt made in an hour but pt still not on the unit. Will follow up December 26th.          Judith Amaya MSN, FNP-BC, ACHPN

## 2024-12-25 LAB
ALBUMIN SERPL-MCNC: 2.6 G/DL (ref 3.5–5)
ALBUMIN/GLOB SERPL: 0.7 (ref 1.1–2.2)
ALP SERPL-CCNC: 190 U/L (ref 45–117)
ALT SERPL-CCNC: 12 U/L (ref 12–78)
ANION GAP SERPL CALC-SCNC: 7 MMOL/L (ref 2–12)
AST SERPL-CCNC: 49 U/L (ref 15–37)
BILIRUB SERPL-MCNC: 0.3 MG/DL (ref 0.2–1)
BUN SERPL-MCNC: 9 MG/DL (ref 6–20)
BUN/CREAT SERPL: 8 (ref 12–20)
CALCIUM SERPL-MCNC: 8.6 MG/DL (ref 8.5–10.1)
CHLORIDE SERPL-SCNC: 103 MMOL/L (ref 97–108)
CO2 SERPL-SCNC: 25 MMOL/L (ref 21–32)
CREAT SERPL-MCNC: 1.09 MG/DL (ref 0.7–1.3)
ERYTHROCYTE [DISTWIDTH] IN BLOOD BY AUTOMATED COUNT: 15 % (ref 11.5–14.5)
GLOBULIN SER CALC-MCNC: 3.5 G/DL (ref 2–4)
GLUCOSE SERPL-MCNC: 113 MG/DL (ref 65–100)
HCT VFR BLD AUTO: 35 % (ref 36.6–50.3)
HGB BLD-MCNC: 11.3 G/DL (ref 12.1–17)
INR PPP: 1.1 (ref 0.9–1.1)
MCH RBC QN AUTO: 32.2 PG (ref 26–34)
MCHC RBC AUTO-ENTMCNC: 32.3 G/DL (ref 30–36.5)
MCV RBC AUTO: 99.7 FL (ref 80–99)
NRBC # BLD: 0 K/UL (ref 0–0.01)
NRBC BLD-RTO: 0 PER 100 WBC
PLATELET # BLD AUTO: 143 K/UL (ref 150–400)
PMV BLD AUTO: 10.5 FL (ref 8.9–12.9)
POTASSIUM SERPL-SCNC: 3.6 MMOL/L (ref 3.5–5.1)
PROT SERPL-MCNC: 6.1 G/DL (ref 6.4–8.2)
PROTHROMBIN TIME: 11.8 SEC (ref 9–11.1)
RBC # BLD AUTO: 3.51 M/UL (ref 4.1–5.7)
SODIUM SERPL-SCNC: 135 MMOL/L (ref 136–145)
WBC # BLD AUTO: 8.6 K/UL (ref 4.1–11.1)

## 2024-12-25 PROCEDURE — 6360000002 HC RX W HCPCS: Performed by: INTERNAL MEDICINE

## 2024-12-25 PROCEDURE — 6370000000 HC RX 637 (ALT 250 FOR IP): Performed by: INTERNAL MEDICINE

## 2024-12-25 PROCEDURE — 80053 COMPREHEN METABOLIC PANEL: CPT

## 2024-12-25 PROCEDURE — 6360000002 HC RX W HCPCS: Performed by: NURSE PRACTITIONER

## 2024-12-25 PROCEDURE — 85027 COMPLETE CBC AUTOMATED: CPT

## 2024-12-25 PROCEDURE — 2500000003 HC RX 250 WO HCPCS: Performed by: INTERNAL MEDICINE

## 2024-12-25 PROCEDURE — 6370000000 HC RX 637 (ALT 250 FOR IP): Performed by: NURSE PRACTITIONER

## 2024-12-25 PROCEDURE — 6360000002 HC RX W HCPCS: Performed by: PHYSICIAN ASSISTANT

## 2024-12-25 PROCEDURE — 2580000003 HC RX 258: Performed by: INTERNAL MEDICINE

## 2024-12-25 PROCEDURE — 85610 PROTHROMBIN TIME: CPT

## 2024-12-25 PROCEDURE — 2060000000 HC ICU INTERMEDIATE R&B

## 2024-12-25 RX ORDER — HYDROMORPHONE HYDROCHLORIDE 1 MG/ML
2 INJECTION, SOLUTION INTRAMUSCULAR; INTRAVENOUS; SUBCUTANEOUS
Status: DISCONTINUED | OUTPATIENT
Start: 2024-12-25 | End: 2024-12-28

## 2024-12-25 RX ADMIN — HYDROMORPHONE HYDROCHLORIDE 2 MG: 1 INJECTION, SOLUTION INTRAMUSCULAR; INTRAVENOUS; SUBCUTANEOUS at 17:33

## 2024-12-25 RX ADMIN — PIPERACILLIN AND TAZOBACTAM 3375 MG: 3; .375 INJECTION, POWDER, LYOPHILIZED, FOR SOLUTION INTRAVENOUS at 21:32

## 2024-12-25 RX ADMIN — HYDROCODONE BITARTRATE AND ACETAMINOPHEN 15 ML: 7.5; 325 SOLUTION ORAL at 02:59

## 2024-12-25 RX ADMIN — HYDROMORPHONE HYDROCHLORIDE 2 MG: 1 INJECTION, SOLUTION INTRAMUSCULAR; INTRAVENOUS; SUBCUTANEOUS at 00:28

## 2024-12-25 RX ADMIN — LORAZEPAM 0.5 MG: 1 TABLET ORAL at 16:52

## 2024-12-25 RX ADMIN — DICYCLOMINE HYDROCHLORIDE 20 MG: 20 TABLET ORAL at 21:08

## 2024-12-25 RX ADMIN — HYDROCODONE BITARTRATE AND ACETAMINOPHEN 15 ML: 7.5; 325 SOLUTION ORAL at 16:53

## 2024-12-25 RX ADMIN — HYDROMORPHONE HYDROCHLORIDE 2 MG: 1 INJECTION, SOLUTION INTRAMUSCULAR; INTRAVENOUS; SUBCUTANEOUS at 15:02

## 2024-12-25 RX ADMIN — HYDROMORPHONE HYDROCHLORIDE 2 MG: 1 INJECTION, SOLUTION INTRAMUSCULAR; INTRAVENOUS; SUBCUTANEOUS at 23:06

## 2024-12-25 RX ADMIN — OCTREOTIDE ACETATE 50 MCG: 50 INJECTION, SOLUTION INTRAVENOUS; SUBCUTANEOUS at 18:46

## 2024-12-25 RX ADMIN — HYDROMORPHONE HYDROCHLORIDE 2 MG: 1 INJECTION, SOLUTION INTRAMUSCULAR; INTRAVENOUS; SUBCUTANEOUS at 04:26

## 2024-12-25 RX ADMIN — MEMANTINE 5 MG: 5 TABLET ORAL at 08:54

## 2024-12-25 RX ADMIN — METOPROLOL TARTRATE 12.5 MG: 25 TABLET, FILM COATED ORAL at 15:53

## 2024-12-25 RX ADMIN — GABAPENTIN 300 MG: 100 CAPSULE ORAL at 21:09

## 2024-12-25 RX ADMIN — METOPROLOL TARTRATE 12.5 MG: 25 TABLET, FILM COATED ORAL at 04:05

## 2024-12-25 RX ADMIN — HYDROMORPHONE HYDROCHLORIDE 2 MG: 1 INJECTION, SOLUTION INTRAMUSCULAR; INTRAVENOUS; SUBCUTANEOUS at 08:54

## 2024-12-25 RX ADMIN — PIPERACILLIN AND TAZOBACTAM 3375 MG: 3; .375 INJECTION, POWDER, LYOPHILIZED, FOR SOLUTION INTRAVENOUS at 05:21

## 2024-12-25 RX ADMIN — OCTREOTIDE ACETATE 50 MCG: 50 INJECTION, SOLUTION INTRAVENOUS; SUBCUTANEOUS at 07:10

## 2024-12-25 RX ADMIN — HYDROMORPHONE HYDROCHLORIDE 2 MG: 1 INJECTION, SOLUTION INTRAMUSCULAR; INTRAVENOUS; SUBCUTANEOUS at 20:05

## 2024-12-25 RX ADMIN — ENOXAPARIN SODIUM 30 MG: 100 INJECTION SUBCUTANEOUS at 08:55

## 2024-12-25 RX ADMIN — DICYCLOMINE HYDROCHLORIDE 20 MG: 20 TABLET ORAL at 04:12

## 2024-12-25 RX ADMIN — GABAPENTIN 300 MG: 100 CAPSULE ORAL at 08:54

## 2024-12-25 RX ADMIN — HYDROCODONE BITARTRATE AND ACETAMINOPHEN 15 ML: 7.5; 325 SOLUTION ORAL at 21:33

## 2024-12-25 RX ADMIN — DICYCLOMINE HYDROCHLORIDE 20 MG: 20 TABLET ORAL at 15:54

## 2024-12-25 RX ADMIN — HYDROCODONE BITARTRATE AND ACETAMINOPHEN 15 ML: 7.5; 325 SOLUTION ORAL at 11:05

## 2024-12-25 RX ADMIN — HYDROCODONE BITARTRATE AND ACETAMINOPHEN 15 ML: 7.5; 325 SOLUTION ORAL at 07:09

## 2024-12-25 RX ADMIN — HYDROMORPHONE HYDROCHLORIDE 2 MG: 1 INJECTION, SOLUTION INTRAMUSCULAR; INTRAVENOUS; SUBCUTANEOUS at 12:33

## 2024-12-25 RX ADMIN — PIPERACILLIN AND TAZOBACTAM 3375 MG: 3; .375 INJECTION, POWDER, LYOPHILIZED, FOR SOLUTION INTRAVENOUS at 14:09

## 2024-12-25 RX ADMIN — SODIUM CHLORIDE, PRESERVATIVE FREE 10 ML: 5 INJECTION INTRAVENOUS at 09:00

## 2024-12-25 RX ADMIN — SODIUM CHLORIDE, PRESERVATIVE FREE 10 ML: 5 INJECTION INTRAVENOUS at 21:09

## 2024-12-25 RX ADMIN — GABAPENTIN 300 MG: 100 CAPSULE ORAL at 14:08

## 2024-12-25 RX ADMIN — PANTOPRAZOLE SODIUM 40 MG: 40 TABLET, DELAYED RELEASE ORAL at 07:10

## 2024-12-25 RX ADMIN — ENOXAPARIN SODIUM 30 MG: 100 INJECTION SUBCUTANEOUS at 21:09

## 2024-12-25 RX ADMIN — DICYCLOMINE HYDROCHLORIDE 20 MG: 20 TABLET ORAL at 08:55

## 2024-12-25 ASSESSMENT — PAIN DESCRIPTION - DESCRIPTORS
DESCRIPTORS: ACHING;BURNING
DESCRIPTORS: ACHING;BURNING;JABBING
DESCRIPTORS: ACHING;BURNING
DESCRIPTORS: ACHING
DESCRIPTORS: ACHING;BURNING
DESCRIPTORS: ACHING;BURNING
DESCRIPTORS: ACHING;BURNING;JABBING

## 2024-12-25 ASSESSMENT — PAIN SCALES - GENERAL
PAINLEVEL_OUTOF10: 10
PAINLEVEL_OUTOF10: 9
PAINLEVEL_OUTOF10: 8
PAINLEVEL_OUTOF10: 9
PAINLEVEL_OUTOF10: 10
PAINLEVEL_OUTOF10: 9
PAINLEVEL_OUTOF10: 10
PAINLEVEL_OUTOF10: 10
PAINLEVEL_OUTOF10: 9
PAINLEVEL_OUTOF10: 10
PAINLEVEL_OUTOF10: 8
PAINLEVEL_OUTOF10: 8
PAINLEVEL_OUTOF10: 7
PAINLEVEL_OUTOF10: 8
PAINLEVEL_OUTOF10: 8

## 2024-12-25 ASSESSMENT — PAIN DESCRIPTION - PAIN TYPE
TYPE: CHRONIC PAIN
TYPE: ACUTE PAIN
TYPE: CHRONIC PAIN

## 2024-12-25 ASSESSMENT — PAIN DESCRIPTION - LOCATION
LOCATION: HIP

## 2024-12-25 ASSESSMENT — PAIN DESCRIPTION - FREQUENCY
FREQUENCY: CONTINUOUS

## 2024-12-25 ASSESSMENT — PAIN DESCRIPTION - ORIENTATION
ORIENTATION: RIGHT

## 2024-12-25 ASSESSMENT — PAIN DESCRIPTION - ONSET
ONSET: ON-GOING

## 2024-12-26 PROBLEM — R79.9 CONTAMINATION OF BLOOD CULTURE: Status: ACTIVE | Noted: 2024-12-26

## 2024-12-26 PROBLEM — Z87.19 HISTORY OF CROHN'S DISEASE: Status: ACTIVE | Noted: 2024-12-26

## 2024-12-26 PROCEDURE — 99232 SBSQ HOSP IP/OBS MODERATE 35: CPT

## 2024-12-26 PROCEDURE — 6370000000 HC RX 637 (ALT 250 FOR IP): Performed by: NURSE PRACTITIONER

## 2024-12-26 PROCEDURE — 6370000000 HC RX 637 (ALT 250 FOR IP): Performed by: INTERNAL MEDICINE

## 2024-12-26 PROCEDURE — 6360000002 HC RX W HCPCS: Performed by: INTERNAL MEDICINE

## 2024-12-26 PROCEDURE — 99232 SBSQ HOSP IP/OBS MODERATE 35: CPT | Performed by: INTERNAL MEDICINE

## 2024-12-26 PROCEDURE — 6360000002 HC RX W HCPCS: Performed by: PHYSICIAN ASSISTANT

## 2024-12-26 PROCEDURE — 2580000003 HC RX 258: Performed by: INTERNAL MEDICINE

## 2024-12-26 PROCEDURE — 99233 SBSQ HOSP IP/OBS HIGH 50: CPT | Performed by: NURSE PRACTITIONER

## 2024-12-26 PROCEDURE — 2060000000 HC ICU INTERMEDIATE R&B

## 2024-12-26 PROCEDURE — 2700000000 HC OXYGEN THERAPY PER DAY

## 2024-12-26 PROCEDURE — 2500000003 HC RX 250 WO HCPCS: Performed by: INTERNAL MEDICINE

## 2024-12-26 PROCEDURE — 94760 N-INVAS EAR/PLS OXIMETRY 1: CPT

## 2024-12-26 PROCEDURE — 6360000002 HC RX W HCPCS: Performed by: FAMILY MEDICINE

## 2024-12-26 RX ORDER — ACETAMINOPHEN 160 MG/5ML
650 SUSPENSION ORAL EVERY 6 HOURS
Status: DISCONTINUED | OUTPATIENT
Start: 2024-12-26 | End: 2024-12-26

## 2024-12-26 RX ORDER — ENOXAPARIN SODIUM 100 MG/ML
40 INJECTION SUBCUTANEOUS DAILY
Status: DISCONTINUED | OUTPATIENT
Start: 2024-12-26 | End: 2025-01-01 | Stop reason: HOSPADM

## 2024-12-26 RX ORDER — ACETAMINOPHEN 160 MG/5ML
650 LIQUID ORAL EVERY 6 HOURS
Status: DISCONTINUED | OUTPATIENT
Start: 2024-12-26 | End: 2025-01-01 | Stop reason: HOSPADM

## 2024-12-26 RX ORDER — OXYCODONE HCL 20 MG/ML
10 CONCENTRATE, ORAL ORAL EVERY 4 HOURS PRN
Status: DISCONTINUED | OUTPATIENT
Start: 2024-12-26 | End: 2024-12-26

## 2024-12-26 RX ORDER — DEXAMETHASONE 1 MG
2 TABLET ORAL DAILY
Status: DISCONTINUED | OUTPATIENT
Start: 2024-12-26 | End: 2024-12-28

## 2024-12-26 RX ORDER — OXYCODONE HCL 5 MG/5 ML
10 SOLUTION, ORAL ORAL EVERY 4 HOURS PRN
Status: DISCONTINUED | OUTPATIENT
Start: 2024-12-26 | End: 2024-12-27

## 2024-12-26 RX ADMIN — METOPROLOL TARTRATE 12.5 MG: 25 TABLET, FILM COATED ORAL at 14:48

## 2024-12-26 RX ADMIN — OCTREOTIDE ACETATE 50 MCG: 50 INJECTION, SOLUTION INTRAVENOUS; SUBCUTANEOUS at 07:05

## 2024-12-26 RX ADMIN — HYDROMORPHONE HYDROCHLORIDE 2 MG: 1 INJECTION, SOLUTION INTRAMUSCULAR; INTRAVENOUS; SUBCUTANEOUS at 02:05

## 2024-12-26 RX ADMIN — GABAPENTIN 300 MG: 100 CAPSULE ORAL at 20:03

## 2024-12-26 RX ADMIN — TIZANIDINE 2 MG: 2 TABLET ORAL at 04:34

## 2024-12-26 RX ADMIN — SODIUM CHLORIDE, PRESERVATIVE FREE 10 ML: 5 INJECTION INTRAVENOUS at 20:07

## 2024-12-26 RX ADMIN — DICYCLOMINE HYDROCHLORIDE 20 MG: 20 TABLET ORAL at 22:52

## 2024-12-26 RX ADMIN — DICYCLOMINE HYDROCHLORIDE 20 MG: 20 TABLET ORAL at 08:30

## 2024-12-26 RX ADMIN — MEMANTINE 5 MG: 5 TABLET ORAL at 08:30

## 2024-12-26 RX ADMIN — DICYCLOMINE HYDROCHLORIDE 20 MG: 20 TABLET ORAL at 04:33

## 2024-12-26 RX ADMIN — HYDROMORPHONE HYDROCHLORIDE 2 MG: 1 INJECTION, SOLUTION INTRAMUSCULAR; INTRAVENOUS; SUBCUTANEOUS at 18:03

## 2024-12-26 RX ADMIN — TIZANIDINE 2 MG: 2 TABLET ORAL at 13:37

## 2024-12-26 RX ADMIN — DEXAMETHASONE 2 MG: 1 TABLET ORAL at 15:25

## 2024-12-26 RX ADMIN — HYDROMORPHONE HYDROCHLORIDE 2 MG: 1 INJECTION, SOLUTION INTRAMUSCULAR; INTRAVENOUS; SUBCUTANEOUS at 21:01

## 2024-12-26 RX ADMIN — METOPROLOL TARTRATE 12.5 MG: 25 TABLET, FILM COATED ORAL at 04:34

## 2024-12-26 RX ADMIN — ACETAMINOPHEN 650 MG: 160 SOLUTION ORAL at 16:35

## 2024-12-26 RX ADMIN — OXYCODONE HYDROCHLORIDE 10 MG: 5 SOLUTION ORAL at 13:25

## 2024-12-26 RX ADMIN — LORAZEPAM 0.5 MG: 1 TABLET ORAL at 13:37

## 2024-12-26 RX ADMIN — HYDROMORPHONE HYDROCHLORIDE 2 MG: 1 INJECTION, SOLUTION INTRAMUSCULAR; INTRAVENOUS; SUBCUTANEOUS at 04:56

## 2024-12-26 RX ADMIN — PIPERACILLIN AND TAZOBACTAM 3375 MG: 3; .375 INJECTION, POWDER, LYOPHILIZED, FOR SOLUTION INTRAVENOUS at 04:40

## 2024-12-26 RX ADMIN — GABAPENTIN 300 MG: 100 CAPSULE ORAL at 08:30

## 2024-12-26 RX ADMIN — HYDROMORPHONE HYDROCHLORIDE 2 MG: 1 INJECTION, SOLUTION INTRAMUSCULAR; INTRAVENOUS; SUBCUTANEOUS at 11:49

## 2024-12-26 RX ADMIN — PIPERACILLIN AND TAZOBACTAM 3375 MG: 3; .375 INJECTION, POWDER, LYOPHILIZED, FOR SOLUTION INTRAVENOUS at 13:37

## 2024-12-26 RX ADMIN — GABAPENTIN 300 MG: 100 CAPSULE ORAL at 13:37

## 2024-12-26 RX ADMIN — ACETAMINOPHEN 650 MG: 650 SOLUTION ORAL at 22:52

## 2024-12-26 RX ADMIN — OCTREOTIDE ACETATE 50 MCG: 50 INJECTION, SOLUTION INTRAVENOUS; SUBCUTANEOUS at 20:03

## 2024-12-26 RX ADMIN — HYDROMORPHONE HYDROCHLORIDE 2 MG: 1 INJECTION, SOLUTION INTRAMUSCULAR; INTRAVENOUS; SUBCUTANEOUS at 08:30

## 2024-12-26 RX ADMIN — ENOXAPARIN SODIUM 40 MG: 100 INJECTION SUBCUTANEOUS at 08:30

## 2024-12-26 RX ADMIN — HYDROMORPHONE HYDROCHLORIDE 2 MG: 1 INJECTION, SOLUTION INTRAMUSCULAR; INTRAVENOUS; SUBCUTANEOUS at 14:48

## 2024-12-26 RX ADMIN — PANTOPRAZOLE SODIUM 40 MG: 40 TABLET, DELAYED RELEASE ORAL at 07:05

## 2024-12-26 RX ADMIN — SODIUM CHLORIDE, PRESERVATIVE FREE 10 ML: 5 INJECTION INTRAVENOUS at 08:31

## 2024-12-26 RX ADMIN — HYDROCODONE BITARTRATE AND ACETAMINOPHEN 15 ML: 7.5; 325 SOLUTION ORAL at 07:05

## 2024-12-26 RX ADMIN — DICYCLOMINE HYDROCHLORIDE 20 MG: 20 TABLET ORAL at 14:48

## 2024-12-26 ASSESSMENT — PAIN SCALES - GENERAL
PAINLEVEL_OUTOF10: 8
PAINLEVEL_OUTOF10: 9
PAINLEVEL_OUTOF10: 8
PAINLEVEL_OUTOF10: 5
PAINLEVEL_OUTOF10: 8
PAINLEVEL_OUTOF10: 2
PAINLEVEL_OUTOF10: 7
PAINLEVEL_OUTOF10: 9
PAINLEVEL_OUTOF10: 9
PAINLEVEL_OUTOF10: 10

## 2024-12-26 ASSESSMENT — PAIN DESCRIPTION - ORIENTATION: ORIENTATION: MID

## 2024-12-26 ASSESSMENT — PAIN DESCRIPTION - LOCATION
LOCATION: HIP
LOCATION: HIP
LOCATION: BACK
LOCATION: HIP;BACK
LOCATION: HIP
LOCATION: HIP

## 2024-12-26 ASSESSMENT — PAIN DESCRIPTION - DESCRIPTORS: DESCRIPTORS: ACHING

## 2024-12-26 NOTE — CARE COORDINATION
Care Management Initial Assessment       RUR:12% Low Risk  Readmission? No  1st IM letter given? Yes - 12/26/2024  1st  letter given: No       12/26/24 1214   Service Assessment   Patient Orientation Alert and Oriented   Cognition Alert   History Provided By Patient   Primary Caregiver Self   Support Systems Friends/Neighbors   Patient's Healthcare Decision Maker is: Named in Scanned ACP Document   PCP Verified by CM Yes   Last Visit to PCP Within last year   Prior Functional Level Independent in ADLs/IADLs   Current Functional Level Independent in ADLs/IADLs   Can patient return to prior living arrangement Yes   Ability to make needs known: Good   Family able to assist with home care needs: Yes   Would you like for me to discuss the discharge plan with any other family members/significant others, and if so, who? Yes   Financial Resources Medicare   Social/Functional History   Lives With Alone   Type of Home Apartment   Home Layout One level   Home Access Stairs to enter with rails   Entrance Stairs - Number of Steps 18   Entrance Stairs - Rails Both   Bathroom Shower/Tub Tub/Shower unit   Bathroom Toilet Handicap height   Bathroom Accessibility Not accessible   Home Equipment Rollator   Receives Help From Family   Prior Level of Assist for ADLs Independent   Prior Level of Assist for Homemaking Independent   Homemaking Responsibilities Yes   Meal Prep Responsibility Primary   Laundry Responsibility Primary   Cleaning Responsibility Primary   Bill Paying/Finance Responsibility Primary   Health Care Management Primary   Ambulation Assistance Independent   Prior Level of Assist for Transfers Independent   Active  Yes   Mode of Transportation Truck   Education 10th Grade   Occupation On disability   Discharge Planning   Type of Residence Apartment   Living Arrangements Alone   Current Services Prior To Admission None   Potential Assistance Needed N/A   DME Ordered? No   Potential Assistance Purchasing

## 2024-12-27 LAB
ANION GAP SERPL CALC-SCNC: 8 MMOL/L (ref 2–12)
BACTERIA SPEC CULT: NORMAL
BASOPHILS # BLD: 0 K/UL (ref 0–0.1)
BASOPHILS NFR BLD: 1 % (ref 0–1)
BUN SERPL-MCNC: 11 MG/DL (ref 6–20)
BUN/CREAT SERPL: 11 (ref 12–20)
CALCIUM SERPL-MCNC: 8.9 MG/DL (ref 8.5–10.1)
CHLORIDE SERPL-SCNC: 106 MMOL/L (ref 97–108)
CO2 SERPL-SCNC: 24 MMOL/L (ref 21–32)
CREAT SERPL-MCNC: 0.96 MG/DL (ref 0.7–1.3)
DIFFERENTIAL METHOD BLD: ABNORMAL
EOSINOPHIL # BLD: 0.4 K/UL (ref 0–0.4)
EOSINOPHIL NFR BLD: 5 % (ref 0–7)
ERYTHROCYTE [DISTWIDTH] IN BLOOD BY AUTOMATED COUNT: 14.7 % (ref 11.5–14.5)
GLUCOSE SERPL-MCNC: 140 MG/DL (ref 65–100)
HCT VFR BLD AUTO: 35 % (ref 36.6–50.3)
HGB BLD-MCNC: 11.4 G/DL (ref 12.1–17)
IMM GRANULOCYTES # BLD AUTO: 0.1 K/UL (ref 0–0.04)
IMM GRANULOCYTES NFR BLD AUTO: 2 % (ref 0–0.5)
LYMPHOCYTES # BLD: 1.7 K/UL (ref 0.8–3.5)
LYMPHOCYTES NFR BLD: 21 % (ref 12–49)
MCH RBC QN AUTO: 32.3 PG (ref 26–34)
MCHC RBC AUTO-ENTMCNC: 32.6 G/DL (ref 30–36.5)
MCV RBC AUTO: 99.2 FL (ref 80–99)
MONOCYTES # BLD: 0.6 K/UL (ref 0–1)
MONOCYTES NFR BLD: 7 % (ref 5–13)
NEUTS SEG # BLD: 5.4 K/UL (ref 1.8–8)
NEUTS SEG NFR BLD: 65 % (ref 32–75)
NRBC # BLD: 0 K/UL (ref 0–0.01)
NRBC BLD-RTO: 0 PER 100 WBC
PLATELET # BLD AUTO: 142 K/UL (ref 150–400)
PMV BLD AUTO: 10.9 FL (ref 8.9–12.9)
POTASSIUM SERPL-SCNC: 3.6 MMOL/L (ref 3.5–5.1)
RBC # BLD AUTO: 3.53 M/UL (ref 4.1–5.7)
SERVICE CMNT-IMP: NORMAL
SODIUM SERPL-SCNC: 138 MMOL/L (ref 136–145)
WBC # BLD AUTO: 8.3 K/UL (ref 4.1–11.1)

## 2024-12-27 PROCEDURE — 6360000002 HC RX W HCPCS: Performed by: PHYSICIAN ASSISTANT

## 2024-12-27 PROCEDURE — 97161 PT EVAL LOW COMPLEX 20 MIN: CPT

## 2024-12-27 PROCEDURE — 36415 COLL VENOUS BLD VENIPUNCTURE: CPT

## 2024-12-27 PROCEDURE — 99233 SBSQ HOSP IP/OBS HIGH 50: CPT | Performed by: NURSE PRACTITIONER

## 2024-12-27 PROCEDURE — 6360000002 HC RX W HCPCS: Performed by: INTERNAL MEDICINE

## 2024-12-27 PROCEDURE — 6370000000 HC RX 637 (ALT 250 FOR IP): Performed by: NURSE PRACTITIONER

## 2024-12-27 PROCEDURE — 99497 ADVNCD CARE PLAN 30 MIN: CPT | Performed by: NURSE PRACTITIONER

## 2024-12-27 PROCEDURE — 99233 SBSQ HOSP IP/OBS HIGH 50: CPT | Performed by: INTERNAL MEDICINE

## 2024-12-27 PROCEDURE — 80048 BASIC METABOLIC PNL TOTAL CA: CPT

## 2024-12-27 PROCEDURE — 97165 OT EVAL LOW COMPLEX 30 MIN: CPT

## 2024-12-27 PROCEDURE — 6370000000 HC RX 637 (ALT 250 FOR IP): Performed by: INTERNAL MEDICINE

## 2024-12-27 PROCEDURE — 2060000000 HC ICU INTERMEDIATE R&B

## 2024-12-27 PROCEDURE — 97116 GAIT TRAINING THERAPY: CPT

## 2024-12-27 PROCEDURE — 94760 N-INVAS EAR/PLS OXIMETRY 1: CPT

## 2024-12-27 PROCEDURE — 85025 COMPLETE CBC W/AUTO DIFF WBC: CPT

## 2024-12-27 PROCEDURE — 2700000000 HC OXYGEN THERAPY PER DAY

## 2024-12-27 PROCEDURE — 2500000003 HC RX 250 WO HCPCS: Performed by: INTERNAL MEDICINE

## 2024-12-27 PROCEDURE — 6360000002 HC RX W HCPCS: Performed by: FAMILY MEDICINE

## 2024-12-27 RX ORDER — OXYCODONE HCL 5 MG/5 ML
15 SOLUTION, ORAL ORAL EVERY 4 HOURS
Status: DISCONTINUED | OUTPATIENT
Start: 2024-12-27 | End: 2024-12-28

## 2024-12-27 RX ORDER — OXYCODONE HCL 5 MG/5 ML
15 SOLUTION, ORAL ORAL EVERY 4 HOURS PRN
Status: DISCONTINUED | OUTPATIENT
Start: 2024-12-27 | End: 2024-12-27

## 2024-12-27 RX ADMIN — OXYCODONE HYDROCHLORIDE 10 MG: 5 SOLUTION ORAL at 08:40

## 2024-12-27 RX ADMIN — LORAZEPAM 0.5 MG: 1 TABLET ORAL at 04:52

## 2024-12-27 RX ADMIN — HYDROMORPHONE HYDROCHLORIDE 2 MG: 1 INJECTION, SOLUTION INTRAMUSCULAR; INTRAVENOUS; SUBCUTANEOUS at 14:26

## 2024-12-27 RX ADMIN — METOPROLOL TARTRATE 12.5 MG: 25 TABLET, FILM COATED ORAL at 16:24

## 2024-12-27 RX ADMIN — HYDROMORPHONE HYDROCHLORIDE 2 MG: 1 INJECTION, SOLUTION INTRAMUSCULAR; INTRAVENOUS; SUBCUTANEOUS at 23:04

## 2024-12-27 RX ADMIN — GABAPENTIN 300 MG: 100 CAPSULE ORAL at 08:40

## 2024-12-27 RX ADMIN — DICYCLOMINE HYDROCHLORIDE 20 MG: 20 TABLET ORAL at 08:40

## 2024-12-27 RX ADMIN — ENOXAPARIN SODIUM 40 MG: 100 INJECTION SUBCUTANEOUS at 08:39

## 2024-12-27 RX ADMIN — ACETAMINOPHEN 650 MG: 650 SOLUTION ORAL at 04:52

## 2024-12-27 RX ADMIN — SODIUM CHLORIDE, PRESERVATIVE FREE 10 ML: 5 INJECTION INTRAVENOUS at 08:41

## 2024-12-27 RX ADMIN — HYDROMORPHONE HYDROCHLORIDE 2 MG: 1 INJECTION, SOLUTION INTRAMUSCULAR; INTRAVENOUS; SUBCUTANEOUS at 00:12

## 2024-12-27 RX ADMIN — LORAZEPAM 0.5 MG: 1 TABLET ORAL at 22:56

## 2024-12-27 RX ADMIN — MEMANTINE 5 MG: 5 TABLET ORAL at 08:40

## 2024-12-27 RX ADMIN — OXYCODONE HYDROCHLORIDE 15 MG: 5 SOLUTION ORAL at 16:32

## 2024-12-27 RX ADMIN — HYDROMORPHONE HYDROCHLORIDE 2 MG: 1 INJECTION, SOLUTION INTRAMUSCULAR; INTRAVENOUS; SUBCUTANEOUS at 11:23

## 2024-12-27 RX ADMIN — DICYCLOMINE HYDROCHLORIDE 20 MG: 20 TABLET ORAL at 22:19

## 2024-12-27 RX ADMIN — OCTREOTIDE ACETATE 50 MCG: 50 INJECTION, SOLUTION INTRAVENOUS; SUBCUTANEOUS at 06:36

## 2024-12-27 RX ADMIN — TIZANIDINE 2 MG: 2 TABLET ORAL at 22:56

## 2024-12-27 RX ADMIN — OXYCODONE HYDROCHLORIDE 10 MG: 5 SOLUTION ORAL at 13:44

## 2024-12-27 RX ADMIN — HYDROMORPHONE HYDROCHLORIDE 2 MG: 1 INJECTION, SOLUTION INTRAMUSCULAR; INTRAVENOUS; SUBCUTANEOUS at 20:16

## 2024-12-27 RX ADMIN — DEXAMETHASONE 2 MG: 1 TABLET ORAL at 08:39

## 2024-12-27 RX ADMIN — ACETAMINOPHEN 650 MG: 650 SOLUTION ORAL at 22:55

## 2024-12-27 RX ADMIN — ACETAMINOPHEN 650 MG: 650 SOLUTION ORAL at 11:26

## 2024-12-27 RX ADMIN — TIZANIDINE 2 MG: 2 TABLET ORAL at 13:47

## 2024-12-27 RX ADMIN — DICYCLOMINE HYDROCHLORIDE 20 MG: 20 TABLET ORAL at 04:53

## 2024-12-27 RX ADMIN — DICYCLOMINE HYDROCHLORIDE 20 MG: 20 TABLET ORAL at 16:24

## 2024-12-27 RX ADMIN — OCTREOTIDE ACETATE 50 MCG: 50 INJECTION, SOLUTION INTRAVENOUS; SUBCUTANEOUS at 20:18

## 2024-12-27 RX ADMIN — HYDROMORPHONE HYDROCHLORIDE 2 MG: 1 INJECTION, SOLUTION INTRAMUSCULAR; INTRAVENOUS; SUBCUTANEOUS at 17:34

## 2024-12-27 RX ADMIN — METOPROLOL TARTRATE 12.5 MG: 25 TABLET, FILM COATED ORAL at 04:52

## 2024-12-27 RX ADMIN — GABAPENTIN 300 MG: 100 CAPSULE ORAL at 20:17

## 2024-12-27 RX ADMIN — GABAPENTIN 300 MG: 100 CAPSULE ORAL at 13:44

## 2024-12-27 RX ADMIN — OXYCODONE HYDROCHLORIDE 15 MG: 5 SOLUTION ORAL at 22:19

## 2024-12-27 RX ADMIN — PANTOPRAZOLE SODIUM 40 MG: 40 TABLET, DELAYED RELEASE ORAL at 06:36

## 2024-12-27 RX ADMIN — ACETAMINOPHEN 650 MG: 650 SOLUTION ORAL at 16:23

## 2024-12-27 ASSESSMENT — PAIN SCALES - GENERAL
PAINLEVEL_OUTOF10: 9
PAINLEVEL_OUTOF10: 10
PAINLEVEL_OUTOF10: 9
PAINLEVEL_OUTOF10: 9
PAINLEVEL_OUTOF10: 10
PAINLEVEL_OUTOF10: 1
PAINLEVEL_OUTOF10: 9
PAINLEVEL_OUTOF10: 7
PAINLEVEL_OUTOF10: 9

## 2024-12-27 ASSESSMENT — PAIN - FUNCTIONAL ASSESSMENT
PAIN_FUNCTIONAL_ASSESSMENT: ACTIVITIES ARE NOT PREVENTED

## 2024-12-27 ASSESSMENT — PAIN DESCRIPTION - LOCATION
LOCATION: BACK
LOCATION: ABDOMEN;BACK;HIP
LOCATION: ABDOMEN;BACK;HIP;PELVIS
LOCATION: BACK
LOCATION: ABDOMEN;BACK;HIP
LOCATION: BACK
LOCATION: ABDOMEN;LEG;HIP
LOCATION: ABDOMEN;BACK;HIP
LOCATION: BACK
LOCATION: BACK
LOCATION: ABDOMEN;HIP;BACK
LOCATION: ABDOMEN;BACK;HIP

## 2024-12-27 ASSESSMENT — PAIN DESCRIPTION - DESCRIPTORS
DESCRIPTORS: ACHING;CRAMPING
DESCRIPTORS: ACHING;STABBING;THROBBING
DESCRIPTORS: ACHING;CRAMPING;DISCOMFORT
DESCRIPTORS: ACHING;SHARP;SPASM;THROBBING
DESCRIPTORS: ACHING;BURNING
DESCRIPTORS: ACHING;CRAMPING
DESCRIPTORS: CRAMPING;ACHING;JABBING
DESCRIPTORS: ACHING;CRAMPING
DESCRIPTORS: ACHING;STABBING;SHARP
DESCRIPTORS: ACHING;CRAMPING
DESCRIPTORS: ACHING;THROBBING;STABBING
DESCRIPTORS: ACHING;SHARP

## 2024-12-27 ASSESSMENT — PAIN DESCRIPTION - ORIENTATION
ORIENTATION: MID

## 2024-12-27 NOTE — CARE COORDINATION
Transition of Care Plan:    RUR: 11%-Lo9w  Prior Level of Functioning: Independent  Disposition: home  DARVIN:   If SNF or IPR: Date FOC offered:   Date FOC received:   Accepting facility:   Date authorization started with reference number:   Date authorization received and expires:   Follow up appointments:   DME needed: walker  Transportation at discharge:   IM/[facility] Medicare/ letter given:   Is patient a [place] and connected with VA?   If yes, was  transfer form completed and VA notified?   Caregiver Contact:   Discharge Caregiver contacted prior to discharge?   Care Conference needed?   Barriers to discharge:   Order noted for rolling walker. Referral sent via [place]. Requested delivery to patient's room. Awaiting their approval.

## 2024-12-27 NOTE — ACP (ADVANCE CARE PLANNING)
Advance Care Planning      Palliative Medicine Provider (MD/NP)  Advance Care Planning (ACP) Conversation      Date of Conversation: 12/27/24  The patient and/or authorized decision maker consented to a voluntary Advance Care Planning conversation.   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 Additional stains pending.  Options for care~ oncology initiated care goal discussions. Explained potential options for chemo and radiation all palliative intent pending final pathology  Discussed with the pt implications if he chooses hospice vs aggressive approach.  Explained the limitations on our end managing pain outside of comfort care as the pt is asking to further increase the iv dilaudid.   We have scheduled a family meeting on Monday at 11am to include his MPOA to discuss care moving forward. If the pt elects hospice then we can assist with referral for enrollment. Hospice would assume symptom management. If he declines hospice, we will assist with an outpatient oral pain regimen. Pt has a long history of chronic pain now with a terminal illness. Degree of chemical coping and existential pain are factoring into pain perception  .      Resuscitation Status:    Code Status: Full Code    Outcomes / Completed Documentation:  An explanation of advance directives and their importance was provided and the following forms completed:    -No new documents completed.    If new document completed, original was provided to patient and/or family member.    Copy

## 2024-12-28 LAB
ANION GAP SERPL CALC-SCNC: 8 MMOL/L (ref 2–12)
BACTERIA SPEC CULT: NORMAL
BASOPHILS # BLD: 0 K/UL (ref 0–0.1)
BASOPHILS NFR BLD: 0 % (ref 0–1)
BUN SERPL-MCNC: 14 MG/DL (ref 6–20)
BUN/CREAT SERPL: 12 (ref 12–20)
CALCIUM SERPL-MCNC: 8.7 MG/DL (ref 8.5–10.1)
CHLORIDE SERPL-SCNC: 104 MMOL/L (ref 97–108)
CO2 SERPL-SCNC: 24 MMOL/L (ref 21–32)
CREAT SERPL-MCNC: 1.2 MG/DL (ref 0.7–1.3)
DIFFERENTIAL METHOD BLD: ABNORMAL
EOSINOPHIL # BLD: 0.6 K/UL (ref 0–0.4)
EOSINOPHIL NFR BLD: 6 % (ref 0–7)
ERYTHROCYTE [DISTWIDTH] IN BLOOD BY AUTOMATED COUNT: 14.8 % (ref 11.5–14.5)
GLUCOSE SERPL-MCNC: 109 MG/DL (ref 65–100)
GRAM STN SPEC: NORMAL
GRAM STN SPEC: NORMAL
HCT VFR BLD AUTO: 36.4 % (ref 36.6–50.3)
HGB BLD-MCNC: 11.7 G/DL (ref 12.1–17)
IMM GRANULOCYTES # BLD AUTO: 0.2 K/UL (ref 0–0.04)
IMM GRANULOCYTES NFR BLD AUTO: 2 % (ref 0–0.5)
LYMPHOCYTES # BLD: 2.6 K/UL (ref 0.8–3.5)
LYMPHOCYTES NFR BLD: 26 % (ref 12–49)
MCH RBC QN AUTO: 32.4 PG (ref 26–34)
MCHC RBC AUTO-ENTMCNC: 32.1 G/DL (ref 30–36.5)
MCV RBC AUTO: 100.8 FL (ref 80–99)
MONOCYTES # BLD: 0.6 K/UL (ref 0–1)
MONOCYTES NFR BLD: 6 % (ref 5–13)
NEUTS SEG # BLD: 6.1 K/UL (ref 1.8–8)
NEUTS SEG NFR BLD: 60 % (ref 32–75)
NRBC # BLD: 0.02 K/UL (ref 0–0.01)
NRBC BLD-RTO: 0.2 PER 100 WBC
PLATELET # BLD AUTO: 145 K/UL (ref 150–400)
PMV BLD AUTO: 10.7 FL (ref 8.9–12.9)
POTASSIUM SERPL-SCNC: 3.3 MMOL/L (ref 3.5–5.1)
RBC # BLD AUTO: 3.61 M/UL (ref 4.1–5.7)
SERVICE CMNT-IMP: NORMAL
SODIUM SERPL-SCNC: 136 MMOL/L (ref 136–145)
WBC # BLD AUTO: 10 K/UL (ref 4.1–11.1)

## 2024-12-28 PROCEDURE — 6370000000 HC RX 637 (ALT 250 FOR IP): Performed by: NURSE PRACTITIONER

## 2024-12-28 PROCEDURE — 2060000000 HC ICU INTERMEDIATE R&B

## 2024-12-28 PROCEDURE — 6360000002 HC RX W HCPCS: Performed by: FAMILY MEDICINE

## 2024-12-28 PROCEDURE — 6360000002 HC RX W HCPCS: Performed by: INTERNAL MEDICINE

## 2024-12-28 PROCEDURE — 80048 BASIC METABOLIC PNL TOTAL CA: CPT

## 2024-12-28 PROCEDURE — 6370000000 HC RX 637 (ALT 250 FOR IP): Performed by: INTERNAL MEDICINE

## 2024-12-28 PROCEDURE — 6360000002 HC RX W HCPCS

## 2024-12-28 PROCEDURE — 85025 COMPLETE CBC W/AUTO DIFF WBC: CPT

## 2024-12-28 PROCEDURE — 2500000003 HC RX 250 WO HCPCS: Performed by: INTERNAL MEDICINE

## 2024-12-28 PROCEDURE — 2700000000 HC OXYGEN THERAPY PER DAY

## 2024-12-28 PROCEDURE — 6370000000 HC RX 637 (ALT 250 FOR IP)

## 2024-12-28 PROCEDURE — 2580000003 HC RX 258

## 2024-12-28 PROCEDURE — 6360000002 HC RX W HCPCS: Performed by: PHYSICIAN ASSISTANT

## 2024-12-28 RX ORDER — NALOXONE HYDROCHLORIDE 0.4 MG/ML
0.4 INJECTION, SOLUTION INTRAMUSCULAR; INTRAVENOUS; SUBCUTANEOUS PRN
Status: DISCONTINUED | OUTPATIENT
Start: 2024-12-28 | End: 2025-01-01 | Stop reason: HOSPADM

## 2024-12-28 RX ORDER — DOCUSATE SODIUM 100 MG/1
100 CAPSULE, LIQUID FILLED ORAL 2 TIMES DAILY
Status: DISCONTINUED | OUTPATIENT
Start: 2024-12-28 | End: 2025-01-01 | Stop reason: HOSPADM

## 2024-12-28 RX ORDER — POTASSIUM CHLORIDE 750 MG/1
20 TABLET, EXTENDED RELEASE ORAL ONCE
Status: COMPLETED | OUTPATIENT
Start: 2024-12-28 | End: 2024-12-28

## 2024-12-28 RX ADMIN — ACETAMINOPHEN 650 MG: 650 SOLUTION ORAL at 17:24

## 2024-12-28 RX ADMIN — POTASSIUM CHLORIDE 20 MEQ: 750 TABLET, EXTENDED RELEASE ORAL at 13:31

## 2024-12-28 RX ADMIN — ACETAMINOPHEN 650 MG: 650 SOLUTION ORAL at 23:41

## 2024-12-28 RX ADMIN — OXYCODONE HYDROCHLORIDE 15 MG: 5 SOLUTION ORAL at 07:40

## 2024-12-28 RX ADMIN — PANTOPRAZOLE SODIUM 40 MG: 40 TABLET, DELAYED RELEASE ORAL at 06:45

## 2024-12-28 RX ADMIN — MEMANTINE 5 MG: 5 TABLET ORAL at 08:54

## 2024-12-28 RX ADMIN — METOPROLOL TARTRATE 12.5 MG: 25 TABLET, FILM COATED ORAL at 04:36

## 2024-12-28 RX ADMIN — HYDROMORPHONE HYDROCHLORIDE 2 MG: 1 INJECTION, SOLUTION INTRAMUSCULAR; INTRAVENOUS; SUBCUTANEOUS at 13:29

## 2024-12-28 RX ADMIN — OCTREOTIDE ACETATE 50 MCG: 50 INJECTION, SOLUTION INTRAVENOUS; SUBCUTANEOUS at 08:54

## 2024-12-28 RX ADMIN — SODIUM CHLORIDE: 900 INJECTION, SOLUTION INTRAVENOUS at 15:07

## 2024-12-28 RX ADMIN — HYDROMORPHONE HYDROCHLORIDE 2 MG: 1 INJECTION, SOLUTION INTRAMUSCULAR; INTRAVENOUS; SUBCUTANEOUS at 10:17

## 2024-12-28 RX ADMIN — OXYCODONE HYDROCHLORIDE 15 MG: 5 SOLUTION ORAL at 02:11

## 2024-12-28 RX ADMIN — DICYCLOMINE HYDROCHLORIDE 20 MG: 20 TABLET ORAL at 04:36

## 2024-12-28 RX ADMIN — HYDROMORPHONE HYDROCHLORIDE 2 MG: 1 INJECTION, SOLUTION INTRAMUSCULAR; INTRAVENOUS; SUBCUTANEOUS at 06:18

## 2024-12-28 RX ADMIN — OCTREOTIDE ACETATE 50 MCG: 50 INJECTION, SOLUTION INTRAVENOUS; SUBCUTANEOUS at 19:03

## 2024-12-28 RX ADMIN — ACETAMINOPHEN 650 MG: 650 SOLUTION ORAL at 06:20

## 2024-12-28 RX ADMIN — ACETAMINOPHEN 650 MG: 650 SOLUTION ORAL at 11:09

## 2024-12-28 RX ADMIN — DEXAMETHASONE 2 MG: 1 TABLET ORAL at 08:55

## 2024-12-28 RX ADMIN — DICYCLOMINE HYDROCHLORIDE 20 MG: 20 TABLET ORAL at 17:25

## 2024-12-28 RX ADMIN — ENOXAPARIN SODIUM 40 MG: 100 INJECTION SUBCUTANEOUS at 08:53

## 2024-12-28 RX ADMIN — GABAPENTIN 300 MG: 100 CAPSULE ORAL at 08:54

## 2024-12-28 RX ADMIN — GABAPENTIN 300 MG: 100 CAPSULE ORAL at 13:32

## 2024-12-28 RX ADMIN — SODIUM CHLORIDE, PRESERVATIVE FREE 10 ML: 5 INJECTION INTRAVENOUS at 08:59

## 2024-12-28 RX ADMIN — DOCUSATE SODIUM 100 MG: 100 CAPSULE, LIQUID FILLED ORAL at 21:16

## 2024-12-28 RX ADMIN — HYDROMORPHONE HYDROCHLORIDE 2 MG: 1 INJECTION, SOLUTION INTRAMUSCULAR; INTRAVENOUS; SUBCUTANEOUS at 02:29

## 2024-12-28 RX ADMIN — METOPROLOL TARTRATE 12.5 MG: 25 TABLET, FILM COATED ORAL at 17:27

## 2024-12-28 RX ADMIN — GABAPENTIN 300 MG: 100 CAPSULE ORAL at 21:16

## 2024-12-28 RX ADMIN — DICYCLOMINE HYDROCHLORIDE 20 MG: 20 TABLET ORAL at 11:09

## 2024-12-28 RX ADMIN — OXYCODONE HYDROCHLORIDE 15 MG: 5 SOLUTION ORAL at 11:05

## 2024-12-28 RX ADMIN — DICYCLOMINE HYDROCHLORIDE 20 MG: 20 TABLET ORAL at 21:15

## 2024-12-28 ASSESSMENT — PAIN SCALES - GENERAL
PAINLEVEL_OUTOF10: 10
PAINLEVEL_OUTOF10: 9
PAINLEVEL_OUTOF10: 9
PAINLEVEL_OUTOF10: 10
PAINLEVEL_OUTOF10: 8
PAINLEVEL_OUTOF10: 10
PAINLEVEL_OUTOF10: 9
PAINLEVEL_OUTOF10: 10
PAINLEVEL_OUTOF10: 8
PAINLEVEL_OUTOF10: 8
PAINLEVEL_OUTOF10: 10
PAINLEVEL_OUTOF10: 10

## 2024-12-28 ASSESSMENT — PAIN DESCRIPTION - LOCATION
LOCATION: ABDOMEN;HIP
LOCATION: ABDOMEN
LOCATION: HIP;GROIN;KNEE
LOCATION: ABDOMEN;BACK;HIP
LOCATION: ABDOMEN;HIP
LOCATION: ABDOMEN;KNEE;HIP
LOCATION: ABDOMEN;HIP;KNEE
LOCATION: ABDOMEN;BACK;HIP;SHOULDER
LOCATION: GROIN;HIP
LOCATION: ABDOMEN;BACK;HIP
LOCATION: ABDOMEN;BACK;GROIN

## 2024-12-28 ASSESSMENT — PAIN - FUNCTIONAL ASSESSMENT
PAIN_FUNCTIONAL_ASSESSMENT: ACTIVITIES ARE NOT PREVENTED

## 2024-12-28 ASSESSMENT — PAIN DESCRIPTION - DESCRIPTORS
DESCRIPTORS: SHOOTING
DESCRIPTORS: SHOOTING
DESCRIPTORS: ACHING;SPASM
DESCRIPTORS: ACHING;THROBBING;SHARP
DESCRIPTORS: ACHING;SHARP;THROBBING
DESCRIPTORS: ACHING;STABBING;THROBBING
DESCRIPTORS: ACHING;SHOOTING;THROBBING
DESCRIPTORS: SHOOTING
DESCRIPTORS: ACHING;SHARP
DESCRIPTORS: ACHING;STABBING;THROBBING
DESCRIPTORS: SHOOTING;NAGGING
DESCRIPTORS: SHOOTING

## 2024-12-28 ASSESSMENT — PAIN DESCRIPTION - ORIENTATION
ORIENTATION: RIGHT
ORIENTATION: RIGHT;LEFT
ORIENTATION: RIGHT;LEFT
ORIENTATION: RIGHT
ORIENTATION: RIGHT;LEFT
ORIENTATION: RIGHT
ORIENTATION: RIGHT;LEFT

## 2024-12-28 ASSESSMENT — PAIN DESCRIPTION - PAIN TYPE: TYPE: CHRONIC PAIN

## 2024-12-28 ASSESSMENT — PAIN DESCRIPTION - DIRECTION: RADIATING_TOWARDS: KNEE

## 2024-12-29 ENCOUNTER — APPOINTMENT (OUTPATIENT)
Facility: HOSPITAL | Age: 64
DRG: 435 | End: 2024-12-29
Payer: MEDICARE

## 2024-12-29 LAB
ANION GAP SERPL CALC-SCNC: 13 MMOL/L (ref 2–12)
BASOPHILS # BLD: 0.1 K/UL (ref 0–0.1)
BASOPHILS NFR BLD: 1 % (ref 0–1)
BUN SERPL-MCNC: 19 MG/DL (ref 6–20)
BUN/CREAT SERPL: 13 (ref 12–20)
CALCIUM SERPL-MCNC: 8.7 MG/DL (ref 8.5–10.1)
CHLORIDE SERPL-SCNC: 106 MMOL/L (ref 97–108)
CO2 SERPL-SCNC: 19 MMOL/L (ref 21–32)
CREAT SERPL-MCNC: 1.48 MG/DL (ref 0.7–1.3)
DIFFERENTIAL METHOD BLD: ABNORMAL
EOSINOPHIL # BLD: 0.6 K/UL (ref 0–0.4)
EOSINOPHIL NFR BLD: 5 % (ref 0–7)
ERYTHROCYTE [DISTWIDTH] IN BLOOD BY AUTOMATED COUNT: 14.6 % (ref 11.5–14.5)
GLUCOSE SERPL-MCNC: 88 MG/DL (ref 65–100)
HCT VFR BLD AUTO: 35.9 % (ref 36.6–50.3)
HGB BLD-MCNC: 11.5 G/DL (ref 12.1–17)
IMM GRANULOCYTES # BLD AUTO: 0.2 K/UL (ref 0–0.04)
IMM GRANULOCYTES NFR BLD AUTO: 2 % (ref 0–0.5)
LYMPHOCYTES # BLD: 3.3 K/UL (ref 0.8–3.5)
LYMPHOCYTES NFR BLD: 30 % (ref 12–49)
MCH RBC QN AUTO: 32.3 PG (ref 26–34)
MCHC RBC AUTO-ENTMCNC: 32 G/DL (ref 30–36.5)
MCV RBC AUTO: 100.8 FL (ref 80–99)
MONOCYTES # BLD: 0.7 K/UL (ref 0–1)
MONOCYTES NFR BLD: 6 % (ref 5–13)
NEUTS SEG # BLD: 6.1 K/UL (ref 1.8–8)
NEUTS SEG NFR BLD: 56 % (ref 32–75)
NRBC # BLD: 0.02 K/UL (ref 0–0.01)
NRBC BLD-RTO: 0.2 PER 100 WBC
PLATELET # BLD AUTO: 129 K/UL (ref 150–400)
PMV BLD AUTO: 10.7 FL (ref 8.9–12.9)
POTASSIUM SERPL-SCNC: 3.8 MMOL/L (ref 3.5–5.1)
RBC # BLD AUTO: 3.56 M/UL (ref 4.1–5.7)
RBC MORPH BLD: ABNORMAL
RBC MORPH BLD: ABNORMAL
SODIUM SERPL-SCNC: 138 MMOL/L (ref 136–145)
WBC # BLD AUTO: 11 K/UL (ref 4.1–11.1)
WBC MORPH BLD: ABNORMAL

## 2024-12-29 PROCEDURE — 6360000002 HC RX W HCPCS: Performed by: INTERNAL MEDICINE

## 2024-12-29 PROCEDURE — 2700000000 HC OXYGEN THERAPY PER DAY

## 2024-12-29 PROCEDURE — 6360000002 HC RX W HCPCS: Performed by: FAMILY MEDICINE

## 2024-12-29 PROCEDURE — 2500000003 HC RX 250 WO HCPCS: Performed by: INTERNAL MEDICINE

## 2024-12-29 PROCEDURE — 6360000002 HC RX W HCPCS

## 2024-12-29 PROCEDURE — 94760 N-INVAS EAR/PLS OXIMETRY 1: CPT

## 2024-12-29 PROCEDURE — 80048 BASIC METABOLIC PNL TOTAL CA: CPT

## 2024-12-29 PROCEDURE — 6360000002 HC RX W HCPCS: Performed by: NURSE PRACTITIONER

## 2024-12-29 PROCEDURE — 6370000000 HC RX 637 (ALT 250 FOR IP): Performed by: NURSE PRACTITIONER

## 2024-12-29 PROCEDURE — 2060000000 HC ICU INTERMEDIATE R&B

## 2024-12-29 PROCEDURE — 71045 X-RAY EXAM CHEST 1 VIEW: CPT

## 2024-12-29 PROCEDURE — 85025 COMPLETE CBC W/AUTO DIFF WBC: CPT

## 2024-12-29 PROCEDURE — 6370000000 HC RX 637 (ALT 250 FOR IP): Performed by: INTERNAL MEDICINE

## 2024-12-29 PROCEDURE — 2580000003 HC RX 258

## 2024-12-29 RX ORDER — HYDROMORPHONE HYDROCHLORIDE 1 MG/ML
0.5 INJECTION, SOLUTION INTRAMUSCULAR; INTRAVENOUS; SUBCUTANEOUS ONCE
Status: COMPLETED | OUTPATIENT
Start: 2024-12-29 | End: 2024-12-29

## 2024-12-29 RX ORDER — MORPHINE SULFATE/0.9% NACL/PF 1 MG/ML
SYRINGE (ML) INJECTION CONTINUOUS
Status: DISPENSED | OUTPATIENT
Start: 2024-12-29 | End: 2025-01-01

## 2024-12-29 RX ADMIN — TIZANIDINE 2 MG: 2 TABLET ORAL at 04:35

## 2024-12-29 RX ADMIN — DICYCLOMINE HYDROCHLORIDE 20 MG: 20 TABLET ORAL at 15:23

## 2024-12-29 RX ADMIN — MORPHINE SULFATE: 1 INJECTION INTRAVENOUS at 20:24

## 2024-12-29 RX ADMIN — PANTOPRAZOLE SODIUM 40 MG: 40 TABLET, DELAYED RELEASE ORAL at 06:47

## 2024-12-29 RX ADMIN — DICYCLOMINE HYDROCHLORIDE 20 MG: 20 TABLET ORAL at 10:20

## 2024-12-29 RX ADMIN — DICYCLOMINE HYDROCHLORIDE 20 MG: 20 TABLET ORAL at 21:33

## 2024-12-29 RX ADMIN — MEMANTINE 5 MG: 5 TABLET ORAL at 10:19

## 2024-12-29 RX ADMIN — HYDROMORPHONE HYDROCHLORIDE 0.5 MG: 1 INJECTION, SOLUTION INTRAMUSCULAR; INTRAVENOUS; SUBCUTANEOUS at 00:43

## 2024-12-29 RX ADMIN — OCTREOTIDE ACETATE 50 MCG: 50 INJECTION, SOLUTION INTRAVENOUS; SUBCUTANEOUS at 20:34

## 2024-12-29 RX ADMIN — LORAZEPAM 0.5 MG: 1 TABLET ORAL at 04:35

## 2024-12-29 RX ADMIN — ENOXAPARIN SODIUM 40 MG: 100 INJECTION SUBCUTANEOUS at 10:21

## 2024-12-29 RX ADMIN — METOPROLOL TARTRATE 12.5 MG: 25 TABLET, FILM COATED ORAL at 04:10

## 2024-12-29 RX ADMIN — MORPHINE SULFATE: 1 INJECTION INTRAVENOUS at 23:49

## 2024-12-29 RX ADMIN — ACETAMINOPHEN 650 MG: 650 SOLUTION ORAL at 20:35

## 2024-12-29 RX ADMIN — OCTREOTIDE ACETATE 50 MCG: 50 INJECTION, SOLUTION INTRAVENOUS; SUBCUTANEOUS at 06:47

## 2024-12-29 RX ADMIN — METOPROLOL TARTRATE 12.5 MG: 25 TABLET, FILM COATED ORAL at 15:23

## 2024-12-29 RX ADMIN — GABAPENTIN 300 MG: 100 CAPSULE ORAL at 15:23

## 2024-12-29 RX ADMIN — GABAPENTIN 300 MG: 100 CAPSULE ORAL at 21:33

## 2024-12-29 RX ADMIN — GABAPENTIN 300 MG: 100 CAPSULE ORAL at 10:19

## 2024-12-29 RX ADMIN — DICYCLOMINE HYDROCHLORIDE 20 MG: 20 TABLET ORAL at 04:10

## 2024-12-29 RX ADMIN — SODIUM CHLORIDE: 900 INJECTION, SOLUTION INTRAVENOUS at 02:07

## 2024-12-29 RX ADMIN — ACETAMINOPHEN 650 MG: 650 SOLUTION ORAL at 10:21

## 2024-12-29 RX ADMIN — SODIUM CHLORIDE, PRESERVATIVE FREE 10 ML: 5 INJECTION INTRAVENOUS at 10:41

## 2024-12-29 RX ADMIN — ACETAMINOPHEN 650 MG: 650 SOLUTION ORAL at 04:34

## 2024-12-29 ASSESSMENT — PAIN SCALES - GENERAL
PAINLEVEL_OUTOF10: 10
PAINLEVEL_OUTOF10: 9
PAINLEVEL_OUTOF10: 10
PAINLEVEL_OUTOF10: 9
PAINLEVEL_OUTOF10: 10
PAINLEVEL_OUTOF10: 9
PAINLEVEL_OUTOF10: 10
PAINLEVEL_OUTOF10: 10
PAINLEVEL_OUTOF10: 8
PAINLEVEL_OUTOF10: 10
PAINLEVEL_OUTOF10: 9
PAINLEVEL_OUTOF10: 10
PAINLEVEL_OUTOF10: 10
PAINLEVEL_OUTOF10: 8

## 2024-12-29 ASSESSMENT — PAIN DESCRIPTION - LOCATION
LOCATION: BACK;HIP
LOCATION: BACK;HIP
LOCATION: ABDOMEN;BACK
LOCATION: BACK;HIP
LOCATION: BACK;HIP
LOCATION: ABDOMEN;BACK;HIP
LOCATION: BACK;HIP
LOCATION: BACK
LOCATION: ABDOMEN;BACK;HIP
LOCATION: ABDOMEN;BACK;HIP
LOCATION: BACK;HIP

## 2024-12-29 ASSESSMENT — PAIN DESCRIPTION - PAIN TYPE
TYPE: CHRONIC PAIN
TYPE: CHRONIC PAIN

## 2024-12-29 ASSESSMENT — PAIN - FUNCTIONAL ASSESSMENT
PAIN_FUNCTIONAL_ASSESSMENT: ACTIVITIES ARE NOT PREVENTED

## 2024-12-29 ASSESSMENT — PAIN DESCRIPTION - DIRECTION
RADIATING_TOWARDS: KNEE
RADIATING_TOWARDS: KNEE

## 2024-12-29 ASSESSMENT — PAIN DESCRIPTION - DESCRIPTORS
DESCRIPTORS: ACHING
DESCRIPTORS: ACHING;STABBING;THROBBING
DESCRIPTORS: ACHING
DESCRIPTORS: ACHING
DESCRIPTORS: ACHING;THROBBING;SHARP
DESCRIPTORS: ACHING;SHARP;THROBBING
DESCRIPTORS: ACHING;STABBING;THROBBING

## 2024-12-29 ASSESSMENT — PAIN DESCRIPTION - ORIENTATION
ORIENTATION: POSTERIOR
ORIENTATION: POSTERIOR
ORIENTATION: RIGHT;LEFT
ORIENTATION: POSTERIOR
ORIENTATION: POSTERIOR
ORIENTATION: RIGHT;LEFT
ORIENTATION: POSTERIOR
ORIENTATION: RIGHT;LEFT

## 2024-12-29 ASSESSMENT — PAIN DESCRIPTION - FREQUENCY
FREQUENCY: CONTINUOUS
FREQUENCY: CONTINUOUS

## 2024-12-29 ASSESSMENT — PAIN DESCRIPTION - ONSET
ONSET: ON-GOING
ONSET: ON-GOING

## 2024-12-30 ENCOUNTER — APPOINTMENT (OUTPATIENT)
Facility: HOSPITAL | Age: 64
DRG: 435 | End: 2024-12-30
Payer: MEDICARE

## 2024-12-30 PROBLEM — C78.7 METASTATIC ADENOCARCINOMA TO LIVER (HCC): Status: ACTIVE | Noted: 2024-12-30

## 2024-12-30 PROBLEM — G89.3 CANCER ASSOCIATED PAIN: Status: ACTIVE | Noted: 2024-12-30

## 2024-12-30 PROBLEM — Z71.89 ADVANCE CARE PLANNING: Status: ACTIVE | Noted: 2024-12-30

## 2024-12-30 LAB
ANION GAP SERPL CALC-SCNC: 9 MMOL/L (ref 2–12)
BACTERIA SPEC CULT: NORMAL
BASOPHILS # BLD: 0 K/UL (ref 0–0.1)
BASOPHILS NFR BLD: 0 % (ref 0–1)
BUN SERPL-MCNC: 18 MG/DL (ref 6–20)
BUN/CREAT SERPL: 15 (ref 12–20)
CALCIUM SERPL-MCNC: 9 MG/DL (ref 8.5–10.1)
CHLORIDE SERPL-SCNC: 102 MMOL/L (ref 97–108)
CO2 SERPL-SCNC: 23 MMOL/L (ref 21–32)
CREAT SERPL-MCNC: 1.24 MG/DL (ref 0.7–1.3)
DIFFERENTIAL METHOD BLD: ABNORMAL
EOSINOPHIL # BLD: 0.2 K/UL (ref 0–0.4)
EOSINOPHIL NFR BLD: 2 % (ref 0–7)
ERYTHROCYTE [DISTWIDTH] IN BLOOD BY AUTOMATED COUNT: 14.9 % (ref 11.5–14.5)
GLUCOSE SERPL-MCNC: 96 MG/DL (ref 65–100)
HCT VFR BLD AUTO: 35.8 % (ref 36.6–50.3)
HGB BLD-MCNC: 11.6 G/DL (ref 12.1–17)
IMM GRANULOCYTES # BLD AUTO: 0.2 K/UL (ref 0–0.04)
IMM GRANULOCYTES NFR BLD AUTO: 2 % (ref 0–0.5)
LYMPHOCYTES # BLD: 1.7 K/UL (ref 0.8–3.5)
LYMPHOCYTES NFR BLD: 19 % (ref 12–49)
MCH RBC QN AUTO: 32.7 PG (ref 26–34)
MCHC RBC AUTO-ENTMCNC: 32.4 G/DL (ref 30–36.5)
MCV RBC AUTO: 100.8 FL (ref 80–99)
MONOCYTES # BLD: 0.8 K/UL (ref 0–1)
MONOCYTES NFR BLD: 9 % (ref 5–13)
NEUTS SEG # BLD: 6.2 K/UL (ref 1.8–8)
NEUTS SEG NFR BLD: 68 % (ref 32–75)
NRBC # BLD: 0.02 K/UL (ref 0–0.01)
NRBC BLD-RTO: 0.2 PER 100 WBC
PLATELET # BLD AUTO: 126 K/UL (ref 150–400)
PMV BLD AUTO: 11.2 FL (ref 8.9–12.9)
POTASSIUM SERPL-SCNC: 3.6 MMOL/L (ref 3.5–5.1)
RBC # BLD AUTO: 3.55 M/UL (ref 4.1–5.7)
RBC MORPH BLD: ABNORMAL
RBC MORPH BLD: ABNORMAL
SERVICE CMNT-IMP: NORMAL
SODIUM SERPL-SCNC: 134 MMOL/L (ref 136–145)
WBC # BLD AUTO: 9.1 K/UL (ref 4.1–11.1)
WBC MORPH BLD: ABNORMAL

## 2024-12-30 PROCEDURE — 3430000000 HC RX DIAGNOSTIC RADIOPHARMACEUTICAL: Performed by: INTERNAL MEDICINE

## 2024-12-30 PROCEDURE — 99233 SBSQ HOSP IP/OBS HIGH 50: CPT | Performed by: PHYSICAL MEDICINE & REHABILITATION

## 2024-12-30 PROCEDURE — 85025 COMPLETE CBC W/AUTO DIFF WBC: CPT

## 2024-12-30 PROCEDURE — 6360000002 HC RX W HCPCS: Performed by: PHYSICAL MEDICINE & REHABILITATION

## 2024-12-30 PROCEDURE — 6370000000 HC RX 637 (ALT 250 FOR IP): Performed by: INTERNAL MEDICINE

## 2024-12-30 PROCEDURE — 6360000002 HC RX W HCPCS: Performed by: INTERNAL MEDICINE

## 2024-12-30 PROCEDURE — 2700000000 HC OXYGEN THERAPY PER DAY

## 2024-12-30 PROCEDURE — 80048 BASIC METABOLIC PNL TOTAL CA: CPT

## 2024-12-30 PROCEDURE — 99232 SBSQ HOSP IP/OBS MODERATE 35: CPT | Performed by: INTERNAL MEDICINE

## 2024-12-30 PROCEDURE — 6360000002 HC RX W HCPCS

## 2024-12-30 PROCEDURE — 6370000000 HC RX 637 (ALT 250 FOR IP): Performed by: NURSE PRACTITIONER

## 2024-12-30 PROCEDURE — 6370000000 HC RX 637 (ALT 250 FOR IP): Performed by: PHYSICAL MEDICINE & REHABILITATION

## 2024-12-30 PROCEDURE — A9503 TC99M MEDRONATE: HCPCS | Performed by: INTERNAL MEDICINE

## 2024-12-30 PROCEDURE — 6370000000 HC RX 637 (ALT 250 FOR IP)

## 2024-12-30 PROCEDURE — 94760 N-INVAS EAR/PLS OXIMETRY 1: CPT

## 2024-12-30 PROCEDURE — 2500000003 HC RX 250 WO HCPCS: Performed by: INTERNAL MEDICINE

## 2024-12-30 PROCEDURE — 6360000002 HC RX W HCPCS: Performed by: FAMILY MEDICINE

## 2024-12-30 PROCEDURE — 6360000002 HC RX W HCPCS: Performed by: NURSE PRACTITIONER

## 2024-12-30 PROCEDURE — 2060000000 HC ICU INTERMEDIATE R&B

## 2024-12-30 RX ORDER — TC 99M MEDRONATE 20 MG/10ML
21 INJECTION, POWDER, LYOPHILIZED, FOR SOLUTION INTRAVENOUS
Status: COMPLETED | OUTPATIENT
Start: 2024-12-30 | End: 2024-12-30

## 2024-12-30 RX ORDER — METHADONE HYDROCHLORIDE 5 MG/5ML
5 SOLUTION ORAL 3 TIMES DAILY
Status: DISCONTINUED | OUTPATIENT
Start: 2024-12-30 | End: 2025-01-01 | Stop reason: HOSPADM

## 2024-12-30 RX ORDER — HYDROMORPHONE HYDROCHLORIDE 1 MG/ML
0.5 INJECTION, SOLUTION INTRAMUSCULAR; INTRAVENOUS; SUBCUTANEOUS ONCE
Status: COMPLETED | OUTPATIENT
Start: 2024-12-30 | End: 2024-12-30

## 2024-12-30 RX ADMIN — GABAPENTIN 300 MG: 100 CAPSULE ORAL at 14:51

## 2024-12-30 RX ADMIN — DICYCLOMINE HYDROCHLORIDE 20 MG: 20 TABLET ORAL at 04:26

## 2024-12-30 RX ADMIN — DICYCLOMINE HYDROCHLORIDE 20 MG: 20 TABLET ORAL at 14:51

## 2024-12-30 RX ADMIN — Medication 5 MG: at 14:50

## 2024-12-30 RX ADMIN — ENOXAPARIN SODIUM 40 MG: 100 INJECTION SUBCUTANEOUS at 09:09

## 2024-12-30 RX ADMIN — DICYCLOMINE HYDROCHLORIDE 20 MG: 20 TABLET ORAL at 20:31

## 2024-12-30 RX ADMIN — MORPHINE SULFATE: 1 INJECTION INTRAVENOUS at 18:18

## 2024-12-30 RX ADMIN — ACETAMINOPHEN 650 MG: 650 SOLUTION ORAL at 17:54

## 2024-12-30 RX ADMIN — ACETAMINOPHEN 650 MG: 650 SOLUTION ORAL at 11:53

## 2024-12-30 RX ADMIN — PANTOPRAZOLE SODIUM 40 MG: 40 TABLET, DELAYED RELEASE ORAL at 07:55

## 2024-12-30 RX ADMIN — DOCUSATE SODIUM 100 MG: 100 CAPSULE, LIQUID FILLED ORAL at 09:08

## 2024-12-30 RX ADMIN — METOPROLOL TARTRATE 25 MG: 25 TABLET, FILM COATED ORAL at 04:26

## 2024-12-30 RX ADMIN — OCTREOTIDE ACETATE 50 MCG: 50 INJECTION, SOLUTION INTRAVENOUS; SUBCUTANEOUS at 17:54

## 2024-12-30 RX ADMIN — GABAPENTIN 300 MG: 100 CAPSULE ORAL at 20:31

## 2024-12-30 RX ADMIN — ACETAMINOPHEN 650 MG: 650 SOLUTION ORAL at 04:26

## 2024-12-30 RX ADMIN — DOCUSATE SODIUM 100 MG: 100 CAPSULE, LIQUID FILLED ORAL at 20:31

## 2024-12-30 RX ADMIN — OCTREOTIDE ACETATE 50 MCG: 50 INJECTION, SOLUTION INTRAVENOUS; SUBCUTANEOUS at 07:56

## 2024-12-30 RX ADMIN — ACETAMINOPHEN 650 MG: 650 SOLUTION ORAL at 22:32

## 2024-12-30 RX ADMIN — MORPHINE SULFATE: 1 INJECTION INTRAVENOUS at 09:02

## 2024-12-30 RX ADMIN — Medication 5 MG: at 20:30

## 2024-12-30 RX ADMIN — SODIUM CHLORIDE, PRESERVATIVE FREE 10 ML: 5 INJECTION INTRAVENOUS at 20:31

## 2024-12-30 RX ADMIN — GABAPENTIN 300 MG: 100 CAPSULE ORAL at 09:08

## 2024-12-30 RX ADMIN — MORPHINE SULFATE: 1 INJECTION INTRAVENOUS at 04:55

## 2024-12-30 RX ADMIN — SODIUM CHLORIDE, PRESERVATIVE FREE 10 ML: 5 INJECTION INTRAVENOUS at 09:10

## 2024-12-30 RX ADMIN — Medication 5 MG: at 11:53

## 2024-12-30 RX ADMIN — DICYCLOMINE HYDROCHLORIDE 20 MG: 20 TABLET ORAL at 09:09

## 2024-12-30 RX ADMIN — MEMANTINE 5 MG: 5 TABLET ORAL at 09:09

## 2024-12-30 RX ADMIN — METOPROLOL TARTRATE 12.5 MG: 25 TABLET, FILM COATED ORAL at 14:51

## 2024-12-30 RX ADMIN — HYDROMORPHONE HYDROCHLORIDE 0.5 MG: 1 INJECTION, SOLUTION INTRAMUSCULAR; INTRAVENOUS; SUBCUTANEOUS at 04:53

## 2024-12-30 RX ADMIN — TC 99M MEDRONATE 21 MILLICURIE: 20 INJECTION, POWDER, LYOPHILIZED, FOR SOLUTION INTRAVENOUS at 08:00

## 2024-12-30 ASSESSMENT — PAIN DESCRIPTION - DESCRIPTORS
DESCRIPTORS: ACHING
DESCRIPTORS: THROBBING;BURNING
DESCRIPTORS: ACHING
DESCRIPTORS: SHOOTING

## 2024-12-30 ASSESSMENT — PAIN SCALES - GENERAL
PAINLEVEL_OUTOF10: 10
PAINLEVEL_OUTOF10: 8
PAINLEVEL_OUTOF10: 10
PAINLEVEL_OUTOF10: 10
PAINLEVEL_OUTOF10: 8
PAINLEVEL_OUTOF10: 9
PAINLEVEL_OUTOF10: 10
PAINLEVEL_OUTOF10: 8
PAINLEVEL_OUTOF10: 10
PAINLEVEL_OUTOF10: 9

## 2024-12-30 ASSESSMENT — PAIN DESCRIPTION - LOCATION
LOCATION: BACK;HIP
LOCATION: BACK
LOCATION: BACK;HIP
LOCATION: ABDOMEN
LOCATION: HIP

## 2024-12-30 ASSESSMENT — PAIN - FUNCTIONAL ASSESSMENT: PAIN_FUNCTIONAL_ASSESSMENT: ACTIVITIES ARE NOT PREVENTED

## 2024-12-30 ASSESSMENT — PAIN DESCRIPTION - ORIENTATION
ORIENTATION: POSTERIOR
ORIENTATION: LOWER
ORIENTATION: POSTERIOR
ORIENTATION: RIGHT;LEFT
ORIENTATION: POSTERIOR

## 2024-12-30 NOTE — CARE COORDINATION
Signed         Transition of Care Plan:     RUR: 11%-Lo9w  Prior Level of Functioning: Independent  Disposition: [facility]ospice  DARVIN:   If SNF or IPR: Date FOC offered:   Date FOC received:   Accepting facility:   Date authorization started with reference number:   Date authorization received and expires:   Follow up appointments:   DME needed: walker  Transportation at discharge:   IM/[facility] Medicare/ letter given:   Is patient a  and connected with VA?   If yes, was [place] transfer form completed and VA notified?   Caregiver Contact:   Discharge Caregiver contacted prior to discharge?   Care Conference needed?   Barriers to discharge:   Order noted for rolling walker. Referral sent via [place]. Requested delivery to patient's room. Awaiting their approval.           Update-12/30/24  Seen by Palliative and referral for Hospice ordered. Patient requested [facility]. Referral sent via [email or web address]. CM continues to follow for EMILY needs.    Update-[facility] has accepted for services. CM spoke with patient and he needs a bed and home oxygen. CM spoke with Mellissa from Hospice and they will order equipment and hopefully deliver to house today. Patient to discharge tomorrow at 1000 and they will meet at his home to admit to their services. Attending updated on details.

## 2024-12-31 PROCEDURE — 6360000002 HC RX W HCPCS: Performed by: FAMILY MEDICINE

## 2024-12-31 PROCEDURE — 99233 SBSQ HOSP IP/OBS HIGH 50: CPT | Performed by: PHYSICAL MEDICINE & REHABILITATION

## 2024-12-31 PROCEDURE — 6370000000 HC RX 637 (ALT 250 FOR IP): Performed by: INTERNAL MEDICINE

## 2024-12-31 PROCEDURE — 6360000002 HC RX W HCPCS: Performed by: INTERNAL MEDICINE

## 2024-12-31 PROCEDURE — 6360000002 HC RX W HCPCS: Performed by: PHYSICAL MEDICINE & REHABILITATION

## 2024-12-31 PROCEDURE — 6370000000 HC RX 637 (ALT 250 FOR IP): Performed by: PHYSICAL MEDICINE & REHABILITATION

## 2024-12-31 PROCEDURE — 6370000000 HC RX 637 (ALT 250 FOR IP): Performed by: NURSE PRACTITIONER

## 2024-12-31 PROCEDURE — 2500000003 HC RX 250 WO HCPCS: Performed by: INTERNAL MEDICINE

## 2024-12-31 PROCEDURE — 2060000000 HC ICU INTERMEDIATE R&B

## 2024-12-31 RX ORDER — OXYCODONE HCL 20 MG/ML
10 CONCENTRATE, ORAL ORAL
Status: DISCONTINUED | OUTPATIENT
Start: 2024-12-31 | End: 2025-01-01 | Stop reason: HOSPADM

## 2024-12-31 RX ORDER — HYDROMORPHONE HYDROCHLORIDE 1 MG/ML
1 INJECTION, SOLUTION INTRAMUSCULAR; INTRAVENOUS; SUBCUTANEOUS
Status: DISCONTINUED | OUTPATIENT
Start: 2024-12-31 | End: 2025-01-01 | Stop reason: HOSPADM

## 2024-12-31 RX ORDER — HYDROMORPHONE HYDROCHLORIDE 1 MG/ML
0.5 INJECTION, SOLUTION INTRAMUSCULAR; INTRAVENOUS; SUBCUTANEOUS ONCE
Status: DISCONTINUED | OUTPATIENT
Start: 2024-12-31 | End: 2024-12-31

## 2024-12-31 RX ADMIN — Medication 5 MG: at 15:15

## 2024-12-31 RX ADMIN — Medication 10 MG: at 23:21

## 2024-12-31 RX ADMIN — ACETAMINOPHEN 650 MG: 650 SOLUTION ORAL at 22:17

## 2024-12-31 RX ADMIN — HYDROMORPHONE HYDROCHLORIDE 1 MG: 1 INJECTION, SOLUTION INTRAMUSCULAR; INTRAVENOUS; SUBCUTANEOUS at 18:47

## 2024-12-31 RX ADMIN — GABAPENTIN 300 MG: 100 CAPSULE ORAL at 09:23

## 2024-12-31 RX ADMIN — DICYCLOMINE HYDROCHLORIDE 20 MG: 20 TABLET ORAL at 21:49

## 2024-12-31 RX ADMIN — GABAPENTIN 300 MG: 100 CAPSULE ORAL at 21:48

## 2024-12-31 RX ADMIN — ACETAMINOPHEN 650 MG: 650 SOLUTION ORAL at 17:57

## 2024-12-31 RX ADMIN — OCTREOTIDE ACETATE 50 MCG: 50 INJECTION, SOLUTION INTRAVENOUS; SUBCUTANEOUS at 12:09

## 2024-12-31 RX ADMIN — DICYCLOMINE HYDROCHLORIDE 20 MG: 20 TABLET ORAL at 05:35

## 2024-12-31 RX ADMIN — GABAPENTIN 300 MG: 100 CAPSULE ORAL at 15:15

## 2024-12-31 RX ADMIN — ACETAMINOPHEN 650 MG: 650 SOLUTION ORAL at 10:40

## 2024-12-31 RX ADMIN — HYDROMORPHONE HYDROCHLORIDE 1 MG: 1 INJECTION, SOLUTION INTRAMUSCULAR; INTRAVENOUS; SUBCUTANEOUS at 21:49

## 2024-12-31 RX ADMIN — ONDANSETRON 4 MG: 2 INJECTION INTRAMUSCULAR; INTRAVENOUS at 13:10

## 2024-12-31 RX ADMIN — PANTOPRAZOLE SODIUM 40 MG: 40 TABLET, DELAYED RELEASE ORAL at 06:45

## 2024-12-31 RX ADMIN — TIZANIDINE 2 MG: 2 TABLET ORAL at 18:53

## 2024-12-31 RX ADMIN — Medication 5 MG: at 22:17

## 2024-12-31 RX ADMIN — Medication 5 MG: at 12:09

## 2024-12-31 RX ADMIN — DICYCLOMINE HYDROCHLORIDE 20 MG: 20 TABLET ORAL at 15:15

## 2024-12-31 RX ADMIN — LORAZEPAM 0.5 MG: 1 TABLET ORAL at 00:52

## 2024-12-31 RX ADMIN — OCTREOTIDE ACETATE 50 MCG: 50 INJECTION, SOLUTION INTRAVENOUS; SUBCUTANEOUS at 21:50

## 2024-12-31 RX ADMIN — MEMANTINE 5 MG: 5 TABLET ORAL at 09:24

## 2024-12-31 RX ADMIN — METOPROLOL TARTRATE 12.5 MG: 25 TABLET, FILM COATED ORAL at 15:15

## 2024-12-31 RX ADMIN — METOPROLOL TARTRATE 12.5 MG: 25 TABLET, FILM COATED ORAL at 05:35

## 2024-12-31 RX ADMIN — MORPHINE SULFATE: 1 INJECTION INTRAVENOUS at 10:21

## 2024-12-31 RX ADMIN — DICYCLOMINE HYDROCHLORIDE 20 MG: 20 TABLET ORAL at 09:23

## 2024-12-31 RX ADMIN — SODIUM CHLORIDE, PRESERVATIVE FREE 10 ML: 5 INJECTION INTRAVENOUS at 21:50

## 2024-12-31 RX ADMIN — ACETAMINOPHEN 650 MG: 650 SOLUTION ORAL at 05:34

## 2024-12-31 RX ADMIN — ENOXAPARIN SODIUM 40 MG: 100 INJECTION SUBCUTANEOUS at 09:25

## 2024-12-31 ASSESSMENT — PAIN DESCRIPTION - LOCATION: LOCATION: BACK

## 2024-12-31 ASSESSMENT — PAIN SCALES - GENERAL
PAINLEVEL_OUTOF10: 7
PAINLEVEL_OUTOF10: 9
PAINLEVEL_OUTOF10: 3
PAINLEVEL_OUTOF10: 9

## 2024-12-31 ASSESSMENT — PAIN DESCRIPTION - DESCRIPTORS: DESCRIPTORS: ACHING

## 2024-12-31 NOTE — PROGRESS NOTES
[facility]  Hospitalist Group                                                                                          Hospitalist Progress Note  David M Milligram, PA-C  Answering service: [phone] OR [phone] from in house phone        Date of Service:  2024  NAME:  Vahe Shaver  :  1960  MRN:  [id number]      Admission Summary:   Per H&P: \"Vahe Shaver is a 64 y.o. male with past medical history significant for Crohn's disease, chronic pain syndrome, hypertension, COPD presented at the emergency room with abdominal pain.  Patient reported multiple falls at home due to lower extremity weakness.  Following the most recent fall patient developed right hip pain which is constant with radiation to the abdomen, sharp, 8/10 in severity, no known aggravating or relieving factors.  Patient denies  fever, rigors and chills.  Patient was last admitted to this hospital from 2024 to 9/10/2024 patient was admitted and treated for Crohn's disease exacerbation.  CT scan of the abdomen and pelvis done on arrival at the emergency room showed new masslike hepatic hypodensities suspicious for metastatic disease/hepatic abscess.  Patient was started on vancomycin and Zosyn and was referred to the hospitalist service for admission.\"    Interval history / Subjective:   Patient reports continued pain in his hips, legs and lower abdominal quadrants.     Discussed with Palliative MD, plan to transition to home with hospice, CM following     Assessment & Plan:     Multiple liver lesions, R lung mass, adenopathy, and bone metastasis  Stage IV pancreaticobiliary adenocarcinoma   - MRI abdomen: Osseous metastatic disease, lesion in inferior pole and posteriorly in the right kidney with pyelo favored over malignancy - cancelled GI consult as MRI result is more c/f metastatic disease  - CT chest : \"Right upper lobe consolidation increased in size from previous concerning for lung primary, 
      [facility]  Hospitalist Group                                                                                          Hospitalist Progress Note  David M Milligram, PA-C  Answering service: [phone] OR [phone] from in house phone        Date of Service:  2024  NAME:  Vahe Shaver  :  1960  MRN:  [id number]      Admission Summary:   Per H&P: \"Vahe Shaver is a 64 y.o. male with past medical history significant for Crohn's disease, chronic pain syndrome, hypertension, COPD presented at the emergency room with abdominal pain.  Patient reported multiple falls at home due to lower extremity weakness.  Following the most recent fall patient developed right hip pain which is constant with radiation to the abdomen, sharp, 8/10 in severity, no known aggravating or relieving factors.  Patient denies  fever, rigors and chills.  Patient was last admitted to this hospital from 2024 to 9/10/2024 patient was admitted and treated for Crohn's disease exacerbation.  CT scan of the abdomen and pelvis done on arrival at the emergency room showed new masslike hepatic hypodensities suspicious for metastatic disease/hepatic abscess.  Patient was started on vancomycin and Zosyn and was referred to the hospitalist service for admission.\"    Interval history / Subjective:   No acute interval events. Patient with no acute complaints at the time of exam.     Discussed extensively with Palliative MD and appreciate expertise. Plan to wean off PCA over next 24 hours and discharge to hospice tomorrow (25)     Assessment & Plan:     Multiple liver lesions, R lung mass, adenopathy, and bone metastasis  Stage IV pancreaticobiliary adenocarcinoma   - MRI abdomen: Osseous metastatic disease, lesion in inferior pole and posteriorly in the right kidney with pyelo favored over malignancy - cancelled GI consult as MRI result is more c/f metastatic disease  - CT chest : \"Right upper lobe consolidation 
      [facility] Adult  Hospitalist Group                                                                                          Hospitalist Progress Note  JOSUE Hare NP  Answering service: [phone] OR [phone] from in house phone        Date of Service:  2024  NAME:  Vahe Shaver  :  1960  MRN:  [id number]      Admission Summary:   Vahe Shaver is a 64 y.o. male with past medical history significant for Crohn's disease, chronic pain syndrome, hypertension, COPD presented at the emergency room with abdominal pain.  Patient reported multiple falls at home due to lower extremity weakness.  Following the most recent fall patient developed right hip pain which is constant with radiation to the abdomen, sharp, 8/10 in severity, no known aggravating or relieving factors.  Patient denies  fever, rigors and chills.  Patient was last admitted to this hospital from 2024 to 9/10/2024 patient was admitted and treated for Crohn's disease exacerbation.  CT scan of the abdomen and pelvis done on arrival at the emergency room showed new masslike hepatic hypodensities suspicious for metastatic disease/hepatic abscess.  Patient was started on vancomycin and Zosyn and was referred to the hospitalist service for admission.    Interval history / Subjective:   Patient seen and examined, lying in bed. Reports continued severe pain despite transition to PCA yesterday. Received additional 0.5 mg IV hydromorphone overnight due to pain. Does not feel that his pain concerns/needs have been heard.    Will increase PCA continuous rate slightly after discussion with pharmacy.    Noted desaturation while sleeping. CXR unremarkable. Encourage incentive spirometer.    Palliative meeting scheduled for 11:00 tomorrow morning. Patient strongly considering transition to hospice care - reports wanting to focus on quality of life rather than quantity of life. Requested DNR status .    Addendum 154: 
      [facility]  Hospitalist Group                                                                                          Hospitalist Progress Note  David M Milligram, PA-C  Answering service: [phone] OR [phone] from in house phone        Date of Service:  2024  NAME:  Vahe Shaver  :  1960  MRN:  [id number]      Admission Summary:   Vahe Shaver is a 64 y.o. male with past medical history significant for Crohn's disease, chronic pain syndrome, hypertension, COPD presented at the emergency room with abdominal pain.  Patient reported multiple falls at home due to lower extremity weakness.  Following the most recent fall patient developed right hip pain which is constant with radiation to the abdomen, sharp, 8/10 in severity, no known aggravating or relieving factors.  Patient denies  fever, rigors and chills.  Patient was last admitted to this hospital from 2024 to 9/10/2024 patient was admitted and treated for Crohn's disease exacerbation.  CT scan of the abdomen and pelvis done on arrival at the emergency room showed new masslike hepatic hypodensities suspicious for metastatic disease/hepatic abscess.  Patient was started on vancomycin and Zosyn and was referred to the hospitalist service for admission.    Interval history / Subjective:   Patient reports improved pain with new regimen, appreciate palliative care input.     Awaiting Liver biopsy pathology for further planning     Assessment & Plan:     Liver mass  -MRI abdomen: Osseous metastatic disease, lesion in inferior pole and posteriorly in the right kidney with pyelofavored over malignancy  -cancelled GI consult as MRI result is more c/f metastatic disease  -cont Flagyl and Levaquin for now until abscess can be ruled out  -CT chest : \"Right upper lobe consolidation increased in size from previous concerning for lung primary, extensive right mediastinal adenopathy, nodule in the left lower lobe has increased 
      [facility] Adult  Hospitalist Group                                                                                          Hospitalist Progress Note  JOSUE Hare NP  Answering service: [phone] OR [phone] from in house phone        Date of Service:  2024  NAME:  Vahe Shaver  :  1960  MRN:  [id number]      Admission Summary:   Vahe Shaver is a 64 y.o. male with past medical history significant for Crohn's disease, chronic pain syndrome, hypertension, COPD presented at the emergency room with abdominal pain.  Patient reported multiple falls at home due to lower extremity weakness.  Following the most recent fall patient developed right hip pain which is constant with radiation to the abdomen, sharp, 8/10 in severity, no known aggravating or relieving factors.  Patient denies  fever, rigors and chills.  Patient was last admitted to this hospital from 2024 to 9/10/2024 patient was admitted and treated for Crohn's disease exacerbation.  CT scan of the abdomen and pelvis done on arrival at the emergency room showed new masslike hepatic hypodensities suspicious for metastatic disease/hepatic abscess.  Patient was started on vancomycin and Zosyn and was referred to the hospitalist service for admission.    Interval history / Subjective:   Patient seen and examined, lying in bed. Complains of ongoing lower back pain that radiates down legs. Does not feel that oxycodone is helping pain.    Has meeting at 11:00 on Monday with Palliative Care and medical POA to discuss goals of care moving forward. Patient expresses that he is considering transitioning to hospice care.    Spoke with Palliative Care - PRN oxycodone increased to 15 mg today.     Assessment & Plan:     Multiple liver lesions, R lung mass, adenopathy, and bone metastasis  Stage IV pancreaticobiliary adenocarcinoma   - MRI abdomen: Osseous metastatic disease, lesion in inferior pole and posteriorly in the 
    [facility] [place] [facility] [place]  [address], [address] [facility] [address]  W: [phone]  F: [phone]    Reason for Evaluation:   Vahe Shaver is a 64 y.o. male who is seen in consultation at the request of Génesis SALAS for evaluation of stage IV Pancreaticobiliary adenocarcinoma     History of Present Illness:   Vahe Shaver is a pleasant 64 y.o. male who presents today for evaluation of concern for malignancy. He has a past medical history of Crohn's, bowel obstruction, tobacco use, short bowel syndrome, GERD, HTN, cavitary lesion of the lung. Mr. Shaver reports to the ED with right hip pain after a ground level fall ~three weeks ago (denies LOC), as well as burning abdominal pain and shortness of breath. He describes his hip as \"occasionally giving out\" with intermittent swelling.   Last admitted to the hospital 9/6/2024-9/10/2024 after a Crohn's flare  Imaging performed in ED concerning for hepatic malignancy vs infectious process. MRI also suspicious for pyelonephritis. CTA remarkable for right upper lobe mass and left lower lobe nodule. He tells me he's \"had holes in his lungs that developed when he had COVID.\"    Seen resting comfortably, tolerating supplemental O2.     Physical Exam:   BP (!) 141/80   Pulse 80   Temp 97.8 °F (36.6 °C) (Oral)   Resp 18   Wt 93.8 kg (206 lb 12.7 oz)   SpO2 94%   BMI 29.67 kg/m²   ECOG PS: 2  General: uncomfortable   Eyes: anicteric sclerae  Neck: supple  Lymphatic: no cervical, supraclavicular, or inguinal adenopathy  Respiratory: tachypneic effort on supplemental O2     Results:     Lab Results   Component Value Date/Time    WBC 8.3 12/27/2024 07:08 AM    HGB 11.4 12/27/2024 07:08 AM    HCT 35.0 12/27/2024 07:08 AM     12/27/2024 07:08 AM    MCV 99.2 12/27/2024 07:08 AM     Lab Results   Component Value Date/Time     12/27/2024 07:08 AM    K 3.6 12/27/2024 07:08 AM     12/27/2024 07:08 AM    CO2 24 12/27/2024 07:08 AM    BUN 
    [facility] [place] [facility] [place]  [address], [address] [facility] [address]  W: [phone]  F: [phone]    Reason for Evaluation:   Vahe Shaver is a 64 y.o. male who is seen in consultation at the request of Génesis SALAS for evaluation of concern for malignancy.    History of Present Illness:   Vahe Shaver is a pleasant 64 y.o. male who presents today for evaluation of concern for malignancy. He has a past medical history of Crohn's, bowel obstruction, tobacco use, short bowel syndrome, GERD, HTN, cavitary lesion of the lung. Mr. Shaver reports to the ED with right hip pain after a ground level fall ~three weeks ago (denies LOC), as well as burning abdominal pain and shortness of breath. He describes his hip as \"occasionally giving out\" with intermittent swelling.   Last admitted to the hospital 9/6/2024-9/10/2024 after a Crohn's flair.   Imaging performed in ED concerning for hepatic malignancy vs infectious process. MRI also suspicious for pyelonephritis. CTA remarkable for right upper lobe mass and left lower lobe nodule. He tells me he's \"had holes in his lungs that developed when he had COVID.\"    Mr. Shaver is seen laying in bed, in 10/10 pain of his left hip. He is short of breath newly on supplemental O2. He has received PRN 1mg Dilaudid though reports minimal difference in his pain, hospitalist team to increase frequency of analgesia. He reports discomfort in his mouth, having a history of thrush.     He is an active tobacco user, having started at 9yo 2ppd. He reports being up to date on his colon cancer screenings, though unsure if his prostate has been checked recently \"I'm usually asleep when they do it.\" He reports no cancer history in his family.      Review of systems was obtained and pertinent findings reviewed above. Past medical history, social history, family history, medications, and allergies are located in the electronic medical record.    Physical Exam:   BP (!) 
    [facility] [place] [facility] [place]  [address], [address] [facility] [address]  W: [phone]  F: [phone]    Reason for Evaluation:   Vahe Shaver is a 64 y.o. male who is seen in consultation at the request of Génesis SALAS for evaluation of stage IV Pancreaticobiliary adenocarcinoma     History of Present Illness:   Vahe Shaver is a pleasant 64 y.o. male who presents today for evaluation of concern for malignancy. He has a past medical history of Crohn's, bowel obstruction, tobacco use, short bowel syndrome, GERD, HTN, cavitary lesion of the lung. Mr. Shaver reports to the ED with right hip pain after a ground level fall ~three weeks ago (denies LOC), as well as burning abdominal pain and shortness of breath. He describes his hip as \"occasionally giving out\" with intermittent swelling.   Last admitted to the hospital 9/6/2024-9/10/2024 after a Crohn's flare  Imaging performed in ED concerning for hepatic malignancy vs infectious process. MRI also suspicious for pyelonephritis. CTA remarkable for right upper lobe mass and left lower lobe nodule. He tells me he's \"had holes in his lungs that developed when he had COVID.\"    Seen resting comfortably, tolerating supplemental O2.     INTERIM HX:  Seen today in room/ in bed.  Still dealing with pain.   PC seeing pt.   No fevers/ chills/ chest pain/ SOB/ nausea/ vomiting/diarrhea/       Physical Exam:   /65   Pulse 99   Temp 98.2 °F (36.8 °C) (Oral)   Resp 26   Wt 86.8 kg (191 lb 5.8 oz)   SpO2 96%   BMI 27.46 kg/m²   ECOG PS: 2  General: uncomfortable   Eyes: anicteric sclerae  Neck: supple  Respiratory: normal effort  M/S weak in bed    Results:     Lab Results   Component Value Date/Time    WBC 9.1 12/30/2024 06:45 AM    HGB 11.6 12/30/2024 06:45 AM    HCT 35.8 12/30/2024 06:45 AM     12/30/2024 06:45 AM    .8 12/30/2024 06:45 AM     Lab Results   Component Value Date/Time     12/30/2024 06:45 AM    K 3.6 
1015: Pt arrives via stretcher to angio department accompanied by transport for US guided liver biopsy procedure. All assessments completed and consent was reviewed.  Education given was regarding procedure, mod sedation, post-procedure care and  management/follow-up. Opportunity for questions was provided and all questions and concerns were addressed.     1110: pt in recovery    1210: Name of procedure:  US guided liver biopsy     Sedation medications given: versed 1.5mg, fentanyl 50mcg     Sedation tolerated: well     Total Procedure time: 23 min     Vital Signs: stable     Any complications related to procedure: see orders     Post Procedure Care Needed/order sets placed in connect care: see orders     Pt tolerated procedure well. VSS. No C/O pain. Dressing to site D&I. No bleeding or hematoma noted to site.     TRANSFER - OUT REPORT:    Verbal report given to bedside RN on Vahe Shaver  being transferred to  for routine progression of patient care       Report consisted of patient's Situation, Background, Assessment and   Recommendations(SBAR).     Information from the following report(s) Nurse Handoff Report and Event Log was reviewed with the receiving nurse.           Lines:   Peripheral IV 12/23/24 Right;Ventral Forearm (Active)   Site Assessment Clean, dry & intact 12/24/24 0852   Line Status Flushed;Infusing 12/24/24 0852   Line Care Cap changed;Connections checked and tightened 12/24/24 0852   Phlebitis Assessment No symptoms 12/24/24 0852   Infiltration Assessment 0 12/24/24 0852   Alcohol Cap Used Yes 12/24/24 0852   Dressing Status Clean, dry & intact 12/24/24 0852   Dressing Type Transparent w/CHG gel 12/24/24 0852   Dressing Intervention New 12/23/24 6911        Opportunity for questions and clarification was provided.      Patient transported with:  O2 @ 2lpm and Tech        
Chart checked, case discussed with pt's RN who reports that pt is meeting with palliative care at this time and might go home under hospice care. Additionally a bone scan is planned for today. PT will follow and see as able and appropriate. Leisa Phillips, PT  
Chief complaint:   Chief Complaint   Patient presents with   •  Symptoms       Vitals:  Visit Vitals  /85   Pulse 93   Temp 98.3 °F (36.8 °C) (Oral)   Resp 18   Ht 5' 2\" (1.575 m)   Wt 55 kg   LMP 01/03/2011   BMI 22.18 kg/m²       HISTORY OF PRESENT ILLNESS     Dysuria with frequency. Patient has not noticed any hematuria. Symptoms started last night. She denies any fevers or chills. She denies any nausea or vomiting.       Symptoms    The current episode started yesterday. The problem occurs every urination. The problem has been unchanged. The quality of the pain is described as burning. The pain is at a severity of 3/10. The pain is mild. There has been no fever. There is no history of pyelonephritis. Associated symptoms include urgency. Pertinent negatives include no chills, discharge, flank pain, frequency, hematuria, hesitancy, nausea, possible pregnancy, sweats or vomiting. She has tried nothing for the symptoms. The treatment provided no relief. There is no history of catheterization, kidney stones, recurrent UTIs, a single kidney, urinary stasis or a urological procedure.       Other significant problems:  Patient Active Problem List    Diagnosis Date Noted   • Stress 11/05/2018     Priority: Low   • Essential hypertension 12/29/2017     Priority: Low     Diagnosed Dec 2017     • Neck pain 10/25/2016     Priority: Low     Uses aleve two times per day     • Osteopenia 06/04/2013     Priority: Low     BMD 10/10         PAST MEDICAL, FAMILY AND SOCIAL HISTORY     Medications:  Current Outpatient Medications   Medication   • nitrofurantoin, macrocrystal-monohydrate, (MACROBID) 100 MG capsule   • escitalopram (LEXAPRO) 5 MG tablet   • losartan (COZAAR) 25 MG tablet   • VITAMIN E PO   • MULTIPLE VITAMIN PO   • Cholecalciferol (VITAMIN D PO)   • CALCIUM PO     No current facility-administered medications for this visit.        Allergies:  ALLERGIES:   Allergen Reactions   • Bactrim [Sulfamethoxazole 
Lovenox Monitoring  Indication: DVT Prophylaxis  Recent Labs     12/24/24  0716 12/24/24  0720 12/25/24  0634   HGB 11.7*  --  11.3*   *  --  143*   INR  --  1.2* 1.1     Current Weight: 87.8 kg  Est. CrCl = 77 ml/min  Current Dose: 30 mg subcutaneously every 12 hours.  Plan: Change to enoxaparin 40 mg Q24H for weight < 101 kg    
Mr. Shaver has elected to transition to hospice. We appreciate the opportunity to have cared for Mr. Shaver and support his decision to transition to hospice.     Stephy GONZALES-BC, NP      [facility] [place] at [address], [address] [place] [address]  W: [phone]  F: [phone]   
Night shift summary:    PCA changed from dilaudid to morphine at shift start. Patient c/o 10/10 pain in back, hips, and down through legs. Patient states that he has never had pain like this before in his life.     0130 Discussed pain with America Brody NP on unit. Patient is falling asleep with current PCA order for brief periods of time but c/o excruciating 10/10 pain when awake. Per NP, if he continues to have 10/10 pain but it keeps him awake, he can possibly have a one time dilaudid dose.    0425 Messaged NP asking for a one time dilaudid for patient's intractable pain. Ordered and given. Patient's pain decreased to 8/10 and he was able to go back to sleep.    8394 Messaged NP and pharmacy, as well as told patient that he received 25mg metoprolol instead of 12.5 mg metoprolol. Watching BP and HR closely.    0763 Bedside shift report given to VANDANA Main. Patient's BP has remained WNL.   
Nuclear medicine bone scan planned for 12/30.     No prep    Injection around 730 and scan around 1030. Scan will take about 30 minutes.  
Orders received, chart reviewed and patient evaluated by physical therapy. Pending progression with skilled acute physical therapy, recommend:    Intermittent physical therapy up to 2-3x/week in previous living setting    Recommend with nursing OOB to chair 3x/day and walking daily with X 1 assist and rolling walker. Thank you for completing as able in order to maintain patient strength, endurance and independence.     Full evaluation to follow.  Leisa Phillips, PT  
Palliative Medicine  Patient Name: Vahe Shaver  YOB: 1960  MRN: [id number]  Age: 64 y.o.  Gender: male    Date of Initial Consult: Dec 24, 2024  Date of Service: 12/30/2024  Time: 12:08 PM  Provider: Judith Amaya NP  Hospital Day: 9  Admit Date: 12/22/2024  Referring Provider: Génesis Diehl NP      Reasons for Consultation:  Goals of Care, Overwhelming Symptoms, and Other: uncontrolled pain    HISTORY OF PRESENT ILLNESS (HPI):   Vahe Shaver \"Rg\"  is a 64 y.o. male  who was admitted on 12/22/2024 from home after falling with resultant left and rt hip pain and leg giving out with a diagnosis of abdominal pain, rt hip pain, elevated troponin, acute on chronic pain, falls, left hip pain, hypokalemia, hyponatremia .     Oncology on board~ biopsy pending to determine options. ID on board~ rule out abscess vs tumor    CT scan of the abdomen and pelvis done on arrival at the emergency room showed new masslike hepatic hypodensities suspicious for metastatic disease/hepatic abscess. Suspicious for pyelonephritis.   -Pt w/ multiple liver lesions, R lung mass, bone mets, adenopathy- pacreaticobiliary adenocarcinoma.     PMH: Crohn's, bowel obstruction, short bowel syndrome, GERD, HTN, cavitary lesion of the lung, chronic pain, COPD, migraine, melanoma of head and face, anxiety, depression, OA, anemia, active smoker, perirectal abscess    Psychosocial: pt is . AMD on file.     Interval History:    12/27/24: scheduled and increased oxy liquid to 15mg every 4  12/26/24:   Oxycodone liquid 5mg/5ml ~ give 10mg every 4 hours as needed  Tylenol liquid 650mg scheduled every 6  Will allow iv dilaudid as ordered every 3 as needed today. Plan to reduce tomorrow as discussed  Dc hydrocodone  Dc prn tylenol order  12/24/24: Hydrocodone 7.5mg liquid ordered every 4 hours as needed. (Pt requested) this was increased from 5mg  Dc oxycodone 5mg tablets  Increase iv dilaudid to 2mg for rescue   12/30- 
Per NP, patient can use the dilaudid currently in the PCA while the syringe is still full and can transition to morphine for PCA when the dilaudid syringe is empty/near empty.   
Shift Summary:    Patient complain of right hip pain 10/10 with prn medication given when available. Patient stated, that he was frustrated with the current regimen of pain control. Approximately around 1730, patient experienced a panic attack- patient visibly shaking, short of breath, and heart rate elevated to 117. Ativan given along with Hydrocodone prn. Informed Dr. Cox about patient's frustration.       
Spiritual Health History and Assessment/Progress Note  [facility]    Palliative Care,  , Anticipatory Grief, Adjustment to illness,      Name: Vahe Shaver MRN: [id number]    Age: 64 y.o.     Sex: male   Language: English   Rastafarian: Orthodox   Liver masses     Date: 12/29/2024            Total Time Calculated: 35 min              Spiritual Assessment began in [facility] 3N TELEMETRY        Referral/Consult From: Palliative Care   Encounter Overview/Reason: Palliative Care  Service Provided For: Patient    Sandra, Belief, Meaning:   Patient identifies as spiritual and has beliefs or practices that help with coping during difficult times  Family/Friends No family/friends present      Importance and Influence:  Patient has no beliefs influential to healthcare decision-making identified during this visit  Family/Friends No family/friends present    Community:  Patient expresses feelings of isolation: disconnected from family/friends  Family/Friends No family/friends present    Assessment and Plan of Care:     Patient Interventions include: Facilitated expression of thoughts and feelings, Explored spiritual coping/struggle/distress, and Affirmed coping skills/support systems  Family/Friends Interventions include: No family/friends present    Patient Plan of Care: Spiritual Care available upon further referral  Family/Friends Plan of Care: No family/friends present    I visited Robbie Shaver for a palliative initial spiritual health assessment.     He remembers me from a previous visit during an earlier hospital stay.     He spoke about his illness and his decision to employ hospice support. He has a goals of care meeting set for Monday morning with palliative. He plans to announce his decision at that meeting. His MPOA, friend Desiree Stover, plans to be present.     The decision has not been easy but he believes it to be the best option for him. He spoke of his father's death earlier this year and the good 
Spiritual Health History and Assessment/Progress Note  [facility]    Palliative Care,  , Adjustment to illness,      Name: Vahe Shaver MRN: [id number]    Age: 64 y.o.     Sex: male   Language: English   Adventist: Voodoo   Liver masses     Date: 12/30/2024            Total Time Calculated: 30 min              Spiritual Assessment continued in [facility] 3N TELEMETRY        Referral/Consult From: Palliative Care   Encounter Overview/Reason: Palliative Care  Service Provided For: Patient, Friend    Sandra, Belief, Meaning:   Patient identifies as spiritual  Family/Friends Other:        Importance and Influence:  Patient has no beliefs influential to healthcare decision-making identified during this visit  Family/Friends have no beliefs influential to healthcare decision-making identified during this visit    Community:  Patient feels well-supported. Support system includes: Friends  Family/Friends Other:      Assessment and Plan of Care:     Patient Interventions include: Facilitated expression of thoughts and feelings and Affirmed coping skills/support systems  Family/Friends Interventions include: Facilitated expression of thoughts and feelings    Patient Plan of Care: Spiritual Care available upon further referral  Family/Friends Plan of Care: Spiritual Care available upon further referral    I was with palliative Dr Barcenas for a goals of care meeting with patient, Vahe Shaver. His MPOA, Desiree Stover was present for the meeting via cell phone.     He has chosen to embrace hospice at home.     Electronically signed by LILLIAN FLETCHER on 12/30/2024 at 11:59 AM   
GFRAA >60 09/12/2022 04:27 AM     Lab Results   Component Value Date/Time    ALT 12 12/25/2024 06:34 AM    GLOB 3.5 12/25/2024 06:34 AM     Lab Results   Component Value Date/Time    CEA 2.6 08/31/2022 06:21 PM    PSA 0.1 12/24/2024 07:20 AM     09/05/2022 04:09 AM    AFP <0.9 08/31/2022 06:21 PM           Records from labs and imaging reviewed and summarized above.  Test results above have been reviewed.  Component      Latest Ref Rng 12/23/2024   CA 19-9      0 - 35 U/mL 5068 (H)       Legend:  (H) High      Imaging:     CT Heat WO Contrast  12/23/2024    IMPRESSION:  No acute intracranial abnormality.    CTA Chest   12/23/2024  IMPRESSION:     1. Right upper lobe consolidation has increased in size concerning for lung  primary     2. Extensive right mediastinal adenopathy     3. Nodule of the left lower lobe has increased in size as well     4. Known liver metastases and bony metastases are difficult to visualize    MRI Abdomen W WO Contrast   12/23/2024  IMPRESSION:     1. Osseous metastatic disease. Given this finding, the lesion in the liver most  consistent with hepatic metastases.     2. The lesion in the inferior pole and posteriorly in the right kidney  demonstrates heterogeneous T2 signal which is relatively patchy and relative  heterogeneous hypoenhancement. Pyelonephritis is favored over malignancy.  Correlate clinically.    CT Abdomen Pelvis W IV Contrast   12/22/2024    IMPRESSION:     1. New masslike hepatic hypodensities, appearance is highly suspicious for  metastatic disease although given chronicity (new since September 6) there is  some suspicion for multifocal hepatic abscess. Recommend abdominal MRI for  further evaluation. No definite primary lesion is identified.  2. There are a few segments of mildly dilated small bowel, with a few short  segments of subtle mucosal thickening, probably reflecting early or mild bowel  inflammation. No obstruction.  3. New focus of hypoenhancement in 
W/Trimethoprim] RASH     Dk Isaac's syndrome   • Lisinopril Cough       Past Medical  History/Surgeries:  Past Medical History:   Diagnosis Date   • Acute GI bleeding 2016    hospitalized 16-16, peptic ulcer disease due to NSAID use, got 1 unit blood, had syncope from blood loss   • Essential hypertension 2017   • History of blood transfusion 2016    one unit after GI bleed   • Neck pain 10/25/2016    Uses aleve two times per day   • Osteopenia 2013    BMD 10/10    • Uterine fibroid        Past Surgical History:   Procedure Laterality Date   • Bd dexa axial skeleton  10-11-10    osteopenia   • Colonoscopy      normal exam   • Colonoscopy  6-3-16    polyp removed   • Endometrial ablation  2000   • Esophagogastroduodenoscopy transoral flex diag  2016    bleeding ulcers   • Mammo screening bilateral  5-6-13    negative   • Mammo screening bilateral  9-2-14    results  pending   • Mammo screening bilateral  01/15/2016    negative   • Thinprep pap test with hpv reflex  -13    negative   • Thinprep pap test with hpv reflex  9-2-14    negative   • Thinprep pap test with hpv reflex  01/15/2016    negative   • Tonsillectomy and adenoidectomy     • [place] tooth extraction         Family History:  Family History   Problem Relation Age of Onset   • Diabetes Mother    • High blood pressure Mother    • Osteoporosis Mother    • Depression Father    • Alcohol Abuse Father         alcohol   • Other Father         suicide   • Cancer Maternal Aunt 70        stomach   • Cancer Maternal Grandmother 80        breast, bone   • Alcohol Abuse Sister         alcohol   • Neurological Disorder Sister         2017-frontotemporal dementia       Social History:  Social History     Tobacco Use   • Smoking status: Former Smoker     Packs/day: 1.00     Years: 30.00     Pack years: 30.00     Last attempt to quit: 2007     Years since quittin.7   • Smokeless tobacco: Never Used 
iv dilaudid as this will not be an option outpatient and it is not in his best interest. He understands stating he only asks for it if he is in excruciating pain after trying oral first    Changes: we discussed switching to oxycodone liquid  Oxycodone liquid 5mg/5ml ~ give 10mg every 4 hours as needed  Tylenol liquid 650mg scheduled every 6  Will allow iv dilaudid as ordered every 3 as needed today. Plan to reduce tomorrow as discussed  Dc hydrocodone  Dc prn tylenol order  Agree with lidoderm patch  Agree with neurontin 300mg three times daily    12/24/24  Chronic Pain: pt has polyarticular pain, chronic back and neck pain. He follows an outpatient provider who has been giving him Norco 5mg every 6 prn. The provider is not willing to increase or change his medication. He has limited options so he continues to take as prescribed despite limited relief of pain.  Acute Pain hard to differentiate from chronic pain. Some of his discomfort may be related to bony mets although workup is ongoing. Both of hips hurt. Recent fall hurting rt hip but says the left hip hurts now as well.  Safe to assume that he has pathology for pain related to malignancy based on preliminary information.  Our outpatient clinic able to manage provided pain is cancer related. I have discussed this with him. Advised that he keep his current provider until we are able to get him on the clinic schedule as there may be a delay  He said that liquid hydrocodone works well for him. He has not had adequate relief from oxycodone IR 5 mg. Due to short gut there may be some absorption issues contributing to response to oxy iR.   Plan  Hydrocodone 7.5mg liquid ordered every 4 hours as needed. Will likely need to transition to another agent if this doesn't work. Tylenol limiting factor in the setting of possible liver mets. We can discuss further at next encounter  Dc oxycodone  Increase iv dilaudid to 2mg for rescue only if oral fails  Will place clinic 
suspension 650 mg, 650 mg, Oral, Q6H, Daudier Charles, APRN - NP    oxyCODONE (ROXICODONE) 5 MG/5ML solution 10 mg, 10 mg, Oral, Q4H PRN, Judith Amaya, APRN - NP, 10 mg at 12/26/24 1325    HYDROmorphone HCl PF (DILAUDID) injection 2 mg, 2 mg, IntraVENous, Q3H PRN, Milligram, Theo CAR PA-C, 2 mg at 12/26/24 1149    lidocaine 4 % external patch 3 patch, 3 patch, TransDERmal, Daily, Génesis Diehl APRN - CNP, 3 patch at 12/26/24 0829    dicyclomine (BENTYL) tablet 20 mg, 20 mg, Oral, Q6H, Abdifatah Blackman MD, 20 mg at 12/26/24 0830    gabapentin (NEURONTIN) capsule 300 mg, 300 mg, Oral, TID, Abdifatah Blackman MD, 300 mg at 12/26/24 1337    memantine (NAMENDA) tablet 5 mg, 5 mg, Oral, Daily, Abdifatah Blackman MD, 5 mg at 12/26/24 0830    metoprolol tartrate (LOPRESSOR) tablet 12.5 mg, 12.5 mg, Oral, Q12H, Abdifatah Blackman MD, 12.5 mg at 12/26/24 0434    pantoprazole (PROTONIX) tablet 40 mg, 40 mg, Oral, QAM AC, Abdifatah Blackman MD, 40 mg at 12/26/24 0705    tiZANidine (ZANAFLEX) tablet 2 mg, 2 mg, Oral, Q8H PRN, Abdifatah Blackman MD, 2 mg at 12/26/24 1337    sodium chloride flush 0.9 % injection 5-40 mL, 5-40 mL, IntraVENous, 2 times per day, Abdifatah Blackman MD, 10 mL at 12/26/24 0831    sodium chloride flush 0.9 % injection 5-40 mL, 5-40 mL, IntraVENous, PRN, Abdifatah Blackman MD    0.9 % sodium chloride infusion, , IntraVENous, PRN, Abdifatah Blackman MD    potassium chloride (KLOR-CON) extended release tablet 40 mEq, 40 mEq, Oral, PRN **OR** potassium bicarb-citric acid (EFFER-K) effervescent tablet 40 mEq, 40 mEq, Oral, PRN **OR** potassium chloride 10 mEq/100 mL IVPB (Peripheral Line), 10 mEq, IntraVENous, PRN, Abdifatah Blackman MD    magnesium sulfate 2000 mg in 50 mL IVPB premix, 2,000 mg, IntraVENous, PRN, Abdifatah Blackman MD    ondansetron (ZOFRAN-ODT) disintegrating tablet 4 mg, 4 mg, Oral, Q8H PRN **OR** ondansetron (ZOFRAN) injection 4 mg, 4 mg, IntraVENous, Q6H PRN, Abdifatah Blackman MD    
  Substance Use Topics   • Alcohol use: Yes     Frequency: Monthly or less     Drinks per session: 1 or 2     Comment: 1 drink less than once per month       REVIEW OF SYSTEMS     Review of Systems   Constitutional: Negative for chills.   Gastrointestinal: Negative for nausea and vomiting.   Genitourinary: Positive for dysuria and urgency. Negative for flank pain, frequency, hematuria and hesitancy.       PHYSICAL EXAM     Physical Exam   Constitutional: She is oriented to person, place, and time. She appears well-developed and well-nourished. No distress.   HENT:   Head: Normocephalic and atraumatic.   Mouth/Throat: Oropharynx is clear and moist. No oropharyngeal exudate.   Eyes: Conjunctivae and EOM are normal. Right eye exhibits no discharge. Left eye exhibits no discharge.   Neck: Normal range of motion. Neck supple. No tracheal deviation present.   Cardiovascular: Normal rate, regular rhythm, normal heart sounds and intact distal pulses. Exam reveals no gallop.   No murmur heard.  Pulmonary/Chest: Effort normal and breath sounds normal. No stridor. No respiratory distress. She has no wheezes. She has no rales. She exhibits no tenderness.   Abdominal: Soft. She exhibits no distension and no mass. There is no tenderness. There is no rebound and no guarding.   Musculoskeletal:   No CVA tenderness bilaterally.   Neurological: She is alert and oriented to person, place, and time. She exhibits normal muscle tone. Coordination normal.   Skin: Skin is warm and dry. No rash noted. She is not diaphoretic. No erythema. No pallor.   Psychiatric: She has a normal mood and affect. Her behavior is normal. Judgment and thought content normal.   Nursing note and vitals reviewed.      ASSESSMENT/PLAN     1. Acute cystitis with hematuria  Urinalysis consistent with urinary tract infection. Patient with UTI. No signs of pyelonephritis. Macrobid ordered for 5 days. Urine culture sent. Patient counseled on signs and symptoms first 
impairment  - Cont Namenda      Tobacco abuse  - Nicotine patch available  - Cessation strongly encouraged to promote healthy lifestyle      Code status: Full  Prophylaxis: Lovenox  Care Plan discussed with: Patient, Attending  Anticipated Disposition: TBD pending pathology           Review of Systems:   Pertinent items are noted in HPI.       Vital Signs:    Last 24hrs VS reviewed since prior progress note. Most recent are:  Vitals:    12/25/24 1200   BP:    Pulse: 85   Resp:    Temp:    SpO2:          Intake/Output Summary (Last 24 hours) at 12/25/2024 1313  Last data filed at 12/24/2024 1824  Gross per 24 hour   Intake 200 ml   Output --   Net 200 ml        Physical Examination:     I had a face to face encounter with this patient and independently examined them on 12/25/2024 as outlined below:          Constitutional:  No acute distress, cooperative, pleasant    ENT:  Oral mucosa moist, oropharynx benign.    Resp:  CTA bilaterally. No wheezing/rhonchi/rales. No accessory muscle use.    CV:  Regular rhythm, normal rate, no murmurs, gallops, rubs    GI:  Soft, non distended, non tender. normoactive bowel sounds    Musculoskeletal:  No edema, warm, 2+ pulses throughout    Neurologic:  Moves all extremities.  AAOx3, CN II-XII grossly intact            Data Review:    Review and/or order of clinical lab test  Review and/or order of tests in the radiology section of CPT  Review and/or order of tests in the medicine section of CPT      Labs:     Recent Labs     12/24/24  0716 12/25/24  0634   WBC 8.6 8.6   HGB 11.7* 11.3*   HCT 35.7* 35.0*   * 143*     Recent Labs     12/23/24  0434 12/24/24  0716 12/25/24  0634    134* 135*   K 4.6 4.0 3.6   * 104 103   CO2 24 25 25   BUN 12 9 9   MG 2.0  --   --    PHOS 3.3  --   --      Recent Labs     12/23/24  0434 12/24/24  0716 12/25/24  0634   ALT 14 13 12   GLOB 3.5 3.4 3.5     Recent Labs     12/24/24  0720 12/25/24  0634   INR 1.2* 1.1      No results for 
[x] Normal mental status exam  [] Drowsy  [] Confused  [] Alert and NAD []Other:  Cardiovascular: [x] Regular rate/rhythm  [] Arrhythmia  [] Other:   [] deferred  Chest: [x] Effort normal  []Lungs clear  [] Respiratory distress  []Tachypnea  [] Other:  Abdomen: [] Soft/non-tender  [] Normal appearance  [] Distended  [] Ascites  [] Multiple healed incisions, no umbilicus, visible peristalsis, cauliflower appearing abdomen  Neurological: [x] Normal speech  [] Normal sensation  []Deficits present: [] Unable to assess  Extremity: [] Normal skin color/temp  [] Clubbing/cyanosis  [x] No edema  [] Other:  Back: intact  Knees: without erythema   Hips: no visible abnormalities  Mobility: able to ambulate    Wt Readings from Last 15 Encounters:   12/31/24 93.8 kg (206 lb 12.7 oz)   09/07/24 93.1 kg (205 lb 4.8 oz)   02/10/23 78.3 kg (172 lb 9.6 oz)   09/08/22 89.1 kg (196 lb 6.9 oz)        Current Diet: ADULT DIET; Regular       PSYCHOSOCIAL/SPIRITUAL SCREENING:   Palliative IDT has assessed this patient for cultural preferences / practices and a referral made as appropriate to needs (Cultural Services, Patient Advocacy, Ethics, etc.)    Spiritual Affiliation: Rastafarian    Any spiritual / Oriental orthodox concerns:  [] Yes /  [x] No  [] Unable to obtain this information  If \"Yes\" to discuss with pastoral care during IDT     Does caregiver feel burdened by caring for their loved one:   [] Yes /  [] No /  [x] No Caregiver Present/Available [] No Caregiver [] Pt Lives at Facility  If \"Yes\" to discuss with social work during IDT    Anticipatory grief assessment:   [] Normal  / [] Maladaptive   [x] Unable to obtain this information  If \"Maladaptive\" to discuss with social work during IDT    ESAS Anxiety: Anxiety Score: Not anxious    ESAS Depression:          LAB AND IMAGING FINDINGS:   Objective data reviewed:  labs, images, records, medication use, vitals, and chart     FINAL COMMENTS   Thank you for allowing Palliative Medicine to 
appearing abdomen  Neurological: [x] Normal speech  [] Normal sensation  []Deficits present: [] Unable to assess  Extremity: [] Normal skin color/temp  [] Clubbing/cyanosis  [x] No edema  [] Other:  Back: intact  Knees: without erythema   Hips: no visible abnormalities  Mobility: able to ambulate    Wt Readings from Last 15 Encounters:   12/27/24 93.8 kg (206 lb 12.7 oz)   09/07/24 93.1 kg (205 lb 4.8 oz)   02/10/23 78.3 kg (172 lb 9.6 oz)   09/08/22 89.1 kg (196 lb 6.9 oz)        Current Diet: ADULT DIET; Regular; 1200 ml       PSYCHOSOCIAL/SPIRITUAL SCREENING:   Palliative IDT has assessed this patient for cultural preferences / practices and a referral made as appropriate to needs (Cultural Services, Patient Advocacy, Ethics, etc.)    Spiritual Affiliation: Mandaen    Any spiritual / Faith concerns:  [] Yes /  [x] No  [] Unable to obtain this information  If \"Yes\" to discuss with pastoral care during IDT     Does caregiver feel burdened by caring for their loved one:   [] Yes /  [] No /  [x] No Caregiver Present/Available [] No Caregiver [] Pt Lives at Facility  If \"Yes\" to discuss with social work during IDT    Anticipatory grief assessment:   [] Normal  / [] Maladaptive   [x] Unable to obtain this information  If \"Maladaptive\" to discuss with social work during IDT    ESAS Anxiety: Anxiety Score: Not anxious    ESAS Depression:          LAB AND IMAGING FINDINGS:   Objective data reviewed:  labs, images, records, medication use, vitals, and chart     FINAL COMMENTS   Thank you for allowing Palliative Medicine to participate in the care of Vahe Shaver.    Only check if applicable and billing time based rather than MDM  [] The total encounter time on this service date was ____ minutes which was spent performing a face-to-face encounter and personally completing the provider-level activities documented in the note. This includes time spent prior to the visit and after the visit in direct care of the 
follow up.      - nitrofurantoin, macrocrystal-monohydrate, (MACROBID) 100 MG capsule; Take 1 capsule by mouth 2 times daily for 5 days.  Dispense: 10 capsule; Refill: 0    2.  (genitourinary) symptoms  As above  - URINALYSIS DIPSTICK ONLY  - URINE CULTURE  Gonsalo Barker MD    
AC    tiZANidine (ZANAFLEX) tablet 2 mg  2 mg Oral Q8H PRN    sodium chloride flush 0.9 % injection 5-40 mL  5-40 mL IntraVENous 2 times per day    sodium chloride flush 0.9 % injection 5-40 mL  5-40 mL IntraVENous PRN    0.9 % sodium chloride infusion   IntraVENous PRN    potassium chloride (KLOR-CON) extended release tablet 40 mEq  40 mEq Oral PRN    Or    potassium bicarb-citric acid (EFFER-K) effervescent tablet 40 mEq  40 mEq Oral PRN    Or    potassium chloride 10 mEq/100 mL IVPB (Peripheral Line)  10 mEq IntraVENous PRN    magnesium sulfate 2000 mg in 50 mL IVPB premix  2,000 mg IntraVENous PRN    ondansetron (ZOFRAN-ODT) disintegrating tablet 4 mg  4 mg Oral Q8H PRN    Or    ondansetron (ZOFRAN) injection 4 mg  4 mg IntraVENous Q6H PRN    polyethylene glycol (GLYCOLAX) packet 17 g  17 g Oral Daily PRN    ipratropium 0.5 mg-albuterol 2.5 mg (DUONEB) nebulizer solution 1 Dose  1 Dose Inhalation Q4H PRN    diphenoxylate-atropine (LOMOTIL) 2.5-0.025 MG per tablet 1 tablet  1 tablet Oral 4x Daily PRN    octreotide (SANDOSTATIN) injection 50 mcg  50 mcg SubCUTAneous Q12H    LORazepam (ATIVAN) tablet 0.5 mg  0.5 mg Oral Q6H PRN    nicotine (NICODERM CQ) 21 MG/24HR 1 patch  1 patch TransDERmal Daily     ______________________________________________________________________  EXPECTED LENGTH OF STAY: 6  ACTUAL LENGTH OF STAY:          6                 JOSUE Hare NP     
providers based on care coordination needs.         Patients current active Medications were reviewed, considered, added and adjusted based on the clinical condition today.      Home Medications were reconciled to the best of my ability given all available resources at the time of admission. Route is PO if not otherwise noted.      Admission Status:[id number]:::1}      Medications Reviewed:     Current Facility-Administered Medications   Medication Dose Route Frequency    HYDROcodone-acetaminophen 2.5-108 mg/5 mL solution 15 mL  15 mL Oral Q4H PRN    HYDROmorphone HCl PF (DILAUDID) injection 2 mg  2 mg IntraVENous Q4H PRN    dicyclomine (BENTYL) tablet 20 mg  20 mg Oral Q6H    gabapentin (NEURONTIN) capsule 300 mg  300 mg Oral TID    memantine (NAMENDA) tablet 5 mg  5 mg Oral Daily    metoprolol tartrate (LOPRESSOR) tablet 12.5 mg  12.5 mg Oral Q12H    pantoprazole (PROTONIX) tablet 40 mg  40 mg Oral QAM AC    tiZANidine (ZANAFLEX) tablet 2 mg  2 mg Oral Q8H PRN    sodium chloride flush 0.9 % injection 5-40 mL  5-40 mL IntraVENous 2 times per day    sodium chloride flush 0.9 % injection 5-40 mL  5-40 mL IntraVENous PRN    0.9 % sodium chloride infusion   IntraVENous PRN    potassium chloride (KLOR-CON) extended release tablet 40 mEq  40 mEq Oral PRN    Or    potassium bicarb-citric acid (EFFER-K) effervescent tablet 40 mEq  40 mEq Oral PRN    Or    potassium chloride 10 mEq/100 mL IVPB (Peripheral Line)  10 mEq IntraVENous PRN    magnesium sulfate 2000 mg in 50 mL IVPB premix  2,000 mg IntraVENous PRN    enoxaparin Sodium (LOVENOX) injection 30 mg  30 mg SubCUTAneous BID    ondansetron (ZOFRAN-ODT) disintegrating tablet 4 mg  4 mg Oral Q8H PRN    Or    ondansetron (ZOFRAN) injection 4 mg  4 mg IntraVENous Q6H PRN    polyethylene glycol (GLYCOLAX) packet 17 g  17 g Oral Daily PRN    acetaminophen (TYLENOL) tablet 650 mg  650 mg Oral Q6H PRN    Or    acetaminophen (TYLENOL) suppository 650 mg  650 mg Rectal Q6H PRN    
PRN     ______________________________________________________________________  EXPECTED LENGTH OF STAY: 3  ACTUAL LENGTH OF STAY:          1                 JOSUE Rodriguez - CNP    
13    Occupational Therapy Evaluation Charge Determination   History Examination Decision-Making   LOW Complexity : Brief history review  LOW Complexity: 1-3 Performance deficits relating to physical, cognitive, or psychosocial skills that result in activity limitations and/or participation restrictions LOW Complexity: No comorbidities that affect functional and  no verbal  or physical assist needed to complete eval tasks   Based on the above components, the patient evaluation is determined to be of the following complexity level: Low

## 2024-12-31 NOTE — PLAN OF CARE
Problem: Discharge Planning  Goal: Discharge to home or other facility with appropriate resources  Outcome: Progressing  Flowsheets  Taken 12/28/2024 0904  Discharge to home or other facility with appropriate resources:   Identify barriers to discharge with patient and caregiver   Identify discharge learning needs (meds, wound care, etc)  Taken 12/28/2024 0800  Discharge to home or other facility with appropriate resources:   Identify barriers to discharge with patient and caregiver   Identify discharge learning needs (meds, wound care, etc)     Problem: Pain  Goal: Verbalizes/displays adequate comfort level or baseline comfort level  Outcome: Not Progressing     Problem: Pain  Goal: Verbalizes/displays adequate comfort level or baseline comfort level  Outcome: Not Progressing     
  Problem: Pain  Goal: Verbalizes/displays adequate comfort level or baseline comfort level  Outcome: Not Progressing     
  Problem: Safety - Adult  Goal: Free from fall injury  12/26/2024 2128 by Kaushik Guidry RN  Outcome: Progressing  12/26/2024 1715 by Jono West RN  Outcome: Progressing     Problem: Discharge Planning  Goal: Discharge to home or other facility with appropriate resources  12/26/2024 2128 by Kaushik Guidry RN  Outcome: Progressing  12/26/2024 1715 by Jono West RN  Outcome: Progressing     Problem: Pain  Goal: Verbalizes/displays adequate comfort level or baseline comfort level  12/26/2024 2128 by Kaushik Guidry RN  Outcome: Progressing  12/26/2024 1715 by Jono West RN  Outcome: Progressing     
  Problem: Safety - Adult  Goal: Free from fall injury  Outcome: Adequate for Discharge     Problem: Discharge Planning  Goal: Discharge to home or other facility with appropriate resources  Outcome: Adequate for Discharge     Problem: Pain  Goal: Verbalizes/displays adequate comfort level or baseline comfort level  Outcome: Adequate for Discharge     
  Problem: Safety - Adult  Goal: Free from fall injury  Outcome: Progressing     Problem: Discharge Planning  Goal: Discharge to home or other facility with appropriate resources  Outcome: Progressing     Problem: Pain  Goal: Verbalizes/displays adequate comfort level or baseline comfort level  Outcome: Progressing     
  Problem: Safety - Adult  Goal: Free from fall injury  Outcome: Progressing     Problem: Discharge Planning  Goal: Discharge to home or other facility with appropriate resources  Outcome: Progressing     Problem: Pain  Goal: Verbalizes/displays adequate comfort level or baseline comfort level  Outcome: Progressing     Problem: Physical Therapy - Adult  Goal: By Discharge: Performs mobility at highest level of function for planned discharge setting.  See evaluation for individualized goals.  Description: FUNCTIONAL STATUS PRIOR TO ADMISSION: Patient was modified independent using a rollator for functional mobility, but had a history of falls.    HOME SUPPORT PRIOR TO ADMISSION: The patient lived alone with mention of friends who can provide assistance.    Physical Therapy Goals  Initiated 12/27/2024  1.  Patient will move from supine to sit and sit to supine, scoot up and down, and roll side to side in bed with independence within 7 day(s).    2.  Patient will perform sit to stand with modified independence within 7 day(s).  3.  Patient will transfer from bed to chair and chair to bed with modified independence using the least restrictive device within 7 day(s).  4.  Patient will ambulate with modified independence for 200 feet with the least restrictive device within 7 day(s).   5.  Patient will ascend/descend 18 stairs with one handrail(s) with modified independence within 7 day(s).   12/27/2024 1203 by Leisa Phillips, PT  Outcome: Progressing     
  Problem: Safety - Adult  Goal: Free from fall injury  Outcome: Progressing     Problem: Discharge Planning  Goal: Discharge to home or other facility with appropriate resources  Outcome: Progressing  Flowsheets (Taken 12/23/2024 2000 by Cassie Barlow), RN)  Discharge to home or other facility with appropriate resources: Identify barriers to discharge with patient and caregiver     Problem: Pain  Goal: Verbalizes/displays adequate comfort level or baseline comfort level  Outcome: Progressing     
  Problem: Safety - Adult  Goal: Free from fall injury  Outcome: Progressing  Flowsheets (Taken 12/30/2024 0800)  Free From Fall Injury: Instruct family/caregiver on patient safety     Problem: Discharge Planning  Goal: Discharge to home or other facility with appropriate resources  Outcome: Progressing  Flowsheets (Taken 12/30/2024 0800)  Discharge to home or other facility with appropriate resources: Identify barriers to discharge with patient and caregiver     Problem: Pain  Goal: Verbalizes/displays adequate comfort level or baseline comfort level  Outcome: Progressing     
Patient continues to have pain. Medicated for pain PRN.  Up in chair with assists. Eating well.        Problem: Safety - Adult  Goal: Free from fall injury  Outcome: Progressing     Problem: Discharge Planning  Goal: Discharge to home or other facility with appropriate resources  Outcome: Progressing     Problem: Pain  Goal: Verbalizes/displays adequate comfort level or baseline comfort level  Outcome: Progressing     
Physical Therapy Mar 2014, [phone]) [phone]; DOI: 10.2522/ptj.18087226  2. [place]ell CAR, Demar J, Judit J, Kira HARRIS. Association of AM-PAC \"6-Clicks\" Basic Mobility and Daily Activity Scores With Discharge Destination. Phys Ther. 2021 4;101(4):krss454. doi: 10.1093/ptj/fmgg349. PMID: 87474820.  3. Kareem HARRIS, Marcelina D, Michell S, Pepper K, Claudia S. Activity Measure for Post-Acute Care \"6-Clicks\" Basic Mobility Scores Predict Discharge Destination After Acute Care Hospitalization in Select Patient Groups: A Retrospective, Observational Study. Arch Rehabil Res Clin Transl. 2022 16;4(3):195426. doi: 10.1016/j.arrct..143872. PMID: 27003131; PMCID: THI7892317.  4. Axel DAS, Tianna S, Fer W, Sapphire P. AM-PAC Short Forms Manual 4.0. Revised 2020.                                                                                                                                                                                                                              Pain Ratin/10   Pain Intervention(s):   nursing notified and addressing    Activity Tolerance:   Good and SpO2 stable on room air    After treatment:   Patient left in no apparent distress sitting up in chair, Call bell within reach, and Bed/ chair alarm activated    COMMUNICATION/EDUCATION:   The patient's plan of care was discussed with: occupational therapist and registered nurse    Patient Education  Education Given To: Patient  Education Provided: Role of Therapy;Fall Prevention Strategies;Plan of Care  Education Method: Verbal  Barriers to Learning:  (volition)  Education Outcome: Continued education needed    Thank you for this referral.  ELIANA MCKEON, PT  Minutes: 16      Physical Therapy Evaluation Charge Determination   History Examination Presentation Decision-Making   HIGH Complexity :3+ comorbidities / personal factors will impact the outcome/ POC  LOW Complexity : 1-2 Standardized tests and measures addressing body structure,

## 2024-12-31 NOTE — CARE COORDINATION
Transition of Care Plan:     RUR: 11%-Lo9w  Prior Level of Functioning: Independent  Disposition: [facility]  DARVIN:   If SNF or IPR: Date FOC offered:   Date FOC received:   Accepting facility:   Date authorization started with reference number:   Date authorization received and expires:   Follow up appointments:   DME needed: walker  Transportation at discharge:   IM/IMM Medicare/ letter given:   Is patient a [place] and connected with [facility]?   If yes, was  transfer form completed and VA notified?   Caregiver Contact:   Discharge Caregiver contacted prior to discharge?   Care Conference needed?   Barriers to discharge:   Update-[facility] has accepted for services. MAURY spoke with patient and he needs a bed and home oxygen. MAURY spoke with Mellissa from Hospice and they will order equipment and hopefully deliver to house today. Patient to discharge tomorrow at 1000 and they will meet at his home to admit to their services. Attending updated on details.     Update-12/31/24  Plan to discharge home tomorrow followed by [facility]. Home equipment will be delivered to the home today. MAURY spoke to Rudy [phone], who lives with patient and he will be there to receive the equipment today.attending updated.

## 2025-01-01 VITALS
OXYGEN SATURATION: 96 % | SYSTOLIC BLOOD PRESSURE: 114 MMHG | TEMPERATURE: 98.8 F | WEIGHT: 206.79 LBS | HEART RATE: 76 BPM | DIASTOLIC BLOOD PRESSURE: 67 MMHG | BODY MASS INDEX: 29.67 KG/M2 | RESPIRATION RATE: 18 BRPM

## 2025-01-01 LAB
GLUCOSE BLD STRIP.AUTO-MCNC: 113 MG/DL (ref 65–117)
SERVICE CMNT-IMP: NORMAL

## 2025-01-01 PROCEDURE — 6360000002 HC RX W HCPCS: Performed by: PHYSICAL MEDICINE & REHABILITATION

## 2025-01-01 PROCEDURE — 82962 GLUCOSE BLOOD TEST: CPT

## 2025-01-01 PROCEDURE — 6360000002 HC RX W HCPCS: Performed by: FAMILY MEDICINE

## 2025-01-01 PROCEDURE — 6370000000 HC RX 637 (ALT 250 FOR IP): Performed by: PHYSICAL MEDICINE & REHABILITATION

## 2025-01-01 PROCEDURE — 6370000000 HC RX 637 (ALT 250 FOR IP): Performed by: NURSE PRACTITIONER

## 2025-01-01 PROCEDURE — 6370000000 HC RX 637 (ALT 250 FOR IP)

## 2025-01-01 PROCEDURE — 6370000000 HC RX 637 (ALT 250 FOR IP): Performed by: INTERNAL MEDICINE

## 2025-01-01 PROCEDURE — 6360000002 HC RX W HCPCS: Performed by: INTERNAL MEDICINE

## 2025-01-01 PROCEDURE — 94760 N-INVAS EAR/PLS OXIMETRY 1: CPT

## 2025-01-01 RX ADMIN — HYDROMORPHONE HYDROCHLORIDE 1 MG: 1 INJECTION, SOLUTION INTRAMUSCULAR; INTRAVENOUS; SUBCUTANEOUS at 04:33

## 2025-01-01 RX ADMIN — Medication 5 MG: at 09:39

## 2025-01-01 RX ADMIN — ENOXAPARIN SODIUM 40 MG: 100 INJECTION SUBCUTANEOUS at 09:38

## 2025-01-01 RX ADMIN — GABAPENTIN 300 MG: 100 CAPSULE ORAL at 09:39

## 2025-01-01 RX ADMIN — DICYCLOMINE HYDROCHLORIDE 20 MG: 20 TABLET ORAL at 04:32

## 2025-01-01 RX ADMIN — ACETAMINOPHEN 650 MG: 650 SOLUTION ORAL at 09:39

## 2025-01-01 RX ADMIN — DICYCLOMINE HYDROCHLORIDE 20 MG: 20 TABLET ORAL at 09:39

## 2025-01-01 RX ADMIN — PANTOPRAZOLE SODIUM 40 MG: 40 TABLET, DELAYED RELEASE ORAL at 06:51

## 2025-01-01 RX ADMIN — Medication 10 MG: at 11:08

## 2025-01-01 RX ADMIN — HYDROMORPHONE HYDROCHLORIDE 1 MG: 1 INJECTION, SOLUTION INTRAMUSCULAR; INTRAVENOUS; SUBCUTANEOUS at 01:31

## 2025-01-01 RX ADMIN — ACETAMINOPHEN 650 MG: 650 SOLUTION ORAL at 04:32

## 2025-01-01 RX ADMIN — MEMANTINE 5 MG: 5 TABLET ORAL at 09:43

## 2025-01-01 RX ADMIN — Medication 10 MG: at 06:51

## 2025-01-01 RX ADMIN — OCTREOTIDE ACETATE 50 MCG: 50 INJECTION, SOLUTION INTRAVENOUS; SUBCUTANEOUS at 09:38

## 2025-01-01 RX ADMIN — Medication 10 MG: at 03:20

## 2025-01-01 RX ADMIN — DOCUSATE SODIUM 100 MG: 100 CAPSULE, LIQUID FILLED ORAL at 09:39

## 2025-01-01 RX ADMIN — METOPROLOL TARTRATE 12.5 MG: 25 TABLET, FILM COATED ORAL at 04:37

## 2025-01-01 RX ADMIN — HYDROMORPHONE HYDROCHLORIDE 1 MG: 1 INJECTION, SOLUTION INTRAMUSCULAR; INTRAVENOUS; SUBCUTANEOUS at 09:40

## 2025-01-01 ASSESSMENT — PAIN SCALES - GENERAL
PAINLEVEL_OUTOF10: 9
PAINLEVEL_OUTOF10: 9
PAINLEVEL_OUTOF10: 3
PAINLEVEL_OUTOF10: 7
PAINLEVEL_OUTOF10: 10

## 2025-01-01 NOTE — CARE COORDINATION
Signed         Transition of Care Plan:     RUR: 11%-Lo9w  Prior Level of Functioning: Independent  Disposition: [facility]  DARVIN:   If SNF or IPR: Date FOC offered:   Date FOC received:   Accepting facility:   Date authorization started with reference number:   Date authorization received and expires:   Follow up appointments:   DME needed: walker  Transportation at discharge:   IM/IMM Medicare/ letter given:   Is patient a  and connected with VA?   If yes, was [place] transfer form completed and VA notified?   Caregiver Contact:   Discharge Caregiver contacted prior to discharge?   Care Conference needed?   Barriers to discharge:   Update-Located [facility] has accepted for services. CM spoke with patient and he needs a bed and home oxygen. CM spoke with Mellissa from Hospice and they will order equipment and hopefully deliver to house today. Patient to discharge tomorrow at 1000 and they will meet at his home to admit to their services. Attending updated on details.               pdate-12/31/24  Plan to discharge home tomorrow followed by [facility]. Home equipment will be delivered to the home today. CM spoke to Rudy [phone], who lives with patient and he will be there to receive the equipment today.attending updated.                 Update-1/1/25  Plan to discharge home today with [facility]. CM spoke with hospice liaison Emi [phone] and they can admit to their service today. MAURY confirmed with son Rudy and equipment was delivered to the home yesterday.   Transport with [facility] for 1230. MAURY updated Emi liaison [phone] from [facility].

## 2025-01-01 NOTE — DISCHARGE SUMMARY
Discharge Summary       PATIENT ID: Vahe Shaver  MRN: [id number]   YOB: 1960    DATE OF ADMISSION: 12/22/2024  7:15 PM    DATE OF DISCHARGE: 1/1/2025   PRIMARY CARE PROVIDER: Ryan Gunn MD     ATTENDING PHYSICIAN:  Kay Brock MD  DISCHARGING PROVIDER: Kay Brock MD    To contact this individual call [phone] and ask the  to page.  If unavailable ask to be transferred the Adult Hospitalist Department.    CONSULTATIONS:   IP CONSULT TO HOSPITALIST  IP CONSULT TO PALLIATIVE CARE  IP CONSULT TO ONCOLOGY  IP CONSULT TO INFECTIOUS DISEASES  IP CONSULT TO CASE MANAGEMENT  IP CONSULT TO CASE MANAGEMENT    PROCEDURES/SURGERIES: * No surgery found *    ADMITTING DIAGNOSES & HOSPITAL COURSE:      \"Vahe Shaver is a 64 y.o. male with past medical history significant for Crohn's disease, chronic pain syndrome, hypertension, COPD presented at the emergency room with abdominal pain.  Patient reported multiple falls at home due to lower extremity weakness.  Following the most recent fall patient developed right hip pain which is constant with radiation to the abdomen, sharp, 8/10 in severity, no known aggravating or relieving factors.  Patient denies  fever, rigors and chills.  Patient was last admitted to this hospital from 9/6/2024 to 9/10/2024 patient was admitted and treated for Crohn's disease exacerbation.  CT scan of the abdomen and pelvis done on arrival at the emergency room showed new masslike hepatic hypodensities suspicious for metastatic disease/hepatic abscess.  Patient was started on vancomycin and Zosyn and was referred to the hospitalist service for admission.\"    Transitioned to [facility]  -home equipment was delivered and   -discharge home with hospice care team   Multiple liver lesions, R lung mass, adenopathy, and bone metastasis  Stage IV pancreaticobiliary adenocarcinoma   - MRI abdomen: Osseous metastatic disease, lesion in inferior

## 2025-01-01 NOTE — DISCHARGE INSTRUCTIONS
SNF/Inpatient Rehab/LTAC    Independent/assisted living   X Home Hospice    Other:     CDMP Checked:   Yes x     PROBLEM LIST Updated:  Yes x       Signed:   Kay Brock MD  1/1/2025  11:19 AM

## 2025-01-04 ENCOUNTER — HOSPITAL ENCOUNTER (EMERGENCY)
Facility: HOSPITAL | Age: 65
Discharge: HOME | End: 2025-01-04
Attending: EMERGENCY MEDICINE
Payer: MEDICARE

## 2025-01-04 VITALS
SYSTOLIC BLOOD PRESSURE: 127 MMHG | DIASTOLIC BLOOD PRESSURE: 82 MMHG | OXYGEN SATURATION: 90 % | TEMPERATURE: 97.6 F | HEART RATE: 116 BPM | RESPIRATION RATE: 16 BRPM

## 2025-01-04 DIAGNOSIS — R11.2 NAUSEA VOMITING AND DIARRHEA: Primary | ICD-10-CM

## 2025-01-04 DIAGNOSIS — R19.7 NAUSEA VOMITING AND DIARRHEA: Primary | ICD-10-CM

## 2025-01-04 DIAGNOSIS — Z51.5 HOSPICE CARE PATIENT: ICD-10-CM

## 2025-01-04 PROCEDURE — 96374 THER/PROPH/DIAG INJ IV PUSH: CPT

## 2025-01-04 PROCEDURE — 99284 EMERGENCY DEPT VISIT MOD MDM: CPT

## 2025-01-04 PROCEDURE — 6360000002 HC RX W HCPCS: Performed by: EMERGENCY MEDICINE

## 2025-01-04 PROCEDURE — 2580000003 HC RX 258: Performed by: EMERGENCY MEDICINE

## 2025-01-04 PROCEDURE — 96361 HYDRATE IV INFUSION ADD-ON: CPT

## 2025-01-04 PROCEDURE — 96375 TX/PRO/DX INJ NEW DRUG ADDON: CPT

## 2025-01-04 RX ORDER — 0.9 % SODIUM CHLORIDE 0.9 %
1000 INTRAVENOUS SOLUTION INTRAVENOUS ONCE
Status: COMPLETED | OUTPATIENT
Start: 2025-01-04 | End: 2025-01-04

## 2025-01-04 RX ORDER — MORPHINE SULFATE 4 MG/ML
4 INJECTION, SOLUTION INTRAMUSCULAR; INTRAVENOUS ONCE
Status: COMPLETED | OUTPATIENT
Start: 2025-01-04 | End: 2025-01-04

## 2025-01-04 RX ORDER — METOCLOPRAMIDE HYDROCHLORIDE 5 MG/ML
10 INJECTION INTRAMUSCULAR; INTRAVENOUS ONCE
Status: COMPLETED | OUTPATIENT
Start: 2025-01-04 | End: 2025-01-04

## 2025-01-04 RX ORDER — PROCHLORPERAZINE EDISYLATE 5 MG/ML
10 INJECTION INTRAMUSCULAR; INTRAVENOUS ONCE
Status: COMPLETED | OUTPATIENT
Start: 2025-01-04 | End: 2025-01-04

## 2025-01-04 RX ORDER — HYDROMORPHONE HYDROCHLORIDE 1 MG/ML
1 INJECTION, SOLUTION INTRAMUSCULAR; INTRAVENOUS; SUBCUTANEOUS
Status: COMPLETED | OUTPATIENT
Start: 2025-01-04 | End: 2025-01-04

## 2025-01-04 RX ADMIN — HYDROMORPHONE HYDROCHLORIDE 1 MG: 1 INJECTION, SOLUTION INTRAMUSCULAR; INTRAVENOUS; SUBCUTANEOUS at 06:27

## 2025-01-04 RX ADMIN — MORPHINE SULFATE 4 MG: 4 INJECTION, SOLUTION INTRAMUSCULAR; INTRAVENOUS at 04:16

## 2025-01-04 RX ADMIN — PROCHLORPERAZINE EDISYLATE 10 MG: 5 INJECTION INTRAMUSCULAR; INTRAVENOUS at 04:15

## 2025-01-04 RX ADMIN — METOCLOPRAMIDE 10 MG: 5 INJECTION, SOLUTION INTRAMUSCULAR; INTRAVENOUS at 08:08

## 2025-01-04 RX ADMIN — SODIUM CHLORIDE 1000 ML: 9 INJECTION, SOLUTION INTRAVENOUS at 04:36

## 2025-01-04 ASSESSMENT — PAIN SCALES - GENERAL: PAINLEVEL_OUTOF10: 5

## 2025-01-04 ASSESSMENT — PAIN - FUNCTIONAL ASSESSMENT: PAIN_FUNCTIONAL_ASSESSMENT: 0-10

## 2025-01-04 ASSESSMENT — PAIN DESCRIPTION - LOCATION: LOCATION: GENERALIZED

## 2025-01-04 NOTE — ED PROVIDER NOTES
[facility] EMERGENCY DEP  EMERGENCY DEPARTMENT ENCOUNTER      Pt Name: Vahe Shaver  MRN: [id number]  Birthdate 1960  Date of evaluation: 1/4/2025  Provider: Ezekiel Tomlinson MD    CHIEF COMPLAINT       Chief Complaint   Patient presents with    Vomiting         HISTORY OF PRESENT ILLNESS   (Location/Symptom, Timing/Onset, Context/Setting, Quality, Duration, Modifying Factors, Severity)  Note limiting factors.   64M w/ hx Crohns disease, short gut syndrome, metastatic pancreatic cancer p/w 1d vomiting. Pt is currently receiving hospice care at home. Was sent to ED by hospice RN after multiple episodes of bilious vomiting unable to be controlled w/ meds at home. Pt reports pain throughout his body. Pt and family not wanting any interventions besides palliative care.            Review of External Medical Records:     Nursing Notes were reviewed.    REVIEW OF SYSTEMS    (2-9 systems for level 4, 10 or more for level 5)     Review of Systems   Unable to perform ROS: Acuity of condition       Except as noted above the remainder of the review of systems was reviewed and negative.       PAST MEDICAL HISTORY     Past Medical History:   Diagnosis Date    Abdominal wall hernia 6/15/2012    Anal fissure     Anemia     Anemia     Anxiety and depression     Arthritis     Related to the Crohn's disease.    Cancer (HCC)     MELANOMA HEAD AND FACE    Chronic pain     GENERALIZED    COPD (chronic obstructive pulmonary disease) (HCC)     Crohn disease (HCC)     Diagnosed at age 18.    Echocardiogram abnormal 07/2015    Esophageal ulcer     GERD (gastroesophageal reflux disease)     H/O blood transfusion reaction 2013    [facility]    Hypertension     denies    Migraine     Short bowel syndrome     Ventral hernia without obstruction or gangrene          SURGICAL HISTORY       Past Surgical History:   Procedure Laterality Date    APPENDECTOMY      CHOLECYSTECTOMY      COLONOSCOPY N/A 10/08/2019    COLONOSCOPY  performed by Gael Couch MD at [facility] ENDOSCOPY    FLEXIBLE SIGMOIDOSCOPY N/A 09/01/2022    SIGMOIDOSCOPY FLEXIBLE performed by Gayla West MD at [facility] ENDOSCOPY    GI      colectomy x2, one ileostomy    GI  07/28/2014    Exploratory laparotomy, partial colectomy, and creation of end ileostomy; Dr Lorenzo Wilcox.    HEENT      neck fusion    ORTHOPEDIC SURGERY  1980s    wrist right,     ORTHOPEDIC SURGERY  2006, 11/2013    neck, L4 L5 L6    ORTHOPEDIC SURGERY  1990s    right knee scope    OTHER SURGICAL HISTORY  7/14/2015    Cystoscopy and placement of bilateral ureteral catheters; Eamon Hernandez MD.    OTHER SURGICAL HISTORY  07/14/2015    Ileostomy takedown with extensive lysis of adhesions, small bowel resection, and enterocolostomy; Dr. Burris.    OTHER SURGICAL HISTORY  09/29/2015    CT-guided percutaneous drainage of intraabdominal abscess; Dr. Willson.    OTHER SURGICAL HISTORY  11/16/2015    CT-guided percutaneous aspiration of abdominal wall cavity; Dr. Adames.    OTHER SURGICAL HISTORY  12/01/2015    Incision and drainage of abdominal wall abscess; Dr. Burris.    OTHER SURGICAL HISTORY  02/11/2016    Incision and drainage of abdominal wall abscess; Dr. Burris.    OTHER SURGICAL HISTORY  2/23/2016    Cystoscopy and placement of bilateral temporary ureteral catheters; Dr. Conde.    OTHER SURGICAL HISTORY  02/23/2016    Exploratory laparotomy, extensive lysis of adhesions, removal of mesh, and repair of enterocutaneous fistula; Dr. Burris.    OTHER SURGICAL HISTORY  11/03/2017    Exploratory laparotomy, extensive lysis of adhesions, and reduction of intussusception; Dr. Burris.    OTHER SURGICAL HISTORY      surgical repair from spider bite    OTHER SURGICAL HISTORY  12/01/2014    Incision and drainage of posterior right subcutaneous shoulder abscess    OTHER SURGICAL HISTORY  2014    LEFT INDEX FINGER SPIDER BITE    OTHER SURGICAL HISTORY  2014    RECTAL FISTULA     No (0)

## 2025-01-04 NOTE — ED TRIAGE NOTES
Patient arrives from home with c/o emesis since Friday. Hospice patient and does not want any interventions besides IV for emesis control.

## 2025-01-06 LAB
BACTERIA SPEC CULT: NORMAL
SERVICE CMNT-IMP: NORMAL

## 2025-01-13 LAB
BACTERIA SPEC CULT: NORMAL
SERVICE CMNT-IMP: NORMAL

## 2025-01-20 LAB
BACTERIA SPEC CULT: NORMAL
SERVICE CMNT-IMP: NORMAL

## 2025-01-27 LAB
BACTERIA SPEC CULT: NORMAL
SERVICE CMNT-IMP: NORMAL

## (undated) DEVICE — POSITIONER HD REST FOAM CMFRT TCH

## (undated) DEVICE — TUBING HYDR IRR --

## (undated) DEVICE — PENCIL SMK EVAC 10 FT BLADE ELECTRD ROCKER FOR TELSCP

## (undated) DEVICE — SLIM BODY SKIN STAPLER: Brand: APPOSE ULC

## (undated) DEVICE — SUTURE PERMAHAND SZ 2-0 L30IN NONABSORBABLE BLK SILK W/O A305H

## (undated) DEVICE — REM POLYHESIVE ADULT PATIENT RETURN ELECTRODE: Brand: VALLEYLAB

## (undated) DEVICE — SPONGE GZ W4XL4IN COT 12 PLY TYP VII WVN C FLD DSGN STERILE

## (undated) DEVICE — POOLE SUCTION INSTRUMENT WITH REMOVABLE SHEATH: Brand: POOLE

## (undated) DEVICE — SURGICAL PROCEDURE PACK BASIN MAJ SET CUST NO CAUT

## (undated) DEVICE — PAD,ABDOMINAL,5"X9",ST,LF,25/BX: Brand: MEDLINE INDUSTRIES, INC.

## (undated) DEVICE — CATHETER IV 16GA L1 1/4IN PERIPH GRY S STL FEP PLAS HUB THN

## (undated) DEVICE — TOWEL,OR,DSP,ST,BLUE,STD,4/PK,20PK/CS: Brand: MEDLINE

## (undated) DEVICE — INFECTION CONTROL KIT SYS

## (undated) DEVICE — DRAPE FLD WRM W44XL66IN C6L FOR INTRATEMP SYS THERMABASIN

## (undated) DEVICE — SKIN TEMPERATURE SENSOR: Brand: DEROYAL

## (undated) DEVICE — DRESSING,GAUZE,XEROFORM,CURAD,5"X9",ST: Brand: CURAD

## (undated) DEVICE — TOWEL SURG W17XL27IN STD BLU COT NONFENESTRATED PREWASHED

## (undated) DEVICE — SYRINGE MED 10ML LUERLOCK TIP W/O SFTY DISP

## (undated) DEVICE — GLOVE SURG SZ 75 L1212IN FNGR THK138MIL BRN LTX FREE

## (undated) DEVICE — FORCEPS BX L240CM JAW DIA2.8MM L CAP W/ NDL MIC MESH TOOTH

## (undated) DEVICE — SKIN PREP TRAY 4 COMPARTM TRAY: Brand: MEDLINE INDUSTRIES, INC.

## (undated) DEVICE — SURGIFOAM SPNG SZ 12-7

## (undated) DEVICE — HYPODERMIC SAFETY NEEDLE: Brand: MAGELLAN

## (undated) DEVICE — TAPE,CLOTH/SILK,CURAD,3"X10YD,LF,40/CS: Brand: CURAD

## (undated) DEVICE — PACK,BASIC,SIRUS,V: Brand: MEDLINE

## (undated) DEVICE — SUTURE VCRL SZ 0 L27IN ABSRB VLT L36MM CT-1 1/2 CIR J340H

## (undated) DEVICE — GARMENT,MEDLINE,DVT,INT,CALF,MED, GEN2: Brand: MEDLINE

## (undated) DEVICE — LIGHT HANDLE: Brand: DEVON

## (undated) DEVICE — DEVON™ KNEE AND BODY STRAP 60" X 3" (1.5 M X 7.6 CM): Brand: DEVON

## (undated) DEVICE — MASTISOL ADHESIVE LIQ 2/3ML

## (undated) DEVICE — CATH SUC CTRL PRT TRIFLO 14FR --

## (undated) DEVICE — PREMIUM WET SKIN PREP TRAY: Brand: MEDLINE INDUSTRIES, INC.

## (undated) DEVICE — SUTURE PDS II SZ 1 L36IN ABSRB VLT L48MM CTX 1/2 CIR Z371T

## (undated) DEVICE — X-RAY DETECTABLE SPONGES,16 PLY: Brand: VISTEC

## (undated) DEVICE — TRAY CATH OD16FR SIL URIN M STATLOK STBL DEV SURSTP

## (undated) DEVICE — (D)PREP SKN CHLRAPRP APPL 26ML -- CONVERT TO ITEM 371833

## (undated) DEVICE — ELECTRODE PT RET AD L9FT HI MOIST COND ADH HYDRGEL CORDED

## (undated) DEVICE — SURGICAL PROCEDURE PACK TISS 3X5 IN ABSORBABLE SEPRAFILM

## (undated) DEVICE — MAX-CORE® DISPOSABLE CORE BIOPSY INSTRUMENT, 16G X 16CM: Brand: MAX-CORE

## (undated) DEVICE — SOLUTION IV 1000ML 0.9% SOD CHL

## (undated) DEVICE — BASIN ST MAJOR-NO CAUTERY: Brand: MEDLINE INDUSTRIES, INC.

## (undated) DEVICE — DRAPE,LAPAROTOMY,T,PEDI,STERILE: Brand: MEDLINE

## (undated) DEVICE — DBD-PACK,LAPAROTOMY,2 REINFORCED GOWNS: Brand: MEDLINE

## (undated) DEVICE — BLADE ELECTRODE: Brand: EDGE

## (undated) DEVICE — WRAP SURG W1.31XL1.34M CARD FOR PT 165-172CM THERMOWRP

## (undated) DEVICE — ROCKER SWITCH PENCIL BLADE ELECTRODE, HOLSTER: Brand: EDGE

## (undated) DEVICE — SYR 10ML LUER LOK 1/5ML GRAD --

## (undated) DEVICE — KENDALL SCD EXPRESS SLEEVES, KNEE LENGTH, MEDIUM: Brand: KENDALL SCD

## (undated) DEVICE — SUTURE CHROMIC GUT SZ 2-0 L27IN ABSRB BRN L26MM SH 1/2 CIR G123H

## (undated) DEVICE — SOL TOP ADH MASTISOL 2/3ML VI -- NDC#00496052348

## (undated) DEVICE — GAUZE SPONGES,12 PLY: Brand: CURITY

## (undated) DEVICE — SPONGE LAP 18X18IN STRL -- 5/PK

## (undated) DEVICE — FCPS RAD JAW 4 JMB 240CM W/NDL -- BX/20

## (undated) DEVICE — TRAY BX SFT TISS W/ RUL ALC PREP PD FEN DRP TWL LUERLOCK